# Patient Record
Sex: FEMALE | Race: BLACK OR AFRICAN AMERICAN | NOT HISPANIC OR LATINO | Employment: UNEMPLOYED | URBAN - METROPOLITAN AREA
[De-identification: names, ages, dates, MRNs, and addresses within clinical notes are randomized per-mention and may not be internally consistent; named-entity substitution may affect disease eponyms.]

---

## 2017-06-28 ENCOUNTER — HOSPITAL ENCOUNTER (EMERGENCY)
Facility: HOSPITAL | Age: 27
Discharge: HOME | End: 2017-06-28
Attending: EMERGENCY MEDICINE
Payer: MEDICAID

## 2017-06-28 VITALS
DIASTOLIC BLOOD PRESSURE: 74 MMHG | TEMPERATURE: 98 F | HEART RATE: 71 BPM | BODY MASS INDEX: 39.85 KG/M2 | OXYGEN SATURATION: 99 % | HEIGHT: 60 IN | WEIGHT: 203 LBS | SYSTOLIC BLOOD PRESSURE: 128 MMHG | RESPIRATION RATE: 20 BRPM

## 2017-06-28 DIAGNOSIS — S05.01XA CORNEAL ABRASION, RIGHT, INITIAL ENCOUNTER: Primary | ICD-10-CM

## 2017-06-28 LAB
B-HCG UR QL: NEGATIVE
CTP QC/QA: YES

## 2017-06-28 PROCEDURE — 81025 URINE PREGNANCY TEST: CPT | Performed by: NURSE PRACTITIONER

## 2017-06-28 PROCEDURE — 25000003 PHARM REV CODE 250: Performed by: NURSE PRACTITIONER

## 2017-06-28 PROCEDURE — 99283 EMERGENCY DEPT VISIT LOW MDM: CPT

## 2017-06-28 RX ORDER — ERYTHROMYCIN 5 MG/G
OINTMENT OPHTHALMIC
Qty: 1 TUBE | Refills: 0 | Status: SHIPPED | OUTPATIENT
Start: 2017-06-28 | End: 2018-06-01 | Stop reason: CLARIF

## 2017-06-28 RX ORDER — TETRACAINE HYDROCHLORIDE 5 MG/ML
2 SOLUTION OPHTHALMIC
Status: COMPLETED | OUTPATIENT
Start: 2017-06-28 | End: 2017-06-28

## 2017-06-28 RX ADMIN — FLUORESCEIN SODIUM 1 STRIP: 1 STRIP OPHTHALMIC at 05:06

## 2017-06-28 RX ADMIN — TETRACAINE HYDROCHLORIDE 2 DROP: 5 SOLUTION OPHTHALMIC at 05:06
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
## 2017-06-28 NOTE — DISCHARGE INSTRUCTIONS
You're being discharged with a diagnosis of corneal abrasion.  This is caused by a scratch to the surface of the eye.  Try not to rub or touch the eye surface.  Apply the eye ointment as prescribed and see an optometrist as soon as possible for continuing care.  If the other eye becomes irritated or begins to have similar symptoms return to the emergency Department or see her primary care provider.
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
## 2017-06-30 ENCOUNTER — OFFICE VISIT (OUTPATIENT)
Dept: OPTOMETRY | Facility: CLINIC | Age: 27
End: 2017-06-30
Payer: MEDICAID

## 2017-06-30 DIAGNOSIS — H11.31 SUBCONJUNCTIVAL HEMORRHAGE, RIGHT: ICD-10-CM

## 2017-06-30 DIAGNOSIS — H20.9 TRAUMATIC IRITIS: Primary | ICD-10-CM

## 2017-06-30 PROCEDURE — 99212 OFFICE O/P EST SF 10 MIN: CPT | Mod: PBBFAC,PO | Performed by: OPTOMETRIST

## 2017-06-30 PROCEDURE — 92004 COMPRE OPH EXAM NEW PT 1/>: CPT | Mod: S$PBB,,, | Performed by: OPTOMETRIST

## 2017-06-30 PROCEDURE — 99999 PR PBB SHADOW E&M-EST. PATIENT-LVL II: CPT | Mod: PBBFAC,,, | Performed by: OPTOMETRIST

## 2017-06-30 RX ORDER — PREDNISOLONE ACETATE 10 MG/ML
1 SUSPENSION/ DROPS OPHTHALMIC 4 TIMES DAILY
Qty: 5 ML | Refills: 0 | Status: SHIPPED | OUTPATIENT
Start: 2017-06-30 | End: 2017-07-07
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
## 2018-06-01 ENCOUNTER — HOSPITAL ENCOUNTER (EMERGENCY)
Facility: HOSPITAL | Age: 28
Discharge: HOME | End: 2018-06-01
Attending: EMERGENCY MEDICINE
Payer: MEDICAID

## 2018-06-01 VITALS
HEART RATE: 74 BPM | DIASTOLIC BLOOD PRESSURE: 71 MMHG | WEIGHT: 179 LBS | TEMPERATURE: 99 F | OXYGEN SATURATION: 100 % | SYSTOLIC BLOOD PRESSURE: 112 MMHG | RESPIRATION RATE: 20 BRPM | BODY MASS INDEX: 34.96 KG/M2

## 2018-06-01 DIAGNOSIS — Z32.01 PREGNANCY EXAMINATION OR TEST, POSITIVE RESULT: ICD-10-CM

## 2018-06-01 DIAGNOSIS — D64.9 ANEMIA, UNSPECIFIED TYPE: Primary | ICD-10-CM

## 2018-06-01 DIAGNOSIS — M25.50 ARTHRALGIA, UNSPECIFIED JOINT: ICD-10-CM

## 2018-06-01 DIAGNOSIS — O21.0 MILD HYPEREMESIS GRAVIDARUM, ANTEPARTUM: ICD-10-CM

## 2018-06-01 DIAGNOSIS — H10.9 CONJUNCTIVITIS OF RIGHT EYE, UNSPECIFIED CONJUNCTIVITIS TYPE: ICD-10-CM

## 2018-06-01 LAB
ALBUMIN SERPL BCP-MCNC: 3.5 G/DL
ALP SERPL-CCNC: 71 U/L
ALT SERPL W/O P-5'-P-CCNC: 6 U/L
ANION GAP SERPL CALC-SCNC: 9 MMOL/L
ANISOCYTOSIS BLD QL SMEAR: ABNORMAL
AST SERPL-CCNC: 13 U/L
B-HCG UR QL: NEGATIVE
BACTERIA #/AREA URNS HPF: ABNORMAL /HPF
BASOPHILS # BLD AUTO: 0.02 K/UL
BASOPHILS NFR BLD: 0.3 %
BILIRUB SERPL-MCNC: 0.2 MG/DL
BILIRUB UR QL STRIP: NEGATIVE
BUN SERPL-MCNC: 8 MG/DL
CALCIUM SERPL-MCNC: 9.3 MG/DL
CHLORIDE SERPL-SCNC: 104 MMOL/L
CLARITY UR: ABNORMAL
CO2 SERPL-SCNC: 24 MMOL/L
COLOR UR: YELLOW
CREAT SERPL-MCNC: 0.7 MG/DL
CTP QC/QA: YES
DIFFERENTIAL METHOD: ABNORMAL
EOSINOPHIL # BLD AUTO: 0.2 K/UL
EOSINOPHIL NFR BLD: 2.9 %
ERYTHROCYTE [DISTWIDTH] IN BLOOD BY AUTOMATED COUNT: 22.1 %
EST. GFR  (AFRICAN AMERICAN): >60 ML/MIN/1.73 M^2
EST. GFR  (NON AFRICAN AMERICAN): >60 ML/MIN/1.73 M^2
GLUCOSE SERPL-MCNC: 88 MG/DL
GLUCOSE UR QL STRIP: NEGATIVE
HCT VFR BLD AUTO: 25.6 %
HGB BLD-MCNC: 8 G/DL
HGB UR QL STRIP: NEGATIVE
HYPOCHROMIA BLD QL SMEAR: ABNORMAL
KETONES UR QL STRIP: NEGATIVE
LEUKOCYTE ESTERASE UR QL STRIP: ABNORMAL
LIPASE SERPL-CCNC: 14 U/L
LYMPHOCYTES # BLD AUTO: 2.6 K/UL
LYMPHOCYTES NFR BLD: 32.8 %
MCH RBC QN AUTO: 15.4 PG
MCHC RBC AUTO-ENTMCNC: 31.3 G/DL
MCV RBC AUTO: 49 FL
MICROSCOPIC COMMENT: ABNORMAL
MONOCYTES # BLD AUTO: 0.6 K/UL
MONOCYTES NFR BLD: 8 %
NEUTROPHILS # BLD AUTO: 4.5 K/UL
NEUTROPHILS NFR BLD: 56 %
NITRITE UR QL STRIP: NEGATIVE
OVALOCYTES BLD QL SMEAR: ABNORMAL
PH UR STRIP: 6 [PH] (ref 5–8)
PLATELET # BLD AUTO: 434 K/UL
PMV BLD AUTO: ABNORMAL FL
POIKILOCYTOSIS BLD QL SMEAR: SLIGHT
POTASSIUM SERPL-SCNC: 3.8 MMOL/L
PROT SERPL-MCNC: 8.3 G/DL
PROT UR QL STRIP: NEGATIVE
RBC # BLD AUTO: 5.2 M/UL
RBC #/AREA URNS HPF: 1 /HPF (ref 0–4)
SCHISTOCYTES BLD QL SMEAR: ABNORMAL
SODIUM SERPL-SCNC: 137 MMOL/L
SP GR UR STRIP: 1.02 (ref 1–1.03)
SQUAMOUS #/AREA URNS HPF: 2 /HPF
URN SPEC COLLECT METH UR: ABNORMAL
UROBILINOGEN UR STRIP-ACNC: NEGATIVE EU/DL
WBC # BLD AUTO: 8 K/UL
WBC #/AREA URNS HPF: 5 /HPF (ref 0–5)

## 2018-06-01 PROCEDURE — 81000 URINALYSIS NONAUTO W/SCOPE: CPT

## 2018-06-01 PROCEDURE — 83690 ASSAY OF LIPASE: CPT

## 2018-06-01 PROCEDURE — 63600175 PHARM REV CODE 636 W HCPCS: Performed by: PHYSICIAN ASSISTANT

## 2018-06-01 PROCEDURE — 99284 EMERGENCY DEPT VISIT MOD MDM: CPT | Mod: 25

## 2018-06-01 PROCEDURE — 80053 COMPREHEN METABOLIC PANEL: CPT

## 2018-06-01 PROCEDURE — 96374 THER/PROPH/DIAG INJ IV PUSH: CPT

## 2018-06-01 PROCEDURE — 81025 URINE PREGNANCY TEST: CPT | Performed by: NURSE PRACTITIONER

## 2018-06-01 PROCEDURE — 85025 COMPLETE CBC W/AUTO DIFF WBC: CPT

## 2018-06-01 RX ORDER — MELOXICAM 7.5 MG/1
7.5 TABLET ORAL DAILY
Qty: 30 TABLET | Refills: 0 | Status: SHIPPED | OUTPATIENT
Start: 2018-06-01 | End: 2020-03-23

## 2018-06-01 RX ORDER — KETOROLAC TROMETHAMINE 30 MG/ML
15 INJECTION, SOLUTION INTRAMUSCULAR; INTRAVENOUS
Status: COMPLETED | OUTPATIENT
Start: 2018-06-01 | End: 2018-06-01

## 2018-06-01 RX ORDER — ERYTHROMYCIN 5 MG/G
OINTMENT OPHTHALMIC
Qty: 1 TUBE | Refills: 0 | Status: SHIPPED | OUTPATIENT
Start: 2018-06-01 | End: 2018-06-19

## 2018-06-01 RX ADMIN — KETOROLAC TROMETHAMINE 15 MG: 30 INJECTION, SOLUTION INTRAMUSCULAR at 10:06

## 2018-06-01 NOTE — ED PROVIDER NOTES
"Encounter Date: 2018  This is an initial triage evaluation of Nikkie Myles, a 28 y.o., female  that presents to the Emergency Department with c/o "everything hurting" x1 week.     Pertinent exam findings:  Dry mucous membranes, tachycardia    Orders Pending :  HCG,    Destination:  FT    I have evaluated and provided a medical screening exam with initial orders placed, if indicated, to expedite care. The patient is stable to return to the waiting area and will be placed in a treatment area when one is available. Care will be transferred to an alternate provider when patient is roomed from the lobby for full assessment including: history, physical exam, additional orders, and final disposition.      CALLI Leo-RAVI        History     Chief Complaint   Patient presents with    Generalized Body Aches     general pain all over, legs and arms x 1 wk; denies cough, denies fever     27 y/o female presents c/o joint pain all over x 1 wk. She denies fever or recent injury. She states the pain has been moving all over. Today b/l hands and knees hurt. She denies swelling, warmth or redness. PT also is c/o right eye redness and drainage x 2 days. She denies change in vision.           Review of patient's allergies indicates:  No Known Allergies  History reviewed. No pertinent past medical history.  Past Surgical History:   Procedure Laterality Date     SECTION, LOW TRANSVERSE  2013     Family History   Problem Relation Age of Onset    Hypertension Mother     Hypertension Father      Social History   Substance Use Topics    Smoking status: Former Smoker     Packs/day: 1.00     Years: 5.00    Smokeless tobacco: Never Used    Alcohol use Yes      Comment: occ     Review of Systems   Constitutional: Negative for fever.        Myalgia   Eyes: Positive for discharge (right ), redness and itching. Negative for photophobia, pain and visual disturbance.   Respiratory: Negative for chest tightness.  "   Cardiovascular: Negative for chest pain.   Gastrointestinal: Negative for abdominal distention.   Musculoskeletal: Positive for arthralgias. Negative for joint swelling.       Physical Exam     Initial Vitals [06/01/18 0936]   BP Pulse Resp Temp SpO2   131/81 110 20 99.2 °F (37.3 °C) 100 %      MAP       97.67         Physical Exam    Constitutional: She appears well-developed and well-nourished.   HENT:   Right Ear: External ear normal.   Left Ear: External ear normal.   Mouth/Throat: Oropharynx is clear and moist.   Eyes: EOM and lids are normal. Pupils are equal, round, and reactive to light. Lids are everted and swept, no foreign bodies found. Right eye exhibits discharge. Right conjunctiva is injected. Left conjunctiva is not injected.   Slit lamp exam:       The right eye shows no corneal abrasion, no corneal ulcer and no fluorescein uptake.   Neck: Normal range of motion. Neck supple.   Cardiovascular: Normal rate, regular rhythm, normal heart sounds and intact distal pulses.   Pulmonary/Chest: Breath sounds normal.   Abdominal: Soft.   Musculoskeletal: Normal range of motion. She exhibits no edema or tenderness.   Neurological: She is alert and oriented to person, place, and time. She has normal strength. She displays normal reflexes. No cranial nerve deficit or sensory deficit.   Skin: Skin is warm.         ED Course   Procedures  Labs Reviewed   URINALYSIS - Abnormal; Notable for the following:        Result Value    Appearance, UA Hazy (*)     Leukocytes, UA 1+ (*)     All other components within normal limits   CBC W/ AUTO DIFFERENTIAL - Abnormal; Notable for the following:     Hemoglobin 8.0 (*)     Hematocrit 25.6 (*)     MCV 49 (*)     MCH 15.4 (*)     MCHC 31.3 (*)     RDW 22.1 (*)     Platelets 434 (*)     All other components within normal limits   COMPREHENSIVE METABOLIC PANEL - Abnormal; Notable for the following:     ALT 6 (*)     All other components within normal limits   URINALYSIS  MICROSCOPIC - Abnormal; Notable for the following:     Bacteria, UA Few (*)     All other components within normal limits   LIPASE   POCT URINE PREGNANCY             Medical Decision Making:   Initial Assessment:   PT;s hgb is 8 which is her base line. All other labs unremarkable.Pt is afebrile and non-toxic appearing. Vitals stable.  Pt states she has not taken iron supp in the past 2 months. I recommended pt to start taking supp again. PT has no nerurological deficits on exam, full rom of all ext, and distal pulses 3 +. Etiology of pt symptoms unknown at this time. Pt instrcuted to f/up with pcp and rheumatology to r/out a possible rheumatologic cause. PT stable for d/c                      Clinical Impression:   arthralgia  anemia    No orders to display                              RIKI Thompson  06/06/18 0013
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
## 2018-06-19 ENCOUNTER — OFFICE VISIT (OUTPATIENT)
Dept: OPTOMETRY | Facility: CLINIC | Age: 28
End: 2018-06-19
Payer: MEDICAID

## 2018-06-19 DIAGNOSIS — H16.201 KERATOCONJUNCTIVITIS OF RIGHT EYE: Primary | ICD-10-CM

## 2018-06-19 PROCEDURE — 99999 PR PBB SHADOW E&M-EST. PATIENT-LVL II: CPT | Mod: PBBFAC,,, | Performed by: OPTOMETRIST

## 2018-06-19 PROCEDURE — 92014 COMPRE OPH EXAM EST PT 1/>: CPT | Mod: S$PBB,,, | Performed by: OPTOMETRIST

## 2018-06-19 PROCEDURE — 99212 OFFICE O/P EST SF 10 MIN: CPT | Mod: PBBFAC,PO | Performed by: OPTOMETRIST

## 2018-06-19 RX ORDER — PREDNISOLONE ACETATE 10 MG/ML
1 SUSPENSION/ DROPS OPHTHALMIC
Qty: 5 ML | Refills: 0 | Status: SHIPPED | OUTPATIENT
Start: 2018-06-19 | End: 2018-06-29

## 2018-06-19 NOTE — PROGRESS NOTES
Subjective:       Patient ID: Nikkie Myles is a 28 y.o. female      Chief Complaint   Patient presents with    Other     Urgent Care     History of Present Illness  Last Eye Exam: 6/30/2017 w/ Dr. Frausto.    Pt here for urgent care.  Pt states that she uses allergy drops b/c eyes are red/swollen.  Pt went to ER and was given erythromycin--no improvement.  Pt denies injury.    (+) Eye pain/discomfort: 10/10 od only  (+) Blurry Vision  (--) Double Vision  (+) Itchy Eyes od  (+) Watery Eyes od only  (--) Dry Eyes  (--) Floaters/Black Spots  (--) Headaches  (+) Photophobia: od only  ---------------------------------------------------------------------------    Eye Meds: none; last used erythromycin 3 days ago.  ---------------------------------------------------------------------------    Glasses: SVLs  Contacts: none    Pt reports pain 10/10. Some light sensitivity, blurry VA, started a few weeks ago, pt thought it was getting better but started getting worse again.         Assessment/Plan:     1. Keratoconjunctivitis of right eye  ? Toxic/chemical keratoconjunctivitis. Pt denies trauma to eye. 2-3+ diffuse SPK with mild corneal edema. No abrasion/no ulcer. Start PF q2h OD today, q3h OD tomorrow, then QID OD til see me again. RTC Friday for follow up, sooner PRN.   - prednisoLONE acetate (PRED FORTE) 1 % DrpS; Place 1 drop into the right eye every 2 (two) hours.  Dispense: 5 mL; Refill: 0    Follow-up in about 3 days (around 6/22/2018) for follow up.

## 2018-06-22 ENCOUNTER — OFFICE VISIT (OUTPATIENT)
Dept: OPTOMETRY | Facility: CLINIC | Age: 28
End: 2018-06-22
Payer: MEDICAID

## 2018-06-22 DIAGNOSIS — H16.201 KERATOCONJUNCTIVITIS OF RIGHT EYE: Primary | ICD-10-CM

## 2018-06-22 PROCEDURE — 99999 PR PBB SHADOW E&M-EST. PATIENT-LVL II: CPT | Mod: PBBFAC,,, | Performed by: OPTOMETRIST

## 2018-06-22 PROCEDURE — 92012 INTRM OPH EXAM EST PATIENT: CPT | Mod: S$PBB,,, | Performed by: OPTOMETRIST

## 2018-06-22 PROCEDURE — 99212 OFFICE O/P EST SF 10 MIN: CPT | Mod: PBBFAC,PO | Performed by: OPTOMETRIST

## 2018-06-22 NOTE — PROGRESS NOTES
Subjective:       Patient ID: Nikkie Myles is a 28 y.o. female      Chief Complaint   Patient presents with    Follow-up     History of Present Illness  Dls: 6/19/18 Dr. Frausto     27 y/o female presents today for f/u kertoconjunctivitis od.   Pt states od doing better no pain slight photophobia off/on od mucous od in am.     Eye meds:   PF od q 4 hrs         Assessment/Plan:     1. Keratoconjunctivitis of right eye  Symptoms improving per pt. PH OD 20/30. Continue PF QID OD. F/u 1 week, sooner PRN.    Follow-up in about 1 week (around 6/29/2018), or if symptoms worsen or fail to improve, for follow up.

## 2018-06-29 ENCOUNTER — OFFICE VISIT (OUTPATIENT)
Dept: OPTOMETRY | Facility: CLINIC | Age: 28
End: 2018-06-29
Payer: MEDICAID

## 2018-06-29 DIAGNOSIS — H16.201 KERATOCONJUNCTIVITIS OF RIGHT EYE: Primary | ICD-10-CM

## 2018-06-29 DIAGNOSIS — H18.509 CORNEAL DYSTROPHY: ICD-10-CM

## 2018-06-29 PROCEDURE — 99999 PR PBB SHADOW E&M-EST. PATIENT-LVL II: CPT | Mod: PBBFAC,,, | Performed by: OPTOMETRIST

## 2018-06-29 PROCEDURE — 99212 OFFICE O/P EST SF 10 MIN: CPT | Mod: PBBFAC,PO | Performed by: OPTOMETRIST

## 2018-06-29 PROCEDURE — 92012 INTRM OPH EXAM EST PATIENT: CPT | Mod: S$PBB,,, | Performed by: OPTOMETRIST

## 2018-06-29 NOTE — PROGRESS NOTES
Subjective:       Patient ID: Nikkie Myles is a 28 y.o. female      Chief Complaint   Patient presents with    Follow-up     History of Present Illness  Dls: 6/22/18 Dr. Frausto    29 y/o female presents today for f/u keratoconjunctivitis od.   Pt states od feels much better still having blurry vision od.     Eye meds:  PF od qid last dose this am         Assessment/Plan:     1. Keratoconjunctivitis of right eye  2. ? Corneal dystrophy  Pt used romycin noris and allergy drops with no relief started PF 06/19/18. Symptoms improved since initial treatment, no more pain/photophobia/discharge but still with blurry VA. Diffuse SPK on exam with ? Corneal dystrophy. Pt denies trauma/injury to eye. Continue PF QID OD.  - Ambulatory referral to Ophthalmology    Follow-up in about 1 week (around 7/6/2018) for Dr. Carter corneal consult.

## 2018-07-20 ENCOUNTER — OFFICE VISIT (OUTPATIENT)
Dept: OPHTHALMOLOGY | Facility: CLINIC | Age: 28
End: 2018-07-20
Payer: MEDICAID

## 2018-07-20 DIAGNOSIS — H18.891 LIMBAL STEM CELL DEFICIENCY OF RIGHT EYE: Primary | ICD-10-CM

## 2018-07-20 DIAGNOSIS — H10.13 CONJUNCTIVITIS, ALLERGIC, BILATERAL: ICD-10-CM

## 2018-07-20 DIAGNOSIS — H52.213 IRREGULAR ASTIGMATISM OF BOTH EYES: ICD-10-CM

## 2018-07-20 PROCEDURE — 92025 CPTRIZED CORNEAL TOPOGRAPHY: CPT | Mod: PBBFAC | Performed by: OPHTHALMOLOGY

## 2018-07-20 PROCEDURE — 99212 OFFICE O/P EST SF 10 MIN: CPT | Mod: PBBFAC | Performed by: OPHTHALMOLOGY

## 2018-07-20 PROCEDURE — 92014 COMPRE OPH EXAM EST PT 1/>: CPT | Mod: S$PBB,,, | Performed by: OPHTHALMOLOGY

## 2018-07-20 PROCEDURE — 99999 PR PBB SHADOW E&M-EST. PATIENT-LVL II: CPT | Mod: PBBFAC,,, | Performed by: OPHTHALMOLOGY

## 2018-07-20 NOTE — PROGRESS NOTES
HPI     Referred by  Dr. Jett fernández sicca OD    PF od qid last dose this am     Patient returns  for f/u keratoconjunctivitis od. Pt reports discharge   through out the day in corner of OD.  Pt states od feels much better still having blurry vision od.     Last edited by Jeimy Carter MD on 7/20/2018  9:21 AM. (History)            Assessment /Plan     For exam results, see Encounter Report.    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    - improving subjectively and clinically on course of steroids, drops as below  - alaway vs. Zaditor, cool compresses, no rubbing     Blurred VA OD  - 2/2 irreg astig from dry surface, confirmed with fred today      PF BID x 2 wks then qd until you see me again.  F/up 4 wks, va/IOP OU, then back to dr. mei

## 2018-08-08 ENCOUNTER — TELEPHONE (OUTPATIENT)
Dept: OPTOMETRY | Facility: CLINIC | Age: 28
End: 2018-08-08

## 2019-10-22 ENCOUNTER — HOSPITAL ENCOUNTER (EMERGENCY)
Facility: HOSPITAL | Age: 29
Discharge: HOME | End: 2019-10-22
Attending: EMERGENCY MEDICINE
Payer: MEDICAID

## 2019-10-22 VITALS
TEMPERATURE: 99 F | SYSTOLIC BLOOD PRESSURE: 103 MMHG | HEART RATE: 76 BPM | OXYGEN SATURATION: 100 % | RESPIRATION RATE: 18 BRPM | HEIGHT: 60 IN | BODY MASS INDEX: 34.36 KG/M2 | WEIGHT: 175 LBS | DIASTOLIC BLOOD PRESSURE: 60 MMHG

## 2019-10-22 DIAGNOSIS — H16.001 CORNEAL ULCERATION, RIGHT: Primary | ICD-10-CM

## 2019-10-22 LAB
B-HCG UR QL: NEGATIVE
CTP QC/QA: YES

## 2019-10-22 PROCEDURE — 25000003 PHARM REV CODE 250: Performed by: EMERGENCY MEDICINE

## 2019-10-22 PROCEDURE — 90715 TDAP VACCINE 7 YRS/> IM: CPT | Mod: SL,ER | Performed by: NURSE PRACTITIONER

## 2019-10-22 PROCEDURE — 25000003 PHARM REV CODE 250: Mod: ER | Performed by: NURSE PRACTITIONER

## 2019-10-22 PROCEDURE — 63600175 PHARM REV CODE 636 W HCPCS: Mod: SL,ER | Performed by: NURSE PRACTITIONER

## 2019-10-22 PROCEDURE — 87186 SC STD MICRODIL/AGAR DIL: CPT

## 2019-10-22 PROCEDURE — 99285 EMERGENCY DEPT VISIT HI MDM: CPT | Mod: 25

## 2019-10-22 PROCEDURE — 25000003 PHARM REV CODE 250: Mod: ER | Performed by: EMERGENCY MEDICINE

## 2019-10-22 PROCEDURE — 87070 CULTURE OTHR SPECIMN AEROBIC: CPT

## 2019-10-22 PROCEDURE — 87077 CULTURE AEROBIC IDENTIFY: CPT

## 2019-10-22 PROCEDURE — 81025 URINE PREGNANCY TEST: CPT | Mod: ER | Performed by: EMERGENCY MEDICINE

## 2019-10-22 PROCEDURE — 90471 IMMUNIZATION ADMIN: CPT | Mod: VFC,ER | Performed by: NURSE PRACTITIONER

## 2019-10-22 RX ORDER — MOXIFLOXACIN 5 MG/ML
1 SOLUTION/ DROPS OPHTHALMIC
Status: COMPLETED | OUTPATIENT
Start: 2019-10-22 | End: 2019-10-22

## 2019-10-22 RX ORDER — PROPARACAINE HYDROCHLORIDE 5 MG/ML
1 SOLUTION/ DROPS OPHTHALMIC
Status: COMPLETED | OUTPATIENT
Start: 2019-10-22 | End: 2019-10-22

## 2019-10-22 RX ORDER — MOXIFLOXACIN 5 MG/ML
1 SOLUTION/ DROPS OPHTHALMIC
Qty: 3 ML | Refills: 0 | Status: SHIPPED | OUTPATIENT
Start: 2019-10-22 | End: 2020-03-23

## 2019-10-22 RX ADMIN — MOXIFLOXACIN HYDROCHLORIDE 1 DROP: 5 SOLUTION/ DROPS OPHTHALMIC at 10:10

## 2019-10-22 RX ADMIN — PROPARACAINE HYDROCHLORIDE 1 DROP: 5 SOLUTION/ DROPS OPHTHALMIC at 04:10

## 2019-10-22 RX ADMIN — FLUORESCEIN SODIUM 1 EACH: 1 STRIP OPHTHALMIC at 04:10

## 2019-10-22 RX ADMIN — CLOSTRIDIUM TETANI TOXOID ANTIGEN (FORMALDEHYDE INACTIVATED), CORYNEBACTERIUM DIPHTHERIAE TOXOID ANTIGEN (FORMALDEHYDE INACTIVATED), BORDETELLA PERTUSSIS TOXOID ANTIGEN (GLUTARALDEHYDE INACTIVATED), BORDETELLA PERTUSSIS FILAMENTOUS HEMAGGLUTININ ANTIGEN (FORMALDEHYDE INACTIVATED), BORDETELLA PERTUSSIS PERTACTIN ANTIGEN, AND BORDETELLA PERTUSSIS FIMBRIAE 2/3 ANTIGEN 0.5 ML: 5; 2; 2.5; 5; 3; 5 INJECTION, SUSPENSION INTRAMUSCULAR at 04:10

## 2019-10-22 RX ADMIN — FLUORESCEIN SODIUM 1 EACH: 1 STRIP OPHTHALMIC at 07:10

## 2019-10-22 RX ADMIN — MOXIFLOXACIN HYDROCHLORIDE 1 DROP: 5 SOLUTION/ DROPS OPHTHALMIC at 05:10

## 2019-10-22 RX ADMIN — PROPARACAINE HYDROCHLORIDE 1 DROP: 5 SOLUTION/ DROPS OPHTHALMIC at 07:10

## 2019-10-22 NOTE — ED TRIAGE NOTES
Patient sent to ED from [facility] for Optho consult,  reported corneal ulcer. Pain to right eye, denies fevers.

## 2019-10-22 NOTE — ED TRIAGE NOTES
Right eye pain that started a couple of weeks ago. Pt. Reports history of wearing fake eyelashes. Swelling noted to the right outer eye. Pt is alert and oriented ambulatory respirations even unlabored. Will continue to monitor.

## 2019-10-22 NOTE — DISCHARGE INSTRUCTIONS
Follow-up with Ophthalmology tomorrow as scheduled.     You can take Tylenol and/or ibuprofen as needed for symptom control.
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
## 2019-10-22 NOTE — ED NOTES
Pt informed to go straight to [facility] with no detours. Pt informed report has been called and they are waiting on them. V/u. Transfer paper work placed in an envelope and sent with the pt.

## 2019-10-22 NOTE — ED NOTES
Patient identifiers verified and correct for Ms Myles  C/C: Right eye pain  APPEARANCE: awake and alert in NAD. Denies eye injury or redness, denies blurred vision  SKIN: warm, dry and intact. No breakdown or bruising.  MUSCULOSKELETAL: Patient moving all extremities spontaneously, no obvious swelling or deformities noted. Ambulates independently.  RESPIRATORY: Denies shortness of breath.Respirations unlabored.   CARDIAC: Denies CP, 2+ distal pulses; no peripheral edema  ABDOMEN: S/ND/NT, Denies nausea  : voids spontaneously, denies difficulty  Neurologic: AAO x 4; follows commands equal strength in all extremities; denies numbness/tingling. Denies dizziness denies weakness

## 2019-10-23 ENCOUNTER — OFFICE VISIT (OUTPATIENT)
Dept: OPHTHALMOLOGY | Facility: CLINIC | Age: 29
End: 2019-10-23
Payer: MEDICAID

## 2019-10-23 ENCOUNTER — TELEPHONE (OUTPATIENT)
Dept: OPHTHALMOLOGY | Facility: CLINIC | Age: 29
End: 2019-10-23

## 2019-10-23 DIAGNOSIS — H16.9 KERATITIS: Primary | ICD-10-CM

## 2019-10-23 PROCEDURE — 92012 INTRM OPH EXAM EST PATIENT: CPT | Mod: S$PBB,,, | Performed by: OPHTHALMOLOGY

## 2019-10-23 PROCEDURE — 92012 PR EYE EXAM, EST PATIENT,INTERMED: ICD-10-PCS | Mod: S$PBB,,, | Performed by: OPHTHALMOLOGY

## 2019-10-23 PROCEDURE — 99212 OFFICE O/P EST SF 10 MIN: CPT | Mod: PBBFAC | Performed by: OPHTHALMOLOGY

## 2019-10-23 PROCEDURE — 99999 PR PBB SHADOW E&M-EST. PATIENT-LVL II: ICD-10-PCS | Mod: PBBFAC,,, | Performed by: OPHTHALMOLOGY

## 2019-10-23 PROCEDURE — 99999 PR PBB SHADOW E&M-EST. PATIENT-LVL II: CPT | Mod: PBBFAC,,, | Performed by: OPHTHALMOLOGY

## 2019-10-23 RX ORDER — NEOMYCIN SULFATE, POLYMYXIN B SULFATE AND DEXAMETHASONE 3.5; 10000; 1 MG/ML; [USP'U]/ML; MG/ML
1 SUSPENSION/ DROPS OPHTHALMIC 4 TIMES DAILY
Qty: 5 ML | Refills: 1 | Status: SHIPPED | OUTPATIENT
Start: 2019-10-23 | End: 2019-11-22

## 2019-10-23 NOTE — PROGRESS NOTES
HPI     Follow-up      Additional comments: k ulcer OD ED follow-up               Comments     Patient reports that she went ED yesterday for eye pain, redness and   discomfort. She was told she had a K-ulcer OD. No improvement since   yesterday. Using the drops as instructed.     Vision blurry     Drops Moxifloxicin q2 hours OD           Last edited by Pj Prado on 10/23/2019  4:31 PM. (History)            Assessment /Plan     For exam results, see Encounter Report.    Keratitis      Staph Marginal appearing leesa limbal infiltrates OD with peripheral vascularization superiorly.  Poss secondary infection, but start with Maxitrol and monitor.
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
## 2019-10-23 NOTE — ED NOTES
Visual exam completed, states she is only able to see 20/200 right eye, positive light sensitivity.

## 2019-10-25 LAB — BACTERIA SPEC AEROBE CULT: ABNORMAL

## 2019-10-30 ENCOUNTER — OFFICE VISIT (OUTPATIENT)
Dept: OPHTHALMOLOGY | Facility: CLINIC | Age: 29
End: 2019-10-30
Payer: MEDICAID

## 2019-10-30 DIAGNOSIS — H16.001 ULCER OF RIGHT CORNEA: Primary | ICD-10-CM

## 2019-10-30 PROCEDURE — 99212 OFFICE O/P EST SF 10 MIN: CPT | Mod: PBBFAC | Performed by: OPHTHALMOLOGY

## 2019-10-30 PROCEDURE — 99999 PR PBB SHADOW E&M-EST. PATIENT-LVL II: CPT | Mod: PBBFAC,,, | Performed by: OPHTHALMOLOGY

## 2019-10-30 PROCEDURE — 92014 COMPRE OPH EXAM EST PT 1/>: CPT | Mod: S$PBB,,, | Performed by: OPHTHALMOLOGY

## 2019-10-30 PROCEDURE — 99999 PR PBB SHADOW E&M-EST. PATIENT-LVL II: ICD-10-PCS | Mod: PBBFAC,,, | Performed by: OPHTHALMOLOGY

## 2019-10-30 PROCEDURE — 92014 PR EYE EXAM, EST PATIENT,COMPREHESV: ICD-10-PCS | Mod: S$PBB,,, | Performed by: OPHTHALMOLOGY

## 2019-10-30 NOTE — PROGRESS NOTES
HPI      k ulcer OD     Pt states that OD feels a lot better but vision is still blurry in the OD.   Denies pain or discomfort OU today.    Moxifloxacin QID OD      Last edited by Margaret Garcia on 10/30/2019  3:46 PM. (History)            Assessment /Plan     For exam results, see Encounter Report.    Ulcer of right cornea      Some improvement with Maxitrol, but MRSA+ cx so add Vanco q3-4hrs  Limbal areas clearing, but still with dense central infiltrate 1.2mm.

## 2020-01-22 ENCOUNTER — OFFICE VISIT (OUTPATIENT)
Dept: OPHTHALMOLOGY | Facility: CLINIC | Age: 30
End: 2020-01-22
Payer: MEDICAID

## 2020-01-22 DIAGNOSIS — Z22.322 MRSA (METHICILLIN RESISTANT STAPH AUREUS) CULTURE POSITIVE: Primary | ICD-10-CM

## 2020-01-22 DIAGNOSIS — H16.001 ULCER OF RIGHT CORNEA: ICD-10-CM

## 2020-01-22 PROCEDURE — 99213 OFFICE O/P EST LOW 20 MIN: CPT | Mod: PBBFAC | Performed by: OPHTHALMOLOGY

## 2020-01-22 PROCEDURE — 92014 PR EYE EXAM, EST PATIENT,COMPREHESV: ICD-10-PCS | Mod: S$PBB,,, | Performed by: OPHTHALMOLOGY

## 2020-01-22 PROCEDURE — 99999 PR PBB SHADOW E&M-EST. PATIENT-LVL III: ICD-10-PCS | Mod: PBBFAC,,, | Performed by: OPHTHALMOLOGY

## 2020-01-22 PROCEDURE — 92014 COMPRE OPH EXAM EST PT 1/>: CPT | Mod: S$PBB,,, | Performed by: OPHTHALMOLOGY

## 2020-01-22 PROCEDURE — 99999 PR PBB SHADOW E&M-EST. PATIENT-LVL III: CPT | Mod: PBBFAC,,, | Performed by: OPHTHALMOLOGY

## 2020-01-22 NOTE — PROGRESS NOTES
HPI     30 YO female presents today for an ulcer F/U OD. She states that her   vision is very foggy and blurred. Notes occasional eye sharp pain and   tearing OD more often in the morning in bright lighting. No longer using   any eye drops ran out.     Last edited by Cheri Arboleda, PCT on 1/22/2020  4:07 PM. (History)            Assessment /Plan     For exam results, see Encounter Report.    MRSA (methicillin resistant staph aureus) culture positive    Ulcer of right cornea      Central infiltrate now w hx MRSA cx +  Unable to gert fortified Vanco before, but urged compliance.  2-3mm central white infiltrate.(photos taken)  Vanco q2-3hrs

## 2020-01-23 ENCOUNTER — TELEPHONE (OUTPATIENT)
Dept: OPHTHALMOLOGY | Facility: CLINIC | Age: 30
End: 2020-01-23

## 2020-01-23 NOTE — TELEPHONE ENCOUNTER
----- Message from Henri Martinez sent at 1/23/2020  1:54 PM CST -----  Type:  Needs Medical Advice    Who Called: Pt    Would the patient rather a call back or a response via [facility]? Call back    Best Call Back Number: [phone]    Additional Information: Need a letter stating her diagnosis for her job

## 2020-01-29 ENCOUNTER — OFFICE VISIT (OUTPATIENT)
Dept: OPHTHALMOLOGY | Facility: CLINIC | Age: 30
End: 2020-01-29
Payer: MEDICAID

## 2020-01-29 DIAGNOSIS — Z22.322 MRSA (METHICILLIN RESISTANT STAPH AUREUS) CULTURE POSITIVE: ICD-10-CM

## 2020-01-29 DIAGNOSIS — H16.001 ULCER OF RIGHT CORNEA: Primary | ICD-10-CM

## 2020-01-29 PROCEDURE — 92014 COMPRE OPH EXAM EST PT 1/>: CPT | Mod: S$PBB,,, | Performed by: OPHTHALMOLOGY

## 2020-01-29 PROCEDURE — 99999 PR PBB SHADOW E&M-EST. PATIENT-LVL II: ICD-10-PCS | Mod: PBBFAC,,, | Performed by: OPHTHALMOLOGY

## 2020-01-29 PROCEDURE — 99212 OFFICE O/P EST SF 10 MIN: CPT | Mod: PBBFAC | Performed by: OPHTHALMOLOGY

## 2020-01-29 PROCEDURE — 92014 PR EYE EXAM, EST PATIENT,COMPREHESV: ICD-10-PCS | Mod: S$PBB,,, | Performed by: OPHTHALMOLOGY

## 2020-01-29 PROCEDURE — 99999 PR PBB SHADOW E&M-EST. PATIENT-LVL II: CPT | Mod: PBBFAC,,, | Performed by: OPHTHALMOLOGY

## 2020-01-29 NOTE — PATIENT INSTRUCTIONS
Much improved with vanco, so Continue current treatment/medications     3-4 weeks of abx anticipated.  NOT contagious, so OK to work.

## 2020-01-29 NOTE — PROGRESS NOTES
HPI     28 YO female presents today for an ulcer F/U OD. She notes not much   change since her visit last week.     Vanco OD every 2-3 hours.     Last edited by CANDIDA Brunson on 1/29/2020 10:17 AM. (History)            Assessment /Plan     For exam results, see Encounter Report.    Ulcer of right cornea    MRSA (methicillin resistant staph aureus) culture positive      Much improved with vanco, so Continue current treatment/medications  Infiltrate starting to lighten and dry, but 3-4 weeks of abx anticipated.  NOT contagious, so OK to work.

## 2020-03-23 ENCOUNTER — OFFICE VISIT (OUTPATIENT)
Dept: OBSTETRICS AND GYNECOLOGY | Facility: CLINIC | Age: 30
End: 2020-03-23
Payer: MEDICAID

## 2020-03-23 VITALS
BODY MASS INDEX: 33.07 KG/M2 | SYSTOLIC BLOOD PRESSURE: 102 MMHG | DIASTOLIC BLOOD PRESSURE: 60 MMHG | WEIGHT: 168.44 LBS | HEIGHT: 60 IN

## 2020-03-23 DIAGNOSIS — Z32.01 POSITIVE PREGNANCY TEST: Primary | ICD-10-CM

## 2020-03-23 DIAGNOSIS — N92.6 MISSED PERIOD: ICD-10-CM

## 2020-03-23 LAB
B-HCG UR QL: POSITIVE
CTP QC/QA: YES

## 2020-03-23 PROCEDURE — 81025 URINE PREGNANCY TEST: CPT | Mod: PBBFAC | Performed by: OBSTETRICS & GYNECOLOGY

## 2020-03-23 PROCEDURE — 99999 PR PBB SHADOW E&M-EST. PATIENT-LVL III: CPT | Mod: PBBFAC,,, | Performed by: OBSTETRICS & GYNECOLOGY

## 2020-03-23 PROCEDURE — 87491 CHLMYD TRACH DNA AMP PROBE: CPT

## 2020-03-23 PROCEDURE — 99204 PR OFFICE/OUTPT VISIT, NEW, LEVL IV, 45-59 MIN: ICD-10-PCS | Mod: TH,S$PBB,, | Performed by: OBSTETRICS & GYNECOLOGY

## 2020-03-23 PROCEDURE — 99204 OFFICE O/P NEW MOD 45 MIN: CPT | Mod: TH,S$PBB,, | Performed by: OBSTETRICS & GYNECOLOGY

## 2020-03-23 PROCEDURE — 99213 OFFICE O/P EST LOW 20 MIN: CPT | Mod: PBBFAC,TH | Performed by: OBSTETRICS & GYNECOLOGY

## 2020-03-23 PROCEDURE — 99999 PR PBB SHADOW E&M-EST. PATIENT-LVL III: ICD-10-PCS | Mod: PBBFAC,,, | Performed by: OBSTETRICS & GYNECOLOGY
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
## 2020-03-24 LAB
C TRACH DNA SPEC QL NAA+PROBE: NOT DETECTED
N GONORRHOEA DNA SPEC QL NAA+PROBE: NOT DETECTED

## 2020-04-14 ENCOUNTER — INITIAL PRENATAL (OUTPATIENT)
Dept: OBSTETRICS AND GYNECOLOGY | Facility: CLINIC | Age: 30
End: 2020-04-14
Payer: MEDICAID

## 2020-04-14 ENCOUNTER — LAB VISIT (OUTPATIENT)
Dept: LAB | Facility: HOSPITAL | Age: 30
End: 2020-04-14
Attending: OBSTETRICS & GYNECOLOGY
Payer: MEDICAID

## 2020-04-14 VITALS
WEIGHT: 171.94 LBS | BODY MASS INDEX: 33.58 KG/M2 | DIASTOLIC BLOOD PRESSURE: 66 MMHG | SYSTOLIC BLOOD PRESSURE: 110 MMHG

## 2020-04-14 DIAGNOSIS — Z3A.20 20 WEEKS GESTATION OF PREGNANCY: Primary | ICD-10-CM

## 2020-04-14 DIAGNOSIS — Z34.92 SECOND TRIMESTER PREGNANCY: ICD-10-CM

## 2020-04-14 DIAGNOSIS — O34.219 PREVIOUS CESAREAN SECTION COMPLICATING PREGNANCY, ANTEPARTUM CONDITION OR COMPLICATION: ICD-10-CM

## 2020-04-14 DIAGNOSIS — Z3A.20 20 WEEKS GESTATION OF PREGNANCY: ICD-10-CM

## 2020-04-14 DIAGNOSIS — L02.31 CUTANEOUS ABSCESS OF BUTTOCK: ICD-10-CM

## 2020-04-14 LAB
ABO + RH BLD: NORMAL
BASOPHILS # BLD AUTO: 0.02 K/UL (ref 0–0.2)
BASOPHILS NFR BLD: 0.3 % (ref 0–1.9)
BLD GP AB SCN CELLS X3 SERPL QL: NORMAL
BLD GP AB SCN CELLS X3 SERPL QL: NORMAL
DIFFERENTIAL METHOD: ABNORMAL
EOSINOPHIL # BLD AUTO: 0.1 K/UL (ref 0–0.5)
EOSINOPHIL NFR BLD: 0.9 % (ref 0–8)
ERYTHROCYTE [DISTWIDTH] IN BLOOD BY AUTOMATED COUNT: 21.1 % (ref 11.5–14.5)
HCT VFR BLD AUTO: 25.7 % (ref 37–48.5)
HGB BLD-MCNC: 7.3 G/DL (ref 12–16)
IMM GRANULOCYTES # BLD AUTO: 0.01 K/UL (ref 0–0.04)
IMM GRANULOCYTES NFR BLD AUTO: 0.2 % (ref 0–0.5)
LYMPHOCYTES # BLD AUTO: 1.4 K/UL (ref 1–4.8)
LYMPHOCYTES NFR BLD: 22.5 % (ref 18–48)
MCH RBC QN AUTO: 14.6 PG (ref 27–31)
MCHC RBC AUTO-ENTMCNC: 28.4 G/DL (ref 32–36)
MCV RBC AUTO: 52 FL (ref 82–98)
MONOCYTES # BLD AUTO: 0.5 K/UL (ref 0.3–1)
MONOCYTES NFR BLD: 7.4 % (ref 4–15)
NEUTROPHILS # BLD AUTO: 4.4 K/UL (ref 1.8–7.7)
NEUTROPHILS NFR BLD: 68.7 % (ref 38–73)
NRBC BLD-RTO: 0 /100 WBC
PLATELET # BLD AUTO: 377 K/UL (ref 150–350)
PMV BLD AUTO: ABNORMAL FL (ref 9.2–12.9)
RBC # BLD AUTO: 4.99 M/UL (ref 4–5.4)
T4 FREE SERPL-MCNC: 0.85 NG/DL (ref 0.71–1.51)
WBC # BLD AUTO: 6.39 K/UL (ref 3.9–12.7)

## 2020-04-14 PROCEDURE — 86762 RUBELLA ANTIBODY: CPT

## 2020-04-14 PROCEDURE — 86850 RBC ANTIBODY SCREEN: CPT

## 2020-04-14 PROCEDURE — 83036 HEMOGLOBIN GLYCOSYLATED A1C: CPT

## 2020-04-14 PROCEDURE — 85025 COMPLETE CBC W/AUTO DIFF WBC: CPT

## 2020-04-14 PROCEDURE — 99999 PR PBB SHADOW E&M-EST. PATIENT-LVL II: ICD-10-PCS | Mod: PBBFAC,,, | Performed by: OBSTETRICS & GYNECOLOGY

## 2020-04-14 PROCEDURE — 86901 BLOOD TYPING SEROLOGIC RH(D): CPT

## 2020-04-14 PROCEDURE — 87075 CULTR BACTERIA EXCEPT BLOOD: CPT

## 2020-04-14 PROCEDURE — 86703 HIV-1/HIV-2 1 RESULT ANTBDY: CPT

## 2020-04-14 PROCEDURE — 99204 OFFICE O/P NEW MOD 45 MIN: CPT | Mod: TH,S$PBB,, | Performed by: OBSTETRICS & GYNECOLOGY

## 2020-04-14 PROCEDURE — 86592 SYPHILIS TEST NON-TREP QUAL: CPT

## 2020-04-14 PROCEDURE — 87070 CULTURE OTHR SPECIMN AEROBIC: CPT | Mod: 59

## 2020-04-14 PROCEDURE — 87340 HEPATITIS B SURFACE AG IA: CPT

## 2020-04-14 PROCEDURE — 87086 URINE CULTURE/COLONY COUNT: CPT

## 2020-04-14 PROCEDURE — 86803 HEPATITIS C AB TEST: CPT

## 2020-04-14 PROCEDURE — 84439 ASSAY OF FREE THYROXINE: CPT

## 2020-04-14 PROCEDURE — 83020 HEMOGLOBIN ELECTROPHORESIS: CPT

## 2020-04-14 PROCEDURE — 99212 OFFICE O/P EST SF 10 MIN: CPT | Mod: PBBFAC,TH,25 | Performed by: OBSTETRICS & GYNECOLOGY

## 2020-04-14 PROCEDURE — 87147 CULTURE TYPE IMMUNOLOGIC: CPT

## 2020-04-14 PROCEDURE — 99999 PR PBB SHADOW E&M-EST. PATIENT-LVL II: CPT | Mod: PBBFAC,,, | Performed by: OBSTETRICS & GYNECOLOGY

## 2020-04-14 PROCEDURE — 99204 PR OFFICE/OUTPT VISIT, NEW, LEVL IV, 45-59 MIN: ICD-10-PCS | Mod: TH,S$PBB,, | Performed by: OBSTETRICS & GYNECOLOGY

## 2020-04-14 NOTE — PROGRESS NOTES
Discussed [employer] [facility].    Program explained with risks and benefits.  Patient opted in.  Orders in.  Equipments will be sent to her along with specific instructions.

## 2020-04-14 NOTE — PROGRESS NOTES
NOB here for initial prenatal care. Pt feeling really tired due to being restless. Pt with boil on left buttocks for 1 week. frances
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
## 2020-04-15 LAB
ESTIMATED AVG GLUCOSE: 108 MG/DL (ref 68–131)
HBA1C MFR BLD HPLC: 5.4 % (ref 4–5.6)
HBV SURFACE AG SERPL QL IA: NEGATIVE
HCV AB SERPL QL IA: NEGATIVE
HIV 1+2 AB+HIV1 P24 AG SERPL QL IA: NEGATIVE
RPR SER QL: NORMAL
RUBV IGG SER-ACNC: 21.5 IU/ML
RUBV IGG SER-IMP: REACTIVE

## 2020-04-16 LAB
BACTERIA SPEC AEROBE CULT: ABNORMAL
BACTERIA UR CULT: NORMAL
HGB A MFR BLD ELPH: 97.7 % (ref 95.8–98)
HGB A2 MFR BLD: 2.3 % (ref 2–3.3)
HGB F MFR BLD: 0 % (ref 0–0.9)
HGB FRACT BLD ELPH-IMP: NORMAL
HGB OTHER MFR BLD ELPH: 0 %
THEVP VARIANT 2: NORMAL
THEVP VARIANT 3: NORMAL

## 2020-04-18 LAB — BACTERIA SPEC ANAEROBE CULT: NORMAL

## 2020-04-28 ENCOUNTER — PROCEDURE VISIT (OUTPATIENT)
Dept: MATERNAL FETAL MEDICINE | Facility: CLINIC | Age: 30
End: 2020-04-28
Payer: MEDICAID

## 2020-04-28 DIAGNOSIS — Z36.4 ANTENATAL SCREENING FOR FETAL GROWTH RETARDATION USING ULTRASONICS: ICD-10-CM

## 2020-04-28 DIAGNOSIS — Z36.3 ANTENATAL SCREENING FOR MALFORMATION USING ULTRASONICS: ICD-10-CM

## 2020-04-28 DIAGNOSIS — Z32.01 POSITIVE PREGNANCY TEST: ICD-10-CM

## 2020-04-28 DIAGNOSIS — Z3A.23 23 WEEKS GESTATION OF PREGNANCY: ICD-10-CM

## 2020-04-28 PROCEDURE — 76805 OB US >/= 14 WKS SNGL FETUS: CPT | Mod: 26,S$PBB,, | Performed by: OBSTETRICS & GYNECOLOGY

## 2020-04-28 PROCEDURE — 76805 OB US >/= 14 WKS SNGL FETUS: CPT | Mod: PBBFAC,PO | Performed by: OBSTETRICS & GYNECOLOGY

## 2020-04-28 PROCEDURE — 99499 UNLISTED E&M SERVICE: CPT | Mod: S$PBB,,, | Performed by: OBSTETRICS & GYNECOLOGY

## 2020-04-28 PROCEDURE — 99499 NO LOS: ICD-10-PCS | Mod: S$PBB,,, | Performed by: OBSTETRICS & GYNECOLOGY

## 2020-04-28 PROCEDURE — 76805 PR US, OB 14+WKS, TRANSABD, SINGLE GESTATION: ICD-10-PCS | Mod: 26,S$PBB,, | Performed by: OBSTETRICS & GYNECOLOGY

## 2020-06-16 ENCOUNTER — TELEPHONE (OUTPATIENT)
Dept: OBSTETRICS AND GYNECOLOGY | Facility: CLINIC | Age: 30
End: 2020-06-16

## 2020-06-16 NOTE — TELEPHONE ENCOUNTER
Attempted to reach pt left message with mother for call back       ----- Message from Irene Delatorre sent at 6/16/2020  3:24 PM CDT -----  Contact: Self [phone] or [phone]  Type:  Sooner Appointment Request    Patient is requesting a sooner appointment.  Patient declined first available appointment listed as well as another facility and provider .  Patient will not accept being placed on the waitlist and is requesting a message be sent to doctor.    Name of Caller: Self    When is the first available appointment? 7/8    Symptoms: OB appt/missed the last one    Would the patient rather a call back or a response via My [facility]? Call back    Best Call Back Number: [phone] or [phone]

## 2020-06-16 NOTE — TELEPHONE ENCOUNTER
Spoke with pt appt scheduled     ----- Message from Aubrie Piedra sent at 6/16/2020  3:47 PM CDT -----  Contact: Patient [phone]  Type:  Patient Returning Call    Who Called: Patient    Who Left Message for Patient: Darlene    Does the patient know what this is regarding?: Appointment    Would the patient rather a call back or a response via [facility]? Call back    Best Call Back Number: [phone]

## 2020-06-22 DIAGNOSIS — Z36.89 ENCOUNTER FOR ULTRASOUND TO CHECK FETAL GROWTH: Primary | ICD-10-CM

## 2020-06-24 ENCOUNTER — LAB VISIT (OUTPATIENT)
Dept: LAB | Facility: HOSPITAL | Age: 30
End: 2020-06-24
Attending: OBSTETRICS & GYNECOLOGY
Payer: MEDICAID

## 2020-06-24 ENCOUNTER — CLINICAL SUPPORT (OUTPATIENT)
Dept: OBSTETRICS AND GYNECOLOGY | Facility: CLINIC | Age: 30
End: 2020-06-24
Payer: MEDICAID

## 2020-06-24 ENCOUNTER — ROUTINE PRENATAL (OUTPATIENT)
Dept: OBSTETRICS AND GYNECOLOGY | Facility: CLINIC | Age: 30
End: 2020-06-24
Payer: MEDICAID

## 2020-06-24 ENCOUNTER — TELEPHONE (OUTPATIENT)
Dept: OBSTETRICS AND GYNECOLOGY | Facility: CLINIC | Age: 30
End: 2020-06-24

## 2020-06-24 VITALS
BODY MASS INDEX: 35 KG/M2 | BODY MASS INDEX: 35.43 KG/M2 | WEIGHT: 180.44 LBS | HEIGHT: 60 IN | DIASTOLIC BLOOD PRESSURE: 60 MMHG | SYSTOLIC BLOOD PRESSURE: 110 MMHG | WEIGHT: 179.25 LBS

## 2020-06-24 DIAGNOSIS — Z34.93 THIRD TRIMESTER PREGNANCY: ICD-10-CM

## 2020-06-24 DIAGNOSIS — Z23 NEED FOR TDAP VACCINATION: Primary | ICD-10-CM

## 2020-06-24 DIAGNOSIS — Z23 NEED FOR TDAP VACCINATION: ICD-10-CM

## 2020-06-24 DIAGNOSIS — O99.810 GLUCOSE INTOLERANCE OF PREGNANCY: Primary | ICD-10-CM

## 2020-06-24 DIAGNOSIS — L02.31 CUTANEOUS ABSCESS OF BUTTOCK: ICD-10-CM

## 2020-06-24 DIAGNOSIS — Z3A.31 31 WEEKS GESTATION OF PREGNANCY: ICD-10-CM

## 2020-06-24 DIAGNOSIS — Z3A.31 31 WEEKS GESTATION OF PREGNANCY: Primary | ICD-10-CM

## 2020-06-24 DIAGNOSIS — O34.219 PREVIOUS CESAREAN SECTION COMPLICATING PREGNANCY, ANTEPARTUM CONDITION OR COMPLICATION: ICD-10-CM

## 2020-06-24 LAB
ACANTHOCYTES BLD QL SMEAR: PRESENT
ANISOCYTOSIS BLD QL SMEAR: SLIGHT
BASOPHILS # BLD AUTO: 0.03 K/UL (ref 0–0.2)
BASOPHILS NFR BLD: 0.3 % (ref 0–1.9)
BURR CELLS BLD QL SMEAR: ABNORMAL
DACRYOCYTES BLD QL SMEAR: ABNORMAL
DIFFERENTIAL METHOD: ABNORMAL
EOSINOPHIL # BLD AUTO: 0.1 K/UL (ref 0–0.5)
EOSINOPHIL NFR BLD: 0.6 % (ref 0–8)
ERYTHROCYTE [DISTWIDTH] IN BLOOD BY AUTOMATED COUNT: 22.2 % (ref 11.5–14.5)
GLUCOSE SERPL-MCNC: 145 MG/DL (ref 70–140)
HCT VFR BLD AUTO: 25 % (ref 37–48.5)
HGB BLD-MCNC: 7.1 G/DL (ref 12–16)
HYPOCHROMIA BLD QL SMEAR: ABNORMAL
IMM GRANULOCYTES # BLD AUTO: 0.03 K/UL (ref 0–0.04)
IMM GRANULOCYTES NFR BLD AUTO: 0.3 % (ref 0–0.5)
LYMPHOCYTES # BLD AUTO: 2.1 K/UL (ref 1–4.8)
LYMPHOCYTES NFR BLD: 23 % (ref 18–48)
MCH RBC QN AUTO: 14.3 PG (ref 27–31)
MCHC RBC AUTO-ENTMCNC: 28.4 G/DL (ref 32–36)
MCV RBC AUTO: 50 FL (ref 82–98)
MONOCYTES # BLD AUTO: 0.5 K/UL (ref 0.3–1)
MONOCYTES NFR BLD: 5.9 % (ref 4–15)
NEUTROPHILS # BLD AUTO: 6.3 K/UL (ref 1.8–7.7)
NEUTROPHILS NFR BLD: 69.9 % (ref 38–73)
NRBC BLD-RTO: 0 /100 WBC
PLATELET # BLD AUTO: 400 K/UL (ref 150–350)
PMV BLD AUTO: ABNORMAL FL (ref 9.2–12.9)
POIKILOCYTOSIS BLD QL SMEAR: SLIGHT
RBC # BLD AUTO: 4.97 M/UL (ref 4–5.4)
WBC # BLD AUTO: 8.95 K/UL (ref 3.9–12.7)

## 2020-06-24 PROCEDURE — 82950 GLUCOSE TEST: CPT

## 2020-06-24 PROCEDURE — 90471 IMMUNIZATION ADMIN: CPT | Mod: PBBFAC

## 2020-06-24 PROCEDURE — 99213 PR OFFICE/OUTPT VISIT, EST, LEVL III, 20-29 MIN: ICD-10-PCS | Mod: TH,S$PBB,, | Performed by: OBSTETRICS & GYNECOLOGY

## 2020-06-24 PROCEDURE — 99999 PR PBB SHADOW E&M-EST. PATIENT-LVL III: ICD-10-PCS | Mod: PBBFAC,,, | Performed by: OBSTETRICS & GYNECOLOGY

## 2020-06-24 PROCEDURE — 99213 OFFICE O/P EST LOW 20 MIN: CPT | Mod: TH,S$PBB,, | Performed by: OBSTETRICS & GYNECOLOGY

## 2020-06-24 PROCEDURE — 99213 OFFICE O/P EST LOW 20 MIN: CPT | Mod: PBBFAC,TH,25 | Performed by: OBSTETRICS & GYNECOLOGY

## 2020-06-24 PROCEDURE — 85025 COMPLETE CBC W/AUTO DIFF WBC: CPT

## 2020-06-24 PROCEDURE — 99999 PR PBB SHADOW E&M-EST. PATIENT-LVL II: CPT | Mod: PBBFAC,,,

## 2020-06-24 PROCEDURE — 36415 COLL VENOUS BLD VENIPUNCTURE: CPT

## 2020-06-24 PROCEDURE — 99999 PR PBB SHADOW E&M-EST. PATIENT-LVL III: CPT | Mod: PBBFAC,,, | Performed by: OBSTETRICS & GYNECOLOGY

## 2020-06-24 PROCEDURE — 99999 PR PBB SHADOW E&M-EST. PATIENT-LVL II: ICD-10-PCS | Mod: PBBFAC,,,

## 2020-06-24 RX ORDER — AMPICILLIN 500 MG/1
500 CAPSULE ORAL 3 TIMES DAILY
Qty: 21 CAPSULE | Refills: 0 | Status: SHIPPED | OUTPATIENT
Start: 2020-06-24 | End: 2020-07-01

## 2020-06-24 NOTE — PROGRESS NOTES
Tdap injection given IM to the L/DELTOID without difficulty. Pt advised to remain in the clinic for 15 minutes for any adverse reactions.

## 2020-06-24 NOTE — PROGRESS NOTES
31w0d  NO new complaint.  Has not been back since she was 20 weeks    Discussed and given Tdap  Scheduled RCS with BTL for 8/19/2020.  Medicaid tubal consent today.  Glucose tolerance test.  Ampicillin per request.  Still with abscess.    Encourage compliance.  BAck in 2 weeks

## 2020-06-30 ENCOUNTER — PROCEDURE VISIT (OUTPATIENT)
Dept: MATERNAL FETAL MEDICINE | Facility: CLINIC | Age: 30
End: 2020-06-30
Payer: MEDICAID

## 2020-06-30 DIAGNOSIS — Z36.89 ENCOUNTER FOR ULTRASOUND TO CHECK FETAL GROWTH: ICD-10-CM

## 2020-06-30 PROCEDURE — 76816 PR  US,PREGNANT UTERUS,F/U,TRANSABD APP: ICD-10-PCS | Mod: 26,S$PBB,, | Performed by: PEDIATRICS

## 2020-06-30 PROCEDURE — 76816 OB US FOLLOW-UP PER FETUS: CPT | Mod: PBBFAC,PO | Performed by: PEDIATRICS

## 2020-06-30 PROCEDURE — 76819 PR US, OB, FETAL BIOPHYSICAL, W/O NST: ICD-10-PCS | Mod: 26,S$PBB,, | Performed by: PEDIATRICS

## 2020-06-30 PROCEDURE — 76819 FETAL BIOPHYS PROFIL W/O NST: CPT | Mod: PBBFAC,PO | Performed by: PEDIATRICS

## 2020-06-30 PROCEDURE — 76816 OB US FOLLOW-UP PER FETUS: CPT | Mod: 26,S$PBB,, | Performed by: PEDIATRICS

## 2020-06-30 PROCEDURE — 76819 FETAL BIOPHYS PROFIL W/O NST: CPT | Mod: 26,S$PBB,, | Performed by: PEDIATRICS

## 2020-07-06 DIAGNOSIS — O26.93 COMPLICATION OF PREGNANCY IN THIRD TRIMESTER: Primary | ICD-10-CM

## 2020-07-08 ENCOUNTER — INITIAL CONSULT (OUTPATIENT)
Dept: MATERNAL FETAL MEDICINE | Facility: CLINIC | Age: 30
End: 2020-07-08
Payer: MEDICAID

## 2020-07-08 VITALS — SYSTOLIC BLOOD PRESSURE: 104 MMHG | DIASTOLIC BLOOD PRESSURE: 76 MMHG | WEIGHT: 183 LBS | BODY MASS INDEX: 35.74 KG/M2

## 2020-07-08 DIAGNOSIS — O35.9XX0 FETAL ABNORMALITY AFFECTING MANAGEMENT OF MOTHER, SINGLE OR UNSPECIFIED FETUS: Primary | ICD-10-CM

## 2020-07-08 DIAGNOSIS — O26.93 COMPLICATION OF PREGNANCY IN THIRD TRIMESTER: ICD-10-CM

## 2020-07-08 PROCEDURE — 99999 PR PBB SHADOW E&M-EST. PATIENT-LVL III: CPT | Mod: PBBFAC,,, | Performed by: PEDIATRICS

## 2020-07-08 PROCEDURE — 99213 OFFICE O/P EST LOW 20 MIN: CPT | Mod: PBBFAC,TH,PO | Performed by: PEDIATRICS

## 2020-07-08 PROCEDURE — 99214 PR OFFICE/OUTPT VISIT, EST, LEVL IV, 30-39 MIN: ICD-10-PCS | Mod: S$PBB,TH,, | Performed by: PEDIATRICS

## 2020-07-08 PROCEDURE — 99214 OFFICE O/P EST MOD 30 MIN: CPT | Mod: S$PBB,TH,, | Performed by: PEDIATRICS

## 2020-07-08 PROCEDURE — 99999 PR PBB SHADOW E&M-EST. PATIENT-LVL III: ICD-10-PCS | Mod: PBBFAC,,, | Performed by: PEDIATRICS

## 2020-07-08 NOTE — LETTER
July 25, 2020      Jasper Hester MD  [address] [facility] [address]  [address]  [place] [address]           [facility]IC[facility]  [address], [address]  [facility] [address]  Phone: [phone]  Fax: [phone]          Patient: Nikkie Myles   MR Number: [id number]   YOB: 1990   Date of Visit: 7/8/2020       Dear Dr. Jasper Hester:    Thank you for referring Nikkie Myles to me for evaluation. Attached you will find relevant portions of my assessment and plan of care.    If you have questions, please do not hesitate to call me. I look forward to following Nikkie Myles along with you.    Sincerely,    Hoda Kaufman MD    Enclosure  CC:  No Recipients    If you would like to receive this communication electronically, please contact [email or web address] or [phone] to request more information on [employer] access.    For providers and/or their staff who would like to refer a patient to [facility], please contact us through our one-stop-shop provider referral line, [facility], at [phone].    If you feel you have received this communication in error or would no longer like to receive these types of communications, please e-mail [email or web address]

## 2020-07-20 ENCOUNTER — NURSE TRIAGE (OUTPATIENT)
Dept: ADMINISTRATIVE | Facility: CLINIC | Age: 30
End: 2020-07-20

## 2020-07-20 NOTE — TELEPHONE ENCOUNTER
Patient calling, states she is SOB, started two days ago, much worse at night but now she is very SOB, very anxious, audible SOB noted in phone conversation, Triage done, ED advice given, patient verb understanding, will have a family member bring her to the ED,     Reason for Disposition   MODERATE difficulty breathing (e.g., speaks in phrases, SOB even at rest, pulse 100-120) of new onset or worse than normal    Additional Information   Negative: Breathing stopped and hasn't returned   Negative: Choking on something   Negative: SEVERE difficulty breathing (e.g., struggling for each breath, speaks in single words, pulse > 120)   Negative: Bluish (or gray) lips or face   Negative: Difficult to awaken or acting confused (e.g., disoriented, slurred speech)   Negative: Passed out (i.e., fainted, collapsed and was not responding)   Negative: Wheezing started suddenly after medicine, an allergic food, or bee sting   Negative: Stridor   Negative: Slow, shallow and weak breathing   Negative: Sounds like a life-threatening emergency to the triager   Negative: Chest pain   Negative: Wheezing (high pitched whistling sound) and previous asthma attacks or use of asthma medicines   Negative: Difficulty breathing and only present when coughing   Negative: Difficulty breathing and only from stuffy or runny nose    Protocols used: BREATHING DIFFICULTY-A-OH

## 2020-07-21 ENCOUNTER — TELEPHONE (OUTPATIENT)
Dept: OBSTETRICS AND GYNECOLOGY | Facility: CLINIC | Age: 30
End: 2020-07-21

## 2020-07-21 NOTE — TELEPHONE ENCOUNTER
Attempted to return pt call no answer lm for call back   ----- Message from Zuleyka John sent at 7/20/2020  3:43 PM CDT -----  Contact: ISREAL ANDERSON [[id number]]  Type: Patient Call Back    Who called: ISREAL ANDERSON [[id number]]    What is the request in detail: Patient is requesting a call back. She states that she is having SOB. She states that this has been going on a couple of nights ago. She states that she is about 34 weeks pregnant.   Please advise.    Can the clinic reply by [facility]? No    Best call back number: [phone]    Additional Information: N/A

## 2020-07-23 ENCOUNTER — LAB VISIT (OUTPATIENT)
Dept: LAB | Facility: HOSPITAL | Age: 30
End: 2020-07-23
Attending: OBSTETRICS & GYNECOLOGY
Payer: MEDICAID

## 2020-07-23 ENCOUNTER — ROUTINE PRENATAL (OUTPATIENT)
Dept: OBSTETRICS AND GYNECOLOGY | Facility: CLINIC | Age: 30
End: 2020-07-23
Payer: MEDICAID

## 2020-07-23 VITALS
SYSTOLIC BLOOD PRESSURE: 138 MMHG | DIASTOLIC BLOOD PRESSURE: 80 MMHG | BODY MASS INDEX: 38.23 KG/M2 | WEIGHT: 195.75 LBS

## 2020-07-23 DIAGNOSIS — O34.219 PREVIOUS CESAREAN SECTION COMPLICATING PREGNANCY, ANTEPARTUM CONDITION OR COMPLICATION: ICD-10-CM

## 2020-07-23 DIAGNOSIS — Z3A.35 35 WEEKS GESTATION OF PREGNANCY: Primary | ICD-10-CM

## 2020-07-23 DIAGNOSIS — O99.810 GLUCOSE INTOLERANCE OF PREGNANCY: ICD-10-CM

## 2020-07-23 DIAGNOSIS — Z3A.35 35 WEEKS GESTATION OF PREGNANCY: ICD-10-CM

## 2020-07-23 LAB
ALBUMIN SERPL BCP-MCNC: 2.1 G/DL (ref 3.5–5.2)
ALP SERPL-CCNC: 130 U/L (ref 55–135)
ALT SERPL W/O P-5'-P-CCNC: <5 U/L (ref 10–44)
ANION GAP SERPL CALC-SCNC: 3 MMOL/L (ref 8–16)
ANISOCYTOSIS BLD QL SMEAR: ABNORMAL
AST SERPL-CCNC: 13 U/L (ref 10–40)
BASOPHILS # BLD AUTO: 0.03 K/UL (ref 0–0.2)
BASOPHILS NFR BLD: 0.4 % (ref 0–1.9)
BILIRUB SERPL-MCNC: 0.1 MG/DL (ref 0.1–1)
BUN SERPL-MCNC: 9 MG/DL (ref 6–20)
CALCIUM SERPL-MCNC: 7.9 MG/DL (ref 8.7–10.5)
CHLORIDE SERPL-SCNC: 110 MMOL/L (ref 95–110)
CO2 SERPL-SCNC: 23 MMOL/L (ref 23–29)
CREAT SERPL-MCNC: 0.6 MG/DL (ref 0.5–1.4)
DIFFERENTIAL METHOD: ABNORMAL
EOSINOPHIL # BLD AUTO: 0.1 K/UL (ref 0–0.5)
EOSINOPHIL NFR BLD: 0.6 % (ref 0–8)
ERYTHROCYTE [DISTWIDTH] IN BLOOD BY AUTOMATED COUNT: 22.8 % (ref 11.5–14.5)
EST. GFR  (AFRICAN AMERICAN): >60 ML/MIN/1.73 M^2
EST. GFR  (NON AFRICAN AMERICAN): >60 ML/MIN/1.73 M^2
ESTIMATED AVG GLUCOSE: 108 MG/DL (ref 68–131)
GLUCOSE SERPL-MCNC: 64 MG/DL (ref 70–110)
HBA1C MFR BLD HPLC: 5.4 % (ref 4–5.6)
HCT VFR BLD AUTO: 23.7 % (ref 37–48.5)
HGB BLD-MCNC: 6.6 G/DL (ref 12–16)
HYPOCHROMIA BLD QL SMEAR: ABNORMAL
IMM GRANULOCYTES # BLD AUTO: 0.09 K/UL (ref 0–0.04)
IMM GRANULOCYTES NFR BLD AUTO: 1.1 % (ref 0–0.5)
LYMPHOCYTES # BLD AUTO: 2 K/UL (ref 1–4.8)
LYMPHOCYTES NFR BLD: 24.9 % (ref 18–48)
MCH RBC QN AUTO: 13.9 PG (ref 27–31)
MCHC RBC AUTO-ENTMCNC: 27.8 G/DL (ref 32–36)
MCV RBC AUTO: 50 FL (ref 82–98)
MONOCYTES # BLD AUTO: 0.7 K/UL (ref 0.3–1)
MONOCYTES NFR BLD: 8.4 % (ref 4–15)
NEUTROPHILS # BLD AUTO: 5.3 K/UL (ref 1.8–7.7)
NEUTROPHILS NFR BLD: 64.6 % (ref 38–73)
NRBC BLD-RTO: 0 /100 WBC
OVALOCYTES BLD QL SMEAR: ABNORMAL
PLATELET # BLD AUTO: 355 K/UL (ref 150–350)
PMV BLD AUTO: ABNORMAL FL (ref 9.2–12.9)
POIKILOCYTOSIS BLD QL SMEAR: ABNORMAL
POLYCHROMASIA BLD QL SMEAR: ABNORMAL
POTASSIUM SERPL-SCNC: 3.9 MMOL/L (ref 3.5–5.1)
PROT SERPL-MCNC: 6.9 G/DL (ref 6–8.4)
RBC # BLD AUTO: 4.75 M/UL (ref 4–5.4)
SCHISTOCYTES BLD QL SMEAR: ABNORMAL
SODIUM SERPL-SCNC: 136 MMOL/L (ref 136–145)
TARGETS BLD QL SMEAR: ABNORMAL
URATE SERPL-MCNC: 5.2 MG/DL (ref 2.4–5.7)
WBC # BLD AUTO: 8.12 K/UL (ref 3.9–12.7)

## 2020-07-23 PROCEDURE — 99999 PR PBB SHADOW E&M-EST. PATIENT-LVL II: CPT | Mod: PBBFAC,,, | Performed by: OBSTETRICS & GYNECOLOGY

## 2020-07-23 PROCEDURE — 87147 CULTURE TYPE IMMUNOLOGIC: CPT

## 2020-07-23 PROCEDURE — 80053 COMPREHEN METABOLIC PANEL: CPT

## 2020-07-23 PROCEDURE — 36415 COLL VENOUS BLD VENIPUNCTURE: CPT

## 2020-07-23 PROCEDURE — 87081 CULTURE SCREEN ONLY: CPT

## 2020-07-23 PROCEDURE — 85025 COMPLETE CBC W/AUTO DIFF WBC: CPT

## 2020-07-23 PROCEDURE — 86703 HIV-1/HIV-2 1 RESULT ANTBDY: CPT

## 2020-07-23 PROCEDURE — 84550 ASSAY OF BLOOD/URIC ACID: CPT

## 2020-07-23 PROCEDURE — 99999 PR PBB SHADOW E&M-EST. PATIENT-LVL II: ICD-10-PCS | Mod: PBBFAC,,, | Performed by: OBSTETRICS & GYNECOLOGY

## 2020-07-23 PROCEDURE — 83036 HEMOGLOBIN GLYCOSYLATED A1C: CPT

## 2020-07-23 PROCEDURE — 99213 PR OFFICE/OUTPT VISIT, EST, LEVL III, 20-29 MIN: ICD-10-PCS | Mod: TH,S$PBB,, | Performed by: OBSTETRICS & GYNECOLOGY

## 2020-07-23 PROCEDURE — 99213 OFFICE O/P EST LOW 20 MIN: CPT | Mod: TH,S$PBB,, | Performed by: OBSTETRICS & GYNECOLOGY

## 2020-07-23 PROCEDURE — 99212 OFFICE O/P EST SF 10 MIN: CPT | Mod: PBBFAC,TH | Performed by: OBSTETRICS & GYNECOLOGY

## 2020-07-23 NOTE — PROGRESS NOTES
35w1d  No new complaint  Has not been back for 4 weeks  Problems with transportation    Has not done her 3hr OGTT    Occasional contractions  No vaginal bleeding or rupture of membranes  No discharge  Good fetal movements    Exam with cervix at closed    GBS, HIV and consents  Labor precautions  Encourage compliance  To the lab today for CBC, CMP, uric acid and hemoglobin A1c  Fetal kick count instructed and encouraged  Back next week with MFM    Still wants tubal ligation at her  section  Medicaid tubal consent signed 2020  RCS/BTL scheduled for 2020

## 2020-07-23 NOTE — PROGRESS NOTES
OB here for routine exam. Pt is aware of failed 1 hour glucose test today.  Her phone has been off and has been informed to contact her mother for the rest of the pregnancy. Pt will try to go have her 3 hour glucose test today. frances

## 2020-07-24 LAB
BACTERIA SPEC AEROBE CULT: ABNORMAL
HIV 1+2 AB+HIV1 P24 AG SERPL QL IA: NEGATIVE

## 2020-07-25 NOTE — PROGRESS NOTES
Patient was seen one week prior for US only.    US Findings:  Fetal size is AGA with the EFW at the 15%.   On repeat limited fetal anatomic survey, fetal extremities range from 1st percentile to 3rd percentile.  This is a marked drop off in extremity length.  There are no abnormalities of bone structure, density, shape.  No fractures noted.  Normal calcification. No other abnormalities are noted.      Although I suspect this is a normal variation secondary to mild shortening and late diagnorsis, and conveyed that to the patient, we did discuss skeletal dysplasias.    Skeletal dysplasias are a large, heterogeneous group of conditions involving the formation and growth of bone. Some skeletal dysplasias are associated with abnormalities in other organ systems. These disorders begin to manifest in the early stages of fetal development. Prenatal diagnosis is based primarily upon fetal ultrasound findings.    Patient is a smoker, but no other signs of IUGR noted.    A comprehensive assessment of the fetal skeleton is necessary to determine which bones are affected and the type and severity of the abnormalities. The presence of associated abnormalities in other systems also needs to be assessed. The low incidence, phenotypic variability, overlapping features, and lack of family history in the majority of cases make a specific diagnosis, and thus prognostic counselling, difficult.     Genetic defects have been identified for approximately 70% of the over 456 skeletal dysplasias and can be used to provide early prenatal diagnosis (before diagnostic findings are seen on ultrasound) or preimplantation diagnosis in at-risk families, or to confirm he presumptive diagnosis on imaging performed later in pregnancy. A fetal karyotype or microarray analysis is usually performed at the same time.  The patient did not have genetic screening, but there are no real signs of aneuploidy are present and patient has declined screening.  As  she is in late third trimester,  examination and follow up is recommended.      RECOMMENDATIONS:    1. Growth in 2 weeks with reevaluation of skeletal growth.  2. Follow up as indicated by that scan.          I spent 25 minutes in patient care management and consultation with >50% face to face.

## 2020-07-29 ENCOUNTER — ANESTHESIA (OUTPATIENT)
Dept: OBSTETRICS AND GYNECOLOGY | Facility: HOSPITAL | Age: 30
End: 2020-07-29
Payer: MEDICAID

## 2020-07-29 ENCOUNTER — ANESTHESIA EVENT (OUTPATIENT)
Dept: OBSTETRICS AND GYNECOLOGY | Facility: HOSPITAL | Age: 30
End: 2020-07-29
Payer: MEDICAID

## 2020-07-29 ENCOUNTER — INITIAL CONSULT (OUTPATIENT)
Dept: MATERNAL FETAL MEDICINE | Facility: CLINIC | Age: 30
End: 2020-07-29
Payer: MEDICAID

## 2020-07-29 ENCOUNTER — PROCEDURE VISIT (OUTPATIENT)
Dept: MATERNAL FETAL MEDICINE | Facility: CLINIC | Age: 30
End: 2020-07-29
Payer: MEDICAID

## 2020-07-29 ENCOUNTER — HOSPITAL ENCOUNTER (INPATIENT)
Facility: HOSPITAL | Age: 30
LOS: 2 days | Discharge: HOME | End: 2020-07-31
Attending: OBSTETRICS & GYNECOLOGY | Admitting: OBSTETRICS & GYNECOLOGY
Payer: MEDICAID

## 2020-07-29 VITALS
DIASTOLIC BLOOD PRESSURE: 102 MMHG | WEIGHT: 198.44 LBS | SYSTOLIC BLOOD PRESSURE: 155 MMHG | BODY MASS INDEX: 38.75 KG/M2

## 2020-07-29 DIAGNOSIS — O36.5930 INTRAUTERINE GROWTH RESTRICTION (IUGR) AFFECTING CARE OF MOTHER, THIRD TRIMESTER, SINGLE OR UNSPECIFIED FETUS: Primary | ICD-10-CM

## 2020-07-29 DIAGNOSIS — O35.9XX0 FETAL ABNORMALITY AFFECTING MANAGEMENT OF MOTHER, SINGLE OR UNSPECIFIED FETUS: ICD-10-CM

## 2020-07-29 DIAGNOSIS — O16.9 ELEVATED BLOOD PRESSURE AFFECTING PREGNANCY, ANTEPARTUM: ICD-10-CM

## 2020-07-29 DIAGNOSIS — Z98.891 STATUS POST CESAREAN SECTION: Primary | ICD-10-CM

## 2020-07-29 DIAGNOSIS — Z3A.36 36 WEEKS GESTATION OF PREGNANCY: ICD-10-CM

## 2020-07-29 LAB
ABO + RH BLD: NORMAL
ACANTHOCYTES BLD QL SMEAR: PRESENT
ALBUMIN SERPL BCP-MCNC: 2.1 G/DL (ref 3.5–5.2)
ALP SERPL-CCNC: 137 U/L (ref 55–135)
ALT SERPL W/O P-5'-P-CCNC: 7 U/L (ref 10–44)
AMORPH CRY URNS QL MICRO: ABNORMAL
AMPHET+METHAMPHET UR QL: NEGATIVE
ANION GAP SERPL CALC-SCNC: 5 MMOL/L (ref 8–16)
ANISOCYTOSIS BLD QL SMEAR: ABNORMAL
AST SERPL-CCNC: 14 U/L (ref 10–40)
BACTERIA #/AREA URNS HPF: ABNORMAL /HPF
BARBITURATES UR QL SCN>200 NG/ML: NEGATIVE
BASOPHILS # BLD AUTO: 0.02 K/UL (ref 0–0.2)
BASOPHILS NFR BLD: 0.2 % (ref 0–1.9)
BENZODIAZ UR QL SCN>200 NG/ML: NEGATIVE
BILIRUB SERPL-MCNC: 0.1 MG/DL (ref 0.1–1)
BILIRUB UR QL STRIP: NEGATIVE
BLD GP AB SCN CELLS X3 SERPL QL: NORMAL
BUN SERPL-MCNC: 10 MG/DL (ref 6–20)
BZE UR QL SCN: NEGATIVE
CALCIUM SERPL-MCNC: 8.2 MG/DL (ref 8.7–10.5)
CANNABINOIDS UR QL SCN: NEGATIVE
CHLORIDE SERPL-SCNC: 110 MMOL/L (ref 95–110)
CLARITY UR: CLEAR
CO2 SERPL-SCNC: 22 MMOL/L (ref 23–29)
COLOR UR: YELLOW
CREAT SERPL-MCNC: 0.7 MG/DL (ref 0.5–1.4)
CREAT UR-MCNC: 171.8 MG/DL (ref 15–325)
CREAT UR-MCNC: 188.6 MG/DL (ref 15–325)
DIFFERENTIAL METHOD: ABNORMAL
EOSINOPHIL # BLD AUTO: 0.1 K/UL (ref 0–0.5)
EOSINOPHIL NFR BLD: 0.7 % (ref 0–8)
ERYTHROCYTE [DISTWIDTH] IN BLOOD BY AUTOMATED COUNT: 23.3 % (ref 11.5–14.5)
EST. GFR  (AFRICAN AMERICAN): >60 ML/MIN/1.73 M^2
EST. GFR  (NON AFRICAN AMERICAN): >60 ML/MIN/1.73 M^2
GLUCOSE SERPL-MCNC: 89 MG/DL (ref 70–110)
GLUCOSE UR QL STRIP: NEGATIVE
HCT VFR BLD AUTO: 23.3 % (ref 37–48.5)
HGB BLD-MCNC: 6.6 G/DL (ref 12–16)
HGB UR QL STRIP: NEGATIVE
HYALINE CASTS #/AREA URNS LPF: 10 /LPF
HYPOCHROMIA BLD QL SMEAR: ABNORMAL
IMM GRANULOCYTES # BLD AUTO: 0.05 K/UL (ref 0–0.04)
IMM GRANULOCYTES NFR BLD AUTO: 0.6 % (ref 0–0.5)
KETONES UR QL STRIP: NEGATIVE
LDH SERPL L TO P-CCNC: 194 U/L (ref 110–260)
LEUKOCYTE ESTERASE UR QL STRIP: ABNORMAL
LYMPHOCYTES # BLD AUTO: 1.9 K/UL (ref 1–4.8)
LYMPHOCYTES NFR BLD: 23 % (ref 18–48)
MCH RBC QN AUTO: 14.1 PG (ref 27–31)
MCHC RBC AUTO-ENTMCNC: 28.3 G/DL (ref 32–36)
MCV RBC AUTO: 50 FL (ref 82–98)
METHADONE UR QL SCN>300 NG/ML: NEGATIVE
MICROSCOPIC COMMENT: ABNORMAL
MONOCYTES # BLD AUTO: 0.6 K/UL (ref 0.3–1)
MONOCYTES NFR BLD: 7.1 % (ref 4–15)
NEUTROPHILS # BLD AUTO: 5.7 K/UL (ref 1.8–7.7)
NEUTROPHILS NFR BLD: 68.4 % (ref 38–73)
NITRITE UR QL STRIP: NEGATIVE
NRBC BLD-RTO: 0 /100 WBC
OPIATES UR QL SCN: NEGATIVE
OVALOCYTES BLD QL SMEAR: ABNORMAL
PCP UR QL SCN>25 NG/ML: NEGATIVE
PH UR STRIP: 6 [PH] (ref 5–8)
PLATELET # BLD AUTO: 357 K/UL (ref 150–350)
PMV BLD AUTO: ABNORMAL FL (ref 9.2–12.9)
POIKILOCYTOSIS BLD QL SMEAR: ABNORMAL
POLYCHROMASIA BLD QL SMEAR: ABNORMAL
POTASSIUM SERPL-SCNC: 3.7 MMOL/L (ref 3.5–5.1)
PROT SERPL-MCNC: 6.9 G/DL (ref 6–8.4)
PROT UR QL STRIP: ABNORMAL
PROT UR-MCNC: 1243 MG/DL
PROT/CREAT UR: 6.59 MG/G{CREAT} (ref 0–0.2)
RBC # BLD AUTO: 4.68 M/UL (ref 4–5.4)
RBC #/AREA URNS HPF: 0 /HPF (ref 0–4)
SARS-COV-2 RDRP RESP QL NAA+PROBE: NEGATIVE
SCHISTOCYTES BLD QL SMEAR: ABNORMAL
SODIUM SERPL-SCNC: 137 MMOL/L (ref 136–145)
SP GR UR STRIP: 1.03 (ref 1–1.03)
SQUAMOUS #/AREA URNS HPF: 15 /HPF
TARGETS BLD QL SMEAR: ABNORMAL
TOXICOLOGY INFORMATION: NORMAL
URATE SERPL-MCNC: 5.7 MG/DL (ref 2.4–5.7)
URN SPEC COLLECT METH UR: ABNORMAL
UROBILINOGEN UR STRIP-ACNC: NEGATIVE EU/DL
WBC # BLD AUTO: 8.33 K/UL (ref 3.9–12.7)
WBC #/AREA URNS HPF: 10 /HPF (ref 0–5)

## 2020-07-29 PROCEDURE — 86850 RBC ANTIBODY SCREEN: CPT

## 2020-07-29 PROCEDURE — S0028 INJECTION, FAMOTIDINE, 20 MG: HCPCS | Performed by: OBSTETRICS & GYNECOLOGY

## 2020-07-29 PROCEDURE — 25000003 PHARM REV CODE 250: Performed by: OBSTETRICS & GYNECOLOGY

## 2020-07-29 PROCEDURE — 88302 TISSUE EXAM BY PATHOLOGIST: CPT | Mod: 26,,, | Performed by: PATHOLOGY

## 2020-07-29 PROCEDURE — 86920 COMPATIBILITY TEST SPIN: CPT

## 2020-07-29 PROCEDURE — 11000001 HC ACUTE MED/SURG PRIVATE ROOM

## 2020-07-29 PROCEDURE — 59514 PR CESAREAN DELIVERY ONLY: ICD-10-PCS | Mod: AT,,, | Performed by: OBSTETRICS & GYNECOLOGY

## 2020-07-29 PROCEDURE — 58611 PR LIGATION,FALLOPIAN TUBE W/C-SECTION: ICD-10-PCS | Mod: ,,, | Performed by: OBSTETRICS & GYNECOLOGY

## 2020-07-29 PROCEDURE — 76816 OB US FOLLOW-UP PER FETUS: CPT | Mod: PBBFAC,PO | Performed by: PEDIATRICS

## 2020-07-29 PROCEDURE — 25000003 PHARM REV CODE 250: Performed by: ANESTHESIOLOGY

## 2020-07-29 PROCEDURE — 86592 SYPHILIS TEST NON-TREP QUAL: CPT

## 2020-07-29 PROCEDURE — 84156 ASSAY OF PROTEIN URINE: CPT

## 2020-07-29 PROCEDURE — 37000009 HC ANESTHESIA EA ADD 15 MINS: Performed by: OBSTETRICS & GYNECOLOGY

## 2020-07-29 PROCEDURE — 88302 TISSUE EXAM BY PATHOLOGIST: CPT | Mod: SZN | Performed by: PATHOLOGY

## 2020-07-29 PROCEDURE — 99213 PR OFFICE/OUTPT VISIT, EST, LEVL III, 20-29 MIN: ICD-10-PCS | Mod: 25,S$PBB,TH, | Performed by: PEDIATRICS

## 2020-07-29 PROCEDURE — 59514 CESAREAN DELIVERY ONLY: CPT | Mod: CRNA,,, | Performed by: REGISTERED NURSE

## 2020-07-29 PROCEDURE — 59514 CESAREAN DELIVERY ONLY: CPT | Mod: 80,AT,, | Performed by: OBSTETRICS & GYNECOLOGY

## 2020-07-29 PROCEDURE — 80307 DRUG TEST PRSMV CHEM ANLYZR: CPT

## 2020-07-29 PROCEDURE — 84550 ASSAY OF BLOOD/URIC ACID: CPT

## 2020-07-29 PROCEDURE — 37000009 HC ANESTHESIA EA ADD 15 MINS: Mod: SZN

## 2020-07-29 PROCEDURE — 76819 FETAL BIOPHYS PROFIL W/O NST: CPT | Mod: 26,S$PBB,, | Performed by: PEDIATRICS

## 2020-07-29 PROCEDURE — 99213 OFFICE O/P EST LOW 20 MIN: CPT | Mod: 25,S$PBB,TH, | Performed by: PEDIATRICS

## 2020-07-29 PROCEDURE — S0020 INJECTION, BUPIVICAINE HYDRO: HCPCS | Performed by: ANESTHESIOLOGY

## 2020-07-29 PROCEDURE — 99211 OFF/OP EST MAY X REQ PHY/QHP: CPT | Mod: 25

## 2020-07-29 PROCEDURE — 59514 PRA REAN DELIVERY ONLY: ICD-10-PCS | Mod: ANES,,, | Performed by: ANESTHESIOLOGY

## 2020-07-29 PROCEDURE — 63600175 PHARM REV CODE 636 W HCPCS: Performed by: OBSTETRICS & GYNECOLOGY

## 2020-07-29 PROCEDURE — 37000008 HC ANESTHESIA 1ST 15 MINUTES: Performed by: OBSTETRICS & GYNECOLOGY

## 2020-07-29 PROCEDURE — 76820 PR US, OB DOPPLER, FETAL UMBILICAL ARTERY ECHO: ICD-10-PCS | Mod: 26,S$PBB,, | Performed by: PEDIATRICS

## 2020-07-29 PROCEDURE — 76816 PR  US,PREGNANT UTERUS,F/U,TRANSABD APP: ICD-10-PCS | Mod: 26,S$PBB,, | Performed by: PEDIATRICS

## 2020-07-29 PROCEDURE — 80053 COMPREHEN METABOLIC PANEL: CPT

## 2020-07-29 PROCEDURE — 27201127 HC VACUUM EXTRACTOR

## 2020-07-29 PROCEDURE — 58611 LIGATE OVIDUCT(S) ADD-ON: CPT | Mod: 80,,, | Performed by: OBSTETRICS & GYNECOLOGY

## 2020-07-29 PROCEDURE — 36415 COLL VENOUS BLD VENIPUNCTURE: CPT

## 2020-07-29 PROCEDURE — 85025 COMPLETE CBC W/AUTO DIFF WBC: CPT

## 2020-07-29 PROCEDURE — 76819 PR US, OB, FETAL BIOPHYSICAL, W/O NST: ICD-10-PCS | Mod: 26,S$PBB,, | Performed by: PEDIATRICS

## 2020-07-29 PROCEDURE — 25000003 PHARM REV CODE 250: Performed by: REGISTERED NURSE

## 2020-07-29 PROCEDURE — 36000685 HC CESAREAN SECTION LEVEL I

## 2020-07-29 PROCEDURE — 63600175 PHARM REV CODE 636 W HCPCS: Performed by: REGISTERED NURSE

## 2020-07-29 PROCEDURE — 27200688 HC TRAY, SPINAL-HYPER/ ISOBARIC: Performed by: ANESTHESIOLOGY

## 2020-07-29 PROCEDURE — 88302 PR  SURG PATH,LEVEL II: ICD-10-PCS | Mod: 26,,, | Performed by: PATHOLOGY

## 2020-07-29 PROCEDURE — 76820 UMBILICAL ARTERY ECHO: CPT | Mod: PBBFAC,PO | Performed by: PEDIATRICS

## 2020-07-29 PROCEDURE — 83615 LACTATE (LD) (LDH) ENZYME: CPT

## 2020-07-29 PROCEDURE — 58611 LIGATE OVIDUCT(S) ADD-ON: CPT | Mod: ,,, | Performed by: OBSTETRICS & GYNECOLOGY

## 2020-07-29 PROCEDURE — 76819 FETAL BIOPHYS PROFIL W/O NST: CPT | Mod: PBBFAC,PO | Performed by: PEDIATRICS

## 2020-07-29 PROCEDURE — U0002 COVID-19 LAB TEST NON-CDC: HCPCS

## 2020-07-29 PROCEDURE — 58611 PR LIGATION,FALLOPIAN TUBE W/C-SECTION: ICD-10-PCS | Mod: 80,,, | Performed by: OBSTETRICS & GYNECOLOGY

## 2020-07-29 PROCEDURE — 36000679 HC C/S TUBAL LIGATION, UNSCHEDULED

## 2020-07-29 PROCEDURE — 59514 CESAREAN DELIVERY ONLY: CPT | Mod: ANES,,, | Performed by: ANESTHESIOLOGY

## 2020-07-29 PROCEDURE — 81000 URINALYSIS NONAUTO W/SCOPE: CPT | Mod: 59

## 2020-07-29 PROCEDURE — 51702 INSERT TEMP BLADDER CATH: CPT

## 2020-07-29 PROCEDURE — 27100025 HC TUBING, SET FLUID WARMER: Performed by: ANESTHESIOLOGY

## 2020-07-29 PROCEDURE — 76816 OB US FOLLOW-UP PER FETUS: CPT | Mod: 26,S$PBB,, | Performed by: PEDIATRICS

## 2020-07-29 PROCEDURE — 59514 PR CESAREAN DELIVERY ONLY: ICD-10-PCS | Mod: 80,AT,, | Performed by: OBSTETRICS & GYNECOLOGY

## 2020-07-29 PROCEDURE — 59514 CESAREAN DELIVERY ONLY: CPT | Mod: AT,,, | Performed by: OBSTETRICS & GYNECOLOGY

## 2020-07-29 PROCEDURE — 76820 UMBILICAL ARTERY ECHO: CPT | Mod: 26,S$PBB,, | Performed by: PEDIATRICS

## 2020-07-29 PROCEDURE — 59514 PRA REAN DELIVERY ONLY: ICD-10-PCS | Mod: CRNA,,, | Performed by: REGISTERED NURSE

## 2020-07-29 RX ORDER — ACETAMINOPHEN 10 MG/ML
INJECTION, SOLUTION INTRAVENOUS
Status: DISCONTINUED | OUTPATIENT
Start: 2020-07-29 | End: 2020-07-29

## 2020-07-29 RX ORDER — OXYCODONE HYDROCHLORIDE 5 MG/1
10 TABLET ORAL EVERY 4 HOURS PRN
Status: DISCONTINUED | OUTPATIENT
Start: 2020-07-29 | End: 2020-07-29

## 2020-07-29 RX ORDER — SIMETHICONE 80 MG
1 TABLET,CHEWABLE ORAL EVERY 6 HOURS PRN
Status: DISCONTINUED | OUTPATIENT
Start: 2020-07-29 | End: 2020-07-31 | Stop reason: HOSPADM

## 2020-07-29 RX ORDER — HYDROCODONE BITARTRATE AND ACETAMINOPHEN 5; 325 MG/1; MG/1
1 TABLET ORAL EVERY 4 HOURS PRN
Status: DISCONTINUED | OUTPATIENT
Start: 2020-07-29 | End: 2020-07-31 | Stop reason: HOSPADM

## 2020-07-29 RX ORDER — MUPIROCIN 20 MG/G
OINTMENT TOPICAL
Status: CANCELLED | OUTPATIENT
Start: 2020-07-29

## 2020-07-29 RX ORDER — ACETAMINOPHEN 325 MG/1
650 TABLET ORAL EVERY 6 HOURS PRN
Status: DISCONTINUED | OUTPATIENT
Start: 2020-07-29 | End: 2020-07-31 | Stop reason: HOSPADM

## 2020-07-29 RX ORDER — MAGNESIUM SULFATE HEPTAHYDRATE 40 MG/ML
2 INJECTION, SOLUTION INTRAVENOUS CONTINUOUS
Status: DISCONTINUED | OUTPATIENT
Start: 2020-07-29 | End: 2020-07-31 | Stop reason: HOSPADM

## 2020-07-29 RX ORDER — CEFAZOLIN SODIUM 2 G/50ML
2 SOLUTION INTRAVENOUS
Status: COMPLETED | OUTPATIENT
Start: 2020-07-29 | End: 2020-07-29

## 2020-07-29 RX ORDER — ONDANSETRON 2 MG/ML
4 INJECTION INTRAMUSCULAR; INTRAVENOUS EVERY 6 HOURS PRN
Status: DISCONTINUED | OUTPATIENT
Start: 2020-07-29 | End: 2020-07-29

## 2020-07-29 RX ORDER — OXYCODONE HYDROCHLORIDE 5 MG/1
5 TABLET ORAL EVERY 4 HOURS PRN
Status: DISCONTINUED | OUTPATIENT
Start: 2020-07-29 | End: 2020-07-29

## 2020-07-29 RX ORDER — MISOPROSTOL 200 UG/1
200 TABLET ORAL ONCE AS NEEDED
Status: DISCONTINUED | OUTPATIENT
Start: 2020-07-29 | End: 2020-07-31 | Stop reason: HOSPADM

## 2020-07-29 RX ORDER — CALCIUM GLUCONATE 98 MG/ML
1 INJECTION, SOLUTION INTRAVENOUS
Status: DISCONTINUED | OUTPATIENT
Start: 2020-07-29 | End: 2020-07-29

## 2020-07-29 RX ORDER — FAMOTIDINE 10 MG/ML
20 INJECTION INTRAVENOUS
Status: DISCONTINUED | OUTPATIENT
Start: 2020-07-29 | End: 2020-07-29

## 2020-07-29 RX ORDER — ONDANSETRON 8 MG/1
8 TABLET, ORALLY DISINTEGRATING ORAL EVERY 8 HOURS PRN
Status: DISCONTINUED | OUTPATIENT
Start: 2020-07-29 | End: 2020-07-31 | Stop reason: HOSPADM

## 2020-07-29 RX ORDER — DIPHENHYDRAMINE HCL 25 MG
25 CAPSULE ORAL EVERY 4 HOURS PRN
Status: DISCONTINUED | OUTPATIENT
Start: 2020-07-29 | End: 2020-07-31 | Stop reason: HOSPADM

## 2020-07-29 RX ORDER — MISOPROSTOL 200 UG/1
800 TABLET ORAL
Status: DISCONTINUED | OUTPATIENT
Start: 2020-07-29 | End: 2020-07-29

## 2020-07-29 RX ORDER — OXYTOCIN 10 [USP'U]/ML
INJECTION, SOLUTION INTRAMUSCULAR; INTRAVENOUS
Status: DISCONTINUED | OUTPATIENT
Start: 2020-07-29 | End: 2020-07-29

## 2020-07-29 RX ORDER — MUPIROCIN 20 MG/G
OINTMENT TOPICAL 2 TIMES DAILY
Status: DISCONTINUED | OUTPATIENT
Start: 2020-07-29 | End: 2020-07-31 | Stop reason: HOSPADM

## 2020-07-29 RX ORDER — FAMOTIDINE 10 MG/ML
20 INJECTION INTRAVENOUS ONCE
Status: CANCELLED | OUTPATIENT
Start: 2020-07-29 | End: 2020-07-29

## 2020-07-29 RX ORDER — PROPOFOL 10 MG/ML
VIAL (ML) INTRAVENOUS
Status: DISCONTINUED | OUTPATIENT
Start: 2020-07-29 | End: 2020-07-29

## 2020-07-29 RX ORDER — HYDRALAZINE HYDROCHLORIDE 20 MG/ML
INJECTION INTRAMUSCULAR; INTRAVENOUS
Status: DISCONTINUED | OUTPATIENT
Start: 2020-07-29 | End: 2020-07-29

## 2020-07-29 RX ORDER — MAGNESIUM SULFATE HEPTAHYDRATE 40 MG/ML
4 INJECTION, SOLUTION INTRAVENOUS ONCE
Status: COMPLETED | OUTPATIENT
Start: 2020-07-29 | End: 2020-07-29

## 2020-07-29 RX ORDER — SODIUM CITRATE AND CITRIC ACID MONOHYDRATE 334; 500 MG/5ML; MG/5ML
30 SOLUTION ORAL ONCE
Status: CANCELLED | OUTPATIENT
Start: 2020-07-29 | End: 2020-07-29

## 2020-07-29 RX ORDER — KETOROLAC TROMETHAMINE 30 MG/ML
30 INJECTION, SOLUTION INTRAMUSCULAR; INTRAVENOUS EVERY 8 HOURS
Status: COMPLETED | OUTPATIENT
Start: 2020-07-29 | End: 2020-07-30

## 2020-07-29 RX ORDER — MIDAZOLAM HYDROCHLORIDE 1 MG/ML
INJECTION, SOLUTION INTRAMUSCULAR; INTRAVENOUS
Status: DISCONTINUED | OUTPATIENT
Start: 2020-07-29 | End: 2020-07-29

## 2020-07-29 RX ORDER — OXYTOCIN/RINGER'S LACTATE 30/500 ML
41.65 PLASTIC BAG, INJECTION (ML) INTRAVENOUS CONTINUOUS
Status: ACTIVE | OUTPATIENT
Start: 2020-07-29 | End: 2020-07-30

## 2020-07-29 RX ORDER — CARBOPROST TROMETHAMINE 250 UG/ML
250 INJECTION, SOLUTION INTRAMUSCULAR
Status: DISCONTINUED | OUTPATIENT
Start: 2020-07-29 | End: 2020-07-29

## 2020-07-29 RX ORDER — MORPHINE SULFATE 1 MG/ML
INJECTION, SOLUTION EPIDURAL; INTRATHECAL; INTRAVENOUS
Status: DISCONTINUED | OUTPATIENT
Start: 2020-07-29 | End: 2020-07-29

## 2020-07-29 RX ORDER — SODIUM CITRATE AND CITRIC ACID MONOHYDRATE 334; 500 MG/5ML; MG/5ML
30 SOLUTION ORAL
Status: DISCONTINUED | OUTPATIENT
Start: 2020-07-29 | End: 2020-07-29

## 2020-07-29 RX ORDER — SODIUM CHLORIDE, SODIUM LACTATE, POTASSIUM CHLORIDE, CALCIUM CHLORIDE 600; 310; 30; 20 MG/100ML; MG/100ML; MG/100ML; MG/100ML
INJECTION, SOLUTION INTRAVENOUS CONTINUOUS
Status: DISCONTINUED | OUTPATIENT
Start: 2020-07-29 | End: 2020-07-31 | Stop reason: HOSPADM

## 2020-07-29 RX ORDER — HYDROCODONE BITARTRATE AND ACETAMINOPHEN 500; 5 MG/1; MG/1
TABLET ORAL
Status: DISCONTINUED | OUTPATIENT
Start: 2020-07-29 | End: 2020-07-29

## 2020-07-29 RX ORDER — IBUPROFEN 400 MG/1
800 TABLET ORAL EVERY 8 HOURS
Status: DISCONTINUED | OUTPATIENT
Start: 2020-07-30 | End: 2020-07-31 | Stop reason: HOSPADM

## 2020-07-29 RX ORDER — SODIUM CHLORIDE, SODIUM LACTATE, POTASSIUM CHLORIDE, CALCIUM CHLORIDE 600; 310; 30; 20 MG/100ML; MG/100ML; MG/100ML; MG/100ML
INJECTION, SOLUTION INTRAVENOUS CONTINUOUS
Status: DISCONTINUED | OUTPATIENT
Start: 2020-07-29 | End: 2020-07-29

## 2020-07-29 RX ORDER — BUPIVACAINE HYDROCHLORIDE 5 MG/ML
INJECTION, SOLUTION EPIDURAL; INTRACAUDAL
Status: DISCONTINUED | OUTPATIENT
Start: 2020-07-29 | End: 2020-07-29

## 2020-07-29 RX ORDER — KETOROLAC TROMETHAMINE 30 MG/ML
30 INJECTION, SOLUTION INTRAMUSCULAR; INTRAVENOUS EVERY 6 HOURS
Status: DISCONTINUED | OUTPATIENT
Start: 2020-07-29 | End: 2020-07-29

## 2020-07-29 RX ORDER — FENTANYL CITRATE 50 UG/ML
INJECTION, SOLUTION INTRAMUSCULAR; INTRAVENOUS
Status: DISCONTINUED | OUTPATIENT
Start: 2020-07-29 | End: 2020-07-29

## 2020-07-29 RX ORDER — METOPROLOL TARTRATE 1 MG/ML
INJECTION, SOLUTION INTRAVENOUS
Status: DISCONTINUED | OUTPATIENT
Start: 2020-07-29 | End: 2020-07-29

## 2020-07-29 RX ORDER — OXYTOCIN/RINGER'S LACTATE 30/500 ML
334 PLASTIC BAG, INJECTION (ML) INTRAVENOUS ONCE
Status: DISCONTINUED | OUTPATIENT
Start: 2020-07-29 | End: 2020-07-29

## 2020-07-29 RX ORDER — METHYLERGONOVINE MALEATE 0.2 MG/ML
200 INJECTION INTRAVENOUS
Status: DISCONTINUED | OUTPATIENT
Start: 2020-07-29 | End: 2020-07-29

## 2020-07-29 RX ORDER — METOCLOPRAMIDE HYDROCHLORIDE 5 MG/ML
10 INJECTION INTRAMUSCULAR; INTRAVENOUS ONCE
Status: CANCELLED | OUTPATIENT
Start: 2020-07-29 | End: 2020-07-29

## 2020-07-29 RX ORDER — OXYTOCIN/RINGER'S LACTATE 30/500 ML
95 PLASTIC BAG, INJECTION (ML) INTRAVENOUS ONCE
Status: COMPLETED | OUTPATIENT
Start: 2020-07-29 | End: 2020-07-29

## 2020-07-29 RX ORDER — DOCUSATE SODIUM 100 MG/1
200 CAPSULE, LIQUID FILLED ORAL 2 TIMES DAILY PRN
Status: DISCONTINUED | OUTPATIENT
Start: 2020-07-29 | End: 2020-07-31 | Stop reason: HOSPADM

## 2020-07-29 RX ORDER — DIPHENHYDRAMINE HYDROCHLORIDE 50 MG/ML
25 INJECTION INTRAMUSCULAR; INTRAVENOUS EVERY 4 HOURS PRN
Status: DISCONTINUED | OUTPATIENT
Start: 2020-07-29 | End: 2020-07-31 | Stop reason: HOSPADM

## 2020-07-29 RX ORDER — ACETAMINOPHEN 325 MG/1
650 TABLET ORAL EVERY 6 HOURS
Status: DISCONTINUED | OUTPATIENT
Start: 2020-07-29 | End: 2020-07-29

## 2020-07-29 RX ORDER — MAGNESIUM SULFATE HEPTAHYDRATE 40 MG/ML
2 INJECTION, SOLUTION INTRAVENOUS CONTINUOUS
Status: DISCONTINUED | OUTPATIENT
Start: 2020-07-29 | End: 2020-07-29

## 2020-07-29 RX ORDER — OXYCODONE AND ACETAMINOPHEN 10; 325 MG/1; MG/1
1 TABLET ORAL EVERY 4 HOURS PRN
Status: DISCONTINUED | OUTPATIENT
Start: 2020-07-29 | End: 2020-07-31 | Stop reason: HOSPADM

## 2020-07-29 RX ORDER — ONDANSETRON HYDROCHLORIDE 2 MG/ML
INJECTION, SOLUTION INTRAMUSCULAR; INTRAVENOUS
Status: DISCONTINUED | OUTPATIENT
Start: 2020-07-29 | End: 2020-07-29

## 2020-07-29 RX ADMIN — FENTANYL CITRATE 10 MCG: 50 INJECTION, SOLUTION INTRAMUSCULAR; INTRAVENOUS at 02:07

## 2020-07-29 RX ADMIN — HYDRALAZINE HYDROCHLORIDE 5 MG: 20 INJECTION INTRAMUSCULAR; INTRAVENOUS at 03:07

## 2020-07-29 RX ADMIN — SODIUM CITRATE AND CITRIC ACID MONOHYDRATE 30 ML: 500; 334 SOLUTION ORAL at 01:07

## 2020-07-29 RX ADMIN — PROPOFOL 10 MG: 10 INJECTION, EMULSION INTRAVENOUS at 03:07

## 2020-07-29 RX ADMIN — Medication 75 MILLI-UNITS/MIN: at 04:07

## 2020-07-29 RX ADMIN — OXYTOCIN 30 UNITS: 10 INJECTION, SOLUTION INTRAMUSCULAR; INTRAVENOUS at 03:07

## 2020-07-29 RX ADMIN — SODIUM CHLORIDE, SODIUM LACTATE, POTASSIUM CHLORIDE, AND CALCIUM CHLORIDE 1000 ML: .6; .31; .03; .02 INJECTION, SOLUTION INTRAVENOUS at 01:07

## 2020-07-29 RX ADMIN — CEFAZOLIN SODIUM 2 G: 2 SOLUTION INTRAVENOUS at 01:07

## 2020-07-29 RX ADMIN — FAMOTIDINE 20 MG: 10 INJECTION INTRAVENOUS at 01:07

## 2020-07-29 RX ADMIN — MIDAZOLAM HYDROCHLORIDE 2 MG: 1 INJECTION, SOLUTION INTRAMUSCULAR; INTRAVENOUS at 03:07

## 2020-07-29 RX ADMIN — OXYCODONE HYDROCHLORIDE AND ACETAMINOPHEN 1 TABLET: 10; 325 TABLET ORAL at 05:07

## 2020-07-29 RX ADMIN — BUPIVACAINE HYDROCHLORIDE 1.4 ML: 5 INJECTION, SOLUTION EPIDURAL; INTRACAUDAL; PERINEURAL at 02:07

## 2020-07-29 RX ADMIN — ONDANSETRON 4 MG: 2 INJECTION, SOLUTION INTRAMUSCULAR; INTRAVENOUS at 02:07

## 2020-07-29 RX ADMIN — FENTANYL CITRATE 50 MCG: 50 INJECTION, SOLUTION INTRAMUSCULAR; INTRAVENOUS at 03:07

## 2020-07-29 RX ADMIN — SODIUM CHLORIDE, SODIUM LACTATE, POTASSIUM CHLORIDE, AND CALCIUM CHLORIDE: .6; .31; .03; .02 INJECTION, SOLUTION INTRAVENOUS at 02:07

## 2020-07-29 RX ADMIN — FENTANYL CITRATE 25 MCG: 50 INJECTION, SOLUTION INTRAMUSCULAR; INTRAVENOUS at 02:07

## 2020-07-29 RX ADMIN — HYDROCODONE BITARTRATE AND ACETAMINOPHEN 1 TABLET: 5; 325 TABLET ORAL at 08:07

## 2020-07-29 RX ADMIN — KETOROLAC TROMETHAMINE 30 MG: 30 INJECTION, SOLUTION INTRAMUSCULAR at 10:07

## 2020-07-29 RX ADMIN — MUPIROCIN: 20 OINTMENT TOPICAL at 10:07

## 2020-07-29 RX ADMIN — METOPROLOL TARTRATE 5 MG: 5 INJECTION, SOLUTION INTRAVENOUS at 03:07

## 2020-07-29 RX ADMIN — ACETAMINOPHEN 1000 MG: 10 INJECTION, SOLUTION INTRAVENOUS at 03:07

## 2020-07-29 RX ADMIN — MAGNESIUM SULFATE HEPTAHYDRATE 4 G: 40 INJECTION, SOLUTION INTRAVENOUS at 01:07

## 2020-07-29 RX ADMIN — DIPHENHYDRAMINE HYDROCHLORIDE 25 MG: 25 CAPSULE ORAL at 11:07

## 2020-07-29 RX ADMIN — FENTANYL CITRATE 65 MCG: 50 INJECTION, SOLUTION INTRAMUSCULAR; INTRAVENOUS at 02:07

## 2020-07-29 RX ADMIN — Medication 0.1 MG: at 02:07

## 2020-07-29 NOTE — PROGRESS NOTES
Indication  ========    Evaluation of fetal growth/BPP/Dopplers    Maternal Assessment  =================    Weight 90 kg  Weight (lb) 198 lb  BP syst 155 mmHg  BP diast 102 mmHg  Maternal assessment other: repeat BP - 166/98, 154/98    Fetal Growth Overview  =================    Exam date        GA              BPD (mm)         HC (mm)        AC (mm)        FL (mm)         HL (mm)        EFW (g)  04/28/2020        23w 0d        55.4                  215.0             183.5             38.3              36.5               540    35%  06/30/2020        32w 0d        79.6                  300.1             266.1             56.5              50.3               1,629    15%  07/29/2020        36w 1d        85.6                  317.4             302.1             63.7              54.5               2,328    8%      Method  ======    Transabdominal ultrasound examination, 2D Color Doppler, Voluson E10. View: Suboptimal view: limited by late gestational age    Pregnancy  =========    Beaver pregnancy. Number of fetuses: 1    Dating  ======    LMP on: 11/20/2019  GA by LMP 36 w + 0 d  ENDY by LMP: 8/26/2020  Ultrasound examination on: 7/29/2020  GA by U/S based upon: AC, BPD, Femur, HC  GA by U/S 34 w + 2 d  ENDY by U/S: 9/7/2020  Assigned: based on ultrasound (AC, BPD, Femur, HC), selected on 04/28/2020  Assigned GA 36 w + 1 d  Assigned ENDY: 8/25/2020  Pregnancy length 280 d    General Evaluation  ==============    Cardiac activity present.  bpm.  Fetal movements visualized.  Presentation cephalic.  Placenta posterior, fundal.  Umbilical cord 3 vessel cord.  Amniotic fluid MVP 4.8 cm. SANTOS 9.7 cm. Q1 4.8 cm, Q2 0.0 cm, Q3 3.3 cm, Q4 1.6 cm.    Biophysical Profile  ==============    2: Fetal breathing movements  2: Gross body movements  2: Fetal tone  2: Amniotic fluid volume  8/8 Biophysical profile score  Interpretation: normal    Fetal Biometry  ============    Standard  BPD 85.6 mm  34w 4d                 Hadlock    .1 mm  38w 0d                Bert    .4 mm  35w 5d                Hadlock    .1 mm  34w 1d                Hadlock    Femur 63.7 mm  32w 6d                Hadlock    Humerus 54.5 mm  31w 5d                Bert    HC / AC 1.05    EFW 2,328 g          8%        Michael    EFW (lb) 5 lb  EFW (oz) 2 oz  EFW by: Hadlock (BPD-HC-AC-FL)  Extended  Tibia 53.6 mm  31w 5d                Bert    Fibula 53.0 mm  32w 4d                Bert    Radius 52.9 mm    Ulna 51.1 mm  32w 2d                Bert    Head / Face / Neck  Cephalic index 0.76    Extremities / Bony Struc  FL / BPD 0.74    FL / AC 0.21    Other Structures   bpm    Fetal Anatomy  ============    Cranium: normal  4-chamber view: documented previously  Stomach: normal  Kidneys: normal  Bladder: normal  Arms: abnormal  Legs: abnormal  Arms: Short  Legs: Short  Wants to know gender: no  Impression: A full anatomy scan was previously performed.    Fetal Doppler  ===========    Arterial  Umbilical A PI 1.19          96%        Tramaine    Umbilical A RI 0.69          92%        Tramaine    Umbilical A PS -48.66 cm/s    Umbilical A ED -16.73 cm/s  Umbilical A TAmax -19.98 cm/s    Umbilical A MD -8.96 cm/s  Umbilical A S / D 3.16          87%        Tramaine    Umbilical A  bpm  Impression: normal umbilical artery blood flow (S/D ratio)          Consultation  ==========    See prior discussion    Long bones remain below 5th percentile growing at steady and stable rate.  EFWt is at 8th percentile. All testing is normal *see below.    BP is elevated x3: 155/102, repeat BP - 166/98, 154/98  Patient is asymptomatic without signs of Pre E. Explained Pre E and need for evaluation in L&D.    D/W Dr. Hester who is now aware of her admission.      I spent 15 minutes in patient care management and consultation with >50% face to face.      Impression  =========    Fetal size is iugr with the EFW at the 8%. AC is normal; short length  of long bones causative factor.  Normal repeat limited fetal anatomic survey.    AFV is normal.  BPP is 8 of 8.    Fetal UA Doppler S/D ratio is normal.      Recommendation  ==============    To L&D. If discharged, follow IUGR protocol (weekly BPP/Dopplers, intervening NST, delivery by 39 weeks')

## 2020-07-29 NOTE — ANESTHESIA PROCEDURE NOTES
Spinal    Diagnosis: IUP  Patient location during procedure: OR  Start time: 7/29/2020 2:23 PM  Timeout: 7/29/2020 2:23 PM  End time: 7/29/2020 2:30 PM    Staffing  Authorizing Provider: Dario Jesus MD  Performing Provider: Dario Jesus MD    Preanesthetic Checklist  Completed: patient identified, surgical consent, pre-op evaluation, timeout performed, IV checked, risks and benefits discussed and monitors and equipment checked  Spinal Block  Patient position: sitting  Prep: ChloraPrep  Patient monitoring: heart rate, cardiac monitor, continuous pulse ox and frequent blood pressure checks  Approach: midline  Location: L4-5  Injection technique: single shot  CSF Fluid: clear free-flowing CSF  Needle  Needle type: pencan.   Needle gauge: 25 G  Needle length: 3.5 in  Additional Documentation: negative aspiration for heme and no paresthesia on injection  Needle localization: anatomical landmarks  Assessment  Ease of block: easy  Patient's tolerance of the procedure: comfortable throughout block and no complaints
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
## 2020-07-29 NOTE — HOSPITAL COURSE
On Labor and Delivery, blood pressure 170-180s/100-110  Denies preeclamptic symptoms.  No headache.  No blurry.vision. No right upper quadrant pain  However, after 45 minutes, blood pressure still at 174/104    Will start magnesium IV  Proceed with repeat  section with tubal ligation.    MD Manoj Riley MD  Spinal by DANYEL Jesus MD  Viable female infant at 1501 2020  Weight: 2.01 kg (4 lb 6.9 oz)  Apgar: 9/9  Normal tubes and ovaries  Partial salpingectomy performed for sterilization.  To Path.  EBL: 500 cc  No complication    2020 Mag continued postpartum.  Blood pressure better with mostly under 150/100.  Diuresing well, over 1100 cc in 14 hours.  Last hours prior to my rounding with output 150 cc.  Denies symptoms  2020 Did well on labetalol 200 mg bid with -150/70-90.  Still no preeclamptic symptoms.  Breast-feeding better.  Wants to go home.    Postpartum course was otherwise benign.  She is breast-feeding well.  Exam was benign with patient afebrile, vitals stable, and minimal bleeding.  Normal activities.  Patient discharged home on postpartum day #2, 2020 per request   Discharge medications include Percocet, Motrin, prenatal vitamins, and iron supplement with labetalol.  Follow-up with me next week for blood pressure check and dressing removal.    Jasper Hester MD.

## 2020-07-29 NOTE — HPI
31 yo  at 36w0d  Previous  sections x 2  Came in to see M today, 2020 for fetal short legs  Found to have severe hypertension.  Sent to Labor and Delivery

## 2020-07-29 NOTE — NURSING
"Patient arrived to [facility] escorted by OMAR Duke RN from [facility] for elevated Bps. Pt denies contractions, gush/leaking of fluids, or vaginal bleeding. Patient denies headache and RUQ pain. Reports "seeing spots". 2+ edema noted to bilateral lower extremities. /98. Will notify Dr. Hester of patient arrival and assessment.  "

## 2020-07-29 NOTE — LETTER
July 29, 2020      Jasper Hester MD  [address] [facility] [address]  [address]  [place] [address]           MATERNAL AND FETAL MEDICINE  [address], [address]  [facility] [address]  Phone: [phone]  Fax: [phone]          Patient: Nikkie Myles   MR Number: [id number]   YOB: 1990   Date of Visit: 7/29/2020       Dear Dr. Jasper Hester:    Thank you for referring Nikkie Myles to me for evaluation. Attached you will find relevant portions of my assessment and plan of care.    If you have questions, please do not hesitate to call me. I look forward to following Nikkie Myles along with you.    Sincerely,    Hoda Kaufman MD    Enclosure  CC:  No Recipients    If you would like to receive this communication electronically, please contact [email or web address] or [phone] to request more information on prollie Link access.    For providers and/or their staff who would like to refer a patient to [facility], please contact us through our one-stop-shop provider referral line, [facility], at [phone].    If you feel you have received this communication in error or would no longer like to receive these types of communications, please e-mail [email or web address]
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
## 2020-07-29 NOTE — ANESTHESIA PREPROCEDURE EVALUATION
07/29/2020  Nikkie Myles is a 30 y.o., female.    Anesthesia Evaluation     I have reviewed the Nursing Notes.       Review of Systems  Anesthesia Hx:  No problems with previous Anesthesia   Social:  Former Smoker    Cardiovascular:   Denies Pacemaker. Hypertension (pt not on home meds for HTN)  Denies Valvular problems/Murmurs.  Denies MI.  Denies CAD.    Denies CABG/stent.  Denies Dysrhythmias.   Denies Angina.             denies PVD    Pulmonary:  Pulmonary Normal    Renal/:  Renal/ Normal     Hepatic/GI:   No Bowel Prep. Denies PUD. Denies Liver Disease. Denies Hepatitis.    OB/GYN/PEDS:  Pt to be sectioned for HTN   Neurological:  Neurology Normal    Endocrine:  Endocrine Normal        Physical Exam  General:  Well nourished    Airway/Jaw/Neck:  AIRWAY FINDINGS: Normal      Chest/Lungs:  Chest/Lungs Clear    Heart/Vascular:  Heart Findings: Normal       Mental Status:  Mental Status Findings: Normal        Anesthesia Plan  Type of Anesthesia, risks & benefits discussed:  Anesthesia Type:  spinal  Patient's Preference:   Intra-op Monitoring Plan: standard ASA monitors  Intra-op Monitoring Plan Comments:   Post Op Pain Control Plan:   Post Op Pain Control Plan Comments:   Induction:   IV  Beta Blocker:  Patient is not currently on a Beta-Blocker (No further documentation required).       Informed Consent: Patient understands risks and agrees with Anesthesia plan.  Questions answered. Anesthesia consent signed with patient.  ASA Score: 2     Day of Surgery Review of History & Physical:  There are no significant changes.  H&P update referred to the provider.         Ready For Surgery From Anesthesia Perspective.

## 2020-07-29 NOTE — TRANSFER OF CARE
Anesthesia Transfer of Care Note    Patient: Nikkie Myles    Procedure(s) Performed: Procedure(s) (LRB):   SECTION, WITH TUBAL LIGATION (Bilateral)    Patient location: Labor and Delivery    Transport from OR: Transported from OR on room air with adequate spontaneous ventilation    Post pain: adequate analgesia    Post assessment: no apparent anesthetic complications and tolerated procedure well    Post vital signs: stable    Level of consciousness: awake, alert and oriented    Nausea/Vomiting: no nausea/vomiting    Complications: none    Transfer of care protocol was followed      Last vitals:   Visit Vitals  BP (!) 156/95   Pulse 94   Temp 36.9 °C (98.4 °F) (Oral)   Resp 18   Ht 5' (1.524 m)   Wt 89.8 kg (198 lb)   LMP 2019   SpO2 100%   Breastfeeding No   BMI 38.67 kg/m²

## 2020-07-29 NOTE — NURSING
Dr. Hester notified of patient arrival, assessment, and BP 170s/90s x2 15 minutes apart, pre-e labs being collected now. No new orders received at this time. MD farrell will come assess.

## 2020-07-30 LAB
ANISOCYTOSIS BLD QL SMEAR: ABNORMAL
BASOPHILS # BLD AUTO: 0.02 K/UL (ref 0–0.2)
BASOPHILS NFR BLD: 0.2 % (ref 0–1.9)
DIFFERENTIAL METHOD: ABNORMAL
EOSINOPHIL # BLD AUTO: 0.1 K/UL (ref 0–0.5)
EOSINOPHIL NFR BLD: 1.2 % (ref 0–8)
ERYTHROCYTE [DISTWIDTH] IN BLOOD BY AUTOMATED COUNT: 22.6 % (ref 11.5–14.5)
HCT VFR BLD AUTO: 21 % (ref 37–48.5)
HGB BLD-MCNC: 6 G/DL (ref 12–16)
HYPOCHROMIA BLD QL SMEAR: ABNORMAL
IMM GRANULOCYTES # BLD AUTO: 0.04 K/UL (ref 0–0.04)
IMM GRANULOCYTES NFR BLD AUTO: 0.5 % (ref 0–0.5)
LYMPHOCYTES # BLD AUTO: 1.7 K/UL (ref 1–4.8)
LYMPHOCYTES NFR BLD: 20.3 % (ref 18–48)
MCH RBC QN AUTO: 14.2 PG (ref 27–31)
MCHC RBC AUTO-ENTMCNC: 28.6 G/DL (ref 32–36)
MCV RBC AUTO: 50 FL (ref 82–98)
MONOCYTES # BLD AUTO: 0.7 K/UL (ref 0.3–1)
MONOCYTES NFR BLD: 8.2 % (ref 4–15)
NEUTROPHILS # BLD AUTO: 5.8 K/UL (ref 1.8–7.7)
NEUTROPHILS NFR BLD: 69.6 % (ref 38–73)
NRBC BLD-RTO: 0 /100 WBC
OVALOCYTES BLD QL SMEAR: ABNORMAL
PLATELET # BLD AUTO: 297 K/UL (ref 150–350)
PMV BLD AUTO: ABNORMAL FL (ref 9.2–12.9)
POIKILOCYTOSIS BLD QL SMEAR: ABNORMAL
POLYCHROMASIA BLD QL SMEAR: ABNORMAL
RBC # BLD AUTO: 4.22 M/UL (ref 4–5.4)
RPR SER QL: NORMAL
TARGETS BLD QL SMEAR: ABNORMAL
WBC # BLD AUTO: 8.28 K/UL (ref 3.9–12.7)

## 2020-07-30 PROCEDURE — 25000003 PHARM REV CODE 250: Performed by: OBSTETRICS & GYNECOLOGY

## 2020-07-30 PROCEDURE — 36415 COLL VENOUS BLD VENIPUNCTURE: CPT

## 2020-07-30 PROCEDURE — 11000001 HC ACUTE MED/SURG PRIVATE ROOM

## 2020-07-30 PROCEDURE — 85025 COMPLETE CBC W/AUTO DIFF WBC: CPT

## 2020-07-30 PROCEDURE — 63600175 PHARM REV CODE 636 W HCPCS: Performed by: OBSTETRICS & GYNECOLOGY

## 2020-07-30 RX ORDER — LABETALOL 100 MG/1
200 TABLET, FILM COATED ORAL EVERY 12 HOURS
Status: DISCONTINUED | OUTPATIENT
Start: 2020-07-30 | End: 2020-07-31 | Stop reason: HOSPADM

## 2020-07-30 RX ADMIN — KETOROLAC TROMETHAMINE 30 MG: 30 INJECTION, SOLUTION INTRAMUSCULAR at 02:07

## 2020-07-30 RX ADMIN — HYDROCODONE BITARTRATE AND ACETAMINOPHEN 1 TABLET: 5; 325 TABLET ORAL at 05:07

## 2020-07-30 RX ADMIN — KETOROLAC TROMETHAMINE 30 MG: 30 INJECTION, SOLUTION INTRAMUSCULAR at 06:07

## 2020-07-30 RX ADMIN — IBUPROFEN 800 MG: 400 TABLET, FILM COATED ORAL at 09:07

## 2020-07-30 RX ADMIN — LABETALOL HYDROCHLORIDE 200 MG: 100 TABLET, FILM COATED ORAL at 09:07

## 2020-07-30 RX ADMIN — LABETALOL HYDROCHLORIDE 200 MG: 100 TABLET, FILM COATED ORAL at 10:07

## 2020-07-30 RX ADMIN — MUPIROCIN: 20 OINTMENT TOPICAL at 09:07

## 2020-07-30 RX ADMIN — OXYCODONE HYDROCHLORIDE AND ACETAMINOPHEN 1 TABLET: 10; 325 TABLET ORAL at 09:07

## 2020-07-30 RX ADMIN — SIMETHICONE CHEW TAB 80 MG 80 MG: 80 TABLET ORAL at 09:07

## 2020-07-30 RX ADMIN — MUPIROCIN: 20 OINTMENT TOPICAL at 07:07

## 2020-07-30 RX ADMIN — OXYCODONE HYDROCHLORIDE AND ACETAMINOPHEN 1 TABLET: 10; 325 TABLET ORAL at 07:07

## 2020-07-30 NOTE — L&D DELIVERY NOTE
[facility]   Section   Operative Note    SUMMARY     Date of Procedure: 2020        PREOPERATIVE DIAGNOSES:  1.  Intrauterine pregnancy at 36w0d.  2.  Previous  sections.  3.  Undesired fertility  4.  Severe preeclampsia     POSTOPERATIVE DIAGNOSES:  1.  Intrauterine pregnancy at 36w0d.  2.  Previous  sections.  3.  Undesired fertility  4.  Severe preeclampsia     PROCEDURE:  Repeat low transverse  section with bilateral tubal ligation.     SURGEON:  Jasper Hester M.D.     ASSISTANT:  Manoj Miller M.D.     ANESTHESIA:  Spinal by DANYEL Jesus MD     BLOOD LOSS:  About 500 mL.     URINE:  Clear.     FINDINGS:  Viable female infant delivered from vertex position at 1501 on 2020.  Weight is 2010 gm.  Apgar was 9 at 1 minute and 9 at 5 minutes.     OTHER FINDINGS:  Include normal tubes and ovaries bilaterally.     COMPLICATIONS:  None.     SPECIMEN:  None.     HISTORY:    29 yo  at 36w0d  Previous  sections x 2  Came in to see M today, 2020 for fetal short legs  Found to have severe hypertension.  Sent to Labor and Delivery.    On Labor and Delivery, blood pressure 170-180s/100-110  Denies preeclamptic symptoms.  No headache.  No blurry.vision. No right upper quadrant pain  However, after 45 minutes, blood pressure still at 174/104     Will start magnesium IV  Preeclamptic labs only significant for P/C ratio at 6.59  Proceed with repeat  section with tubal ligation.   Risks and benefits discussed.     PROCEDURE IN DETAIL:  After confirming appropriate consent was signed in chart, the patient was then transported to the OR.  Spinal anesthesia per Anesthesia.  The patient was then put in a supine position, prepped and draped.  Adequate anesthesia was verified with negative Allis test to the umbilicus.  A Pfannenstiel skin incision was then made with the scalpel, extended down to the fascia.  Fascia was then entered sharply and extended  bilaterally sharply.  Peritoneum was then identified and entered bluntly and sharply.  The lower uterine segment was then identified.  Hysterotomy was performed using the scalpel transversely and amniotomy performed bluntly without any difficulty.  Clear fluid noted.  The uterine incision was then extended bluntly using the surgeon's finger.  A viable female infant delivered from vertex position using the Kiwi vacuum without much difficulty, suctioned on the field and handed off to the nurse practitioner in attendance.  Birth time was 1501 on 2020.  Weight is 2010 gm  Apgar is 9 at 1 minute and 9 at 5 minutes.  Cord blood was then obtained.  Placenta was then manually extracted intact.  Uterus was then delivered into the abdominal incision.  Endometrial cavity was then wiped clean with wet laps.  Uterine incision was then repaired in 2 layers using #0 Monocryl in a running interlocking fashion with the second layer in an imbricating manner.  Good hemostasis was noted.  Again, normal tubes and ovaries bilaterally.      After confirming again that the patient still would like her tubal ligation, we proceeded with the procedure. The right tube was identified with its fimbriated end.  Sohail clamp placed at the junction of the proximal and middle third of the tube without any difficulty.  Tube elevated and twisted.  Number 0 plain gut was used to ligate the elevated segment of tube twice.  A segment of tube was then transected and removed with Metzenbaum scissors.  The same procedure was repeated on the left side, again without any difficulty.  Good hemostasis noted.  Both segments of tube were sent to Pathology.    Uterus was then replaced back to the abdomen.  Good hemostasis was noted.  Peritoneum was then closed using #3-0 Vicryl, fascia was then closed using #1 Vicryl in a running nonlocking fashion.  Subcutaneous tissue was then noted to be in good hemostasis.  It was then reapproximated using #3-0 Vicryl  and then the skin was then reapproximated InSorb absorbable staples and a pressure bandage applied with the Aquacel dressing.  The patient was then transferred to the Recovery Room in stable condition.         Specimens:   Specimen (12h ago, onward)    None          Condition: Good    Disposition: PACU - hemodynamically stable.    Attestation: Good         Delivery Information for Sabrina Myles    Birth information:  YOB: 2019   Time of birth: 3:01 PM   Sex: female   Head Delivery Date/Time: 2020  3:01 PM   Delivery type: , Low Transverse   Gestational Age: 36w0d    Delivery Providers    Delivering clinician: Jasper Hester MD   Provider Role    Manoj Miller MD Delivery Assist    Irina Lin, CLEMENCIA Registered Nurse    Rea Hoyt, [facility] Surgical Tech    [facility] Nurse Anesthetist    Es CLAY Do, CRNA Nurse Anesthetist    Gagan Pereyra, CRNA Nurse Anesthetist    Dario Jesus MD Anesthesiologist    Brian Garrido, RN Registered Nurse    Kamini Camejo, NP Nurse Practitioner            Measurements    Weight: 2010 g  Length:          Apgars    Living status: Living  Apgars:  1 min.:  5 min.:  10 min.:  15 min.:  20 min.:    Skin color:  1  1       Heart rate:  2  2       Reflex irritability:  2  2       Muscle tone:  2  2       Respiratory effort:  2  2       Total:  9  9       Apgars assigned by: BERTRAM SANTIAGO NNP         Operative Delivery    Forceps attempted?: No  Vacuum extractor attempted?: Yes  Vacuum type: Kiwi Omni Cup (mushroom)  First attempt time vacuum applied: 2020 15:01:00  First attempt time vacuum removed: 2020 15:01:00  Number of pop offs: 0  Number of pulls with vacuum: 1  Low end pressure range (mmHg): 0  High end pressure range (mmHg): 500  Vacuum applied by: DR. HESTRE  Failed vacuum delivery?: No         Shoulder Dystocia    Shoulder dystocia present?: No           Presentation    Presentation: Vertex  Position: Middle Occiput            Interventions/Resuscitation    Method: Bulb Suctioning, Tactile Stimulation, NICU Attended       Cord    Vessels: 3 vessels  Complications: None  Delayed Cord Clamping?: No  Cord Blood Disposition: Sent with Baby  Gases Sent?: No  Stem Cell Collection (by MD): No       Placenta    Placenta delivery date/time: 2020 1503  Placenta removal: Manual removal  Placenta appearance: Intact  Placenta disposition: discarded           Labor Events:       labor: No     Labor Onset Date/Time:         Dilation Complete Date/Time:         Start Pushing Date/Time:         Start Pushing Date/Time:       Rupture Date/Time: 20  1500         Rupture type:          Fluid Amount: Moderate      Fluid Color: Clear, Pinkish      Fluid Odor:       Membrane Status: ARM (Artificial Rupture)               steroids: None     Antibiotics given for GBS: No     Induction: none     Indications for induction:        Augmentation:       Indications for augmentation:       Labor complications: None     Additional complications:          Cervical ripening:                     Delivery:      Episiotomy: None     Indication for Episiotomy:       Perineal Lacerations: None Repaired:      Periurethral Laceration:   Repaired:     Labial Laceration:   Repaired:     Sulcus Laceration:   Repaired:     Vaginal Laceration:   Repaired:     Cervical Laceration:   Repaired:     Repair suture: None     Repair # of packets:       Last Value - EBL - Nursing (mL):       Sum - EBL - Nursing (mL): 330     Last Value - EBL - Anesthesia (mL): 330      Calculated QBL (mL): 330      Vaginal Sweep Performed: No     Surgicount Correct: Yes       Other providers:       Anesthesia    Method: Spinal          Details (if applicable):  Trial of Labor No    Categorization: Repeat    Priority: Emergency   Indications for : Other (Add Comments);Repeat Section   Incision Type: low transverse     Additional   information:  Forceps:    Vacuum:    Breech:    Observed anomalies    Other (Comments):

## 2020-07-30 NOTE — NURSING
Report received from YOLY Llamas RN; care assumed. AAOx3, assessment completed. C/o incisional pain, rates pain 7/10 using pain scale. Right wrist #18g IV patent, infusing LR @ 75 cc/h; Mag 2g @ 50 cc/h without difficulty, no redness or swelling present. SCD's to BLE's. Urimeter present, draining cyu to gravity. POC reviewed with pt, pt verb understanding. Call bell within reach, will monitor. NAD.     0748: Magnesium completed, disconnected. Urimeter removed, pt tolerated well.     0900: Pt up to shower.

## 2020-07-30 NOTE — LACTATION NOTE
"   07/30/20 1000   Pain/Comfort/Sleep   Preferred Pain Scale number (Numeric Rating Pain Scale)   Comfort/Acceptable Pain Level 5   Pain Rating (0-10): Rest 0   Pain Rating: Rest 0 - no pain   Breasts WDL   Breast WDL WDL   Maternal Feeding Assessment   Maternal Emotional State relaxed;independent   Latch Assistance no   Reproductive Interventions   Breastfeeding Support encouragement provided;lactation counseling provided   Safety   Safety WDL WDL     Reports breastfeeding well at least q 2 -3 hours.  Spoke with pt in room.  Baby asleep at this time, without signs of hunger cues. Reviewed breastfeeding basics.  Encouraged to call to call for latch check with next feeding and prn assist.  States "understand" and verbalized appropriate recall.  "

## 2020-07-30 NOTE — SUBJECTIVE & OBJECTIVE
Interval History:   Status post RCSBTL on 2020 for severe pre-eclampsia at 36 weeks, previous  sections with undesired fertility    She is doing well this morning. She is tolerating a regular diet without nausea or vomiting. She is not voiding spontaneously. She is ambulating. She has passed flatus, and has not a BM. Vaginal bleeding is mild. She denies fever or chills. Abdominal pain is mild and controlled with oral medications. She is breastfeeding. She desires circumcision for her male baby: not applicable.    Objective:     Vital Signs (Most Recent):  Temp: 97.9 °F (36.6 °C) (20 0000)  Pulse: 83 (20 0650)  Resp: 18 (20 0650)  BP: (!) 141/95 (20 0650)  SpO2: 96 % (20 0650) Vital Signs (24h Range):  Temp:  [97.9 °F (36.6 °C)-98.4 °F (36.9 °C)] 97.9 °F (36.6 °C)  Pulse:  [] 83  Resp:  [17-18] 18  SpO2:  [96 %-100 %] 96 %  BP: (120-185)/() 141/95     Weight: 89.8 kg (198 lb)  Body mass index is 38.67 kg/m².      Intake/Output Summary (Last 24 hours) at 2020 0713  Last data filed at 2020 0600  Gross per 24 hour   Intake 232.5 ml   Output 2765 ml   Net -2532.5 ml           Significant Labs:  Lab Results   Component Value Date    GROUPTRH O POS 2020    HEPBSAG Negative 2020    STREPBCULT (A) 2020     STREPTOCOCCUS AGALACTIAE (GROUP B)  In case of Penicillin allergy, call lab for further testing.  Beta-hemolytic streptococci are routinely susceptible to   penicillins,cephalosporins and carbapenems.       Recent Labs   Lab 20  0520   HGB 6.0*   HCT 21.0*       I have personallly reviewed all pertinent lab results from the last 24 hours.    Physical Exam:   Constitutional: She appears well-developed and well-nourished. No distress.    HENT:   Head: Normocephalic and atraumatic.    Eyes: EOM are normal.    Neck: Normal range of motion.    Cardiovascular: Normal rate.     Pulmonary/Chest: Effort normal. No respiratory distress.         Abdominal: Soft. She exhibits no distension. There is no abdominal tenderness. There is no rebound and no guarding.   Pfannenstiel incision in AquaCel dressing.  No evidence of active bleeding.                 Musculoskeletal: Normal range of motion.       Neurological: She is alert.    Skin: Skin is warm and dry.    Psychiatric: She has a normal mood and affect.

## 2020-07-30 NOTE — ANESTHESIA POSTPROCEDURE EVALUATION
Anesthesia Post Evaluation    Patient: Nikkie Myles    Procedure(s) Performed: Procedure(s) (LRB):   SECTION, WITH TUBAL LIGATION (Bilateral)    Final Anesthesia Type: spinal    Patient location during evaluation: labor & delivery  Patient participation: Yes- Able to Participate  Level of consciousness: awake and alert  Post-procedure vital signs: reviewed and stable  Pain management: adequate  Airway patency: patent  TRAE mitigation strategies: Multimodal analgesia and Use of major conduction anesthesia (spinal/epidural) or peripheral nerve block  PONV status at discharge: No PONV  Anesthetic complications: no      Cardiovascular status: hypertensive and blood pressure returned to baseline  Respiratory status: spontaneous ventilation  Hydration status: euvolemic  Follow-up not needed.          Vitals Value Taken Time   /104 20 1920   Temp 36.7 °C (98.1 °F) 20 1615   Pulse 85 20 1920   Resp 17 20 1700   SpO2 100 % 20 1918   Vitals shown include unvalidated device data.      No case tracking events are documented in the log.      Pain/Giovanni Score: Pain Rating Prior to Med Admin: 10 (2020  6:00 PM)  Pain Rating Post Med Admin: 7 (2020  6:00 PM)

## 2020-07-30 NOTE — LACTATION NOTE
07/29/20 1814   Maternal Assessment   Breast Density Bilateral:;soft   Areola Bilateral:;elastic   Nipples Bilateral:;everted   Maternal Infant Feeding   Maternal Emotional State assist needed   Infant Positioning cradle   Signs of Milk Transfer audible swallow;infant jaw motion present   Pain with Feeding no   Latch Assistance yes     Baby brought to mother's room from extended transition in NICU. Discussed donor milk for supplementation, mother agreed and signed consent form. Able to hand express drops of colostrum from left breast.  Assisted patient to latch baby to left breast in cradle position. Baby latched deeply, nursing well for 20 minutes, with audible swallows. Mother denies pain during feeding. Provided mother with 20 ml Donor EBM in bottle for post feed supplementation. Baby took full amount. Reviewed basic breastfeeding instructions and answered all questions. Instructed mother to breastfeed first and then supplement with 15-20 ml Donor milk on demand, at least 8 times in 24 hours. Mother verbalizes understanding of all instructions with good recall.

## 2020-07-30 NOTE — PROGRESS NOTES
[facility]  Obstetrics  Postpartum Progress Note    Patient Name: Nikkie Myles  MRN: [id number]  Admission Date: 2020  Hospital Length of Stay: 1 days  Attending Physician: Jasper Hester MD  Primary Care Provider: Brian Collado MD    Subjective:     Principal Problem:36 weeks gestation of pregnancy    Hospital Course:  On Labor and Delivery, blood pressure 170-180s/100-110  Denies preeclamptic symptoms.  No headache.  No blurry.vision. No right upper quadrant pain  However, after 45 minutes, blood pressure still at 174/104    Will start magnesium IV  Proceed with repeat  section with tubal ligation.    MD Manoj Riley MD  Spinal by DANYEL Jesus MD  Viable female infant at 1501 2020  Weight: 2.01 kg (4 lb 6.9 oz)  Apgar: 9/9  Normal tubes and ovaries  Partial salpingectomy performed for sterilization.  To Path.  EBL: 500 cc  No complication    Jasper Hester Md  2020 Mag continued postpartum.  Blood pressure better with mostly under 150/100.  Diuresing well, over 1100 cc in 14 hours.  Last hours prior to my rounding with output 150 cc.  Denies symptoms      Interval History:   Status post RCSBTL on 2020 for severe pre-eclampsia at 36 weeks, previous  sections with undesired fertility    She is doing well this morning. She is tolerating a regular diet without nausea or vomiting. She is not voiding spontaneously. She is ambulating. She has passed flatus, and has not a BM. Vaginal bleeding is mild. She denies fever or chills. Abdominal pain is mild and controlled with oral medications. She is breastfeeding. She desires circumcision for her male baby: not applicable.    Objective:     Vital Signs (Most Recent):  Temp: 97.9 °F (36.6 °C) (20 0000)  Pulse: 83 (20 0650)  Resp: 18 (20 0650)  BP: (!) 141/95 (20 0650)  SpO2: 96 % (20 0650) Vital Signs (24h Range):  Temp:  [97.9 °F (36.6 °C)-98.4 °F (36.9 °C)] 97.9 °F (36.6 °C)  Pulse:   [] 83  Resp:  [17-18] 18  SpO2:  [96 %-100 %] 96 %  BP: (120-185)/() 141/95     Weight: 89.8 kg (198 lb)  Body mass index is 38.67 kg/m².      Intake/Output Summary (Last 24 hours) at 2020 0713  Last data filed at 2020 0600  Gross per 24 hour   Intake 232.5 ml   Output 2765 ml   Net -2532.5 ml           Significant Labs:  Lab Results   Component Value Date    GROUPTRH O POS 2020    HEPBSAG Negative 2020    STREPBCULT (A) 2020     STREPTOCOCCUS AGALACTIAE (GROUP B)  In case of Penicillin allergy, call lab for further testing.  Beta-hemolytic streptococci are routinely susceptible to   penicillins,cephalosporins and carbapenems.       Recent Labs   Lab 20  0520   HGB 6.0*   HCT 21.0*       I have personallly reviewed all pertinent lab results from the last 24 hours.    Physical Exam:   Constitutional: She appears well-developed and well-nourished. No distress.    HENT:   Head: Normocephalic and atraumatic.    Eyes: EOM are normal.    Neck: Normal range of motion.    Cardiovascular: Normal rate.     Pulmonary/Chest: Effort normal. No respiratory distress.        Abdominal: Soft. She exhibits no distension. There is no abdominal tenderness. There is no rebound and no guarding.   Pfannenstiel incision in AquaCel dressing.  No evidence of active bleeding.                 Musculoskeletal: Normal range of motion.       Neurological: She is alert.    Skin: Skin is warm and dry.    Psychiatric: She has a normal mood and affect.       Assessment/Plan:     30 y.o. female  for:    Anemia of mother during pregnancy, delivered  Iron supplement postpartum    Elevated blood pressure affecting pregnancy, antepartum  Stop Mag now  Watch urine output and bleeding  Labetalol 200 mg bid.  Pain control    Status post  section  Stop Mag now  Watch urine output and bleeding  Labetalol 200 mg bid.  Pain control          Disposition: As patient meets milestones, will plan to  discharge home.    Jasper Hester MD  Obstetrics  [facility]

## 2020-07-31 VITALS
HEIGHT: 60 IN | TEMPERATURE: 98 F | OXYGEN SATURATION: 99 % | WEIGHT: 198 LBS | RESPIRATION RATE: 20 BRPM | SYSTOLIC BLOOD PRESSURE: 149 MMHG | BODY MASS INDEX: 38.87 KG/M2 | DIASTOLIC BLOOD PRESSURE: 75 MMHG | HEART RATE: 85 BPM

## 2020-07-31 LAB
BLD PROD TYP BPU: NORMAL
BLD PROD TYP BPU: NORMAL
BLOOD UNIT EXPIRATION DATE: NORMAL
BLOOD UNIT EXPIRATION DATE: NORMAL
BLOOD UNIT TYPE CODE: 5100
BLOOD UNIT TYPE CODE: 5100
BLOOD UNIT TYPE: NORMAL
BLOOD UNIT TYPE: NORMAL
CODING SYSTEM: NORMAL
CODING SYSTEM: NORMAL
DISPENSE STATUS: NORMAL
DISPENSE STATUS: NORMAL
FINAL PATHOLOGIC DIAGNOSIS: NORMAL
GROSS: NORMAL
TRANS ERYTHROCYTES VOL PATIENT: NORMAL ML
TRANS ERYTHROCYTES VOL PATIENT: NORMAL ML

## 2020-07-31 PROCEDURE — 25000003 PHARM REV CODE 250: Performed by: OBSTETRICS & GYNECOLOGY

## 2020-07-31 RX ORDER — LABETALOL 200 MG/1
200 TABLET, FILM COATED ORAL EVERY 12 HOURS
Qty: 60 TABLET | Refills: 1 | Status: SHIPPED | OUTPATIENT
Start: 2020-07-31 | End: 2021-03-10

## 2020-07-31 RX ORDER — IBUPROFEN 800 MG/1
800 TABLET ORAL EVERY 8 HOURS
Qty: 30 TABLET | Refills: 1 | Status: SHIPPED | OUTPATIENT
Start: 2020-07-31 | End: 2020-09-14

## 2020-07-31 RX ORDER — HYDROCODONE BITARTRATE AND ACETAMINOPHEN 5; 325 MG/1; MG/1
1 TABLET ORAL EVERY 4 HOURS PRN
Qty: 12 TABLET | Refills: 0 | Status: SHIPPED | OUTPATIENT
Start: 2020-07-31 | End: 2020-09-14

## 2020-07-31 RX ADMIN — IBUPROFEN 800 MG: 400 TABLET, FILM COATED ORAL at 05:07

## 2020-07-31 RX ADMIN — LABETALOL HYDROCHLORIDE 200 MG: 100 TABLET, FILM COATED ORAL at 08:07

## 2020-07-31 RX ADMIN — MUPIROCIN: 20 OINTMENT TOPICAL at 08:07

## 2020-07-31 NOTE — DISCHARGE INSTRUCTIONS
 Breastfeeding Discharge Instructions      AAP recommendation of exclusive breastfeeding for the first 6 months of life and continued breastfeeding with the introduction of supplemental foods beyond the first year of life and recommends to delay all bottle and pacifier use until after 4 weeks of age and breastfeeding is well established.  Discussed the benefits of exclusive breastfeeding for both mother and baby.  Discussed the risks of supplementation/pacifier use on the exclusivity of breastfeeding in the first 6 months. Feed the baby at the earliest sign of hunger or comfort  o Hands to mouth, sucking motions  o Rooting or searching for something to suck on  o Dont wait for crying - it is a not a late sign of hunger; it is a sign of distress     The feedings may be 8-12 times per 24hrs and will not follow a schedule   Alternate the breast you start the feeding with, or start with the breast that feels the fullest   Switch breasts when the baby takes himself off the breast or falls asleep   Keep offering breasts until the baby looks full, no longer gives hunger signs, and stays asleep when placed on his back in the crib   If the baby is sleepy and wont wake for a feeding, put the baby skin-to-skin dressed in a diaper against the mothers bare chest   Sleep near your baby   The baby should be positioned and latched on to the breast correctly  o Chest-to-chest, chin in the breast  o Babys lips are flipped outward  o Babys mouth is stretched open wide like a shout  o Babys sucking should feel like tugging to the mother  - The baby should be drinking at the breast:  o You should hear swallowing or gulping throughout the feeding  o You should see milk on the babys lips when he comes off the breast  o Your breasts should be softer when the baby is finished feeding  o The baby should look relaxed at the end of feedings  o After the 4th day and your milk is in:  o The babys poop should turn bright  yellow and be loose, watery, and seedy  o The baby should have at least 3-4 poops the size of the palm of your hand per day  o The baby should have at least 6-8 wet diapers per day  o The urine should be light yellow in color  You should drink when you are thirsty and eat a healthy diet when you are    hungry.     Take naps to get the rest you need.   Take medications and/or drink alcohol only with permission of your obstetrician    or the babys pediatrician.  You can also call the [facility],   ([phone]), Monday-Friday, 8am-5pm Central time, to get the most   up-to-date evidence-based information on the use of medications during   pregnancy and breastfeeding.      The baby should be examined by a pediatrician at 3-5 days of age; unless ordered sooner by the pediatrician.  Once your milk comes in, the baby should be back to birth weight no later than 10-14 days of age.    Primary Engorgement    If the milk is flowing, use wet or dry heat applied to the breasts for approximately 10min prior to each feeding as a comfort measure to facilitate the milk ejection reflex    Follow heat treatment with breast massage to soften hard/lumpy areas of the breast    Use unrestricted, frequent, effective feedings.      Wake baby to feed if necessary    Avoid pacifier and bottle feedings    Hand express or pump breasts to the point of comfort prn    Use cold treatments in the form of ice packs/gel packs/ frozen vegetables wrapped in a soft thin cloth and applied to the breasts for approximately 20min after each feeding until engorgement is resolved    Wear comfortable, supportive bra    Take pain medicine prn    Use anti-inflammatory medications if prescribed by physician    Other:    [place] Pumping Instructions :    Preparation and Hygiene:    1. Shower daily.  2. Wear a clean bra each day and wash daily in warm soapy water.  3. Change wet or moist breast pads frequently.  Moist pads can promote growth of  germs.  4. Actively wash your hands, paying close attention to the area around and under your fingernails, thoroughly with soap and water for 15 seconds before pumping or handling your milk.  Re-wash your hands if you touch anything (scratching your nose, answering the phone, etc) while pumping or handling your milk.   5. Before pumping your breasts, assemble the pump collection kit and have ready the sterile container and labels.  Place these items on a clean surface next to the breastpump.  6. Each time after you have finished pumping, take apart all of the parts of the breastpump collection kit and place them in a separate cleaning container (do not place them in the sink).  Be sure to remove the yellow valve from the breastshield and separate the white membrane from the yellow valve.  Rinse all of these parts with cool water.  Then use a new sponge and/or bottle brush and dishwashing detergent to clean the parts.  Rinse off the soapy water with cool water and air dry on a clean towel covered with a clean cloth.  All parts may also be washed after each use in the top rack of a .  7. Once each day, sterilize all of the parts of the breastpump collection kit.  This can be done by boiling the kit parts for 10 minutes or by using a Quick Clean Micro-Steam Bag made by [employer]  8. If condensation appears in the tubing, continue to run the pump with the tubing attached for 1-2 minutes or until the tubing is dry.   9. Notify your babys nurse or doctor if you become ill or need to take any medication, even over-the-counter medicines.        Collection and Storage of Expressed Breastmilk:         1. Pump your breasts at least 8-10 times every 24 hours.  Double pump (both breasts at  the same time) for at least 15-20 minutes using the most suction that is comfortable.    2. Write the date and time of pumping and the name of any medications you are takingon the babys pre-printed hospital identification  label.   3.    Do not touch the inside of the storage containers or lids.      4.        Tightly screw the lid onto the container and place immediately into the                                refrigerator for daily use.  Bottle may remain at room temperature if the next                    feeding is within 4 hours.  5.    Expressed breastmilk should be refrigerated or frozen within 4 hours of                pumping.  6.        Do not store expressed breastmilk on the door of your refrigerator or freeze             where the temperature is warmer.   7.        Refrigerated milk may be stored in for up to 7 days.  At this point it can be              moved to the freezer for 6 -12 months.  8.        Thaw frozen breast milk in overnight in the refrigerator.  Once milk is thawed it              must be used within 24 hours.  9.        Refrigerated breast milk needs to be warmed to room temperature.  Warm by leaving unrefrigerated until it reached room temperature, or place sealed bottle into a cup of warm (not boiling) water or use a bottle warmer.               Never warm breast milk in a microwave or boiling water.    For any questions or concerns call:  Lactation Department at [phone]

## 2020-07-31 NOTE — NURSING
D/C instructions with meds and follow up read to and copy given to pt and significant other, both verbalized and understanding and intent to comply, escorted off unit to personal vehicle with baby and personal belongings via wheelchair per one person escort, no compliants at this time.

## 2020-07-31 NOTE — LACTATION NOTE
"   07/31/20 1300   Pain/Comfort/Sleep   Pain Body Location - Side Bilateral   Pain Body Location breast   Pain Rating: Rest 0 - no pain   Breasts WDL   Breast WDL WDL   Maternal Feeding Assessment   Maternal Emotional State relaxed;independent   Latch Assistance no   Reproductive Interventions   Breastfeeding Support encouragement provided;lactation counseling provided     Breastfeeding discharge instructions given with review of Mother's Breastfeeding Guide and Resource List.  Encouraged to call hotline # prn.  States "understand" and verbalized appropriate recall.    "

## 2020-07-31 NOTE — DISCHARGE SUMMARY
[facility]  Obstetrics  Discharge Summary      Patient Name: Nikkie Myles  MRN: [id number]  Admission Date: 2020  Hospital Length of Stay: 2 days  Discharge Date and Time: 2020  Attending Physician: Jasper Hester MD   Discharging Provider: Jasper Hester MD   Primary Care Provider: Brian Collado MD    HPI: 29 yo  at 36w0d  Previous  sections x 2  Came in to see MFM today, 2020 for fetal short legs  Found to have severe hypertension.  Sent to Labor and Delivery         Procedure(s) (LRB):   SECTION, WITH TUBAL LIGATION (Bilateral)     Hospital Course:   On Labor and Delivery, blood pressure 170-180s/100-110  Denies preeclamptic symptoms.  No headache.  No blurry.vision. No right upper quadrant pain  However, after 45 minutes, blood pressure still at 174/104    Will start magnesium IV  Proceed with repeat  section with tubal ligation.    MD Manoj Riley MD  Spinal by DANYEL Jesus MD  Viable female infant at 1501 2020  Weight: 2.01 kg (4 lb 6.9 oz)  Apgar: 9/9  Normal tubes and ovaries  Partial salpingectomy performed for sterilization.  To Path.  EBL: 500 cc  No complication    2020 Mag continued postpartum.  Blood pressure better with mostly under 150/100.  Diuresing well, over 1100 cc in 14 hours.  Last hours prior to my rounding with output 150 cc.  Denies symptoms  2020 Did well on labetalol 200 mg bid with -150/70-90.  Still no preeclamptic symptoms.  Breast-feeding better.  Wants to go home.    Postpartum course was otherwise benign.  She is breast-feeding well.  Exam was benign with patient afebrile, vitals stable, and minimal bleeding.  Normal activities.  Patient discharged home on postpartum day #2, 2020 per request   Discharge medications include Percocet, Motrin, prenatal vitamins, and iron supplement with labetalol.  Follow-up with me next week for blood pressure check and dressing removal.    Jasper Hester  MD.             Final Active Diagnoses:    Diagnosis Date Noted POA    Anemia of mother during pregnancy, delivered [O99.02] 2020 Yes    Elevated blood pressure affecting pregnancy, antepartum [O16.9] 2020 Yes    Status post  section [Z98.891] 10/17/2015 Not Applicable      Problems Resolved During this Admission:    Diagnosis Date Noted Date Resolved POA    PRINCIPAL PROBLEM:  36 weeks gestation of pregnancy [Z3A.36] 2020 Not Applicable        Significant Diagnostic Studies: Labs: All labs within the past 24 hours have been reviewed      Feeding Method: breast    Immunizations     None          Delivery:    Episiotomy: None   Lacerations: None   Repair suture: None   Repair # of packets:     Blood loss (ml):       Birth information:  YOB: 2019   Time of birth: 3:01 PM   Sex: female   Delivery type: , Low Transverse   Gestational Age: 36w0d    Delivery Clinician:      Other providers:       Additional  information:  Forceps:    Vacuum:    Breech:    Observed anomalies      Living?:           APGARS  One minute Five minutes Ten minutes   Skin color:         Heart rate:         Grimace:         Muscle tone:         Breathing:         Totals: 9  9        Placenta: Delivered:       appearance    Pending Diagnostic Studies:     Procedure Component Value Units Date/Time    Specimen to Pathology, Surgery Gynecology and Obstetrics [[id number]] Collected: 20 1508    Order Status: Sent Lab Status: In process Updated: 20 1011          Discharged Condition: good    Disposition: Home or Self Care    Follow Up:  Follow-up Information     Jasper Hester MD In 1 week.    Specialties: Obstetrics and Gynecology, Obstetrics and Gynecology  Contact information:  [address]  [address] [place], [place] [id number] [phone]                 Patient Instructions:      BREAST PUMP FOR HOME USE     Order Specific Question Answer Comments   Type of pump: Electric     Weight: 89.8 kg (198 lb)    Length of need (1-99 months): 99      Call MD for:  temperature >100.4     Call MD for:  persistent nausea and vomiting or diarrhea     Call MD for:  severe uncontrolled pain     Call MD for:  redness, tenderness, or signs of infection (pain, swelling, redness, odor or green/yellow discharge around incision site)     Call MD for:  difficulty breathing or increased cough     Call MD for:  severe persistent headache     Call MD for:  worsening rash     Call MD for:  persistent dizziness, light-headedness, or visual disturbances     Call MD for:  increased confusion or weakness     No dressing needed     Medications:  Current Discharge Medication List      START taking these medications    Details   HYDROcodone-acetaminophen (NORCO) 5-325 mg per tablet Take 1 tablet by mouth every 4 (four) hours as needed.  Qty: 12 tablet, Refills: 0      ibuprofen (ADVIL,MOTRIN) 800 MG tablet Take 1 tablet (800 mg total) by mouth every 8 (eight) hours.  Qty: 30 tablet, Refills: 1      labetaloL (NORMODYNE) 200 MG tablet Take 1 tablet (200 mg total) by mouth every 12 (twelve) hours.  Qty: 60 tablet, Refills: 1    Comments: .         CONTINUE these medications which have NOT CHANGED    Details   prenatal 26-iron ps-folic-dha (VITAFOL-ONE) 29 mg iron- 1 mg-200 mg Cap Take 1 capsule by mouth once daily.  Qty: 30 capsule, Refills: 6    Associated Diagnoses: Positive pregnancy test      vancomycin oral solution 25mg/mL Place 1 drop into the right eye every hour.             Jasper Hester MD  Obstetrics  [facility]

## 2020-07-31 NOTE — PLAN OF CARE
Plan of care reviewed with mother. All questions and concerns addressed. Pt maintaining adequate pain control with PO pain meds. Mother and infant bonding well. breastfeeding.  Encouraged pt to increase ambulation.  Incision care reviewed with pt.  Exam WNL

## 2020-08-02 ENCOUNTER — TELEPHONE (OUTPATIENT)
Dept: OBSTETRICS AND GYNECOLOGY | Facility: HOSPITAL | Age: 30
End: 2020-08-02

## 2020-08-02 NOTE — TELEPHONE ENCOUNTER
Spoke to patient via telephone regarding breastfeeding since discharge from hospital. States that baby has been nursing well. States that her milk is in.  States baby nurses at least 10 times in 24 hours and softens breasts during feeding. Also pumping 4 ounces several times daily. States baby has had 2 wet and 2 stool diapers so far today. Advised mother that baby should have at least 4 wet and stool diapers today and at 5 days of age 5-6 wet and yellow stool diapers. Baby has pediatrician appointment with  scheduled later this week. Patient states no breastfeeding related concerns. Encouraged patient to call lactation department if any questions or concerns should arise. Patient verbalizes understanding of all instructions with good recall.

## 2020-09-14 ENCOUNTER — POSTPARTUM VISIT (OUTPATIENT)
Dept: OBSTETRICS AND GYNECOLOGY | Facility: CLINIC | Age: 30
End: 2020-09-14
Payer: MEDICAID

## 2020-09-14 VITALS
HEIGHT: 60 IN | SYSTOLIC BLOOD PRESSURE: 120 MMHG | WEIGHT: 160.94 LBS | BODY MASS INDEX: 31.6 KG/M2 | DIASTOLIC BLOOD PRESSURE: 70 MMHG

## 2020-09-14 DIAGNOSIS — Z87.59 HISTORY OF PRE-ECLAMPSIA: ICD-10-CM

## 2020-09-14 DIAGNOSIS — L02.215 PERINEAL ABSCESS: ICD-10-CM

## 2020-09-14 PROCEDURE — 99999 PR PBB SHADOW E&M-EST. PATIENT-LVL III: ICD-10-PCS | Mod: PBBFAC,,, | Performed by: OBSTETRICS & GYNECOLOGY

## 2020-09-14 PROCEDURE — 87147 CULTURE TYPE IMMUNOLOGIC: CPT

## 2020-09-14 PROCEDURE — 99213 OFFICE O/P EST LOW 20 MIN: CPT | Mod: PBBFAC | Performed by: OBSTETRICS & GYNECOLOGY

## 2020-09-14 PROCEDURE — 87075 CULTR BACTERIA EXCEPT BLOOD: CPT

## 2020-09-14 PROCEDURE — 99999 PR PBB SHADOW E&M-EST. PATIENT-LVL III: CPT | Mod: PBBFAC,,, | Performed by: OBSTETRICS & GYNECOLOGY

## 2020-09-14 PROCEDURE — 87070 CULTURE OTHR SPECIMN AEROBIC: CPT

## 2020-09-14 PROCEDURE — 87076 CULTURE ANAEROBE IDENT EACH: CPT

## 2020-09-14 PROCEDURE — 88175 CYTOPATH C/V AUTO FLUID REDO: CPT

## 2020-09-14 RX ORDER — SULFAMETHOXAZOLE AND TRIMETHOPRIM 800; 160 MG/1; MG/1
1 TABLET ORAL 2 TIMES DAILY
Qty: 14 TABLET | Refills: 0 | Status: SHIPPED | OUTPATIENT
Start: 2020-09-14 | End: 2021-03-10 | Stop reason: ALTCHOICE
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
## 2020-09-14 NOTE — LETTER
September 14, 2020               [facility] - OB/ GYN  [address]  [place] [address]  Phone: [phone] September 14, 2020     Patient: Nikkie Myles   YOB: 1990   Date of Visit: 9/14/2020       To Whom It May Concern:    It is my medical opinion that Nikkie Myles may return to full duty immediately with no restrictions starting Tuesday 09/15/2020.    If you have any questions or concerns, please don't hesitate to call.    Sincerely,        Jasper Hester MD

## 2020-09-17 ENCOUNTER — TELEPHONE (OUTPATIENT)
Dept: OBSTETRICS AND GYNECOLOGY | Facility: CLINIC | Age: 30
End: 2020-09-17

## 2020-09-17 DIAGNOSIS — L02.215 PERINEAL ABSCESS: Primary | ICD-10-CM

## 2020-09-17 LAB — BACTERIA SPEC AEROBE CULT: ABNORMAL

## 2020-09-17 RX ORDER — AMPICILLIN 500 MG/1
500 CAPSULE ORAL 3 TIMES DAILY
Qty: 21 CAPSULE | Refills: 0 | Status: SHIPPED | OUTPATIENT
Start: 2020-09-17 | End: 2020-09-24

## 2020-09-17 NOTE — TELEPHONE ENCOUNTER
Attempted to call pt and inform her antibiotic sent to pharmacy number not in service that's on file

## 2020-09-19 LAB — BACTERIA SPEC ANAEROBE CULT: ABNORMAL

## 2020-09-29 LAB
FINAL PATHOLOGIC DIAGNOSIS: NORMAL
Lab: NORMAL

## 2021-03-07 PROCEDURE — 99283 EMERGENCY DEPT VISIT LOW MDM: CPT

## 2021-03-08 ENCOUNTER — HOSPITAL ENCOUNTER (EMERGENCY)
Facility: HOSPITAL | Age: 31
Discharge: HOME | End: 2021-03-08
Attending: EMERGENCY MEDICINE
Payer: MEDICAID

## 2021-03-08 VITALS
TEMPERATURE: 99 F | HEIGHT: 60 IN | SYSTOLIC BLOOD PRESSURE: 134 MMHG | BODY MASS INDEX: 29.45 KG/M2 | WEIGHT: 150 LBS | OXYGEN SATURATION: 99 % | DIASTOLIC BLOOD PRESSURE: 64 MMHG | RESPIRATION RATE: 18 BRPM | HEART RATE: 84 BPM

## 2021-03-08 DIAGNOSIS — H10.9 CONJUNCTIVITIS OF RIGHT EYE, UNSPECIFIED CONJUNCTIVITIS TYPE: Primary | ICD-10-CM

## 2021-03-08 PROCEDURE — 25000003 PHARM REV CODE 250: Performed by: PHYSICIAN ASSISTANT

## 2021-03-08 RX ORDER — MOXIFLOXACIN 5 MG/ML
1 SOLUTION/ DROPS OPHTHALMIC 3 TIMES DAILY
Qty: 3 ML | Refills: 0 | Status: SHIPPED | OUTPATIENT
Start: 2021-03-08 | End: 2021-11-22

## 2021-03-08 RX ORDER — TETRACAINE HYDROCHLORIDE 5 MG/ML
2 SOLUTION OPHTHALMIC
Status: COMPLETED | OUTPATIENT
Start: 2021-03-08 | End: 2021-03-08

## 2021-03-08 RX ORDER — MOXIFLOXACIN 5 MG/ML
1 SOLUTION/ DROPS OPHTHALMIC 3 TIMES DAILY
Status: DISCONTINUED | OUTPATIENT
Start: 2021-03-08 | End: 2021-03-08

## 2021-03-08 RX ORDER — MOXIFLOXACIN 5 MG/ML
1 SOLUTION/ DROPS OPHTHALMIC
Status: COMPLETED | OUTPATIENT
Start: 2021-03-08 | End: 2021-03-08

## 2021-03-08 RX ADMIN — MOXIFLOXACIN 1 DROP: 5 SOLUTION/ DROPS OPHTHALMIC at 01:03

## 2021-03-08 RX ADMIN — FLUORESCEIN SODIUM 1 EACH: 1 STRIP OPHTHALMIC at 01:03

## 2021-03-08 RX ADMIN — TETRACAINE HYDROCHLORIDE 2 DROP: 5 SOLUTION OPHTHALMIC at 01:03

## 2021-03-10 ENCOUNTER — OFFICE VISIT (OUTPATIENT)
Dept: OPHTHALMOLOGY | Facility: CLINIC | Age: 31
End: 2021-03-10
Payer: MEDICAID

## 2021-03-10 ENCOUNTER — TELEPHONE (OUTPATIENT)
Dept: OPHTHALMOLOGY | Facility: CLINIC | Age: 31
End: 2021-03-10

## 2021-03-10 DIAGNOSIS — H16.9 KERATITIS: ICD-10-CM

## 2021-03-10 DIAGNOSIS — H17.9 CORNEAL OPACITY: Primary | ICD-10-CM

## 2021-03-10 PROCEDURE — 99999 PR PBB SHADOW E&M-EST. PATIENT-LVL II: ICD-10-PCS | Mod: PBBFAC,,, | Performed by: OPHTHALMOLOGY

## 2021-03-10 PROCEDURE — 92014 COMPRE OPH EXAM EST PT 1/>: CPT | Mod: S$PBB,,, | Performed by: OPHTHALMOLOGY

## 2021-03-10 PROCEDURE — 99212 OFFICE O/P EST SF 10 MIN: CPT | Mod: PBBFAC | Performed by: OPHTHALMOLOGY

## 2021-03-10 PROCEDURE — 99999 PR PBB SHADOW E&M-EST. PATIENT-LVL II: CPT | Mod: PBBFAC,,, | Performed by: OPHTHALMOLOGY

## 2021-03-10 PROCEDURE — 92014 PR EYE EXAM, EST PATIENT,COMPREHESV: ICD-10-PCS | Mod: S$PBB,,, | Performed by: OPHTHALMOLOGY

## 2021-03-10 RX ORDER — PREDNISOLONE ACETATE 10 MG/ML
1 SUSPENSION/ DROPS OPHTHALMIC 4 TIMES DAILY
Qty: 10 ML | Refills: 0 | Status: SHIPPED | OUTPATIENT
Start: 2021-03-10 | End: 2021-11-22

## 2021-03-17 ENCOUNTER — OFFICE VISIT (OUTPATIENT)
Dept: OPHTHALMOLOGY | Facility: CLINIC | Age: 31
End: 2021-03-17
Payer: MEDICAID

## 2021-03-17 ENCOUNTER — TELEPHONE (OUTPATIENT)
Dept: OPHTHALMOLOGY | Facility: CLINIC | Age: 31
End: 2021-03-17

## 2021-03-17 DIAGNOSIS — H16.209 KERATOCONJUNCTIVITIS, UNSPECIFIED LATERALITY: ICD-10-CM

## 2021-03-17 DIAGNOSIS — H16.9 KERATITIS: Primary | ICD-10-CM

## 2021-03-17 PROCEDURE — 92014 PR EYE EXAM, EST PATIENT,COMPREHESV: ICD-10-PCS | Mod: S$PBB,,, | Performed by: OPHTHALMOLOGY

## 2021-03-17 PROCEDURE — 99999 PR PBB SHADOW E&M-EST. PATIENT-LVL II: ICD-10-PCS | Mod: PBBFAC,,, | Performed by: OPHTHALMOLOGY

## 2021-03-17 PROCEDURE — 99999 PR PBB SHADOW E&M-EST. PATIENT-LVL II: CPT | Mod: PBBFAC,,, | Performed by: OPHTHALMOLOGY

## 2021-03-17 PROCEDURE — 99212 OFFICE O/P EST SF 10 MIN: CPT | Mod: PBBFAC | Performed by: OPHTHALMOLOGY

## 2021-03-17 PROCEDURE — 92014 COMPRE OPH EXAM EST PT 1/>: CPT | Mod: S$PBB,,, | Performed by: OPHTHALMOLOGY

## 2021-04-16 ENCOUNTER — PATIENT MESSAGE (OUTPATIENT)
Dept: RESEARCH | Facility: HOSPITAL | Age: 31
End: 2021-04-16

## 2021-08-27 ENCOUNTER — OFFICE VISIT (OUTPATIENT)
Dept: OPHTHALMOLOGY | Facility: CLINIC | Age: 31
End: 2021-08-27
Attending: OPHTHALMOLOGY
Payer: MEDICAID

## 2021-08-27 ENCOUNTER — TELEPHONE (OUTPATIENT)
Dept: OPHTHALMOLOGY | Facility: CLINIC | Age: 31
End: 2021-08-27

## 2021-08-27 DIAGNOSIS — Z22.322 MRSA (METHICILLIN RESISTANT STAPH AUREUS) CULTURE POSITIVE: Primary | ICD-10-CM

## 2021-08-27 DIAGNOSIS — H16.401 CORNEAL NEOVASCULARIZATION OF RIGHT EYE: ICD-10-CM

## 2021-08-27 DIAGNOSIS — H17.9 CORNEAL OPACITY: ICD-10-CM

## 2021-08-27 PROCEDURE — 99999 PR PBB SHADOW E&M-EST. PATIENT-LVL II: CPT | Mod: PBBFAC,,, | Performed by: OPHTHALMOLOGY

## 2021-08-27 PROCEDURE — 99999 PR PBB SHADOW E&M-EST. PATIENT-LVL II: ICD-10-PCS | Mod: PBBFAC,,, | Performed by: OPHTHALMOLOGY

## 2021-08-27 PROCEDURE — 92014 COMPRE OPH EXAM EST PT 1/>: CPT | Mod: S$PBB,,, | Performed by: OPHTHALMOLOGY

## 2021-08-27 PROCEDURE — 92014 PR EYE EXAM, EST PATIENT,COMPREHESV: ICD-10-PCS | Mod: S$PBB,,, | Performed by: OPHTHALMOLOGY

## 2021-08-27 PROCEDURE — 99212 OFFICE O/P EST SF 10 MIN: CPT | Mod: PBBFAC | Performed by: OPHTHALMOLOGY

## 2021-08-27 RX ORDER — PREDNISOLONE ACETATE 10 MG/ML
1 SUSPENSION/ DROPS OPHTHALMIC 3 TIMES DAILY
Qty: 10 ML | Refills: 3 | Status: SHIPPED | OUTPATIENT
Start: 2021-08-27 | End: 2021-11-22

## 2021-11-22 ENCOUNTER — HOSPITAL ENCOUNTER (EMERGENCY)
Facility: HOSPITAL | Age: 31
Discharge: HOME | End: 2021-11-22
Attending: EMERGENCY MEDICINE
Payer: MEDICAID

## 2021-11-22 VITALS
DIASTOLIC BLOOD PRESSURE: 69 MMHG | SYSTOLIC BLOOD PRESSURE: 115 MMHG | RESPIRATION RATE: 18 BRPM | HEIGHT: 60 IN | OXYGEN SATURATION: 99 % | HEART RATE: 91 BPM | BODY MASS INDEX: 29.45 KG/M2 | TEMPERATURE: 99 F | WEIGHT: 150 LBS

## 2021-11-22 DIAGNOSIS — S00.81XA ABRASION OF FOREHEAD, INITIAL ENCOUNTER: ICD-10-CM

## 2021-11-22 DIAGNOSIS — T14.8XXA MUSCLE STRAIN: ICD-10-CM
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
## 2021-11-22 LAB
B-HCG UR QL: NEGATIVE
CTP QC/QA: YES

## 2021-11-22 PROCEDURE — 25000003 PHARM REV CODE 250: Performed by: EMERGENCY MEDICINE

## 2021-11-22 PROCEDURE — 99285 EMERGENCY DEPT VISIT HI MDM: CPT | Mod: 25

## 2021-11-22 PROCEDURE — 96372 THER/PROPH/DIAG INJ SC/IM: CPT

## 2021-11-22 PROCEDURE — 81025 URINE PREGNANCY TEST: CPT | Performed by: EMERGENCY MEDICINE

## 2021-11-22 PROCEDURE — 63600175 PHARM REV CODE 636 W HCPCS: Performed by: EMERGENCY MEDICINE

## 2021-11-22 RX ORDER — OXYCODONE AND ACETAMINOPHEN 5; 325 MG/1; MG/1
1 TABLET ORAL
Status: COMPLETED | OUTPATIENT
Start: 2021-11-22 | End: 2021-11-22

## 2021-11-22 RX ORDER — METHOCARBAMOL 750 MG/1
1500 TABLET, FILM COATED ORAL 3 TIMES DAILY
Qty: 30 TABLET | Refills: 0 | Status: SHIPPED | OUTPATIENT
Start: 2021-11-22 | End: 2021-11-27

## 2021-11-22 RX ORDER — NAPROXEN 375 MG/1
375 TABLET ORAL 2 TIMES DAILY WITH MEALS
Qty: 30 TABLET | Refills: 0 | Status: SHIPPED | OUTPATIENT
Start: 2021-11-22 | End: 2021-11-29

## 2021-11-22 RX ORDER — KETOROLAC TROMETHAMINE 30 MG/ML
15 INJECTION, SOLUTION INTRAMUSCULAR; INTRAVENOUS
Status: COMPLETED | OUTPATIENT
Start: 2021-11-22 | End: 2021-11-22

## 2021-11-22 RX ADMIN — OXYCODONE AND ACETAMINOPHEN 1 TABLET: 5; 325 TABLET ORAL at 09:11

## 2021-11-22 RX ADMIN — KETOROLAC TROMETHAMINE 15 MG: 30 INJECTION, SOLUTION INTRAMUSCULAR; INTRAVENOUS at 11:11

## 2021-11-27 ENCOUNTER — HOSPITAL ENCOUNTER (EMERGENCY)
Facility: HOSPITAL | Age: 31
Discharge: HOME | End: 2021-11-27
Attending: EMERGENCY MEDICINE
Payer: MEDICAID

## 2021-11-27 VITALS
HEART RATE: 98 BPM | DIASTOLIC BLOOD PRESSURE: 64 MMHG | TEMPERATURE: 98 F | OXYGEN SATURATION: 100 % | BODY MASS INDEX: 29.45 KG/M2 | HEIGHT: 60 IN | SYSTOLIC BLOOD PRESSURE: 115 MMHG | RESPIRATION RATE: 18 BRPM | WEIGHT: 150 LBS
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
## 2021-11-27 DIAGNOSIS — H11.002 PTERYGIUM OF LEFT EYE: ICD-10-CM

## 2021-11-27 DIAGNOSIS — H57.12 LEFT EYE PAIN: Primary | ICD-10-CM

## 2021-11-27 DIAGNOSIS — S05.12XA PERIORBITAL CONTUSION, LEFT, INITIAL ENCOUNTER: ICD-10-CM

## 2021-11-27 PROCEDURE — 25000003 PHARM REV CODE 250: Performed by: PHYSICIAN ASSISTANT

## 2021-11-27 PROCEDURE — 99284 EMERGENCY DEPT VISIT MOD MDM: CPT

## 2021-11-27 RX ORDER — TETRACAINE HYDROCHLORIDE 5 MG/ML
2 SOLUTION OPHTHALMIC
Status: COMPLETED | OUTPATIENT
Start: 2021-11-27 | End: 2021-11-27

## 2021-11-27 RX ORDER — HYDROCODONE BITARTRATE AND ACETAMINOPHEN 5; 325 MG/1; MG/1
1 TABLET ORAL EVERY 6 HOURS PRN
Qty: 12 TABLET | Refills: 0 | Status: SHIPPED | OUTPATIENT
Start: 2021-11-27 | End: 2021-11-30

## 2021-11-27 RX ORDER — ERYTHROMYCIN 5 MG/G
OINTMENT OPHTHALMIC
Qty: 1 G | Refills: 0 | Status: ON HOLD | OUTPATIENT
Start: 2021-11-27 | End: 2022-09-03 | Stop reason: HOSPADM

## 2021-11-27 RX ORDER — PREDNISOLONE ACETATE 10 MG/ML
1 SUSPENSION/ DROPS OPHTHALMIC 3 TIMES DAILY
Qty: 10 ML | Refills: 0 | Status: SHIPPED | OUTPATIENT
Start: 2021-11-27 | End: 2021-12-07

## 2021-11-27 RX ADMIN — FLUORESCEIN SODIUM 2 EACH: 1 STRIP OPHTHALMIC at 08:11

## 2021-11-27 RX ADMIN — TETRACAINE HYDROCHLORIDE 2 DROP: 5 SOLUTION OPHTHALMIC at 08:11

## 2021-12-28 ENCOUNTER — HOSPITAL ENCOUNTER (EMERGENCY)
Facility: HOSPITAL | Age: 31
Discharge: HOME | End: 2021-12-28
Attending: EMERGENCY MEDICINE
Payer: MEDICAID

## 2021-12-28 VITALS
SYSTOLIC BLOOD PRESSURE: 105 MMHG | HEIGHT: 60 IN | WEIGHT: 150 LBS | HEART RATE: 103 BPM | OXYGEN SATURATION: 100 % | BODY MASS INDEX: 29.45 KG/M2 | DIASTOLIC BLOOD PRESSURE: 74 MMHG | RESPIRATION RATE: 18 BRPM | TEMPERATURE: 99 F

## 2021-12-28 DIAGNOSIS — U07.1 COVID-19 VIRUS INFECTION: Primary | ICD-10-CM

## 2021-12-28 LAB
CTP QC/QA: YES
CTP QC/QA: YES
POC MOLECULAR INFLUENZA A AGN: NEGATIVE
POC MOLECULAR INFLUENZA B AGN: NEGATIVE
SARS-COV-2 RDRP RESP QL NAA+PROBE: POSITIVE

## 2021-12-28 PROCEDURE — 87502 INFLUENZA DNA AMP PROBE: CPT

## 2021-12-28 PROCEDURE — U0002 COVID-19 LAB TEST NON-CDC: HCPCS | Performed by: NURSE PRACTITIONER

## 2021-12-28 PROCEDURE — 99284 EMERGENCY DEPT VISIT MOD MDM: CPT | Mod: 25

## 2021-12-28 RX ORDER — ONDANSETRON 4 MG/1
4 TABLET, ORALLY DISINTEGRATING ORAL EVERY 8 HOURS PRN
Qty: 20 TABLET | Refills: 0 | Status: ON HOLD | OUTPATIENT
Start: 2021-12-28 | End: 2022-12-30 | Stop reason: SDUPTHER

## 2021-12-28 RX ORDER — IBUPROFEN 400 MG/1
400 TABLET ORAL EVERY 6 HOURS PRN
Qty: 20 TABLET | Refills: 0 | Status: ON HOLD | OUTPATIENT
Start: 2021-12-28 | End: 2022-12-30 | Stop reason: HOSPADM

## 2021-12-28 RX ORDER — ACETAMINOPHEN 500 MG
1000 TABLET ORAL EVERY 6 HOURS PRN
Qty: 60 TABLET | Refills: 0 | Status: ON HOLD | OUTPATIENT
Start: 2021-12-28 | End: 2022-12-30 | Stop reason: SDUPTHER

## 2021-12-28 NOTE — FIRST PROVIDER EVALUATION
Medical screening exam completed.  I have conducted a focused provider triage encounter, findings are as follows:    Brief history of present illness:  Covid symptoms     There were no vitals filed for this visit.    Pertinent physical exam:  AAO, NAD    Brief workup plan:  POCT Covid and Fl. UPT and U/A reflex to Culture    Preliminary workup initiated; this workup will be continued and followed by the physician or advanced practice provider that is assigned to the patient when roomed.
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
## 2021-12-28 NOTE — Clinical Note
"Nikkie"Marissa Myles was seen and treated in our emergency department on 12/28/2021.     COVID-19 is present in our communities across the state. There is limited testing for COVID at this time, so not all patients can be tested. In this situation, your employee meets the following criteria:    Nikkie Myles has met the criteria for COVID-19 testing and has a POSITIVE result. She can return to work once they are asymptomatic for 72 hours without the use of fever reducing medications AND at least ten days from the first positive result.     If you have any questions or concerns, or if I can be of further assistance, please do not hesitate to contact me.    Sincerely,             Micah Prince MD"

## 2022-03-08 ENCOUNTER — OFFICE VISIT (OUTPATIENT)
Dept: OPTOMETRY | Facility: CLINIC | Age: 32
End: 2022-03-08
Payer: MEDICAID

## 2022-03-08 DIAGNOSIS — H16.401 CORNEAL NEOVASCULARIZATION, RIGHT: ICD-10-CM

## 2022-03-08 DIAGNOSIS — H16.9 KERATITIS, BILATERAL: Primary | ICD-10-CM

## 2022-03-08 PROCEDURE — 92014 PR EYE EXAM, EST PATIENT,COMPREHESV: ICD-10-PCS | Mod: S$PBB,,, | Performed by: OPTOMETRIST

## 2022-03-08 PROCEDURE — 1159F MED LIST DOCD IN RCRD: CPT | Mod: CPTII,,, | Performed by: OPTOMETRIST

## 2022-03-08 PROCEDURE — 99999 PR PBB SHADOW E&M-EST. PATIENT-LVL II: CPT | Mod: PBBFAC,,, | Performed by: OPTOMETRIST

## 2022-03-08 PROCEDURE — 99212 OFFICE O/P EST SF 10 MIN: CPT | Mod: PBBFAC,PO | Performed by: OPTOMETRIST

## 2022-03-08 PROCEDURE — 1160F RVW MEDS BY RX/DR IN RCRD: CPT | Mod: CPTII,,, | Performed by: OPTOMETRIST

## 2022-03-08 PROCEDURE — 92014 COMPRE OPH EXAM EST PT 1/>: CPT | Mod: S$PBB,,, | Performed by: OPTOMETRIST

## 2022-03-08 PROCEDURE — 1159F PR MEDICATION LIST DOCUMENTED IN MEDICAL RECORD: ICD-10-PCS | Mod: CPTII,,, | Performed by: OPTOMETRIST

## 2022-03-08 PROCEDURE — 99999 PR PBB SHADOW E&M-EST. PATIENT-LVL II: ICD-10-PCS | Mod: PBBFAC,,, | Performed by: OPTOMETRIST

## 2022-03-08 PROCEDURE — 1160F PR REVIEW ALL MEDS BY PRESCRIBER/CLIN PHARMACIST DOCUMENTED: ICD-10-PCS | Mod: CPTII,,, | Performed by: OPTOMETRIST

## 2022-03-08 RX ORDER — PREDNISOLONE ACETATE 10 MG/ML
SUSPENSION/ DROPS OPHTHALMIC
Qty: 5 ML | Refills: 0 | Status: SHIPPED | OUTPATIENT
Start: 2022-03-08 | End: 2022-03-29
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
## 2022-03-30 ENCOUNTER — OFFICE VISIT (OUTPATIENT)
Dept: OPHTHALMOLOGY | Facility: CLINIC | Age: 32
End: 2022-03-30
Payer: MEDICAID

## 2022-03-30 DIAGNOSIS — Z22.322 MRSA (METHICILLIN RESISTANT STAPH AUREUS) CULTURE POSITIVE: ICD-10-CM

## 2022-03-30 DIAGNOSIS — H16.9 KERATITIS: ICD-10-CM

## 2022-03-30 DIAGNOSIS — H16.401 CORNEAL NEOVASCULARIZATION OF RIGHT EYE: Primary | ICD-10-CM

## 2022-03-30 PROCEDURE — 99999 PR PBB SHADOW E&M-EST. PATIENT-LVL III: CPT | Mod: PBBFAC,,, | Performed by: OPHTHALMOLOGY

## 2022-03-30 PROCEDURE — 1160F RVW MEDS BY RX/DR IN RCRD: CPT | Mod: CPTII,,, | Performed by: OPHTHALMOLOGY

## 2022-03-30 PROCEDURE — 99999 PR PBB SHADOW E&M-EST. PATIENT-LVL III: ICD-10-PCS | Mod: PBBFAC,,, | Performed by: OPHTHALMOLOGY

## 2022-03-30 PROCEDURE — 92014 PR EYE EXAM, EST PATIENT,COMPREHESV: ICD-10-PCS | Mod: S$PBB,,, | Performed by: OPHTHALMOLOGY

## 2022-03-30 PROCEDURE — 1159F MED LIST DOCD IN RCRD: CPT | Mod: CPTII,,, | Performed by: OPHTHALMOLOGY

## 2022-03-30 PROCEDURE — 92014 COMPRE OPH EXAM EST PT 1/>: CPT | Mod: S$PBB,,, | Performed by: OPHTHALMOLOGY

## 2022-03-30 PROCEDURE — 1159F PR MEDICATION LIST DOCUMENTED IN MEDICAL RECORD: ICD-10-PCS | Mod: CPTII,,, | Performed by: OPHTHALMOLOGY

## 2022-03-30 PROCEDURE — 1160F PR REVIEW ALL MEDS BY PRESCRIBER/CLIN PHARMACIST DOCUMENTED: ICD-10-PCS | Mod: CPTII,,, | Performed by: OPHTHALMOLOGY

## 2022-03-30 PROCEDURE — 99213 OFFICE O/P EST LOW 20 MIN: CPT | Mod: PBBFAC | Performed by: OPHTHALMOLOGY

## 2022-03-30 NOTE — PROGRESS NOTES
HPI     30 y/o female presents to clinic for keratitis f/u     Hx MRSA K ulcer 2019,    Pt states had a flare up in march with Dr Frausto. Rx'd more pre. Recently   diagnosed with Crohns. Having colonoscopy soon     Pred qD OS    Last edited by Mariaelena Noe on 3/30/2022 11:55 AM. (History)            Assessment /Plan     For exam results, see Encounter Report.    Corneal neovascularization of right eye    MRSA (methicillin resistant staph aureus) culture positive    Keratitis      OD with old stable vascularized central scar from MRSA ulcer.    OS with new epithelial and limbal issues, on PF qd and Gel drops...  If doesn't heal well, will consider AMT/BCL...

## 2022-05-04 ENCOUNTER — OFFICE VISIT (OUTPATIENT)
Dept: OPHTHALMOLOGY | Facility: CLINIC | Age: 32
End: 2022-05-04
Payer: MEDICAID

## 2022-05-04 DIAGNOSIS — H17.9 CORNEAL OPACITY: Primary | ICD-10-CM

## 2022-05-04 DIAGNOSIS — H16.9 KERATITIS: ICD-10-CM

## 2022-05-04 DIAGNOSIS — H16.401 CORNEAL NEOVASCULARIZATION OF RIGHT EYE: ICD-10-CM

## 2022-05-04 PROCEDURE — 1160F RVW MEDS BY RX/DR IN RCRD: CPT | Mod: CPTII,,, | Performed by: OPHTHALMOLOGY

## 2022-05-04 PROCEDURE — 99999 PR PBB SHADOW E&M-EST. PATIENT-LVL III: ICD-10-PCS | Mod: PBBFAC,,, | Performed by: OPHTHALMOLOGY

## 2022-05-04 PROCEDURE — 1160F PR REVIEW ALL MEDS BY PRESCRIBER/CLIN PHARMACIST DOCUMENTED: ICD-10-PCS | Mod: CPTII,,, | Performed by: OPHTHALMOLOGY

## 2022-05-04 PROCEDURE — 92014 PR EYE EXAM, EST PATIENT,COMPREHESV: ICD-10-PCS | Mod: S$PBB,,, | Performed by: OPHTHALMOLOGY

## 2022-05-04 PROCEDURE — 99213 OFFICE O/P EST LOW 20 MIN: CPT | Mod: PBBFAC | Performed by: OPHTHALMOLOGY

## 2022-05-04 PROCEDURE — 92014 COMPRE OPH EXAM EST PT 1/>: CPT | Mod: S$PBB,,, | Performed by: OPHTHALMOLOGY

## 2022-05-04 PROCEDURE — 1159F MED LIST DOCD IN RCRD: CPT | Mod: CPTII,,, | Performed by: OPHTHALMOLOGY

## 2022-05-04 PROCEDURE — 1159F PR MEDICATION LIST DOCUMENTED IN MEDICAL RECORD: ICD-10-PCS | Mod: CPTII,,, | Performed by: OPHTHALMOLOGY

## 2022-05-04 PROCEDURE — 99999 PR PBB SHADOW E&M-EST. PATIENT-LVL III: CPT | Mod: PBBFAC,,, | Performed by: OPHTHALMOLOGY

## 2022-05-04 RX ORDER — NEOMYCIN SULFATE, POLYMYXIN B SULFATE AND DEXAMETHASONE 3.5; 10000; 1 MG/ML; [USP'U]/ML; MG/ML
1 SUSPENSION/ DROPS OPHTHALMIC 3 TIMES DAILY
Qty: 10 ML | Refills: 3 | Status: SHIPPED | OUTPATIENT
Start: 2022-05-04 | End: 2022-06-03

## 2022-05-04 RX ORDER — PREDNISOLONE ACETATE 10 MG/ML
1 SUSPENSION/ DROPS OPHTHALMIC 3 TIMES DAILY
Qty: 10 ML | Refills: 4 | Status: ON HOLD | OUTPATIENT
Start: 2022-05-04 | End: 2022-12-30 | Stop reason: HOSPADM

## 2022-05-04 NOTE — PROGRESS NOTES
HPI     33 y/o female presents to clinic for corneal neovascularization OD f/u    Hx MRSA K ulcer 2019    Pt states eyes are still light sensitive and run water a lot     Pred qD OD  AT gel PRN    Last edited by Mariaelena Noe on 5/4/2022  3:49 PM. (History)            Assessment /Plan     For exam results, see Encounter Report.    Corneal opacity    Keratitis    Corneal neovascularization of right eye    Other orders  -     prednisoLONE acetate (PRED FORTE) 1 % DrpS; Place 1 drop into the left eye 3 (three) times daily.  Dispense: 10 mL; Refill: 4      OD with dense central scar and NV from prior infection (MRSA)    OS with new and progressive limbal wedge defects (Phlectenular-like) superior/inferior with whorling epitheliopathy  Add PF tid to systemic MTX for Chron's

## 2022-06-08 ENCOUNTER — OFFICE VISIT (OUTPATIENT)
Dept: OPHTHALMOLOGY | Facility: CLINIC | Age: 32
End: 2022-06-08
Payer: MEDICAID

## 2022-06-08 DIAGNOSIS — H16.401 CORNEAL NEOVASCULARIZATION OF RIGHT EYE: ICD-10-CM

## 2022-06-08 DIAGNOSIS — H17.9 CORNEAL OPACITY: Primary | ICD-10-CM

## 2022-06-08 PROCEDURE — 1160F RVW MEDS BY RX/DR IN RCRD: CPT | Mod: CPTII,,, | Performed by: OPHTHALMOLOGY

## 2022-06-08 PROCEDURE — 1159F PR MEDICATION LIST DOCUMENTED IN MEDICAL RECORD: ICD-10-PCS | Mod: CPTII,,, | Performed by: OPHTHALMOLOGY

## 2022-06-08 PROCEDURE — 92014 COMPRE OPH EXAM EST PT 1/>: CPT | Mod: S$PBB,,, | Performed by: OPHTHALMOLOGY

## 2022-06-08 PROCEDURE — 92014 PR EYE EXAM, EST PATIENT,COMPREHESV: ICD-10-PCS | Mod: S$PBB,,, | Performed by: OPHTHALMOLOGY

## 2022-06-08 PROCEDURE — 1160F PR REVIEW ALL MEDS BY PRESCRIBER/CLIN PHARMACIST DOCUMENTED: ICD-10-PCS | Mod: CPTII,,, | Performed by: OPHTHALMOLOGY

## 2022-06-08 PROCEDURE — 99213 OFFICE O/P EST LOW 20 MIN: CPT | Mod: PBBFAC | Performed by: OPHTHALMOLOGY

## 2022-06-08 PROCEDURE — 99999 PR PBB SHADOW E&M-EST. PATIENT-LVL III: CPT | Mod: PBBFAC,,, | Performed by: OPHTHALMOLOGY

## 2022-06-08 PROCEDURE — 99999 PR PBB SHADOW E&M-EST. PATIENT-LVL III: ICD-10-PCS | Mod: PBBFAC,,, | Performed by: OPHTHALMOLOGY

## 2022-06-08 PROCEDURE — 1159F MED LIST DOCD IN RCRD: CPT | Mod: CPTII,,, | Performed by: OPHTHALMOLOGY

## 2022-06-08 RX ORDER — TETRACAINE HYDROCHLORIDE 5 MG/ML
1 SOLUTION OPHTHALMIC
Status: CANCELLED | OUTPATIENT
Start: 2022-06-08

## 2022-06-08 RX ORDER — SODIUM CHLORIDE 0.9 % (FLUSH) 0.9 %
10 SYRINGE (ML) INJECTION
Status: DISCONTINUED | OUTPATIENT
Start: 2022-06-08 | End: 2022-12-30 | Stop reason: HOSPADM

## 2022-06-08 RX ORDER — MOXIFLOXACIN 5 MG/ML
1 SOLUTION/ DROPS OPHTHALMIC
Status: CANCELLED | OUTPATIENT
Start: 2022-06-08

## 2022-06-08 NOTE — PROGRESS NOTES
HPI     33 y/o female presents to clinic for corneal neovascularization OD f/u     Hx MRSA K ulcer 2019    Pt states eyes are feeling much better. Used pred for about 2-3 weeks and   then stopped       Last edited by Mariaelena Noe on 6/8/2022  3:46 PM. (History)            Assessment /Plan     For exam results, see Encounter Report.    Corneal opacity    Corneal neovascularization of right eye        OD with dense central scar and NV from prior infection (MRSA)  Plan PKP OD  General Anesthesia      OS with new and progressive limbal wedge defects (Phlectenular-like) superior/inferior with whorling epitheliopathy  Add PF tid to systemic MTX for Chron's  Improved with 2-3 weeks of PF, now with stable peripheral scars (wedge shaped x2)

## 2022-08-11 ENCOUNTER — HOSPITAL ENCOUNTER (INPATIENT)
Facility: HOSPITAL | Age: 32
LOS: 23 days | Discharge: HOME HEALTH | DRG: 331 | End: 2022-09-03
Attending: EMERGENCY MEDICINE | Admitting: INTERNAL MEDICINE
Payer: MEDICAID

## 2022-08-11 DIAGNOSIS — R06.02 SHORTNESS OF BREATH: ICD-10-CM

## 2022-08-11 DIAGNOSIS — R07.9 CHEST PAIN: ICD-10-CM

## 2022-08-11 DIAGNOSIS — M07.60 ENTEROPATHIC ARTHROPATHY: ICD-10-CM

## 2022-08-11 DIAGNOSIS — H16.011 CENTRAL CORNEAL ULCER, RIGHT: Primary | ICD-10-CM

## 2022-08-11 DIAGNOSIS — B96.81 HELICOBACTER PYLORI GASTRITIS: ICD-10-CM

## 2022-08-11 DIAGNOSIS — K50.114 CROHN'S DISEASE OF LARGE INTESTINE WITH ABSCESS: ICD-10-CM

## 2022-08-11 DIAGNOSIS — K29.70 HELICOBACTER PYLORI GASTRITIS: ICD-10-CM

## 2022-08-11 DIAGNOSIS — Z87.2 HISTORY OF ERYTHEMA NODOSUM: ICD-10-CM

## 2022-08-11 DIAGNOSIS — K50.813 CROHN'S DISEASE OF BOTH SMALL AND LARGE INTESTINE WITH FISTULA: ICD-10-CM

## 2022-08-11 DIAGNOSIS — H16.009 CORNEAL ULCER, UNSPECIFIED LATERALITY: ICD-10-CM

## 2022-08-11 DIAGNOSIS — N76.5 VAGINAL ULCER: ICD-10-CM

## 2022-08-11 DIAGNOSIS — N89.8 VAGINAL DISCHARGE: ICD-10-CM

## 2022-08-11 DIAGNOSIS — R00.1 BRADYCARDIA: ICD-10-CM

## 2022-08-11 DIAGNOSIS — R10.9 INTRACTABLE ABDOMINAL PAIN: ICD-10-CM

## 2022-08-11 DIAGNOSIS — K60.3 PERIANAL FISTULA: ICD-10-CM

## 2022-08-11 DIAGNOSIS — K50.119 CROHN'S DISEASE OF COLON WITH COMPLICATION: ICD-10-CM

## 2022-08-11 PROCEDURE — 12000002 HC ACUTE/MED SURGE SEMI-PRIVATE ROOM

## 2022-08-11 RX ORDER — ONDANSETRON 2 MG/ML
4 INJECTION INTRAMUSCULAR; INTRAVENOUS
Status: COMPLETED | OUTPATIENT
Start: 2022-08-12 | End: 2022-08-12

## 2022-08-11 RX ORDER — ACETAMINOPHEN 500 MG
1000 TABLET ORAL
Status: DISPENSED | OUTPATIENT
Start: 2022-08-12 | End: 2022-08-12

## 2022-08-11 RX ORDER — MORPHINE SULFATE 4 MG/ML
4 INJECTION, SOLUTION INTRAMUSCULAR; INTRAVENOUS
Status: COMPLETED | OUTPATIENT
Start: 2022-08-12 | End: 2022-08-12

## 2022-08-11 NOTE — Clinical Note
Diagnosis: Intractable abdominal pain [[id number]]   Future Attending Provider: EVAN BREEN [[id number]]   Admitting Provider:: EVAN BREEN [[id number]]

## 2022-08-12 PROBLEM — H16.011: Status: ACTIVE | Noted: 2022-08-12

## 2022-08-12 PROBLEM — K50.914 CROHN'S DISEASE WITH ABSCESS: Status: ACTIVE | Noted: 2022-08-12

## 2022-08-12 LAB
ALBUMIN SERPL BCP-MCNC: 3 G/DL (ref 3.5–5.2)
ALP SERPL-CCNC: 67 U/L (ref 55–135)
ALT SERPL W/O P-5'-P-CCNC: 6 U/L (ref 10–44)
ANION GAP SERPL CALC-SCNC: 11 MMOL/L (ref 8–16)
ANISOCYTOSIS BLD QL SMEAR: SLIGHT
AST SERPL-CCNC: 13 U/L (ref 10–40)
B-HCG UR QL: NEGATIVE
BACTERIA #/AREA URNS HPF: ABNORMAL /HPF
BACTERIA GENITAL QL WET PREP: ABNORMAL
BASOPHILS # BLD AUTO: 0.04 K/UL (ref 0–0.2)
BASOPHILS NFR BLD: 0.5 % (ref 0–1.9)
BILIRUB SERPL-MCNC: 0.2 MG/DL (ref 0.1–1)
BILIRUB UR QL STRIP: NEGATIVE
BUN SERPL-MCNC: 8 MG/DL (ref 6–20)
C DIFF GDH STL QL: NEGATIVE
C DIFF TOX A+B STL QL IA: NEGATIVE
CALCIUM SERPL-MCNC: 9.1 MG/DL (ref 8.7–10.5)
CHLORIDE SERPL-SCNC: 103 MMOL/L (ref 95–110)
CLARITY UR: ABNORMAL
CLUE CELLS VAG QL WET PREP: ABNORMAL
CO2 SERPL-SCNC: 23 MMOL/L (ref 23–29)
COLOR UR: YELLOW
CREAT SERPL-MCNC: 0.7 MG/DL (ref 0.5–1.4)
CRP SERPL-MCNC: 91.4 MG/L (ref 0–8.2)
CTP QC/QA: YES
DACRYOCYTES BLD QL SMEAR: ABNORMAL
DIFFERENTIAL METHOD: ABNORMAL
EOSINOPHIL # BLD AUTO: 0.1 K/UL (ref 0–0.5)
EOSINOPHIL NFR BLD: 1.3 % (ref 0–8)
ERYTHROCYTE [DISTWIDTH] IN BLOOD BY AUTOMATED COUNT: 24.4 % (ref 11.5–14.5)
ERYTHROCYTE [SEDIMENTATION RATE] IN BLOOD BY WESTERGREN METHOD: 75 MM/HR (ref 0–20)
EST. GFR  (NO RACE VARIABLE): >60 ML/MIN/1.73 M^2
FILAMENT FUNGI VAG WET PREP-#/AREA: ABNORMAL
GLUCOSE SERPL-MCNC: 99 MG/DL (ref 70–110)
GLUCOSE UR QL STRIP: NEGATIVE
HCT VFR BLD AUTO: 29.4 % (ref 37–48.5)
HGB BLD-MCNC: 9.1 G/DL (ref 12–16)
HGB UR QL STRIP: ABNORMAL
HYALINE CASTS #/AREA URNS LPF: 50 /LPF
HYPOCHROMIA BLD QL SMEAR: ABNORMAL
IMM GRANULOCYTES # BLD AUTO: 0.04 K/UL (ref 0–0.04)
IMM GRANULOCYTES NFR BLD AUTO: 0.5 % (ref 0–0.5)
KETONES UR QL STRIP: ABNORMAL
LACTATE SERPL-SCNC: 0.7 MMOL/L (ref 0.5–2.2)
LACTATE SERPL-SCNC: 1.8 MMOL/L (ref 0.5–2.2)
LEUKOCYTE ESTERASE UR QL STRIP: ABNORMAL
LYMPHOCYTES # BLD AUTO: 2.3 K/UL (ref 1–4.8)
LYMPHOCYTES NFR BLD: 29.4 % (ref 18–48)
MCH RBC QN AUTO: 17.4 PG (ref 27–31)
MCHC RBC AUTO-ENTMCNC: 31 G/DL (ref 32–36)
MCV RBC AUTO: 56 FL (ref 82–98)
MICROSCOPIC COMMENT: ABNORMAL
MONOCYTES # BLD AUTO: 0.8 K/UL (ref 0.3–1)
MONOCYTES NFR BLD: 10.4 % (ref 4–15)
NEUTROPHILS # BLD AUTO: 4.6 K/UL (ref 1.8–7.7)
NEUTROPHILS NFR BLD: 57.9 % (ref 38–73)
NITRITE UR QL STRIP: NEGATIVE
NRBC BLD-RTO: 0 /100 WBC
OVALOCYTES BLD QL SMEAR: ABNORMAL
PH UR STRIP: 6 [PH] (ref 5–8)
PLATELET # BLD AUTO: 382 K/UL (ref 150–450)
PLATELET BLD QL SMEAR: ABNORMAL
PMV BLD AUTO: ABNORMAL FL (ref 9.2–12.9)
POC MOLECULAR INFLUENZA A AGN: NEGATIVE
POC MOLECULAR INFLUENZA B AGN: NEGATIVE
POTASSIUM SERPL-SCNC: 3.6 MMOL/L (ref 3.5–5.1)
PROT SERPL-MCNC: 9.5 G/DL (ref 6–8.4)
PROT UR QL STRIP: ABNORMAL
RBC # BLD AUTO: 5.24 M/UL (ref 4–5.4)
RBC #/AREA URNS HPF: 12 /HPF (ref 0–4)
SARS-COV-2 RDRP RESP QL NAA+PROBE: NEGATIVE
SODIUM SERPL-SCNC: 137 MMOL/L (ref 136–145)
SP GR UR STRIP: 1.02 (ref 1–1.03)
SPECIMEN SOURCE: ABNORMAL
SQUAMOUS #/AREA URNS HPF: 3 /HPF
T VAGINALIS GENITAL QL WET PREP: ABNORMAL
TARGETS BLD QL SMEAR: ABNORMAL
URN SPEC COLLECT METH UR: ABNORMAL
UROBILINOGEN UR STRIP-ACNC: NEGATIVE EU/DL
WBC # BLD AUTO: 7.92 K/UL (ref 3.9–12.7)
WBC #/AREA STL HPF: ABNORMAL /[HPF]
WBC #/AREA URNS HPF: >100 /HPF (ref 0–5)
WBC #/AREA VAG WET PREP: ABNORMAL
YEAST GENITAL QL WET PREP: ABNORMAL

## 2022-08-12 PROCEDURE — 99285 EMERGENCY DEPT VISIT HI MDM: CPT | Mod: 25

## 2022-08-12 PROCEDURE — 87070 CULTURE OTHR SPECIMN AEROBIC: CPT

## 2022-08-12 PROCEDURE — 85025 COMPLETE CBC W/AUTO DIFF WBC: CPT | Performed by: EMERGENCY MEDICINE

## 2022-08-12 PROCEDURE — 87491 CHLMYD TRACH DNA AMP PROBE: CPT | Performed by: NURSE PRACTITIONER

## 2022-08-12 PROCEDURE — 25500020 PHARM REV CODE 255: Performed by: EMERGENCY MEDICINE

## 2022-08-12 PROCEDURE — 63600175 PHARM REV CODE 636 W HCPCS: Performed by: EMERGENCY MEDICINE

## 2022-08-12 PROCEDURE — 87449 NOS EACH ORGANISM AG IA: CPT | Performed by: NURSE PRACTITIONER

## 2022-08-12 PROCEDURE — 86140 C-REACTIVE PROTEIN: CPT | Performed by: EMERGENCY MEDICINE

## 2022-08-12 PROCEDURE — 99223 PR INITIAL HOSPITAL CARE,LEVL III: ICD-10-PCS | Mod: ,,, | Performed by: FAMILY MEDICINE

## 2022-08-12 PROCEDURE — 96361 HYDRATE IV INFUSION ADD-ON: CPT

## 2022-08-12 PROCEDURE — 82397 CHEMILUMINESCENT ASSAY: CPT | Performed by: INTERNAL MEDICINE

## 2022-08-12 PROCEDURE — 89055 LEUKOCYTE ASSESSMENT FECAL: CPT | Performed by: NURSE PRACTITIONER

## 2022-08-12 PROCEDURE — 63600175 PHARM REV CODE 636 W HCPCS: Performed by: NURSE PRACTITIONER

## 2022-08-12 PROCEDURE — 36415 COLL VENOUS BLD VENIPUNCTURE: CPT | Performed by: INTERNAL MEDICINE

## 2022-08-12 PROCEDURE — 99223 1ST HOSP IP/OBS HIGH 75: CPT | Mod: ,,, | Performed by: SURGERY

## 2022-08-12 PROCEDURE — 63600175 PHARM REV CODE 636 W HCPCS: Performed by: INTERNAL MEDICINE

## 2022-08-12 PROCEDURE — 25000003 PHARM REV CODE 250: Performed by: NURSE PRACTITIONER

## 2022-08-12 PROCEDURE — 87045 FECES CULTURE AEROBIC BACT: CPT | Performed by: NURSE PRACTITIONER

## 2022-08-12 PROCEDURE — 99204 OFFICE O/P NEW MOD 45 MIN: CPT | Mod: ,,, | Performed by: INTERNAL MEDICINE

## 2022-08-12 PROCEDURE — 85652 RBC SED RATE AUTOMATED: CPT | Performed by: EMERGENCY MEDICINE

## 2022-08-12 PROCEDURE — 25000003 PHARM REV CODE 250: Performed by: EMERGENCY MEDICINE

## 2022-08-12 PROCEDURE — 83993 ASSAY FOR CALPROTECTIN FECAL: CPT | Performed by: NURSE PRACTITIONER

## 2022-08-12 PROCEDURE — 83605 ASSAY OF LACTIC ACID: CPT | Performed by: EMERGENCY MEDICINE

## 2022-08-12 PROCEDURE — 93010 ELECTROCARDIOGRAM REPORT: CPT | Mod: ,,, | Performed by: INTERNAL MEDICINE

## 2022-08-12 PROCEDURE — 27000207 HC ISOLATION

## 2022-08-12 PROCEDURE — 81025 URINE PREGNANCY TEST: CPT | Performed by: EMERGENCY MEDICINE

## 2022-08-12 PROCEDURE — 80053 COMPREHEN METABOLIC PANEL: CPT | Performed by: EMERGENCY MEDICINE

## 2022-08-12 PROCEDURE — 81000 URINALYSIS NONAUTO W/SCOPE: CPT | Performed by: EMERGENCY MEDICINE

## 2022-08-12 PROCEDURE — U0002 COVID-19 LAB TEST NON-CDC: HCPCS | Performed by: STUDENT IN AN ORGANIZED HEALTH CARE EDUCATION/TRAINING PROGRAM

## 2022-08-12 PROCEDURE — 99223 1ST HOSP IP/OBS HIGH 75: CPT | Mod: ,,, | Performed by: FAMILY MEDICINE

## 2022-08-12 PROCEDURE — 83605 ASSAY OF LACTIC ACID: CPT | Mod: 91 | Performed by: EMERGENCY MEDICINE

## 2022-08-12 PROCEDURE — 87209 SMEAR COMPLEX STAIN: CPT | Performed by: NURSE PRACTITIONER

## 2022-08-12 PROCEDURE — 87102 FUNGUS ISOLATION CULTURE: CPT

## 2022-08-12 PROCEDURE — 87040 BLOOD CULTURE FOR BACTERIA: CPT | Mod: 59 | Performed by: EMERGENCY MEDICINE

## 2022-08-12 PROCEDURE — 63600175 PHARM REV CODE 636 W HCPCS: Performed by: PHYSICIAN ASSISTANT

## 2022-08-12 PROCEDURE — 25000003 PHARM REV CODE 250: Performed by: FAMILY MEDICINE

## 2022-08-12 PROCEDURE — 25000003 PHARM REV CODE 250: Performed by: INTERNAL MEDICINE

## 2022-08-12 PROCEDURE — 96375 TX/PRO/DX INJ NEW DRUG ADDON: CPT

## 2022-08-12 PROCEDURE — 87591 N.GONORRHOEAE DNA AMP PROB: CPT | Performed by: NURSE PRACTITIONER

## 2022-08-12 PROCEDURE — 87177 OVA AND PARASITES SMEARS: CPT | Performed by: NURSE PRACTITIONER

## 2022-08-12 PROCEDURE — 87205 SMEAR GRAM STAIN: CPT

## 2022-08-12 PROCEDURE — 87046 STOOL CULTR AEROBIC BACT EA: CPT | Performed by: NURSE PRACTITIONER

## 2022-08-12 PROCEDURE — 11000001 HC ACUTE MED/SURG PRIVATE ROOM

## 2022-08-12 PROCEDURE — 99204 PR OFFICE/OUTPT VISIT, NEW, LEVL IV, 45-59 MIN: ICD-10-PCS | Mod: ,,, | Performed by: INTERNAL MEDICINE

## 2022-08-12 PROCEDURE — 25000003 PHARM REV CODE 250

## 2022-08-12 PROCEDURE — 87075 CULTR BACTERIA EXCEPT BLOOD: CPT

## 2022-08-12 PROCEDURE — 93005 ELECTROCARDIOGRAM TRACING: CPT

## 2022-08-12 PROCEDURE — 93010 EKG 12-LEAD: ICD-10-PCS | Mod: ,,, | Performed by: INTERNAL MEDICINE

## 2022-08-12 PROCEDURE — 96374 THER/PROPH/DIAG INJ IV PUSH: CPT

## 2022-08-12 PROCEDURE — 87427 SHIGA-LIKE TOXIN AG IA: CPT | Mod: 59 | Performed by: NURSE PRACTITIONER

## 2022-08-12 PROCEDURE — 87076 CULTURE ANAEROBE IDENT EACH: CPT

## 2022-08-12 PROCEDURE — 99223 PR INITIAL HOSPITAL CARE,LEVL III: ICD-10-PCS | Mod: ,,, | Performed by: SURGERY

## 2022-08-12 PROCEDURE — 87086 URINE CULTURE/COLONY COUNT: CPT | Performed by: EMERGENCY MEDICINE

## 2022-08-12 PROCEDURE — 87210 SMEAR WET MOUNT SALINE/INK: CPT | Performed by: EMERGENCY MEDICINE

## 2022-08-12 PROCEDURE — 96376 TX/PRO/DX INJ SAME DRUG ADON: CPT

## 2022-08-12 RX ORDER — MORPHINE SULFATE 4 MG/ML
3 INJECTION, SOLUTION INTRAMUSCULAR; INTRAVENOUS EVERY 6 HOURS PRN
Status: DISCONTINUED | OUTPATIENT
Start: 2022-08-12 | End: 2022-08-12

## 2022-08-12 RX ORDER — MORPHINE SULFATE 4 MG/ML
4 INJECTION, SOLUTION INTRAMUSCULAR; INTRAVENOUS
Status: DISPENSED | OUTPATIENT
Start: 2022-08-12 | End: 2022-08-12

## 2022-08-12 RX ORDER — SODIUM CHLORIDE 9 MG/ML
INJECTION, SOLUTION INTRAVENOUS CONTINUOUS
Status: DISCONTINUED | OUTPATIENT
Start: 2022-08-12 | End: 2022-08-16

## 2022-08-12 RX ORDER — MAG HYDROX/ALUMINUM HYD/SIMETH 200-200-20
30 SUSPENSION, ORAL (FINAL DOSE FORM) ORAL 4 TIMES DAILY PRN
Status: DISCONTINUED | OUTPATIENT
Start: 2022-08-12 | End: 2022-09-03 | Stop reason: HOSPADM

## 2022-08-12 RX ORDER — OXYCODONE HYDROCHLORIDE 5 MG/1
5 TABLET ORAL EVERY 6 HOURS PRN
Status: DISCONTINUED | OUTPATIENT
Start: 2022-08-12 | End: 2022-08-17

## 2022-08-12 RX ORDER — GLUCAGON 1 MG
1 KIT INJECTION
Status: DISCONTINUED | OUTPATIENT
Start: 2022-08-12 | End: 2022-08-14

## 2022-08-12 RX ORDER — IBUPROFEN 200 MG
24 TABLET ORAL
Status: DISCONTINUED | OUTPATIENT
Start: 2022-08-12 | End: 2022-08-14

## 2022-08-12 RX ORDER — MORPHINE SULFATE 4 MG/ML
2 INJECTION, SOLUTION INTRAMUSCULAR; INTRAVENOUS EVERY 6 HOURS PRN
Status: DISCONTINUED | OUTPATIENT
Start: 2022-08-12 | End: 2022-08-17

## 2022-08-12 RX ORDER — MORPHINE SULFATE 4 MG/ML
4 INJECTION, SOLUTION INTRAMUSCULAR; INTRAVENOUS
Status: COMPLETED | OUTPATIENT
Start: 2022-08-12 | End: 2022-08-12

## 2022-08-12 RX ORDER — IPRATROPIUM BROMIDE AND ALBUTEROL SULFATE 2.5; .5 MG/3ML; MG/3ML
3 SOLUTION RESPIRATORY (INHALATION) EVERY 6 HOURS PRN
Status: DISCONTINUED | OUTPATIENT
Start: 2022-08-12 | End: 2022-09-03 | Stop reason: HOSPADM

## 2022-08-12 RX ORDER — ONDANSETRON 2 MG/ML
4 INJECTION INTRAMUSCULAR; INTRAVENOUS EVERY 8 HOURS PRN
Status: DISCONTINUED | OUTPATIENT
Start: 2022-08-12 | End: 2022-09-03 | Stop reason: HOSPADM

## 2022-08-12 RX ORDER — ACETAMINOPHEN 325 MG/1
650 TABLET ORAL EVERY 4 HOURS PRN
Status: DISCONTINUED | OUTPATIENT
Start: 2022-08-12 | End: 2022-08-23

## 2022-08-12 RX ORDER — SODIUM CHLORIDE 0.9 % (FLUSH) 0.9 %
10 SYRINGE (ML) INJECTION EVERY 12 HOURS PRN
Status: DISCONTINUED | OUTPATIENT
Start: 2022-08-12 | End: 2022-09-03 | Stop reason: HOSPADM

## 2022-08-12 RX ORDER — ERYTHROMYCIN 5 MG/G
OINTMENT OPHTHALMIC NIGHTLY
Status: DISCONTINUED | OUTPATIENT
Start: 2022-08-12 | End: 2022-08-13

## 2022-08-12 RX ORDER — ATROPINE SULFATE 10 MG/ML
1 SOLUTION/ DROPS OPHTHALMIC 3 TIMES DAILY
Status: DISCONTINUED | OUTPATIENT
Start: 2022-08-12 | End: 2022-08-17

## 2022-08-12 RX ORDER — TALC
6 POWDER (GRAM) TOPICAL NIGHTLY PRN
Status: DISCONTINUED | OUTPATIENT
Start: 2022-08-12 | End: 2022-09-03 | Stop reason: HOSPADM

## 2022-08-12 RX ORDER — FOLIC ACID 1 MG/1
1 TABLET ORAL DAILY
Status: DISCONTINUED | OUTPATIENT
Start: 2022-08-12 | End: 2022-08-12

## 2022-08-12 RX ORDER — FOLIC ACID 1 MG/1
1 TABLET ORAL DAILY
Status: DISCONTINUED | OUTPATIENT
Start: 2022-08-13 | End: 2022-09-03 | Stop reason: HOSPADM

## 2022-08-12 RX ORDER — IBUPROFEN 200 MG
16 TABLET ORAL
Status: DISCONTINUED | OUTPATIENT
Start: 2022-08-12 | End: 2022-08-14

## 2022-08-12 RX ORDER — NALOXONE HCL 0.4 MG/ML
0.02 VIAL (ML) INJECTION
Status: DISCONTINUED | OUTPATIENT
Start: 2022-08-12 | End: 2022-09-03 | Stop reason: HOSPADM

## 2022-08-12 RX ADMIN — CEFTRIAXONE 1 G: 1 INJECTION, SOLUTION INTRAVENOUS at 05:08

## 2022-08-12 RX ADMIN — MORPHINE SULFATE 4 MG: 4 INJECTION INTRAVENOUS at 04:08

## 2022-08-12 RX ADMIN — ONDANSETRON 4 MG: 2 INJECTION INTRAMUSCULAR; INTRAVENOUS at 01:08

## 2022-08-12 RX ADMIN — IOHEXOL 70 ML: 350 INJECTION, SOLUTION INTRAVENOUS at 03:08

## 2022-08-12 RX ADMIN — MORPHINE SULFATE 4 MG: 4 INJECTION INTRAVENOUS at 05:08

## 2022-08-12 RX ADMIN — HYPROMELLOSE 2910 1 DROP: 5 SOLUTION OPHTHALMIC at 11:08

## 2022-08-12 RX ADMIN — OXYCODONE 5 MG: 5 TABLET ORAL at 11:08

## 2022-08-12 RX ADMIN — SODIUM CHLORIDE, SODIUM LACTATE, POTASSIUM CHLORIDE, AND CALCIUM CHLORIDE 2313 ML: .6; .31; .03; .02 INJECTION, SOLUTION INTRAVENOUS at 01:08

## 2022-08-12 RX ADMIN — MORPHINE SULFATE 3 MG: 4 INJECTION INTRAVENOUS at 11:08

## 2022-08-12 RX ADMIN — MORPHINE SULFATE 4 MG: 4 INJECTION INTRAVENOUS at 01:08

## 2022-08-12 RX ADMIN — SODIUM CHLORIDE: 0.9 INJECTION, SOLUTION INTRAVENOUS at 08:08

## 2022-08-12 RX ADMIN — VANCOMYCIN HYDROCHLORIDE 1250 MG: 1.25 INJECTION, POWDER, LYOPHILIZED, FOR SOLUTION INTRAVENOUS at 11:08

## 2022-08-12 RX ADMIN — VANCOMYCIN HYDROCHLORIDE 2000 MG: 500 INJECTION, POWDER, LYOPHILIZED, FOR SOLUTION INTRAVENOUS at 06:08

## 2022-08-12 RX ADMIN — METHYLPREDNISOLONE SODIUM SUCCINATE 40 MG: 40 INJECTION, POWDER, FOR SOLUTION INTRAMUSCULAR; INTRAVENOUS at 11:08

## 2022-08-12 NOTE — ASSESSMENT & PLAN NOTE
Complicated crohn's disease. History of right corneal ulcer that sounds like resolved after start of infliximab now recurrent symptoms which started on Tuesday (4 days ago). Baseline blurry but can see shape and color. Currently significant discomfort, redness and no vision. IV steroid x 1 given.   Requested transfer to [facility] [place] for Ophthalmology evaluation

## 2022-08-12 NOTE — PROVIDER TRANSFER
Outside Transfer Acceptance Note / [facility]erral [facility]    Referring facility: [facility]  [facility] [place]   Referring provider: THO MIXON THUY N.  Accepting facility: [facility]  Accepting provider: Darcy Joya MD  Admitting provider: Darcy Joya MD  Reason for transfer: Corneal ulcer in immunocompromised patient  Transfer diagnosis: Corneal ulcer, loss of vision  Transfer specialty requested: Ophthalmology  Ophthalmology  Transfer specialty notified: yes  Transfer level: NUMBER 1-5: 1  Bed type requested: [facility]  Isolation status: Special Contact   Admission class or status: Emergency  IP- Inpatient      Narrative     Transfer Diagnosis:  Corneal Ulcer in immunocompromised patient  Reason for Transfer:  Optho eval  Consultants:  Optho, GI  Transferring Facility:  [facility]  Transferring Destination: [facility]    Ms. Nikkie Myles is a 32 y.o. female with  duodenal, ileocolonic and perianal Crohn's disease on Remicade and MTX, as well as a a chronic right corneal ulcer, who presented to the  ER on 8/11 for evaluation of several weeks bloody diarrhea and abdominal cramping, as well as vaginal discharge.  CT abdomen pelvis showed mild rectosigmoid colitis, and prominent appearance of the region of the lower uterine segment/cervix.  MRI was ordered for further evaluation.  GI and GYN were consulted.  Stool studies and C. Diff were collected.  She was given a dose of Vanc and Ceftriaxone.  She also endorses complete loss of vision in her right eye and significant discomfort, when she normally can see shapes and colors.  Her case was discussed with Dr. Strickland of Optho, who suggested she be transferred to [facility] for Optho evaluation.  She was given Solumedrol 125mg IV x 1.    · Level 1 transfer to [facility] for emergent Optho evaluation for corneal ulcer given loss of vision in the right eye, as there are no beds at [facility] to do a direct transfer to the  floor  · Consult Optho  · Consult GI  · Consult Gyn  · F/u stool studies      Objective     Vitals: Temp: 97.6 °F (36.4 °C) (08/12/22 1059)  Pulse: 92 (08/12/22 1059)  Resp: 16 (08/12/22 1132)  BP: 120/73 (08/12/22 1059)  SpO2: 100 % (08/12/22 1059)  Recent Labs: All pertinent labs within the past 24 hours have been reviewed.  Recent imaging: Epic   Airway:     Vent settings:     IV access:        Peripheral IV - Single Lumen 08/12/22 0054 20 G Right Antecubital (Active)   Site Assessment Clean;Dry;Intact 08/12/22 0900   Line Status Flushed 08/12/22 0900     Allergies: Review of patient's allergies indicates:  No Known Allergies   NPO: Yes      Anticoagulation:   Anticoagulants     None           Instructions      Ej Parada-  Admit to Hospital Medicine
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
## 2022-08-12 NOTE — HPI
Nikkie Myles is a 32 y.o. lady with newly diagnosed Crohn's disease on 3/22 complicated by uveitis and erythema nodosum, started on infliximab and methotrexate. She presents with a 2 week history of abdominal pain, bloody diarrhea, and changes in vision. She reports uncontrolled drainage from her anus that she is unable to keep dry despite changing pads frequently. She was found to be afebrile and HDS without a white count in the ED. CT was performed which revealed proctitis, but no drainable perianal fluid collections. General surgery was consulted for perirectal induration and evaluation for possible drainage.    Upon evaluation in her room, she is AF and HDS. Complaints of pain and continued drainage perirectally. Upon examination, she has pain and induration but no fluctuance palpated. Healing fistulous track seen, but exam limited due to pain.

## 2022-08-12 NOTE — HPI
Ms. Nikkie Myles is a 32 y.o. female with  duodenal, ileocolonic and perianal Crohn's disease on Remicade and MTX, as well as a a chronic right corneal ulcer, who presented to the  ER on 8/11 for evaluation of several weeks bloody diarrhea and abdominal cramping, as well as vaginal discharge.  CT abdomen pelvis showed mild rectosigmoid colitis, and prominent appearance of the region of the lower uterine segment/cervix.  MRI was ordered for further evaluation.  GI and GYN were consulted.  Stool studies and C. Diff were collected.  She was given a dose of Vanc and Ceftriaxone.  She also endorses complete loss of vision in her right eye and significant discomfort, when she normally can see shapes and colors.  Her case was discussed with Dr. Strickland of Optho, who suggested she be transferred to [facility] for Optho evaluation.  She was given Solumedrol 125mg IV x 1. Last infliximab 7/14 and next infusion for 8/19. Compliant with MTX 15 mg once a week up until last Sat due to insurance issue.      Patient transferred ED to ED due to bed availability. In ED patient afebrile and hemodynamically stable. Continues to report ongoing abdominal/rectal pain and blood mixed stools. Ophthalmology consulted and evaluated patient in the ED. Patient admitted to the care of medicine for further evaluation and management.

## 2022-08-12 NOTE — PLAN OF CARE
Problem: Adult Inpatient Plan of Care  Goal: Plan of Care Review  Outcome: Ongoing, Progressing  Goal: Patient-Specific Goal (Individualized)  Outcome: Ongoing, Progressing  Goal: Absence of Hospital-Acquired Illness or Injury  Outcome: Ongoing, Progressing  Goal: Optimal Comfort and Wellbeing  Outcome: Ongoing, Progressing  Goal: Readiness for Transition of Care  Outcome: Ongoing, Progressing     Problem: Infection  Goal: Absence of Infection Signs and Symptoms  Outcome: Ongoing, Progressing     Problem: Diarrhea  Goal: Fluid and Electrolyte Balance  Outcome: Ongoing, Progressing
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
## 2022-08-12 NOTE — ASSESSMENT & PLAN NOTE
Recent diagnosis March 2022 of duodenal, ileocolonic and perianal Crohn disease on Remicade and MTX doing well up into 2 weeks ago.  Admit to hospital for frequent diarrhea with blood 10 times a day and diffuse abdominal pain. Perianal tender and tiny abscesses?? General surgery following  CT showed mild rectosigmoid colitis, and prominent appearance of the region of the lower uterine segment/cervix  States vaginal discharge similar to however female monthly discharge  STD work up in progress  GI following  C diff, OCP, fecal calprotectin, stool culture  Infliximab level and antibody, next dose plan for 8/19  Restart MTX when okay GI  MRI pelvis  IV steroid x 1  GYN consult given CT findings
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
## 2022-08-12 NOTE — ASSESSMENT & PLAN NOTE
Nikkie Myles is a 32 y.o. lady with Crohn's who presents with a possible acute exacerbation.     - No clinical evidence of drainable fluid collection, and none seen on CT  - however, agree with MRI and will f/u results.   - agree with antibiotics
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
## 2022-08-12 NOTE — ED NOTES
Pt care assumed. Report received by CLEMENCIA Ureña. Pt lying in stretcher in low and locked position and side rails raised x2. Call light, pt's belongings, and bedside table within pt's reach. Pt on continuous cardiac monitoring, pulse oximetry, and BP cycling every 30 minutes. Pt in NAD and verbalized no needs at this time.
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
## 2022-08-13 LAB
ALBUMIN SERPL BCP-MCNC: 2.5 G/DL (ref 3.5–5.2)
ALP SERPL-CCNC: 60 U/L (ref 55–135)
ALT SERPL W/O P-5'-P-CCNC: 8 U/L (ref 10–44)
ANION GAP SERPL CALC-SCNC: 5 MMOL/L (ref 8–16)
AST SERPL-CCNC: 14 U/L (ref 10–40)
BASOPHILS # BLD AUTO: 0.01 K/UL (ref 0–0.2)
BASOPHILS NFR BLD: 0.1 % (ref 0–1.9)
BILIRUB SERPL-MCNC: 0.1 MG/DL (ref 0.1–1)
BUN SERPL-MCNC: 5 MG/DL (ref 6–20)
CALCIUM SERPL-MCNC: 8.9 MG/DL (ref 8.7–10.5)
CHLORIDE SERPL-SCNC: 104 MMOL/L (ref 95–110)
CO2 SERPL-SCNC: 27 MMOL/L (ref 23–29)
CREAT SERPL-MCNC: 0.6 MG/DL (ref 0.5–1.4)
DIFFERENTIAL METHOD: ABNORMAL
E COLI SXT1 STL QL IA: NEGATIVE
E COLI SXT2 STL QL IA: NEGATIVE
EOSINOPHIL # BLD AUTO: 0 K/UL (ref 0–0.5)
EOSINOPHIL NFR BLD: 0 % (ref 0–8)
ERYTHROCYTE [DISTWIDTH] IN BLOOD BY AUTOMATED COUNT: 24.4 % (ref 11.5–14.5)
EST. GFR  (NO RACE VARIABLE): >60 ML/MIN/1.73 M^2
GLUCOSE SERPL-MCNC: 87 MG/DL (ref 70–110)
GRAM STN SPEC: NORMAL
GRAM STN SPEC: NORMAL
HCT VFR BLD AUTO: 27 % (ref 37–48.5)
HGB BLD-MCNC: 8.1 G/DL (ref 12–16)
IMM GRANULOCYTES # BLD AUTO: 0.02 K/UL (ref 0–0.04)
IMM GRANULOCYTES NFR BLD AUTO: 0.3 % (ref 0–0.5)
LYMPHOCYTES # BLD AUTO: 2.1 K/UL (ref 1–4.8)
LYMPHOCYTES NFR BLD: 28 % (ref 18–48)
MAGNESIUM SERPL-MCNC: 1.9 MG/DL (ref 1.6–2.6)
MCH RBC QN AUTO: 16.9 PG (ref 27–31)
MCHC RBC AUTO-ENTMCNC: 30 G/DL (ref 32–36)
MCV RBC AUTO: 56 FL (ref 82–98)
MONOCYTES # BLD AUTO: 0.6 K/UL (ref 0.3–1)
MONOCYTES NFR BLD: 8.7 % (ref 4–15)
NEUTROPHILS # BLD AUTO: 4.6 K/UL (ref 1.8–7.7)
NEUTROPHILS NFR BLD: 62.9 % (ref 38–73)
NRBC BLD-RTO: 0 /100 WBC
PHOSPHATE SERPL-MCNC: 3.6 MG/DL (ref 2.7–4.5)
PLATELET # BLD AUTO: 335 K/UL (ref 150–450)
PMV BLD AUTO: ABNORMAL FL (ref 9.2–12.9)
POTASSIUM SERPL-SCNC: 4 MMOL/L (ref 3.5–5.1)
PROT SERPL-MCNC: 8.2 G/DL (ref 6–8.4)
RBC # BLD AUTO: 4.8 M/UL (ref 4–5.4)
SODIUM SERPL-SCNC: 136 MMOL/L (ref 136–145)
WBC # BLD AUTO: 7.38 K/UL (ref 3.9–12.7)

## 2022-08-13 PROCEDURE — A9585 GADOBUTROL INJECTION: HCPCS | Performed by: INTERNAL MEDICINE

## 2022-08-13 PROCEDURE — 25000003 PHARM REV CODE 250

## 2022-08-13 PROCEDURE — 99223 1ST HOSP IP/OBS HIGH 75: CPT | Mod: ,,, | Performed by: INTERNAL MEDICINE

## 2022-08-13 PROCEDURE — 99233 SBSQ HOSP IP/OBS HIGH 50: CPT | Mod: ,,, | Performed by: INTERNAL MEDICINE

## 2022-08-13 PROCEDURE — 85025 COMPLETE CBC W/AUTO DIFF WBC: CPT | Performed by: FAMILY MEDICINE

## 2022-08-13 PROCEDURE — 11000001 HC ACUTE MED/SURG PRIVATE ROOM

## 2022-08-13 PROCEDURE — 27000207 HC ISOLATION

## 2022-08-13 PROCEDURE — 83735 ASSAY OF MAGNESIUM: CPT | Performed by: FAMILY MEDICINE

## 2022-08-13 PROCEDURE — 25000003 PHARM REV CODE 250: Performed by: FAMILY MEDICINE

## 2022-08-13 PROCEDURE — 36415 COLL VENOUS BLD VENIPUNCTURE: CPT | Performed by: FAMILY MEDICINE

## 2022-08-13 PROCEDURE — 63600175 PHARM REV CODE 636 W HCPCS: Performed by: FAMILY MEDICINE

## 2022-08-13 PROCEDURE — 99233 PR SUBSEQUENT HOSPITAL CARE,LEVL III: ICD-10-PCS | Mod: ,,, | Performed by: INTERNAL MEDICINE

## 2022-08-13 PROCEDURE — 25500020 PHARM REV CODE 255: Performed by: INTERNAL MEDICINE

## 2022-08-13 PROCEDURE — 80053 COMPREHEN METABOLIC PANEL: CPT | Performed by: FAMILY MEDICINE

## 2022-08-13 PROCEDURE — 25000003 PHARM REV CODE 250: Performed by: NURSE PRACTITIONER

## 2022-08-13 PROCEDURE — 99223 PR INITIAL HOSPITAL CARE,LEVL III: ICD-10-PCS | Mod: ,,, | Performed by: INTERNAL MEDICINE

## 2022-08-13 PROCEDURE — 25000003 PHARM REV CODE 250: Performed by: INTERNAL MEDICINE

## 2022-08-13 PROCEDURE — 63600175 PHARM REV CODE 636 W HCPCS: Performed by: INTERNAL MEDICINE

## 2022-08-13 PROCEDURE — 63600175 PHARM REV CODE 636 W HCPCS: Performed by: STUDENT IN AN ORGANIZED HEALTH CARE EDUCATION/TRAINING PROGRAM

## 2022-08-13 PROCEDURE — 84100 ASSAY OF PHOSPHORUS: CPT | Performed by: FAMILY MEDICINE

## 2022-08-13 RX ORDER — ERYTHROMYCIN 5 MG/G
OINTMENT OPHTHALMIC 3 TIMES DAILY
Status: DISCONTINUED | OUTPATIENT
Start: 2022-08-13 | End: 2022-08-31

## 2022-08-13 RX ORDER — GADOBUTROL 604.72 MG/ML
8 INJECTION INTRAVENOUS
Status: COMPLETED | OUTPATIENT
Start: 2022-08-13 | End: 2022-08-13

## 2022-08-13 RX ADMIN — METHYLPREDNISOLONE SODIUM SUCCINATE 60 MG: 40 INJECTION, POWDER, FOR SOLUTION INTRAMUSCULAR; INTRAVENOUS at 04:08

## 2022-08-13 RX ADMIN — OXYCODONE 5 MG: 5 TABLET ORAL at 05:08

## 2022-08-13 RX ADMIN — CEFTRIAXONE 1 G: 1 INJECTION, SOLUTION INTRAVENOUS at 05:08

## 2022-08-13 RX ADMIN — VANCOMYCIN HYDROCHLORIDE 1250 MG: 1.25 INJECTION, POWDER, LYOPHILIZED, FOR SOLUTION INTRAVENOUS at 11:08

## 2022-08-13 RX ADMIN — HYPROMELLOSE 2910 1 DROP: 5 SOLUTION OPHTHALMIC at 10:08

## 2022-08-13 RX ADMIN — HYPROMELLOSE 2910 1 DROP: 5 SOLUTION OPHTHALMIC at 02:08

## 2022-08-13 RX ADMIN — GADOBUTROL 8 ML: 604.72 INJECTION INTRAVENOUS at 03:08

## 2022-08-13 RX ADMIN — HYPROMELLOSE 2910 1 DROP: 5 SOLUTION OPHTHALMIC at 01:08

## 2022-08-13 RX ADMIN — HYPROMELLOSE 2910 1 DROP: 5 SOLUTION OPHTHALMIC at 04:08

## 2022-08-13 RX ADMIN — ATROPINE SULFATE 1 DROP: 10 SOLUTION OPHTHALMIC at 08:08

## 2022-08-13 RX ADMIN — FOLIC ACID 1 MG: 1 TABLET ORAL at 08:08

## 2022-08-13 RX ADMIN — ERYTHROMYCIN: 5 OINTMENT OPHTHALMIC at 08:08

## 2022-08-13 RX ADMIN — HYPROMELLOSE 2910 1 DROP: 5 SOLUTION OPHTHALMIC at 08:08

## 2022-08-13 RX ADMIN — HYPROMELLOSE 2910 1 DROP: 5 SOLUTION OPHTHALMIC at 07:08

## 2022-08-13 RX ADMIN — OXYCODONE 5 MG: 5 TABLET ORAL at 11:08

## 2022-08-13 RX ADMIN — ATROPINE SULFATE 1 DROP: 10 SOLUTION OPHTHALMIC at 02:08

## 2022-08-13 RX ADMIN — HYPROMELLOSE 2910 1 DROP: 5 SOLUTION OPHTHALMIC at 11:08

## 2022-08-13 NOTE — ASSESSMENT & PLAN NOTE
Recent diagnosis March 2022 of duodenal, ileocolonic and perianal Crohn disease on Remicade and MTX doing well up into 2 weeks ago.    - CT showed mild rectosigmoid colitis, and prominent appearance of the region of the lower uterine segment/cervix  - GI consulted  ;  Appreciate recs  - C diff negative  - continue ceftriaxone and vancomycin  - follow up additional stool studies: OCP, fecal calprotectin, stool culture  - General surgery evaluated at outside hospital ; per note no drainable fluid collection. Further eval with MRI and consider re-consult pending review  - Infliximab level and antibody pending, next dose plan for 8/19  - Restart MTX when okay GI  - MRI pelvis pending  - consider Gyn consult in am given CT results  - STD work up in progress
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
## 2022-08-13 NOTE — ED NOTES
Telemetry Verification   Patient placed on Telemetry Box  Verified with War Room  Box # [id number]   Monitor Tech Alexandra   Rate 66   Rhythm Normal sinus

## 2022-08-13 NOTE — PLAN OF CARE
"  Problem: Adult Inpatient Plan of Care  Goal: Plan of Care Review  Outcome: Ongoing, Progressing  Goal: Patient-Specific Goal (Individualized)  Outcome: Ongoing, Progressing  Goal: Optimal Comfort and Wellbeing  Outcome: Ongoing, Progressing  Goal: Readiness for Transition of Care  Outcome: Ongoing, Progressing     Problem: Infection  Goal: Absence of Infection Signs and Symptoms  Outcome: Ongoing, Progressing     Pt has refused eye drops most of the shift. She tells me "I don't like eye drops"the patient remains free from falls. Pain management prn oxycodone 5mg for pain. Pt R eye is edematous and red.  Bed in lowest position with call light in reach.   "

## 2022-08-13 NOTE — NURSING
Nurse attempted to go start IV but she was currently getting a thorough eye exam by Ophthalmology. Pt requested for nurse to come back after eye exam.
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
## 2022-08-13 NOTE — PROGRESS NOTES
"Progress Note  Ophthalmology Service    Date: 08/13/2022    Chief complaint: "corneal ulcer transfer"     Interval History:   Patient still reports eye pain, photophobia, redness, irritation, and discharge. Feels that sx are the same as yesterday without any changes or new/worsening sx.       Current eye gtts: OD only: Fortified Vanc/tobra q2h, Atropine TID, erythromycin ointment qhs     Medications this encounter:    atropine 1%  1 drop Right Eye TID    cefTRIAXone (ROCEPHIN) IVPB  1 g Intravenous Q24H    erythromycin   Right Eye TID    folic acid  1 mg Oral Daily    Fortified Tobramycin 15 mg/ml Opht  1 drop Right Eye Q2H    Fortified Vancomycin 25 mg/ml Ophth  1 drop Right Eye Q2H    vancomycin (VANCOCIN) IVPB  15 mg/kg Intravenous Q12H       Allergies: has No Known Allergies.     ROS: As per HPI    Ocular examination/Dilated fundus examination:  Base Eye Exam     Visual Acuity (Snellen - Linear)       Right Left    Dist sc HM 20/30          Tonometry (Tonopen, 3:00 PM)       Right Left    Pressure 7           Pupils       Dark Light Shape React APD    Right         Left 4 3 Round Brisk None          Neuro/Psych     Oriented x3: Yes    Mood/Affect: Normal            Slit Lamp and Fundus Exam     External Exam       Right Left    External mild edema Normal          Slit Lamp Exam       Right Left    Lids/Lashes Ptosis Normal    Conjunctiva/Sclera 2+ Injection IT and inf, limbal thickening White and quiet    Cornea old central scar and stromal haze with 3+ NV with overlying central epi defect and thinning, IT epi defect with underlying stromal haze, lindsay negative 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 Corneal Ulcer without Hypopyon, RIGHT eye   2. 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   - Patient w/ 2wk hx of Crohn's flare and OD pain, photophobia, redness, irritation, and discharge  - Limited VA OD from previous ulcer with baseline at CF @3ft   - OD Exam 8/12/22: VA HM, iop wnl, 2+ injection IT, Epi defect centrally over stromal scar with thinning centrally and 3+ KNV, epi defect IT with underlying stromal haze   - 8/13/22 Update: Pt reports sx are unchanged, OD exam overall stable with  IOP 7, VA HM, mild increase in conj injection, but epi defects and area of central thinning remain stable in size, no hypopyon, lindsay negative  - Gram stain without organisms  - Cultures pending  - Absolutely no contact lens wear in either eye    Recommendations:   - Oral pain medication as needed  - Continue fortified Vancomycin and Tobramycin q2hr (5min apart)  - Continue Atropine TID  - Increase Erythromycin ointment toTID   - Plan discussed w/  Friend     Will plan to follow daily while inpatient. Please contact on call resident for any significant changes.     Carlos Manuel Rodriguez MD  [facility], PGY-2  08/13/2022  2:03 PM

## 2022-08-13 NOTE — CONSULTS
"Consultation Report  Ophthalmology Service    Date: 2022    Chief complaint/Reason for Consult: "corneal ulcer transfer"     History of Present Illness: Nikkie Myles is a 32 y.o. female with PMHx Crohn's disease dx on 3/22 complicated by uveitis and erythema nodosum, started on infliximab and methotrexate presents with a 2 week history of abdominal pain, bloody diarrhea, and right eye pain. 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 Patient reports right eye pain comes and goes w/ Crohns flares. States that since her flare up began 2 weeks ago she has experienced OD pain, photophobia, redness, irritation, and discharge. Denies any recent trauma to the eye, contact lens use, flashes, floaters, or curtains/veils over vision.    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 family history includes Glaucoma in her maternal grandmother; Hypertension in her father and mother; No Known Problems in her brother, maternal aunt, maternal grandfather, maternal uncle, paternal aunt, paternal grandfather, paternal grandmother, paternal uncle, and sister.     PMHx:  has a past medical history of Anal fistula, Chronic diarrhea, Crohn's disease (2022), and Eye injury.     PSurgHx:  has a past surgical history that includes  section, low transverse (2013);  section with tubal ligation (Bilateral, 2020); and  section ().     Home Medications:   Prior to Admission medications    Medication Sig Start Date End Date Taking? Authorizing Provider   acetaminophen (TYLENOL) 500 MG tablet Take 2 tablets (1,000 mg total) " by mouth every 6 (six) hours as needed. 12/28/21   Micah Prince MD   erythromycin (ROMYCIN) ophthalmic ointment Place a 1/2 inch ribbon of ointment into the lower eyelid 2-3 times daily.  Patient not taking: Reported on 3/8/2022 11/27/21   Willie Pickering PA-C   ibuprofen (ADVIL,MOTRIN) 400 MG tablet Take 1 tablet (400 mg total) by mouth every 6 (six) hours as needed. 12/28/21   Micah Prince MD   ondansetron (ZOFRAN-ODT) 4 MG TbDL Take 1 tablet (4 mg total) by mouth every 8 (eight) hours as needed. 12/28/21   Micah Prince MD   prednisoLONE acetate (PRED FORTE) 1 % DrpS Place 1 drop into the left eye 3 (three) times daily. 5/4/22   Doug Warner MD        Medications this encounter:    atropine 1%  1 drop Right Eye TID    [START ON 8/13/2022] cefTRIAXone (ROCEPHIN) IVPB  1 g Intravenous Q24H    erythromycin   Right Eye QHS    [START ON 8/13/2022] folic acid  1 mg Oral Daily    Fortified Tobramycin 15 mg/ml Opht  1 drop Right Eye Q2H    Fortified Vancomycin 25 mg/ml Ophth  1 drop Right Eye Q2H    vancomycin (VANCOCIN) IVPB  15 mg/kg Intravenous Q12H       Allergies: has No Known Allergies.     Social:  reports that she has quit smoking. She has a 5.00 pack-year smoking history. She has never used smokeless tobacco. She reports current alcohol use. She reports that she does not use drugs.     ROS: As per HPI    Ocular examination/Dilated fundus examination:  Base Eye Exam     Visual Acuity (Snellen - Linear)       Right Left    Dist sc HM 20/30    Dist ph sc  20/20 -1          Tonometry (Tonopen, 9:22 PM)       Right Left    Pressure 9 11          Pupils       Dark Light Shape React APD    Right         Left 4 3 Round Brisk None   Lesli OD            Visual Fields       Right Left      Full          Extraocular Movement       Right Left     Full, Ortho Full, Ortho          Neuro/Psych     Oriented x3: Yes    Mood/Affect: Normal          Dilation     Both eyes: 1% Tropicamide, 2.5%  Phenylephrine, 2% Cyclopentolate @ 7:22 PM            Slit Lamp and Fundus Exam     External Exam       Right Left    External Normal Normal          Slit Lamp Exam       Right Left    Lids/Lashes Normal Normal    Conjunctiva/Sclera 2+ Injection IT, limbal thickening White and quiet    Cornea old central scar and stromal haze with 3+ NV with overlying central epi defect and thinning, IT epi defect with underlying stromal haze Whorling epitheliopathy w/ inferior and superior limbal wedge defects     Anterior Chamber Deep and appears formed with no hypopyon appreciated, jennyfer for cell/flare 2/2 corneal haze Deep and formed    Iris no view Round and reactive    Lens no view Clear    Anterior Vitreous no view Normal          Fundus Exam       Right Left    Disc no view Pink & sharp    C/D Ratio  0.2    Macula no view Flat    Vessels no view Normal    Periphery no view Flat w/o holes/tears/detachment               8/12/22         Assessment/Plan:     1. Corneal Ulcer without Hypopyon, RIGHT eye   2. 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   - Patient w/ 2wk hx of Crohn's flare and OD pain, photophobia, redness, irritation, and discharge  - Limited VA OD from previous ulcer with baseline at CF @3ft   - OD exam significant for 2+ injection, Epi defect centrally over stromal scar with thinning centrally and 3+ KNV, epi defect IT with underlying stromal haze   - OD VA HM, iop wnl, OS with few corneal scars but otherwise OS exam wnl   - Patient unable to position for SLE 2/2 pain   - Gram stain and anaerobic, aerobic, and fungal cultures ordered   - Absolutely no contact lens wear in either eye  - Pt informed about the seriousness of this condition and the possibility of poor visual outcome despite maximal antibiotic treatment  - Oral pain medication as needed  - START fortified tobramycin drops 15mg/mL q2hr and fortified vancomycin drops 25mg/mL q2hr to RIGHT eye  - START Atropine TID  - START Erythromycin  ointment qhs   - Plan discussed w/ Naina Lisa and Derek   Will plan to follow daily while inpatient. Please contact on call resident for any significant changes.     Carlos Manuel Rodriguez MD  [facility], PGY-2  08/12/2022  7:11 PM
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
## 2022-08-14 LAB
ABO + RH BLD: NORMAL
ALBUMIN SERPL BCP-MCNC: 2.7 G/DL (ref 3.5–5.2)
ALP SERPL-CCNC: 79 U/L (ref 55–135)
ALT SERPL W/O P-5'-P-CCNC: 9 U/L (ref 10–44)
ANION GAP SERPL CALC-SCNC: 10 MMOL/L (ref 8–16)
AST SERPL-CCNC: 11 U/L (ref 10–40)
B-HCG UR QL: NEGATIVE
BACTERIA UR CULT: NORMAL
BASOPHILS # BLD AUTO: 0.01 K/UL (ref 0–0.2)
BASOPHILS NFR BLD: 0.1 % (ref 0–1.9)
BILIRUB SERPL-MCNC: 0.3 MG/DL (ref 0.1–1)
BLD GP AB SCN CELLS X3 SERPL QL: NORMAL
BUN SERPL-MCNC: 8 MG/DL (ref 6–20)
CALCIUM SERPL-MCNC: 8.8 MG/DL (ref 8.7–10.5)
CHLORIDE SERPL-SCNC: 105 MMOL/L (ref 95–110)
CO2 SERPL-SCNC: 23 MMOL/L (ref 23–29)
CREAT SERPL-MCNC: 0.7 MG/DL (ref 0.5–1.4)
CRP SERPL-MCNC: 61.8 MG/L (ref 0–8.2)
DIFFERENTIAL METHOD: ABNORMAL
EOSINOPHIL # BLD AUTO: 0 K/UL (ref 0–0.5)
EOSINOPHIL NFR BLD: 0 % (ref 0–8)
ERYTHROCYTE [DISTWIDTH] IN BLOOD BY AUTOMATED COUNT: 24.9 % (ref 11.5–14.5)
EST. GFR  (NO RACE VARIABLE): >60 ML/MIN/1.73 M^2
GLUCOSE SERPL-MCNC: 145 MG/DL (ref 70–110)
HCT VFR BLD AUTO: 30.9 % (ref 37–48.5)
HGB BLD-MCNC: 9.1 G/DL (ref 12–16)
IMM GRANULOCYTES # BLD AUTO: 0.05 K/UL (ref 0–0.04)
IMM GRANULOCYTES NFR BLD AUTO: 0.7 % (ref 0–0.5)
LYMPHOCYTES # BLD AUTO: 1.1 K/UL (ref 1–4.8)
LYMPHOCYTES NFR BLD: 14.8 % (ref 18–48)
MAGNESIUM SERPL-MCNC: 1.9 MG/DL (ref 1.6–2.6)
MCH RBC QN AUTO: 17.3 PG (ref 27–31)
MCHC RBC AUTO-ENTMCNC: 29.4 G/DL (ref 32–36)
MCV RBC AUTO: 59 FL (ref 82–98)
MONOCYTES # BLD AUTO: 0.1 K/UL (ref 0.3–1)
MONOCYTES NFR BLD: 0.8 % (ref 4–15)
NEUTROPHILS # BLD AUTO: 6 K/UL (ref 1.8–7.7)
NEUTROPHILS NFR BLD: 83.6 % (ref 38–73)
NRBC BLD-RTO: 0 /100 WBC
PHOSPHATE SERPL-MCNC: 3.7 MG/DL (ref 2.7–4.5)
PLATELET # BLD AUTO: 365 K/UL (ref 150–450)
PMV BLD AUTO: ABNORMAL FL (ref 9.2–12.9)
POTASSIUM SERPL-SCNC: 4.1 MMOL/L (ref 3.5–5.1)
PROT SERPL-MCNC: 8.8 G/DL (ref 6–8.4)
RBC # BLD AUTO: 5.27 M/UL (ref 4–5.4)
SARS-COV-2 RNA RESP QL NAA+PROBE: NOT DETECTED
SODIUM SERPL-SCNC: 138 MMOL/L (ref 136–145)
VANCOMYCIN TROUGH SERPL-MCNC: 10 UG/ML (ref 10–22)
WBC # BLD AUTO: 7.16 K/UL (ref 3.9–12.7)

## 2022-08-14 PROCEDURE — U0003 INFECTIOUS AGENT DETECTION BY NUCLEIC ACID (DNA OR RNA); SEVERE ACUTE RESPIRATORY SYNDROME CORONAVIRUS 2 (SARS-COV-2) (CORONAVIRUS DISEASE [COVID-19]), AMPLIFIED PROBE TECHNIQUE, MAKING USE OF HIGH THROUGHPUT TECHNOLOGIES AS DESCRIBED BY CMS-2020-01-R: HCPCS | Performed by: STUDENT IN AN ORGANIZED HEALTH CARE EDUCATION/TRAINING PROGRAM

## 2022-08-14 PROCEDURE — 80053 COMPREHEN METABOLIC PANEL: CPT | Performed by: FAMILY MEDICINE

## 2022-08-14 PROCEDURE — 25000003 PHARM REV CODE 250

## 2022-08-14 PROCEDURE — 80202 ASSAY OF VANCOMYCIN: CPT | Performed by: STUDENT IN AN ORGANIZED HEALTH CARE EDUCATION/TRAINING PROGRAM

## 2022-08-14 PROCEDURE — 86140 C-REACTIVE PROTEIN: CPT | Performed by: STUDENT IN AN ORGANIZED HEALTH CARE EDUCATION/TRAINING PROGRAM

## 2022-08-14 PROCEDURE — 85025 COMPLETE CBC W/AUTO DIFF WBC: CPT | Performed by: FAMILY MEDICINE

## 2022-08-14 PROCEDURE — 86850 RBC ANTIBODY SCREEN: CPT | Performed by: STUDENT IN AN ORGANIZED HEALTH CARE EDUCATION/TRAINING PROGRAM

## 2022-08-14 PROCEDURE — 63600175 PHARM REV CODE 636 W HCPCS: Performed by: NURSE PRACTITIONER

## 2022-08-14 PROCEDURE — 84100 ASSAY OF PHOSPHORUS: CPT | Performed by: FAMILY MEDICINE

## 2022-08-14 PROCEDURE — 11000001 HC ACUTE MED/SURG PRIVATE ROOM

## 2022-08-14 PROCEDURE — 99223 PR INITIAL HOSPITAL CARE,LEVL III: ICD-10-PCS | Mod: ,,, | Performed by: INTERNAL MEDICINE

## 2022-08-14 PROCEDURE — 27000207 HC ISOLATION

## 2022-08-14 PROCEDURE — 81025 URINE PREGNANCY TEST: CPT | Performed by: STUDENT IN AN ORGANIZED HEALTH CARE EDUCATION/TRAINING PROGRAM

## 2022-08-14 PROCEDURE — 99233 SBSQ HOSP IP/OBS HIGH 50: CPT | Mod: ,,, | Performed by: INTERNAL MEDICINE

## 2022-08-14 PROCEDURE — 63600175 PHARM REV CODE 636 W HCPCS: Performed by: FAMILY MEDICINE

## 2022-08-14 PROCEDURE — 25000003 PHARM REV CODE 250: Performed by: INTERNAL MEDICINE

## 2022-08-14 PROCEDURE — 36415 COLL VENOUS BLD VENIPUNCTURE: CPT | Performed by: FAMILY MEDICINE

## 2022-08-14 PROCEDURE — 99233 PR SUBSEQUENT HOSPITAL CARE,LEVL III: ICD-10-PCS | Mod: ,,, | Performed by: INTERNAL MEDICINE

## 2022-08-14 PROCEDURE — 99223 1ST HOSP IP/OBS HIGH 75: CPT | Mod: ,,, | Performed by: INTERNAL MEDICINE

## 2022-08-14 PROCEDURE — 25000003 PHARM REV CODE 250: Performed by: NURSE PRACTITIONER

## 2022-08-14 PROCEDURE — 36415 COLL VENOUS BLD VENIPUNCTURE: CPT | Performed by: STUDENT IN AN ORGANIZED HEALTH CARE EDUCATION/TRAINING PROGRAM

## 2022-08-14 PROCEDURE — 63600175 PHARM REV CODE 636 W HCPCS: Performed by: STUDENT IN AN ORGANIZED HEALTH CARE EDUCATION/TRAINING PROGRAM

## 2022-08-14 PROCEDURE — 63600175 PHARM REV CODE 636 W HCPCS: Performed by: INTERNAL MEDICINE

## 2022-08-14 PROCEDURE — U0005 INFEC AGEN DETEC AMPLI PROBE: HCPCS | Performed by: STUDENT IN AN ORGANIZED HEALTH CARE EDUCATION/TRAINING PROGRAM

## 2022-08-14 PROCEDURE — 83735 ASSAY OF MAGNESIUM: CPT | Performed by: FAMILY MEDICINE

## 2022-08-14 RX ORDER — ENOXAPARIN SODIUM 100 MG/ML
40 INJECTION SUBCUTANEOUS EVERY 24 HOURS
Status: DISCONTINUED | OUTPATIENT
Start: 2022-08-14 | End: 2022-09-03 | Stop reason: HOSPADM

## 2022-08-14 RX ORDER — CARBOXYMETHYLCELLULOSE SODIUM 10 MG/ML
1 GEL OPHTHALMIC 4 TIMES DAILY
Status: DISCONTINUED | OUTPATIENT
Start: 2022-08-14 | End: 2022-09-03 | Stop reason: HOSPADM

## 2022-08-14 RX ADMIN — HYPROMELLOSE 2910 1 DROP: 5 SOLUTION OPHTHALMIC at 06:08

## 2022-08-14 RX ADMIN — ATROPINE SULFATE 1 DROP: 10 SOLUTION OPHTHALMIC at 09:08

## 2022-08-14 RX ADMIN — CARBOXYMETHYLCELLULOSE SODIUM 1 DROP: 10 GEL OPHTHALMIC at 09:08

## 2022-08-14 RX ADMIN — ENOXAPARIN SODIUM 40 MG: 100 INJECTION SUBCUTANEOUS at 05:08

## 2022-08-14 RX ADMIN — MORPHINE SULFATE 2 MG: 4 INJECTION INTRAVENOUS at 01:08

## 2022-08-14 RX ADMIN — METHYLPREDNISOLONE SODIUM SUCCINATE 20 MG: 40 INJECTION, POWDER, FOR SOLUTION INTRAMUSCULAR; INTRAVENOUS at 04:08

## 2022-08-14 RX ADMIN — MORPHINE SULFATE 2 MG: 4 INJECTION INTRAVENOUS at 09:08

## 2022-08-14 RX ADMIN — CEFTRIAXONE 1 G: 1 INJECTION, SOLUTION INTRAVENOUS at 06:08

## 2022-08-14 RX ADMIN — METHYLPREDNISOLONE SODIUM SUCCINATE 20 MG: 40 INJECTION, POWDER, FOR SOLUTION INTRAMUSCULAR; INTRAVENOUS at 12:08

## 2022-08-14 RX ADMIN — ERYTHROMYCIN: 5 OINTMENT OPHTHALMIC at 09:08

## 2022-08-14 RX ADMIN — METHYLPREDNISOLONE SODIUM SUCCINATE 20 MG: 40 INJECTION, POWDER, FOR SOLUTION INTRAMUSCULAR; INTRAVENOUS at 08:08

## 2022-08-14 RX ADMIN — MORPHINE SULFATE 2 MG: 4 INJECTION INTRAVENOUS at 12:08

## 2022-08-14 RX ADMIN — CARBOXYMETHYLCELLULOSE SODIUM 1 DROP: 10 GEL OPHTHALMIC at 01:08

## 2022-08-14 RX ADMIN — HYPROMELLOSE 2910 1 DROP: 5 SOLUTION OPHTHALMIC at 12:08

## 2022-08-14 RX ADMIN — VANCOMYCIN HYDROCHLORIDE 1250 MG: 1.25 INJECTION, POWDER, LYOPHILIZED, FOR SOLUTION INTRAVENOUS at 01:08

## 2022-08-14 RX ADMIN — FOLIC ACID 1 MG: 1 TABLET ORAL at 08:08

## 2022-08-14 RX ADMIN — HYPROMELLOSE 2910 1 DROP: 5 SOLUTION OPHTHALMIC at 02:08

## 2022-08-14 NOTE — PROGRESS NOTES
[facility] Medicine  Progress note    Team: [facility] Jen Webster MD  Admit Date: 8/11/2022    Principal Problem:  Crohn's disease with abscess    Interval hx:  Patient is complaining of pain. Well-controlled on medications    ROS   Respiratory: neg for cough neg for shortness of breath  Cardiovascular: neg for chest pain neg for palpitations  Gastrointestinal: neg for nausea neg for vomiting, neg for abdominal pain neg for diarrhea neg for constipation   Behavioral/Psych: neg for depression neg for anxiety    PEx  Temp:  [96.1 °F (35.6 °C)-97.9 °F (36.6 °C)]   Pulse:  [66-84]   Resp:  [17-20]   BP: (100-124)/(58-75)   SpO2:  [97 %-100 %]     Intake/Output Summary (Last 24 hours) at 8/14/2022 0119  Last data filed at 8/13/2022 2200  Gross per 24 hour   Intake 840 ml   Output 1 ml   Net 839 ml       General Appearance: no acute distress, WDWN  Heart: regular rate and rhythm, no heave  Respiratory: Normal respiratory effort, symmetric excursion, bilateral vesicular breath sounds   Abdomen: Soft, non-tender; bowel sounds active  Skin: intact, no rash, no ulcers  Neurologic:  No focal numbness or weakness  Mental status: Alert, oriented x 4, affect appropriate     Recent Labs   Lab 08/12/22 0055 08/13/22  0505   WBC 7.92 7.38   HGB 9.1* 8.1*   HCT 29.4* 27.0*    335     Recent Labs   Lab 08/12/22 0055 08/13/22  0505    136   K 3.6 4.0    104   CO2 23 27   BUN 8 5*   CREATININE 0.7 0.6   GLU 99 87   CALCIUM 9.1 8.9   MG  --  1.9   PHOS  --  3.6     Recent Labs   Lab 08/12/22 0055 08/13/22  0505   ALKPHOS 67 60   ALT 6* 8*   AST 13 14   ALBUMIN 3.0* 2.5*   PROT 9.5* 8.2   BILITOT 0.2 0.1        No results for input(s): POCTGLUCOSE in the last 168 hours.    Scheduled Meds:   atropine 1%  1 drop Right Eye TID    cefTRIAXone (ROCEPHIN) IVPB  1 g Intravenous Q24H    erythromycin   Right Eye TID    folic acid  1 mg Oral Daily    Fortified Tobramycin 15 mg/ml Opht  1 drop Right Eye Q2H     Fortified Vancomycin 25 mg/ml Ophth  1 drop Right Eye Q2H    methylPREDNISolone sodium succinate injection  20 mg Intravenous Q8H    vancomycin (VANCOCIN) IVPB  15 mg/kg Intravenous Q12H     Continuous Infusions:   sodium chloride 0.9% Stopped (08/12/22 2129)     As Needed:  acetaminophen, albuterol-ipratropium, aluminum-magnesium hydroxide-simethicone, dextrose 10%, dextrose 10%, glucagon (human recombinant), glucose, glucose, melatonin, morphine, naloxone, ondansetron, oxyCODONE, sodium chloride 0.9%, Pharmacy to dose Vancomycin consult **AND** vancomycin - pharmacy to dose    Assessment and Plan  / Problems managed today    * Crohn's disease with abscess  Perirectal fistula    Recent diagnosis March 2022 of duodenal, ileocolonic and perianal Crohn disease on Remicade and MTX doing well up into 2 weeks ago.    - CT showed mild rectosigmoid colitis, and prominent appearance of the region of the lower uterine segment/cervix  - GI consulted  ;  Appreciate recs  - C diff negative  - continue ceftriaxone and vancomycin  - follow up additional stool studies: OCP, fecal calprotectin, stool culture  - General surgery evaluated at outside hospital ; per note no drainable fluid collection.   - Infliximab level and antibody pending, next dose plan for 8/19  - Restart MTX when okay GI  - MRI pelvis showed 2 complex perianal fistulas  - STD work up in progress  -ceftriaxone and vancomycin   - ID consult    Central corneal ulcer, right  - Solumedrol 125mg IV x 1  - Ophthalmology consulted and evaluated patient  ;  Appreciate recs      Discharge Planning   ENDY:      Code Status: Full Code   Is the patient medically ready for discharge?:     Reason for patient still in hospital (select all that apply): Patient trending condition           Diet:  regular diet  GI PPx: not needed  DVT PPx:  enoxaparin  Airways: room air  Wounds: none    Goals of Care:  Return to prior functional status      Time (minutes) spent in care of the  patient (Greater than 1/2 spent in direct face-to-face contact and care coordination on unit)  35 min    Jen Webster MD
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
## 2022-08-14 NOTE — PLAN OF CARE
Problem: Adult Inpatient Plan of Care  Goal: Plan of Care Review  Outcome: Ongoing, Progressing  Goal: Patient-Specific Goal (Individualized)  Outcome: Ongoing, Progressing  Goal: Absence of Hospital-Acquired Illness or Injury  Outcome: Ongoing, Progressing  Goal: Optimal Comfort and Wellbeing  Outcome: Ongoing, Progressing  Goal: Readiness for Transition of Care  Outcome: Ongoing, Progressing     Problem: Infection  Goal: Absence of Infection Signs and Symptoms  Outcome: Ongoing, Progressing     Problem: Diarrhea  Goal: Fluid and Electrolyte Balance  Outcome: Ongoing, Progressing    Plan of care reviewed with pt VSS. Pt rt eye remains swollen and red. Pt intermittently refuses eye gtts at times. Pt given prn pain meds via iv for rectal and eye pain. Pt is free of falls and injuries. Bed in lowest position and call light within reach. I will continue to monitor.
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
## 2022-08-14 NOTE — PROGRESS NOTES
"Progress Note  Ophthalmology Service    Date: 08/14/2022    Chief complaint: "corneal ulcer transfer"     Interval History:   Patient reports mild improvement in OD sx today. States that she is feeling slightly better overall with corresponding decrease in OD pain/irriatation and photophobia.     Current eye gtts: OD only: Fortified Vanc/tobra q2h, Atropine TID, erythromycin ointment TID, gel ATs QID      Medications this encounter:    atropine 1%  1 drop Right Eye TID    carboxymethylcellulose sodium  1 drop Ophthalmic QID    cefTRIAXone (ROCEPHIN) IVPB  1 g Intravenous Q24H    enoxaparin  40 mg Subcutaneous Daily    erythromycin   Right Eye TID    folic acid  1 mg Oral Daily    Fortified Tobramycin 15 mg/ml Opht  1 drop Right Eye Q2H    Fortified Vancomycin 25 mg/ml Ophth  1 drop Right Eye Q2H    methylPREDNISolone sodium succinate injection  20 mg Intravenous Q8H    vancomycin (VANCOCIN) IVPB  1,250 mg Intravenous Q12H       Allergies: has No Known Allergies.     ROS: As per HPI    Ocular examination/Dilated fundus examination:  Base Eye Exam     Visual Acuity (Snellen - Linear)       Right Left    Dist sc HM 20/30          Tonometry    Deferred            Pupils       Dark Light Shape React APD    Right         Left 4 3 Round Brisk None          Visual Fields       Right Left      Full          Extraocular Movement       Right Left     Full, Ortho Full, Ortho          Neuro/Psych     Oriented x3: Yes    Mood/Affect: Normal            Slit Lamp and Fundus Exam     External Exam       Right Left    External trace edema Normal          Pen Light Exam       Right Left    Lids/Lashes mild Ptosis Normal    Conjunctiva/Sclera 1+ Injection IT, limbal thickening White and quiet    Cornea Central scar/stromal haze with 3+ NV, central epi defect with stromal haze and thinning of central/ST portion, IT epi defect with underlying stromal haze/edema, lindsay negative Whorling epitheliopathy w/ inferior and superior " limbal wedge defects     Anterior Chamber Deep and formed with no hypopyon appreciated, jennyfer for cell/flare 2/2 corneal haze Deep and formed    Iris no view Round and reactive    Lens no view Clear    Anterior Vitreous no view Normal                     8/12/22 8/13/22 8/14/22    Assessment/Plan:     1. Corneal Ulcer without Hypopyon, RIGHT eye   2. 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   - Patient w/ 2wk hx of Crohn's flare and OD pain, photophobia, redness, irritation, and discharge  - Limited VA OD from previous ulcer with baseline at CF @3ft   - OD Exam 8/12/22: VA HM, iop wnl, 2+ injection IT, Epi defect centrally over stromal scar with thinning centrally and 3+ KNV, epi defect IT with underlying stromal haze   - 8/13/22 Update: Pt reports sx are unchanged, OD exam overall stable with  IOP 7, VA HM, mild increase in conj injection, but epi defects and area of central thinning remain stable in size, no hypopyon, lindsay negative  - 8/14/22 Update: Pt reports mild improvement in sx and feels her overall condition is improving as well. OD exam stable with slight decrease in conj injection and no evidence of vitritis on B scan. Otherwise unchanged from previous.    - Gram stain without organisms  - Cultures pending  - Absolutely no contact lens wear in either eye    Recommendations:   - Oral pain medication as needed  - Continue Atropine TID  - Continue Erythromycin ointment toTID   - Continue gel ATs QID  - Decrease fortified Vancomycin and Tobramycin to QID (5min apart)   - Continue mgmt of Crohn's flare per GI/primary team - currently on IV SoluMedrol 20mg Q8hrs, Vancomycin, and Ceftriaxone   - Plan discussed w/ Dr Lisa     Will plan to follow daily while inpatient and will have patient seen by Cornea 8/15/22. Please contact on call resident for any significant changes.     Carlos Manuel Rodriguez MD  [facility], PGY-2  08/14/2022  12:18 PM

## 2022-08-14 NOTE — HPI
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  CT a/p with mild rectosigmoid colitis and prominence of lower uterine segment/cervix.  She also c/o lpss of vision in R eye which is typical for her crohns flare being treated for Crohns flare and R corneal ulcer (tobramycin, vancomycin, erythromycin)

## 2022-08-14 NOTE — PROGRESS NOTES
Hospital Medicine  Progress note    Team: [facility] MED Z Jen Webster MD  Admit Date: 8/11/2022    Principal Problem:  Crohn's disease with abscess    Interval hx: Pain improved    ROS   Respiratory: neg for cough neg for shortness of breath  Cardiovascular: neg for chest pain neg for palpitations  Gastrointestinal: neg for nausea neg for vomiting, neg for abdominal pain neg for diarrhea neg for constipation   Behavioral/Psych: neg for depression neg for anxiety    PEx  Temp:  [96.1 °F (35.6 °C)-98.3 °F (36.8 °C)]   Pulse:  [57-84]   Resp:  [16-20]   BP: ()/(55-76)   SpO2:  [97 %-100 %]     Intake/Output Summary (Last 24 hours) at 8/14/2022 1542  Last data filed at 8/14/2022 0400  Gross per 24 hour   Intake 780 ml   Output 3 ml   Net 777 ml       General Appearance: no acute distress, WDWN  Heart: regular rate and rhythm, no heave  Respiratory: Normal respiratory effort, symmetric excursion, bilateral vesicular breath sounds   Abdomen: Soft, non-tender; bowel sounds active  Skin: intact, no rash, no ulcers  Neurologic:  No focal numbness or weakness  Mental status: Alert, oriented x 4, affect appropriate     Recent Labs   Lab 08/12/22 0055 08/13/22  0505 08/14/22  0248   WBC 7.92 7.38 7.16   HGB 9.1* 8.1* 9.1*   HCT 29.4* 27.0* 30.9*    335 365     Recent Labs   Lab 08/12/22 0055 08/13/22  0505 08/14/22  0247    136 138   K 3.6 4.0 4.1    104 105   CO2 23 27 23   BUN 8 5* 8   CREATININE 0.7 0.6 0.7   GLU 99 87 145*   CALCIUM 9.1 8.9 8.8   MG  --  1.9 1.9   PHOS  --  3.6 3.7     Recent Labs   Lab 08/12/22 0055 08/13/22  0505 08/14/22  0247   ALKPHOS 67 60 79   ALT 6* 8* 9*   AST 13 14 11   ALBUMIN 3.0* 2.5* 2.7*   PROT 9.5* 8.2 8.8*   BILITOT 0.2 0.1 0.3      Scheduled Meds:   atropine 1%  1 drop Right Eye TID    carboxymethylcellulose sodium  1 drop Ophthalmic QID    cefTRIAXone (ROCEPHIN) IVPB  1 g Intravenous Q24H    enoxaparin  40 mg Subcutaneous Daily    erythromycin   Right Eye  TID    folic acid  1 mg Oral Daily    Fortified Tobramycin 15 mg/ml Opht  1 drop Right Eye QID    Fortified Vancomycin 25 mg/ml Ophth  1 drop Right Eye QID    methylPREDNISolone sodium succinate injection  20 mg Intravenous Q8H    vancomycin (VANCOCIN) IVPB  1,250 mg Intravenous Q12H     Continuous Infusions:   sodium chloride 0.9% Stopped (08/12/22 2129)     As Needed:  acetaminophen, albuterol-ipratropium, aluminum-magnesium hydroxide-simethicone, dextrose 10%, dextrose 10%, glucagon (human recombinant), glucose, glucose, melatonin, morphine, naloxone, ondansetron, oxyCODONE, sodium chloride 0.9%, Pharmacy to dose Vancomycin consult **AND** vancomycin - pharmacy to dose    Assessment and Plan  / Problems managed today    * Crohn's disease with abscess  Perirectal fistula    Recent diagnosis March 2022 of duodenal, ileocolonic and perianal Crohn disease on Remicade and MTX doing well up into 2 weeks ago.    - CT showed mild rectosigmoid colitis, and prominent appearance of the region of the lower uterine segment/cervix  - GI consulted  ;  Appreciate recs  - C diff negative, stool leukocytes - positive  - started ceftriaxone and vancomycin  - follow up additional stool studies: fecal calprotectin and stool culture positive  - General surgery evaluated at outside hospital ; per note no drainable fluid collection.   - Infliximab level and antibody pending, next dose plan for 8/19  - Restart MTX when okay GI  - MRI pelvis showed 2 complex perianal fistulas   -CRS evaluated patient - will perform EUA tomorrow  - STD work up pending  - intermittent methylprednisolone IV per GI - now 20 mg IV q8h  - ID consulted -recommended ceftriaxone and metronidazole for 5 more days    Central corneal ulcer, right  - Solumedrol 125mg IV x 1, now solumedrol 20 mg IV q8h  - Ophthalmology consulted and evaluated patient  ;  Appreciate recs. Managing eye drops and ointments.      Discharge Planning   ENDY:      Code Status: Full Code    Is the patient medically ready for discharge?:     Reason for patient still in hospital (select all that apply): Patient trending condition           Diet:  regular diet  GI PPx: not needed  DVT PPx:  enoxaparin  Airways: room air  Wounds: none    Goals of Care:  Return to prior functional status      Time (minutes) spent in care of the patient (Greater than 1/2 spent in direct face-to-face contact and care coordination on unit)  35 min    Jen Webster MD

## 2022-08-14 NOTE — PROGRESS NOTES
Pharmacokinetic Assessment Follow Up: IV Vancomycin    Vancomycin serum concentration assessment(s):    -Vancomycin level was drawn appropriately and can be used to guide therapy.  -Level of 10 mcg/mL is below goal for IAI (15-20 mcg/mL).  -Renal function wnl.    Vancomycin Regimen Plan:    -Change vancomycin to 1250 mg IV Q12H.  -Trough on 8/16 @ 11:00.  -Monitor for changes in renal function closely.    Drug levels (last 3 results):  Recent Labs   Lab Result Units 08/14/22  1206   Vancomycin-Trough ug/mL 10.0       Pharmacy will continue to follow and monitor vancomycin.    Please contact pharmacy at extension [phone] for questions regarding this assessment.    Thank you for the consult,   Sage Trevino       Patient brief summary:  Nikkie Myles is a 32 y.o. female initiated on antimicrobial therapy with IV Vancomycin for treatment of intra-abdominal infection    Drug Allergies:   Review of patient's allergies indicates:  No Known Allergies    Actual Body Weight:   77.2 kg    Renal Function:   Estimated Creatinine Clearance: 107 mL/min (based on SCr of 0.7 mg/dL).,     Dialysis Method (if applicable):  N/A    CBC (last 72 hours):  Recent Labs   Lab Result Units 08/12/22  0055 08/13/22  0505 08/14/22  0248   WBC K/uL 7.92 7.38 7.16   Hemoglobin g/dL 9.1* 8.1* 9.1*   Hematocrit % 29.4* 27.0* 30.9*   Platelets K/uL 382 335 365   Gran % % 57.9 62.9 83.6*   Lymph % % 29.4 28.0 14.8*   Mono % % 10.4 8.7 0.8*   Eosinophil % % 1.3 0.0 0.0   Basophil % % 0.5 0.1 0.1   Differential Method  Automated Automated Automated       Metabolic Panel (last 72 hours):  Recent Labs   Lab Result Units 08/12/22  0055 08/12/22  0333 08/13/22  0505 08/14/22  0247   Sodium mmol/L 137  --  136 138   Potassium mmol/L 3.6  --  4.0 4.1   Chloride mmol/L 103  --  104 105   CO2 mmol/L 23  --  27 23   Glucose mg/dL 99  --  87 145*   Glucose, UA   --  Negative  --   --    BUN mg/dL 8  --  5* 8   Creatinine mg/dL 0.7  --  0.6 0.7   Albumin g/dL  3.0*  --  2.5* 2.7*   Total Bilirubin mg/dL 0.2  --  0.1 0.3   Alkaline Phosphatase U/L 67  --  60 79   AST U/L 13  --  14 11   ALT U/L 6*  --  8* 9*   Magnesium mg/dL  --   --  1.9 1.9   Phosphorus mg/dL  --   --  3.6 3.7       Vancomycin Administrations:  vancomycin given in the last 96 hours                   vancomycin 1.25 g in dextrose 5% 250 mL IVPB (ready to mix) (mg) 1,250 mg New Bag 08/14/22 1304     1,250 mg New Bag 08/13/22 2345     1,250 mg New Bag  1151     1,250 mg New Bag 08/12/22 2354    Fortified Vancomycin 25 mg/ml Ophth Drop 1 drop (drop) 1 drop Given 08/14/22 0638     1 drop Given  0200     1 drop Given  0000     1 drop Given 08/13/22 2030     1 drop Given  1630     1 drop Given  1414     1 drop Given  1149     1 drop Given  1031     1 drop Given  0811     1 drop Given  0701     1 drop Given  0138     1 drop Given 08/12/22 2342    vancomycin (VANCOCIN) 2,000 mg in dextrose 5 % 500 mL IVPB (mg) 2,000 mg New Bag 08/12/22 0621                Microbiologic Results:  Microbiology Results (last 7 days)     Procedure Component Value Units Date/Time    Culture, Anaerobe [[id number]] Collected: 08/12/22 2058    Order Status: Completed Specimen: Conjunctiva from Cornea, Right Updated: 08/14/22 1119     Anaerobic Culture Culture in progress    Urine culture [[id number]] Collected: 08/12/22 0333    Order Status: Completed Specimen: Urine Updated: 08/14/22 0936     Urine Culture, Routine No significant growth    Narrative:      Specimen Source->Urine    Stool culture [[id number]] Collected: 08/12/22 1250    Order Status: Completed Specimen: Stool Updated: 08/14/22 0843     Stool Culture Nothing significant to date    Aerobic culture [[id number]] Collected: 08/12/22 2058    Order Status: Completed Specimen: Conjunctiva from Cornea, Right Updated: 08/14/22 0720     Aerobic Bacterial Culture No growth    Blood culture x two cultures. Draw prior to antibiotics. [[id number]] Collected: 08/12/22 0133    Order Status:  Completed Specimen: Blood from Peripheral, Hand, Left Updated: 08/14/22 0303     Blood Culture, Routine No Growth to date      No Growth to date      No Growth to date    Narrative:      Aerobic and anaerobic    Blood culture x two cultures. Draw prior to antibiotics. [[id number]] Collected: 08/12/22 0055    Order Status: Completed Specimen: Blood from Peripheral, Antecubital, Right Updated: 08/14/22 0303     Blood Culture, Routine No Growth to date      No Growth to date      No Growth to date    Narrative:      Aerobic and anaerobic    E. coli 0157 antigen [[id number]] Collected: 08/12/22 1250    Order Status: Completed Specimen: Stool Updated: 08/13/22 1227     Shiga Toxin 1 E.coli Negative     Shiga Toxin 2 E.coli Negative    Gram stain [[id number]] Collected: 08/12/22 2058    Order Status: Completed Specimen: Conjunctiva from Cornea, Right Updated: 08/13/22 0159     Gram Stain Result No WBC's      No organisms seen    Fungus culture [[id number]] Collected: 08/12/22 2058    Order Status: Sent Specimen: Conjunctiva from Cornea, Right Updated: 08/12/22 2124    C. trachomatis/N. gonorrhoeae by AMP DNA [facility]; Urine [[id number]] Collected: 08/12/22 1249    Order Status: Sent Specimen: Genital Updated: 08/12/22 1731    Clostridium difficile EIA [[id number]] Collected: 08/12/22 1250    Order Status: Completed Specimen: Stool Updated: 08/12/22 1342     C. diff Antigen Negative     C difficile Toxins A+B, EIA Negative     Comment: Testing not recommended for children <24 months old.       Vaginal Screen [[id number]]  (Abnormal) Collected: 08/12/22 0556    Order Status: Completed Specimen: Vaginal swab Updated: 08/12/22 0615     Trichomonas None     Clue Cells None     Budding Yeast None     Fungal Hyphae None     WBC - Vaginal Screen Many     Bacteria - Vaginal Screen Occasional     Wet Prep Source Vagina    Narrative:      Release to patient->Immediate    C. trachomatis/N. gonorrhoeae by AMP DNA [facility]; Cervix [[id number]]  Collected: 08/12/22 0556    Order Status: Canceled Specimen: Genital

## 2022-08-14 NOTE — ASSESSMENT & PLAN NOTE
- Solumedrol 125mg IV x 1, now solumedrol 20 mg IV q8h  - Ophthalmology consulted and evaluated patient  ;  Appreciate recs. Managing eye drops and ointments.

## 2022-08-14 NOTE — ASSESSMENT & PLAN NOTE
"Hs of Perirectal fistula    Recent diagnosis March 2022 of duodenal, ileocolonic and perianal Crohn disease on Remicade and MTX doing well up into 2 weeks ago.    - CT showed mild rectosigmoid colitis, and prominent appearance of the region of the lower uterine segment/cervix  - GI consulted  ;  Appreciate recs  - C diff negative, stool leukocytes - positive  - started ceftriaxone and vancomycin  - follow up additional stool studies: fecal calprotectin and stool culture positive  - General surgery evaluated at outside hospital ; per note no drainable fluid collection.   - Infliximab level and antibody pending, next dose plan for 8/19  - Restart MTX when okay GI  - MRI pelvis showed 2 complex perianal fistulas  - MRI enterography showed "mucosal enhancement and wall thickening of the distal descending/sigmoid colon" and one perianal fistula.   -CRS evaluated patient via EUA - extensive disease but no defined fistulas   -candidate for fecal diversion  - STD work up pending  - intermittent methylprednisolone IV per GI - now 20 mg IV q8h  - ID consulted -recommended ceftriaxone and metronidazole for 5 more days  - continue ciprofloxacin and metronidazole for now  - colonoscopy per GI to assess for ideal placement of ostomy. Clear liquid diet now    8/18- Per CRS: on the schedule on Tuesday for diverting loop ileostomy, pending final results of endoscopies.  Will have her seen in marked for ileostomy preoperatively.  "

## 2022-08-14 NOTE — TREATMENT PLAN
[facility]  Gastroenterology  TREATMENT PLAN    Patient Name: Nikkie Myles  MRN: [id number]  Admission Date: 2022  Hospital Length of Stay: 2 days    Subjective:     HPI: Nikkie Myles is a 32 y.o. female with history of Crohn's disease (with duodenal, perianal, sigmoidal and rectal involvement), anemia & corneal ulceration who was transferred from [facility] to [facility] for ophthalmology consult for her corneal ulcer. GI has been consulted for management of her Crohn's disease.       Interval History:  - IV steroids started on . CRP today 91.4>61.8.  - Patient had 3-4 BMs yesterday that were bloody but more formed. No fever, chills. Continues to have some rectal pain.   - Explained the results of the MRI findings to her.     ============================================  IBD history:  - The patient had recurrent ocular infections & erythema nodosum, and was diagnosed with Crohn's disease in 3/2022.   - Started on infliximab and methotrexate by [facility] Dr. Lujan/Yo and CRS Dr. Henson.  - MRI pelvis 2022 showed complex bilateral multiple transsphinteric perianal fistula with internal sphincter components and multiple tiny abscesses along the tract.     Past surgical history:  has a past surgical history that includes  section, low transverse (2013);  section with tubal ligation (Bilateral, 2020); and  section ().      Endoscopic history:  - EGD (22): Exam concerning for duodenal Crohn's. Biopsy consistent with duodenal enteritis.   - Colonoscopy (22)    No current facility-administered medications on file prior to encounter.     Current Outpatient Medications on File Prior to Encounter   Medication Sig Dispense Refill    acetaminophen (TYLENOL) 500 MG tablet Take 2 tablets (1,000 mg total) by mouth every 6 (six) hours as needed. 60 tablet 0    erythromycin (ROMYCIN) ophthalmic ointment Place a 1/2 inch ribbon of  ointment into the lower eyelid 2-3 times daily. (Patient not taking: Reported on 3/8/2022) 1 g 0    ibuprofen (ADVIL,MOTRIN) 400 MG tablet Take 1 tablet (400 mg total) by mouth every 6 (six) hours as needed. 20 tablet 0    ondansetron (ZOFRAN-ODT) 4 MG TbDL Take 1 tablet (4 mg total) by mouth every 8 (eight) hours as needed. 20 tablet 0    prednisoLONE acetate (PRED FORTE) 1 % DrpS Place 1 drop into the left eye 3 (three) times daily. 10 mL 4       Review of patient's allergies indicates:  No Known Allergies      Objective:     Vitals:    08/14/22 0829   BP: 114/76   Pulse: 64   Resp: 16   Temp: 98.2 °F (36.8 °C)         Constitutional:       General: She is not in acute distress.     Appearance: Normal appearance.   HENT:      Head: Normocephalic and atraumatic.   Cardiovascular:      Rate and Rhythm: Normal rate and regular rhythm.   Pulmonary:      Effort: Pulmonary effort is normal. No respiratory distress.      Breath sounds: Normal breath sounds.   Abdominal:      General: Abdomen is flat. There is no distension.      Palpations: Abdomen is soft.      Tenderness: There is no abdominal tenderness.   Genitourinary:     Comments: Perianal drainage  Induration perirectal area, no fluctuance palpated  Healing wounds  Tender to palpation  Neurological:      General: No focal deficit present.      Mental Status: She is alert and oriented to person, place, and time.   Psychiatric:         Behavior: Behavior normal.         Thought Content: Thought content normal.        Significant Labs:  Recent Labs   Lab 08/12/22  0055 08/13/22  0505 08/14/22  0248   HGB 9.1* 8.1* 9.1*       Lab Results   Component Value Date    WBC 7.16 08/14/2022    HGB 9.1 (L) 08/14/2022    HCT 30.9 (L) 08/14/2022    MCV 59 (L) 08/14/2022     08/14/2022       Lab Results   Component Value Date     08/14/2022    K 4.1 08/14/2022     08/14/2022    CO2 23 08/14/2022    BUN 8 08/14/2022    CREATININE 0.7 08/14/2022    CALCIUM  8.8 08/14/2022    ANIONGAP 10 08/14/2022    ESTGFRAFRICA >60 07/29/2020    EGFRNONAA >60 07/29/2020       Lab Results   Component Value Date    ALT 9 (L) 08/14/2022    AST 11 08/14/2022    ALKPHOS 79 08/14/2022    BILITOT 0.3 08/14/2022       Lab Results   Component Value Date    INR 0.9 01/12/2012       Significant Imaging:  Reviewed pertinent radiology findings.       Assessment/Plan:     Nikkie Myles is a 32 y.o. female with history of  Crohn's disease (with duodenal, perianal, sigmoidal and rectal involvement), anemia & corneal ulceration who was transferred from [facility] to [facility] for ophthalmology consult for her corneal ulcer.     Problem List:  1. Crohn's disease with duodenal, perianal, sigmoidal and rectal involvement)  2. S/p anal fistula repair (7/5/22)  3. Corneal ulceration     - CRP (8/12/22): 91.4>61.8  -  ESR (8/12/22): 75    - C diff negative on 8/8    - CT scan abdomen and pelvis: No evidence for small bowel obstructive process. Subtle suggestion of mild wall and fold thickening along the rectosigmoid colon without significant pericolonic stranding however may relate to mild rectosigmoid colitis.  There is no evidence for colonic obstructive change.  There is no evidence for free intraperitoneal air.     - MRI pelvis (8/13/22): Two complex perianal fistulas with branching tracks. Along the bilateral medial gluteal cleft just deep to the cutaneous surface there are multiple serpiginous peripherally enhancing tracts which may have multiple cutaneous exits. Tracts are difficult to follow.      Recommendations:    - ContinueIV SoluMedrol 20 mg Q8hrs.    - have reached out to colorectal surgery now that we have MRI results.   - Continue abx per primary team.   - Follow up on fecal calprotectin & stool culture.    Inpatient IBD Recommendations:  -Avoid NSAIDS, for minor pain control Acetaminophen is preferred  -Patient should be placed on DVT ppx appropriately dosed for weight during  inpatient stay  -Minimize use of opiate/sedating medications; if necessary, IV morphine is preferred due to short acting nature  -Trend Daily CBC, CMP  -Trend CRP q48 hours       Thank you for involving us in the care of Nikkie Myles. Please call with any additional questions, concerns or changes in the patient's clinical status. We will continue to follow.     Henry Sheridan MD  Gastroenterology Fellow PGY IV   [facility]

## 2022-08-14 NOTE — ASSESSMENT & PLAN NOTE
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  CT a/p with mild rectosigmoid colitis and prominence of lower uterine segment/cervix.  She also c/o lpss of vision in R eye which is typical for her crohns flare being treated for Crohns flare and R corneal ulcer (tobramycin, vancomycin, erythromycin)  - can continue ceftriaxone add metronidazole x 5 days  - can stop vancomycin  - will sign off, flare management per GI, call back if questions

## 2022-08-15 ENCOUNTER — ANESTHESIA (OUTPATIENT)
Dept: SURGERY | Facility: HOSPITAL | Age: 32
DRG: 331 | End: 2022-08-15
Payer: MEDICAID

## 2022-08-15 ENCOUNTER — ANESTHESIA EVENT (OUTPATIENT)
Dept: SURGERY | Facility: HOSPITAL | Age: 32
DRG: 331 | End: 2022-08-15
Payer: MEDICAID

## 2022-08-15 ENCOUNTER — TELEPHONE (OUTPATIENT)
Dept: OBSTETRICS AND GYNECOLOGY | Facility: CLINIC | Age: 32
End: 2022-08-15
Payer: MEDICAID

## 2022-08-15 LAB — BACTERIA STL CULT: NORMAL

## 2022-08-15 PROCEDURE — 36000704 HC OR TIME LEV I 1ST 15 MIN: Performed by: COLON & RECTAL SURGERY

## 2022-08-15 PROCEDURE — 36000705 HC OR TIME LEV I EA ADD 15 MIN: Performed by: COLON & RECTAL SURGERY

## 2022-08-15 PROCEDURE — 45990 PR SURG DIAGNOSTIC EXAM, ANORECTAL: ICD-10-PCS | Mod: ,,, | Performed by: COLON & RECTAL SURGERY

## 2022-08-15 PROCEDURE — 63600175 PHARM REV CODE 636 W HCPCS: Performed by: NURSE PRACTITIONER

## 2022-08-15 PROCEDURE — 63600175 PHARM REV CODE 636 W HCPCS: Performed by: FAMILY MEDICINE

## 2022-08-15 PROCEDURE — 63600175 PHARM REV CODE 636 W HCPCS: Performed by: INTERNAL MEDICINE

## 2022-08-15 PROCEDURE — 11000001 HC ACUTE MED/SURG PRIVATE ROOM

## 2022-08-15 PROCEDURE — 25000003 PHARM REV CODE 250: Performed by: COLON & RECTAL SURGERY

## 2022-08-15 PROCEDURE — 45990 SURG DX EXAM ANORECTAL: CPT | Mod: ,,, | Performed by: COLON & RECTAL SURGERY

## 2022-08-15 PROCEDURE — 25000003 PHARM REV CODE 250: Performed by: NURSE ANESTHETIST, CERTIFIED REGISTERED

## 2022-08-15 PROCEDURE — 63600175 PHARM REV CODE 636 W HCPCS: Performed by: STUDENT IN AN ORGANIZED HEALTH CARE EDUCATION/TRAINING PROGRAM

## 2022-08-15 PROCEDURE — D9220A PRA ANESTHESIA: Mod: CRNA,,, | Performed by: NURSE ANESTHETIST, CERTIFIED REGISTERED

## 2022-08-15 PROCEDURE — 25000003 PHARM REV CODE 250: Performed by: INTERNAL MEDICINE

## 2022-08-15 PROCEDURE — 99233 PR SUBSEQUENT HOSPITAL CARE,LEVL III: ICD-10-PCS | Mod: ,,, | Performed by: INTERNAL MEDICINE

## 2022-08-15 PROCEDURE — 71000033 HC RECOVERY, INTIAL HOUR: Performed by: COLON & RECTAL SURGERY

## 2022-08-15 PROCEDURE — 37000008 HC ANESTHESIA 1ST 15 MINUTES: Performed by: COLON & RECTAL SURGERY

## 2022-08-15 PROCEDURE — 71000015 HC POSTOP RECOV 1ST HR: Performed by: COLON & RECTAL SURGERY

## 2022-08-15 PROCEDURE — 71000016 HC POSTOP RECOV ADDL HR: Performed by: COLON & RECTAL SURGERY

## 2022-08-15 PROCEDURE — 63600175 PHARM REV CODE 636 W HCPCS: Performed by: NURSE ANESTHETIST, CERTIFIED REGISTERED

## 2022-08-15 PROCEDURE — D9220A PRA ANESTHESIA: Mod: ANES,,, | Performed by: ANESTHESIOLOGY

## 2022-08-15 PROCEDURE — D9220A PRA ANESTHESIA: ICD-10-PCS | Mod: ANES,,, | Performed by: ANESTHESIOLOGY

## 2022-08-15 PROCEDURE — 99233 SBSQ HOSP IP/OBS HIGH 50: CPT | Mod: ,,, | Performed by: INTERNAL MEDICINE

## 2022-08-15 PROCEDURE — 37000009 HC ANESTHESIA EA ADD 15 MINS: Performed by: COLON & RECTAL SURGERY

## 2022-08-15 PROCEDURE — 25000003 PHARM REV CODE 250

## 2022-08-15 PROCEDURE — 25000003 PHARM REV CODE 250: Performed by: STUDENT IN AN ORGANIZED HEALTH CARE EDUCATION/TRAINING PROGRAM

## 2022-08-15 PROCEDURE — D9220A PRA ANESTHESIA: ICD-10-PCS | Mod: CRNA,,, | Performed by: NURSE ANESTHETIST, CERTIFIED REGISTERED

## 2022-08-15 PROCEDURE — 25000003 PHARM REV CODE 250: Performed by: FAMILY MEDICINE

## 2022-08-15 PROCEDURE — 25000003 PHARM REV CODE 250: Performed by: NURSE PRACTITIONER

## 2022-08-15 RX ORDER — HALOPERIDOL 5 MG/ML
0.5 INJECTION INTRAMUSCULAR EVERY 10 MIN PRN
Status: DISCONTINUED | OUTPATIENT
Start: 2022-08-15 | End: 2022-08-15 | Stop reason: HOSPADM

## 2022-08-15 RX ORDER — SODIUM CHLORIDE 9 MG/ML
INJECTION, SOLUTION INTRAVENOUS CONTINUOUS PRN
Status: DISCONTINUED | OUTPATIENT
Start: 2022-08-15 | End: 2022-08-15

## 2022-08-15 RX ORDER — FENTANYL CITRATE 50 UG/ML
25 INJECTION, SOLUTION INTRAMUSCULAR; INTRAVENOUS EVERY 5 MIN PRN
Status: DISCONTINUED | OUTPATIENT
Start: 2022-08-15 | End: 2022-08-15 | Stop reason: HOSPADM

## 2022-08-15 RX ORDER — BUPIVACAINE HYDROCHLORIDE AND EPINEPHRINE 2.5; 5 MG/ML; UG/ML
INJECTION, SOLUTION EPIDURAL; INFILTRATION; INTRACAUDAL; PERINEURAL
Status: DISCONTINUED | OUTPATIENT
Start: 2022-08-15 | End: 2022-08-15 | Stop reason: HOSPADM

## 2022-08-15 RX ORDER — PROPOFOL 10 MG/ML
VIAL (ML) INTRAVENOUS CONTINUOUS PRN
Status: DISCONTINUED | OUTPATIENT
Start: 2022-08-15 | End: 2022-08-15

## 2022-08-15 RX ORDER — DEXMEDETOMIDINE HYDROCHLORIDE 100 UG/ML
INJECTION, SOLUTION INTRAVENOUS
Status: DISCONTINUED | OUTPATIENT
Start: 2022-08-15 | End: 2022-08-15

## 2022-08-15 RX ORDER — METRONIDAZOLE 500 MG/1
500 TABLET ORAL EVERY 8 HOURS
Status: DISCONTINUED | OUTPATIENT
Start: 2022-08-15 | End: 2022-08-16

## 2022-08-15 RX ORDER — FENTANYL CITRATE 50 UG/ML
INJECTION, SOLUTION INTRAMUSCULAR; INTRAVENOUS
Status: DISCONTINUED | OUTPATIENT
Start: 2022-08-15 | End: 2022-08-15

## 2022-08-15 RX ORDER — HYDROMORPHONE HYDROCHLORIDE 2 MG/ML
INJECTION, SOLUTION INTRAMUSCULAR; INTRAVENOUS; SUBCUTANEOUS
Status: DISCONTINUED | OUTPATIENT
Start: 2022-08-15 | End: 2022-08-15

## 2022-08-15 RX ORDER — PROPOFOL 10 MG/ML
VIAL (ML) INTRAVENOUS
Status: DISCONTINUED | OUTPATIENT
Start: 2022-08-15 | End: 2022-08-15

## 2022-08-15 RX ORDER — CIPROFLOXACIN 500 MG/1
500 TABLET ORAL EVERY 12 HOURS
Status: DISCONTINUED | OUTPATIENT
Start: 2022-08-15 | End: 2022-08-19

## 2022-08-15 RX ORDER — KETAMINE HCL IN 0.9 % NACL 50 MG/5 ML
SYRINGE (ML) INTRAVENOUS
Status: DISCONTINUED | OUTPATIENT
Start: 2022-08-15 | End: 2022-08-15

## 2022-08-15 RX ORDER — MIDAZOLAM HYDROCHLORIDE 1 MG/ML
INJECTION INTRAMUSCULAR; INTRAVENOUS
Status: DISCONTINUED | OUTPATIENT
Start: 2022-08-15 | End: 2022-08-15

## 2022-08-15 RX ORDER — LIDOCAINE HYDROCHLORIDE 10 MG/ML
INJECTION, SOLUTION EPIDURAL; INFILTRATION; INTRACAUDAL; PERINEURAL
Status: DISCONTINUED | OUTPATIENT
Start: 2022-08-15 | End: 2022-08-15 | Stop reason: HOSPADM

## 2022-08-15 RX ORDER — SODIUM CHLORIDE 0.9 % (FLUSH) 0.9 %
10 SYRINGE (ML) INJECTION
Status: DISCONTINUED | OUTPATIENT
Start: 2022-08-15 | End: 2022-08-15 | Stop reason: HOSPADM

## 2022-08-15 RX ORDER — ONDANSETRON 2 MG/ML
INJECTION INTRAMUSCULAR; INTRAVENOUS
Status: DISCONTINUED | OUTPATIENT
Start: 2022-08-15 | End: 2022-08-15

## 2022-08-15 RX ADMIN — MIDAZOLAM HYDROCHLORIDE 2 MG: 1 INJECTION, SOLUTION INTRAMUSCULAR; INTRAVENOUS at 02:08

## 2022-08-15 RX ADMIN — GLYCOPYRROLATE 0.2 MG: 0.2 INJECTION INTRAMUSCULAR; INTRAVENOUS at 02:08

## 2022-08-15 RX ADMIN — METHYLPREDNISOLONE SODIUM SUCCINATE 20 MG: 40 INJECTION, POWDER, FOR SOLUTION INTRAMUSCULAR; INTRAVENOUS at 11:08

## 2022-08-15 RX ADMIN — SODIUM CHLORIDE: 0.9 INJECTION, SOLUTION INTRAVENOUS at 05:08

## 2022-08-15 RX ADMIN — ONDANSETRON 4 MG: 2 INJECTION INTRAMUSCULAR; INTRAVENOUS at 02:08

## 2022-08-15 RX ADMIN — CARBOXYMETHYLCELLULOSE SODIUM 1 DROP: 10 GEL OPHTHALMIC at 11:08

## 2022-08-15 RX ADMIN — FENTANYL CITRATE 25 MCG: 50 INJECTION, SOLUTION INTRAMUSCULAR; INTRAVENOUS at 03:08

## 2022-08-15 RX ADMIN — Medication 25 MG: at 03:08

## 2022-08-15 RX ADMIN — ENOXAPARIN SODIUM 40 MG: 100 INJECTION SUBCUTANEOUS at 05:08

## 2022-08-15 RX ADMIN — METHYLPREDNISOLONE SODIUM SUCCINATE 20 MG: 40 INJECTION, POWDER, FOR SOLUTION INTRAMUSCULAR; INTRAVENOUS at 05:08

## 2022-08-15 RX ADMIN — OXYCODONE 5 MG: 5 TABLET ORAL at 10:08

## 2022-08-15 RX ADMIN — HYDROMORPHONE HYDROCHLORIDE 0.4 MG: 2 INJECTION, SOLUTION INTRAMUSCULAR; INTRAVENOUS; SUBCUTANEOUS at 03:08

## 2022-08-15 RX ADMIN — CARBOXYMETHYLCELLULOSE SODIUM 1 DROP: 10 GEL OPHTHALMIC at 08:08

## 2022-08-15 RX ADMIN — VANCOMYCIN HYDROCHLORIDE 1250 MG: 1.25 INJECTION, POWDER, LYOPHILIZED, FOR SOLUTION INTRAVENOUS at 11:08

## 2022-08-15 RX ADMIN — CEFTRIAXONE 1 G: 1 INJECTION, SOLUTION INTRAVENOUS at 02:08

## 2022-08-15 RX ADMIN — METHYLPREDNISOLONE SODIUM SUCCINATE 20 MG: 40 INJECTION, POWDER, FOR SOLUTION INTRAMUSCULAR; INTRAVENOUS at 12:08

## 2022-08-15 RX ADMIN — METRONIDAZOLE 500 MG: 500 TABLET ORAL at 10:08

## 2022-08-15 RX ADMIN — OXYCODONE 5 MG: 5 TABLET ORAL at 01:08

## 2022-08-15 RX ADMIN — PROPOFOL 20 MG: 10 INJECTION, EMULSION INTRAVENOUS at 02:08

## 2022-08-15 RX ADMIN — FOLIC ACID 1 MG: 1 TABLET ORAL at 11:08

## 2022-08-15 RX ADMIN — ATROPINE SULFATE 1 DROP: 10 SOLUTION OPHTHALMIC at 08:08

## 2022-08-15 RX ADMIN — CIPROFLOXACIN 500 MG: 500 TABLET, FILM COATED ORAL at 08:08

## 2022-08-15 RX ADMIN — FENTANYL CITRATE 50 MCG: 50 INJECTION, SOLUTION INTRAMUSCULAR; INTRAVENOUS at 02:08

## 2022-08-15 RX ADMIN — MORPHINE SULFATE 2 MG: 4 INJECTION INTRAVENOUS at 11:08

## 2022-08-15 RX ADMIN — VANCOMYCIN HYDROCHLORIDE 1250 MG: 1.25 INJECTION, POWDER, LYOPHILIZED, FOR SOLUTION INTRAVENOUS at 01:08

## 2022-08-15 RX ADMIN — CARBOXYMETHYLCELLULOSE SODIUM 1 DROP: 10 GEL OPHTHALMIC at 05:08

## 2022-08-15 RX ADMIN — PROPOFOL 150 MCG/KG/MIN: 10 INJECTION, EMULSION INTRAVENOUS at 02:08

## 2022-08-15 RX ADMIN — SODIUM CHLORIDE: 9 INJECTION, SOLUTION INTRAVENOUS at 02:08

## 2022-08-15 RX ADMIN — MORPHINE SULFATE 2 MG: 4 INJECTION INTRAVENOUS at 05:08

## 2022-08-15 RX ADMIN — DEXMEDETOMIDINE HYDROCHLORIDE 8 MCG: 100 INJECTION, SOLUTION INTRAVENOUS at 02:08

## 2022-08-15 RX ADMIN — FENTANYL CITRATE 25 MCG: 50 INJECTION, SOLUTION INTRAMUSCULAR; INTRAVENOUS at 02:08

## 2022-08-15 NOTE — PROGRESS NOTES
Hospital Medicine  Progress note    Team: [facility] MED Z Jen Webster MD  Admit Date: 8/11/2022    Principal Problem:  Crohn's disease with abscess    Interval hx: Pain improved. Eyesight unchanged, eye irritation improved.     ROS   Respiratory: neg for cough neg for shortness of breath  Cardiovascular: neg for chest pain neg for palpitations  Gastrointestinal: neg for nausea neg for vomiting, neg for abdominal pain neg for diarrhea neg for constipation   Behavioral/Psych: neg for depression neg for anxiety    PEx  Temp:  [97.9 °F (36.6 °C)-98.3 °F (36.8 °C)]   Pulse:  [61-79]   Resp:  [14-19]   BP: ()/(55-79)   SpO2:  [98 %-100 %]     Intake/Output Summary (Last 24 hours) at 8/15/2022 1201  Last data filed at 8/14/2022 2000  Gross per 24 hour   Intake 120 ml   Output --   Net 120 ml       General Appearance: no acute distress, WDWN  Heart: regular rate and rhythm, no heave  Respiratory: Normal respiratory effort, symmetric excursion, bilateral vesicular breath sounds   Abdomen: Soft, non-tender; bowel sounds active  Skin: intact, no rash, no ulcers  Neurologic:  No focal numbness or weakness  Mental status: Alert, oriented x 4, affect appropriate     Recent Labs   Lab 08/12/22 0055 08/13/22  0505 08/14/22  0248   WBC 7.92 7.38 7.16   HGB 9.1* 8.1* 9.1*   HCT 29.4* 27.0* 30.9*    335 365     Recent Labs   Lab 08/12/22 0055 08/13/22  0505 08/14/22  0247    136 138   K 3.6 4.0 4.1    104 105   CO2 23 27 23   BUN 8 5* 8   CREATININE 0.7 0.6 0.7   GLU 99 87 145*   CALCIUM 9.1 8.9 8.8   MG  --  1.9 1.9   PHOS  --  3.6 3.7     Recent Labs   Lab 08/12/22 0055 08/13/22  0505 08/14/22  0247   ALKPHOS 67 60 79   ALT 6* 8* 9*   AST 13 14 11   ALBUMIN 3.0* 2.5* 2.7*   PROT 9.5* 8.2 8.8*   BILITOT 0.2 0.1 0.3      Scheduled Meds:   atropine 1%  1 drop Right Eye TID    carboxymethylcellulose sodium  1 drop Ophthalmic QID    cefTRIAXone (ROCEPHIN) IVPB  1 g Intravenous Q24H    enoxaparin  40 mg  Subcutaneous Daily    erythromycin   Right Eye TID    folic acid  1 mg Oral Daily    Fortified Tobramycin 15 mg/ml Opht  1 drop Right Eye QID    Fortified Vancomycin 25 mg/ml Ophth  1 drop Right Eye QID    methylPREDNISolone sodium succinate injection  20 mg Intravenous Q8H    vancomycin (VANCOCIN) IVPB  1,250 mg Intravenous Q12H     Continuous Infusions:   sodium chloride 0.9% Stopped (08/12/22 2129)     As Needed:  acetaminophen, albuterol-ipratropium, aluminum-magnesium hydroxide-simethicone, melatonin, morphine, naloxone, ondansetron, oxyCODONE, sodium chloride 0.9%, Pharmacy to dose Vancomycin consult **AND** vancomycin - pharmacy to dose    Assessment and Plan  / Problems managed today    * Crohn's disease with abscess  Perirectal fistula    Recent diagnosis March 2022 of duodenal, ileocolonic and perianal Crohn disease on Remicade and MTX doing well up into 2 weeks ago.    - CT showed mild rectosigmoid colitis, and prominent appearance of the region of the lower uterine segment/cervix  - GI consulted  ;  Appreciate recs  - C diff negative, stool leukocytes - positive  - started ceftriaxone and vancomycin  - follow up additional stool studies: fecal calprotectin and stool culture positive  - General surgery evaluated at outside hospital ; per note no drainable fluid collection.   - Infliximab level and antibody pending, next dose plan for 8/19  - Restart MTX when okay GI  - MRI pelvis showed 2 complex perianal fistulas   -CRS evaluated patient - will perform EUA tomorrow  - STD work up pending  - intermittent methylprednisolone IV per GI - now 20 mg IV q8h  - ID consulted -recommended ceftriaxone and metronidazole for 5 more days    Central corneal ulcer, right  - Solumedrol 125mg IV x 1, now solumedrol 20 mg IV q8h  - Ophthalmology consulted and evaluated patient  ;  Appreciate recs. Managing eye drops and ointments.      Discharge Planning   ENDY:      Code Status: Full Code   Is the patient medically  ready for discharge?: No    Reason for patient still in hospital (select all that apply): Patient trending condition           Diet:  regular diet  GI PPx: not needed  DVT PPx:  enoxaparin  Airways: room air  Wounds: none    Goals of Care:  Return to prior functional status      Time (minutes) spent in care of the patient (Greater than 1/2 spent in direct face-to-face contact and care coordination on unit)  35 min    Jen Webster MD

## 2022-08-15 NOTE — PROGRESS NOTES
[facility] Surg  Colorectal Surgery  Progress Note    Patient Name: Nikkie Myles  MRN: [id number]  Admission Date: 8/11/2022  Hospital Length of Stay: 3 days  Attending Physician: Jen Webster MD    Subjective:     Interval History: No acute events overnight. Pt continues to have perirectal pain. She is NPO.     Post-Op Info:  Procedure(s) (LRB):  Exam under anesthesia (N/A)  SIGMOIDOSCOPY, FLEXIBLE (N/A)          Medications:  Continuous Infusions:   sodium chloride 0.9% Stopped (08/12/22 2129)     Scheduled Meds:   atropine 1%  1 drop Right Eye TID    carboxymethylcellulose sodium  1 drop Ophthalmic QID    cefTRIAXone (ROCEPHIN) IVPB  1 g Intravenous Q24H    enoxaparin  40 mg Subcutaneous Daily    erythromycin   Right Eye TID    folic acid  1 mg Oral Daily    Fortified Tobramycin 15 mg/ml Opht  1 drop Right Eye QID    Fortified Vancomycin 25 mg/ml Ophth  1 drop Right Eye QID    methylPREDNISolone sodium succinate injection  20 mg Intravenous Q8H    vancomycin (VANCOCIN) IVPB  1,250 mg Intravenous Q12H     PRN Meds:   acetaminophen    albuterol-ipratropium    aluminum-magnesium hydroxide-simethicone    melatonin    morphine    naloxone    ondansetron    oxyCODONE    sodium chloride 0.9%    vancomycin - pharmacy to dose        Objective:     Vital Signs (Most Recent):  Temp: 97.9 °F (36.6 °C) (08/15/22 0810)  Pulse: 61 (08/15/22 0810)  Resp: 14 (08/15/22 0810)  BP: 115/79 (08/15/22 0810)  SpO2: 100 % (08/15/22 0810) Vital Signs (24h Range):  Temp:  [97.8 °F (36.6 °C)-98.3 °F (36.8 °C)] 97.9 °F (36.6 °C)  Pulse:  [61-79] 61  Resp:  [14-20] 14  SpO2:  [98 %-100 %] 100 %  BP: ()/(55-79) 115/79     Intake/Output - Last 3 Shifts         08/13 0700  08/14 0659 08/14 0700  08/15 0659 08/15 0700  08/16 0659    P.O. 1140 120     I.V. (mL/kg)       Total Intake(mL/kg) 1140 (14.8) 120 (1.6)     Urine (mL/kg/hr) 2 (0)      Stool 1      Total Output 3      Net +1137 +120            Urine  Occurrence 4 x 2 x     Stool Occurrence 2 x              Physical Exam  Constitutional:       General: She is not in acute distress.     Appearance: She is well-developed.   HENT:      Head: Normocephalic and atraumatic.   Eyes:      General:         Right eye: No discharge.         Left eye: No discharge.      Conjunctiva/sclera: Conjunctivae normal.   Cardiovascular:      Rate and Rhythm: Normal rate and regular rhythm.   Pulmonary:      Effort: Pulmonary effort is normal. No respiratory distress.   Abdominal:      General: There is no distension.      Palpations: Abdomen is soft.      Tenderness: There is no abdominal tenderness.   Musculoskeletal:         General: No deformity. Normal range of motion.      Cervical back: Normal range of motion.   Skin:     General: Skin is warm and dry.   Neurological:      Mental Status: She is alert and oriented to person, place, and time.   Psychiatric:         Behavior: Behavior normal.       Anorectal Exam:  Exquisitely tender to palpation throughout  Multiple areas of induration  No visible drainage or bleeding seen       Significant Labs:  BMP (Last 3 Results):   Recent Labs   Lab 08/12/22  0055 08/13/22  0505 08/14/22  0247   GLU 99 87 145*    136 138   K 3.6 4.0 4.1    104 105   CO2 23 27 23   BUN 8 5* 8   CREATININE 0.7 0.6 0.7   CALCIUM 9.1 8.9 8.8   MG  --  1.9 1.9     CBC (Last 3 Results):   Recent Labs   Lab 08/12/22 0055 08/13/22  0505 08/14/22  0248   WBC 7.92 7.38 7.16   RBC 5.24 4.80 5.27   HGB 9.1* 8.1* 9.1*   HCT 29.4* 27.0* 30.9*    335 365   MCV 56* 56* 59*   MCH 17.4* 16.9* 17.3*   MCHC 31.0* 30.0* 29.4*       Significant Diagnostics:  I have reviewed all pertinent imaging results/findings within the past 24 hours.    Assessment/Plan:     * Crohn's disease with abscess  31 yo female with hx of Crohn's admitted with perirectal abscess    - Plan for OR today for EUA with I&D, possible seton; consent obtained  - NPO  - Continue IV abx,  methylprednisolone  - mIVF  - Pain meds prn  - Please call with questions          Clark Bridges MD  Colorectal Surgery  [facility]

## 2022-08-15 NOTE — TRANSFER OF CARE
Anesthesia Transfer of Care Note    Patient: Nikkie Myles    Procedure(s) Performed: Procedure(s) (LRB):  Exam under anesthesia (N/A)  SIGMOIDOSCOPY, FLEXIBLE (N/A)    Patient location: PACU    Anesthesia Type: general    Transport from OR: Transported from OR on 6-10 L/min O2 by face mask with adequate spontaneous ventilation    Post pain: adequate analgesia    Post assessment: no apparent anesthetic complications and tolerated procedure well    Post vital signs: stable    Level of consciousness: awake    Nausea/Vomiting: no nausea/vomiting    Complications: none    Transfer of care protocol was followed      Last vitals:   Visit Vitals  /64 (BP Location: Right arm, Patient Position: Lying)   Pulse 102   Temp 36.6 °C (97.9 °F) (Temporal)   Resp 20   Ht 5' (1.524 m)   Wt 77.2 kg (170 lb 3.1 oz)   LMP 07/13/2022   SpO2 100%   Breastfeeding No   BMI 33.24 kg/m²

## 2022-08-15 NOTE — ANESTHESIA PREPROCEDURE EVALUATION
08/15/2022  Pre-operative evaluation for Procedure(s) (LRB):  Exam under anesthesia (N/A)  SIGMOIDOSCOPY, FLEXIBLE (N/A)    Nikkie Myles is a 32 y.o. female     Patient Active Problem List   Diagnosis    Status post  section    Elevated blood pressure affecting pregnancy, antepartum    Anemia of mother during pregnancy, delivered    Central corneal ulcer, right    Crohn's disease with abscess       Review of patient's allergies indicates:  No Known Allergies    No current facility-administered medications on file prior to encounter.     Current Outpatient Medications on File Prior to Encounter   Medication Sig Dispense Refill    acetaminophen (TYLENOL) 500 MG tablet Take 2 tablets (1,000 mg total) by mouth every 6 (six) hours as needed. 60 tablet 0    erythromycin (ROMYCIN) ophthalmic ointment Place a 1/2 inch ribbon of ointment into the lower eyelid 2-3 times daily. (Patient not taking: Reported on 3/8/2022) 1 g 0    ibuprofen (ADVIL,MOTRIN) 400 MG tablet Take 1 tablet (400 mg total) by mouth every 6 (six) hours as needed. 20 tablet 0    ondansetron (ZOFRAN-ODT) 4 MG TbDL Take 1 tablet (4 mg total) by mouth every 8 (eight) hours as needed. 20 tablet 0    prednisoLONE acetate (PRED FORTE) 1 % DrpS Place 1 drop into the left eye 3 (three) times daily. 10 mL 4       Past Surgical History:   Procedure Laterality Date     SECTION       SECTION WITH TUBAL LIGATION Bilateral 2020    Procedure:  SECTION, WITH TUBAL LIGATION;  Surgeon: Jasper Hester MD;  Location: [facility] L&D OR;  Service: OB/GYN;  Laterality: Bilateral;     SECTION, LOW TRANSVERSE  2013       Social History     Socioeconomic History    Marital status: Single   Tobacco Use    Smoking status: Former Smoker     Packs/day: 1.00     Years: 5.00     Pack years: 5.00    Smokeless tobacco:  Never Used   Substance and Sexual Activity    Alcohol use: Yes     Comment: RARELY    Drug use: No    Sexual activity: Yes     Partners: Male     Birth control/protection: None   Social History Narrative    Together since last year,     He is a garvin    She is a CNA at [employer].         CBC:   Recent Labs     22  0505 22  0248   WBC 7.38 7.16   RBC 4.80 5.27   HGB 8.1* 9.1*   HCT 27.0* 30.9*    365   MCV 56* 59*   MCH 16.9* 17.3*   MCHC 30.0* 29.4*       CMP:   Recent Labs     22  0505 22  0247    138   K 4.0 4.1    105   CO2 27 23   BUN 5* 8   CREATININE 0.6 0.7   GLU 87 145*   MG 1.9 1.9   PHOS 3.6 3.7   CALCIUM 8.9 8.8   ALBUMIN 2.5* 2.7*   PROT 8.2 8.8*   ALKPHOS 60 79   ALT 8* 9*   AST 14 11   BILITOT 0.1 0.3       INR  No results for input(s): PT, INR, PROTIME, APTT in the last 72 hours.        Diagnostic Studies:      EKD Echo:  No results found for this or any previous visit.        Pre-op Assessment    I have reviewed the Patient Summary Reports.     I have reviewed the Nursing Notes. I have reviewed the NPO Status.      Review of Systems  EENT/Dental:   OD corneal ulcer   Cardiovascular:  Cardiovascular Normal     Pulmonary:  Pulmonary Normal    Renal/:  Renal/ Normal     Hepatic/GI:   Crohn's with perianal abscess   Neurological:  Neurology Normal    Endocrine:  Obesity / BMI > 30  Psych:  Psychiatric Normal           Physical Exam  General: Well nourished, Cooperative and Alert    Airway:  Mallampati: II   Mouth Opening: Normal  Neck ROM: Normal ROM    Dental:  Intact        Anesthesia Plan  Type of Anesthesia, risks & benefits discussed:    Anesthesia Type: Gen Natural Airway, Gen ETT  Intra-op Monitoring Plan: Standard ASA Monitors  Induction:  IV  Informed Consent: Informed consent signed with the Patient and all parties understand the risks and agree with anesthesia plan.  All questions answered.   ASA Score: 2  Day of  Surgery Review of History & Physical: H&P Update referred to the surgeon/provider.    Ready For Surgery From Anesthesia Perspective.     .

## 2022-08-15 NOTE — PLAN OF CARE
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  Per Ms. Carlson, patient lives with her boyfriend and 3 children in a 2-story home with no step(s) to enter. Patient is unable to live on the first floor of the home per Ms. Carlson.   Per Ms. Carlson, patient was independent with ADLs and used no dme for ambulation prior to admit.  Patient will have assistance from family upon discharge.  Patient does not attend a coumadin clinic and is not on dialysis.     Discharge Planning Booklet was left in patient's room.  All questions addressed.     SW/CM to follow and assist with d/c needs as needed.    Discharge Barriers Identified: Underinsured    Payor: MEDICAID / Plan: HEALTHY BLUE [place] / Product Type: Managed Medicaid /     Extended Emergency Contact Information  Primary Emergency Contact: MiguelFranky  Mobile Phone: [phone]  Relation: Mother  Secondary Emergency Contact: Karsten Carlson  Mobile Phone: [phone]  Relation: Sister    Discharge Plan A: Home with family  Discharge Plan B: Home      [facility] - [place]rasco - [address]  [address]  [place] [address]  Phone: [phone] Fax: [phone]    [id number] - [place] DONALD - [address] AT [facility] [address]  [address]  [place] [address]  Phone: [phone] Fax: [phone]    [facility]  - [place] - [place] - [address]  [address]  [place] [address]  Phone:  [phone] Fax: [phone]    [id number] - [place], [place] - [address] [place] [address]  Phone: [phone] Fax: [phone]      Initial Assessment (most recent)     Adult Discharge Assessment - 08/15/22 1315        Discharge Assessment    Assessment Type Discharge Planning Assessment     Confirmed/corrected address, phone number and insurance Yes     Confirmed Demographics Correct on Facesheet     Source of Information family     If unable to respond/provide information was family/caregiver contacted? Yes     Contact Name/Number sister Karsten Carlson [phone]     Communicated ENDY with patient/caregiver Date not available/Unable to determine     Reason For Admission Crohn's disease with abscess     Lives With child(ayad), dependent;significant other     Facility Arrived From: home     Do you expect to return to your current living situation? Yes     Do you have help at home or someone to help you manage your care at home? No     Prior to hospitilization cognitive status: Unable to Assess     Current cognitive status: Unable to Assess     Walking or Climbing Stairs Difficulty none     Dressing/Bathing Difficulty none     Home Accessibility not wheelchair accessible;stairs within home     Home Layout Bathroom on 2nd floor;Bedroom on 2nd floor     Equipment Currently Used at Home none     Do you currently have service(s) that help you manage your care at home? No     Do you have prescription coverage? Yes     Who is going to help you get home at discharge? family     How do you get to doctors appointments? family or friend will provide     Are you on dialysis? No     Do you take coumadin? No     Discharge Plan A Home with family     Discharge Plan B Home     Discharge Plan discussed with: Sibling     Discharge Barriers Identified Underinsured        Relationship/Environment    Name(s) of Who Lives With Patient  boyfriend and 3 minor children                      Nevaeh Knight, [facility]  [id number]

## 2022-08-15 NOTE — PLAN OF CARE
Problem: Adult Inpatient Plan of Care  Goal: Plan of Care Review  Outcome: Ongoing, Progressing  Goal: Patient-Specific Goal (Individualized)  Outcome: Ongoing, Progressing  Goal: Absence of Hospital-Acquired Illness or Injury  Outcome: Ongoing, Progressing  Goal: Optimal Comfort and Wellbeing  Outcome: Ongoing, Progressing  Goal: Readiness for Transition of Care  Outcome: Ongoing, Progressing     Problem: Infection  Goal: Absence of Infection Signs and Symptoms  Outcome: Ongoing, Progressing     Problem: Diarrhea  Goal: Fluid and Electrolyte Balance  Outcome: Ongoing, Progressing    Plan of care reviewed with pt. VSS. Pt lost iv access last night. (See previous note) Midline consult placed. Pt has been npo since mn . Pt given prn pain meds . Pt is free of falls and injuries .Bed in lowest position and call light within reach. I will continue to monitor.

## 2022-08-15 NOTE — SUBJECTIVE & OBJECTIVE
Subjective:     Interval History: No acute events overnight. Pt continues to have perirectal pain. She is NPO.     Post-Op Info:  Procedure(s) (LRB):  Exam under anesthesia (N/A)  SIGMOIDOSCOPY, FLEXIBLE (N/A)          Medications:  Continuous Infusions:   sodium chloride 0.9% Stopped (08/12/22 2129)     Scheduled Meds:   atropine 1%  1 drop Right Eye TID    carboxymethylcellulose sodium  1 drop Ophthalmic QID    cefTRIAXone (ROCEPHIN) IVPB  1 g Intravenous Q24H    enoxaparin  40 mg Subcutaneous Daily    erythromycin   Right Eye TID    folic acid  1 mg Oral Daily    Fortified Tobramycin 15 mg/ml Opht  1 drop Right Eye QID    Fortified Vancomycin 25 mg/ml Ophth  1 drop Right Eye QID    methylPREDNISolone sodium succinate injection  20 mg Intravenous Q8H    vancomycin (VANCOCIN) IVPB  1,250 mg Intravenous Q12H     PRN Meds:   acetaminophen    albuterol-ipratropium    aluminum-magnesium hydroxide-simethicone    melatonin    morphine    naloxone    ondansetron    oxyCODONE    sodium chloride 0.9%    vancomycin - pharmacy to dose        Objective:     Vital Signs (Most Recent):  Temp: 97.9 °F (36.6 °C) (08/15/22 0810)  Pulse: 61 (08/15/22 0810)  Resp: 14 (08/15/22 0810)  BP: 115/79 (08/15/22 0810)  SpO2: 100 % (08/15/22 0810) Vital Signs (24h Range):  Temp:  [97.8 °F (36.6 °C)-98.3 °F (36.8 °C)] 97.9 °F (36.6 °C)  Pulse:  [61-79] 61  Resp:  [14-20] 14  SpO2:  [98 %-100 %] 100 %  BP: ()/(55-79) 115/79     Intake/Output - Last 3 Shifts         08/13 0700  08/14 0659 08/14 0700  08/15 0659 08/15 0700  08/16 0659    P.O. 1140 120     I.V. (mL/kg)       Total Intake(mL/kg) 1140 (14.8) 120 (1.6)     Urine (mL/kg/hr) 2 (0)      Stool 1      Total Output 3      Net +1137 +120            Urine Occurrence 4 x 2 x     Stool Occurrence 2 x              Physical Exam  Constitutional:       General: She is not in acute distress.     Appearance: She is well-developed.   HENT:      Head: Normocephalic and atraumatic.   Eyes:       General:         Right eye: No discharge.         Left eye: No discharge.      Conjunctiva/sclera: Conjunctivae normal.   Cardiovascular:      Rate and Rhythm: Normal rate and regular rhythm.   Pulmonary:      Effort: Pulmonary effort is normal. No respiratory distress.   Abdominal:      General: There is no distension.      Palpations: Abdomen is soft.      Tenderness: There is no abdominal tenderness.   Musculoskeletal:         General: No deformity. Normal range of motion.      Cervical back: Normal range of motion.   Skin:     General: Skin is warm and dry.   Neurological:      Mental Status: She is alert and oriented to person, place, and time.   Psychiatric:         Behavior: Behavior normal.       Anorectal Exam:  Exquisitely tender to palpation throughout  Multiple areas of induration  No visible drainage or bleeding seen       Significant Labs:  BMP (Last 3 Results):   Recent Labs   Lab 08/12/22 0055 08/13/22  0505 08/14/22  0247   GLU 99 87 145*    136 138   K 3.6 4.0 4.1    104 105   CO2 23 27 23   BUN 8 5* 8   CREATININE 0.7 0.6 0.7   CALCIUM 9.1 8.9 8.8   MG  --  1.9 1.9     CBC (Last 3 Results):   Recent Labs   Lab 08/12/22 0055 08/13/22  0505 08/14/22  0248   WBC 7.92 7.38 7.16   RBC 5.24 4.80 5.27   HGB 9.1* 8.1* 9.1*   HCT 29.4* 27.0* 30.9*    335 365   MCV 56* 56* 59*   MCH 17.4* 16.9* 17.3*   MCHC 31.0* 30.0* 29.4*       Significant Diagnostics:  I have reviewed all pertinent imaging results/findings within the past 24 hours.

## 2022-08-15 NOTE — OP NOTE
[facility]  Operative Note    Date: 08/15/2022    Name: Nikkie Myles    MRN: [id number]    Pre-Op Diagnosis: Crohn's disease of large intestine with abscess [K50.114]    Post-Op Diagnosis: Same    Procedure(s) Performed:   1. Rectal exam under anesthesia  2. Flexible sigmoidoscopy    Specimen(s): None    Staff Surgeon: Zuleyka Yip    Assistant Surgeon: Micheal Akhtar (resident)    Anesthesia: Monitor Anesthesia Care    Indications: Nikkie Myles is a 32 y.o. female with a history of Crohn's disease, with reported duodenal/sigmoid/pperianal involvement.  She has received her care at [facility], and recently underwent fistulotomy in July 2022.  She presented here with worsening abdominal and perianal pain.  MRI pelvis demonstrated multiple complex fistulas.  After a discussion of risks and benefits she agreed to proceed with EUA.    Details of procedure: The patient was taken to the operating room and transferred to the OR table.  SCDs boots were applied and she was placed under sedation.  Her anus was prepped and draped with betadine.  A time-out was performed.  External exam revealed severe perianal disease (see photo below).  There was a large anterior perianal skin tag with a deep ulcer.  There was also a left posterior deep ulcer that I suspect was her prior fistulotomy site.  There was a deep posterior midline ulcer with a skin bridge.  There was a shallow ulcer in the distal vaginal introitus, but this did not probe anywhere.  There was also some small openings in the skin overlying the ischiorectal fat bilaterally that probed to each other but not the anal canal (similar to hydradenitis).     We next performed a perianal block using a mixture of 0.25% marcaine with epi and 1% lidocaine.  Digital exam was performed which revealed a deep ulcer in the posterior midline, but no abscess cavity.  A Hill-Gonzalez anoscope was introduced and the anal canal was examined.  There was severe, circumferential  inflammation of the anal canal and distal rectal mucosa. In the posterior midline there was an internal fistula opening below the internal sphincter.  This was probed and it tracked to the rectovaginal space, where there was a cavity.  However, there was no clear external opening.  Peroxide was injected to confirm that this did not connect with the vagina.    Because there was no true fistula to place a seton in, we elected to stop the EUA.  We inserted a colonoscope into the anal canal and advanced it to the mid-sigmoid, where we encountered formed stool.  The sigmoid and upper rectal mucosa appeared normal, however, the distal rectal mucosa was severely inflamed.  The procedure was then terminated. All sponge and instrument counts were correct.  I was present and scrubbed for the entire procedure.  The patient was taken to recovery in good condition.    Estimated Blood Loss: 10mL    Drains/Implants: None    Wound Class: IV    Zuleyka GAYTAN Paruch              Proximal sigmoid    Proximal rectum      Distal rectal/anal ulcer      Distal rectum      Anal canal
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
## 2022-08-15 NOTE — ASSESSMENT & PLAN NOTE
31 yo female with hx of Crohn's admitted with perirectal abscess    - Plan for OR today for EUA with I&D, possible seton; consent obtained  - NPO  - Continue IV abx, methylprednisolone  - mIVF  - Pain meds prn  - Please call with questions

## 2022-08-15 NOTE — BRIEF OP NOTE
[facility]  Brief Operative Note     Patient: Nikkie Myles  Date: 8/15/2022  MRN: [id number]    Date of Procedure: 8/15/2022      Surgeon(s) and Role:   Zuleyka Yip MD - Primary     Micheal Ventura MD - Resident - assisting        Pre-op Diagnosis: Chron's related severe perianal disease     Post-op Diagnosis: Same as pre-op     Procedure: Procedure(s) (LRB):  Exam under anesthesia (N/A)  SIGMOIDOSCOPY, FLEXIBLE (N/A)        Anesthesia: General      Description of the findings of the procedure(s): See Op note       Estimated Blood Loss: 10cc         Specimens:   Specimen (24h ago, onward)            None        Micheal Ventura MD  General Surgery, PGY-1

## 2022-08-15 NOTE — NURSING
Pt piv leaking. Nurse attempted to replace as well as charge nurse. ANethesia notifed to possibly replace piv. He explained someone would be able to come up around 6 am . I will continue to monitor

## 2022-08-15 NOTE — PROGRESS NOTES
Patient arrived to [facility] at 1243.  VSS and afebrile. Patient has redness, inflammation, and drainage to her right eye due to Chrohn's flare up. Patient is unable to see out of her right eye. PreOp complete. Anesthesia consent needed.

## 2022-08-15 NOTE — NURSING TRANSFER
Nursing Transfer Note      8/15/2022     Reason patient is being transferred: Out of PACU    Transfer To: 626    Transfer via stretcher    Transported by PCT    Medicines sent: N/A    Any special needs or follow-up needed: No    Chart send with patient: Yes    Notified: Mother    Patient reassessed at: 1630

## 2022-08-15 NOTE — TELEPHONE ENCOUNTER
----- Message from Gina Soria MD sent at 8/12/2022  5:22 PM CDT -----  Regarding: ED follow up  Hello!    This patient was seen in the ED for a chron's flare and would like to follow up in our clinic for non-emergent GYN issues. Would you be able to schedule an appointment for her in our ED follow up clinic as soon as possible? Thank you!    Gina Soria MD PGY-3  Obstetrics and Gynecology

## 2022-08-15 NOTE — PROGRESS NOTES
"Progress Note  Ophthalmology Service    Date: 08/15/2022    Chief complaint: "corneal ulcer transfer"     Interval History:   Patient reports mild improvement in OD sx today. States that she is feeling slightly better overall with corresponding decrease in OD pain/irriatation and photophobia.     Current eye gtts: OD only: Fortified Vanc/tobra q2h, Atropine TID, erythromycin ointment TID, gel ATs QID      Medications this encounter:    atropine 1%  1 drop Right Eye TID    carboxymethylcellulose sodium  1 drop Ophthalmic QID    cefTRIAXone (ROCEPHIN) IVPB  1 g Intravenous Q24H    enoxaparin  40 mg Subcutaneous Daily    erythromycin   Right Eye TID    folic acid  1 mg Oral Daily    Fortified Tobramycin 15 mg/ml Opht  1 drop Right Eye QID    Fortified Vancomycin 25 mg/ml Ophth  1 drop Right Eye QID    methylPREDNISolone sodium succinate injection  20 mg Intravenous Q8H    vancomycin (VANCOCIN) IVPB  1,250 mg Intravenous Q12H       Allergies: has No Known Allergies.     ROS: As per HPI    Ocular examination/Dilated fundus examination:  Base Eye Exam     Visual Acuity (Snellen - Linear)       Right Left    Dist sc HM           Extraocular Movement       Right Left     Full, Ortho Full, Ortho          Neuro/Psych     Oriented x3: Yes    Mood/Affect: Normal            Slit Lamp and Fundus Exam     External Exam       Right Left    External trace edema Normal          Slit Lamp Exam       Right Left    Lids/Lashes mild Ptosis Normal    Conjunctiva/Sclera 1+ Injection IT, limbal thickening White and quiet    Cornea Central scar/stromal haze with 3+ NV appears overall improved, central epi defect with stromal haze and thinning of central/ST portion, IT epi defect with underlying stromal haze/edema, lindsay negative Whorling epitheliopathy w/ inferior and superior limbal wedge defects    Anterior Chamber Deep and formed with no hypopyon appreciated, jennyfer for cell/flare 2/2 corneal haze Deep and formed    Iris no view " Round and reactive    Lens no view Clear    Anterior Vitreous no view Normal                     8/12/22      8/13/22        8/14/22      8/15    Assessment/Plan:     1. Corneal Ulcer without Hypopyon, RIGHT eye   2. 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   - Patient w/ 2wk hx of Crohn's flare and OD pain, photophobia, redness, irritation, and discharge  - Limited VA OD from previous ulcer with baseline at CF @3ft   - OD Exam 8/12/22: VA HM, iop wnl, 2+ injection IT, Epi defect centrally over stromal scar with thinning centrally and 3+ KNV, epi defect IT with underlying stromal haze   - 8/13/22 Update: Pt reports sx are unchanged, OD exam overall stable with  IOP 7, VA HM, mild increase in conj injection, but epi defects and area of central thinning remain stable in size, no hypopyon, lindsay negative  - 8/14/22 Update: Pt reports mild improvement in sx and feels her overall condition is improving as well. OD exam stable with slight decrease in conj injection and no evidence of vitritis on B scan. Otherwise unchanged from previous.   - 8/15/22 update - Pt was in OR for majority of the day so briefly saw patient at the end of the day, still tired from anesthesia. Could not bring patient to cornea clinic, will plan for Wednesday if she is still admitted as she is a patient of [facility]. Patient reports improvement in eye. States she doesn't like the abx drops. Recommended to continue the abx until the cultures finalize.   - Gram stain without organisms  - Cultures pending  - Absolutely no contact lens wear in either eye    Recommendations:   - Oral pain medication as needed  - Continue Atropine TID  - Continue Erythromycin ointment TID   - Continue gel ATs QID  - Continue fortified Vancomycin and Tobramycin to QID   - Continue mgmt of Crohn's flare per GI/primary team - currently on IV SoluMedrol 20mg Q8hrs, Vancomycin, and Ceftriaxone   - Cornea specialist agrees that steroids (IV or PO) will improve the  epithelial defect as the ulcer is most likely related to her chrohn's exacerbation  - If cultures are no growth, will stop abx and transition to PF drops      Terri Jaffe MD  [employer] [facility], PGY-2  08/15/2022

## 2022-08-15 NOTE — TELEPHONE ENCOUNTER
Message received to schedule pt a follow up in the resident gyn follow up clinic. Pt has been scheduled on 8/31/22 for 1:15 at [facility] [address]. Call was disconnected. Tried to contact pt back no ans. Pt currently still admitted.

## 2022-08-16 LAB
BACTERIA BLD CULT: NORMAL
BACTERIA BLD CULT: NORMAL
BACTERIA SPEC AEROBE CULT: NO GROWTH
C TRACH DNA SPEC QL NAA+PROBE: NOT DETECTED
N GONORRHOEA DNA SPEC QL NAA+PROBE: NOT DETECTED

## 2022-08-16 PROCEDURE — 93005 ELECTROCARDIOGRAM TRACING: CPT

## 2022-08-16 PROCEDURE — 11000001 HC ACUTE MED/SURG PRIVATE ROOM

## 2022-08-16 PROCEDURE — A9585 GADOBUTROL INJECTION: HCPCS | Performed by: INTERNAL MEDICINE

## 2022-08-16 PROCEDURE — 25000003 PHARM REV CODE 250: Performed by: NURSE PRACTITIONER

## 2022-08-16 PROCEDURE — 25000003 PHARM REV CODE 250

## 2022-08-16 PROCEDURE — 63600175 PHARM REV CODE 636 W HCPCS: Performed by: INTERNAL MEDICINE

## 2022-08-16 PROCEDURE — 25000003 PHARM REV CODE 250: Performed by: INTERNAL MEDICINE

## 2022-08-16 PROCEDURE — 25500020 PHARM REV CODE 255: Performed by: INTERNAL MEDICINE

## 2022-08-16 PROCEDURE — 63600175 PHARM REV CODE 636 W HCPCS: Performed by: NURSE PRACTITIONER

## 2022-08-16 PROCEDURE — 99233 PR SUBSEQUENT HOSPITAL CARE,LEVL III: ICD-10-PCS | Mod: ,,, | Performed by: INTERNAL MEDICINE

## 2022-08-16 PROCEDURE — 99223 1ST HOSP IP/OBS HIGH 75: CPT | Mod: ,,, | Performed by: COLON & RECTAL SURGERY

## 2022-08-16 PROCEDURE — 93010 ELECTROCARDIOGRAM REPORT: CPT | Mod: ,,, | Performed by: INTERNAL MEDICINE

## 2022-08-16 PROCEDURE — 99233 SBSQ HOSP IP/OBS HIGH 50: CPT | Mod: ,,, | Performed by: INTERNAL MEDICINE

## 2022-08-16 PROCEDURE — 25000003 PHARM REV CODE 250: Performed by: FAMILY MEDICINE

## 2022-08-16 PROCEDURE — 63600175 PHARM REV CODE 636 W HCPCS: Performed by: STUDENT IN AN ORGANIZED HEALTH CARE EDUCATION/TRAINING PROGRAM

## 2022-08-16 PROCEDURE — 99223 PR INITIAL HOSPITAL CARE,LEVL III: ICD-10-PCS | Mod: ,,, | Performed by: COLON & RECTAL SURGERY

## 2022-08-16 PROCEDURE — 93010 EKG 12-LEAD: ICD-10-PCS | Mod: ,,, | Performed by: INTERNAL MEDICINE

## 2022-08-16 RX ORDER — MORPHINE SULFATE 2 MG/ML
10 INJECTION, SOLUTION INTRAMUSCULAR; INTRAVENOUS ONCE
Status: DISCONTINUED | OUTPATIENT
Start: 2022-08-16 | End: 2022-08-16

## 2022-08-16 RX ORDER — METRONIDAZOLE 500 MG/1
500 TABLET ORAL EVERY 8 HOURS
Status: DISCONTINUED | OUTPATIENT
Start: 2022-08-16 | End: 2022-08-19

## 2022-08-16 RX ORDER — MORPHINE SULFATE 2 MG/ML
5 INJECTION, SOLUTION INTRAMUSCULAR; INTRAVENOUS ONCE
Status: COMPLETED | OUTPATIENT
Start: 2022-08-16 | End: 2022-08-16

## 2022-08-16 RX ORDER — GADOBUTROL 604.72 MG/ML
10 INJECTION INTRAVENOUS
Status: COMPLETED | OUTPATIENT
Start: 2022-08-16 | End: 2022-08-16

## 2022-08-16 RX ADMIN — ATROPINE SULFATE 1 DROP: 10 SOLUTION OPHTHALMIC at 08:08

## 2022-08-16 RX ADMIN — OXYCODONE 5 MG: 5 TABLET ORAL at 01:08

## 2022-08-16 RX ADMIN — SODIUM CHLORIDE: 0.9 INJECTION, SOLUTION INTRAVENOUS at 08:08

## 2022-08-16 RX ADMIN — CARBOXYMETHYLCELLULOSE SODIUM 1 DROP: 10 GEL OPHTHALMIC at 02:08

## 2022-08-16 RX ADMIN — CARBOXYMETHYLCELLULOSE SODIUM 1 DROP: 10 GEL OPHTHALMIC at 05:08

## 2022-08-16 RX ADMIN — CEFTRIAXONE 1 G: 1 INJECTION, SOLUTION INTRAVENOUS at 02:08

## 2022-08-16 RX ADMIN — OXYCODONE 5 MG: 5 TABLET ORAL at 08:08

## 2022-08-16 RX ADMIN — ATROPINE SULFATE 1 DROP: 10 SOLUTION OPHTHALMIC at 04:08

## 2022-08-16 RX ADMIN — METRONIDAZOLE 500 MG: 500 TABLET ORAL at 10:08

## 2022-08-16 RX ADMIN — METHYLPREDNISOLONE SODIUM SUCCINATE 20 MG: 40 INJECTION, POWDER, FOR SOLUTION INTRAMUSCULAR; INTRAVENOUS at 08:08

## 2022-08-16 RX ADMIN — METRONIDAZOLE 500 MG: 500 TABLET ORAL at 06:08

## 2022-08-16 RX ADMIN — CARBOXYMETHYLCELLULOSE SODIUM 1 DROP: 10 GEL OPHTHALMIC at 08:08

## 2022-08-16 RX ADMIN — GADOBUTROL 10 ML: 604.72 INJECTION INTRAVENOUS at 10:08

## 2022-08-16 RX ADMIN — METRONIDAZOLE 500 MG: 500 TABLET ORAL at 01:08

## 2022-08-16 RX ADMIN — ENOXAPARIN SODIUM 40 MG: 100 INJECTION SUBCUTANEOUS at 04:08

## 2022-08-16 RX ADMIN — MORPHINE SULFATE 5 MG: 2 INJECTION, SOLUTION INTRAMUSCULAR; INTRAVENOUS at 04:08

## 2022-08-16 RX ADMIN — OXYCODONE 5 MG: 5 TABLET ORAL at 06:08

## 2022-08-16 RX ADMIN — FOLIC ACID 1 MG: 1 TABLET ORAL at 08:08

## 2022-08-16 RX ADMIN — SODIUM CHLORIDE: 0.9 INJECTION, SOLUTION INTRAVENOUS at 12:08

## 2022-08-16 RX ADMIN — MORPHINE SULFATE 2 MG: 4 INJECTION INTRAVENOUS at 10:08

## 2022-08-16 RX ADMIN — MELATONIN TAB 3 MG 6 MG: 3 TAB at 10:08

## 2022-08-16 RX ADMIN — CIPROFLOXACIN 500 MG: 500 TABLET, FILM COATED ORAL at 08:08

## 2022-08-16 RX ADMIN — METHYLPREDNISOLONE SODIUM SUCCINATE 20 MG: 40 INJECTION, POWDER, FOR SOLUTION INTRAMUSCULAR; INTRAVENOUS at 04:08

## 2022-08-16 NOTE — PROGRESS NOTES
Therapy with vancomycin complete and/or consult discontinued by provider.  Pharmacy will sign off, please re-consult as needed.      Nkechi Villatoro, PharmD, [facility]  Clinical Pharmacist  Extension: [phone]

## 2022-08-16 NOTE — NURSING
Patient's heart rate fluctuates between 54 and 70 within seconds, this morning her heart rate was 47, Dr. Webster notified via secure chat, EKG ordered, Morphine 5mg IV ordered for pain, will continue to monitor for changes.

## 2022-08-16 NOTE — NURSING
Patient complains of pain, rates 10 on 0-10 scale, pt already received Morphine 2mg IV and oxycodone 5mg.  Dr. Webster notified via secure chat.

## 2022-08-16 NOTE — SUBJECTIVE & OBJECTIVE
Subjective:     Interval History: She underwent EUA yesterday, demonstrated numerous deep fissures but no surgical intervention indicated. Pain somewhat improved overnight.     Post-Op Info:  Procedure(s) (LRB):  Exam under anesthesia (N/A)  SIGMOIDOSCOPY, FLEXIBLE (N/A)   1 Day Post-Op      Medications:  Continuous Infusions:   sodium chloride 0.9% 125 mL/hr at 08/16/22 0050     Scheduled Meds:   atropine 1%  1 drop Right Eye TID    carboxymethylcellulose sodium  1 drop Ophthalmic QID    cefTRIAXone (ROCEPHIN) IVPB  1 g Intravenous Q24H    ciprofloxacin HCl  500 mg Oral Q12H    enoxaparin  40 mg Subcutaneous Daily    erythromycin   Right Eye TID    folic acid  1 mg Oral Daily    Fortified Tobramycin 15 mg/ml Opht  1 drop Right Eye QID    Fortified Vancomycin 25 mg/ml Ophth  1 drop Right Eye QID    methylPREDNISolone sodium succinate injection  20 mg Intravenous Q8H    metroNIDAZOLE  500 mg Oral Q8H     PRN Meds:   acetaminophen    albuterol-ipratropium    aluminum-magnesium hydroxide-simethicone    melatonin    morphine    naloxone    ondansetron    oxyCODONE    sodium chloride 0.9%        Objective:     Vital Signs (Most Recent):  Temp: 98.8 °F (37.1 °C) (08/16/22 0500)  Pulse: (!) 47 (08/16/22 0500)  Resp: 16 (08/16/22 0610)  BP: (!) 103/58 (08/16/22 0500)  SpO2: 97 % (08/16/22 0500) Vital Signs (24h Range):  Temp:  [97.9 °F (36.6 °C)-98.8 °F (37.1 °C)] 98.8 °F (37.1 °C)  Pulse:  [] 47  Resp:  [14-20] 16  SpO2:  [95 %-100 %] 97 %  BP: ()/(53-88) 103/58     Intake/Output - Last 3 Shifts         08/14 0700  08/15 0659 08/15 0700 08/16 0659 08/16 0700 08/17 0659    P.O. 120      I.V. (mL/kg)  2175 (28.2)     Total Intake(mL/kg) 120 (1.6) 2175 (28.2)     Urine (mL/kg/hr)       Stool       Total Output       Net +120 +2175            Urine Occurrence 2 x 2 x             Physical Exam  Constitutional:       General: She is not in acute distress.     Appearance: She is well-developed.   HENT:      Head:  Normocephalic and atraumatic.   Eyes:      General:         Right eye: No discharge.         Left eye: No discharge.      Conjunctiva/sclera: Conjunctivae normal.   Cardiovascular:      Rate and Rhythm: Normal rate and regular rhythm.   Pulmonary:      Effort: Pulmonary effort is normal. No respiratory distress.   Abdominal:      General: There is no distension.      Palpations: Abdomen is soft.      Tenderness: There is no abdominal tenderness.   Musculoskeletal:         General: No deformity. Normal range of motion.      Cervical back: Normal range of motion.   Skin:     General: Skin is warm and dry.   Neurological:      Mental Status: She is alert and oriented to person, place, and time.   Psychiatric:         Behavior: Behavior normal.       Anorectal Exam:  Moderately tender to palpation throughout  Multiple areas of induration  No visible drainage or bleeding seen       Significant Labs:  BMP (Last 3 Results):   Recent Labs   Lab 08/12/22 0055 08/13/22  0505 08/14/22  0247   GLU 99 87 145*    136 138   K 3.6 4.0 4.1    104 105   CO2 23 27 23   BUN 8 5* 8   CREATININE 0.7 0.6 0.7   CALCIUM 9.1 8.9 8.8   MG  --  1.9 1.9       CBC (Last 3 Results):   Recent Labs   Lab 08/12/22 0055 08/13/22  0505 08/14/22  0248   WBC 7.92 7.38 7.16   RBC 5.24 4.80 5.27   HGB 9.1* 8.1* 9.1*   HCT 29.4* 27.0* 30.9*    335 365   MCV 56* 56* 59*   MCH 17.4* 16.9* 17.3*   MCHC 31.0* 30.0* 29.4*         Significant Diagnostics:  I have reviewed all pertinent imaging results/findings within the past 24 hours.

## 2022-08-16 NOTE — PROGRESS NOTES
"Progress Note  Ophthalmology Service    Date: 08/16/2022    Chief complaint: "corneal ulcer transfer"     Interval History:   Patient continues to improve     Current eye gtts: OD only: Fortified Vanc QID, Atropine TID, erythromycin ointment TID, gel ATs QID      Medications this encounter:    atropine 1%  1 drop Right Eye TID    carboxymethylcellulose sodium  1 drop Ophthalmic QID    ciprofloxacin HCl  500 mg Oral Q12H    enoxaparin  40 mg Subcutaneous Daily    erythromycin   Right Eye TID    folic acid  1 mg Oral Daily    Fortified Vancomycin 25 mg/ml Ophth  1 drop Right Eye QID    methylPREDNISolone sodium succinate injection  20 mg Intravenous Q8H    metroNIDAZOLE  500 mg Oral Q8H       Allergies: has No Known Allergies.     ROS: As per HPI    Ocular examination/Dilated fundus examination:  Base Eye Exam     Visual Acuity (Snellen - Linear)       Right Left    Dist sc HM 20/30          Extraocular Movement       Right Left     Full, Ortho Full, Ortho          Neuro/Psych     Oriented x3: Yes    Mood/Affect: Normal            Slit Lamp and Fundus Exam     External Exam       Right Left    External trace edema Normal          Slit Lamp Exam       Right Left    Lids/Lashes mild Ptosis Normal    Conjunctiva/Sclera 1+ Injection IT, limbal thickening White and quiet    Cornea Central scar/stromal haze with 3+ NV appears overall improved, central epi defect with stromal haze and thinning of central/ST portion, IT epi defect with underlying stromal haze/edema, lindsay negative Whorling epitheliopathy w/ inferior and superior limbal wedge defects    Anterior Chamber Deep and formed with no hypopyon appreciated, jennyfer for cell/flare 2/2 corneal haze Deep and formed    Iris no view Round and reactive    Lens no view Clear    Anterior Vitreous no view Normal                     8/12/22 8/13/22 8/14/22 8/16    Assessment/Plan:     1. Corneal Ulcer without Hypopyon, RIGHT eye   2. 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   - Patient w/ 2wk hx of Crohn's flare and OD pain, photophobia, redness, irritation, and discharge  - Limited VA OD from previous ulcer with baseline at CF @3ft   - OD Exam 8/12/22: VA HM, iop wnl, 2+ injection IT, Epi defect centrally over stromal scar with thinning centrally and 3+ KNV, epi defect IT with underlying stromal haze   - 8/13/22 Update: Pt reports sx are unchanged, OD exam overall stable with  IOP 7, VA HM, mild increase in conj injection, but epi defects and area of central thinning remain stable in size, no hypopyon, lindsay negative  - 8/14/22 Update: Pt reports mild improvement in sx and feels her overall condition is improving as well. OD exam stable with slight decrease in conj injection and no evidence of vitritis on B scan. Otherwise unchanged from previous.   - 8/15/22 update - Pt was in OR for majority of the day so briefly saw patient at the end of the day, still tired from anesthesia. Could not bring patient to cornea clinic, will plan for Wednesday if she is still admitted as she is a patient of [facility]. Patient reports improvement in eye. States she doesn't like the abx drops. Recommended to continue the abx until the cultures finalize.   - 8/16/22 update - Patient continues to improve. Reports vision is at baseline. Will discuss patient with Dr. Warner tomorrow as he is in clinic. Likely will stop antibiotics tomorrow once all cultures result.   - Gram stain without organisms, aerobic cx NGTD, anaerobic pending   - Absolutely no contact lens wear in either eye    Recommendations:   - Oral pain medication as needed  - Continue Atropine TID OD  - Continue Erythromycin ointment TID OD  - Continue gel ATs QID OD  - Continue fortified Vancomycin QID OD  - STOP fortified tobramycin   - Continue mgmt of Crohn's flare per GI/primary team - currently on IV SoluMedrol 20mg Q8hrs, Vancomycin, and Ceftriaxone   - Cornea specialist agrees that steroids (IV or PO) will  improve the epithelial defect as the ulcer is most likely related to her chrohn's exacerbation  - Will start to decrease abx as cultures are beginning to result and likely sterile infiltrate. Once cultures finalize, will stop all abx and transition to PF.       Terri Jaffe MD  [facility], PGY-2  08/16/2022

## 2022-08-16 NOTE — PROGRESS NOTES
[facility] Surg  Colorectal Surgery  Progress Note    Patient Name: Nikkie Myles  MRN: [id number]  Admission Date: 8/11/2022  Hospital Length of Stay: 4 days  Attending Physician: Jen Webster MD    Subjective:     Interval History: She underwent EUA yesterday, demonstrated numerous deep fissures but no surgical intervention indicated. Pain somewhat improved overnight.     Post-Op Info:  Procedure(s) (LRB):  Exam under anesthesia (N/A)  SIGMOIDOSCOPY, FLEXIBLE (N/A)   1 Day Post-Op      Medications:  Continuous Infusions:   sodium chloride 0.9% 125 mL/hr at 08/16/22 0050     Scheduled Meds:   atropine 1%  1 drop Right Eye TID    carboxymethylcellulose sodium  1 drop Ophthalmic QID    cefTRIAXone (ROCEPHIN) IVPB  1 g Intravenous Q24H    ciprofloxacin HCl  500 mg Oral Q12H    enoxaparin  40 mg Subcutaneous Daily    erythromycin   Right Eye TID    folic acid  1 mg Oral Daily    Fortified Tobramycin 15 mg/ml Opht  1 drop Right Eye QID    Fortified Vancomycin 25 mg/ml Ophth  1 drop Right Eye QID    methylPREDNISolone sodium succinate injection  20 mg Intravenous Q8H    metroNIDAZOLE  500 mg Oral Q8H     PRN Meds:   acetaminophen    albuterol-ipratropium    aluminum-magnesium hydroxide-simethicone    melatonin    morphine    naloxone    ondansetron    oxyCODONE    sodium chloride 0.9%        Objective:     Vital Signs (Most Recent):  Temp: 98.8 °F (37.1 °C) (08/16/22 0500)  Pulse: (!) 47 (08/16/22 0500)  Resp: 16 (08/16/22 0610)  BP: (!) 103/58 (08/16/22 0500)  SpO2: 97 % (08/16/22 0500) Vital Signs (24h Range):  Temp:  [97.9 °F (36.6 °C)-98.8 °F (37.1 °C)] 98.8 °F (37.1 °C)  Pulse:  [] 47  Resp:  [14-20] 16  SpO2:  [95 %-100 %] 97 %  BP: ()/(53-88) 103/58     Intake/Output - Last 3 Shifts         08/14 0700  08/15 0659 08/15 0700  08/16 0659 08/16 0700 08/17 0659    P.O. 120      I.V. (mL/kg)  2175 (28.2)     Total Intake(mL/kg) 120 (1.6) 2175 (28.2)     Urine (mL/kg/hr)        Stool       Total Output       Net +120 +2175            Urine Occurrence 2 x 2 x             Physical Exam  Constitutional:       General: She is not in acute distress.     Appearance: She is well-developed.   HENT:      Head: Normocephalic and atraumatic.   Eyes:      General:         Right eye: No discharge.         Left eye: No discharge.      Conjunctiva/sclera: Conjunctivae normal.   Cardiovascular:      Rate and Rhythm: Normal rate and regular rhythm.   Pulmonary:      Effort: Pulmonary effort is normal. No respiratory distress.   Abdominal:      General: There is no distension.      Palpations: Abdomen is soft.      Tenderness: There is no abdominal tenderness.   Musculoskeletal:         General: No deformity. Normal range of motion.      Cervical back: Normal range of motion.   Skin:     General: Skin is warm and dry.   Neurological:      Mental Status: She is alert and oriented to person, place, and time.   Psychiatric:         Behavior: Behavior normal.       Anorectal Exam:  Moderately tender to palpation throughout  Multiple areas of induration  No visible drainage or bleeding seen       Significant Labs:  BMP (Last 3 Results):   Recent Labs   Lab 08/12/22  0055 08/13/22  0505 08/14/22  0247   GLU 99 87 145*    136 138   K 3.6 4.0 4.1    104 105   CO2 23 27 23   BUN 8 5* 8   CREATININE 0.7 0.6 0.7   CALCIUM 9.1 8.9 8.8   MG  --  1.9 1.9       CBC (Last 3 Results):   Recent Labs   Lab 08/12/22 0055 08/13/22  0505 08/14/22  0248   WBC 7.92 7.38 7.16   RBC 5.24 4.80 5.27   HGB 9.1* 8.1* 9.1*   HCT 29.4* 27.0* 30.9*    335 365   MCV 56* 56* 59*   MCH 17.4* 16.9* 17.3*   MCHC 31.0* 30.0* 29.4*         Significant Diagnostics:  I have reviewed all pertinent imaging results/findings within the past 24 hours.    Assessment/Plan:     * Crohn's disease with abscess  31 yo female with hx of Crohn's admitted with perirectal abscess. S/p EUA on 8/15 demonstrating deep anal fissures but no  surgical intervention indicated    - Okay to advance to reg diet  - Continue IV Cipro/Flagyl, methylprednisolone; work to optimize medical management  - mIVF  - F/u GI recs; may be candidate for fecal diversion however needs further Crohn's staging first  - Pain meds prn  - Please call with questions          Clark Bridges MD  Colorectal Surgery  [facility]       Pickle ball 3 times per week

## 2022-08-16 NOTE — PROGRESS NOTES
[facility]  Gastroenterology  Progress Note    Patient Name: Nikkie Myles  MRN: [id number]  Admission Date: 2022  Hospital Length of Stay: 3 days    Subjective:     HPI: Nikkie Myles is a 32 y.o. female with history of Crohn's disease (with duodenal, perianal, sigmoidal and rectal involvement), anemia & corneal ulceration who was transferred from [facility] to [facility] for ophthalmology consult for her corneal ulcer. GI has been consulted for management of her Crohn's disease.       Interval History:  - Underwent EUA today with CRS, showed severe perianal disease with deep ulcerations, shallow vaginal ulcer without fistula formation, and small openings in ischiorectal fat concerning for hidradenitis  - No setons placed as no true fistula  - Flex sig done, advanced to mid-sigmoid then encountered formed stool. Distal rectal mucosa was reportedly severely inflamed    Patient had 3 solid, formed stools over past 24h but did note blood in each stool. Denies fevers, chills, join pain. Still having significant rectal pain prior to EUA.    ============================================  IBD history:  - The patient had recurrent ocular infections & erythema nodosum, and was diagnosed with Crohn's disease in 3/2022.   - Started on infliximab and methotrexate by [facility] Dr. Lujan/Yo and CRS Dr. Henson.  - MRI pelvis 2022 showed complex bilateral multiple transsphinteric perianal fistula with internal sphincter components and multiple tiny abscesses along the tract.  - Infliximab infusion history:   - 1st infusion 22   - 2nd infusion 22   - 3rd infusion 22 (completed induction)   - Next infusion due 22 (q8 weeks)     Past surgical history:  has a past surgical history that includes  section, low transverse (2013);  section with tubal ligation (Bilateral, 2020); and  section ().      Endoscopic history:  - EGD (22): Exam  concerning for duodenal Crohn's. Biopsy consistent with duodenal enteritis.   - Colonoscopy (4/21/22)    No current facility-administered medications on file prior to encounter.     Current Outpatient Medications on File Prior to Encounter   Medication Sig Dispense Refill    acetaminophen (TYLENOL) 500 MG tablet Take 2 tablets (1,000 mg total) by mouth every 6 (six) hours as needed. 60 tablet 0    erythromycin (ROMYCIN) ophthalmic ointment Place a 1/2 inch ribbon of ointment into the lower eyelid 2-3 times daily. (Patient not taking: Reported on 3/8/2022) 1 g 0    ibuprofen (ADVIL,MOTRIN) 400 MG tablet Take 1 tablet (400 mg total) by mouth every 6 (six) hours as needed. 20 tablet 0    ondansetron (ZOFRAN-ODT) 4 MG TbDL Take 1 tablet (4 mg total) by mouth every 8 (eight) hours as needed. 20 tablet 0    prednisoLONE acetate (PRED FORTE) 1 % DrpS Place 1 drop into the left eye 3 (three) times daily. 10 mL 4       Review of patient's allergies indicates:  No Known Allergies      Objective:     Vitals:    08/15/22 1721   BP:    Pulse:    Resp: 16   Temp:          Constitutional:       General: She is not in acute distress.     Appearance: Normal appearance.   HENT:      Head: Normocephalic and atraumatic.   Cardiovascular:      Rate and Rhythm: Normal rate and regular rhythm.   Pulmonary:      Effort: Pulmonary effort is normal. No respiratory distress.      Breath sounds: Normal breath sounds.   Abdominal:      General: Abdomen is flat. There is no distension.      Palpations: Abdomen is soft.      Tenderness: There is no abdominal tenderness.   Genitourinary:     Comments: deferred  Neurological:      General: No focal deficit present.      Mental Status: She is alert and oriented to person, place, and time.   Psychiatric:         Behavior: Behavior normal.         Thought Content: Thought content normal.        Significant Labs:  Recent Labs   Lab 08/12/22  0055 08/13/22  0505 08/14/22  0248   HGB 9.1* 8.1* 9.1*        Lab Results   Component Value Date    WBC 7.16 08/14/2022    HGB 9.1 (L) 08/14/2022    HCT 30.9 (L) 08/14/2022    MCV 59 (L) 08/14/2022     08/14/2022       Lab Results   Component Value Date     08/14/2022    K 4.1 08/14/2022     08/14/2022    CO2 23 08/14/2022    BUN 8 08/14/2022    CREATININE 0.7 08/14/2022    CALCIUM 8.8 08/14/2022    ANIONGAP 10 08/14/2022    ESTGFRAFRICA >60 07/29/2020    EGFRNONAA >60 07/29/2020       Lab Results   Component Value Date    ALT 9 (L) 08/14/2022    AST 11 08/14/2022    ALKPHOS 79 08/14/2022    BILITOT 0.3 08/14/2022       Lab Results   Component Value Date    INR 0.9 01/12/2012       Significant Imaging:  Reviewed pertinent radiology findings.       Assessment/Plan:     Nikkie Myles is a 32 y.o. female with Crohn's disease (possible gastroduodenitis, ileal, sigmoid/rectum, perianal/perineal with abscess/fistula, S/P fistulotomy)  transferred from [facility] to [facility] for ophthalmology consult for her corneal ulcer. CRS following and are concerned for severe perianal disease noted on EUA, although no opportunity for seton placement. At this time we want to assess her disease witkh MRE. Last EGD/colonoscopy 4/2022 prior to starting IFX but reports not available though bx c/w H pylori pos gastritis, duodenitis, ileal/sigmoid/rectal and perineal/perianal disease.  Will need serum trough levels prior to upcoming infliximab dose to help evaluate effectiveness. Per notes looks like there were plans to optimize remicade dosing in near future by her [facility] GI/IBD MD    Problem List:     Crohn's disease with duodenal ?, ileum, sigmoid/rectum, perianal fistula with vaginal ulcer and suspect impending RV fistula  Possible hydradenitis supp- perineal    S/p anal fistula repair (7/5/22)  Corneal ulceration with h/o uveitis- opthamology consulted  History of erythema nodosum  Enteropathic arthropathy suspected  H pylori gastritis- not clear if treated     -  CRP (8/12/22): 91.4>61.8  - C diff negative on 8/8  - CT scan abdomen and pelvis: No evidence for small bowel obstructive process. Subtle suggestion of mild wall and fold thickening along the rectosigmoid colon without significant pericolonic stranding however may relate to mild rectosigmoid colitis.  There is no evidence for colonic obstructive change.  There is no evidence for free intraperitoneal air.   - MRI pelvis (8/13/22): Two complex perianal fistulas with branching tracks. Along the bilateral medial gluteal cleft just deep to the cutaneous surface there are multiple serpiginous peripherally enhancing tracts which may have multiple cutaneous exits. Tracts are difficult to follow.  - EUA 8/15 shows severe perianal disease with deep ulcerations, shallow vaginal ulceration without fistula, and some changes in ischiorectal fat c/f hidradenitis    Recommendations:  - Continue solumedrol 20 mg IV q 8 hours   - Obtain MR enterography on 8/16 to help characterize small bowel disease, may help with surgical planning if diverting ostomy becomes necessary  - Will need to obtain 14-week infliximab trough level on 8/18 prior to next infusion due on 8/19- may need to postpone infusion if pt still inpatient  - Will likely plan for EGD/colonoscopy on 8/17 to evaluate disease activity on infliximab though may defer depending on MRE results   - Continue abx per primary team.   - Follow up on stool studies  - Anemia- S/P venofer     Inpatient IBD Recommendations:  -Avoid NSAIDS, for minor pain control Acetaminophen is preferred  -Patient should be placed on DVT ppx appropriately dosed for weight during inpatient stay  -Minimize use of opiate/sedating medications; if necessary, IV morphine is preferred due to short acting nature    Thank you for involving us in the care of Nikkie Myles. Please call with any additional questions, concerns or changes in the patient's clinical status. We will continue to follow.     Brian  MD Sola  Gastroenterology Fellow PGY IV   [facility]

## 2022-08-16 NOTE — PROGRESS NOTES
33yo F w/ who presented to the emergency room here with diarrhea and rectal pain.  She has a history of Crohn's disease.  This was initially diagnosed at [facility] in spring of this year.  At that time she presented with painful anal skin tags and diarrhea.  Prior to this she did note that she had had perianal abscesses treated with antibiotics or aspiration.    She underwent an EUA with Dr. Henson on March 29th, where she was found to have severe perianal and anal canal ulceration as well as superficial fistulas and a large skin tag consistent with Crohn's disease.  She reportedly underwent an EGD and colonoscopy on April 21st, I do not have the procedure reports, but pathology showed moderate gastritis with non necrotizing granulomas, H pylori positive, mild enteritis of the duodenal bulb, chronic inactive colitis of the sigmoid colon, severe proctitis of the rectum.  She was then reportedly started on Remicade and methotrexate.  She says that her perianal symptoms initially did get better with this treatment, however, she was recently tapered to every 8 week dosing.  With this her anal pain became worse again.    MRI pelvis on admission here demonstrated evidence of complex perianal fistulas with branching tracts.  She was taken by me for an exam under anesthesia yesterday which demonstrated severe perianal ulceration, a developing rectovaginal fistula, and large skin tag.  She is currently on her menstrual cycle, is not entirely sure whether she has fecal drainage from the vagina.    /65 (BP Location: Right arm, Patient Position: Lying)   Pulse 67   Temp 97.7 °F (36.5 °C) (Oral)   Resp 16   Ht 5' (1.524 m)   Wt 77.2 kg (170 lb 3.1 oz)   LMP 07/13/2022   SpO2 98%   Breastfeeding No   BMI 33.24 kg/m²   Awake, alert, comfortable  Right eye with evidence of resolving corneal abrasion  Abdomen is soft, nontender, and nondistended    MR enterography yesterday demonstrates mucosal enhancement and wall  thickening of the distal descending and sigmoid colon as well as perianal fistulas.  Terminal ileum and small bowel appear normal.    33yo F w/ Crohn's disease, with severe perianal involvement.  Case discussed with Gastroenterology team, who are in the process of evaluating her.  Discussed with Gastroenterology team and patient that we may need to consider fecal diversion, especially if she has already been optimized on medical treatment.  She does seem open to this idea, but would like to meet with our enterostomal therapist.  We will place the referral for that.  I did tell her that we could tentatively plan for this procedure as early as next week, but that she does not need to remain in the hospital for this.  In the interim would continue ciprofloxacin and Flagyl.  Gastroenterology team tentatively planning for repeat endoscopies this admission.    Zuleyka Yip

## 2022-08-16 NOTE — PLAN OF CARE
Patient in bed awake and alert, currently denies discomfort at present, tolerate scheduled meds well, ambulate to restroom independently, will continue to monitor.    Problem: Adult Inpatient Plan of Care  Goal: Optimal Comfort and Wellbeing  Outcome: Ongoing, Progressing  Intervention: Monitor Pain and Promote Comfort  Flowsheets (Taken 8/16/2022 1823)  Pain Management Interventions:   pillow support provided   position adjusted   medication offered     Problem: Infection  Goal: Absence of Infection Signs and Symptoms  Outcome: Ongoing, Progressing     Problem: Fall Injury Risk  Goal: Absence of Fall and Fall-Related Injury  Outcome: Ongoing, Progressing  Intervention: Identify and Manage Contributors  Flowsheets (Taken 8/16/2022 1823)  Medication Review/Management: medications reviewed

## 2022-08-16 NOTE — ASSESSMENT & PLAN NOTE
31 yo female with hx of Crohn's admitted with perirectal abscess. S/p EUA on 8/15 demonstrating deep anal fissures but no surgical intervention indicated    - Okay to advance to reg diet  - Continue IV Cipro/Flagyl, methylprednisolone; work to optimize medical management  - mIVF  - F/u GI recs; may be candidate for fecal diversion however needs further Crohn's staging first  - Pain meds prn  - Please call with questions

## 2022-08-16 NOTE — PROGRESS NOTES
[facility] Medicine  Progress note    Team: [facility] HOSP MED Z Jen Webster MD  Admit Date: 8/11/2022    Principal Problem:  Crohn's disease with abscess    Interval hx: Patient with significant pain after MRI enterography. It started after she drank the contrast.Eyesight unchanged, eye irritation improved.     ROS   Respiratory: neg for cough neg for shortness of breath  Cardiovascular: neg for chest pain neg for palpitations  Gastrointestinal: neg for nausea neg for vomiting, neg for abdominal pain neg for diarrhea neg for constipation   Behavioral/Psych: neg for depression neg for anxiety    PEx  Temp:  [97.7 °F (36.5 °C)-98.8 °F (37.1 °C)]   Pulse:  [47-71]   Resp:  [16]   BP: ()/(53-76)   SpO2:  [97 %-100 %]     Intake/Output Summary (Last 24 hours) at 8/16/2022 1753  Last data filed at 8/16/2022 0600  Gross per 24 hour   Intake 1375 ml   Output --   Net 1375 ml       General Appearance: no acute distress, WDWN  Heart: regular rate and rhythm, no heave  Respiratory: Normal respiratory effort, symmetric excursion, bilateral vesicular breath sounds   Abdomen: Soft, non-tender; bowel sounds active  Skin: intact, no rash, no ulcers  Neurologic:  No focal numbness or weakness  Mental status: Alert, oriented x 4, affect appropriate     Recent Labs   Lab 08/12/22 0055 08/13/22  0505 08/14/22  0248   WBC 7.92 7.38 7.16   HGB 9.1* 8.1* 9.1*   HCT 29.4* 27.0* 30.9*    335 365     Recent Labs   Lab 08/12/22 0055 08/13/22  0505 08/14/22  0247    136 138   K 3.6 4.0 4.1    104 105   CO2 23 27 23   BUN 8 5* 8   CREATININE 0.7 0.6 0.7   GLU 99 87 145*   CALCIUM 9.1 8.9 8.8   MG  --  1.9 1.9   PHOS  --  3.6 3.7     Recent Labs   Lab 08/12/22 0055 08/13/22  0505 08/14/22  0247   ALKPHOS 67 60 79   ALT 6* 8* 9*   AST 13 14 11   ALBUMIN 3.0* 2.5* 2.7*   PROT 9.5* 8.2 8.8*   BILITOT 0.2 0.1 0.3      Scheduled Meds:   atropine 1%  1 drop Right Eye TID    carboxymethylcellulose sodium  1 drop Ophthalmic QID  "   ciprofloxacin HCl  500 mg Oral Q12H    enoxaparin  40 mg Subcutaneous Daily    erythromycin   Right Eye TID    folic acid  1 mg Oral Daily    Fortified Vancomycin 25 mg/ml Ophth  1 drop Right Eye QID    methylPREDNISolone sodium succinate injection  20 mg Intravenous Q8H    metroNIDAZOLE  500 mg Oral Q8H     Continuous Infusions:    As Needed:  acetaminophen, albuterol-ipratropium, aluminum-magnesium hydroxide-simethicone, melatonin, morphine, naloxone, ondansetron, oxyCODONE, sodium chloride 0.9%    Assessment and Plan  / Problems managed today    * Crohn's disease with abscess  Perirectal fistula    Recent diagnosis March 2022 of duodenal, ileocolonic and perianal Crohn disease on Remicade and MTX doing well up into 2 weeks ago.    - CT showed mild rectosigmoid colitis, and prominent appearance of the region of the lower uterine segment/cervix  - GI consulted  ;  Appreciate recs  - C diff negative, stool leukocytes - positive  - started ceftriaxone and vancomycin  - follow up additional stool studies: fecal calprotectin and stool culture positive  - General surgery evaluated at outside hospital ; per note no drainable fluid collection.   - Infliximab level and antibody pending, next dose plan for 8/19  - Restart MTX when okay GI  - MRI pelvis showed 2 complex perianal fistulas  - MRI enterography showed "mucosal enhancement and wall thickening of the distal descending/sigmoid colon" and one perianal fistula.   -CRS evaluated patient via EUA - extensive disease but no defined fistulas   -candidate for fecal diversion  - STD work up pending  - intermittent methylprednisolone IV per GI - now 20 mg IV q8h  - ID consulted -recommended ceftriaxone and metronidazole for 5 more days  - continue ciprofloxacin and metronidazole for now  - endoscopies per GI    Central corneal ulcer, right  - Solumedrol 125mg IV x 1, now solumedrol 20 mg IV q8h  - Ophthalmology consulted and evaluated patient  ;  Appreciate recs. " Managing eye drops and ointments.      Discharge Planning   ENDY:      Code Status: Full Code   Is the patient medically ready for discharge?: No    Reason for patient still in hospital (select all that apply): Patient trending condition  Discharge Plan A: Home with family        Diet:  regular diet  GI PPx: not needed  DVT PPx:  enoxaparin  Airways: room air  Wounds: none    Goals of Care:  Return to prior functional status      Time (minutes) spent in care of the patient (Greater than 1/2 spent in direct face-to-face contact and care coordination on unit)  35 min    Jen Webster MD

## 2022-08-16 NOTE — ANESTHESIA POSTPROCEDURE EVALUATION
Anesthesia Post Evaluation    Patient: Nikkie Myles    Procedure(s) Performed: Procedure(s) (LRB):  Exam under anesthesia (N/A)  SIGMOIDOSCOPY, FLEXIBLE (N/A)    Final Anesthesia Type: general      Patient location during evaluation: PACU  Patient participation: Yes- Able to Participate  Level of consciousness: awake and alert  Post-procedure vital signs: reviewed and stable  Pain management: adequate  Airway patency: patent    PONV status at discharge: No PONV  Anesthetic complications: no      Cardiovascular status: blood pressure returned to baseline  Respiratory status: unassisted  Hydration status: euvolemic  Follow-up not needed.          Vitals Value Taken Time   /58 08/16/22 0500   Temp 37.1 °C (98.8 °F) 08/16/22 0500   Pulse 47 08/16/22 0500   Resp 16 08/16/22 0610   SpO2 97 % 08/16/22 0500         Event Time   Out of Recovery 08/15/2022 15:45:00         Pain/Giovanni Score: Pain Rating Prior to Med Admin: 6 (8/16/2022  6:10 AM)  Pain Rating Post Med Admin: 3 (8/15/2022  6:00 PM)  Giovanni Score: 10 (8/15/2022  3:45 PM)

## 2022-08-17 PROBLEM — R52 ACUTE PAIN: Status: ACTIVE | Noted: 2022-08-17

## 2022-08-17 LAB — CRP SERPL-MCNC: 11.4 MG/L (ref 0–8.2)

## 2022-08-17 PROCEDURE — 25000003 PHARM REV CODE 250: Performed by: INTERNAL MEDICINE

## 2022-08-17 PROCEDURE — 25000003 PHARM REV CODE 250

## 2022-08-17 PROCEDURE — 25000003 PHARM REV CODE 250: Performed by: NURSE PRACTITIONER

## 2022-08-17 PROCEDURE — 25000003 PHARM REV CODE 250: Performed by: STUDENT IN AN ORGANIZED HEALTH CARE EDUCATION/TRAINING PROGRAM

## 2022-08-17 PROCEDURE — 99233 SBSQ HOSP IP/OBS HIGH 50: CPT | Mod: ,,, | Performed by: INTERNAL MEDICINE

## 2022-08-17 PROCEDURE — 99233 PR SUBSEQUENT HOSPITAL CARE,LEVL III: ICD-10-PCS | Mod: ,,, | Performed by: INTERNAL MEDICINE

## 2022-08-17 PROCEDURE — 63600175 PHARM REV CODE 636 W HCPCS: Performed by: INTERNAL MEDICINE

## 2022-08-17 PROCEDURE — 86140 C-REACTIVE PROTEIN: CPT | Performed by: STUDENT IN AN ORGANIZED HEALTH CARE EDUCATION/TRAINING PROGRAM

## 2022-08-17 PROCEDURE — 63600175 PHARM REV CODE 636 W HCPCS: Performed by: STUDENT IN AN ORGANIZED HEALTH CARE EDUCATION/TRAINING PROGRAM

## 2022-08-17 PROCEDURE — 11000001 HC ACUTE MED/SURG PRIVATE ROOM

## 2022-08-17 PROCEDURE — 94761 N-INVAS EAR/PLS OXIMETRY MLT: CPT

## 2022-08-17 PROCEDURE — 25000003 PHARM REV CODE 250: Performed by: FAMILY MEDICINE

## 2022-08-17 PROCEDURE — 36415 COLL VENOUS BLD VENIPUNCTURE: CPT | Performed by: STUDENT IN AN ORGANIZED HEALTH CARE EDUCATION/TRAINING PROGRAM

## 2022-08-17 PROCEDURE — 63600175 PHARM REV CODE 636 W HCPCS: Performed by: NURSE PRACTITIONER

## 2022-08-17 RX ORDER — TOBRAMYCIN 3 MG/ML
1 SOLUTION/ DROPS OPHTHALMIC 4 TIMES DAILY
Status: DISCONTINUED | OUTPATIENT
Start: 2022-08-17 | End: 2022-08-31

## 2022-08-17 RX ORDER — OXYCODONE HYDROCHLORIDE 10 MG/1
10 TABLET ORAL EVERY 6 HOURS PRN
Status: DISCONTINUED | OUTPATIENT
Start: 2022-08-17 | End: 2022-08-19

## 2022-08-17 RX ORDER — MORPHINE SULFATE 2 MG/ML
5 INJECTION, SOLUTION INTRAMUSCULAR; INTRAVENOUS EVERY 4 HOURS PRN
Status: DISCONTINUED | OUTPATIENT
Start: 2022-08-17 | End: 2022-08-19

## 2022-08-17 RX ORDER — PREDNISOLONE ACETATE 10 MG/ML
1 SUSPENSION/ DROPS OPHTHALMIC 3 TIMES DAILY
Status: DISCONTINUED | OUTPATIENT
Start: 2022-08-17 | End: 2022-08-31

## 2022-08-17 RX ORDER — GABAPENTIN 100 MG/1
100 CAPSULE ORAL 3 TIMES DAILY
Status: DISCONTINUED | OUTPATIENT
Start: 2022-08-17 | End: 2022-08-23

## 2022-08-17 RX ORDER — ACETAMINOPHEN 500 MG
1000 TABLET ORAL 3 TIMES DAILY
Status: DISCONTINUED | OUTPATIENT
Start: 2022-08-17 | End: 2022-09-03 | Stop reason: HOSPADM

## 2022-08-17 RX ORDER — POLYETHYLENE GLYCOL 3350, SODIUM SULFATE ANHYDROUS, SODIUM BICARBONATE, SODIUM CHLORIDE, POTASSIUM CHLORIDE 236; 22.74; 6.74; 5.86; 2.97 G/4L; G/4L; G/4L; G/4L; G/4L
4000 POWDER, FOR SOLUTION ORAL ONCE
Status: COMPLETED | OUTPATIENT
Start: 2022-08-17 | End: 2022-08-17

## 2022-08-17 RX ADMIN — GABAPENTIN 100 MG: 100 CAPSULE ORAL at 09:08

## 2022-08-17 RX ADMIN — CARBOXYMETHYLCELLULOSE SODIUM 1 DROP: 10 GEL OPHTHALMIC at 12:08

## 2022-08-17 RX ADMIN — MORPHINE SULFATE 5 MG: 2 INJECTION, SOLUTION INTRAMUSCULAR; INTRAVENOUS at 02:08

## 2022-08-17 RX ADMIN — CARBOXYMETHYLCELLULOSE SODIUM 1 DROP: 10 GEL OPHTHALMIC at 09:08

## 2022-08-17 RX ADMIN — MORPHINE SULFATE 2 MG: 4 INJECTION INTRAVENOUS at 04:08

## 2022-08-17 RX ADMIN — OXYCODONE 5 MG: 5 TABLET ORAL at 06:08

## 2022-08-17 RX ADMIN — PREDNISOLONE ACETATE 1 DROP: 10 SUSPENSION/ DROPS OPHTHALMIC at 04:08

## 2022-08-17 RX ADMIN — CIPROFLOXACIN 500 MG: 500 TABLET, FILM COATED ORAL at 09:08

## 2022-08-17 RX ADMIN — TOBRAMYCIN 1 DROP: 3 SOLUTION/ DROPS OPHTHALMIC at 04:08

## 2022-08-17 RX ADMIN — METRONIDAZOLE 500 MG: 500 TABLET ORAL at 02:08

## 2022-08-17 RX ADMIN — POLYETHYLENE GLYCOL 3350, SODIUM SULFATE ANHYDROUS, SODIUM BICARBONATE, SODIUM CHLORIDE, POTASSIUM CHLORIDE 4000 ML: 236; 22.74; 6.74; 5.86; 2.97 POWDER, FOR SOLUTION ORAL at 05:08

## 2022-08-17 RX ADMIN — TOBRAMYCIN 1 DROP: 3 SOLUTION/ DROPS OPHTHALMIC at 09:08

## 2022-08-17 RX ADMIN — ACETAMINOPHEN 1000 MG: 500 TABLET ORAL at 10:08

## 2022-08-17 RX ADMIN — METRONIDAZOLE 500 MG: 500 TABLET ORAL at 09:08

## 2022-08-17 RX ADMIN — ACETAMINOPHEN 1000 MG: 500 TABLET ORAL at 09:08

## 2022-08-17 RX ADMIN — METHYLPREDNISOLONE SODIUM SUCCINATE 20 MG: 40 INJECTION, POWDER, FOR SOLUTION INTRAMUSCULAR; INTRAVENOUS at 04:08

## 2022-08-17 RX ADMIN — ENOXAPARIN SODIUM 40 MG: 100 INJECTION SUBCUTANEOUS at 04:08

## 2022-08-17 RX ADMIN — MORPHINE SULFATE 5 MG: 2 INJECTION, SOLUTION INTRAMUSCULAR; INTRAVENOUS at 09:08

## 2022-08-17 RX ADMIN — METHYLPREDNISOLONE SODIUM SUCCINATE 20 MG: 40 INJECTION, POWDER, FOR SOLUTION INTRAMUSCULAR; INTRAVENOUS at 09:08

## 2022-08-17 RX ADMIN — ATROPINE SULFATE 1 DROP: 10 SOLUTION OPHTHALMIC at 09:08

## 2022-08-17 RX ADMIN — GABAPENTIN 100 MG: 100 CAPSULE ORAL at 02:08

## 2022-08-17 RX ADMIN — PREDNISOLONE ACETATE 1 DROP: 10 SUSPENSION/ DROPS OPHTHALMIC at 09:08

## 2022-08-17 RX ADMIN — CARBOXYMETHYLCELLULOSE SODIUM 1 DROP: 10 GEL OPHTHALMIC at 04:08

## 2022-08-17 RX ADMIN — METRONIDAZOLE 500 MG: 500 TABLET ORAL at 06:08

## 2022-08-17 RX ADMIN — ACETAMINOPHEN 1000 MG: 500 TABLET ORAL at 02:08

## 2022-08-17 RX ADMIN — FOLIC ACID 1 MG: 1 TABLET ORAL at 09:08

## 2022-08-17 RX ADMIN — METHYLPREDNISOLONE SODIUM SUCCINATE 20 MG: 40 INJECTION, POWDER, FOR SOLUTION INTRAMUSCULAR; INTRAVENOUS at 12:08

## 2022-08-17 NOTE — PROGRESS NOTES
"Progress Note  Ophthalmology Service    Date: 08/17/2022    Chief complaint: "corneal ulcer transfer"     Interval History:   Patient continues to improve     Current eye gtts: OD only: Fortified Vanc QID, Atropine TID, erythromycin ointment TID, gel ATs QID      Medications this encounter:    acetaminophen  1,000 mg Oral TID    carboxymethylcellulose sodium  1 drop Ophthalmic QID    ciprofloxacin HCl  500 mg Oral Q12H    enoxaparin  40 mg Subcutaneous Daily    erythromycin   Right Eye TID    folic acid  1 mg Oral Daily    gabapentin  100 mg Oral TID    methylPREDNISolone sodium succinate injection  20 mg Intravenous Q8H    metroNIDAZOLE  500 mg Oral Q8H    prednisoLONE acetate  1 drop Left Eye TID    tobramycin sulfate 0.3%  1 drop Right Eye QID       Allergies: has No Known Allergies.     ROS: As per HPI    Ocular examination/Dilated fundus examination:  Base Eye Exam     Visual Acuity (Snellen - Linear)       Right Left    Dist sc HM 20/30          Extraocular Movement       Right Left     Full, Ortho Full, Ortho          Neuro/Psych     Oriented x3: Yes    Mood/Affect: Normal            Slit Lamp and Fundus Exam     External Exam       Right Left    External trace edema Normal          Slit Lamp Exam       Right Left    Lids/Lashes mild Ptosis Normal    Conjunctiva/Sclera 1+ Injection IT, limbal thickening White and quiet    Cornea Central scar/stromal haze with 3+ NV appears overall improved, central epi defect with stromal haze and thinning of central/ST portion, IT epi defect with underlying stromal haze/edema, lindsay negative Whorling epitheliopathy w/ inferior and superior limbal wedge defects    Anterior Chamber Deep and formed with no hypopyon appreciated, jennyfer for cell/flare 2/2 corneal haze Deep and formed    Iris no view Round and reactive    Lens no view Clear    Anterior Vitreous no view Normal                     8/12/22 8/13/22 8/14/22 8/16       " 8/17    Assessment/Plan:     1. Corneal Ulcer without Hypopyon, RIGHT eye   2. 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   - Patient w/ 2wk hx of Crohn's flare and OD pain, photophobia, redness, irritation, and discharge  - Limited VA OD from previous ulcer with baseline at CF @3ft   - OD Exam 8/12/22: VA HM, iop wnl, 2+ injection IT, Epi defect centrally over stromal scar with thinning centrally and 3+ KNV, epi defect IT with underlying stromal haze   - 8/13/22 Update: Pt reports sx are unchanged, OD exam overall stable with  IOP 7, VA HM, mild increase in conj injection, but epi defects and area of central thinning remain stable in size, no hypopyon, lindsay negative  - 8/14/22 Update: Pt reports mild improvement in sx and feels her overall condition is improving as well. OD exam stable with slight decrease in conj injection and no evidence of vitritis on B scan. Otherwise unchanged from previous.   - 8/15/22 update - Pt was in OR for majority of the day so briefly saw patient at the end of the day, still tired from anesthesia. Could not bring patient to cornea clinic, will plan for Wednesday if she is still admitted as she is a patient of Timmy. Patient reports improvement in eye. States she doesn't like the abx drops. Recommended to continue the abx until the cultures finalize.   - 8/16/22 update - Patient continues to improve. Reports vision is at baseline. Will discuss patient with Dr. Warner tomorrow as he is in clinic. Likely will stop antibiotics tomorrow once all cultures result.   - 8/17/22 update - Continues to improve. Timmy okay with stopping fortified abx and transitioning to tobrex QID OD. For changes in left eye, will restart PF TID OS.   - Gram stain without organisms, aerobic cx NGTD, anaerobic pending   - Absolutely no contact lens wear in either eye    Recommendations:   - Oral pain medication as needed  - Start Tobrex QID OD  - Start PF TID OS  - Continue Erythromycin ointment TID OD  -  Continue gel ATs QID OD  - Stop fortified Vancomycin QID OD  - Stop Atropine TID OD  - Continue mgmt of Crohn's flare per GI/primary team. Cornea specialist agrees that steroids will improve the epithelial defect as the ulcer is most likely related to her chrohn's exacerbation       Terri Jaffe MD  [facility], PGY-2  08/17/2022

## 2022-08-17 NOTE — PROGRESS NOTES
[facility]  Gastroenterology  Progress Note    Patient Name: Nikkie Myles  MRN: [id number]  Admission Date: 2022  Hospital Length of Stay: 4 days    Subjective:     HPI: Nikkie Myles is a 32 y.o. female with history of Crohn's disease (with duodenal, perianal, sigmoidal and rectal involvement), anemia & corneal ulceration who was transferred from [facility] to [facility] for ophthalmology consult for her corneal ulcer. GI has been consulted for management of her Crohn's disease.     Interval History:  - Still having severe perianal pain provoked by BMs  - Had one large volume liquid BM this morning, first since EUA yesterday, 3-4 in past 24h  - Reports she has noticed some pneumaturia, otherwise no evidence of rectovaginal fistula formation    ============================================  IBD history:  - The patient had recurrent ocular infections & erythema nodosum, and was diagnosed with Crohn's disease in 3/2022.   - Started on infliximab and methotrexate by [facility] Dr. Lujan/Yo and CRS Dr. Henson.  - MRI pelvis 2022 showed complex bilateral multiple transsphinteric perianal fistula with internal sphincter components and multiple tiny abscesses along the tract.  - Infliximab infusion history:   - 1st infusion 22   - 2nd infusion 22   - 3rd infusion 22 (completed induction)   - Next infusion due 22 (q8 weeks)     Past surgical history:  has a past surgical history that includes  section, low transverse (2013);  section with tubal ligation (Bilateral, 2020); and  section ().      Endoscopic history:  - EGD (22): Exam concerning for duodenal Crohn's. Biopsy consistent with duodenal enteritis.   - Colonoscopy (22)    No current facility-administered medications on file prior to encounter.     Current Outpatient Medications on File Prior to Encounter   Medication Sig Dispense Refill    acetaminophen  (TYLENOL) 500 MG tablet Take 2 tablets (1,000 mg total) by mouth every 6 (six) hours as needed. 60 tablet 0    erythromycin (ROMYCIN) ophthalmic ointment Place a 1/2 inch ribbon of ointment into the lower eyelid 2-3 times daily. (Patient not taking: Reported on 3/8/2022) 1 g 0    ibuprofen (ADVIL,MOTRIN) 400 MG tablet Take 1 tablet (400 mg total) by mouth every 6 (six) hours as needed. 20 tablet 0    ondansetron (ZOFRAN-ODT) 4 MG TbDL Take 1 tablet (4 mg total) by mouth every 8 (eight) hours as needed. 20 tablet 0    prednisoLONE acetate (PRED FORTE) 1 % DrpS Place 1 drop into the left eye 3 (three) times daily. 10 mL 4       Review of patient's allergies indicates:  No Known Allergies      Objective:     Vitals:    08/16/22 1603   BP:    Pulse:    Resp: 16   Temp:      Constitutional:       General: She is not in acute distress.     Appearance: Normal appearance.   HENT:      Head: Normocephalic and atraumatic.   Cardiovascular:      Rate and Rhythm: Normal rate and regular rhythm.   Pulmonary:      Effort: Pulmonary effort is normal. No respiratory distress.      Breath sounds: Normal breath sounds.   Abdominal:      General: Abdomen is flat. There is no distension.      Palpations: Abdomen is soft.      Tenderness: There is no abdominal tenderness.   Genitourinary:     Comments: deferred  Neurological:      General: No focal deficit present.      Mental Status: She is alert and oriented to person, place, and time.   Psychiatric:         Behavior: Behavior normal.         Thought Content: Thought content normal.     Significant Labs:  Recent Labs   Lab 08/12/22  0055 08/13/22  0505 08/14/22  0248   HGB 9.1* 8.1* 9.1*       Lab Results   Component Value Date    WBC 7.16 08/14/2022    HGB 9.1 (L) 08/14/2022    HCT 30.9 (L) 08/14/2022    MCV 59 (L) 08/14/2022     08/14/2022       Lab Results   Component Value Date     08/14/2022    K 4.1 08/14/2022     08/14/2022    CO2 23 08/14/2022    BUN 8  08/14/2022    CREATININE 0.7 08/14/2022    CALCIUM 8.8 08/14/2022    ANIONGAP 10 08/14/2022    ESTGFRAFRICA >60 07/29/2020    EGFRNONAA >60 07/29/2020       Lab Results   Component Value Date    ALT 9 (L) 08/14/2022    AST 11 08/14/2022    ALKPHOS 79 08/14/2022    BILITOT 0.3 08/14/2022       Lab Results   Component Value Date    INR 0.9 01/12/2012     Significant Imaging:  Reviewed pertinent radiology findings.     Assessment/Plan:     Nikkie Myles is a 32 y.o. female with Crohn's disease (possible gastroduodenitis, ileal, sigmoid/rectum, perianal/perineal with abscess/fistula, S/P fistulotomy)  transferred from [facility] to [facility] for ophthalmology consult for her corneal ulcer. CRS following and are concerned for severe perianal/perineal disease noted on EUA, although no opportunity for seton placement. MRE shows disease activity in distal colon. Last EGD/colonoscopy 4/2022 prior to starting IFX but reports not available though bx c/w H pylori pos gastritis, duodenitis, ileal/sigmoid/rectal and perineal/perianal disease.  Per notes looks like there were plans to optimize remicade dosing in near future by her [facility] GI/IBD MD. Has 13 week IFX in process and will get 14 week levels if needed.  Given ongoing severe symptomatic perianal disease, will plan on EGD/colon to evaluate disease activity and to help with surgical planning for possible DLI.      Problem List:   Crohn's disease with duodenal ?, ileum, sigmoid/rectum, perianal fistula with vaginal ulcer and suspect impending RV fistula  Possible hydradenitis supp- perineal   S/P anal fistula repair (7/5/22)  Eye issues- corneal ulceration with h/o MRSA corneal ulcer and h/o uveitis?  History of erythema nodosum  Enteropathic arthropathy suspected  H pylori gastritis- not clear if treated     - CRP (8/12/22): 91.4>61.8  - C diff negative on 8/8  - CT scan abdomen and pelvis: No evidence for small bowel obstructive process. Subtle suggestion of mild  wall and fold thickening along the rectosigmoid colon without significant pericolonic stranding however may relate to mild rectosigmoid colitis.  There is no evidence for colonic obstructive change.  There is no evidence for free intraperitoneal air.   - MRI pelvis (8/13/22): Two complex perianal fistulas with branching tracks. Along the bilateral medial gluteal cleft just deep to the cutaneous surface there are multiple serpiginous peripherally enhancing tracts which may have multiple cutaneous exits. Tracts are difficult to follow.  - EUA 8/15 shows severe perianal disease with deep ulcerations, shallow vaginal ulceration without fistula, and some changes in ischiorectal fat c/f hidradenitis  -MRE shows mucosal enhancement and wall thickening of the distal descending/sigmoid colon in this patient with Crohn's disease, as above, likely representing chronic inflammatory process.  No definitive abscess collection. Persistent perianal fistula as seen on MRI pelvis 8/13/22. Pt does have some passage of air in vagina suspicious for early RV fistula    Recommendations:  - Continue solumedrol 20 mg IV q 8 hours   - Check CRP in morning (ordered), may consider transition to prednisone 30 mg po BID if improving  - Will need to obtain 14-week infliximab trough level on 8/18 prior to next infusion due on 8/19- may need to postpone infusion if pt still inpatient  - Has infliximab level pending from 8/12, expect result on 8/19  - Will plan for EGD/colonoscopy on Thursday 8/18 to evaluate disease activity on infliximab. Start clear liquid diet tomorrow 8/17.  - Continue abx per primary team: cipro/flagyl  - optho following for corneal ulcer  - Follow up on stool studies  - Anemia- S/P venofer in past (8/14- Hgb 9.1)  - Will plan for repeat H. Pylori biopsy during EGD (positive in April, unclear if treated)  - On DVT ppx: lovenox    Thank you for involving us in the care of Nikkie Myles. Please call with any additional  questions, concerns or changes in the patient's clinical status. We will continue to follow.     Brian Selby MD  Gastroenterology Fellow PGY IV   [facility]

## 2022-08-17 NOTE — PROGRESS NOTES
[facility] Medicine  Progress note    Team: [facility]OS[facility] MED Z Jen Webster MD  Admit Date: 8/11/2022    Principal Problem:  Crohn's disease with abscess    Interval hx:Patient still with signficant perineal pain. Okay with   ROS   Respiratory: neg for cough neg for shortness of breath  Cardiovascular: neg for chest pain neg for palpitations  Gastrointestinal: neg for nausea neg for vomiting, neg for abdominal pain neg for diarrhea neg for constipation   Behavioral/Psych: neg for depression neg for anxiety    PEx  Temp:  [97.3 °F (36.3 °C)-98 °F (36.7 °C)]   Pulse:  [47-67]   Resp:  [1-20]   BP: (111-124)/(64-75)   SpO2:  [97 %-99 %]   No intake or output data in the 24 hours ending 08/17/22 0859    General Appearance: no acute distress, WDWN  Heart: regular rate and rhythm, no heave  Respiratory: Normal respiratory effort, symmetric excursion, bilateral vesicular breath sounds   Abdomen: Soft, non-tender; bowel sounds active  Skin: intact, no rash, no ulcers  Neurologic:  No focal numbness or weakness  Mental status: Alert, oriented x 4, affect appropriate     Recent Labs   Lab 08/12/22 0055 08/13/22  0505 08/14/22  0248   WBC 7.92 7.38 7.16   HGB 9.1* 8.1* 9.1*   HCT 29.4* 27.0* 30.9*    335 365     Recent Labs   Lab 08/12/22 0055 08/13/22  0505 08/14/22  0247    136 138   K 3.6 4.0 4.1    104 105   CO2 23 27 23   BUN 8 5* 8   CREATININE 0.7 0.6 0.7   GLU 99 87 145*   CALCIUM 9.1 8.9 8.8   MG  --  1.9 1.9   PHOS  --  3.6 3.7     Recent Labs   Lab 08/12/22 0055 08/13/22  0505 08/14/22  0247   ALKPHOS 67 60 79   ALT 6* 8* 9*   AST 13 14 11   ALBUMIN 3.0* 2.5* 2.7*   PROT 9.5* 8.2 8.8*   BILITOT 0.2 0.1 0.3      Scheduled Meds:   acetaminophen  1,000 mg Oral TID    atropine 1%  1 drop Right Eye TID    carboxymethylcellulose sodium  1 drop Ophthalmic QID    ciprofloxacin HCl  500 mg Oral Q12H    enoxaparin  40 mg Subcutaneous Daily    erythromycin   Right Eye TID    folic acid  1 mg Oral Daily  "   Fortified Vancomycin 25 mg/ml Ophth  1 drop Right Eye QID    gabapentin  100 mg Oral TID    methylPREDNISolone sodium succinate injection  20 mg Intravenous Q8H    metroNIDAZOLE  500 mg Oral Q8H     Continuous Infusions:    As Needed:  acetaminophen, albuterol-ipratropium, aluminum-magnesium hydroxide-simethicone, melatonin, morphine, naloxone, ondansetron, oxyCODONE, sodium chloride 0.9%    Assessment and Plan  / Problems managed today    * Crohn's disease with abscess  Perirectal fistula    Recent diagnosis March 2022 of duodenal, ileocolonic and perianal Crohn disease on Remicade and MTX doing well up into 2 weeks ago.    - CT showed mild rectosigmoid colitis, and prominent appearance of the region of the lower uterine segment/cervix  - GI consulted  ;  Appreciate recs  - C diff negative, stool leukocytes - positive  - started ceftriaxone and vancomycin  - follow up additional stool studies: fecal calprotectin and stool culture positive  - General surgery evaluated at outside hospital ; per note no drainable fluid collection.   - Infliximab level and antibody pending, next dose plan for 8/19  - Restart MTX when okay GI  - MRI pelvis showed 2 complex perianal fistulas  - MRI enterography showed "mucosal enhancement and wall thickening of the distal descending/sigmoid colon" and one perianal fistula.   -CRS evaluated patient via EUA - extensive disease but no defined fistulas   -candidate for fecal diversion  - STD work up pending  - intermittent methylprednisolone IV per GI - now 20 mg IV q8h  - ID consulted -recommended ceftriaxone and metronidazole for 5 more days  - continue ciprofloxacin and metronidazole for now  - colonoscopy per GI to assess for ideal placement of ostomy. Clear liquid diet now    Acute pain  Perianal pain in passage of stool  Multimodal approach  Acetaminophen 1 g TID and gabapentin 100 mg TID  Morphine 5 mg IV q4h prn  oxycodone 10 mg q4h prn  Need judicious use of opioids in IBD, " but NSAID is also contraindicated!    Central corneal ulcer, right  - Solumedrol 125mg IV x 1, now solumedrol 20 mg IV q8h  - Ophthalmology consulted and evaluated patient  ;  Appreciate recs. Managing eye drops and ointments.      Discharge Planning   ENDY:      Code Status: Full Code   Is the patient medically ready for discharge?: No    Reason for patient still in hospital (select all that apply): Patient trending condition  Discharge Plan A: Home with family        Diet:  regular diet  GI PPx: not needed  DVT PPx:  enoxaparin  Airways: room air  Wounds: none    Goals of Care:  Return to prior functional status      Time (minutes) spent in care of the patient (Greater than 1/2 spent in direct face-to-face contact and care coordination on unit)  35 min    Jen Webster MD

## 2022-08-17 NOTE — PROGRESS NOTES
[facility]  Gastroenterology  Progress Note    Patient Name: Nikkie Myles  MRN: [id number]  Admission Date: 2022  Hospital Length of Stay: 5 days    Subjective:     HPI: Nikkie Myles is a 32 y.o. female with history of Crohn's disease (with duodenal, perianal, sigmoidal and rectal involvement), anemia & corneal ulceration who was transferred from [facility] to [facility] for ophthalmology consult for her corneal ulcer. GI has been consulted for management of her Crohn's disease.     Interval History:  - Still having severe perianal pain provoked by BMs  - R eye looks and feels better  - 3 BMs past 24h, 2 liquid, one soft, no blood  - CRP downtrending    ============================================  IBD history:  - The patient had recurrent ocular infections & erythema nodosum, and was diagnosed with Crohn's disease in 3/2022.   - Started on infliximab and methotrexate by [facility] Dr. Lujan/Yo and CRS Dr. Henson.  - MRI pelvis 2022 showed complex bilateral multiple transsphinteric perianal fistula with internal sphincter components and multiple tiny abscesses along the tract.  - Infliximab infusion history:   - 1st infusion 22   - 2nd infusion 22   - 3rd infusion 22 (completed induction)   - Next infusion due 22 (q8 weeks)     Past surgical history:  has a past surgical history that includes  section, low transverse (2013);  section with tubal ligation (Bilateral, 2020); and  section ().      Endoscopic history:  - EGD (22): Exam concerning for duodenal Crohn's. Biopsy consistent with duodenal enteritis.   - Colonoscopy (22)    No current facility-administered medications on file prior to encounter.     Current Outpatient Medications on File Prior to Encounter   Medication Sig Dispense Refill    acetaminophen (TYLENOL) 500 MG tablet Take 2 tablets (1,000 mg total) by mouth every 6 (six) hours as needed.  60 tablet 0    erythromycin (ROMYCIN) ophthalmic ointment Place a 1/2 inch ribbon of ointment into the lower eyelid 2-3 times daily. (Patient not taking: Reported on 3/8/2022) 1 g 0    ibuprofen (ADVIL,MOTRIN) 400 MG tablet Take 1 tablet (400 mg total) by mouth every 6 (six) hours as needed. 20 tablet 0    ondansetron (ZOFRAN-ODT) 4 MG TbDL Take 1 tablet (4 mg total) by mouth every 8 (eight) hours as needed. 20 tablet 0    prednisoLONE acetate (PRED FORTE) 1 % DrpS Place 1 drop into the left eye 3 (three) times daily. 10 mL 4       Review of patient's allergies indicates:  No Known Allergies      Objective:     Vitals:    08/17/22 1609   BP: 127/78   Pulse: (!) 49   Resp: 16   Temp: 97.5 °F (36.4 °C)     Constitutional:       General: She is not in acute distress.     Appearance: Normal appearance.   HENT:      Head: Normocephalic and atraumatic.   Cardiovascular:      Rate and Rhythm: Normal rate and regular rhythm.   Pulmonary:      Effort: Pulmonary effort is normal. No respiratory distress.      Breath sounds: Normal breath sounds.   Abdominal:      General: Abdomen is flat. There is no distension.      Palpations: Abdomen is soft.      Tenderness: There is no abdominal tenderness.   Genitourinary:     Comments: deferred  Neurological:      General: No focal deficit present.      Mental Status: She is alert and oriented to person, place, and time.   Psychiatric:         Behavior: Behavior normal.         Thought Content: Thought content normal.     Significant Labs:  Recent Labs   Lab 08/12/22  0055 08/13/22  0505 08/14/22  0248   HGB 9.1* 8.1* 9.1*       Lab Results   Component Value Date    WBC 7.16 08/14/2022    HGB 9.1 (L) 08/14/2022    HCT 30.9 (L) 08/14/2022    MCV 59 (L) 08/14/2022     08/14/2022       Lab Results   Component Value Date     08/14/2022    K 4.1 08/14/2022     08/14/2022    CO2 23 08/14/2022    BUN 8 08/14/2022    CREATININE 0.7 08/14/2022    CALCIUM 8.8 08/14/2022     ANIONGAP 10 08/14/2022    ESTGFRAFRICA >60 07/29/2020    EGFRNONAA >60 07/29/2020       Lab Results   Component Value Date    ALT 9 (L) 08/14/2022    AST 11 08/14/2022    ALKPHOS 79 08/14/2022    BILITOT 0.3 08/14/2022       Lab Results   Component Value Date    INR 0.9 01/12/2012     Significant Imaging:  Reviewed pertinent radiology findings.     Assessment/Plan:     Nikkie Myles is a 32 y.o. female with Crohn's disease (possible gastroduodenitis, ileal, sigmoid/rectum, perianal/perineal with abscess/fistula, S/P fistulotomy)  transferred from [facility] to [facility] for ophthalmology consult for her corneal ulcer. CRS following and are concerned for severe perianal/perineal disease noted on EUA, although no opportunity for seton placement. MRE shows disease activity in distal colon. Last EGD/colonoscopy 4/2022 prior to starting IFX but reports not available though bx c/w H pylori pos gastritis, duodenitis, ileal/sigmoid/rectal and perineal/perianal disease.  Per notes looks like there were plans to optimize remicade dosing in near future by her [facility] GI/IBD MD. Has 13 week IFX in process and will get 14 week levels if needed. Will plan on EGD/colon to evaluate disease activity. Given severe perianal disease, planning DLI on 8/23.    Problem List:  1.  Crohn's disease with duodenal ?, ileum, sigmoid/rectum, perianal fistula with vaginal ulcer and suspect impending RV fistula  2. Possible hydradenitis supp- perineal   3. S/P anal fistula repair (7/5/22)  4. 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  5. History of erythema nodosum  6. 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 H pylori gastritis- not clear if treated     - CRP (8/12/22): 91.4>61.8>11.4  - C diff negative on 8/8  - CT scan abdomen and pelvis: No evidence for small bowel obstructive process. Subtle suggestion of mild wall and fold thickening along the rectosigmoid colon without significant pericolonic stranding  however may relate to mild rectosigmoid colitis.  There is no evidence for colonic obstructive change.  There is no evidence for free intraperitoneal air.   - MRI pelvis (8/13/22): Two complex perianal fistulas with branching tracks. Along the bilateral medial gluteal cleft just deep to the cutaneous surface there are multiple serpiginous peripherally enhancing tracts which may have multiple cutaneous exits. Tracts are difficult to follow.  - EUA 8/15 shows severe perianal disease with deep ulcerations, shallow vaginal ulceration without fistula, and some changes in ischiorectal fat c/f hidradenitis  -MRE shows mucosal enhancement and wall thickening of the distal descending/sigmoid colon in this patient with Crohn's disease, as above, likely representing chronic inflammatory process.  No definitive abscess collection. Persistent perianal fistula as seen on MRI pelvis 8/13/22. Pt does have some passage of air in vagina suspicious for early RV fistula    Recommendations:  - Decreased solumedrol to 25 mg IV q12h. CRP downtrending.  - Ordered 14-week infliximab trough level to be drawn 8/18   - With upcoming surgery, postpone next infliximab infusion (originally planned 8/19)  - Has infliximab level pending from 8/12, expect result on 8/19  - Will plan for EGD/colonoscopy on Thursday 8/18 to evaluate disease activity on infliximab. Prep ordered, NPO at midnight.  - Continue abx per primary team: cipro/flagyl  - optho following for corneal ulcer  - Follow up on stool studies  - Anemia- S/P venofer in past (8/14- Hgb 9.1)  - Will plan for repeat H. Pylori biopsy during EGD (positive in April, unclear if treated)  - On DVT ppx: lovenox    Thank you for involving us in the care of Nikkie Myles. Please call with any additional questions, concerns or changes in the patient's clinical status. We will continue to follow.     Brian Selby MD  Gastroenterology Fellow PGY IV   [facility]

## 2022-08-17 NOTE — PROGRESS NOTES
08/17/22 1530   WOCN Assessment   WOCN Total Time (mins) 10   Visit Date 08/17/22   Visit Time 1530   Consult Type New   WOCN Speciality Wound   WOCN List ileostomy   Intervention assessed;chart review;coordination of care;orders;applied;changed   Marked yes   Patient wound care consult for ileostomy site. The patient was assessed lying, sitting, and standing. The patient rectus muscles were palpated. With agreement with the patient she was marked the right upper and right lower quadrant. The sites were covered by Tegaderm dressing to maintain michelle.

## 2022-08-17 NOTE — ASSESSMENT & PLAN NOTE
Perianal pain in passage of stool  Multimodal approach  Acetaminophen 1 g TID and gabapentin 100 mg TID  Morphine 5 mg IV q4h prn  oxycodone 10 mg q4h prn  Need judicious use of opioids in IBD, but NSAID is also contraindicated!

## 2022-08-18 ENCOUNTER — ANESTHESIA EVENT (OUTPATIENT)
Dept: ENDOSCOPY | Facility: HOSPITAL | Age: 32
DRG: 331 | End: 2022-08-18
Payer: MEDICAID

## 2022-08-18 ENCOUNTER — ANESTHESIA (OUTPATIENT)
Dept: ENDOSCOPY | Facility: HOSPITAL | Age: 32
DRG: 331 | End: 2022-08-18
Payer: MEDICAID

## 2022-08-18 PROCEDURE — 99233 PR SUBSEQUENT HOSPITAL CARE,LEVL III: ICD-10-PCS | Mod: 25,,, | Performed by: INTERNAL MEDICINE

## 2022-08-18 PROCEDURE — 63600175 PHARM REV CODE 636 W HCPCS: Performed by: REGISTERED NURSE

## 2022-08-18 PROCEDURE — 88305 TISSUE EXAM BY PATHOLOGIST: CPT | Performed by: PATHOLOGY

## 2022-08-18 PROCEDURE — 25000003 PHARM REV CODE 250: Performed by: REGISTERED NURSE

## 2022-08-18 PROCEDURE — 82397 CHEMILUMINESCENT ASSAY: CPT | Performed by: STUDENT IN AN ORGANIZED HEALTH CARE EDUCATION/TRAINING PROGRAM

## 2022-08-18 PROCEDURE — 99233 SBSQ HOSP IP/OBS HIGH 50: CPT | Mod: 25,,, | Performed by: INTERNAL MEDICINE

## 2022-08-18 PROCEDURE — D9220A PRA ANESTHESIA: ICD-10-PCS | Mod: ANES,,, | Performed by: ANESTHESIOLOGY

## 2022-08-18 PROCEDURE — 63600175 PHARM REV CODE 636 W HCPCS: Performed by: INTERNAL MEDICINE

## 2022-08-18 PROCEDURE — 11000001 HC ACUTE MED/SURG PRIVATE ROOM

## 2022-08-18 PROCEDURE — D9220A PRA ANESTHESIA: Mod: ANES,,, | Performed by: ANESTHESIOLOGY

## 2022-08-18 PROCEDURE — 25000003 PHARM REV CODE 250

## 2022-08-18 PROCEDURE — 45380 COLONOSCOPY AND BIOPSY: CPT | Mod: ,,, | Performed by: INTERNAL MEDICINE

## 2022-08-18 PROCEDURE — D9220A PRA ANESTHESIA: ICD-10-PCS | Mod: CRNA,,, | Performed by: REGISTERED NURSE

## 2022-08-18 PROCEDURE — 36415 COLL VENOUS BLD VENIPUNCTURE: CPT | Performed by: STUDENT IN AN ORGANIZED HEALTH CARE EDUCATION/TRAINING PROGRAM

## 2022-08-18 PROCEDURE — 99232 SBSQ HOSP IP/OBS MODERATE 35: CPT | Mod: ,,, | Performed by: STUDENT IN AN ORGANIZED HEALTH CARE EDUCATION/TRAINING PROGRAM

## 2022-08-18 PROCEDURE — D9220A PRA ANESTHESIA: Mod: CRNA,,, | Performed by: REGISTERED NURSE

## 2022-08-18 PROCEDURE — 37000008 HC ANESTHESIA 1ST 15 MINUTES: Performed by: INTERNAL MEDICINE

## 2022-08-18 PROCEDURE — 99232 PR SUBSEQUENT HOSPITAL CARE,LEVL II: ICD-10-PCS | Mod: ,,, | Performed by: STUDENT IN AN ORGANIZED HEALTH CARE EDUCATION/TRAINING PROGRAM

## 2022-08-18 PROCEDURE — 45380 PR COLONOSCOPY,BIOPSY: ICD-10-PCS | Mod: ,,, | Performed by: INTERNAL MEDICINE

## 2022-08-18 PROCEDURE — 43239 EGD BIOPSY SINGLE/MULTIPLE: CPT | Performed by: INTERNAL MEDICINE

## 2022-08-18 PROCEDURE — 88342 IMHCHEM/IMCYTCHM 1ST ANTB: CPT | Mod: 26,,, | Performed by: PATHOLOGY

## 2022-08-18 PROCEDURE — 88342 CHG IMMUNOCYTOCHEMISTRY: ICD-10-PCS | Mod: 26,,, | Performed by: PATHOLOGY

## 2022-08-18 PROCEDURE — 63600175 PHARM REV CODE 636 W HCPCS: Performed by: STUDENT IN AN ORGANIZED HEALTH CARE EDUCATION/TRAINING PROGRAM

## 2022-08-18 PROCEDURE — 88305 TISSUE EXAM BY PATHOLOGIST: ICD-10-PCS | Mod: 26,,, | Performed by: PATHOLOGY

## 2022-08-18 PROCEDURE — 88305 TISSUE EXAM BY PATHOLOGIST: CPT | Mod: 26,,, | Performed by: PATHOLOGY

## 2022-08-18 PROCEDURE — 27201012 HC FORCEPS, HOT/COLD, DISP: Performed by: INTERNAL MEDICINE

## 2022-08-18 PROCEDURE — 25000003 PHARM REV CODE 250: Performed by: INTERNAL MEDICINE

## 2022-08-18 PROCEDURE — 88342 IMHCHEM/IMCYTCHM 1ST ANTB: CPT | Performed by: PATHOLOGY

## 2022-08-18 PROCEDURE — 37000009 HC ANESTHESIA EA ADD 15 MINS: Performed by: INTERNAL MEDICINE

## 2022-08-18 PROCEDURE — 94761 N-INVAS EAR/PLS OXIMETRY MLT: CPT

## 2022-08-18 PROCEDURE — 43239 EGD BIOPSY SINGLE/MULTIPLE: CPT | Mod: 51,,, | Performed by: INTERNAL MEDICINE

## 2022-08-18 PROCEDURE — 43239 PR EGD, FLEX, W/BIOPSY, SGL/MULTI: ICD-10-PCS | Mod: 51,,, | Performed by: INTERNAL MEDICINE

## 2022-08-18 PROCEDURE — 25000003 PHARM REV CODE 250: Performed by: NURSE PRACTITIONER

## 2022-08-18 PROCEDURE — 45380 COLONOSCOPY AND BIOPSY: CPT | Performed by: INTERNAL MEDICINE

## 2022-08-18 RX ORDER — SODIUM CHLORIDE 0.9 % (FLUSH) 0.9 %
10 SYRINGE (ML) INJECTION
Status: DISCONTINUED | OUTPATIENT
Start: 2022-08-18 | End: 2022-08-18 | Stop reason: HOSPADM

## 2022-08-18 RX ORDER — PROPOFOL 10 MG/ML
VIAL (ML) INTRAVENOUS CONTINUOUS PRN
Status: DISCONTINUED | OUTPATIENT
Start: 2022-08-18 | End: 2022-08-18

## 2022-08-18 RX ORDER — PROPOFOL 10 MG/ML
VIAL (ML) INTRAVENOUS
Status: DISCONTINUED | OUTPATIENT
Start: 2022-08-18 | End: 2022-08-18

## 2022-08-18 RX ORDER — LIDOCAINE HYDROCHLORIDE 10 MG/ML
INJECTION, SOLUTION INTRAVENOUS
Status: DISCONTINUED | OUTPATIENT
Start: 2022-08-18 | End: 2022-08-18

## 2022-08-18 RX ORDER — DEXMEDETOMIDINE HYDROCHLORIDE 100 UG/ML
INJECTION, SOLUTION INTRAVENOUS
Status: DISCONTINUED | OUTPATIENT
Start: 2022-08-18 | End: 2022-08-18

## 2022-08-18 RX ADMIN — ACETAMINOPHEN 1000 MG: 500 TABLET ORAL at 09:08

## 2022-08-18 RX ADMIN — ENOXAPARIN SODIUM 40 MG: 100 INJECTION SUBCUTANEOUS at 04:08

## 2022-08-18 RX ADMIN — GABAPENTIN 100 MG: 100 CAPSULE ORAL at 09:08

## 2022-08-18 RX ADMIN — METHYLPREDNISOLONE SODIUM SUCCINATE 25 MG: 40 INJECTION, POWDER, FOR SOLUTION INTRAMUSCULAR; INTRAVENOUS at 04:08

## 2022-08-18 RX ADMIN — CARBOXYMETHYLCELLULOSE SODIUM 1 DROP: 10 GEL OPHTHALMIC at 04:08

## 2022-08-18 RX ADMIN — CARBOXYMETHYLCELLULOSE SODIUM 1 DROP: 10 GEL OPHTHALMIC at 09:08

## 2022-08-18 RX ADMIN — Medication 90 MG: at 12:08

## 2022-08-18 RX ADMIN — Medication 50 MG: at 12:08

## 2022-08-18 RX ADMIN — PROPOFOL 150 MCG/KG/MIN: 10 INJECTION, EMULSION INTRAVENOUS at 12:08

## 2022-08-18 RX ADMIN — GLYCOPYRROLATE 0.2 MG: 0.2 INJECTION, SOLUTION INTRAMUSCULAR; INTRAVITREAL at 12:08

## 2022-08-18 RX ADMIN — DEXMEDETOMIDINE HYDROCHLORIDE 4 MCG: 100 INJECTION, SOLUTION, CONCENTRATE INTRAVENOUS at 12:08

## 2022-08-18 RX ADMIN — TOBRAMYCIN 1 DROP: 3 SOLUTION/ DROPS OPHTHALMIC at 09:08

## 2022-08-18 RX ADMIN — METRONIDAZOLE 500 MG: 500 TABLET ORAL at 06:08

## 2022-08-18 RX ADMIN — CIPROFLOXACIN 500 MG: 500 TABLET, FILM COATED ORAL at 09:08

## 2022-08-18 RX ADMIN — LIDOCAINE HYDROCHLORIDE 30 MG: 10 INJECTION, SOLUTION INTRAVENOUS at 12:08

## 2022-08-18 RX ADMIN — METRONIDAZOLE 500 MG: 500 TABLET ORAL at 09:08

## 2022-08-18 RX ADMIN — PREDNISOLONE ACETATE 1 DROP: 10 SUSPENSION/ DROPS OPHTHALMIC at 09:08

## 2022-08-18 RX ADMIN — OXYCODONE HYDROCHLORIDE 10 MG: 10 TABLET ORAL at 02:08

## 2022-08-18 RX ADMIN — MORPHINE SULFATE 5 MG: 2 INJECTION, SOLUTION INTRAMUSCULAR; INTRAVENOUS at 09:08

## 2022-08-18 RX ADMIN — PREDNISOLONE ACETATE 1 DROP: 10 SUSPENSION/ DROPS OPHTHALMIC at 02:08

## 2022-08-18 RX ADMIN — METRONIDAZOLE 500 MG: 500 TABLET ORAL at 02:08

## 2022-08-18 RX ADMIN — TOBRAMYCIN 1 DROP: 3 SOLUTION/ DROPS OPHTHALMIC at 04:08

## 2022-08-18 RX ADMIN — MORPHINE SULFATE 5 MG: 2 INJECTION, SOLUTION INTRAMUSCULAR; INTRAVENOUS at 02:08

## 2022-08-18 RX ADMIN — CARBOXYMETHYLCELLULOSE SODIUM 1 DROP: 10 GEL OPHTHALMIC at 02:08

## 2022-08-18 RX ADMIN — SODIUM CHLORIDE: 9 INJECTION, SOLUTION INTRAVENOUS at 12:08

## 2022-08-18 RX ADMIN — GABAPENTIN 100 MG: 100 CAPSULE ORAL at 02:08

## 2022-08-18 RX ADMIN — FOLIC ACID 1 MG: 1 TABLET ORAL at 09:08

## 2022-08-18 RX ADMIN — TOBRAMYCIN 1 DROP: 3 SOLUTION/ DROPS OPHTHALMIC at 02:08

## 2022-08-18 RX ADMIN — ACETAMINOPHEN 1000 MG: 500 TABLET ORAL at 02:08

## 2022-08-18 RX ADMIN — OXYCODONE HYDROCHLORIDE 10 MG: 10 TABLET ORAL at 09:08

## 2022-08-18 NOTE — ANESTHESIA PREPROCEDURE EVALUATION
08/18/2022  Pre-operative evaluation for Procedure(s) (LRB):  Exam under anesthesia (N/A)  SIGMOIDOSCOPY, FLEXIBLE (N/A)    Nikkie Myles is a 32 y.o. female     Nikkie Myles is a 32 y.o. female with Crohn's disease (possible gastroduodenitis, ileal, sigmoid/rectum, perianal/perineal with abscess/fistula, S/P fistulotomy)  transferred from [facility] for ophthalmology consult for her corneal ulcer. CRS following and are concerned for severe perianal/perineal disease noted on EUA, although no opportunity for seton placement. MRE shows disease activity in distal colon. Last EGD/colonoscopy 4/2022 prior to starting IFX but reports not available though bx c/w H pylori pos gastritis, duodenitis, ileal/sigmoid/rectal and perineal/perianal disease.  Per notes looks like there were plans to optimize remicade dosing in near future by her [facility] GI/IBD MD. Has 13 week IFX in process and will get 14 week levels if needed. Will plan on EGD/colon to evaluate disease activity. Given severe perianal disease, planning DLI on 8/23.     Problem List:  1.  Crohn's disease with duodenal ?, ileum, sigmoid/rectum, perianal fistula with vaginal ulcer and suspect impending RV fistula  2. Possible hydradenitis supp- perineal   3. S/P anal fistula repair (7/5/22)  4. 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  5. History of erythema nodosum  6. 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 H pylori gastritis- not clear if treated      - CRP (8/12/22): 91.4>61.8>11.4  - C diff negative on 8/8  - CT scan abdomen and pelvis: No evidence for small bowel obstructive process. Subtle suggestion of mild wall and fold thickening along the rectosigmoid colon without significant pericolonic stranding however may relate to mild rectosigmoid colitis.  There is no evidence for colonic obstructive  change.  There is no evidence for free intraperitoneal air.   - MRI pelvis (22): Two complex perianal fistulas with branching tracks. Along the bilateral medial gluteal cleft just deep to the cutaneous surface there are multiple serpiginous peripherally enhancing tracts which may have multiple cutaneous exits. Tracts are difficult to follow.  - EUA 8/15 shows severe perianal disease with deep ulcerations, shallow vaginal ulceration without fistula, and some changes in ischiorectal fat c/f hidradenitis  -MRE shows mucosal enhancement and wall thickening of the distal descending/sigmoid colon in this patient with Crohn's disease, as above, likely representing chronic inflammatory process.  No definitive abscess collection. Persistent perianal fistula as seen on MRI pelvis 22. Pt does have some passage of air in vagina suspicious for early RV fistula    Patient Active Problem List   Diagnosis    Status post  section    Elevated blood pressure affecting pregnancy, antepartum    Anemia of mother during pregnancy, delivered    Central corneal ulcer, right    Crohn's disease with abscess    Vaginal ulcer    Perianal fistula    Crohn's disease of both small and large intestine with fistula    Helicobacter pylori gastritis    History of erythema nodosum    Enteropathic arthropathy    Corneal ulcer    Acute pain       Review of patient's allergies indicates:  No Known Allergies    Current Facility-Administered Medications on File Prior to Visit   Medication Dose Route Frequency Provider Last Rate Last Admin    acetaminophen tablet 1,000 mg  1,000 mg Oral TID Jen Webster MD   1,000 mg at 22    acetaminophen tablet 650 mg  650 mg Oral Q4H PRN Bernabe Rowe MD        albuterol-ipratropium 2.5 mg-0.5 mg/3 mL nebulizer solution 3 mL  3 mL Nebulization Q6H PRN Bernabe Rowe MD        aluminum-magnesium hydroxide-simethicone 200-200-20 mg/5 mL suspension 30 mL  30 mL Oral  QID PRN Bernabe Rowe MD        carboxymethylcellulose sodium 1 % DpGe 1 drop  1 drop Ophthalmic QID Carlos Manuel Rodriguez MD   1 drop at 08/17/22 2145    ciprofloxacin HCl tablet 500 mg  500 mg Oral Q12H Jen Webster MD   500 mg at 08/17/22 2103    enoxaparin injection 40 mg  40 mg Subcutaneous Daily Jen Webster MD   40 mg at 08/17/22 1611    erythromycin 5 mg/gram (0.5 %) ophthalmic ointment   Right Eye TID Carlos Manuel Rodriguez MD   Given at 08/14/22 2124    folic acid tablet 1 mg  1 mg Oral Daily Jacklyn Trevino, NP   1 mg at 08/17/22 0913    gabapentin capsule 100 mg  100 mg Oral TID Jen Webster MD   100 mg at 08/17/22 2103    melatonin tablet 6 mg  6 mg Oral Nightly PRN Bernabe Rowe MD   6 mg at 08/16/22 2209    methylPREDNISolone sodium succinate injection 25 mg  25 mg Intravenous Q12H Brian Selby MD   25 mg at 08/18/22 0415    metroNIDAZOLE tablet 500 mg  500 mg Oral Q8H Jen Webster MD   500 mg at 08/17/22 2103    morphine injection 5 mg  5 mg Intravenous Q4H PRN Jen Webster MD   5 mg at 08/17/22 2120    naloxone 0.4 mg/mL injection 0.02 mg  0.02 mg Intravenous PRN Bernabe Rowe MD        ondansetron injection 4 mg  4 mg Intravenous Q8H PRN Bernabe Rowe MD        oxyCODONE immediate release tablet Tab 10 mg  10 mg Oral Q6H PRN Jen Webster MD   10 mg at 08/18/22 0225    prednisoLONE acetate 1 % ophthalmic suspension 1 drop  1 drop Left Eye TID Terri Jaffe MD   1 drop at 08/17/22 2102    sodium chloride 0.9% flush 10 mL  10 mL Intravenous Q12H PRN Bernabe Rowe MD        tobramycin sulfate 0.3% ophthalmic solution 1 drop  1 drop Right Eye QID Terri Jaffe MD   1 drop at 08/17/22 2103     Current Outpatient Medications on File Prior to Visit   Medication Sig Dispense Refill    acetaminophen (TYLENOL) 500 MG tablet Take 2 tablets (1,000 mg total) by mouth every 6 (six) hours as needed. 60 tablet 0    erythromycin (ROMYCIN) ophthalmic ointment Place  a 1/2 inch ribbon of ointment into the lower eyelid 2-3 times daily. (Patient not taking: Reported on 3/8/2022) 1 g 0    ibuprofen (ADVIL,MOTRIN) 400 MG tablet Take 1 tablet (400 mg total) by mouth every 6 (six) hours as needed. 20 tablet 0    ondansetron (ZOFRAN-ODT) 4 MG TbDL Take 1 tablet (4 mg total) by mouth every 8 (eight) hours as needed. 20 tablet 0    prednisoLONE acetate (PRED FORTE) 1 % DrpS Place 1 drop into the left eye 3 (three) times daily. 10 mL 4       Past Surgical History:   Procedure Laterality Date     SECTION       SECTION WITH TUBAL LIGATION Bilateral 2020    Procedure:  SECTION, WITH TUBAL LIGATION;  Surgeon: Jasper Hester MD;  Location: [facility] L&D OR;  Service: OB/GYN;  Laterality: Bilateral;     SECTION, LOW TRANSVERSE  2013    EXAMINATION UNDER ANESTHESIA N/A 8/15/2022    Procedure: Exam under anesthesia;  Surgeon: Zuleyka Yip MD;  Location: [facility] OR [address] [place] [address];  Service: Endoscopy;  Laterality: N/A;  Possible seton    FLEXIBLE SIGMOIDOSCOPY N/A 8/15/2022    Procedure: SIGMOIDOSCOPY, FLEXIBLE;  Surgeon: Zuleyka Yip MD;  Location: [address];  Service: Endoscopy;  Laterality: N/A;       Social History     Socioeconomic History    Marital status: Single   Tobacco Use    Smoking status: Former Smoker     Packs/day: 1.00     Years: 5.00     Pack years: 5.00    Smokeless tobacco: Never Used   Substance and Sexual Activity    Alcohol use: Yes     Comment: RARELY    Drug use: No    Sexual activity: Yes     Partners: Male     Birth control/protection: None   Social History Narrative    Together since last year,     He is a garvin    She is a CNA at [employer].         CBC:   No results for input(s): WBC, RBC, HGB, HCT, PLT, MCV, MCH, MCHC in the last 72 hours.    CMP:   No results for input(s): NA, K, CL, CO2, BUN, CREATININE, GLU, MG, PHOS, CALCIUM, ALBUMIN, PROT, ALKPHOS, ALT, AST, BILITOT in the last 72  hours.    INR  No results for input(s): PT, INR, PROTIME, APTT in the last 72 hours.        Diagnostic Studies:      EKD Echo:  No results found for this or any previous visit.        Pre-op Assessment    I have reviewed the Patient Summary Reports.     I have reviewed the Nursing Notes. I have reviewed the NPO Status.      Review of Systems  EENT/Dental:   OD corneal ulcer   Cardiovascular:  Cardiovascular Normal     Pulmonary:  Pulmonary Normal    Renal/:  Renal/ Normal     Hepatic/GI:   Crohn's with perianal abscess   Neurological:  Neurology Normal    Endocrine:  Obesity / BMI > 30  Psych:  Psychiatric Normal         Physical Exam  General: Well nourished, Cooperative and Alert    Airway:  Mallampati: II   Mouth Opening: Normal  Neck ROM: Normal ROM    Dental:  Intact        Anesthesia Plan  Type of Anesthesia, risks & benefits discussed:    Anesthesia Type: Gen Natural Airway, Gen ETT  Intra-op Monitoring Plan: Standard ASA Monitors  Induction:  IV  Informed Consent: Informed consent signed with the Patient and all parties understand the risks and agree with anesthesia plan.  All questions answered.   ASA Score: 2  Day of Surgery Review of History & Physical: H&P Update referred to the surgeon/provider.    Ready For Surgery From Anesthesia Perspective.     .

## 2022-08-18 NOTE — HOSPITAL COURSE
She was started on IV solumedrol and ophthalmology managing eye medication. Colorectal surgery did EUA and did not find fistula to where they can place seton. MRI enterography confirmed severe distal colon disease and no abscess. They recommend fecal diversion. GI to perform colonoscopy to determine extent of disease for ostomy placement. Patient started on vancomycin and ceftriaxone due to concern of MRSA of eye and intraabdominal abscess. ID consulted and recommended a limited course of ceftriaxone and metronidazole. Will continue on ciprofloxacin and metronidazole as per CRS. GI is managing corticosteroids.    8/18- per GI:  32 y.o. female with history of Crohn's disease (with duodenal, perianal, sigmoidal and rectal involvement), anemia & corneal ulceration who was transferred from [facility] to [facility] for ophthalmology consult for her corneal ulcer.   - Still having severe perianal pain provoked by BMs  - R eye looks and feels better  - 3 BMs past 24h, 2 liquid, one soft, no blood  - CRP downtrending  - on solumedrol  /70  Pulse 52  AF, CRS planning for fecal diversion, ileostomy next Tuesday 8/19- advance diet. /67 and VSS. Pain under control.; lab done. We had a long talk about ostomies.  8/20- /68 VSS. Appeciate GI f.u. surgery planned Tuesday 8/21- solumedrol weaned. VSS, surgery planned this upcomming Tuesday 8/22- pt is ready for surgery tomorrow. BP 84/54   Pulse 90    8/23- VSS, ate 50% yesterday, NPO, +urinations and BMs. To OR today. Pt is ready.

## 2022-08-18 NOTE — PROGRESS NOTES
[facility] Medicine  Progress Note    Patient Name: Nikkie Myles  MRN: [id number]  Patient Class: IP- Inpatient   Admission Date: 8/11/2022  Length of Stay: 6 days  Attending Physician: Shayy Dietz MD  Primary Care Provider: Brian Collado MD        Subjective:     Principal Problem:Crohn's disease with abscess        HPI:  Ms. Nikkie Myles is a 32 y.o. female with  duodenal, ileocolonic and perianal Crohn's disease on Remicade and MTX, as well as a a chronic right corneal ulcer, who presented to the  ER on 8/11 for evaluation of several weeks bloody diarrhea and abdominal cramping, as well as vaginal discharge.  CT abdomen pelvis showed mild rectosigmoid colitis, and prominent appearance of the region of the lower uterine segment/cervix.  MRI was ordered for further evaluation.  GI and GYN were consulted.  Stool studies and C. Diff were collected.  She was given a dose of Vanc and Ceftriaxone.  She also endorses complete loss of vision in her right eye and significant discomfort, when she normally can see shapes and colors.  Her case was discussed with Dr. Strickland of Optho, who suggested she be transferred to [facility] for Optho evaluation.  She was given Solumedrol 125mg IV x 1. Last infliximab 7/14 and next infusion for 8/19. Compliant with MTX 15 mg once a week up until last Sat due to insurance issue.      Patient transferred ED to ED due to bed availability. In ED patient afebrile and hemodynamically stable. Continues to report ongoing abdominal/rectal pain and blood mixed stools. Ophthalmology consulted and evaluated patient in the ED. Patient admitted to the care of medicine for further evaluation and management.      Overview/Hospital Course:  She was started on IV solumedrol and ophthalmology managing eye medication. Colorectal surgery did EUA and did not find fistula to where they can place seton. MRI enterography confirmed severe distal colon disease and no abscess. They recommend  fecal diversion. GI to perform colonoscopy to determine extent of disease for ostomy placement. Patient started on vancomycin and ceftriaxone due to concern of MRSA of eye and intraabdominal abscess. ID consulted and recommended a limited course of ceftriaxone and metronidazole. Will continue on ciprofloxacin and metronidazole as per CRS. GI is managing corticosteroids.    8/18- per GI:  32 y.o. female with history of Crohn's disease (with duodenal, perianal, sigmoidal and rectal involvement), anemia & corneal ulceration who was transferred from [facility] to [facility] for ophthalmology consult for her corneal ulcer.   - Still having severe perianal pain provoked by BMs  - R eye looks and feels better  - 3 BMs past 24h, 2 liquid, one soft, no blood  - CRP downtrending  - on solumedrol  /70  Pulse 52  AF, CRS planning for fecal diversion, ileostomy next Tuesday      Interval History: see above    Review of Systems   Constitutional:  Negative for activity change, chills, fatigue and fever.   HENT:  Negative for congestion, nosebleeds and trouble swallowing.    Respiratory:  Negative for apnea, cough, choking, chest tightness and shortness of breath.    Cardiovascular:  Negative for chest pain and leg swelling.   Gastrointestinal:  Negative for abdominal distention, abdominal pain, constipation, diarrhea, nausea and vomiting.   Genitourinary:  Negative for decreased urine volume, difficulty urinating, dysuria and frequency.   Musculoskeletal:  Negative for arthralgias, back pain, joint swelling, neck pain and neck stiffness.   Skin:  Negative for rash and wound.   Neurological:  Negative for dizziness, seizures, syncope, weakness, light-headedness, numbness and headaches.   Psychiatric/Behavioral:  Negative for agitation, behavioral problems, confusion, hallucinations, self-injury and sleep disturbance. The patient is not nervous/anxious.    Objective:     Vital Signs (Most Recent):  Temp: 98.3 °F (36.8 °C)  (08/18/22 1041)  Pulse: (!) 52 (08/18/22 1049)  Resp: 17 (08/18/22 1049)  BP: 126/70 (08/18/22 1041)  SpO2: 97 % (08/18/22 1049)   Vital Signs (24h Range):  Temp:  [97.1 °F (36.2 °C)-98.7 °F (37.1 °C)] 98.3 °F (36.8 °C)  Pulse:  [48-54] 52  Resp:  [14-18] 17  SpO2:  [95 %-100 %] 97 %  BP: (100-131)/(55-78) 126/70     Weight: 77.2 kg (170 lb 3.1 oz)  Body mass index is 33.24 kg/m².  No intake or output data in the 24 hours ending 08/18/22 1131   Physical Exam  Constitutional:       General: She is not in acute distress.     Appearance: Normal appearance.   HENT:      Head: Normocephalic and atraumatic.      Nose: Nose normal.      Mouth/Throat:      Mouth: Mucous membranes are moist.   Eyes:      General: No scleral icterus.     Extraocular Movements: Extraocular movements intact.      Pupils: Pupils are equal, round, and reactive to light.   Cardiovascular:      Rate and Rhythm: Normal rate and regular rhythm.      Pulses: Normal pulses.      Heart sounds: Normal heart sounds.   Pulmonary:      Effort: Pulmonary effort is normal.      Breath sounds: Normal breath sounds. No wheezing or rhonchi.   Chest:      Chest wall: No tenderness.   Abdominal:      General: Abdomen is flat. Bowel sounds are normal. There is no distension.      Palpations: Abdomen is soft.      Tenderness: There is no abdominal tenderness. There is no right CVA tenderness, left CVA tenderness, guarding or rebound.   Musculoskeletal:         General: No swelling, tenderness, deformity or signs of injury. Normal range of motion.      Cervical back: Normal range of motion and neck supple. No rigidity or tenderness.   Skin:     General: Skin is warm and dry.      Coloration: Skin is not jaundiced or pale.      Findings: No erythema or rash.   Neurological:      General: No focal deficit present.      Mental Status: She is alert and oriented to person, place, and time. Mental status is at baseline.      Cranial Nerves: No cranial nerve deficit.       "Motor: No weakness.   Psychiatric:         Mood and Affect: Mood normal.         Behavior: Behavior normal.         Thought Content: Thought content normal.         Judgment: Judgment normal.       Significant Labs: All pertinent labs within the past 24 hours have been reviewed.  CBC: No results for input(s): WBC, HGB, HCT, PLT in the last 48 hours.  CMP: No results for input(s): NA, K, CL, CO2, GLU, BUN, CREATININE, CALCIUM, PROT, ALBUMIN, BILITOT, ALKPHOS, AST, ALT, ANIONGAP, EGFRNONAA in the last 48 hours.    Invalid input(s): ESTGFAFR[place]    Significant Imaging: I have reviewed all pertinent imaging results/findings within the past 24 hours.      Assessment/Plan:      * Crohn's disease with abscess  Hs of Perirectal fistula    Recent diagnosis March 2022 of duodenal, ileocolonic and perianal Crohn disease on Remicade and MTX doing well up into 2 weeks ago.    - CT showed mild rectosigmoid colitis, and prominent appearance of the region of the lower uterine segment/cervix  - GI consulted  ;  Appreciate recs  - C diff negative, stool leukocytes - positive  - started ceftriaxone and vancomycin  - follow up additional stool studies: fecal calprotectin and stool culture positive  - General surgery evaluated at outside hospital ; per note no drainable fluid collection.   - Infliximab level and antibody pending, next dose plan for 8/19  - Restart MTX when okay GI  - MRI pelvis showed 2 complex perianal fistulas  - MRI enterography showed "mucosal enhancement and wall thickening of the distal descending/sigmoid colon" and one perianal fistula.   -CRS evaluated patient via EUA - extensive disease but no defined fistulas   -candidate for fecal diversion  - STD work up pending  - intermittent methylprednisolone IV per GI - now 20 mg IV q8h  - ID consulted -recommended ceftriaxone and metronidazole for 5 more days  - continue ciprofloxacin and metronidazole for now  - colonoscopy per GI to assess for ideal placement of " ostomy. Clear liquid diet now    8/18- Per CRS: on the schedule on Tuesday for diverting loop ileostomy, pending final results of endoscopies.  Will have her seen in marked for ileostomy preoperatively.    Acute pain  Perianal pain in passage of stool  Multimodal approach  Acetaminophen 1 g TID and gabapentin 100 mg TID  Morphine 5 mg IV q4h prn  oxycodone 10 mg q4h prn  Need judicious use of opioids in IBD, but NSAID is also contraindicated!    Central corneal ulcer, right  - Solumedrol 125mg IV x 1, now solumedrol 20 mg IV q8h  - Ophthalmology consulted and evaluated patient  ;  Appreciate recs. Managing eye drops and ointments.    Corneal ulcer  See ophthalmology consuts      Crohn's disease of both small and large intestine with fistula  Per history.   Has severe leesa-anal disease  See above        VTE Risk Mitigation (From admission, onward)         Ordered     enoxaparin injection 40 mg  Daily         08/14/22 0219     IP VTE HIGH RISK PATIENT  Once         08/12/22 2058                Discharge Planning   ENDY: 8/17/2022     Code Status: Full Code   Is the patient medically ready for discharge?: No    Reason for patient still in hospital (select all that apply): Patient trending condition  Discharge Plan A: Home with family          Shayy Dietz MD  Department of Hospital Medicine   [facility]

## 2022-08-18 NOTE — SUBJECTIVE & OBJECTIVE
Interval History: see above    Review of Systems   Constitutional:  Negative for activity change, chills, fatigue and fever.   HENT:  Negative for congestion, nosebleeds and trouble swallowing.    Respiratory:  Negative for apnea, cough, choking, chest tightness and shortness of breath.    Cardiovascular:  Negative for chest pain and leg swelling.   Gastrointestinal:  Negative for abdominal distention, abdominal pain, constipation, diarrhea, nausea and vomiting.   Genitourinary:  Negative for decreased urine volume, difficulty urinating, dysuria and frequency.   Musculoskeletal:  Negative for arthralgias, back pain, joint swelling, neck pain and neck stiffness.   Skin:  Negative for rash and wound.   Neurological:  Negative for dizziness, seizures, syncope, weakness, light-headedness, numbness and headaches.   Psychiatric/Behavioral:  Negative for agitation, behavioral problems, confusion, hallucinations, self-injury and sleep disturbance. The patient is not nervous/anxious.    Objective:     Vital Signs (Most Recent):  Temp: 98.3 °F (36.8 °C) (08/18/22 1041)  Pulse: (!) 52 (08/18/22 1049)  Resp: 17 (08/18/22 1049)  BP: 126/70 (08/18/22 1041)  SpO2: 97 % (08/18/22 1049)   Vital Signs (24h Range):  Temp:  [97.1 °F (36.2 °C)-98.7 °F (37.1 °C)] 98.3 °F (36.8 °C)  Pulse:  [48-54] 52  Resp:  [14-18] 17  SpO2:  [95 %-100 %] 97 %  BP: (100-131)/(55-78) 126/70     Weight: 77.2 kg (170 lb 3.1 oz)  Body mass index is 33.24 kg/m².  No intake or output data in the 24 hours ending 08/18/22 1131   Physical Exam  Constitutional:       General: She is not in acute distress.     Appearance: Normal appearance.   HENT:      Head: Normocephalic and atraumatic.      Nose: Nose normal.      Mouth/Throat:      Mouth: Mucous membranes are moist.   Eyes:      General: No scleral icterus.     Extraocular Movements: Extraocular movements intact.      Pupils: Pupils are equal, round, and reactive to light.   Cardiovascular:      Rate and  Rhythm: Normal rate and regular rhythm.      Pulses: Normal pulses.      Heart sounds: Normal heart sounds.   Pulmonary:      Effort: Pulmonary effort is normal.      Breath sounds: Normal breath sounds. No wheezing or rhonchi.   Chest:      Chest wall: No tenderness.   Abdominal:      General: Abdomen is flat. Bowel sounds are normal. There is no distension.      Palpations: Abdomen is soft.      Tenderness: There is no abdominal tenderness. There is no right CVA tenderness, left CVA tenderness, guarding or rebound.   Musculoskeletal:         General: No swelling, tenderness, deformity or signs of injury. Normal range of motion.      Cervical back: Normal range of motion and neck supple. No rigidity or tenderness.   Skin:     General: Skin is warm and dry.      Coloration: Skin is not jaundiced or pale.      Findings: No erythema or rash.   Neurological:      General: No focal deficit present.      Mental Status: She is alert and oriented to person, place, and time. Mental status is at baseline.      Cranial Nerves: No cranial nerve deficit.      Motor: No weakness.   Psychiatric:         Mood and Affect: Mood normal.         Behavior: Behavior normal.         Thought Content: Thought content normal.         Judgment: Judgment normal.       Significant Labs: All pertinent labs within the past 24 hours have been reviewed.  CBC: No results for input(s): WBC, HGB, HCT, PLT in the last 48 hours.  CMP: No results for input(s): NA, K, CL, CO2, GLU, BUN, CREATININE, CALCIUM, PROT, ALBUMIN, BILITOT, ALKPHOS, AST, ALT, ANIONGAP, EGFRNONAA in the last 48 hours.    Invalid input(s): ESTGFA[place]    Significant Imaging: I have reviewed all pertinent imaging results/findings within the past 24 hours.

## 2022-08-18 NOTE — NURSING TRANSFER
Nursing Transfer Note      8/18/2022     Reason patient is being transferred: post op    Transfer To: 626, pt returning, report called to bedside RN at the time of this note    Transfer via stretcher    Transported by PCT    Medicines sent: na    Any special needs or follow-up needed: routine    Chart send with patient: Yes    Notified: family    Patient reassessed at: 1345 on 8/18

## 2022-08-18 NOTE — TRANSFER OF CARE
Anesthesia Transfer of Care Note    Patient: Nikkie Myles    Procedure(s) Performed: Procedure(s) (LRB):  EGD (ESOPHAGOGASTRODUODENOSCOPY) (N/A)  COLONOSCOPY (N/A)    Patient location: PACU    Anesthesia Type: general    Transport from OR: Transported from OR on room air with adequate spontaneous ventilation    Post pain: adequate analgesia    Post assessment: no apparent anesthetic complications and tolerated procedure well    Post vital signs: stable    Level of consciousness: awake, alert and oriented    Nausea/Vomiting: no nausea/vomiting    Complications: none    Transfer of care protocol was followedComments: Nurse at bedside, VSS, spont reg resp noted      Last vitals:   Visit Vitals  /79 (BP Location: Right arm, Patient Position: Lying)   Pulse (!) 50   Temp 36.7 °C (98.1 °F) (Temporal)   Resp 16   Ht 5' (1.524 m)   Wt 77.2 kg (170 lb 3.1 oz)   LMP 07/13/2022   SpO2 100%   Breastfeeding No   BMI 33.24 kg/m²

## 2022-08-18 NOTE — PROVATION PATIENT INSTRUCTIONS
Discharge Summary/Instructions after an Endoscopic Procedure  Patient Name: Nikkie Myles  Patient MRN: [id number]  Patient YOB: 1990 Thursday, August 18, 2022  Luigi Jasmine MD  Dear patient,  As a result of recent federal legislation (The Federal Cures Act), you may   receive lab or pathology results from your procedure in your [facility]   account before your physician is able to contact you. Your physician or   their representative will relay the results to you with their   recommendations at their soonest availability.  Thank you,  RESTRICTIONS:  During your procedure today, you received medications for sedation.  These   medications may affect your judgment, balance and coordination.  Therefore,   for 24 hours, you have the following restrictions:   - DO NOT drive a car, operate machinery, make legal/financial decisions,   sign important papers or drink alcohol.    ACTIVITY:  Today: no heavy lifting, straining or running due to procedural   sedation/anesthesia.  The following day: return to full activity including work.  DIET:  Eat and drink normally unless instructed otherwise.     TREATMENT FOR COMMON SIDE EFFECTS:  - Mild abdominal pain, nausea, belching, bloating or excessive gas:  rest,   eat lightly and use a heating pad.  - Sore Throat: treat with throat lozenges and/or gargle with warm salt   water.  - Because air was used during the procedure, expelling large amounts of air   from your rectum or belching is normal.  - If a bowel prep was taken, you may not have a bowel movement for 1-3 days.    This is normal.  SYMPTOMS TO WATCH FOR AND REPORT TO YOUR PHYSICIAN:  1. Abdominal pain or bloating, other than gas cramps.  2. Chest pain.  3. Back pain.  4. Signs of infection such as: chills or fever occurring within 24 hours   after the procedure.  5. Rectal bleeding, which would show as bright red, maroon, or black stools.   (A tablespoon of blood from the rectum is not serious, especially  if   hemorrhoids are present.)  6. Vomiting.  7. Weakness or dizziness.  GO DIRECTLY TO THE NEAREST EMERGENCY ROOM IF YOU HAVE ANY OF THE FOLLOWING:      Difficulty breathing              Chills and/or fever over 101 F   Persistent vomiting and/or vomiting blood   Severe abdominal pain   Severe chest pain   Black, tarry stools   Bleeding- more than one tablespoon   Any other symptom or condition that you feel may need urgent attention  Your doctor recommends these additional instructions:  If any biopsies were taken, your doctors clinic will contact you in 1 to 2   weeks with any results.  - Await pathology results.   - Perform a colonoscopy now.   - See colonoscopy report for further recommendations.   For questions, problems or results please call your physician - Luigi Jasmine MD at Work:  [phone].  [facility], EMERGENCY ROOM PHONE NUMBER: [phone]  IF A COMPLICATION OR EMERGENCY SITUATION ARISES AND YOU ARE UNABLE TO REACH   YOUR PHYSICIAN - GO DIRECTLY TO THE EMERGENCY ROOM.  Luigi Jasmine MD  8/18/2022 1:39:25 PM  This report has been verified and signed electronically.  Dear patient,  As a result of recent federal legislation (The Federal Cures Act), you may   receive lab or pathology results from your procedure in your [facility]   account before your physician is able to contact you. Your physician or   their representative will relay the results to you with their   recommendations at their soonest availability.  Thank you,  PROVATION

## 2022-08-18 NOTE — PROGRESS NOTES
"Progress Note  Ophthalmology Service    Date: 08/18/2022    Chief complaint: "corneal ulcer transfer"     Interval History:   Patient continues to improve daily. States she is feeling better overall and feels that "things are looking up".      Current eye gtts:   OD: Tobrex QID OD, Erythromycin ointment TID OD, gel ATs QID OD  OS: Pred Forte TID OS    Medications this encounter:    acetaminophen  1,000 mg Oral TID    carboxymethylcellulose sodium  1 drop Ophthalmic QID    ciprofloxacin HCl  500 mg Oral Q12H    enoxaparin  40 mg Subcutaneous Daily    erythromycin   Right Eye TID    folic acid  1 mg Oral Daily    gabapentin  100 mg Oral TID    methylPREDNISolone sodium succinate injection  25 mg Intravenous Q12H    metroNIDAZOLE  500 mg Oral Q8H    prednisoLONE acetate  1 drop Left Eye TID    tobramycin sulfate 0.3%  1 drop Right Eye QID       Allergies: has No Known Allergies.     ROS: As per HPI    Ocular examination/Dilated fundus examination:  Base Eye Exam     Visual Acuity (Snellen - Linear)       Right Left    Dist sc CF @ 5ft 20/30          Visual Fields       Right Left      Full          Extraocular Movement       Right Left     Full, Ortho Full, Ortho          Neuro/Psych     Oriented x3: Yes    Mood/Affect: Normal            Slit Lamp and Fundus Exam     External Exam       Right Left    External trace edema Normal          Slit Lamp Exam       Right Left    Lids/Lashes mild Ptosis Normal    Conjunctiva/Sclera 1+ Injection IT, limbal thickening White and quiet    Cornea Central scar/stromal haze with 3+ NV, central epi defect with stromal haze and thinning of central/ST portion and IT epi defect with underlying stromal haze/edema much improved 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 Corneal Ulcer without Hypopyon, RIGHT eye   2. 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   - Patient w/ 2wk hx of Crohn's flare and OD pain, photophobia, redness, irritation, and discharge  - Limited VA OD from previous ulcer with baseline at CF @3ft   - OD Exam 8/12/22: VA HM, iop wnl, 2+ injection IT, Epi defect centrally over stromal scar with thinning centrally and 3+ KNV, epi defect IT with underlying stromal haze   - 8/13/22 Update: Pt reports sx are unchanged, OD exam overall stable with  IOP 7, VA HM, mild increase in conj injection, but epi defects and area of central thinning remain stable in size, no hypopyon, lindsay negative  - 8/14/22 Update: Pt reports mild improvement in sx and feels her overall condition is improving as well. OD exam stable with slight decrease in conj injection and no evidence of vitritis on B scan. Otherwise unchanged from previous.   - 8/15/22 update - Pt was in OR for majority of the day so briefly saw patient at the end of the day, still tired from anesthesia. Could not bring patient to cornea clinic, will plan for Wednesday if she is still admitted as she is a patient of [facility]. Patient reports improvement in eye. States she doesn't like the abx drops. Recommended to continue the abx until the cultures finalize.   - 8/16/22 update - Patient continues to improve. Reports vision is at baseline. Will discuss patient with Dr. Warner tomorrow as he is in clinic. Likely will stop antibiotics tomorrow once all cultures result.   - 8/17/22 update - Continues to improve. [facility] okay with stopping fortified abx and transitioning to tobrex QID OD. For changes in left eye, will restart PF TID OS.   - 8/18/22 update - Doing well w/ continued improvement off fortified gtts. Will continue current management for now.  - Gram stain without organisms,  aerobic cx NGTD, anaerobic pending   - Absolutely no contact lens wear in either eye    Recommendations:   - Continue Tobrex QID OD  - Continue Erythromycin ointment TID OD  - Continue gel ATs QID OD    - Continue PF TID OS    - Continue mgmt of Crohn's flare per GI/primary team. Cornea specialist agrees that steroids will improve the epithelial defect as the ulcer is most likely related to her chrohn's exacerbation     Carlos Manuel Rodriguez MD  [employer] [facility], PGY-2  08/18/2022  2:49 PM

## 2022-08-18 NOTE — PLAN OF CARE
Problem: Adult Inpatient Plan of Care  Goal: Plan of Care Review  Outcome: Ongoing, Progressing  Goal: Patient-Specific Goal (Individualized)  Outcome: Ongoing, Progressing  Goal: Absence of Hospital-Acquired Illness or Injury  Outcome: Ongoing, Progressing  Goal: Optimal Comfort and Wellbeing  Outcome: Ongoing, Progressing  Goal: Readiness for Transition of Care  Outcome: Ongoing, Progressing     Problem: Infection  Goal: Absence of Infection Signs and Symptoms  Outcome: Ongoing, Progressing     Problem: Diarrhea  Goal: Fluid and Electrolyte Balance  Outcome: Ongoing, Progressing     Problem: Fall Injury Risk  Goal: Absence of Fall and Fall-Related Injury  Outcome: Ongoing, Progressing

## 2022-08-18 NOTE — PROVATION PATIENT INSTRUCTIONS
Discharge Summary/Instructions after an Endoscopic Procedure  Patient Name: Nikkie Myles  Patient MRN: [id number]  Patient YOB: 1990 Thursday, August 18, 2022  Luigi Jasmine MD  Dear patient,  As a result of recent federal legislation (The Federal Cures Act), you may   receive lab or pathology results from your procedure in your [facility]   account before your physician is able to contact you. Your physician or   their representative will relay the results to you with their   recommendations at their soonest availability.  Thank you,  RESTRICTIONS:  During your procedure today, you received medications for sedation.  These   medications may affect your judgment, balance and coordination.  Therefore,   for 24 hours, you have the following restrictions:   - DO NOT drive a car, operate machinery, make legal/financial decisions,   sign important papers or drink alcohol.    ACTIVITY:  Today: no heavy lifting, straining or running due to procedural   sedation/anesthesia.  The following day: return to full activity including work.  DIET:  Eat and drink normally unless instructed otherwise.     TREATMENT FOR COMMON SIDE EFFECTS:  - Mild abdominal pain, nausea, belching, bloating or excessive gas:  rest,   eat lightly and use a heating pad.  - Sore Throat: treat with throat lozenges and/or gargle with warm salt   water.  - Because air was used during the procedure, expelling large amounts of air   from your rectum or belching is normal.  - If a bowel prep was taken, you may not have a bowel movement for 1-3 days.    This is normal.  SYMPTOMS TO WATCH FOR AND REPORT TO YOUR PHYSICIAN:  1. Abdominal pain or bloating, other than gas cramps.  2. Chest pain.  3. Back pain.  4. Signs of infection such as: chills or fever occurring within 24 hours   after the procedure.  5. Rectal bleeding, which would show as bright red, maroon, or black stools.   (A tablespoon of blood from the rectum is not serious, especially  if   hemorrhoids are present.)  6. Vomiting.  7. Weakness or dizziness.  GO DIRECTLY TO THE NEAREST EMERGENCY ROOM IF YOU HAVE ANY OF THE FOLLOWING:      Difficulty breathing              Chills and/or fever over 101 F   Persistent vomiting and/or vomiting blood   Severe abdominal pain   Severe chest pain   Black, tarry stools   Bleeding- more than one tablespoon   Any other symptom or condition that you feel may need urgent attention  Your doctor recommends these additional instructions:  If any biopsies were taken, your doctors clinic will contact you in 1 to 2   weeks with any results.  - Return patient to [facility] for ongoing care.   - Advance diet as tolerated.   - Continue present medications.   - Await pathology results.  I requested rush processing.   - No recommendation at this time regarding repeat colonoscopy.   For questions, problems or results please call your physician - Luigi Jasmine MD at Work:  [phone].  [facility], EMERGENCY ROOM PHONE NUMBER: [phone]  IF A COMPLICATION OR EMERGENCY SITUATION ARISES AND YOU ARE UNABLE TO REACH   YOUR PHYSICIAN - GO DIRECTLY TO THE EMERGENCY ROOM.  Luigi Jasmine MD  8/18/2022 1:56:05 PM  This report has been verified and signed electronically.  Dear patient,  As a result of recent federal legislation (The Federal Cures Act), you may   receive lab or pathology results from your procedure in your [facility]   account before your physician is able to contact you. Your physician or   their representative will relay the results to you with their   recommendations at their soonest availability.  Thank you,  PROVATION

## 2022-08-18 NOTE — PLAN OF CARE
Pt was NPO for majority of the day and had EGD/Colonoscopy procedure done. No major events or reports as per PACU nurse. Pt stated she primarily experiences pain during bowel movements. Otherwise no major events during day shift. Pain control continues to be part of plan of care.   Problem: Adult Inpatient Plan of Care  Goal: Plan of Care Review  Outcome: Ongoing, Progressing  Goal: Patient-Specific Goal (Individualized)  Outcome: Ongoing, Progressing  Goal: Absence of Hospital-Acquired Illness or Injury  Outcome: Ongoing, Progressing  Goal: Optimal Comfort and Wellbeing  Outcome: Ongoing, Progressing  Goal: Readiness for Transition of Care  Outcome: Ongoing, Progressing     Problem: Infection  Goal: Absence of Infection Signs and Symptoms  Outcome: Ongoing, Progressing     Problem: Diarrhea  Goal: Fluid and Electrolyte Balance  Outcome: Ongoing, Progressing     Problem: Fall Injury Risk  Goal: Absence of Fall and Fall-Related Injury  Outcome: Ongoing, Progressing

## 2022-08-18 NOTE — H&P
Short Stay Endoscopy History and Physical    PCP - Brian Collado MD     Procedure - EGD & Colonoscopy  ASA - per anesthesia  Mallampati - per anesthesia  History of Anesthesia problems - no  Family history Anesthesia problems -  no   Plan of anesthesia - General    HPI:  This is a 32 y.o. female here for evaluation of :     Disease activity evaluation in a patient presenting wiht Crohn's flare while on infliximab. Her disease is complicated by perianal fistulas.     - EGD (22): Exam concerning for duodenal Crohn's. Biopsy consistent with duodenal enteritis.     - Colonoscopy (22): records not available.       ROS:  Constitutional: No fevers, chills, No weight loss  CV: No chest pain  Pulm: No cough, No shortness of breath  Ophtho: No vision changes  GI: see HPI  Derm: No rash    Medical History:  has a past medical history of Anal fistula, Chronic diarrhea, Crohn's disease (2022), Enteropathic arthropathy, and Eye injury.    Surgical History:  has a past surgical history that includes  section, low transverse (2013);  section with tubal ligation (Bilateral, 2020);  section (); Examination under anesthesia (N/A, 8/15/2022); and Flexible sigmoidoscopy (N/A, 8/15/2022).    Family History: family history includes Glaucoma in her maternal grandmother; Hypertension in her father and mother; No Known Problems in her brother, maternal aunt, maternal grandfather, maternal uncle, paternal aunt, paternal grandfather, paternal grandmother, paternal uncle, and sister.. Otherwise no colon cancer, inflammatory bowel disease, or GI malignancies.    Social History:  reports that she has quit smoking. She has a 5.00 pack-year smoking history. She has never used smokeless tobacco. She reports current alcohol use. She reports that she does not use drugs.    Review of patient's allergies indicates:  No Known Allergies    Medications:   Facility-Administered Medications Prior to  Admission   Medication Dose Route Frequency Provider Last Rate Last Admin    sodium chloride 0.9% flush 10 mL  10 mL Intravenous PRN Doug Warner MD         Medications Prior to Admission   Medication Sig Dispense Refill Last Dose    acetaminophen (TYLENOL) 500 MG tablet Take 2 tablets (1,000 mg total) by mouth every 6 (six) hours as needed. 60 tablet 0     erythromycin (ROMYCIN) ophthalmic ointment Place a 1/2 inch ribbon of ointment into the lower eyelid 2-3 times daily. (Patient not taking: Reported on 3/8/2022) 1 g 0     ibuprofen (ADVIL,MOTRIN) 400 MG tablet Take 1 tablet (400 mg total) by mouth every 6 (six) hours as needed. 20 tablet 0     ondansetron (ZOFRAN-ODT) 4 MG TbDL Take 1 tablet (4 mg total) by mouth every 8 (eight) hours as needed. 20 tablet 0     prednisoLONE acetate (PRED FORTE) 1 % DrpS Place 1 drop into the left eye 3 (three) times daily. 10 mL 4        Physical Exam:    Vital Signs:   Vitals:    08/18/22 0927   BP:    Pulse:    Resp: 16   Temp:        General Appearance: Well appearing in no acute distress  Eyes:    No scleral icterus  ENT: Neck supple, Lips, mucosa, and tongue normal; teeth and gums normal  Abdomen: Soft, non tender, non distended with normal bowel sounds. No hepatosplenomegaly, ascites, or mass.  Extremities: No edema  Skin: No rash    Labs:  Lab Results   Component Value Date    WBC 7.16 08/14/2022    HGB 9.1 (L) 08/14/2022    HCT 30.9 (L) 08/14/2022     08/14/2022    CHOL 118 04/20/2022    TRIG 85 04/20/2022    HDL 35 (L) 04/20/2022    ALT 9 (L) 08/14/2022    AST 11 08/14/2022     08/14/2022    K 4.1 08/14/2022     08/14/2022    CREATININE 0.7 08/14/2022    BUN 8 08/14/2022    CO2 23 08/14/2022    INR 0.9 01/12/2012    HGBA1C 5.7 (H) 04/21/2022     #Crohn's disease, fistulizing  - On infliximab. Next dose was due on 8/19.     PLAN   - EGD and colonoscopy today with Dr. Jasmine         I have explained the risks and benefits of endoscopy procedures to  the patient including but not limited to bleeding, perforation, infection, and death.  The patient was asked if they understand and allowed to ask any further questions to their satisfaction.      Henry Sheridan MD  PGY-4, Gastroenterology

## 2022-08-19 LAB
ALBUMIN SERPL BCP-MCNC: 2.8 G/DL (ref 3.5–5.2)
ALP SERPL-CCNC: 62 U/L (ref 55–135)
ALT SERPL W/O P-5'-P-CCNC: 15 U/L (ref 10–44)
ANION GAP SERPL CALC-SCNC: 8 MMOL/L (ref 8–16)
ANISOCYTOSIS BLD QL SMEAR: SLIGHT
AST SERPL-CCNC: 12 U/L (ref 10–40)
BASOPHILS # BLD AUTO: 0.01 K/UL (ref 0–0.2)
BASOPHILS NFR BLD: 0.1 % (ref 0–1.9)
BILIRUB SERPL-MCNC: 0.2 MG/DL (ref 0.1–1)
BUN SERPL-MCNC: 14 MG/DL (ref 6–20)
CALCIUM SERPL-MCNC: 9 MG/DL (ref 8.7–10.5)
CALPROTECTIN STL-MCNT: ABNORMAL MCG/G
CHLORIDE SERPL-SCNC: 103 MMOL/L (ref 95–110)
CO2 SERPL-SCNC: 24 MMOL/L (ref 23–29)
CREAT SERPL-MCNC: 0.6 MG/DL (ref 0.5–1.4)
CRP SERPL-MCNC: 4.3 MG/L (ref 0–8.2)
DIFFERENTIAL METHOD: ABNORMAL
EOSINOPHIL # BLD AUTO: 0 K/UL (ref 0–0.5)
EOSINOPHIL NFR BLD: 0 % (ref 0–8)
ERYTHROCYTE [DISTWIDTH] IN BLOOD BY AUTOMATED COUNT: 26.4 % (ref 11.5–14.5)
EST. GFR  (NO RACE VARIABLE): >60 ML/MIN/1.73 M^2
GLUCOSE SERPL-MCNC: 113 MG/DL (ref 70–110)
HCT VFR BLD AUTO: 31.1 % (ref 37–48.5)
HGB BLD-MCNC: 9.6 G/DL (ref 12–16)
HYPOCHROMIA BLD QL SMEAR: ABNORMAL
IMM GRANULOCYTES # BLD AUTO: 0.14 K/UL (ref 0–0.04)
IMM GRANULOCYTES NFR BLD AUTO: 1.3 % (ref 0–0.5)
LYMPHOCYTES # BLD AUTO: 1.8 K/UL (ref 1–4.8)
LYMPHOCYTES NFR BLD: 15.9 % (ref 18–48)
MCH RBC QN AUTO: 18 PG (ref 27–31)
MCHC RBC AUTO-ENTMCNC: 30.9 G/DL (ref 32–36)
MCV RBC AUTO: 58 FL (ref 82–98)
MONOCYTES # BLD AUTO: 0.5 K/UL (ref 0.3–1)
MONOCYTES NFR BLD: 4.4 % (ref 4–15)
NEUTROPHILS # BLD AUTO: 8.8 K/UL (ref 1.8–7.7)
NEUTROPHILS NFR BLD: 78.3 % (ref 38–73)
NRBC BLD-RTO: 0 /100 WBC
O+P STL MICRO: NORMAL
OVALOCYTES BLD QL SMEAR: ABNORMAL
PLATELET # BLD AUTO: 375 K/UL (ref 150–450)
PLATELET BLD QL SMEAR: ABNORMAL
PMV BLD AUTO: ABNORMAL FL (ref 9.2–12.9)
POIKILOCYTOSIS BLD QL SMEAR: SLIGHT
POTASSIUM SERPL-SCNC: 4.4 MMOL/L (ref 3.5–5.1)
PROT SERPL-MCNC: 8 G/DL (ref 6–8.4)
RBC # BLD AUTO: 5.34 M/UL (ref 4–5.4)
SODIUM SERPL-SCNC: 135 MMOL/L (ref 136–145)
TARGETS BLD QL SMEAR: ABNORMAL
WBC # BLD AUTO: 11.2 K/UL (ref 3.9–12.7)

## 2022-08-19 PROCEDURE — 63600175 PHARM REV CODE 636 W HCPCS: Performed by: INTERNAL MEDICINE

## 2022-08-19 PROCEDURE — 36415 COLL VENOUS BLD VENIPUNCTURE: CPT | Performed by: HOSPITALIST

## 2022-08-19 PROCEDURE — 99233 SBSQ HOSP IP/OBS HIGH 50: CPT | Mod: ,,, | Performed by: HOSPITALIST

## 2022-08-19 PROCEDURE — 86140 C-REACTIVE PROTEIN: CPT | Performed by: STUDENT IN AN ORGANIZED HEALTH CARE EDUCATION/TRAINING PROGRAM

## 2022-08-19 PROCEDURE — 85025 COMPLETE CBC W/AUTO DIFF WBC: CPT | Performed by: HOSPITALIST

## 2022-08-19 PROCEDURE — 11000001 HC ACUTE MED/SURG PRIVATE ROOM

## 2022-08-19 PROCEDURE — 25000003 PHARM REV CODE 250: Performed by: HOSPITALIST

## 2022-08-19 PROCEDURE — 25000003 PHARM REV CODE 250

## 2022-08-19 PROCEDURE — 25000003 PHARM REV CODE 250: Performed by: INTERNAL MEDICINE

## 2022-08-19 PROCEDURE — 99233 PR SUBSEQUENT HOSPITAL CARE,LEVL III: ICD-10-PCS | Mod: ,,, | Performed by: HOSPITALIST

## 2022-08-19 PROCEDURE — 36415 COLL VENOUS BLD VENIPUNCTURE: CPT | Performed by: STUDENT IN AN ORGANIZED HEALTH CARE EDUCATION/TRAINING PROGRAM

## 2022-08-19 PROCEDURE — 63600175 PHARM REV CODE 636 W HCPCS: Performed by: STUDENT IN AN ORGANIZED HEALTH CARE EDUCATION/TRAINING PROGRAM

## 2022-08-19 PROCEDURE — 25000003 PHARM REV CODE 250: Performed by: FAMILY MEDICINE

## 2022-08-19 PROCEDURE — 80053 COMPREHEN METABOLIC PANEL: CPT | Performed by: HOSPITALIST

## 2022-08-19 PROCEDURE — 25000003 PHARM REV CODE 250: Performed by: NURSE PRACTITIONER

## 2022-08-19 RX ORDER — OXYCODONE HYDROCHLORIDE 5 MG/1
5 TABLET ORAL EVERY 6 HOURS PRN
Status: DISCONTINUED | OUTPATIENT
Start: 2022-08-19 | End: 2022-08-23

## 2022-08-19 RX ADMIN — ACETAMINOPHEN 1000 MG: 500 TABLET ORAL at 09:08

## 2022-08-19 RX ADMIN — PREDNISOLONE ACETATE 1 DROP: 10 SUSPENSION/ DROPS OPHTHALMIC at 03:08

## 2022-08-19 RX ADMIN — MORPHINE SULFATE 5 MG: 2 INJECTION, SOLUTION INTRAMUSCULAR; INTRAVENOUS at 08:08

## 2022-08-19 RX ADMIN — PREDNISOLONE ACETATE 1 DROP: 10 SUSPENSION/ DROPS OPHTHALMIC at 10:08

## 2022-08-19 RX ADMIN — METHYLPREDNISOLONE SODIUM SUCCINATE 20 MG: 40 INJECTION, POWDER, FOR SOLUTION INTRAMUSCULAR; INTRAVENOUS at 03:08

## 2022-08-19 RX ADMIN — ACETAMINOPHEN 1000 MG: 500 TABLET ORAL at 03:08

## 2022-08-19 RX ADMIN — ERYTHROMYCIN: 5 OINTMENT OPHTHALMIC at 10:08

## 2022-08-19 RX ADMIN — TOBRAMYCIN 1 DROP: 3 SOLUTION/ DROPS OPHTHALMIC at 10:08

## 2022-08-19 RX ADMIN — CARBOXYMETHYLCELLULOSE SODIUM 1 DROP: 10 GEL OPHTHALMIC at 04:08

## 2022-08-19 RX ADMIN — METHYLPREDNISOLONE SODIUM SUCCINATE 25 MG: 40 INJECTION, POWDER, FOR SOLUTION INTRAMUSCULAR; INTRAVENOUS at 05:08

## 2022-08-19 RX ADMIN — TOBRAMYCIN 1 DROP: 3 SOLUTION/ DROPS OPHTHALMIC at 04:08

## 2022-08-19 RX ADMIN — METRONIDAZOLE 500 MG: 500 TABLET ORAL at 06:08

## 2022-08-19 RX ADMIN — MELATONIN TAB 3 MG 6 MG: 3 TAB at 09:08

## 2022-08-19 RX ADMIN — OXYCODONE 5 MG: 5 TABLET ORAL at 09:08

## 2022-08-19 RX ADMIN — GABAPENTIN 100 MG: 100 CAPSULE ORAL at 09:08

## 2022-08-19 RX ADMIN — GABAPENTIN 100 MG: 100 CAPSULE ORAL at 03:08

## 2022-08-19 RX ADMIN — CARBOXYMETHYLCELLULOSE SODIUM 1 DROP: 10 GEL OPHTHALMIC at 08:08

## 2022-08-19 RX ADMIN — CARBOXYMETHYLCELLULOSE SODIUM 1 DROP: 10 GEL OPHTHALMIC at 10:08

## 2022-08-19 RX ADMIN — ENOXAPARIN SODIUM 40 MG: 100 INJECTION SUBCUTANEOUS at 04:08

## 2022-08-19 RX ADMIN — TOBRAMYCIN 1 DROP: 3 SOLUTION/ DROPS OPHTHALMIC at 09:08

## 2022-08-19 RX ADMIN — TOBRAMYCIN 1 DROP: 3 SOLUTION/ DROPS OPHTHALMIC at 01:08

## 2022-08-19 RX ADMIN — CIPROFLOXACIN 500 MG: 500 TABLET, FILM COATED ORAL at 08:08

## 2022-08-19 RX ADMIN — FOLIC ACID 1 MG: 1 TABLET ORAL at 09:08

## 2022-08-19 RX ADMIN — METRONIDAZOLE 500 MG: 500 TABLET ORAL at 01:08

## 2022-08-19 RX ADMIN — ACETAMINOPHEN 1000 MG: 500 TABLET ORAL at 08:08

## 2022-08-19 RX ADMIN — OXYCODONE 5 MG: 5 TABLET ORAL at 03:08

## 2022-08-19 RX ADMIN — PREDNISOLONE ACETATE 1 DROP: 10 SUSPENSION/ DROPS OPHTHALMIC at 09:08

## 2022-08-19 RX ADMIN — CARBOXYMETHYLCELLULOSE SODIUM 1 DROP: 10 GEL OPHTHALMIC at 01:08

## 2022-08-19 NOTE — ASSESSMENT & PLAN NOTE
Perianal pain in passage of stool  Multimodal approach  Acetaminophen 1 g TID and gabapentin 100 mg TID  Morphine 5 mg IV q4h prn  oxycodone 10 mg q4h prn  Need judicious use of opioids in IBD, but NSAID is also contraindicated!      Inpatient Morphine Milligram Equivalents Per Day 8/17 - 8/20    Values displayed are in units of MME/Day     Order Start / End Date 8/17 Yesterday Today Tomorrow     morphine injection 2 mg 8/12 - 8/17 6 of 6 -- -- --     oxyCODONE immediate release tablet 5 mg 8/12 - 8/17 7.5 of 7.5 -- -- --     oxyCODONE immediate release tablet Tab 10 mg 8/17 - No end date 0 of 45 30 of 60 0 of 60 0 of 60     morphine injection 5 mg 8/17 - No end date 30 of 60 30 of 90 15 of 90 0 of 90     Daily Totals  43.5 of 118.5 60 of 150 15 of 150 0 of 150        8/19- weaning opioid

## 2022-08-19 NOTE — ANESTHESIA POSTPROCEDURE EVALUATION
Anesthesia Post Evaluation    Patient: Nikkie Myles    Procedure(s) Performed: Procedure(s) (LRB):  EGD (ESOPHAGOGASTRODUODENOSCOPY) (N/A)  COLONOSCOPY (N/A)    Final Anesthesia Type: general      Patient location during evaluation: PACU  Patient participation: Yes- Able to Participate  Level of consciousness: awake and alert and oriented  Pain management: adequate  Airway patency: patent    PONV status at discharge: No PONV  Anesthetic complications: no      Cardiovascular status: blood pressure returned to baseline and hemodynamically stable  Respiratory status: unassisted  Hydration status: euvolemic  Follow-up not needed.          Vitals Value Taken Time   /65 08/18/22 1401   Temp 36.4 °C (97.5 °F) 08/18/22 1340   Pulse 44 08/18/22 1414   Resp 13 08/18/22 1414   SpO2 100 % 08/18/22 1414   Vitals shown include unvalidated device data.      Event Time   Out of Recovery 13:57:00         Pain/Giovanni Score: Pain Rating Prior to Med Admin: 10 (8/19/2022  8:53 AM)  Pain Rating Post Med Admin: 5 (8/18/2022 10:27 AM)  Giovanni Score: 9 (8/18/2022  1:40 PM)

## 2022-08-19 NOTE — PROGRESS NOTES
[facility]  Gastroenterology  Progress Note    Patient Name: Nikkie Myles  MRN: [id number]  Admission Date: 2022  Hospital Length of Stay: 6 days    Subjective:     HPI: Nikkie Myles is a 32 y.o. female with history of Crohn's disease (with duodenal, perianal, sigmoidal and rectal involvement), anemia & corneal ulceration who was transferred from [facility] to [facility] for ophthalmology consult for her corneal ulcer. GI has been consulted for management of her Crohn's disease.     Interval History:  - Able to tolerate most of prep overnight with significant perianal pain  - EGD/colon today, results per procedure notes. Terminal ileum without endoscopic disease    ============================================  IBD history:  - The patient had recurrent ocular infections & erythema nodosum, and was diagnosed with Crohn's disease in 3/2022.   - Started on infliximab and methotrexate by [facility] Dr. Lujan/Yo and CRS Dr. Henson.  - MRI pelvis 2022 showed complex bilateral multiple transsphinteric perianal fistula with internal sphincter components and multiple tiny abscesses along the tract.  - Infliximab infusion history:   - 1st infusion 22   - 2nd infusion 22   - 3rd infusion 22 (completed induction)   - Next infusion due 22 (q8 weeks)     Past surgical history:  has a past surgical history that includes  section, low transverse (2013);  section with tubal ligation (Bilateral, 2020); and  section ().      Endoscopic history:  - EGD (22): Exam concerning for duodenal Crohn's. Biopsy consistent with duodenal enteritis.   - Colonoscopy (22)    No current facility-administered medications on file prior to encounter.     Current Outpatient Medications on File Prior to Encounter   Medication Sig Dispense Refill    acetaminophen (TYLENOL) 500 MG tablet Take 2 tablets (1,000 mg total) by mouth every 6 (six) hours  as needed. 60 tablet 0    erythromycin (ROMYCIN) ophthalmic ointment Place a 1/2 inch ribbon of ointment into the lower eyelid 2-3 times daily. (Patient not taking: Reported on 3/8/2022) 1 g 0    ibuprofen (ADVIL,MOTRIN) 400 MG tablet Take 1 tablet (400 mg total) by mouth every 6 (six) hours as needed. 20 tablet 0    ondansetron (ZOFRAN-ODT) 4 MG TbDL Take 1 tablet (4 mg total) by mouth every 8 (eight) hours as needed. 20 tablet 0    prednisoLONE acetate (PRED FORTE) 1 % DrpS Place 1 drop into the left eye 3 (three) times daily. 10 mL 4       Review of patient's allergies indicates:  No Known Allergies      Objective:     Vitals:    08/18/22 1639   BP: (!) 157/81   Pulse: (!) 49   Resp: 17   Temp: 97.1 °F (36.2 °C)     Constitutional:       General: She is not in acute distress.     Appearance: Normal appearance.   HENT:      Head: Normocephalic and atraumatic.   Cardiovascular:      Rate and Rhythm: Normal rate and regular rhythm.   Pulmonary:      Effort: Pulmonary effort is normal. No respiratory distress.      Breath sounds: Normal breath sounds.   Abdominal:      General: Abdomen is flat. There is no distension.      Palpations: Abdomen is soft.      Tenderness: There is no abdominal tenderness.   Genitourinary:     Comments: deferred  Neurological:      General: No focal deficit present.      Mental Status: She is alert and oriented to person, place, and time.   Psychiatric:         Behavior: Behavior normal.         Thought Content: Thought content normal.     Significant Labs:  Recent Labs   Lab 08/12/22  0055 08/13/22  0505 08/14/22  0248   HGB 9.1* 8.1* 9.1*       Lab Results   Component Value Date    WBC 7.16 08/14/2022    HGB 9.1 (L) 08/14/2022    HCT 30.9 (L) 08/14/2022    MCV 59 (L) 08/14/2022     08/14/2022       Lab Results   Component Value Date     08/14/2022    K 4.1 08/14/2022     08/14/2022    CO2 23 08/14/2022    BUN 8 08/14/2022    CREATININE 0.7 08/14/2022    CALCIUM  8.8 08/14/2022    ANIONGAP 10 08/14/2022    ESTGFRAFRICA >60 07/29/2020    EGFRNONAA >60 07/29/2020       Lab Results   Component Value Date    ALT 9 (L) 08/14/2022    AST 11 08/14/2022    ALKPHOS 79 08/14/2022    BILITOT 0.3 08/14/2022       Lab Results   Component Value Date    INR 0.9 01/12/2012     Significant Imaging:  Reviewed pertinent radiology findings.     Assessment/Plan:     Nikkie Myles is a 32 y.o. female with Crohn's disease (possible gastroduodenitis, ileal, sigmoid/rectum, perianal/perineal with abscess/fistula, S/P fistulotomy)  transferred from [facility] to [facility] for ophthalmology consult for her corneal ulcer. CRS following and are concerned for severe perianal/perineal disease noted on EUA, although no opportunity for seton placement. MRE shows disease activity in distal colon. Last EGD/colonoscopy 4/2022 prior to starting IFX but reports not available though bx c/w H pylori pos gastritis, duodenitis, ileal/sigmoid/rectal and perineal/perianal disease.  Per notes looks like there were plans to optimize remicade dosing in near future by her [facility]/IBD MD. Has 13 week IFX in process and will get 14 week levels if needed. Colonoscopy showed mild disease activity in the sigmoid and rectum, biopsies pending. Terminal ileum was normal. Given severe perianal disease, planning DLI on 8/23.    Problem List:  1.  Crohn's disease with duodenal ?, ileum, sigmoid/rectum, perianal fistula with vaginal ulcer and suspect impending RV fistula  2. Possible hydradenitis supp- perineal   3. S/P anal fistula repair (7/5/22)  4. 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  5. History of erythema nodosum  6. 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 H pylori gastritis- not clear if treated     - CRP (8/12/22): 91.4>61.8>11.4  - C diff negative on 8/8  - CT scan abdomen and pelvis: No evidence for small bowel obstructive process. Subtle suggestion of mild wall and fold  thickening along the rectosigmoid colon without significant pericolonic stranding however may relate to mild rectosigmoid colitis.  There is no evidence for colonic obstructive change.  There is no evidence for free intraperitoneal air.   - MRI pelvis (8/13/22): Two complex perianal fistulas with branching tracks. Along the bilateral medial gluteal cleft just deep to the cutaneous surface there are multiple serpiginous peripherally enhancing tracts which may have multiple cutaneous exits. Tracts are difficult to follow.  - EUA 8/15 shows severe perianal disease with deep ulcerations, shallow vaginal ulceration without fistula, and some changes in ischiorectal fat c/f hidradenitis  -MRE shows mucosal enhancement and wall thickening of the distal descending/sigmoid colon in this patient with Crohn's disease, as above, likely representing chronic inflammatory process.  No definitive abscess collection. Persistent perianal fistula as seen on MRI pelvis 8/13/22. Pt does have some passage of air in vagina suspicious for early RV fistula    - EGD 8/18:   Impression:            - Normal esophagus.                          - Esophagogastric landmarks identified.                          - A single gastric polyp. This appears like a                          benign inflammatory polyp.                          - Congested and erythematous mucosa in the gastric                          body and antrum. Biopsied.                          - Duodenal aphthous ulcers, which are a                          non-specific finding. Biopsied.                          - Normal second portion of the duodenum and third                          portion of the duodenum.    Colonoscopy 8/18:  Impression:            - Anal fissure and perianal skin tags found on                          perianal exam.                          - Erythematous mucosa in the rectum. Biopsied.                          - Minimal patchy erythematous mucosa in the                           sigmoid colon. Biopsied.                          - The descending colon, transverse colon,                          ascending colon and cecum are normal.                          - The examined portion of the ileum was normal.                          Biopsied.                          - Biopsies were obtained in the descending colon,                          in the transverse colon, in the ascending colon                          and in the cecum.       Recommendations:  - Solumedrol 25 mg IV q12h. Trending CRP  - With upcoming surgery, postpone next infliximab infusion (originally planned 8/19)   - Has infliximab level pending from 8/12, expect result on 8/19   - Repeat infliximab level collected this AM as true 14 week trough level  - EGD/colonoscopy as above. Terminal ileum without disease activity.  - Continue abx per primary team: cipro/flagyl  - optho following for corneal ulcer, will discuss steroid plan  - Follow up on stool studies  - Anemia- S/P venofer in past (8/14- Hgb 9.1)  - H. Pylori biopsies taken during EGD (positive in April, unclear if treated)  - On DVT ppx: lovenox    Thank you for involving us in the care of Nikkie Myles. Please call with any additional questions, concerns or changes in the patient's clinical status. We will continue to follow.     Brian Selby MD  Gastroenterology Fellow PGY IV   [facility]

## 2022-08-19 NOTE — PROGRESS NOTES
[facility]  Gastroenterology  Progress Note    Patient Name: Nikkie Myles  MRN: [id number]  Admission Date: 2022  Hospital Length of Stay: 7 days    Subjective:     HPI: Nikkie Myles is a 32 y.o. female with history of Crohn's disease (with duodenal, perianal, sigmoidal and rectal involvement), anemia & corneal ulceration who was transferred from [facility] to [facility] for ophthalmology consult for her corneal ulcer. GI has been consulted for management of her Crohn's disease.     Interval History:  - One BM since EGD/colon, loosely formed, improved in consistency, not bloody  - Reported receiving PO oxycodone and Iv morphine for pain, still having pain with BMs    ============================================  IBD history:  - The patient had recurrent ocular infections & erythema nodosum, and was diagnosed with Crohn's disease in 3/2022.   - Started on infliximab and methotrexate by [facility] Dr. Lujan/Yo and CRS Dr. Henson.  - MRI pelvis 2022 showed complex bilateral multiple transsphinteric perianal fistula with internal sphincter components and multiple tiny abscesses along the tract.  - Infliximab infusion history:   - 1st infusion 22   - 2nd infusion 22   - 3rd infusion 22 (completed induction)   - Next infusion due 22 (q8 weeks)     Past surgical history:  has a past surgical history that includes  section, low transverse (2013);  section with tubal ligation (Bilateral, 2020); and  section ().      Endoscopic history:  - EGD (22): Exam concerning for duodenal Crohn's. Biopsy consistent with duodenal enteritis.   - Colonoscopy (22)    No current facility-administered medications on file prior to encounter.     Current Outpatient Medications on File Prior to Encounter   Medication Sig Dispense Refill    acetaminophen (TYLENOL) 500 MG tablet Take 2 tablets (1,000 mg total) by mouth every 6 (six)  hours as needed. 60 tablet 0    erythromycin (ROMYCIN) ophthalmic ointment Place a 1/2 inch ribbon of ointment into the lower eyelid 2-3 times daily. (Patient not taking: Reported on 3/8/2022) 1 g 0    ibuprofen (ADVIL,MOTRIN) 400 MG tablet Take 1 tablet (400 mg total) by mouth every 6 (six) hours as needed. 20 tablet 0    ondansetron (ZOFRAN-ODT) 4 MG TbDL Take 1 tablet (4 mg total) by mouth every 8 (eight) hours as needed. 20 tablet 0    prednisoLONE acetate (PRED FORTE) 1 % DrpS Place 1 drop into the left eye 3 (three) times daily. 10 mL 4       Review of patient's allergies indicates:  No Known Allergies      Objective:     Vitals:    08/19/22 1604   BP: (!) 92/50   Pulse: 63   Resp: 17   Temp: 97.9 °F (36.6 °C)     Constitutional:       General: She is not in acute distress.     Appearance: Normal appearance.   HENT:      Head: Normocephalic and atraumatic.   Cardiovascular:      Rate and Rhythm: Normal rate and regular rhythm.   Pulmonary:      Effort: Pulmonary effort is normal. No respiratory distress.      Breath sounds: Normal breath sounds.   Abdominal:      General: Abdomen is flat. There is no distension.      Palpations: Abdomen is soft.      Tenderness: There is no abdominal tenderness.   Genitourinary:     Comments: deferred  Neurological:      General: No focal deficit present.      Mental Status: She is alert and oriented to person, place, and time.   Psychiatric:         Behavior: Behavior normal.         Thought Content: Thought content normal.     Significant Labs:  Recent Labs   Lab 08/13/22  0505 08/14/22  0248 08/19/22  1102   HGB 8.1* 9.1* 9.6*       Lab Results   Component Value Date    WBC 11.20 08/19/2022    HGB 9.6 (L) 08/19/2022    HCT 31.1 (L) 08/19/2022    MCV 58 (L) 08/19/2022     08/19/2022       Lab Results   Component Value Date     (L) 08/19/2022    K 4.4 08/19/2022     08/19/2022    CO2 24 08/19/2022    BUN 14 08/19/2022    CREATININE 0.6 08/19/2022     CALCIUM 9.0 08/19/2022    ANIONGAP 8 08/19/2022    ESTGFRAFRICA >60 07/29/2020    EGFRNONAA >60 07/29/2020       Lab Results   Component Value Date    ALT 15 08/19/2022    AST 12 08/19/2022    ALKPHOS 62 08/19/2022    BILITOT 0.2 08/19/2022       Lab Results   Component Value Date    INR 0.9 01/12/2012     Significant Imaging:  Reviewed pertinent radiology findings.     Assessment/Plan:     Nikkie Myles is a 32 y.o. female with Crohn's disease (possible gastroduodenitis, ileal, sigmoid/rectum, perianal/perineal with abscess/fistula, S/P fistulotomy)  transferred from [facility] to [facility] for ophthalmology consult for her corneal ulcer. CRS following and are concerned for severe perianal/perineal disease noted on EUA, although no opportunity for seton placement. MRE shows disease activity in distal colon. Last EGD/colonoscopy 4/2022 prior to starting IFX but reports not available though bx c/w H pylori pos gastritis, duodenitis, ileal/sigmoid/rectal and perineal/perianal disease.  Per notes looks like there were plans to optimize remicade dosing in near future by her [facility] GI/IBD MD. Has 13 week IFX in process and will get 14 week levels if needed. Colonoscopy showed mild disease activity in the sigmoid and rectum, biopsies pending. Terminal ileum was normal. Given severe perianal disease, planning DLI on 8/23.    Problem List:  1.  Crohn's disease with duodenal ?, ileum, sigmoid/rectum, perianal fistula with vaginal ulcer and suspect impending RV fistula  2. Possible hydradenitis supp- perineal   3. S/P anal fistula repair (7/5/22)  4. 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  5. History of erythema nodosum  6. 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 H pylori gastritis- not clear if treated     - CRP (8/12/22): 91.4>61.8>11.4  - C diff negative on 8/8  - CT scan abdomen and pelvis: No evidence for small bowel obstructive process. Subtle suggestion of mild wall and fold  thickening along the rectosigmoid colon without significant pericolonic stranding however may relate to mild rectosigmoid colitis.  There is no evidence for colonic obstructive change.  There is no evidence for free intraperitoneal air.   - MRI pelvis (8/13/22): Two complex perianal fistulas with branching tracks. Along the bilateral medial gluteal cleft just deep to the cutaneous surface there are multiple serpiginous peripherally enhancing tracts which may have multiple cutaneous exits. Tracts are difficult to follow.  - EUA 8/15 shows severe perianal disease with deep ulcerations, shallow vaginal ulceration without fistula, and some changes in ischiorectal fat c/f hidradenitis  -MRE shows mucosal enhancement and wall thickening of the distal descending/sigmoid colon in this patient with Crohn's disease, as above, likely representing chronic inflammatory process.  No definitive abscess collection. Persistent perianal fistula as seen on MRI pelvis 8/13/22. Pt does have some passage of air in vagina suspicious for early RV fistula    - EGD 8/18:   Impression:            - Normal esophagus.                          - Esophagogastric landmarks identified.                          - A single gastric polyp. This appears like a                          benign inflammatory polyp.                          - Congested and erythematous mucosa in the gastric                          body and antrum. Biopsied.                          - Duodenal aphthous ulcers, which are a                          non-specific finding. Biopsied.                          - Normal second portion of the duodenum and third                          portion of the duodenum.    Colonoscopy 8/18:  Impression:            - Anal fissure and perianal skin tags found on                          perianal exam.                          - Erythematous mucosa in the rectum. Biopsied.                          - Minimal patchy erythematous mucosa in the                           sigmoid colon. Biopsied.                          - The descending colon, transverse colon,                          ascending colon and cecum are normal.                          - The examined portion of the ileum was normal.                          Biopsied.                          - Biopsies were obtained in the descending colon,                          in the transverse colon, in the ascending colon                          and in the cecum.       Recommendations:  - Reduced solumedrol to 20 mg IV q12h. Plan to continue taper if not uptrending  - With upcoming surgery, postpone next infliximab infusion (originally planned 8/19)   - Has infliximab level pending from 8/12, expect result by 8/22  - Repeat infliximab level collected 8/18 as true 14-week trough level  - EGD/colonoscopy as above. Terminal ileum without disease activity  - Would stop antibiotics at this point  - optho following for corneal ulcer, will discuss steroid plan  - Follow up on stool studies  - Anemia- S/P venofer in past (8/14- Hgb 9.1)  - H. Pylori biopsies taken during EGD (positive in April, unclear if treated)  - On DVT ppx: lovenox    Thank you for involving us in the care of Nikkie Myles. Please call with any additional questions, concerns or changes in the patient's clinical status. We will continue to follow.     Brian Selby MD  Gastroenterology Fellow PGY IV   [facility]

## 2022-08-19 NOTE — SUBJECTIVE & OBJECTIVE
Interval History: see above    Review of Systems   Constitutional:  Negative for activity change, chills, fatigue and fever.   HENT:  Negative for congestion, nosebleeds and trouble swallowing.    Respiratory:  Negative for apnea, cough, choking, chest tightness and shortness of breath.    Cardiovascular:  Negative for chest pain and leg swelling.   Gastrointestinal:  Negative for abdominal distention, abdominal pain, constipation, diarrhea, nausea and vomiting.   Genitourinary:  Negative for decreased urine volume, difficulty urinating, dysuria and frequency.   Musculoskeletal:  Negative for arthralgias, back pain, joint swelling, neck pain and neck stiffness.   Skin:  Negative for rash and wound.   Neurological:  Negative for dizziness, seizures, syncope, weakness, light-headedness, numbness and headaches.   Psychiatric/Behavioral:  Negative for agitation, behavioral problems, confusion, hallucinations, self-injury and sleep disturbance. The patient is not nervous/anxious.    Objective:     Vital Signs (Most Recent):  Temp: 97.9 °F (36.6 °C) (08/19/22 1604)  Pulse: 63 (08/19/22 1604)  Resp: 17 (08/19/22 1604)  BP: (!) 92/50 (08/19/22 1604)  SpO2: 100 % (08/19/22 1604)   Vital Signs (24h Range):  Temp:  [97.1 °F (36.2 °C)-98.3 °F (36.8 °C)] 97.9 °F (36.6 °C)  Pulse:  [49-76] 63  Resp:  [16-18] 17  SpO2:  [95 %-100 %] 100 %  BP: ()/(50-81) 92/50     Weight: 77.2 kg (170 lb 3.1 oz)  Body mass index is 33.24 kg/m².    Intake/Output Summary (Last 24 hours) at 8/19/2022 1615  Last data filed at 8/19/2022 0853  Gross per 24 hour   Intake 200 ml   Output --   Net 200 ml      Physical Exam  Constitutional:       General: She is not in acute distress.     Appearance: Normal appearance.   HENT:      Head: Normocephalic and atraumatic.      Nose: Nose normal.      Mouth/Throat:      Mouth: Mucous membranes are moist.   Eyes:      General: No scleral icterus.     Extraocular Movements: Extraocular movements intact.       Pupils: Pupils are equal, round, and reactive to light.   Cardiovascular:      Rate and Rhythm: Normal rate and regular rhythm.      Pulses: Normal pulses.      Heart sounds: Normal heart sounds.   Pulmonary:      Effort: Pulmonary effort is normal.      Breath sounds: Normal breath sounds. No wheezing or rhonchi.   Chest:      Chest wall: No tenderness.   Abdominal:      General: Abdomen is flat. Bowel sounds are normal. There is no distension.      Palpations: Abdomen is soft.      Tenderness: There is no abdominal tenderness. There is no right CVA tenderness, left CVA tenderness, guarding or rebound.   Musculoskeletal:         General: No swelling, tenderness, deformity or signs of injury. Normal range of motion.      Cervical back: Normal range of motion and neck supple. No rigidity or tenderness.   Skin:     General: Skin is warm and dry.      Coloration: Skin is not jaundiced or pale.      Findings: No erythema or rash.   Neurological:      General: No focal deficit present.      Mental Status: She is alert and oriented to person, place, and time. Mental status is at baseline.      Cranial Nerves: No cranial nerve deficit.      Motor: No weakness.   Psychiatric:         Mood and Affect: Mood normal.         Behavior: Behavior normal.         Thought Content: Thought content normal.         Judgment: Judgment normal.       Significant Labs: All pertinent labs within the past 24 hours have been reviewed.  CBC:   Recent Labs   Lab 08/19/22  1102   WBC 11.20   HGB 9.6*   HCT 31.1*        CMP:   Recent Labs   Lab 08/19/22  1102   *   K 4.4      CO2 24   *   BUN 14   CREATININE 0.6   CALCIUM 9.0   PROT 8.0   ALBUMIN 2.8*   BILITOT 0.2   ALKPHOS 62   AST 12   ALT 15   ANIONGAP 8       Significant Imaging: I have reviewed all pertinent imaging results/findings within the past 24 hours.

## 2022-08-19 NOTE — PLAN OF CARE
[facility]  Discharge Reassessment    Primary Care Provider: Brian Collado MD    Expected Discharge Date: 8/24/2022    Reassessment (most recent)     Discharge Reassessment - 08/19/22 0903        Discharge Reassessment    Assessment Type Discharge Planning Reassessment     Did the patient's condition or plan change since previous assessment? Yes     Discharge Plan discussed with: Patient     Communicated ENDY with patient/caregiver Yes     Discharge Plan A Home     Discharge Plan B Home with family     DME Needed Upon Discharge  none     Discharge Barriers Identified None     Why the patient remains in the hospital Requires continued medical care        Post-Acute Status    Discharge Delays None known at this time

## 2022-08-19 NOTE — ASSESSMENT & PLAN NOTE
"Hs of Perirectal fistula    Recent diagnosis March 2022 of duodenal, ileocolonic and perianal Crohn disease on Remicade and MTX doing well up into 2 weeks ago.    - CT showed mild rectosigmoid colitis, and prominent appearance of the region of the lower uterine segment/cervix  - GI consulted  ;  Appreciate recs  - C diff negative, stool leukocytes - positive  - started ceftriaxone and vancomycin  - follow up additional stool studies: fecal calprotectin and stool culture positive  - General surgery evaluated at outside hospital ; per note no drainable fluid collection.   - Infliximab level and antibody pending, next dose plan for 8/19  - Restart MTX when okay GI  - MRI pelvis showed 2 complex perianal fistulas  - MRI enterography showed "mucosal enhancement and wall thickening of the distal descending/sigmoid colon" and one perianal fistula.   -CRS evaluated patient via EUA - extensive disease but no defined fistulas   -candidate for fecal diversion  - STD work up pending  - intermittent methylprednisolone IV per GI - now 20 mg IV q8h  - ID consulted -recommended ceftriaxone and metronidazole for 5 more days  - continue ciprofloxacin and metronidazole for now  - colonoscopy per GI to assess for ideal placement of ostomy. Clear liquid diet now    8/19- Per CRS: on the schedule on Tuesday for diverting loop ileostomy, pending final results of endoscopies.  Will have her seen in marked for ileostomy preoperatively. Remcade Infusion 8/19 being held due to plans for diverting ileostomy. On soft diet, on steroids, flagyl, cipro.  "

## 2022-08-19 NOTE — PROGRESS NOTES
[facility] Medicine  Progress Note    Patient Name: Nikkie Myles  MRN: [id number]  Patient Class: IP- Inpatient   Admission Date: 8/11/2022  Length of Stay: 7 days  Attending Physician: Shayy Dietz MD  Primary Care Provider: Brian Collado MD        Subjective:     Principal Problem:Crohn's disease with abscess        HPI:  Ms. Nikkie Myles is a 32 y.o. female with  duodenal, ileocolonic and perianal Crohn's disease on Remicade and MTX, as well as a a chronic right corneal ulcer, who presented to the  ER on 8/11 for evaluation of several weeks bloody diarrhea and abdominal cramping, as well as vaginal discharge.  CT abdomen pelvis showed mild rectosigmoid colitis, and prominent appearance of the region of the lower uterine segment/cervix.  MRI was ordered for further evaluation.  GI and GYN were consulted.  Stool studies and C. Diff were collected.  She was given a dose of Vanc and Ceftriaxone.  She also endorses complete loss of vision in her right eye and significant discomfort, when she normally can see shapes and colors.  Her case was discussed with Dr. Strickland of Optho, who suggested she be transferred to [facility] for Optho evaluation.  She was given Solumedrol 125mg IV x 1. Last infliximab 7/14 and next infusion for 8/19. Compliant with MTX 15 mg once a week up until last Sat due to insurance issue.      Patient transferred ED to ED due to bed availability. In ED patient afebrile and hemodynamically stable. Continues to report ongoing abdominal/rectal pain and blood mixed stools. Ophthalmology consulted and evaluated patient in the ED. Patient admitted to the care of medicine for further evaluation and management.      Overview/Hospital Course:  She was started on IV solumedrol and ophthalmology managing eye medication. Colorectal surgery did EUA and did not find fistula to where they can place seton. MRI enterography confirmed severe distal colon disease and no abscess. They recommend  fecal diversion. GI to perform colonoscopy to determine extent of disease for ostomy placement. Patient started on vancomycin and ceftriaxone due to concern of MRSA of eye and intraabdominal abscess. ID consulted and recommended a limited course of ceftriaxone and metronidazole. Will continue on ciprofloxacin and metronidazole as per CRS. GI is managing corticosteroids.    8/18- per GI:  32 y.o. female with history of Crohn's disease (with duodenal, perianal, sigmoidal and rectal involvement), anemia & corneal ulceration who was transferred from [facility] to [facility] for ophthalmology consult for her corneal ulcer.   - Still having severe perianal pain provoked by BMs  - R eye looks and feels better  - 3 BMs past 24h, 2 liquid, one soft, no blood  - CRP downtrending  - on solumedrol  /70  Pulse 52  AF, CRS planning for fecal diversion, ileostomy next Tuesday 8/19- advance diet. /67 and VSS. Pain under control.; lab done. We had a long talk about ostomies.      Interval History: see above    Review of Systems   Constitutional:  Negative for activity change, chills, fatigue and fever.   HENT:  Negative for congestion, nosebleeds and trouble swallowing.    Respiratory:  Negative for apnea, cough, choking, chest tightness and shortness of breath.    Cardiovascular:  Negative for chest pain and leg swelling.   Gastrointestinal:  Negative for abdominal distention, abdominal pain, constipation, diarrhea, nausea and vomiting.   Genitourinary:  Negative for decreased urine volume, difficulty urinating, dysuria and frequency.   Musculoskeletal:  Negative for arthralgias, back pain, joint swelling, neck pain and neck stiffness.   Skin:  Negative for rash and wound.   Neurological:  Negative for dizziness, seizures, syncope, weakness, light-headedness, numbness and headaches.   Psychiatric/Behavioral:  Negative for agitation, behavioral problems, confusion, hallucinations, self-injury and sleep  disturbance. The patient is not nervous/anxious.    Objective:     Vital Signs (Most Recent):  Temp: 97.9 °F (36.6 °C) (08/19/22 1604)  Pulse: 63 (08/19/22 1604)  Resp: 17 (08/19/22 1604)  BP: (!) 92/50 (08/19/22 1604)  SpO2: 100 % (08/19/22 1604)   Vital Signs (24h Range):  Temp:  [97.1 °F (36.2 °C)-98.3 °F (36.8 °C)] 97.9 °F (36.6 °C)  Pulse:  [49-76] 63  Resp:  [16-18] 17  SpO2:  [95 %-100 %] 100 %  BP: ()/(50-81) 92/50     Weight: 77.2 kg (170 lb 3.1 oz)  Body mass index is 33.24 kg/m².    Intake/Output Summary (Last 24 hours) at 8/19/2022 1615  Last data filed at 8/19/2022 0853  Gross per 24 hour   Intake 200 ml   Output --   Net 200 ml      Physical Exam  Constitutional:       General: She is not in acute distress.     Appearance: Normal appearance.   HENT:      Head: Normocephalic and atraumatic.      Nose: Nose normal.      Mouth/Throat:      Mouth: Mucous membranes are moist.   Eyes:      General: No scleral icterus.     Extraocular Movements: Extraocular movements intact.      Pupils: Pupils are equal, round, and reactive to light.   Cardiovascular:      Rate and Rhythm: Normal rate and regular rhythm.      Pulses: Normal pulses.      Heart sounds: Normal heart sounds.   Pulmonary:      Effort: Pulmonary effort is normal.      Breath sounds: Normal breath sounds. No wheezing or rhonchi.   Chest:      Chest wall: No tenderness.   Abdominal:      General: Abdomen is flat. Bowel sounds are normal. There is no distension.      Palpations: Abdomen is soft.      Tenderness: There is no abdominal tenderness. There is no right CVA tenderness, left CVA tenderness, guarding or rebound.   Musculoskeletal:         General: No swelling, tenderness, deformity or signs of injury. Normal range of motion.      Cervical back: Normal range of motion and neck supple. No rigidity or tenderness.   Skin:     General: Skin is warm and dry.      Coloration: Skin is not jaundiced or pale.      Findings: No erythema or rash.  "  Neurological:      General: No focal deficit present.      Mental Status: She is alert and oriented to person, place, and time. Mental status is at baseline.      Cranial Nerves: No cranial nerve deficit.      Motor: No weakness.   Psychiatric:         Mood and Affect: Mood normal.         Behavior: Behavior normal.         Thought Content: Thought content normal.         Judgment: Judgment normal.       Significant Labs: All pertinent labs within the past 24 hours have been reviewed.  CBC:   Recent Labs   Lab 08/19/22  1102   WBC 11.20   HGB 9.6*   HCT 31.1*        CMP:   Recent Labs   Lab 08/19/22  1102   *   K 4.4      CO2 24   *   BUN 14   CREATININE 0.6   CALCIUM 9.0   PROT 8.0   ALBUMIN 2.8*   BILITOT 0.2   ALKPHOS 62   AST 12   ALT 15   ANIONGAP 8       Significant Imaging: I have reviewed all pertinent imaging results/findings within the past 24 hours.      Assessment/Plan:      * Crohn's disease with abscess  Hs of Perirectal fistula    Recent diagnosis March 2022 of duodenal, ileocolonic and perianal Crohn disease on Remicade and MTX doing well up into 2 weeks ago.    - CT showed mild rectosigmoid colitis, and prominent appearance of the region of the lower uterine segment/cervix  - GI consulted  ;  Appreciate recs  - C diff negative, stool leukocytes - positive  - started ceftriaxone and vancomycin  - follow up additional stool studies: fecal calprotectin and stool culture positive  - General surgery evaluated at outside hospital ; per note no drainable fluid collection.   - Infliximab level and antibody pending, next dose plan for 8/19  - Restart MTX when okay GI  - MRI pelvis showed 2 complex perianal fistulas  - MRI enterography showed "mucosal enhancement and wall thickening of the distal descending/sigmoid colon" and one perianal fistula.   -CRS evaluated patient via EUA - extensive disease but no defined fistulas   -candidate for fecal diversion  - STD work up pending  - " intermittent methylprednisolone IV per GI - now 20 mg IV q8h  - ID consulted -recommended ceftriaxone and metronidazole for 5 more days  - continue ciprofloxacin and metronidazole for now  - colonoscopy per GI to assess for ideal placement of ostomy. Clear liquid diet now    8/19- Per CRS: on the schedule on Tuesday for diverting loop ileostomy, pending final results of endoscopies.  Will have her seen in marked for ileostomy preoperatively. Remcade Infusion 8/19 being held due to plans for diverting ileostomy. On soft diet, on steroids, flagyl, cipro.    Acute pain  Perianal pain in passage of stool  Multimodal approach  Acetaminophen 1 g TID and gabapentin 100 mg TID  Morphine 5 mg IV q4h prn  oxycodone 10 mg q4h prn  Need judicious use of opioids in IBD, but NSAID is also contraindicated!      Inpatient Morphine Milligram Equivalents Per Day 8/17 - 8/20    Values displayed are in units of MME/Day     Order Start / End Date 8/17 Yesterday Today Tomorrow     morphine injection 2 mg 8/12 - 8/17 6 of 6 -- -- --     oxyCODONE immediate release tablet 5 mg 8/12 - 8/17 7.5 of 7.5 -- -- --     oxyCODONE immediate release tablet Tab 10 mg 8/17 - No end date 0 of 45 30 of 60 0 of 60 0 of 60     morphine injection 5 mg 8/17 - No end date 30 of 60 30 of 90 15 of 90 0 of 90     Daily Totals  43.5 of 118.5 60 of 150 15 of 150 0 of 150        8/19- weaning opioid    Central corneal ulcer, right  - Solumedrol 125mg IV x 1, now solumedrol 20 mg IV q8h  - Ophthalmology consulted and evaluated patient  ;  Appreciate recs. Managing eye drops and ointments.    Corneal ulcer  See ophthalmology consuts      Crohn's disease of both small and large intestine with fistula  Per history.   Has severe leesa-anal disease  See above      VTE Risk Mitigation (From admission, onward)         Ordered     enoxaparin injection 40 mg  Daily         08/14/22 0219     IP VTE HIGH RISK PATIENT  Once         08/12/22 2058                Discharge  Planning   ENDY: 8/24/2022     Code Status: Full Code   Is the patient medically ready for discharge?: No    Reason for patient still in hospital (select all that apply): Patient trending condition  Discharge Plan A: Home   Discharge Delays: None known at this time      Shayy Dietz MD  Department of Hospital Medicine   [facility]

## 2022-08-20 PROCEDURE — 25000003 PHARM REV CODE 250: Performed by: FAMILY MEDICINE

## 2022-08-20 PROCEDURE — 25000003 PHARM REV CODE 250: Performed by: HOSPITALIST

## 2022-08-20 PROCEDURE — 63600175 PHARM REV CODE 636 W HCPCS: Performed by: INTERNAL MEDICINE

## 2022-08-20 PROCEDURE — 99232 PR SUBSEQUENT HOSPITAL CARE,LEVL II: ICD-10-PCS | Mod: ,,, | Performed by: HOSPITALIST

## 2022-08-20 PROCEDURE — 25000003 PHARM REV CODE 250: Performed by: NURSE PRACTITIONER

## 2022-08-20 PROCEDURE — 11000001 HC ACUTE MED/SURG PRIVATE ROOM

## 2022-08-20 PROCEDURE — 25000003 PHARM REV CODE 250

## 2022-08-20 PROCEDURE — 63600175 PHARM REV CODE 636 W HCPCS: Performed by: STUDENT IN AN ORGANIZED HEALTH CARE EDUCATION/TRAINING PROGRAM

## 2022-08-20 PROCEDURE — 25000003 PHARM REV CODE 250: Performed by: INTERNAL MEDICINE

## 2022-08-20 PROCEDURE — 99232 SBSQ HOSP IP/OBS MODERATE 35: CPT | Mod: ,,, | Performed by: HOSPITALIST

## 2022-08-20 PROCEDURE — 94761 N-INVAS EAR/PLS OXIMETRY MLT: CPT

## 2022-08-20 RX ADMIN — TOBRAMYCIN 1 DROP: 3 SOLUTION/ DROPS OPHTHALMIC at 04:08

## 2022-08-20 RX ADMIN — GABAPENTIN 100 MG: 100 CAPSULE ORAL at 09:08

## 2022-08-20 RX ADMIN — TOBRAMYCIN 1 DROP: 3 SOLUTION/ DROPS OPHTHALMIC at 08:08

## 2022-08-20 RX ADMIN — ACETAMINOPHEN 1000 MG: 500 TABLET ORAL at 09:08

## 2022-08-20 RX ADMIN — OXYCODONE 5 MG: 5 TABLET ORAL at 09:08

## 2022-08-20 RX ADMIN — PREDNISOLONE ACETATE 1 DROP: 10 SUSPENSION/ DROPS OPHTHALMIC at 09:08

## 2022-08-20 RX ADMIN — TOBRAMYCIN 1 DROP: 3 SOLUTION/ DROPS OPHTHALMIC at 02:08

## 2022-08-20 RX ADMIN — CARBOXYMETHYLCELLULOSE SODIUM 1 DROP: 10 GEL OPHTHALMIC at 04:08

## 2022-08-20 RX ADMIN — GABAPENTIN 100 MG: 100 CAPSULE ORAL at 08:08

## 2022-08-20 RX ADMIN — ACETAMINOPHEN 1000 MG: 500 TABLET ORAL at 08:08

## 2022-08-20 RX ADMIN — ACETAMINOPHEN 1000 MG: 500 TABLET ORAL at 02:08

## 2022-08-20 RX ADMIN — CARBOXYMETHYLCELLULOSE SODIUM 1 DROP: 10 GEL OPHTHALMIC at 10:08

## 2022-08-20 RX ADMIN — CARBOXYMETHYLCELLULOSE SODIUM 1 DROP: 10 GEL OPHTHALMIC at 08:08

## 2022-08-20 RX ADMIN — METHYLPREDNISOLONE SODIUM SUCCINATE 20 MG: 40 INJECTION, POWDER, FOR SOLUTION INTRAMUSCULAR; INTRAVENOUS at 06:08

## 2022-08-20 RX ADMIN — PREDNISOLONE ACETATE 1 DROP: 10 SUSPENSION/ DROPS OPHTHALMIC at 08:08

## 2022-08-20 RX ADMIN — PREDNISOLONE ACETATE 1 DROP: 10 SUSPENSION/ DROPS OPHTHALMIC at 02:08

## 2022-08-20 RX ADMIN — METHYLPREDNISOLONE SODIUM SUCCINATE 10 MG: 40 INJECTION, POWDER, FOR SOLUTION INTRAMUSCULAR; INTRAVENOUS at 04:08

## 2022-08-20 RX ADMIN — OXYCODONE 5 MG: 5 TABLET ORAL at 08:08

## 2022-08-20 RX ADMIN — ENOXAPARIN SODIUM 40 MG: 100 INJECTION SUBCUTANEOUS at 04:08

## 2022-08-20 RX ADMIN — TOBRAMYCIN 1 DROP: 3 SOLUTION/ DROPS OPHTHALMIC at 09:08

## 2022-08-20 RX ADMIN — MELATONIN TAB 3 MG 6 MG: 3 TAB at 09:08

## 2022-08-20 RX ADMIN — GABAPENTIN 100 MG: 100 CAPSULE ORAL at 02:08

## 2022-08-20 RX ADMIN — CARBOXYMETHYLCELLULOSE SODIUM 1 DROP: 10 GEL OPHTHALMIC at 02:08

## 2022-08-20 RX ADMIN — FOLIC ACID 1 MG: 1 TABLET ORAL at 08:08

## 2022-08-20 NOTE — PLAN OF CARE
Pt pain regimen was adjusted today. She now only has Oxy 5 mg PO but stated that her pain has progressively gotten better. Pt is expected to have ileostomy procedure on Tuesday, abdomen area marked by wound care. Otherwise no major events.   Problem: Adult Inpatient Plan of Care  Goal: Plan of Care Review  Outcome: Ongoing, Progressing  Goal: Patient-Specific Goal (Individualized)  Outcome: Ongoing, Progressing  Goal: Absence of Hospital-Acquired Illness or Injury  Outcome: Ongoing, Progressing  Goal: Optimal Comfort and Wellbeing  Outcome: Ongoing, Progressing  Goal: Readiness for Transition of Care  Outcome: Ongoing, Progressing     Problem: Infection  Goal: Absence of Infection Signs and Symptoms  Outcome: Ongoing, Progressing     Problem: Diarrhea  Goal: Fluid and Electrolyte Balance  Outcome: Ongoing, Progressing

## 2022-08-20 NOTE — ASSESSMENT & PLAN NOTE
Perianal pain in passage of stool  Multimodal approach  Acetaminophen 1 g TID and gabapentin 100 mg TID  Morphine 5 mg IV q4h prn  oxycodone 10 mg q4h prn  Need judicious use of opioids in IBD, but NSAID is also contraindicated!    8/20- weaning opioid: MEDD 60-> 30  Inpatient Morphine Milligram Equivalents Per Day 8/18 - 8/21    Values displayed are in units of MME/Day     Order Start / End Date 8/18 Yesterday Today Tomorrow     oxyCODONE immediate release tablet Tab 10 mg 8/17 - 8/19 30 of 75 0 of 15 -- --     morphine injection 5 mg 8/17 - 8/19 30 of 105 15 of 30 -- --     oxyCODONE immediate release tablet 5 mg 8/19 - No end date -- 15 of 22.5 0 of 30 0 of 30     Daily Totals  60 of 180 30 of 67.5 0 of 30 0 of 30

## 2022-08-20 NOTE — SUBJECTIVE & OBJECTIVE
Interval History: see above    Review of Systems   Constitutional:  Negative for activity change, chills, fatigue and fever.   HENT:  Negative for congestion, nosebleeds and trouble swallowing.    Respiratory:  Negative for apnea, cough, choking, chest tightness and shortness of breath.    Cardiovascular:  Negative for chest pain and leg swelling.   Gastrointestinal:  Negative for abdominal distention, abdominal pain, constipation, diarrhea, nausea and vomiting.   Genitourinary:  Negative for decreased urine volume, difficulty urinating, dysuria and frequency.   Musculoskeletal:  Negative for arthralgias, back pain, joint swelling, neck pain and neck stiffness.   Skin:  Negative for rash and wound.   Neurological:  Negative for dizziness, seizures, syncope, weakness, light-headedness, numbness and headaches.   Psychiatric/Behavioral:  Negative for agitation, behavioral problems, confusion, hallucinations, self-injury and sleep disturbance. The patient is not nervous/anxious.    Objective:     Vital Signs (Most Recent):  Temp: 96.9 °F (36.1 °C) (08/19/22 2026)  Pulse: (!) 58 (08/19/22 2026)  Resp: 16 (08/19/22 2154)  BP: 106/68 (08/19/22 2026)  SpO2: 97 % (08/19/22 2026)   Vital Signs (24h Range):  Temp:  [96.9 °F (36.1 °C)-98.3 °F (36.8 °C)] 96.9 °F (36.1 °C)  Pulse:  [52-70] 58  Resp:  [16-17] 16  SpO2:  [95 %-100 %] 97 %  BP: ()/(50-68) 106/68     Weight: 77.2 kg (170 lb 3.1 oz)  Body mass index is 33.24 kg/m².    Intake/Output Summary (Last 24 hours) at 8/20/2022 0708  Last data filed at 8/19/2022 1800  Gross per 24 hour   Intake 900 ml   Output --   Net 900 ml        Physical Exam  Constitutional:       General: She is not in acute distress.     Appearance: Normal appearance.   HENT:      Head: Normocephalic and atraumatic.      Nose: Nose normal.      Mouth/Throat:      Mouth: Mucous membranes are moist.   Eyes:      General: No scleral icterus.     Extraocular Movements: Extraocular movements intact.       Pupils: Pupils are equal, round, and reactive to light.   Cardiovascular:      Rate and Rhythm: Normal rate and regular rhythm.      Pulses: Normal pulses.      Heart sounds: Normal heart sounds.   Pulmonary:      Effort: Pulmonary effort is normal.      Breath sounds: Normal breath sounds. No wheezing or rhonchi.   Chest:      Chest wall: No tenderness.   Abdominal:      General: Abdomen is flat. Bowel sounds are normal. There is no distension.      Palpations: Abdomen is soft.      Tenderness: There is no abdominal tenderness. There is no right CVA tenderness, left CVA tenderness, guarding or rebound.   Musculoskeletal:         General: No swelling, tenderness, deformity or signs of injury. Normal range of motion.      Cervical back: Normal range of motion and neck supple. No rigidity or tenderness.   Skin:     General: Skin is warm and dry.      Coloration: Skin is not jaundiced or pale.      Findings: No erythema or rash.   Neurological:      General: No focal deficit present.      Mental Status: She is alert and oriented to person, place, and time. Mental status is at baseline.      Cranial Nerves: No cranial nerve deficit.      Motor: No weakness.   Psychiatric:         Mood and Affect: Mood normal.         Behavior: Behavior normal.         Thought Content: Thought content normal.         Judgment: Judgment normal.       Significant Labs: All pertinent labs within the past 24 hours have been reviewed.  CBC:   Recent Labs   Lab 08/19/22  1102   WBC 11.20   HGB 9.6*   HCT 31.1*          CMP:   Recent Labs   Lab 08/19/22  1102   *   K 4.4      CO2 24   *   BUN 14   CREATININE 0.6   CALCIUM 9.0   PROT 8.0   ALBUMIN 2.8*   BILITOT 0.2   ALKPHOS 62   AST 12   ALT 15   ANIONGAP 8         Significant Imaging: I have reviewed all pertinent imaging results/findings within the past 24 hours.

## 2022-08-20 NOTE — PROGRESS NOTES
[facility] Medicine  Progress Note    Patient Name: Nikkie Myles  MRN: [id number]  Patient Class: IP- Inpatient   Admission Date: 8/11/2022  Length of Stay: 8 days  Attending Physician: Shayy Dietz MD  Primary Care Provider: Brian Collado MD        Subjective:     Principal Problem:Crohn's disease with abscess        HPI:  Ms. Nikkie Myles is a 32 y.o. female with  duodenal, ileocolonic and perianal Crohn's disease on Remicade and MTX, as well as a a chronic right corneal ulcer, who presented to the  ER on 8/11 for evaluation of several weeks bloody diarrhea and abdominal cramping, as well as vaginal discharge.  CT abdomen pelvis showed mild rectosigmoid colitis, and prominent appearance of the region of the lower uterine segment/cervix.  MRI was ordered for further evaluation.  GI and GYN were consulted.  Stool studies and C. Diff were collected.  She was given a dose of Vanc and Ceftriaxone.  She also endorses complete loss of vision in her right eye and significant discomfort, when she normally can see shapes and colors.  Her case was discussed with Dr. Strickland of Optho, who suggested she be transferred to [facility] for Optho evaluation.  She was given Solumedrol 125mg IV x 1. Last infliximab 7/14 and next infusion for 8/19. Compliant with MTX 15 mg once a week up until last Sat due to insurance issue.      Patient transferred ED to ED due to bed availability. In ED patient afebrile and hemodynamically stable. Continues to report ongoing abdominal/rectal pain and blood mixed stools. Ophthalmology consulted and evaluated patient in the ED. Patient admitted to the care of medicine for further evaluation and management.      Overview/Hospital Course:  She was started on IV solumedrol and ophthalmology managing eye medication. Colorectal surgery did EUA and did not find fistula to where they can place seton. MRI enterography confirmed severe distal colon disease and no abscess. They recommend  fecal diversion. GI to perform colonoscopy to determine extent of disease for ostomy placement. Patient started on vancomycin and ceftriaxone due to concern of MRSA of eye and intraabdominal abscess. ID consulted and recommended a limited course of ceftriaxone and metronidazole. Will continue on ciprofloxacin and metronidazole as per CRS. GI is managing corticosteroids.    8/18- per GI:  32 y.o. female with history of Crohn's disease (with duodenal, perianal, sigmoidal and rectal involvement), anemia & corneal ulceration who was transferred from [facility] to [facility] for ophthalmology consult for her corneal ulcer.   - Still having severe perianal pain provoked by BMs  - R eye looks and feels better  - 3 BMs past 24h, 2 liquid, one soft, no blood  - CRP downtrending  - on solumedrol  /70  Pulse 52  AF, CRS planning for fecal diversion, ileostomy next Tuesday 8/19- advance diet. /67 and VSS. Pain under control.; lab done. We had a long talk about ostomies.  8/20- /68 VSS. Appeciate GI f.u. surgery planned Tuesday      Interval History: see above    Review of Systems   Constitutional:  Negative for activity change, chills, fatigue and fever.   HENT:  Negative for congestion, nosebleeds and trouble swallowing.    Respiratory:  Negative for apnea, cough, choking, chest tightness and shortness of breath.    Cardiovascular:  Negative for chest pain and leg swelling.   Gastrointestinal:  Negative for abdominal distention, abdominal pain, constipation, diarrhea, nausea and vomiting.   Genitourinary:  Negative for decreased urine volume, difficulty urinating, dysuria and frequency.   Musculoskeletal:  Negative for arthralgias, back pain, joint swelling, neck pain and neck stiffness.   Skin:  Negative for rash and wound.   Neurological:  Negative for dizziness, seizures, syncope, weakness, light-headedness, numbness and headaches.   Psychiatric/Behavioral:  Negative for agitation, behavioral  problems, confusion, hallucinations, self-injury and sleep disturbance. The patient is not nervous/anxious.    Objective:     Vital Signs (Most Recent):  Temp: 96.9 °F (36.1 °C) (08/19/22 2026)  Pulse: (!) 58 (08/19/22 2026)  Resp: 16 (08/19/22 2154)  BP: 106/68 (08/19/22 2026)  SpO2: 97 % (08/19/22 2026)   Vital Signs (24h Range):  Temp:  [96.9 °F (36.1 °C)-98.3 °F (36.8 °C)] 96.9 °F (36.1 °C)  Pulse:  [52-70] 58  Resp:  [16-17] 16  SpO2:  [95 %-100 %] 97 %  BP: ()/(50-68) 106/68     Weight: 77.2 kg (170 lb 3.1 oz)  Body mass index is 33.24 kg/m².    Intake/Output Summary (Last 24 hours) at 8/20/2022 0708  Last data filed at 8/19/2022 1800  Gross per 24 hour   Intake 900 ml   Output --   Net 900 ml        Physical Exam  Constitutional:       General: She is not in acute distress.     Appearance: Normal appearance.   HENT:      Head: Normocephalic and atraumatic.      Nose: Nose normal.      Mouth/Throat:      Mouth: Mucous membranes are moist.   Eyes:      General: No scleral icterus.     Extraocular Movements: Extraocular movements intact.      Pupils: Pupils are equal, round, and reactive to light.   Cardiovascular:      Rate and Rhythm: Normal rate and regular rhythm.      Pulses: Normal pulses.      Heart sounds: Normal heart sounds.   Pulmonary:      Effort: Pulmonary effort is normal.      Breath sounds: Normal breath sounds. No wheezing or rhonchi.   Chest:      Chest wall: No tenderness.   Abdominal:      General: Abdomen is flat. Bowel sounds are normal. There is no distension.      Palpations: Abdomen is soft.      Tenderness: There is no abdominal tenderness. There is no right CVA tenderness, left CVA tenderness, guarding or rebound.   Musculoskeletal:         General: No swelling, tenderness, deformity or signs of injury. Normal range of motion.      Cervical back: Normal range of motion and neck supple. No rigidity or tenderness.   Skin:     General: Skin is warm and dry.      Coloration: Skin is  "not jaundiced or pale.      Findings: No erythema or rash.   Neurological:      General: No focal deficit present.      Mental Status: She is alert and oriented to person, place, and time. Mental status is at baseline.      Cranial Nerves: No cranial nerve deficit.      Motor: No weakness.   Psychiatric:         Mood and Affect: Mood normal.         Behavior: Behavior normal.         Thought Content: Thought content normal.         Judgment: Judgment normal.       Significant Labs: All pertinent labs within the past 24 hours have been reviewed.  CBC:   Recent Labs   Lab 08/19/22  1102   WBC 11.20   HGB 9.6*   HCT 31.1*          CMP:   Recent Labs   Lab 08/19/22  1102   *   K 4.4      CO2 24   *   BUN 14   CREATININE 0.6   CALCIUM 9.0   PROT 8.0   ALBUMIN 2.8*   BILITOT 0.2   ALKPHOS 62   AST 12   ALT 15   ANIONGAP 8         Significant Imaging: I have reviewed all pertinent imaging results/findings within the past 24 hours.      Assessment/Plan:      * Crohn's disease with abscess  Hs of Perirectal fistula    Recent diagnosis March 2022 of duodenal, ileocolonic and perianal Crohn disease on Remicade and MTX doing well up into 2 weeks ago.    - CT showed mild rectosigmoid colitis, and prominent appearance of the region of the lower uterine segment/cervix  - GI consulted  ;  Appreciate recs  - C diff negative, stool leukocytes - positive  - started ceftriaxone and vancomycin  - follow up additional stool studies: fecal calprotectin and stool culture positive  - General surgery evaluated at outside hospital ; per note no drainable fluid collection.   - Infliximab level and antibody pending, next dose plan for 8/19  - Restart MTX when okay GI  - MRI pelvis showed 2 complex perianal fistulas  - MRI enterography showed "mucosal enhancement and wall thickening of the distal descending/sigmoid colon" and one perianal fistula.   -CRS evaluated patient via EUA - extensive disease but no defined " fistulas   -candidate for fecal diversion  - STD work up pending  - intermittent methylprednisolone IV per GI - now 20 mg IV q8h  - ID consulted -recommended ceftriaxone and metronidazole for 5 more days  - continue ciprofloxacin and metronidazole for now  - colonoscopy per GI to assess for ideal placement of ostomy. Clear liquid diet now    8/19- Per CRS: on the schedule on Tuesday for diverting loop ileostomy, pending final results of endoscopies.  Will have her seen in marked for ileostomy preoperatively. Remcade Infusion 8/19 being held due to plans for diverting ileostomy. On soft diet, on steroids, flagyl, cipro.    8/20 - wants reg diet  Per GI:  Reduced solumedrol to 20 mg IV q12h. Plan to continue taper if not uptrending  - With upcoming surgery, postpone next infliximab infusion (originally planned 8/19)   - Has infliximab level pending from 8/12, expect result by 8/22  - Repeat infliximab level collected 8/18 as true 14-week trough level  - EGD/colonoscopy as above. Terminal ileum without disease activity  -- Follow up on stool studies  - Anemia- S/P venofer in past (8/14- Hgb 9.1)  - H. Pylori biopsies taken during EGD (positive in April, unclear if treated)    Acute pain  Perianal pain in passage of stool  Multimodal approach  Acetaminophen 1 g TID and gabapentin 100 mg TID  Morphine 5 mg IV q4h prn  oxycodone 10 mg q4h prn  Need judicious use of opioids in IBD, but NSAID is also contraindicated!    8/20- weaning opioid: MEDD 60-> 30  Inpatient Morphine Milligram Equivalents Per Day 8/18 - 8/21    Values displayed are in units of MME/Day     Order Start / End Date 8/18 Yesterday Today Tomorrow     oxyCODONE immediate release tablet Tab 10 mg 8/17 - 8/19 30 of 75 0 of 15 -- --     morphine injection 5 mg 8/17 - 8/19 30 of 105 15 of 30 -- --     oxyCODONE immediate release tablet 5 mg 8/19 - No end date -- 15 of 22.5 0 of 30 0 of 30     Daily Totals  60 of 180 30 of 67.5 0 of 30 0 of 30            Central  corneal ulcer, right  - Solumedrol 125mg IV x 1, now solumedrol 20 mg IV q8h  - Ophthalmology consulted and evaluated patient  ;  Appreciate recs. Managing eye drops and ointments.  8/20 - solumedrol 20 IV q 12    Corneal ulcer  See ophthalmology consuts      Crohn's disease of both small and large intestine with fistula  Per history.   Has severe leesa-anal disease  See above        VTE Risk Mitigation (From admission, onward)         Ordered     enoxaparin injection 40 mg  Daily         08/14/22 0219     IP VTE HIGH RISK PATIENT  Once         08/12/22 2058                Discharge Planning   ENDY: 8/24/2022     Code Status: Full Code   Is the patient medically ready for discharge?: No    Reason for patient still in hospital (select all that apply): Patient trending condition  Discharge Plan A: Home   Discharge Delays: None known at this time              Shayy Dietz MD  Department of Hospital Medicine   [facility]

## 2022-08-20 NOTE — TREATMENT PLAN
[facility]  Gastroenterology  Progress Note    Patient Name: Nikkie Myles  MRN: [id number]  Admission Date: 2022  Hospital Length of Stay: 8 days    Subjective:     HPI: Nikkie Myles is a 32 y.o. female with history of Crohn's disease (with duodenal, perianal, sigmoidal and rectal involvement), anemia & corneal ulceration who was transferred from [facility] to [facility] for ophthalmology consult for her corneal ulcer. GI has been consulted for management of her Crohn's disease.     Interval History:  HDS, CRP normalized yesterday    ============================================  IBD history:  - The patient had recurrent ocular infections & erythema nodosum, and was diagnosed with Crohn's disease in 3/2022.   - Started on infliximab and methotrexate by [facility] GI Dr. Lujan/Yo and CRS Dr. Henson.  - MRI pelvis 2022 showed complex bilateral multiple transsphinteric perianal fistula with internal sphincter components and multiple tiny abscesses along the tract.  - Infliximab infusion history:   - 1st infusion 22   - 2nd infusion 22   - 3rd infusion 22 (completed induction)   - Next infusion due 22 (q8 weeks)     Past surgical history:  has a past surgical history that includes  section, low transverse (2013);  section with tubal ligation (Bilateral, 2020); and  section ().      Endoscopic history:  - EGD (22): Exam concerning for duodenal Crohn's. Biopsy consistent with duodenal enteritis.   - Colonoscopy (22)    No current facility-administered medications on file prior to encounter.     Current Outpatient Medications on File Prior to Encounter   Medication Sig Dispense Refill    acetaminophen (TYLENOL) 500 MG tablet Take 2 tablets (1,000 mg total) by mouth every 6 (six) hours as needed. 60 tablet 0    erythromycin (ROMYCIN) ophthalmic ointment Place a 1/2 inch ribbon of ointment into the lower eyelid 2-3  times daily. (Patient not taking: Reported on 3/8/2022) 1 g 0    ibuprofen (ADVIL,MOTRIN) 400 MG tablet Take 1 tablet (400 mg total) by mouth every 6 (six) hours as needed. 20 tablet 0    ondansetron (ZOFRAN-ODT) 4 MG TbDL Take 1 tablet (4 mg total) by mouth every 8 (eight) hours as needed. 20 tablet 0    prednisoLONE acetate (PRED FORTE) 1 % DrpS Place 1 drop into the left eye 3 (three) times daily. 10 mL 4       Review of patient's allergies indicates:  No Known Allergies      Objective:     Vitals:    08/20/22 1643   BP: 98/60   Pulse: 75   Resp: 18   Temp: 97.4 °F (36.3 °C)     Constitutional:       General: She is not in acute distress.     Appearance: Normal appearance.   HENT:      Head: Normocephalic and atraumatic.   Cardiovascular:      Rate and Rhythm: Normal rate and regular rhythm.   Pulmonary:      Effort: Pulmonary effort is normal. No respiratory distress.      Breath sounds: Normal breath sounds.   Abdominal:      General: Abdomen is flat. There is no distension.      Palpations: Abdomen is soft.      Tenderness: There is no abdominal tenderness.   Genitourinary:     Comments: deferred  Neurological:      General: No focal deficit present.      Mental Status: She is alert and oriented to person, place, and time.   Psychiatric:         Behavior: Behavior normal.         Thought Content: Thought content normal.     Significant Labs:  Recent Labs   Lab 08/14/22  0248 08/19/22  1102   HGB 9.1* 9.6*       Lab Results   Component Value Date    WBC 11.20 08/19/2022    HGB 9.6 (L) 08/19/2022    HCT 31.1 (L) 08/19/2022    MCV 58 (L) 08/19/2022     08/19/2022       Lab Results   Component Value Date     (L) 08/19/2022    K 4.4 08/19/2022     08/19/2022    CO2 24 08/19/2022    BUN 14 08/19/2022    CREATININE 0.6 08/19/2022    CALCIUM 9.0 08/19/2022    ANIONGAP 8 08/19/2022    ESTGFRAFRICA >60 07/29/2020    EGFRNONAA >60 07/29/2020       Lab Results   Component Value Date    ALT 15  08/19/2022    AST 12 08/19/2022    ALKPHOS 62 08/19/2022    BILITOT 0.2 08/19/2022       Lab Results   Component Value Date    INR 0.9 01/12/2012     Significant Imaging:  Reviewed pertinent radiology findings.     Assessment/Plan:     Nikkie Myles is a 32 y.o. female with Crohn's disease (possible gastroduodenitis, ileal, sigmoid/rectum, perianal/perineal with abscess/fistula, S/P fistulotomy)  transferred from [facility] to [facility] for ophthalmology consult for her corneal ulcer. CRS following and are concerned for severe perianal/perineal disease noted on EUA, although no opportunity for seton placement. MRE shows disease activity in distal colon. Last EGD/colonoscopy 4/2022 prior to starting IFX but reports not available though bx c/w H pylori pos gastritis, duodenitis, ileal/sigmoid/rectal and perineal/perianal disease.  Per notes looks like there were plans to optimize remicade dosing in near future by her [facility] GI/IBD MD. Has 13 week IFX in process and will get 14 week levels if needed. Colonoscopy showed mild disease activity in the sigmoid and rectum, biopsies pending. Terminal ileum was normal. Given severe perianal disease, planning DLI on 8/23.    Problem List:  1.  Crohn's disease with duodenal ?, ileum, sigmoid/rectum, perianal fistula with vaginal ulcer and suspect impending RV fistula  2. Possible hydradenitis supp- perineal   3. S/P anal fistula repair (7/5/22)  4. 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  5. History of erythema nodosum  6. 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 H pylori gastritis- not clear if treated     - CRP (8/12/22): 91.4>61.8>11.4  - C diff negative on 8/8  - CT scan abdomen and pelvis: No evidence for small bowel obstructive process. Subtle suggestion of mild wall and fold thickening along the rectosigmoid colon without significant pericolonic stranding however may relate to mild rectosigmoid colitis.  There is no evidence for  colonic obstructive change.  There is no evidence for free intraperitoneal air.   - MRI pelvis (8/13/22): Two complex perianal fistulas with branching tracks. Along the bilateral medial gluteal cleft just deep to the cutaneous surface there are multiple serpiginous peripherally enhancing tracts which may have multiple cutaneous exits. Tracts are difficult to follow.  - EUA 8/15 shows severe perianal disease with deep ulcerations, shallow vaginal ulceration without fistula, and some changes in ischiorectal fat c/f hidradenitis  -MRE shows mucosal enhancement and wall thickening of the distal descending/sigmoid colon in this patient with Crohn's disease, as above, likely representing chronic inflammatory process.  No definitive abscess collection. Persistent perianal fistula as seen on MRI pelvis 8/13/22. Pt does have some passage of air in vagina suspicious for early RV fistula    - EGD 8/18:   Impression:            - Normal esophagus.                          - Esophagogastric landmarks identified.                          - A single gastric polyp. This appears like a                          benign inflammatory polyp.                          - Congested and erythematous mucosa in the gastric                          body and antrum. Biopsied.                          - Duodenal aphthous ulcers, which are a                          non-specific finding. Biopsied.                          - Normal second portion of the duodenum and third                          portion of the duodenum.    Colonoscopy 8/18:  Impression:            - Anal fissure and perianal skin tags found on                          perianal exam.                          - Erythematous mucosa in the rectum. Biopsied.                          - Minimal patchy erythematous mucosa in the                          sigmoid colon. Biopsied.                          - The descending colon, transverse colon,                          ascending colon and  cecum are normal.                          - The examined portion of the ileum was normal.                          Biopsied.                          - Biopsies were obtained in the descending colon,                          in the transverse colon, in the ascending colon                          and in the cecum.       Recommendations:  - Reduced solumedrol to 10 mg IV q12h. Checking CRP 8/21, continue taper if not uptrending  - With upcoming surgery, postpone next infliximab infusion (originally planned 8/19)   - Has infliximab level pending from 8/12, expect result by 8/22  - Repeat infliximab level collected 8/18 as true 14-week trough level  - EGD/colonoscopy as above. Terminal ileum without disease activity  - Would stop antibiotics at this point  - optho following for corneal ulcer, will discuss steroid plan  - Follow up on stool studies  - Anemia- S/P venofer in past (8/14- Hgb 9.1)  - H. Pylori biopsies taken during EGD (positive in April, unclear if treated)  - On DVT ppx: lovenox    Thank you for involving us in the care of Nikkie Myles. Please call with any additional questions, concerns or changes in the patient's clinical status. We will continue to follow.     Brian Selby MD  Gastroenterology Fellow PGY IV   [facility]

## 2022-08-20 NOTE — ASSESSMENT & PLAN NOTE
"Hs of Perirectal fistula    Recent diagnosis March 2022 of duodenal, ileocolonic and perianal Crohn disease on Remicade and MTX doing well up into 2 weeks ago.    - CT showed mild rectosigmoid colitis, and prominent appearance of the region of the lower uterine segment/cervix  - GI consulted  ;  Appreciate recs  - C diff negative, stool leukocytes - positive  - started ceftriaxone and vancomycin  - follow up additional stool studies: fecal calprotectin and stool culture positive  - General surgery evaluated at outside hospital ; per note no drainable fluid collection.   - Infliximab level and antibody pending, next dose plan for 8/19  - Restart MTX when okay GI  - MRI pelvis showed 2 complex perianal fistulas  - MRI enterography showed "mucosal enhancement and wall thickening of the distal descending/sigmoid colon" and one perianal fistula.   -CRS evaluated patient via EUA - extensive disease but no defined fistulas   -candidate for fecal diversion  - STD work up pending  - intermittent methylprednisolone IV per GI - now 20 mg IV q8h  - ID consulted -recommended ceftriaxone and metronidazole for 5 more days  - continue ciprofloxacin and metronidazole for now  - colonoscopy per GI to assess for ideal placement of ostomy. Clear liquid diet now    8/19- Per CRS: on the schedule on Tuesday for diverting loop ileostomy, pending final results of endoscopies.  Will have her seen in marked for ileostomy preoperatively. Remcade Infusion 8/19 being held due to plans for diverting ileostomy. On soft diet, on steroids, flagyl, cipro.    8/20 - wants reg diet  Per GI:  Reduced solumedrol to 20 mg IV q12h. Plan to continue taper if not uptrending  - With upcoming surgery, postpone next infliximab infusion (originally planned 8/19)   - Has infliximab level pending from 8/12, expect result by 8/22  - Repeat infliximab level collected 8/18 as true 14-week trough level  - EGD/colonoscopy as above. Terminal ileum without disease " activity  -- Follow up on stool studies  - Anemia- S/P venofer in past (8/14- Hgb 9.1)  - H. Pylori biopsies taken during EGD (positive in April, unclear if treated)

## 2022-08-20 NOTE — CARE UPDATE
Colorectal Surgery    Plan for ileostomy marking this weekend and will discuss consents with patient. Will coordinate pre-op orders as well.   Please call with questions.

## 2022-08-20 NOTE — ASSESSMENT & PLAN NOTE
- Solumedrol 125mg IV x 1, now solumedrol 20 mg IV q8h  - Ophthalmology consulted and evaluated patient  ;  Appreciate recs. Managing eye drops and ointments.  8/20 - solumedrol 20 IV q 12

## 2022-08-21 LAB
ALBUMIN SERPL BCP-MCNC: 2.8 G/DL (ref 3.5–5.2)
ALP SERPL-CCNC: 63 U/L (ref 55–135)
ALT SERPL W/O P-5'-P-CCNC: 12 U/L (ref 10–44)
ANION GAP SERPL CALC-SCNC: 6 MMOL/L (ref 8–16)
AST SERPL-CCNC: 11 U/L (ref 10–40)
BASOPHILS # BLD AUTO: 0.03 K/UL (ref 0–0.2)
BASOPHILS NFR BLD: 0.2 % (ref 0–1.9)
BILIRUB SERPL-MCNC: 0.1 MG/DL (ref 0.1–1)
BUN SERPL-MCNC: 12 MG/DL (ref 6–20)
CALCIUM SERPL-MCNC: 9.2 MG/DL (ref 8.7–10.5)
CHLORIDE SERPL-SCNC: 101 MMOL/L (ref 95–110)
CO2 SERPL-SCNC: 28 MMOL/L (ref 23–29)
CREAT SERPL-MCNC: 0.7 MG/DL (ref 0.5–1.4)
CRP SERPL-MCNC: 2.5 MG/L (ref 0–8.2)
DIFFERENTIAL METHOD: ABNORMAL
EOSINOPHIL # BLD AUTO: 0 K/UL (ref 0–0.5)
EOSINOPHIL NFR BLD: 0.1 % (ref 0–8)
ERYTHROCYTE [DISTWIDTH] IN BLOOD BY AUTOMATED COUNT: 26.8 % (ref 11.5–14.5)
EST. GFR  (NO RACE VARIABLE): >60 ML/MIN/1.73 M^2
GLUCOSE SERPL-MCNC: 180 MG/DL (ref 70–110)
HCT VFR BLD AUTO: 33 % (ref 37–48.5)
HGB BLD-MCNC: 9.8 G/DL (ref 12–16)
IMM GRANULOCYTES # BLD AUTO: 0.1 K/UL (ref 0–0.04)
IMM GRANULOCYTES NFR BLD AUTO: 0.8 % (ref 0–0.5)
LYMPHOCYTES # BLD AUTO: 2.1 K/UL (ref 1–4.8)
LYMPHOCYTES NFR BLD: 17.2 % (ref 18–48)
MCH RBC QN AUTO: 17.2 PG (ref 27–31)
MCHC RBC AUTO-ENTMCNC: 29.7 G/DL (ref 32–36)
MCV RBC AUTO: 58 FL (ref 82–98)
MONOCYTES # BLD AUTO: 0.7 K/UL (ref 0.3–1)
MONOCYTES NFR BLD: 5.5 % (ref 4–15)
NEUTROPHILS # BLD AUTO: 9.5 K/UL (ref 1.8–7.7)
NEUTROPHILS NFR BLD: 76.2 % (ref 38–73)
NRBC BLD-RTO: 0 /100 WBC
PLATELET # BLD AUTO: 390 K/UL (ref 150–450)
PMV BLD AUTO: ABNORMAL FL (ref 9.2–12.9)
POTASSIUM SERPL-SCNC: 4.9 MMOL/L (ref 3.5–5.1)
PROT SERPL-MCNC: 7.7 G/DL (ref 6–8.4)
RBC # BLD AUTO: 5.7 M/UL (ref 4–5.4)
SODIUM SERPL-SCNC: 135 MMOL/L (ref 136–145)
WBC # BLD AUTO: 12.4 K/UL (ref 3.9–12.7)

## 2022-08-21 PROCEDURE — 11000001 HC ACUTE MED/SURG PRIVATE ROOM

## 2022-08-21 PROCEDURE — 25000003 PHARM REV CODE 250

## 2022-08-21 PROCEDURE — 63600175 PHARM REV CODE 636 W HCPCS: Performed by: STUDENT IN AN ORGANIZED HEALTH CARE EDUCATION/TRAINING PROGRAM

## 2022-08-21 PROCEDURE — 85025 COMPLETE CBC W/AUTO DIFF WBC: CPT | Performed by: HOSPITALIST

## 2022-08-21 PROCEDURE — 25000003 PHARM REV CODE 250: Performed by: FAMILY MEDICINE

## 2022-08-21 PROCEDURE — 99233 PR SUBSEQUENT HOSPITAL CARE,LEVL III: ICD-10-PCS | Mod: ,,, | Performed by: HOSPITALIST

## 2022-08-21 PROCEDURE — 99233 SBSQ HOSP IP/OBS HIGH 50: CPT | Mod: ,,, | Performed by: HOSPITALIST

## 2022-08-21 PROCEDURE — 80053 COMPREHEN METABOLIC PANEL: CPT | Performed by: HOSPITALIST

## 2022-08-21 PROCEDURE — 25000003 PHARM REV CODE 250: Performed by: NURSE PRACTITIONER

## 2022-08-21 PROCEDURE — 25000003 PHARM REV CODE 250: Performed by: INTERNAL MEDICINE

## 2022-08-21 PROCEDURE — 86140 C-REACTIVE PROTEIN: CPT | Performed by: STUDENT IN AN ORGANIZED HEALTH CARE EDUCATION/TRAINING PROGRAM

## 2022-08-21 PROCEDURE — 25000003 PHARM REV CODE 250: Performed by: HOSPITALIST

## 2022-08-21 PROCEDURE — 63600175 PHARM REV CODE 636 W HCPCS: Performed by: INTERNAL MEDICINE

## 2022-08-21 PROCEDURE — 36415 COLL VENOUS BLD VENIPUNCTURE: CPT | Performed by: HOSPITALIST

## 2022-08-21 PROCEDURE — 36415 COLL VENOUS BLD VENIPUNCTURE: CPT | Performed by: STUDENT IN AN ORGANIZED HEALTH CARE EDUCATION/TRAINING PROGRAM

## 2022-08-21 RX ADMIN — OXYCODONE 5 MG: 5 TABLET ORAL at 08:08

## 2022-08-21 RX ADMIN — ACETAMINOPHEN 1000 MG: 500 TABLET ORAL at 03:08

## 2022-08-21 RX ADMIN — FOLIC ACID 1 MG: 1 TABLET ORAL at 08:08

## 2022-08-21 RX ADMIN — METHYLPREDNISOLONE SODIUM SUCCINATE 10 MG: 40 INJECTION, POWDER, FOR SOLUTION INTRAMUSCULAR; INTRAVENOUS at 04:08

## 2022-08-21 RX ADMIN — TOBRAMYCIN 1 DROP: 3 SOLUTION/ DROPS OPHTHALMIC at 01:08

## 2022-08-21 RX ADMIN — ENOXAPARIN SODIUM 40 MG: 100 INJECTION SUBCUTANEOUS at 05:08

## 2022-08-21 RX ADMIN — CARBOXYMETHYLCELLULOSE SODIUM 1 DROP: 10 GEL OPHTHALMIC at 01:08

## 2022-08-21 RX ADMIN — CARBOXYMETHYLCELLULOSE SODIUM 1 DROP: 10 GEL OPHTHALMIC at 08:08

## 2022-08-21 RX ADMIN — CARBOXYMETHYLCELLULOSE SODIUM 1 DROP: 10 GEL OPHTHALMIC at 05:08

## 2022-08-21 RX ADMIN — GABAPENTIN 100 MG: 100 CAPSULE ORAL at 08:08

## 2022-08-21 RX ADMIN — PREDNISOLONE ACETATE 1 DROP: 10 SUSPENSION/ DROPS OPHTHALMIC at 08:08

## 2022-08-21 RX ADMIN — ACETAMINOPHEN 1000 MG: 500 TABLET ORAL at 08:08

## 2022-08-21 RX ADMIN — TOBRAMYCIN 1 DROP: 3 SOLUTION/ DROPS OPHTHALMIC at 05:08

## 2022-08-21 RX ADMIN — PREDNISOLONE ACETATE 1 DROP: 10 SUSPENSION/ DROPS OPHTHALMIC at 03:08

## 2022-08-21 RX ADMIN — GABAPENTIN 100 MG: 100 CAPSULE ORAL at 03:08

## 2022-08-21 RX ADMIN — MELATONIN TAB 3 MG 6 MG: 3 TAB at 08:08

## 2022-08-21 RX ADMIN — TOBRAMYCIN 1 DROP: 3 SOLUTION/ DROPS OPHTHALMIC at 08:08

## 2022-08-21 NOTE — PROGRESS NOTES
[facility] Surg  Colorectal Surgery  Progress Note    Patient Name: Nikkie Myles  MRN: [id number]  Admission Date: 8/11/2022  Hospital Length of Stay: 9 days  Attending Physician: Shayy Dietz MD    Subjective:     Interval History: No complaints this morning. Discussed diverting ileostomy with patient and she is amenable. NAEON. VSS.     Post-Op Info:  Procedure(s) (LRB):  EGD (ESOPHAGOGASTRODUODENOSCOPY) (N/A)  COLONOSCOPY (N/A)   3 Days Post-Op      Medications:  Continuous Infusions:      Scheduled Meds:   acetaminophen  1,000 mg Oral TID    carboxymethylcellulose sodium  1 drop Ophthalmic QID    enoxaparin  40 mg Subcutaneous Daily    erythromycin   Right Eye TID    folic acid  1 mg Oral Daily    gabapentin  100 mg Oral TID    methylPREDNISolone sodium succinate injection  10 mg Intravenous Q12H    prednisoLONE acetate  1 drop Left Eye TID    tobramycin sulfate 0.3%  1 drop Right Eye QID     PRN Meds:   acetaminophen    albuterol-ipratropium    aluminum-magnesium hydroxide-simethicone    melatonin    naloxone    ondansetron    oxyCODONE    sodium chloride 0.9%        Objective:     Vital Signs (Most Recent):  Temp: 98.1 °F (36.7 °C) (08/21/22 0824)  Pulse: 73 (08/21/22 0824)  Resp: 16 (08/21/22 0824)  BP: (!) 96/54 (08/21/22 0824)  SpO2: 98 % (08/21/22 0824) Vital Signs (24h Range):  Temp:  [97.4 °F (36.3 °C)-98.3 °F (36.8 °C)] 98.1 °F (36.7 °C)  Pulse:  [71-88] 73  Resp:  [16-20] 16  SpO2:  [95 %-100 %] 98 %  BP: ()/(54-64) 96/54     Intake/Output - Last 3 Shifts         08/19 0700 08/20 0659 08/20 0700 08/21 0659 08/21 0700 08/22 0659    P.O. 900 450     IV Piggyback       Total Intake(mL/kg) 900 (11.7) 450 (5.8)     Net +900 +450            Urine Occurrence 5 x 2 x     Stool Occurrence 2 x 1 x             Physical Exam  Constitutional:       General: She is not in acute distress.     Appearance: She is well-developed.   HENT:      Head: Normocephalic and atraumatic.   Eyes:       General:         Right eye: No discharge.         Left eye: No discharge.      Conjunctiva/sclera: Conjunctivae normal.   Cardiovascular:      Rate and Rhythm: Normal rate and regular rhythm.   Pulmonary:      Effort: Pulmonary effort is normal. No respiratory distress.   Abdominal:      General: There is no distension.      Palpations: Abdomen is soft.      Tenderness: There is no abdominal tenderness.   Musculoskeletal:         General: No deformity. Normal range of motion.      Cervical back: Normal range of motion.   Skin:     General: Skin is warm and dry.   Neurological:      Mental Status: She is alert and oriented to person, place, and time.   Psychiatric:         Behavior: Behavior normal.       Anorectal Exam:  Moderately tender to palpation throughout  Multiple areas of induration  No visible drainage or bleeding seen       Significant Labs:  BMP (Last 3 Results):   Recent Labs   Lab 08/19/22  1102   *   *   K 4.4      CO2 24   BUN 14   CREATININE 0.6   CALCIUM 9.0       CBC (Last 3 Results):   Recent Labs   Lab 08/19/22  1102   WBC 11.20   RBC 5.34   HGB 9.6*   HCT 31.1*      MCV 58*   MCH 18.0*   MCHC 30.9*         Significant Diagnostics:  I have reviewed all pertinent imaging results/findings within the past 24 hours.    Assessment/Plan:     * Crohn's disease with abscess  33 yo female with hx of Crohn's admitted with perirectal abscess. S/p EUA on 8/15 demonstrating deep anal fissures. Colonoscopy 8/18 demonstraetd erythema in distal colon and proximal rectum. Plan for diverting ileostomy 8/23    - Continue regular diet  - Continue IV Cipro/Flagyl, methylprednisolone; work to optimize medical management  - Continue multimodal pain regimen  - Plan for diverting ileostomy 8/23   -site marked  - Please call with questions          Terry Baez MD  Colorectal Surgery  [facility] S[facility]

## 2022-08-21 NOTE — SUBJECTIVE & OBJECTIVE
Interval History: see above    Review of Systems   Constitutional:  Negative for activity change, chills, fatigue and fever.        Functioning well, talking to famiy on phome, and using technology   HENT:  Negative for congestion, nosebleeds and trouble swallowing.    Respiratory:  Negative for apnea, cough, choking, chest tightness and shortness of breath.    Cardiovascular:  Negative for chest pain and leg swelling.   Gastrointestinal:  Negative for abdominal distention, abdominal pain, constipation, diarrhea, nausea and vomiting.   Genitourinary:  Negative for decreased urine volume, difficulty urinating, dysuria and frequency.   Musculoskeletal:  Negative for arthralgias, back pain, joint swelling, neck pain and neck stiffness.   Skin:  Negative for rash and wound.   Neurological:  Negative for dizziness, seizures, syncope, weakness, light-headedness, numbness and headaches.   Psychiatric/Behavioral:  Negative for agitation, behavioral problems, confusion, hallucinations, self-injury and sleep disturbance. The patient is not nervous/anxious.    Objective:     Vital Signs (Most Recent):  Temp: 97.8 °F (36.6 °C) (08/21/22 0107)  Pulse: 87 (08/21/22 0107)  Resp: 20 (08/21/22 0107)  BP: (!) 94/58 (08/21/22 0107)  SpO2: 99 % (08/21/22 0107)   Vital Signs (24h Range):  Temp:  [97.4 °F (36.3 °C)-98.2 °F (36.8 °C)] 97.8 °F (36.6 °C)  Pulse:  [71-87] 87  Resp:  [18-20] 20  SpO2:  [95 %-100 %] 99 %  BP: ()/(58-64) 94/58     Weight: 77.2 kg (170 lb 3.1 oz)  Body mass index is 33.24 kg/m².  No intake or output data in the 24 hours ending 08/21/22 0649     Physical Exam  Constitutional:       General: She is not in acute distress.     Appearance: Normal appearance.   HENT:      Head: Normocephalic and atraumatic.      Nose: Nose normal.      Mouth/Throat:      Mouth: Mucous membranes are moist.   Eyes:      General: No scleral icterus.     Extraocular Movements: Extraocular movements intact.      Pupils: Pupils are  equal, round, and reactive to light.   Cardiovascular:      Rate and Rhythm: Normal rate and regular rhythm.      Pulses: Normal pulses.      Heart sounds: Normal heart sounds.   Pulmonary:      Effort: Pulmonary effort is normal.      Breath sounds: Normal breath sounds. No wheezing or rhonchi.   Chest:      Chest wall: No tenderness.   Abdominal:      General: Abdomen is flat. Bowel sounds are normal. There is no distension.      Palpations: Abdomen is soft.      Tenderness: There is no abdominal tenderness. There is no right CVA tenderness, left CVA tenderness, guarding or rebound.   Musculoskeletal:         General: No swelling, tenderness, deformity or signs of injury. Normal range of motion.      Cervical back: Normal range of motion and neck supple. No rigidity or tenderness.   Skin:     General: Skin is warm and dry.      Coloration: Skin is not jaundiced or pale.      Findings: No erythema or rash.   Neurological:      General: No focal deficit present.      Mental Status: She is alert and oriented to person, place, and time. Mental status is at baseline.      Cranial Nerves: No cranial nerve deficit.      Motor: No weakness.   Psychiatric:         Mood and Affect: Mood normal.         Behavior: Behavior normal.         Thought Content: Thought content normal.         Judgment: Judgment normal.       Significant Labs: All pertinent labs within the past 24 hours have been reviewed.  CBC:   Recent Labs   Lab 08/19/22  1102   WBC 11.20   HGB 9.6*   HCT 31.1*          CMP:   Recent Labs   Lab 08/19/22  1102   *   K 4.4      CO2 24   *   BUN 14   CREATININE 0.6   CALCIUM 9.0   PROT 8.0   ALBUMIN 2.8*   BILITOT 0.2   ALKPHOS 62   AST 12   ALT 15   ANIONGAP 8         Significant Imaging: I have reviewed all pertinent imaging results/findings within the past 24 hours.

## 2022-08-21 NOTE — PLAN OF CARE
Pt is AAOx4. Pt remain free from fall and injuries. VS remain stable. Questions and concerns voiced and answered. Medication compliance. Pain is managed with PRN meds. Call light in reach. Bed in low locked position. Side rails x2. Belongings at bedside.

## 2022-08-21 NOTE — SUBJECTIVE & OBJECTIVE
Subjective:     Interval History: No complaints this morning. Discussed diverting ileostomy with patient and she is amenable. MADDY. PAULS.     Post-Op Info:  Procedure(s) (LRB):  EGD (ESOPHAGOGASTRODUODENOSCOPY) (N/A)  COLONOSCOPY (N/A)   3 Days Post-Op      Medications:  Continuous Infusions:      Scheduled Meds:   acetaminophen  1,000 mg Oral TID    carboxymethylcellulose sodium  1 drop Ophthalmic QID    enoxaparin  40 mg Subcutaneous Daily    erythromycin   Right Eye TID    folic acid  1 mg Oral Daily    gabapentin  100 mg Oral TID    methylPREDNISolone sodium succinate injection  10 mg Intravenous Q12H    prednisoLONE acetate  1 drop Left Eye TID    tobramycin sulfate 0.3%  1 drop Right Eye QID     PRN Meds:   acetaminophen    albuterol-ipratropium    aluminum-magnesium hydroxide-simethicone    melatonin    naloxone    ondansetron    oxyCODONE    sodium chloride 0.9%        Objective:     Vital Signs (Most Recent):  Temp: 98.1 °F (36.7 °C) (08/21/22 0824)  Pulse: 73 (08/21/22 0824)  Resp: 16 (08/21/22 0824)  BP: (!) 96/54 (08/21/22 0824)  SpO2: 98 % (08/21/22 0824) Vital Signs (24h Range):  Temp:  [97.4 °F (36.3 °C)-98.3 °F (36.8 °C)] 98.1 °F (36.7 °C)  Pulse:  [71-88] 73  Resp:  [16-20] 16  SpO2:  [95 %-100 %] 98 %  BP: ()/(54-64) 96/54     Intake/Output - Last 3 Shifts         08/19 0700  08/20 0659 08/20 0700 08/21 0659 08/21 0700  08/22 0659    P.O. 900 450     IV Piggyback       Total Intake(mL/kg) 900 (11.7) 450 (5.8)     Net +900 +450            Urine Occurrence 5 x 2 x     Stool Occurrence 2 x 1 x             Physical Exam  Constitutional:       General: She is not in acute distress.     Appearance: She is well-developed.   HENT:      Head: Normocephalic and atraumatic.   Eyes:      General:         Right eye: No discharge.         Left eye: No discharge.      Conjunctiva/sclera: Conjunctivae normal.   Cardiovascular:      Rate and Rhythm: Normal rate and regular rhythm.   Pulmonary:      Effort:  Pulmonary effort is normal. No respiratory distress.   Abdominal:      General: There is no distension.      Palpations: Abdomen is soft.      Tenderness: There is no abdominal tenderness.   Musculoskeletal:         General: No deformity. Normal range of motion.      Cervical back: Normal range of motion.   Skin:     General: Skin is warm and dry.   Neurological:      Mental Status: She is alert and oriented to person, place, and time.   Psychiatric:         Behavior: Behavior normal.       Anorectal Exam:  Moderately tender to palpation throughout  Multiple areas of induration  No visible drainage or bleeding seen       Significant Labs:  BMP (Last 3 Results):   Recent Labs   Lab 08/19/22  1102   *   *   K 4.4      CO2 24   BUN 14   CREATININE 0.6   CALCIUM 9.0       CBC (Last 3 Results):   Recent Labs   Lab 08/19/22  1102   WBC 11.20   RBC 5.34   HGB 9.6*   HCT 31.1*      MCV 58*   MCH 18.0*   MCHC 30.9*         Significant Diagnostics:  I have reviewed all pertinent imaging results/findings within the past 24 hours.

## 2022-08-21 NOTE — PROGRESS NOTES
[facility] Medicine  Progress Note    Patient Name: Nikkie Myles  MRN: [id number]  Patient Class: IP- Inpatient   Admission Date: 8/11/2022  Length of Stay: 9 days  Attending Physician: Shayy Dietz MD  Primary Care Provider: Brian Collado MD        Subjective:     Principal Problem:Crohn's disease with abscess        HPI:  Ms. Nikkie Myles is a 32 y.o. female with  duodenal, ileocolonic and perianal Crohn's disease on Remicade and MTX, as well as a a chronic right corneal ulcer, who presented to the  ER on 8/11 for evaluation of several weeks bloody diarrhea and abdominal cramping, as well as vaginal discharge.  CT abdomen pelvis showed mild rectosigmoid colitis, and prominent appearance of the region of the lower uterine segment/cervix.  MRI was ordered for further evaluation.  GI and GYN were consulted.  Stool studies and C. Diff were collected.  She was given a dose of Vanc and Ceftriaxone.  She also endorses complete loss of vision in her right eye and significant discomfort, when she normally can see shapes and colors.  Her case was discussed with Dr. Strickland of Optho, who suggested she be transferred to [place] for Optho evaluation.  She was given Solumedrol 125mg IV x 1. Last infliximab 7/14 and next infusion for 8/19. Compliant with MTX 15 mg once a week up until last Sat due to insurance issue.      Patient transferred ED to ED due to bed availability. In ED patient afebrile and hemodynamically stable. Continues to report ongoing abdominal/rectal pain and blood mixed stools. Ophthalmology consulted and evaluated patient in the ED. Patient admitted to the care of medicine for further evaluation and management.      Overview/Hospital Course:  She was started on IV solumedrol and ophthalmology managing eye medication. Colorectal surgery did EUA and did not find fistula to where they can place seton. MRI enterography confirmed severe distal colon disease and no abscess. They recommend  fecal diversion. GI to perform colonoscopy to determine extent of disease for ostomy placement. Patient started on vancomycin and ceftriaxone due to concern of MRSA of eye and intraabdominal abscess. ID consulted and recommended a limited course of ceftriaxone and metronidazole. Will continue on ciprofloxacin and metronidazole as per CRS. GI is managing corticosteroids.    8/18- per GI:  32 y.o. female with history of Crohn's disease (with duodenal, perianal, sigmoidal and rectal involvement), anemia & corneal ulceration who was transferred from [facility] to [facility] for ophthalmology consult for her corneal ulcer.   - Still having severe perianal pain provoked by BMs  - R eye looks and feels better  - 3 BMs past 24h, 2 liquid, one soft, no blood  - CRP downtrending  - on solumedrol  /70  Pulse 52  AF, CRS planning for fecal diversion, ileostomy next Tuesday 8/19- advance diet. /67 and VSS. Pain under control.; lab done. We had a long talk about ostomies.  8/20- /68 VSS. Appeciate GI f.u. surgery planned Tuesday 8/21- solumedrol weaned. VSS, surgery planned this upcomming Tuesday      Interval History: see above    Review of Systems   Constitutional:  Negative for activity change, chills, fatigue and fever.        Functioning well, talking to famiy on phome, and using technology   HENT:  Negative for congestion, nosebleeds and trouble swallowing.    Respiratory:  Negative for apnea, cough, choking, chest tightness and shortness of breath.    Cardiovascular:  Negative for chest pain and leg swelling.   Gastrointestinal:  Negative for abdominal distention, abdominal pain, constipation, diarrhea, nausea and vomiting.   Genitourinary:  Negative for decreased urine volume, difficulty urinating, dysuria and frequency.   Musculoskeletal:  Negative for arthralgias, back pain, joint swelling, neck pain and neck stiffness.   Skin:  Negative for rash and wound.   Neurological:  Negative for  dizziness, seizures, syncope, weakness, light-headedness, numbness and headaches.   Psychiatric/Behavioral:  Negative for agitation, behavioral problems, confusion, hallucinations, self-injury and sleep disturbance. The patient is not nervous/anxious.    Objective:     Vital Signs (Most Recent):  Temp: 97.8 °F (36.6 °C) (08/21/22 0107)  Pulse: 87 (08/21/22 0107)  Resp: 20 (08/21/22 0107)  BP: (!) 94/58 (08/21/22 0107)  SpO2: 99 % (08/21/22 0107)   Vital Signs (24h Range):  Temp:  [97.4 °F (36.3 °C)-98.2 °F (36.8 °C)] 97.8 °F (36.6 °C)  Pulse:  [71-87] 87  Resp:  [18-20] 20  SpO2:  [95 %-100 %] 99 %  BP: ()/(58-64) 94/58     Weight: 77.2 kg (170 lb 3.1 oz)  Body mass index is 33.24 kg/m².  No intake or output data in the 24 hours ending 08/21/22 0649     Physical Exam  Constitutional:       General: She is not in acute distress.     Appearance: Normal appearance.   HENT:      Head: Normocephalic and atraumatic.      Nose: Nose normal.      Mouth/Throat:      Mouth: Mucous membranes are moist.   Eyes:      General: No scleral icterus.     Extraocular Movements: Extraocular movements intact.      Pupils: Pupils are equal, round, and reactive to light.   Cardiovascular:      Rate and Rhythm: Normal rate and regular rhythm.      Pulses: Normal pulses.      Heart sounds: Normal heart sounds.   Pulmonary:      Effort: Pulmonary effort is normal.      Breath sounds: Normal breath sounds. No wheezing or rhonchi.   Chest:      Chest wall: No tenderness.   Abdominal:      General: Abdomen is flat. Bowel sounds are normal. There is no distension.      Palpations: Abdomen is soft.      Tenderness: There is no abdominal tenderness. There is no right CVA tenderness, left CVA tenderness, guarding or rebound.   Musculoskeletal:         General: No swelling, tenderness, deformity or signs of injury. Normal range of motion.      Cervical back: Normal range of motion and neck supple. No rigidity or tenderness.   Skin:      "General: Skin is warm and dry.      Coloration: Skin is not jaundiced or pale.      Findings: No erythema or rash.   Neurological:      General: No focal deficit present.      Mental Status: She is alert and oriented to person, place, and time. Mental status is at baseline.      Cranial Nerves: No cranial nerve deficit.      Motor: No weakness.   Psychiatric:         Mood and Affect: Mood normal.         Behavior: Behavior normal.         Thought Content: Thought content normal.         Judgment: Judgment normal.       Significant Labs: All pertinent labs within the past 24 hours have been reviewed.  CBC:   Recent Labs   Lab 08/19/22  1102   WBC 11.20   HGB 9.6*   HCT 31.1*          CMP:   Recent Labs   Lab 08/19/22  1102   *   K 4.4      CO2 24   *   BUN 14   CREATININE 0.6   CALCIUM 9.0   PROT 8.0   ALBUMIN 2.8*   BILITOT 0.2   ALKPHOS 62   AST 12   ALT 15   ANIONGAP 8         Significant Imaging: I have reviewed all pertinent imaging results/findings within the past 24 hours.      Assessment/Plan:      * Crohn's disease with abscess  Hs of Perirectal fistula    Recent diagnosis March 2022 of duodenal, ileocolonic and perianal Crohn disease on Remicade and MTX doing well up into 2 weeks ago.    - CT showed mild rectosigmoid colitis, and prominent appearance of the region of the lower uterine segment/cervix  - GI consulted  ;  Appreciate recs  - C diff negative, stool leukocytes - positive  - started ceftriaxone and vancomycin  - follow up additional stool studies: fecal calprotectin and stool culture positive  - General surgery evaluated at outside hospital ; per note no drainable fluid collection.   - Infliximab level and antibody pending, next dose plan for 8/19  - Restart MTX when okay GI  - MRI pelvis showed 2 complex perianal fistulas  - MRI enterography showed "mucosal enhancement and wall thickening of the distal descending/sigmoid colon" and one perianal fistula.   -CRS evaluated " patient via EUA - extensive disease but no defined fistulas   -candidate for fecal diversion  - STD work up pending  - intermittent methylprednisolone IV per GI - now 20 mg IV q8h  - ID consulted -recommended ceftriaxone and metronidazole for 5 more days  - continue ciprofloxacin and metronidazole for now  - colonoscopy per GI to assess for ideal placement of ostomy. Clear liquid diet now    8/19- Per CRS: on the schedule on Tuesday for diverting loop ileostomy, pending final results of endoscopies.  Will have her seen in marked for ileostomy preoperatively. Remcade Infusion 8/19 being held due to plans for diverting ileostomy. On soft diet, on steroids, flagyl, cipro.    8/20 - wants reg diet  Per GI:  Reduced solumedrol to 20 mg IV q12h. Plan to continue taper if not uptrending  - With upcoming surgery, postpone next infliximab infusion (originally planned 8/19)   - Has infliximab level pending from 8/12, expect result by 8/22  - Repeat infliximab level collected 8/18 as true 14-week trough level  - EGD/colonoscopy as above. Terminal ileum without disease activity  -- Follow up on stool studies  - Anemia- S/P venofer in past (8/14- Hgb 9.1)  - H. Pylori biopsies taken during EGD (positive in April, unclear if treated)  8/21- solumedrol 10 bid, surgery planned for Tuesday    Acute pain  Perianal pain in passage of stool  Multimodal approach  Acetaminophen 1 g TID and gabapentin 100 mg TID  Morphine 5 mg IV q4h prn  oxycodone 10 mg q4h prn  Need judicious use of opioids in IBD, but NSAID is also contraindicated!    8/21- weaning opioid: MEDD 60-> 30 -> 15    Inpatient Morphine Milligram Equivalents Per Day 8/19 - 8/22    Values displayed are in units of MME/Day     Order Start / End Date 8/19 Yesterday Today Tomorrow     oxyCODONE immediate release tablet Tab 10 mg 8/17 - 8/19 0 of 15 -- -- --     morphine injection 5 mg 8/17 - 8/19 15 of 30 -- -- --     oxyCODONE immediate release tablet 5 mg 8/19 - No end date 15  of 22.5 15 of 30 0 of 30 0 of 30     Daily Totals  30 of 67.5 15 of 30 0 of 30 0 of 30              Central corneal ulcer, right  - Solumedrol 125mg IV x 1, now solumedrol 20 mg IV q8h  - Ophthalmology consulted and evaluated patient  ;  Appreciate recs. Managing eye drops and ointments.  8/10 - solumedrol 10 IV q 12    Corneal ulcer  See ophthalmology consuts      Crohn's disease of both small and large intestine with fistula  Per history.   Has severe leesa-anal disease  See above        VTE Risk Mitigation (From admission, onward)         Ordered     enoxaparin injection 40 mg  Daily         08/14/22 0219     IP VTE HIGH RISK PATIENT  Once         08/12/22 2058                Discharge Planning   ENDY: 8/24/2022     Code Status: Full Code   Is the patient medically ready for discharge?: No    Reason for patient still in hospital (select all that apply): Patient trending condition  Discharge Plan A: Home   Discharge Delays: None known at this time            Shayy Dietz MD  Department of Hospital Medicine   [facility]

## 2022-08-21 NOTE — ASSESSMENT & PLAN NOTE
- Solumedrol 125mg IV x 1, now solumedrol 20 mg IV q8h  - Ophthalmology consulted and evaluated patient  ;  Appreciate recs. Managing eye drops and ointments.  8/10 - solumedrol 10 IV q 12

## 2022-08-21 NOTE — PLAN OF CARE
Problem: Adult Inpatient Plan of Care  Goal: Plan of Care Review  Outcome: Ongoing, Progressing     Problem: Adult Inpatient Plan of Care  Goal: Absence of Hospital-Acquired Illness or Injury  Outcome: Ongoing, Progressing     Problem: Infection  Goal: Absence of Infection Signs and Symptoms  Outcome: Ongoing, Progressing     Problem: Fall Injury Risk  Goal: Absence of Fall and Fall-Related Injury  Outcome: Ongoing, Progressing   Pt is A,A,O x 4 . Stable V/S. Pain medications given as needed . Pt ambulated to the bathroom independently . Pt remain free of falls and injuries . Medications given as ordered . Pt is schedule for diverting ileostomy on 8/23. No significant changes during the day .

## 2022-08-21 NOTE — TREATMENT PLAN
[facility]  Gastroenterology  Treatment Plan    Patient Name: Nikkie Myles  MRN: [id number]  Admission Date: 2022  Hospital Length of Stay: 9 days    Subjective:     HPI: Nikkie Myles is a 32 y.o. female with history of Crohn's disease (with duodenal, perianal, sigmoidal and rectal involvement), anemia & corneal ulceration who [place] [facility] [place] [facility] for ophthalmology consult for her corneal ulcer. GI has been consulted for management of her Crohn's disease.     Interval History:  HDS, CRP remains wnl    ============================================  IBD history:  - The patient had recurrent ocular infections & erythema nodosum, and was diagnosed with Crohn's disease in 3/2022.   - Started on infliximab and methotrexate by [facility] GI Dr. Lujan/Yo and CRS Dr. Henson.  - MRI pelvis 2022 showed complex bilateral multiple transsphinteric perianal fistula with internal sphincter components and multiple tiny abscesses along the tract.  - Infliximab infusion history:   - 1st infusion 22   - 2nd infusion 22   - 3rd infusion 22 (completed induction)   - Next infusion due 22 (q8 weeks)     Past surgical history:  has a past surgical history that includes  section, low transverse (2013);  section with tubal ligation (Bilateral, 2020); and  section ().      Endoscopic history:  - EGD (22): Exam concerning for duodenal Crohn's. Biopsy consistent with duodenal enteritis.   - Colonoscopy (22)    No current facility-administered medications on file prior to encounter.     Current Outpatient Medications on File Prior to Encounter   Medication Sig Dispense Refill    acetaminophen (TYLENOL) 500 MG tablet Take 2 tablets (1,000 mg total) by mouth every 6 (six) hours as needed. 60 tablet 0    erythromycin (ROMYCIN) ophthalmic ointment Place a 1/2 inch ribbon of ointment into the lower eyelid 2-3 times  daily. (Patient not taking: Reported on 3/8/2022) 1 g 0    ibuprofen (ADVIL,MOTRIN) 400 MG tablet Take 1 tablet (400 mg total) by mouth every 6 (six) hours as needed. 20 tablet 0    ondansetron (ZOFRAN-ODT) 4 MG TbDL Take 1 tablet (4 mg total) by mouth every 8 (eight) hours as needed. 20 tablet 0    prednisoLONE acetate (PRED FORTE) 1 % DrpS Place 1 drop into the left eye 3 (three) times daily. 10 mL 4       Review of patient's allergies indicates:  No Known Allergies      Objective:     Vitals:    08/21/22 1533   BP: 100/60   Pulse: 95   Resp: 14   Temp: 97.7 °F (36.5 °C)     Constitutional:       General: She is not in acute distress.     Appearance: Normal appearance.   HENT:      Head: Normocephalic and atraumatic.   Cardiovascular:      Rate and Rhythm: Normal rate and regular rhythm.   Pulmonary:      Effort: Pulmonary effort is normal. No respiratory distress.      Breath sounds: Normal breath sounds.   Abdominal:      General: Abdomen is flat. There is no distension.      Palpations: Abdomen is soft.      Tenderness: There is no abdominal tenderness.   Genitourinary:     Comments: deferred  Neurological:      General: No focal deficit present.      Mental Status: She is alert and oriented to person, place, and time.   Psychiatric:         Behavior: Behavior normal.         Thought Content: Thought content normal.     Significant Labs:  Recent Labs   Lab 08/19/22  1102 08/21/22  1035   HGB 9.6* 9.8*       Lab Results   Component Value Date    WBC 12.40 08/21/2022    HGB 9.8 (L) 08/21/2022    HCT 33.0 (L) 08/21/2022    MCV 58 (L) 08/21/2022     08/21/2022       Lab Results   Component Value Date     (L) 08/21/2022    K 4.9 08/21/2022     08/21/2022    CO2 28 08/21/2022    BUN 12 08/21/2022    CREATININE 0.7 08/21/2022    CALCIUM 9.2 08/21/2022    ANIONGAP 6 (L) 08/21/2022    ESTGFRAFRICA >60 07/29/2020    EGFRNONAA >60 07/29/2020       Lab Results   Component Value Date    ALT 12  08/21/2022    AST 11 08/21/2022    ALKPHOS 63 08/21/2022    BILITOT 0.1 08/21/2022       Lab Results   Component Value Date    INR 0.9 01/12/2012     Significant Imaging:  Reviewed pertinent radiology findings.     Assessment/Plan:     Nikkie Myles is a 32 y.o. female with Crohn's disease (possible gastroduodenitis, ileal, sigmoid/rectum, perianal/perineal with abscess/fistula, S/P fistulotomy)  transferred from [facility] to [facility] for ophthalmology consult for her corneal ulcer. CRS following and are concerned for severe perianal/perineal disease noted on EUA, although no opportunity for seton placement. MRE shows disease activity in distal colon. Last EGD/colonoscopy 4/2022 prior to starting IFX but reports not available though bx c/w H pylori pos gastritis, duodenitis, ileal/sigmoid/rectal and perineal/perianal disease.  Per notes looks like there were plans to optimize remicade dosing in near future by her [facility] GI/IBD MD. Has 13 week IFX in process and will get 14 week levels if needed. Colonoscopy showed mild disease activity in the sigmoid and rectum, biopsies pending. Terminal ileum was normal. Given severe perianal disease, planning DLI on 8/23.    Problem List:  1.  Crohn's disease with duodenal ?, ileum, sigmoid/rectum, perianal fistula with vaginal ulcer and suspect impending RV fistula  2. Possible hydradenitis supp- perineal   3. S/P anal fistula repair (7/5/22)  4. 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  5. History of erythema nodosum  6. 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 H pylori gastritis- not clear if treated     - CRP (8/12/22): 91.4>61.8>11.4  - C diff negative on 8/8  - CT scan abdomen and pelvis: No evidence for small bowel obstructive process. Subtle suggestion of mild wall and fold thickening along the rectosigmoid colon without significant pericolonic stranding however may relate to mild rectosigmoid colitis.  There is no evidence for  colonic obstructive change.  There is no evidence for free intraperitoneal air.   - MRI pelvis (8/13/22): Two complex perianal fistulas with branching tracks. Along the bilateral medial gluteal cleft just deep to the cutaneous surface there are multiple serpiginous peripherally enhancing tracts which may have multiple cutaneous exits. Tracts are difficult to follow.  - EUA 8/15 shows severe perianal disease with deep ulcerations, shallow vaginal ulceration without fistula, and some changes in ischiorectal fat c/f hidradenitis  -MRE shows mucosal enhancement and wall thickening of the distal descending/sigmoid colon in this patient with Crohn's disease, as above, likely representing chronic inflammatory process.  No definitive abscess collection. Persistent perianal fistula as seen on MRI pelvis 8/13/22. Pt does have some passage of air in vagina suspicious for early RV fistula    - EGD 8/18:   Impression:            - Normal esophagus.                          - Esophagogastric landmarks identified.                          - A single gastric polyp. This appears like a                          benign inflammatory polyp.                          - Congested and erythematous mucosa in the gastric                          body and antrum. Biopsied.                          - Duodenal aphthous ulcers, which are a                          non-specific finding. Biopsied.                          - Normal second portion of the duodenum and third                          portion of the duodenum.    Colonoscopy 8/18:  Impression:            - Anal fissure and perianal skin tags found on                          perianal exam.                          - Erythematous mucosa in the rectum. Biopsied.                          - Minimal patchy erythematous mucosa in the                          sigmoid colon. Biopsied.                          - The descending colon, transverse colon,                          ascending colon and  cecum are normal.                          - The examined portion of the ileum was normal.                          Biopsied.                          - Biopsies were obtained in the descending colon,                          in the transverse colon, in the ascending colon                          and in the cecum.       Recommendations:   - IV solumedrol 10 mg once today, then discontinue. CRP remains wnl  - With upcoming surgery, postpone next infliximab infusion (originally planned 8/19)   - Has infliximab level pending from 8/12, expect result by 8/22  - Repeat infliximab level collected 8/18 as true 14-week trough level  - EGD/colonoscopy as above. Terminal ileum without disease activity  - optho following for corneal ulcer, will discuss steroid plan  - Anemia- S/P venofer in past (8/14- Hgb 9.1)  - H. Pylori biopsies taken during EGD (positive in April, unclear if treated)  - On DVT ppx: lovenox    Thank you for involving us in the care of Nikkie Myles. Please call with any additional questions, concerns or changes in the patient's clinical status. We will continue to follow.     Brian Selby MD  Gastroenterology Fellow PGY IV   [facility]

## 2022-08-21 NOTE — ASSESSMENT & PLAN NOTE
"Hs of Perirectal fistula    Recent diagnosis March 2022 of duodenal, ileocolonic and perianal Crohn disease on Remicade and MTX doing well up into 2 weeks ago.    - CT showed mild rectosigmoid colitis, and prominent appearance of the region of the lower uterine segment/cervix  - GI consulted  ;  Appreciate recs  - C diff negative, stool leukocytes - positive  - started ceftriaxone and vancomycin  - follow up additional stool studies: fecal calprotectin and stool culture positive  - General surgery evaluated at outside hospital ; per note no drainable fluid collection.   - Infliximab level and antibody pending, next dose plan for 8/19  - Restart MTX when okay GI  - MRI pelvis showed 2 complex perianal fistulas  - MRI enterography showed "mucosal enhancement and wall thickening of the distal descending/sigmoid colon" and one perianal fistula.   -CRS evaluated patient via EUA - extensive disease but no defined fistulas   -candidate for fecal diversion  - STD work up pending  - intermittent methylprednisolone IV per GI - now 20 mg IV q8h  - ID consulted -recommended ceftriaxone and metronidazole for 5 more days  - continue ciprofloxacin and metronidazole for now  - colonoscopy per GI to assess for ideal placement of ostomy. Clear liquid diet now    8/19- Per CRS: on the schedule on Tuesday for diverting loop ileostomy, pending final results of endoscopies.  Will have her seen in marked for ileostomy preoperatively. Remcade Infusion 8/19 being held due to plans for diverting ileostomy. On soft diet, on steroids, flagyl, cipro.    8/20 - wants reg diet  Per GI:  Reduced solumedrol to 20 mg IV q12h. Plan to continue taper if not uptrending  - With upcoming surgery, postpone next infliximab infusion (originally planned 8/19)   - Has infliximab level pending from 8/12, expect result by 8/22  - Repeat infliximab level collected 8/18 as true 14-week trough level  - EGD/colonoscopy as above. Terminal ileum without disease " activity  -- Follow up on stool studies  - Anemia- S/P venofer in past (8/14- Hgb 9.1)  - H. Pylori biopsies taken during EGD (positive in April, unclear if treated)  8/21- solumedrol 10 bid, surgery planned for Tuesday

## 2022-08-21 NOTE — ASSESSMENT & PLAN NOTE
33 yo female with hx of Crohn's admitted with perirectal abscess. S/p EUA on 8/15 demonstrating deep anal fissures. Colonoscopy 8/18 demonstraetd erythema in distal colon and proximal rectum. Plan for diverting ileostomy 8/23    - Continue regular diet  - Continue IV Cipro/Flagyl, methylprednisolone; work to optimize medical management  - Continue multimodal pain regimen  - Plan for diverting ileostomy 8/23   -site marked  - Please call with questions

## 2022-08-21 NOTE — ASSESSMENT & PLAN NOTE
Perianal pain in passage of stool  Multimodal approach  Acetaminophen 1 g TID and gabapentin 100 mg TID  Morphine 5 mg IV q4h prn  oxycodone 10 mg q4h prn  Need judicious use of opioids in IBD, but NSAID is also contraindicated!    8/21- weaning opioid: MEDD 60-> 30 -> 15    Inpatient Morphine Milligram Equivalents Per Day 8/19 - 8/22    Values displayed are in units of MME/Day     Order Start / End Date 8/19 Yesterday Today Tomorrow     oxyCODONE immediate release tablet Tab 10 mg 8/17 - 8/19 0 of 15 -- -- --     morphine injection 5 mg 8/17 - 8/19 15 of 30 -- -- --     oxyCODONE immediate release tablet 5 mg 8/19 - No end date 15 of 22.5 15 of 30 0 of 30 0 of 30     Daily Totals  30 of 67.5 15 of 30 0 of 30 0 of 30

## 2022-08-22 ENCOUNTER — ANESTHESIA EVENT (OUTPATIENT)
Dept: SURGERY | Facility: HOSPITAL | Age: 32
DRG: 331 | End: 2022-08-22
Payer: MEDICAID

## 2022-08-22 LAB
ABO + RH BLD: NORMAL
BACTERIA SPEC ANAEROBE CULT: ABNORMAL
BLD GP AB SCN CELLS X3 SERPL QL: NORMAL
SARS-COV-2 RNA RESP QL NAA+PROBE: NOT DETECTED

## 2022-08-22 PROCEDURE — 36415 COLL VENOUS BLD VENIPUNCTURE: CPT | Performed by: STUDENT IN AN ORGANIZED HEALTH CARE EDUCATION/TRAINING PROGRAM

## 2022-08-22 PROCEDURE — 86901 BLOOD TYPING SEROLOGIC RH(D): CPT | Performed by: STUDENT IN AN ORGANIZED HEALTH CARE EDUCATION/TRAINING PROGRAM

## 2022-08-22 PROCEDURE — 25000003 PHARM REV CODE 250

## 2022-08-22 PROCEDURE — 25000003 PHARM REV CODE 250: Performed by: INTERNAL MEDICINE

## 2022-08-22 PROCEDURE — 99232 SBSQ HOSP IP/OBS MODERATE 35: CPT | Mod: ,,, | Performed by: HOSPITALIST

## 2022-08-22 PROCEDURE — 25000003 PHARM REV CODE 250: Performed by: FAMILY MEDICINE

## 2022-08-22 PROCEDURE — 99232 PR SUBSEQUENT HOSPITAL CARE,LEVL II: ICD-10-PCS | Mod: ,,, | Performed by: HOSPITALIST

## 2022-08-22 PROCEDURE — U0003 INFECTIOUS AGENT DETECTION BY NUCLEIC ACID (DNA OR RNA); SEVERE ACUTE RESPIRATORY SYNDROME CORONAVIRUS 2 (SARS-COV-2) (CORONAVIRUS DISEASE [COVID-19]), AMPLIFIED PROBE TECHNIQUE, MAKING USE OF HIGH THROUGHPUT TECHNOLOGIES AS DESCRIBED BY CMS-2020-01-R: HCPCS | Performed by: STUDENT IN AN ORGANIZED HEALTH CARE EDUCATION/TRAINING PROGRAM

## 2022-08-22 PROCEDURE — 11000001 HC ACUTE MED/SURG PRIVATE ROOM

## 2022-08-22 PROCEDURE — U0005 INFEC AGEN DETEC AMPLI PROBE: HCPCS | Performed by: STUDENT IN AN ORGANIZED HEALTH CARE EDUCATION/TRAINING PROGRAM

## 2022-08-22 PROCEDURE — 25000003 PHARM REV CODE 250: Performed by: HOSPITALIST

## 2022-08-22 PROCEDURE — 25000003 PHARM REV CODE 250: Performed by: NURSE PRACTITIONER

## 2022-08-22 RX ADMIN — OXYCODONE 5 MG: 5 TABLET ORAL at 08:08

## 2022-08-22 RX ADMIN — PREDNISOLONE ACETATE 1 DROP: 10 SUSPENSION/ DROPS OPHTHALMIC at 08:08

## 2022-08-22 RX ADMIN — CARBOXYMETHYLCELLULOSE SODIUM 1 DROP: 10 GEL OPHTHALMIC at 01:08

## 2022-08-22 RX ADMIN — PREDNISOLONE ACETATE 1 DROP: 10 SUSPENSION/ DROPS OPHTHALMIC at 02:08

## 2022-08-22 RX ADMIN — CARBOXYMETHYLCELLULOSE SODIUM 1 DROP: 10 GEL OPHTHALMIC at 09:08

## 2022-08-22 RX ADMIN — TOBRAMYCIN 1 DROP: 3 SOLUTION/ DROPS OPHTHALMIC at 09:08

## 2022-08-22 RX ADMIN — FOLIC ACID 1 MG: 1 TABLET ORAL at 09:08

## 2022-08-22 RX ADMIN — GABAPENTIN 100 MG: 100 CAPSULE ORAL at 02:08

## 2022-08-22 RX ADMIN — CARBOXYMETHYLCELLULOSE SODIUM 1 DROP: 10 GEL OPHTHALMIC at 08:08

## 2022-08-22 RX ADMIN — ACETAMINOPHEN 1000 MG: 500 TABLET ORAL at 08:08

## 2022-08-22 RX ADMIN — TOBRAMYCIN 1 DROP: 3 SOLUTION/ DROPS OPHTHALMIC at 02:08

## 2022-08-22 RX ADMIN — TOBRAMYCIN 1 DROP: 3 SOLUTION/ DROPS OPHTHALMIC at 08:08

## 2022-08-22 RX ADMIN — GABAPENTIN 100 MG: 100 CAPSULE ORAL at 09:08

## 2022-08-22 RX ADMIN — ACETAMINOPHEN 1000 MG: 500 TABLET ORAL at 09:08

## 2022-08-22 RX ADMIN — GABAPENTIN 100 MG: 100 CAPSULE ORAL at 08:08

## 2022-08-22 RX ADMIN — PREDNISOLONE ACETATE 1 DROP: 10 SUSPENSION/ DROPS OPHTHALMIC at 09:08

## 2022-08-22 RX ADMIN — MELATONIN TAB 3 MG 6 MG: 3 TAB at 08:08

## 2022-08-22 RX ADMIN — ACETAMINOPHEN 1000 MG: 500 TABLET ORAL at 02:08

## 2022-08-22 NOTE — ASSESSMENT & PLAN NOTE
31 yo female with hx of Crohn's admitted with perirectal abscess. S/p EUA on 8/15 demonstrating deep anal fissures. Colonoscopy 8/18 demonstraetd erythema in distal colon and proximal rectum. Plan for diverting ileostomy 8/23    - Continue regular diet, NPO after midnight   - Continue IV Cipro/Flagyl, methylprednisolone; work to optimize medical management  - Continue multimodal pain regimen  - Plan for diverting ileostomy 8/23   -site marked   - consent and covid test  - Please call with questions

## 2022-08-22 NOTE — ANESTHESIA PREPROCEDURE EVALUATION
[facility]  Anesthesia Pre-Operative Evaluation         Patient Name: Nikkie Myles  YOB: 1990  MRN: [id number]    SUBJECTIVE:     Pre-operative evaluation for Procedure(s) (LRB):  CREATION, ILEOSTOMY, LAPAROSCOPIC (N/A)     2022    Nikkie Myles is a 32 y.o. female w/ a significant PMHx of Crohns disease who presented with weeks of bloody diarrhea and abdominal cramping, as well as vaginal discharge. She was found to have a perirectal abscess and now presents for the above procedure(s).      LDA:        Peripheral IV - Single Lumen 08/15/22 1029 20 G;1 3/4 in Left Upper Arm (Active)   Site Assessment Clean;Dry;Intact;No redness;No swelling 22   Extremity Assessment Distal to IV No abnormal discoloration;No redness;No swelling;No warmth 22   Line Status Saline locked 22   Dressing Status Clean;Dry;Intact 22   Dressing Intervention Integrity maintained 22   Dressing Change Due 22 08   Site Change Due 22 0800   Reason Not Rotated Poor venous access 22 08   Number of days: 6            Peripheral IV - Single Lumen 22 1245 20 G Right Forearm (Active)   Site Assessment Clean;Dry;Intact;No redness;No swelling 22   Line Status Saline locked 22   Dressing Status Clean;Dry;Intact 22   Dressing Intervention Integrity maintained 22   Dressing Change Due 22 08   Site Change Due 22 08   Reason Not Rotated Not due 22 0800   Number of days: 3       Prev airway: 3/29/22   03/29/22; 0842; Direct laryngoscopy; Oral Standard; 7.5 mm; Auscultation; Pink tape; [place]; 3; 1         Drips: None documented.    Patient Active Problem List   Diagnosis    Status post  section    Elevated blood pressure affecting pregnancy, antepartum    Anemia of mother during pregnancy, delivered    Central corneal ulcer,  right    Crohn's disease with abscess    Vaginal ulcer    Perianal fistula    Crohn's disease of both small and large intestine with fistula    Helicobacter pylori gastritis    History of erythema nodosum    Enteropathic arthropathy    Corneal ulcer    Acute pain       Review of patient's allergies indicates:  No Known Allergies    Current Outpatient Medications:    Current Facility-Administered Medications:     acetaminophen tablet 1,000 mg, 1,000 mg, Oral, TID, Jen Webster MD, 1,000 mg at 08/21/22 2021    acetaminophen tablet 650 mg, 650 mg, Oral, Q4H PRN, Bernabe Rowe MD    albuterol-ipratropium 2.5 mg-0.5 mg/3 mL nebulizer solution 3 mL, 3 mL, Nebulization, Q6H PRN, Bernabe Rowe MD    aluminum-magnesium hydroxide-simethicone 200-200-20 mg/5 mL suspension 30 mL, 30 mL, Oral, QID PRN, Bernabe Rowe MD    carboxymethylcellulose sodium 1 % DpGe 1 drop, 1 drop, Ophthalmic, QID, Carlos Manuel Rodriguez MD, 1 drop at 08/21/22 2022    enoxaparin injection 40 mg, 40 mg, Subcutaneous, Daily, Jen Webster MD, 40 mg at 08/21/22 1713    erythromycin 5 mg/gram (0.5 %) ophthalmic ointment, , Right Eye, TID, Carlos Manuel Rodriguez MD, Given at 08/19/22 2201    folic acid tablet 1 mg, 1 mg, Oral, Daily, Jacklyn Trevino NP, 1 mg at 08/21/22 0826    gabapentin capsule 100 mg, 100 mg, Oral, TID, Jen Webster MD, 100 mg at 08/21/22 2021    melatonin tablet 6 mg, 6 mg, Oral, Nightly PRN, Bernabe Rowe MD, 6 mg at 08/21/22 2021    naloxone 0.4 mg/mL injection 0.02 mg, 0.02 mg, Intravenous, PRN, Bernabe Rowe MD    ondansetron injection 4 mg, 4 mg, Intravenous, Q8H PRN, Bernabe Rowe MD    oxyCODONE immediate release tablet 5 mg, 5 mg, Oral, Q6H PRN, Shayy Dietz MD, 5 mg at 08/21/22 2021    prednisoLONE acetate 1 % ophthalmic suspension 1 drop, 1 drop, Left Eye, TID, Terri Jaffe MD, 1 drop at 08/21/22 2022    sodium chloride 0.9% flush 10 mL, 10 mL, Intravenous, Q12H PRN,  Bernabe Rowe MD    tobramycin sulfate 0.3% ophthalmic solution 1 drop, 1 drop, Right Eye, QID, Terri Jaffe MD, 1 drop at 22    Past Surgical History:   Procedure Laterality Date     SECTION       SECTION WITH TUBAL LIGATION Bilateral 2020    Procedure:  SECTION, WITH TUBAL LIGATION;  Surgeon: Jasper Hester MD;  Location: [facility] L&D OR;  Service: OB/GYN;  Laterality: Bilateral;     SECTION, LOW TRANSVERSE  2013    COLONOSCOPY N/A 2022    Procedure: COLONOSCOPY;  Surgeon: Luigi Jasmine MD;  Location: [facility] ENDO (2ND FLR);  Service: Endoscopy;  Laterality: N/A;    ESOPHAGOGASTRODUODENOSCOPY N/A 2022    Procedure: EGD (ESOPHAGOGASTRODUODENOSCOPY);  Surgeon: Luigi Jasmine MD;  Location: [facility] ENDO (2ND FLR);  Service: Endoscopy;  Laterality: N/A;    EXAMINATION UNDER ANESTHESIA N/A 8/15/2022    Procedure: Exam under anesthesia;  Surgeon: Zuleyka Yip MD;  [facility];  Service: Endoscopy;  Laterality: N/A;  Possible seton    FLEXIBLE SIGMOIDOSCOPY N/A 8/15/2022    Procedure: SIGMOIDOSCOPY, FLEXIBLE;  Surgeon: Zuleyka Yip MD;  [facility];  Service: Endoscopy;  Laterality: N/A;       Social History     Socioeconomic History    Marital status: Single   Tobacco Use    Smoking status: Former Smoker     Packs/day: 1.00     Years: 5.00     Pack years: 5.00    Smokeless tobacco: Never Used   Substance and Sexual Activity    Alcohol use: Yes     Comment: RARELY    Drug use: No    Sexual activity: Yes     Partners: Male     Birth control/protection: None   Social History Narrative    Together since last year,     He is a garvin    She is a CNA at [employer].       OBJECTIVE:     Vital Signs Range (Last 24H):  Temp:  [35.8 °C (96.4 °F)-37 °C (98.6 °F)]   Pulse:  []   Resp:  [14-20]   BP: ()/(54-60)   SpO2:  [97 %-100 %]       Significant Labs:  Lab Results   Component  Value Date    WBC 12.40 08/21/2022    HGB 9.8 (L) 08/21/2022    HCT 33.0 (L) 08/21/2022     08/21/2022    CHOL 118 04/20/2022    TRIG 85 04/20/2022    HDL 35 (L) 04/20/2022    ALT 12 08/21/2022    AST 11 08/21/2022     (L) 08/21/2022    K 4.9 08/21/2022     08/21/2022    CREATININE 0.7 08/21/2022    BUN 12 08/21/2022    CO2 28 08/21/2022    INR 0.9 01/12/2012    HGBA1C 5.7 (H) 04/21/2022       Diagnostic Studies: No relevant studies.    EKG:   Results for orders placed or performed during the hospital encounter of 08/11/22   EKG 12-lead    Collection Time: 08/16/22  5:31 PM    Narrative    Test Reason : R00.1,    Vent. Rate : 056 BPM     Atrial Rate : 056 BPM     P-R Int : 128 ms          QRS Dur : 078 ms      QT Int : 434 ms       P-R-T Axes : 047 010 012 degrees     QTc Int : 418 ms    Sinus bradycardia with sinus arrhythmia  Otherwise normal ECG  When compared with ECG of 12-AUG-2022 00:32,  Vent. rate has decreased BY  40 BPM  Confirmed by Mateus HENDERSON MD ([id number]) on 8/17/2022 7:56:20 AM    Referred By: [email or web address]   SELF           Confirmed By:Mateus HENDERSON MD       2D ECHO:  TTE:  No results found for this or any previous visit.    KARAN:  No results found for this or any previous visit.    ASSESSMENT/PLAN:                                                                                                                 08/22/2022  Nikkie Myles is a 32 y.o., female.      Pre-op Assessment    I have reviewed the Patient Summary Reports.     I have reviewed the Nursing Notes. I have reviewed the NPO Status.   I have reviewed the Medications.     Review of Systems  Anesthesia Hx:  No problems with previous Anesthesia  History of prior surgery of interest to airway management or planning: Denies Family Hx of Anesthesia complications.   Denies Personal Hx of Anesthesia complications.   Hematology/Oncology:         -- Anemia:   Cardiovascular:   Denies Hypertension.  Denies MI.  Denies CAD.    Denies  CABG/stent.   Denies CHF.    Pulmonary:   Denies COPD.    Hepatic/GI:   Denies GERD.  Bowel Conditions:  Inflammatory Bowel Disease, Crohns    Endocrine:  Obesity / BMI > 30      Physical Exam  General: Well nourished, Cooperative, Alert and Oriented    Airway:  Mallampati: I / I  Mouth Opening: Normal  TM Distance: Normal  Tongue: Normal  Neck ROM: Normal ROM    Dental:  Intact, Periodontal disease    Chest/Lungs:  Clear to auscultation, Normal Respiratory Rate    Heart:  Rate: Normal  Rhythm: Regular Rhythm        Anesthesia Plan  Type of Anesthesia, risks & benefits discussed:    Anesthesia Type: Gen ETT  Intra-op Monitoring Plan: Standard ASA Monitors  Post Op Pain Control Plan: multimodal analgesia and IV/PO Opioids PRN  Induction:  IV  Airway Plan: Direct, Post-Induction  Informed Consent: Informed consent signed with the Patient and all parties understand the risks and agree with anesthesia plan.  All questions answered. Patient consented to blood products? Yes  ASA Score: 3  Day of Surgery Review of History & Physical: H&P Update referred to the surgeon/provider.  Anesthesia Plan Notes: Discussed plan for General endotracheal anesthesia    Ready For Surgery From Anesthesia Perspective.     .

## 2022-08-22 NOTE — PROGRESS NOTES
[facility] Medicine  Progress Note    Patient Name: Nikkie Myles  MRN: [id number]  Patient Class: IP- Inpatient   Admission Date: 8/11/2022  Length of Stay: 10 days  Attending Physician: Shayy Dietz MD  Primary Care Provider: Brian Collado MD        Subjective:     Principal Problem:Crohn's disease with abscess        HPI:  Ms. Nikkie Myles is a 32 y.o. female with  duodenal, ileocolonic and perianal Crohn's disease on Remicade and MTX, as well as a a chronic right corneal ulcer, who presented to the  ER on 8/11 for evaluation of several weeks bloody diarrhea and abdominal cramping, as well as vaginal discharge.  CT abdomen pelvis showed mild rectosigmoid colitis, and prominent appearance of the region of the lower uterine segment/cervix.  MRI was ordered for further evaluation.  GI and GYN were consulted.  Stool studies and C. Diff were collected.  She was given a dose of Vanc and Ceftriaxone.  She also endorses complete loss of vision in her right eye and significant discomfort, when she normally can see shapes and colors.  Her case was discussed with Dr. Strickland of Optho, who suggested she be transferred to [facility] for Optho evaluation.  She was given Solumedrol 125mg IV x 1. Last infliximab 7/14 and next infusion for 8/19. Compliant with MTX 15 mg once a week up until last Sat due to insurance issue.      Patient transferred ED to ED due to bed availability. In ED patient afebrile and hemodynamically stable. Continues to report ongoing abdominal/rectal pain and blood mixed stools. Ophthalmology consulted and evaluated patient in the ED. Patient admitted to the care of medicine for further evaluation and management.      Overview/Hospital Course:  She was started on IV solumedrol and ophthalmology managing eye medication. Colorectal surgery did EUA and did not find fistula to where they can place seton. MRI enterography confirmed severe distal colon disease and no abscess. They recommend  fecal diversion. GI to perform colonoscopy to determine extent of disease for ostomy placement. Patient started on vancomycin and ceftriaxone due to concern of MRSA of eye and intraabdominal abscess. ID consulted and recommended a limited course of ceftriaxone and metronidazole. Will continue on ciprofloxacin and metronidazole as per CRS. GI is managing corticosteroids.    8/18- per GI:  32 y.o. female with history of Crohn's disease (with duodenal, perianal, sigmoidal and rectal involvement), anemia & corneal ulceration who was transferred from [facility] to [facility] for ophthalmology consult for her corneal ulcer.   - Still having severe perianal pain provoked by BMs  - R eye looks and feels better  - 3 BMs past 24h, 2 liquid, one soft, no blood  - CRP downtrending  - on solumedrol  /70  Pulse 52  AF, CRS planning for fecal diversion, ileostomy next Tuesday 8/19- advance diet. /67 and VSS. Pain under control.; lab done. We had a long talk about ostomies.  8/20- /68 VSS. Appeciate GI f.u. surgery planned Tuesday 8/21- solumedrol weaned. VSS, surgery planned this upcomming Tuesday 8/22- pt is ready for surgery tomorrow. BP 84/54   Pulse 90        Interval History: see above    Review of Systems   Constitutional:  Negative for activity change, chills, fatigue and fever.        Functioning well, talking to famiy on phome, and using technology   HENT:  Negative for congestion, nosebleeds and trouble swallowing.    Respiratory:  Negative for apnea, cough, choking, chest tightness and shortness of breath.    Cardiovascular:  Negative for chest pain and leg swelling.   Gastrointestinal:  Negative for abdominal distention, abdominal pain, constipation, diarrhea, nausea and vomiting.   Genitourinary:  Negative for decreased urine volume, difficulty urinating, dysuria and frequency.   Musculoskeletal:  Negative for arthralgias, back pain, joint swelling, neck pain and neck stiffness.   Skin:   Negative for rash and wound.   Neurological:  Negative for dizziness, seizures, syncope, weakness, light-headedness, numbness and headaches.   Psychiatric/Behavioral:  Negative for agitation, behavioral problems, confusion, hallucinations, self-injury and sleep disturbance. The patient is not nervous/anxious.    Objective:     Vital Signs (Most Recent):  Temp: 97.4 °F (36.3 °C) (08/22/22 0450)  Pulse: 90 (08/22/22 0450)  Resp: 20 (08/22/22 0450)  BP: (!) 84/54 (08/22/22 0450)  SpO2: 99 % (08/22/22 0450)   Vital Signs (24h Range):  Temp:  [97 °F (36.1 °C)-98.6 °F (37 °C)] 97.4 °F (36.3 °C)  Pulse:  [] 90  Resp:  [14-20] 20  SpO2:  [98 %-100 %] 99 %  BP: ()/(54-60) 84/54     Weight: 77.2 kg (170 lb 3.1 oz)  Body mass index is 33.24 kg/m².    Intake/Output Summary (Last 24 hours) at 8/22/2022 0717  Last data filed at 8/21/2022 1300  Gross per 24 hour   Intake 500 ml   Output --   Net 500 ml        Physical Exam  Constitutional:       General: She is not in acute distress.     Appearance: Normal appearance.   HENT:      Head: Normocephalic and atraumatic.      Nose: Nose normal.      Mouth/Throat:      Mouth: Mucous membranes are moist.   Eyes:      General: No scleral icterus.     Extraocular Movements: Extraocular movements intact.      Pupils: Pupils are equal, round, and reactive to light.   Cardiovascular:      Rate and Rhythm: Normal rate and regular rhythm.      Pulses: Normal pulses.      Heart sounds: Normal heart sounds.   Pulmonary:      Effort: Pulmonary effort is normal.      Breath sounds: Normal breath sounds. No wheezing or rhonchi.   Chest:      Chest wall: No tenderness.   Abdominal:      General: Abdomen is flat. Bowel sounds are normal. There is no distension.      Palpations: Abdomen is soft.      Tenderness: There is no abdominal tenderness. There is no right CVA tenderness, left CVA tenderness, guarding or rebound.   Musculoskeletal:         General: No swelling, tenderness, deformity  or signs of injury. Normal range of motion.      Cervical back: Normal range of motion and neck supple. No rigidity or tenderness.   Skin:     General: Skin is warm and dry.      Coloration: Skin is not jaundiced or pale.      Findings: No erythema or rash.   Neurological:      General: No focal deficit present.      Mental Status: She is alert and oriented to person, place, and time. Mental status is at baseline.      Cranial Nerves: No cranial nerve deficit.      Motor: No weakness.   Psychiatric:         Mood and Affect: Mood normal.         Behavior: Behavior normal.         Thought Content: Thought content normal.         Judgment: Judgment normal.       Significant Labs: All pertinent labs within the past 24 hours have been reviewed.  CBC:   Recent Labs   Lab 08/21/22  1035   WBC 12.40   HGB 9.8*   HCT 33.0*          CMP:   Recent Labs   Lab 08/21/22  1035   *   K 4.9      CO2 28   *   BUN 12   CREATININE 0.7   CALCIUM 9.2   PROT 7.7   ALBUMIN 2.8*   BILITOT 0.1   ALKPHOS 63   AST 11   ALT 12   ANIONGAP 6*         Significant Imaging: I have reviewed all pertinent imaging results/findings within the past 24 hours.      Assessment/Plan:      * Crohn's disease with abscess  Hs of Perirectal fistula    Recent diagnosis March 2022 of duodenal, ileocolonic and perianal Crohn disease on Remicade and MTX doing well up into 2 weeks ago.    - CT showed mild rectosigmoid colitis, and prominent appearance of the region of the lower uterine segment/cervix  - GI consulted  ;  Appreciate recs  - C diff negative, stool leukocytes - positive  - started ceftriaxone and vancomycin  - follow up additional stool studies: fecal calprotectin and stool culture positive  - General surgery evaluated at outside hospital ; per note no drainable fluid collection.   - Infliximab level and antibody pending, next dose plan for 8/19  - Restart MTX when okay GI  - MRI pelvis showed 2 complex perianal fistulas  - MRI  "enterography showed "mucosal enhancement and wall thickening of the distal descending/sigmoid colon" and one perianal fistula.   -CRS evaluated patient via EUA - extensive disease but no defined fistulas   -candidate for fecal diversion  - STD work up pending  - intermittent methylprednisolone IV per GI - now 20 mg IV q8h  - ID consulted -recommended ceftriaxone and metronidazole for 5 more days  - continue ciprofloxacin and metronidazole for now  - colonoscopy per GI to assess for ideal placement of ostomy. Clear liquid diet now    8/19- Per CRS: on the schedule on Tuesday for diverting loop ileostomy, pending final results of endoscopies.  Will have her seen in marked for ileostomy preoperatively. Remcade Infusion 8/19 being held due to plans for diverting ileostomy. On soft diet, on steroids, flagyl, cipro.    8/20 - wants reg diet  Per GI:  Reduced solumedrol to 20 mg IV q12h. Plan to continue taper if not uptrending  - With upcoming surgery, postpone next infliximab infusion (originally planned 8/19)   - Has infliximab level pending from 8/12, expect result by 8/22  - Repeat infliximab level collected 8/18 as true 14-week trough level  - EGD/colonoscopy as above. Terminal ileum without disease activity  -- Follow up on stool studies  - Anemia- S/P venofer in past (8/14- Hgb 9.1)  - H. Pylori biopsies taken during EGD (positive in April, unclear if treated)  8/21- solumedrol 10 bid, surgery planned for Tuesday    Acute pain  Perianal pain in passage of stool  Multimodal approach  Acetaminophen 1 g TID and gabapentin 100 mg TID  Morphine 5 mg IV q4h prn  oxycodone 10 mg q4h prn  Need judicious use of opioids in IBD, but NSAID is also contraindicated!    8/21- weaning opioid: MEDD 60-> 30 -> 15    Inpatient Morphine Milligram Equivalents Per Day 8/19 - 8/22    Values displayed are in units of MME/Day     Order Start / End Date 8/19 Yesterday Today Tomorrow     oxyCODONE immediate release tablet Tab 10 mg 8/17 - " 8/19 0 of 15 -- -- --     morphine injection 5 mg 8/17 - 8/19 15 of 30 -- -- --     oxyCODONE immediate release tablet 5 mg 8/19 - No end date 15 of 22.5 15 of 30 0 of 30 0 of 30     Daily Totals  30 of 67.5 15 of 30 0 of 30 0 of 30              Central corneal ulcer, right  - Solumedrol 125mg IV x 1, now solumedrol 20 mg IV q8h  - Ophthalmology consulted and evaluated patient  ;  Appreciate recs. Managing eye drops and ointments.  8/21 - solumedrol 10 IV q 12, GI weaning steroid    Corneal ulcer  See ophthalmology consuts      Crohn's disease of both small and large intestine with fistula  Per history.   Has severe leesa-anal disease  See above        VTE Risk Mitigation (From admission, onward)         Ordered     enoxaparin injection 40 mg  Daily         08/14/22 0219     IP VTE HIGH RISK PATIENT  Once         08/12/22 2058                Discharge Planning   ENDY: 8/24/2022     Code Status: Full Code   Is the patient medically ready for discharge?: No    Reason for patient still in hospital (select all that apply): Patient trending condition  Discharge Plan A: Home   Discharge Delays: None known at this time      Shayy Dietz MD  Department of Hospital Medicine   [facility]

## 2022-08-22 NOTE — PLAN OF CARE
Problem: Adult Inpatient Plan of Care  Goal: Plan of Care Review  Outcome: Ongoing, Progressing  Goal: Patient-Specific Goal (Individualized)  Outcome: Ongoing, Progressing  Goal: Absence of Hospital-Acquired Illness or Injury  Outcome: Ongoing, Progressing  Goal: Optimal Comfort and Wellbeing  Outcome: Ongoing, Progressing  Goal: Readiness for Transition of Care  Outcome: Ongoing, Progressing   VS within ranges, uneventful night.

## 2022-08-22 NOTE — NURSING
0720 Pt up in bathroom, communication board updated    0748 Pt back in bed A&O x4 resp even and unlabored, vitals done, bed in low locked position, call light in reach    0911 Pt in bed A&O x4, scheduled meds given, pt finished eating, eye gtts set up pt gave herself, refused ointment, denies any pain at this time states just sleepy as was up late, will be NPO tonight for surgery tomorrow, diet changed to clear liquid

## 2022-08-22 NOTE — CARE UPDATE
RAPID RESPONSE NURSE ROUND       Rounding completed with charge RN, Paty for hypotension, 98/56 reports she will recheck and continue to monitor. Patient is going for surgery tomorrow. No additional concerns verbalized at this time. Instructed to call [phone] for further concerns or assistance.

## 2022-08-22 NOTE — SUBJECTIVE & OBJECTIVE
Interval History: see above    Review of Systems   Constitutional:  Negative for activity change, chills, fatigue and fever.        Functioning well, talking to famiy on phome, and using technology   HENT:  Negative for congestion, nosebleeds and trouble swallowing.    Respiratory:  Negative for apnea, cough, choking, chest tightness and shortness of breath.    Cardiovascular:  Negative for chest pain and leg swelling.   Gastrointestinal:  Negative for abdominal distention, abdominal pain, constipation, diarrhea, nausea and vomiting.   Genitourinary:  Negative for decreased urine volume, difficulty urinating, dysuria and frequency.   Musculoskeletal:  Negative for arthralgias, back pain, joint swelling, neck pain and neck stiffness.   Skin:  Negative for rash and wound.   Neurological:  Negative for dizziness, seizures, syncope, weakness, light-headedness, numbness and headaches.   Psychiatric/Behavioral:  Negative for agitation, behavioral problems, confusion, hallucinations, self-injury and sleep disturbance. The patient is not nervous/anxious.    Objective:     Vital Signs (Most Recent):  Temp: 97.4 °F (36.3 °C) (08/22/22 0450)  Pulse: 90 (08/22/22 0450)  Resp: 20 (08/22/22 0450)  BP: (!) 84/54 (08/22/22 0450)  SpO2: 99 % (08/22/22 0450)   Vital Signs (24h Range):  Temp:  [97 °F (36.1 °C)-98.6 °F (37 °C)] 97.4 °F (36.3 °C)  Pulse:  [] 90  Resp:  [14-20] 20  SpO2:  [98 %-100 %] 99 %  BP: ()/(54-60) 84/54     Weight: 77.2 kg (170 lb 3.1 oz)  Body mass index is 33.24 kg/m².    Intake/Output Summary (Last 24 hours) at 8/22/2022 0717  Last data filed at 8/21/2022 1300  Gross per 24 hour   Intake 500 ml   Output --   Net 500 ml        Physical Exam  Constitutional:       General: She is not in acute distress.     Appearance: Normal appearance.   HENT:      Head: Normocephalic and atraumatic.      Nose: Nose normal.      Mouth/Throat:      Mouth: Mucous membranes are moist.   Eyes:      General: No scleral  icterus.     Extraocular Movements: Extraocular movements intact.      Pupils: Pupils are equal, round, and reactive to light.   Cardiovascular:      Rate and Rhythm: Normal rate and regular rhythm.      Pulses: Normal pulses.      Heart sounds: Normal heart sounds.   Pulmonary:      Effort: Pulmonary effort is normal.      Breath sounds: Normal breath sounds. No wheezing or rhonchi.   Chest:      Chest wall: No tenderness.   Abdominal:      General: Abdomen is flat. Bowel sounds are normal. There is no distension.      Palpations: Abdomen is soft.      Tenderness: There is no abdominal tenderness. There is no right CVA tenderness, left CVA tenderness, guarding or rebound.   Musculoskeletal:         General: No swelling, tenderness, deformity or signs of injury. Normal range of motion.      Cervical back: Normal range of motion and neck supple. No rigidity or tenderness.   Skin:     General: Skin is warm and dry.      Coloration: Skin is not jaundiced or pale.      Findings: No erythema or rash.   Neurological:      General: No focal deficit present.      Mental Status: She is alert and oriented to person, place, and time. Mental status is at baseline.      Cranial Nerves: No cranial nerve deficit.      Motor: No weakness.   Psychiatric:         Mood and Affect: Mood normal.         Behavior: Behavior normal.         Thought Content: Thought content normal.         Judgment: Judgment normal.       Significant Labs: All pertinent labs within the past 24 hours have been reviewed.  CBC:   Recent Labs   Lab 08/21/22  1035   WBC 12.40   HGB 9.8*   HCT 33.0*          CMP:   Recent Labs   Lab 08/21/22  1035   *   K 4.9      CO2 28   *   BUN 12   CREATININE 0.7   CALCIUM 9.2   PROT 7.7   ALBUMIN 2.8*   BILITOT 0.1   ALKPHOS 63   AST 11   ALT 12   ANIONGAP 6*         Significant Imaging: I have reviewed all pertinent imaging results/findings within the past 24 hours.

## 2022-08-22 NOTE — PLAN OF CARE
Pt on clear liquid diet, consent signed for anaesthesia for surgery tomorrow   Problem: Adult Inpatient Plan of Care  Goal: Plan of Care Review  Outcome: Ongoing, Progressing  Goal: Patient-Specific Goal (Individualized)  Outcome: Ongoing, Progressing  Goal: Absence of Hospital-Acquired Illness or Injury  Outcome: Ongoing, Progressing  Goal: Optimal Comfort and Wellbeing  Outcome: Ongoing, Progressing  Goal: Readiness for Transition of Care  Outcome: Ongoing, Progressing     Problem: Infection  Goal: Absence of Infection Signs and Symptoms  Outcome: Ongoing, Progressing     Problem: Diarrhea  Goal: Fluid and Electrolyte Balance  Outcome: Ongoing, Progressing     Problem: Fall Injury Risk  Goal: Absence of Fall and Fall-Related Injury  Outcome: Ongoing, Progressing

## 2022-08-22 NOTE — ASSESSMENT & PLAN NOTE
- Solumedrol 125mg IV x 1, now solumedrol 20 mg IV q8h  - Ophthalmology consulted and evaluated patient  ;  Appreciate recs. Managing eye drops and ointments.  8/21 - solumedrol 10 IV q 12, GI weaning steroid

## 2022-08-22 NOTE — NURSING
"Pt on 84/54. Pt refused for nurse to recheck BP with manual, says she "feels ok and it's gonna be low"  "

## 2022-08-22 NOTE — PROGRESS NOTES
[facility] Surg  Colorectal Surgery  Progress Note    Patient Name: Nikkie Myles  MRN: [id number]  Admission Date: 8/11/2022  Hospital Length of Stay: 10 days  Attending Physician: Shayy Dietz MD    Subjective:     Interval History: No acute overnight events, AF, VSS. No complaints today. Discussed diverting ileostomy with patient and still is ok to proceed with surgery.    Post-Op Info:  Procedure(s) (LRB):  EGD (ESOPHAGOGASTRODUODENOSCOPY) (N/A)  COLONOSCOPY (N/A)   4 Days Post-Op      Medications:  Continuous Infusions:      Scheduled Meds:   acetaminophen  1,000 mg Oral TID    carboxymethylcellulose sodium  1 drop Ophthalmic QID    enoxaparin  40 mg Subcutaneous Daily    erythromycin   Right Eye TID    folic acid  1 mg Oral Daily    gabapentin  100 mg Oral TID    prednisoLONE acetate  1 drop Left Eye TID    tobramycin sulfate 0.3%  1 drop Right Eye QID     PRN Meds:   acetaminophen    albuterol-ipratropium    aluminum-magnesium hydroxide-simethicone    melatonin    naloxone    ondansetron    oxyCODONE    sodium chloride 0.9%        Objective:     Vital Signs (Most Recent):  Temp: 96.4 °F (35.8 °C) (08/22/22 0748)  Pulse: 92 (08/22/22 0748)  Resp: 18 (08/22/22 0748)  BP: (!) 98/56 (08/22/22 0748)  SpO2: 97 % (08/22/22 0748) Vital Signs (24h Range):  Temp:  [96.4 °F (35.8 °C)-98.6 °F (37 °C)] 96.4 °F (35.8 °C)  Pulse:  [] 92  Resp:  [14-20] 18  SpO2:  [97 %-100 %] 97 %  BP: ()/(54-60) 98/56     Intake/Output - Last 3 Shifts         08/20 0700 08/21 0659 08/21 0700 08/22 0659 08/22 0700 08/23 0659    P.O. 450 500     Total Intake(mL/kg) 450 (5.8) 500 (6.5)     Net +450 +500            Urine Occurrence 2 x 2 x     Stool Occurrence 1 x 1 x             Physical Exam  Constitutional:       General: She is not in acute distress.     Appearance: She is well-developed.   HENT:      Head: Normocephalic and atraumatic.   Eyes:      General:         Right eye: No discharge.          Left eye: No discharge.      Conjunctiva/sclera: Conjunctivae normal.   Cardiovascular:      Rate and Rhythm: Normal rate and regular rhythm.   Pulmonary:      Effort: Pulmonary effort is normal. No respiratory distress.   Abdominal:      General: There is no distension.      Palpations: Abdomen is soft.      Tenderness: There is no abdominal tenderness.   Musculoskeletal:         General: No deformity. Normal range of motion.      Cervical back: Normal range of motion.   Skin:     General: Skin is warm and dry.   Neurological:      Mental Status: She is alert and oriented to person, place, and time.   Psychiatric:         Behavior: Behavior normal.       Anorectal Exam:  Moderately tender to palpation throughout  Multiple areas of induration  No visible drainage or bleeding seen       Significant Labs:  BMP (Last 3 Results):   Recent Labs   Lab 08/19/22  1102 08/21/22  1035   * 180*   * 135*   K 4.4 4.9    101   CO2 24 28   BUN 14 12   CREATININE 0.6 0.7   CALCIUM 9.0 9.2       CBC (Last 3 Results):   Recent Labs   Lab 08/19/22  1102 08/21/22  1035   WBC 11.20 12.40   RBC 5.34 5.70*   HGB 9.6* 9.8*   HCT 31.1* 33.0*    390   MCV 58* 58*   MCH 18.0* 17.2*   MCHC 30.9* 29.7*         Significant Diagnostics:  I have reviewed all pertinent imaging results/findings within the past 24 hours.    Assessment/Plan:     * Crohn's disease with abscess  31 yo female with hx of Crohn's admitted with perirectal abscess. S/p EUA on 8/15 demonstrating deep anal fissures. Colonoscopy 8/18 demonstraetd erythema in distal colon and proximal rectum. Plan for diverting ileostomy 8/23    - CLD today, NPO after midnight   - Continue multimodal pain regimen  - Plan for diverting ileostomy 8/23   -site marked   - consent and covid test  - Please call with questions          Micheal Mejia MD  Colorectal Surgery  [facility]

## 2022-08-22 NOTE — SUBJECTIVE & OBJECTIVE
Subjective:     Interval History: No acute overnight events, AF, VSS. No complaints today. Discussed diverting ileostomy with patient and still is ok to proceed with surgery.    Post-Op Info:  Procedure(s) (LRB):  EGD (ESOPHAGOGASTRODUODENOSCOPY) (N/A)  COLONOSCOPY (N/A)   4 Days Post-Op      Medications:  Continuous Infusions:      Scheduled Meds:   acetaminophen  1,000 mg Oral TID    carboxymethylcellulose sodium  1 drop Ophthalmic QID    enoxaparin  40 mg Subcutaneous Daily    erythromycin   Right Eye TID    folic acid  1 mg Oral Daily    gabapentin  100 mg Oral TID    prednisoLONE acetate  1 drop Left Eye TID    tobramycin sulfate 0.3%  1 drop Right Eye QID     PRN Meds:   acetaminophen    albuterol-ipratropium    aluminum-magnesium hydroxide-simethicone    melatonin    naloxone    ondansetron    oxyCODONE    sodium chloride 0.9%        Objective:     Vital Signs (Most Recent):  Temp: 96.4 °F (35.8 °C) (08/22/22 0748)  Pulse: 92 (08/22/22 0748)  Resp: 18 (08/22/22 0748)  BP: (!) 98/56 (08/22/22 0748)  SpO2: 97 % (08/22/22 0748) Vital Signs (24h Range):  Temp:  [96.4 °F (35.8 °C)-98.6 °F (37 °C)] 96.4 °F (35.8 °C)  Pulse:  [] 92  Resp:  [14-20] 18  SpO2:  [97 %-100 %] 97 %  BP: ()/(54-60) 98/56     Intake/Output - Last 3 Shifts         08/20 0700  08/21 0659 08/21 0700 08/22 0659 08/22 0700  08/23 0659    P.O. 450 500     Total Intake(mL/kg) 450 (5.8) 500 (6.5)     Net +450 +500            Urine Occurrence 2 x 2 x     Stool Occurrence 1 x 1 x             Physical Exam  Constitutional:       General: She is not in acute distress.     Appearance: She is well-developed.   HENT:      Head: Normocephalic and atraumatic.   Eyes:      General:         Right eye: No discharge.         Left eye: No discharge.      Conjunctiva/sclera: Conjunctivae normal.   Cardiovascular:      Rate and Rhythm: Normal rate and regular rhythm.   Pulmonary:      Effort: Pulmonary effort is normal. No respiratory distress.    Abdominal:      General: There is no distension.      Palpations: Abdomen is soft.      Tenderness: There is no abdominal tenderness.   Musculoskeletal:         General: No deformity. Normal range of motion.      Cervical back: Normal range of motion.   Skin:     General: Skin is warm and dry.   Neurological:      Mental Status: She is alert and oriented to person, place, and time.   Psychiatric:         Behavior: Behavior normal.       Anorectal Exam:  Moderately tender to palpation throughout  Multiple areas of induration  No visible drainage or bleeding seen       Significant Labs:  BMP (Last 3 Results):   Recent Labs   Lab 08/19/22  1102 08/21/22  1035   * 180*   * 135*   K 4.4 4.9    101   CO2 24 28   BUN 14 12   CREATININE 0.6 0.7   CALCIUM 9.0 9.2       CBC (Last 3 Results):   Recent Labs   Lab 08/19/22  1102 08/21/22  1035   WBC 11.20 12.40   RBC 5.34 5.70*   HGB 9.6* 9.8*   HCT 31.1* 33.0*    390   MCV 58* 58*   MCH 18.0* 17.2*   MCHC 30.9* 29.7*         Significant Diagnostics:  I have reviewed all pertinent imaging results/findings within the past 24 hours.

## 2022-08-23 ENCOUNTER — ANESTHESIA (OUTPATIENT)
Dept: SURGERY | Facility: HOSPITAL | Age: 32
DRG: 331 | End: 2022-08-23
Payer: MEDICAID

## 2022-08-23 LAB
ALBUMIN SERPL BCP-MCNC: 2.7 G/DL (ref 3.5–5.2)
ALP SERPL-CCNC: 52 U/L (ref 55–135)
ALT SERPL W/O P-5'-P-CCNC: 12 U/L (ref 10–44)
ANION GAP SERPL CALC-SCNC: 4 MMOL/L (ref 8–16)
AST SERPL-CCNC: 13 U/L (ref 10–40)
BASOPHILS # BLD AUTO: 0.01 K/UL (ref 0–0.2)
BASOPHILS NFR BLD: 0.1 % (ref 0–1.9)
BILIRUB SERPL-MCNC: 0.3 MG/DL (ref 0.1–1)
BUN SERPL-MCNC: 11 MG/DL (ref 6–20)
CALCIUM SERPL-MCNC: 8.7 MG/DL (ref 8.7–10.5)
CHLORIDE SERPL-SCNC: 101 MMOL/L (ref 95–110)
CO2 SERPL-SCNC: 28 MMOL/L (ref 23–29)
CREAT SERPL-MCNC: 0.7 MG/DL (ref 0.5–1.4)
CRP SERPL-MCNC: 33.2 MG/L (ref 0–8.2)
DIFFERENTIAL METHOD: ABNORMAL
EOSINOPHIL # BLD AUTO: 0.1 K/UL (ref 0–0.5)
EOSINOPHIL NFR BLD: 1 % (ref 0–8)
ERYTHROCYTE [DISTWIDTH] IN BLOOD BY AUTOMATED COUNT: 27.1 % (ref 11.5–14.5)
EST. GFR  (NO RACE VARIABLE): >60 ML/MIN/1.73 M^2
GLUCOSE SERPL-MCNC: 75 MG/DL (ref 70–110)
HCT VFR BLD AUTO: 29.8 % (ref 37–48.5)
HGB BLD-MCNC: 9 G/DL (ref 12–16)
IMM GRANULOCYTES # BLD AUTO: 0.04 K/UL (ref 0–0.04)
IMM GRANULOCYTES NFR BLD AUTO: 0.5 % (ref 0–0.5)
INFLIXIMAB AB SERPL-MCNC: <22 NG/ML
INFLIXIMAB SERPL-MCNC: 0.6 UG/ML
LYMPHOCYTES # BLD AUTO: 2.6 K/UL (ref 1–4.8)
LYMPHOCYTES NFR BLD: 31.1 % (ref 18–48)
MCH RBC QN AUTO: 17.8 PG (ref 27–31)
MCHC RBC AUTO-ENTMCNC: 30.2 G/DL (ref 32–36)
MCV RBC AUTO: 59 FL (ref 82–98)
MONOCYTES # BLD AUTO: 0.8 K/UL (ref 0.3–1)
MONOCYTES NFR BLD: 9.4 % (ref 4–15)
NEUTROPHILS # BLD AUTO: 4.8 K/UL (ref 1.8–7.7)
NEUTROPHILS NFR BLD: 57.9 % (ref 38–73)
NRBC BLD-RTO: 0 /100 WBC
PLATELET # BLD AUTO: 300 K/UL (ref 150–450)
PMV BLD AUTO: ABNORMAL FL (ref 9.2–12.9)
POTASSIUM SERPL-SCNC: 4.4 MMOL/L (ref 3.5–5.1)
PROT SERPL-MCNC: 7.2 G/DL (ref 6–8.4)
RBC # BLD AUTO: 5.06 M/UL (ref 4–5.4)
SODIUM SERPL-SCNC: 133 MMOL/L (ref 136–145)
WBC # BLD AUTO: 8.34 K/UL (ref 3.9–12.7)

## 2022-08-23 PROCEDURE — 25000003 PHARM REV CODE 250: Performed by: STUDENT IN AN ORGANIZED HEALTH CARE EDUCATION/TRAINING PROGRAM

## 2022-08-23 PROCEDURE — 88365 INSITU HYBRIDIZATION (FISH): CPT | Mod: 26,,, | Performed by: PATHOLOGY

## 2022-08-23 PROCEDURE — 85025 COMPLETE CBC W/AUTO DIFF WBC: CPT | Performed by: HOSPITALIST

## 2022-08-23 PROCEDURE — 94761 N-INVAS EAR/PLS OXIMETRY MLT: CPT

## 2022-08-23 PROCEDURE — 11104 PR PUNCH BIOPSY, SKIN, SINGLE LESION: ICD-10-PCS | Mod: 51,,, | Performed by: COLON & RECTAL SURGERY

## 2022-08-23 PROCEDURE — 88364 INSITU HYBRIDIZATION (FISH): CPT | Mod: 26,,, | Performed by: PATHOLOGY

## 2022-08-23 PROCEDURE — 63600175 PHARM REV CODE 636 W HCPCS: Performed by: NURSE ANESTHETIST, CERTIFIED REGISTERED

## 2022-08-23 PROCEDURE — 44187 PR LAP, SURG ILEO/JEJUNO-STOMY: ICD-10-PCS | Mod: ,,, | Performed by: COLON & RECTAL SURGERY

## 2022-08-23 PROCEDURE — D9220A PRA ANESTHESIA: Mod: CRNA,,, | Performed by: NURSE ANESTHETIST, CERTIFIED REGISTERED

## 2022-08-23 PROCEDURE — D9220A PRA ANESTHESIA: ICD-10-PCS | Mod: CRNA,,, | Performed by: NURSE ANESTHETIST, CERTIFIED REGISTERED

## 2022-08-23 PROCEDURE — 88364 CHG INSITU HYBRIDIZATION (FISH: ICD-10-PCS | Mod: 26,,, | Performed by: PATHOLOGY

## 2022-08-23 PROCEDURE — D9220A PRA ANESTHESIA: Mod: ANES,,, | Performed by: ANESTHESIOLOGY

## 2022-08-23 PROCEDURE — 25000003 PHARM REV CODE 250: Performed by: NURSE PRACTITIONER

## 2022-08-23 PROCEDURE — 25000003 PHARM REV CODE 250: Performed by: NURSE ANESTHETIST, CERTIFIED REGISTERED

## 2022-08-23 PROCEDURE — 71000033 HC RECOVERY, INTIAL HOUR: Performed by: COLON & RECTAL SURGERY

## 2022-08-23 PROCEDURE — 20600001 HC STEP DOWN PRIVATE ROOM

## 2022-08-23 PROCEDURE — 36415 COLL VENOUS BLD VENIPUNCTURE: CPT | Performed by: HOSPITALIST

## 2022-08-23 PROCEDURE — S0030 INJECTION, METRONIDAZOLE: HCPCS | Performed by: NURSE ANESTHETIST, CERTIFIED REGISTERED

## 2022-08-23 PROCEDURE — 44187 LAP ILEO/JEJUNO-STOMY: CPT | Mod: ,,, | Performed by: COLON & RECTAL SURGERY

## 2022-08-23 PROCEDURE — 37000008 HC ANESTHESIA 1ST 15 MINUTES: Performed by: COLON & RECTAL SURGERY

## 2022-08-23 PROCEDURE — 36000711: Performed by: COLON & RECTAL SURGERY

## 2022-08-23 PROCEDURE — 88304 TISSUE EXAM BY PATHOLOGIST: CPT | Mod: 26,,, | Performed by: PATHOLOGY

## 2022-08-23 PROCEDURE — 88312 PR  SPECIAL STAINS,GROUP I: ICD-10-PCS | Mod: 26,,, | Performed by: PATHOLOGY

## 2022-08-23 PROCEDURE — 88365 INSITU HYBRIDIZATION (FISH): CPT | Performed by: PATHOLOGY

## 2022-08-23 PROCEDURE — 88304 PR  SURG PATH,LEVEL III: ICD-10-PCS | Mod: 26,,, | Performed by: PATHOLOGY

## 2022-08-23 PROCEDURE — 99232 SBSQ HOSP IP/OBS MODERATE 35: CPT | Mod: ,,, | Performed by: HOSPITALIST

## 2022-08-23 PROCEDURE — 88341 IMHCHEM/IMCYTCHM EA ADD ANTB: CPT | Performed by: PATHOLOGY

## 2022-08-23 PROCEDURE — 63600175 PHARM REV CODE 636 W HCPCS: Performed by: STUDENT IN AN ORGANIZED HEALTH CARE EDUCATION/TRAINING PROGRAM

## 2022-08-23 PROCEDURE — 36000710: Performed by: COLON & RECTAL SURGERY

## 2022-08-23 PROCEDURE — 63600175 PHARM REV CODE 636 W HCPCS

## 2022-08-23 PROCEDURE — 11104 PUNCH BX SKIN SINGLE LESION: CPT | Mod: 51,,, | Performed by: COLON & RECTAL SURGERY

## 2022-08-23 PROCEDURE — C1729 CATH, DRAINAGE: HCPCS | Performed by: COLON & RECTAL SURGERY

## 2022-08-23 PROCEDURE — 80053 COMPREHEN METABOLIC PANEL: CPT | Performed by: HOSPITALIST

## 2022-08-23 PROCEDURE — 71000016 HC POSTOP RECOV ADDL HR: Performed by: COLON & RECTAL SURGERY

## 2022-08-23 PROCEDURE — 88341 IMHCHEM/IMCYTCHM EA ADD ANTB: CPT | Mod: 26,,, | Performed by: PATHOLOGY

## 2022-08-23 PROCEDURE — 25000003 PHARM REV CODE 250: Performed by: INTERNAL MEDICINE

## 2022-08-23 PROCEDURE — 71000015 HC POSTOP RECOV 1ST HR: Performed by: COLON & RECTAL SURGERY

## 2022-08-23 PROCEDURE — 88342 CHG IMMUNOCYTOCHEMISTRY: ICD-10-PCS | Mod: 26,,, | Performed by: PATHOLOGY

## 2022-08-23 PROCEDURE — 88364 INSITU HYBRIDIZATION (FISH): CPT | Mod: 59 | Performed by: PATHOLOGY

## 2022-08-23 PROCEDURE — 27201423 OPTIME MED/SURG SUP & DEVICES STERILE SUPPLY: Performed by: COLON & RECTAL SURGERY

## 2022-08-23 PROCEDURE — 36415 COLL VENOUS BLD VENIPUNCTURE: CPT | Performed by: STUDENT IN AN ORGANIZED HEALTH CARE EDUCATION/TRAINING PROGRAM

## 2022-08-23 PROCEDURE — 88342 IMHCHEM/IMCYTCHM 1ST ANTB: CPT | Performed by: PATHOLOGY

## 2022-08-23 PROCEDURE — 88341 PR IHC OR ICC EACH ADD'L SINGLE ANTIBODY  STAINPR: ICD-10-PCS | Mod: 26,,, | Performed by: PATHOLOGY

## 2022-08-23 PROCEDURE — 25000003 PHARM REV CODE 250: Performed by: HOSPITALIST

## 2022-08-23 PROCEDURE — 88365 PR  TISSUE HYBRIDIZATION: ICD-10-PCS | Mod: 26,,, | Performed by: PATHOLOGY

## 2022-08-23 PROCEDURE — D9220A PRA ANESTHESIA: ICD-10-PCS | Mod: ANES,,, | Performed by: ANESTHESIOLOGY

## 2022-08-23 PROCEDURE — 63600175 PHARM REV CODE 636 W HCPCS: Performed by: HOSPITALIST

## 2022-08-23 PROCEDURE — 86140 C-REACTIVE PROTEIN: CPT | Performed by: STUDENT IN AN ORGANIZED HEALTH CARE EDUCATION/TRAINING PROGRAM

## 2022-08-23 PROCEDURE — 99232 PR SUBSEQUENT HOSPITAL CARE,LEVL II: ICD-10-PCS | Mod: ,,, | Performed by: HOSPITALIST

## 2022-08-23 PROCEDURE — 88342 IMHCHEM/IMCYTCHM 1ST ANTB: CPT | Mod: 91 | Performed by: PATHOLOGY

## 2022-08-23 PROCEDURE — 37000009 HC ANESTHESIA EA ADD 15 MINS: Performed by: COLON & RECTAL SURGERY

## 2022-08-23 PROCEDURE — 88304 TISSUE EXAM BY PATHOLOGIST: CPT | Performed by: PATHOLOGY

## 2022-08-23 PROCEDURE — 88312 SPECIAL STAINS GROUP 1: CPT | Performed by: PATHOLOGY

## 2022-08-23 PROCEDURE — 88312 SPECIAL STAINS GROUP 1: CPT | Mod: 26,,, | Performed by: PATHOLOGY

## 2022-08-23 PROCEDURE — 88342 IMHCHEM/IMCYTCHM 1ST ANTB: CPT | Mod: 26,,, | Performed by: PATHOLOGY

## 2022-08-23 PROCEDURE — 63600175 PHARM REV CODE 636 W HCPCS: Performed by: ANESTHESIOLOGY

## 2022-08-23 RX ORDER — PROPOFOL 10 MG/ML
VIAL (ML) INTRAVENOUS
Status: DISCONTINUED | OUTPATIENT
Start: 2022-08-23 | End: 2022-08-23

## 2022-08-23 RX ORDER — METRONIDAZOLE 500 MG/100ML
INJECTION, SOLUTION INTRAVENOUS
Status: DISCONTINUED | OUTPATIENT
Start: 2022-08-23 | End: 2022-08-23

## 2022-08-23 RX ORDER — GABAPENTIN 300 MG/1
300 CAPSULE ORAL 3 TIMES DAILY
Status: DISCONTINUED | OUTPATIENT
Start: 2022-08-23 | End: 2022-08-28

## 2022-08-23 RX ORDER — FENTANYL CITRATE 50 UG/ML
INJECTION, SOLUTION INTRAMUSCULAR; INTRAVENOUS
Status: DISCONTINUED | OUTPATIENT
Start: 2022-08-23 | End: 2022-08-23

## 2022-08-23 RX ORDER — OXYCODONE HYDROCHLORIDE 5 MG/1
5 TABLET ORAL EVERY 6 HOURS PRN
Status: DISCONTINUED | OUTPATIENT
Start: 2022-08-23 | End: 2022-08-28

## 2022-08-23 RX ORDER — ONDANSETRON 2 MG/ML
4 INJECTION INTRAMUSCULAR; INTRAVENOUS DAILY PRN
Status: DISCONTINUED | OUTPATIENT
Start: 2022-08-23 | End: 2022-08-23

## 2022-08-23 RX ORDER — HYDROMORPHONE HYDROCHLORIDE 1 MG/ML
INJECTION, SOLUTION INTRAMUSCULAR; INTRAVENOUS; SUBCUTANEOUS
Status: COMPLETED
Start: 2022-08-23 | End: 2022-08-23

## 2022-08-23 RX ORDER — LIDOCAINE HYDROCHLORIDE 10 MG/ML
INJECTION, SOLUTION EPIDURAL; INFILTRATION; INTRACAUDAL; PERINEURAL
Status: DISCONTINUED | OUTPATIENT
Start: 2022-08-23 | End: 2022-08-23

## 2022-08-23 RX ORDER — IBUPROFEN 200 MG
16 TABLET ORAL
Status: DISCONTINUED | OUTPATIENT
Start: 2022-08-23 | End: 2022-09-03 | Stop reason: HOSPADM

## 2022-08-23 RX ORDER — ESMOLOL HYDROCHLORIDE 10 MG/ML
INJECTION INTRAVENOUS
Status: DISCONTINUED | OUTPATIENT
Start: 2022-08-23 | End: 2022-08-23

## 2022-08-23 RX ORDER — OXYCODONE HYDROCHLORIDE 10 MG/1
10 TABLET ORAL EVERY 4 HOURS PRN
Status: DISCONTINUED | OUTPATIENT
Start: 2022-08-23 | End: 2022-08-28

## 2022-08-23 RX ORDER — FENTANYL CITRATE 50 UG/ML
25 INJECTION, SOLUTION INTRAMUSCULAR; INTRAVENOUS EVERY 5 MIN PRN
Status: COMPLETED | OUTPATIENT
Start: 2022-08-23 | End: 2022-08-23

## 2022-08-23 RX ORDER — HYDROMORPHONE HYDROCHLORIDE 1 MG/ML
0.5 INJECTION, SOLUTION INTRAMUSCULAR; INTRAVENOUS; SUBCUTANEOUS ONCE
Status: COMPLETED | OUTPATIENT
Start: 2022-08-23 | End: 2022-08-23

## 2022-08-23 RX ORDER — MIDAZOLAM HYDROCHLORIDE 1 MG/ML
INJECTION, SOLUTION INTRAMUSCULAR; INTRAVENOUS
Status: DISCONTINUED | OUTPATIENT
Start: 2022-08-23 | End: 2022-08-23

## 2022-08-23 RX ORDER — ACETAMINOPHEN 10 MG/ML
INJECTION, SOLUTION INTRAVENOUS
Status: DISCONTINUED | OUTPATIENT
Start: 2022-08-23 | End: 2022-08-23

## 2022-08-23 RX ORDER — IBUPROFEN 200 MG
24 TABLET ORAL
Status: DISCONTINUED | OUTPATIENT
Start: 2022-08-23 | End: 2022-09-03 | Stop reason: HOSPADM

## 2022-08-23 RX ORDER — GLUCAGON 1 MG
1 KIT INJECTION
Status: DISCONTINUED | OUTPATIENT
Start: 2022-08-23 | End: 2022-09-03 | Stop reason: HOSPADM

## 2022-08-23 RX ORDER — ROCURONIUM BROMIDE 10 MG/ML
INJECTION, SOLUTION INTRAVENOUS
Status: DISCONTINUED | OUTPATIENT
Start: 2022-08-23 | End: 2022-08-23

## 2022-08-23 RX ORDER — PHENYLEPHRINE HYDROCHLORIDE 10 MG/ML
INJECTION INTRAVENOUS
Status: DISCONTINUED | OUTPATIENT
Start: 2022-08-23 | End: 2022-08-23

## 2022-08-23 RX ORDER — DEXAMETHASONE SODIUM PHOSPHATE 4 MG/ML
INJECTION, SOLUTION INTRA-ARTICULAR; INTRALESIONAL; INTRAMUSCULAR; INTRAVENOUS; SOFT TISSUE
Status: DISCONTINUED | OUTPATIENT
Start: 2022-08-23 | End: 2022-08-23

## 2022-08-23 RX ORDER — TRAMADOL HYDROCHLORIDE 50 MG/1
50 TABLET ORAL EVERY 4 HOURS PRN
Status: DISCONTINUED | OUTPATIENT
Start: 2022-08-23 | End: 2022-09-03 | Stop reason: HOSPADM

## 2022-08-23 RX ADMIN — LIDOCAINE HYDROCHLORIDE 100 MG: 10 INJECTION, SOLUTION EPIDURAL; INFILTRATION; INTRACAUDAL at 02:08

## 2022-08-23 RX ADMIN — ACETAMINOPHEN 1000 MG: 10 INJECTION INTRAVENOUS at 03:08

## 2022-08-23 RX ADMIN — MIDAZOLAM 2 MG: 1 INJECTION INTRAMUSCULAR; INTRAVENOUS at 02:08

## 2022-08-23 RX ADMIN — FENTANYL CITRATE 100 MCG: 0.05 INJECTION, SOLUTION INTRAMUSCULAR; INTRAVENOUS at 02:08

## 2022-08-23 RX ADMIN — OXYCODONE HYDROCHLORIDE 10 MG: 10 TABLET ORAL at 09:08

## 2022-08-23 RX ADMIN — GABAPENTIN 300 MG: 300 CAPSULE ORAL at 09:08

## 2022-08-23 RX ADMIN — ROCURONIUM BROMIDE 30 MG: 50 INJECTION INTRAVENOUS at 02:08

## 2022-08-23 RX ADMIN — OXYCODONE HYDROCHLORIDE 10 MG: 10 TABLET ORAL at 05:08

## 2022-08-23 RX ADMIN — HYDROMORPHONE HYDROCHLORIDE 0.5 MG: 1 INJECTION, SOLUTION INTRAMUSCULAR; INTRAVENOUS; SUBCUTANEOUS at 05:08

## 2022-08-23 RX ADMIN — ACETAMINOPHEN 1000 MG: 500 TABLET ORAL at 09:08

## 2022-08-23 RX ADMIN — CEFTRIAXONE 1 G: 1 INJECTION, POWDER, FOR SOLUTION INTRAMUSCULAR; INTRAVENOUS at 03:08

## 2022-08-23 RX ADMIN — ESMOLOL HYDROCHLORIDE 20 MG: 100 INJECTION, SOLUTION INTRAVENOUS at 03:08

## 2022-08-23 RX ADMIN — SODIUM CHLORIDE, SODIUM LACTATE, POTASSIUM CHLORIDE, AND CALCIUM CHLORIDE 500 ML: .6; .31; .03; .02 INJECTION, SOLUTION INTRAVENOUS at 12:08

## 2022-08-23 RX ADMIN — CARBOXYMETHYLCELLULOSE SODIUM 1 DROP: 10 GEL OPHTHALMIC at 09:08

## 2022-08-23 RX ADMIN — FENTANYL CITRATE 25 MCG: 50 INJECTION INTRAMUSCULAR; INTRAVENOUS at 04:08

## 2022-08-23 RX ADMIN — ONDANSETRON 4 MG: 2 INJECTION INTRAMUSCULAR; INTRAVENOUS at 05:08

## 2022-08-23 RX ADMIN — SODIUM CHLORIDE: 0.9 INJECTION, SOLUTION INTRAVENOUS at 02:08

## 2022-08-23 RX ADMIN — PHENYLEPHRINE HYDROCHLORIDE 300 MCG: 10 INJECTION INTRAVENOUS at 03:08

## 2022-08-23 RX ADMIN — PROPOFOL 150 MG: 10 INJECTION, EMULSION INTRAVENOUS at 02:08

## 2022-08-23 RX ADMIN — ROCURONIUM BROMIDE 50 MG: 50 INJECTION INTRAVENOUS at 02:08

## 2022-08-23 RX ADMIN — GABAPENTIN 100 MG: 100 CAPSULE ORAL at 08:08

## 2022-08-23 RX ADMIN — DEXAMETHASONE SODIUM PHOSPHATE 4 MG: 4 INJECTION INTRA-ARTICULAR; INTRALESIONAL; INTRAMUSCULAR; INTRAVENOUS; SOFT TISSUE at 03:08

## 2022-08-23 RX ADMIN — ENOXAPARIN SODIUM 40 MG: 100 INJECTION SUBCUTANEOUS at 05:08

## 2022-08-23 RX ADMIN — TOBRAMYCIN 1 DROP: 3 SOLUTION/ DROPS OPHTHALMIC at 09:08

## 2022-08-23 RX ADMIN — PREDNISOLONE ACETATE 1 DROP: 10 SUSPENSION/ DROPS OPHTHALMIC at 09:08

## 2022-08-23 RX ADMIN — ERYTHROMYCIN: 5 OINTMENT OPHTHALMIC at 09:08

## 2022-08-23 RX ADMIN — OXYCODONE 5 MG: 5 TABLET ORAL at 06:08

## 2022-08-23 RX ADMIN — FOLIC ACID 1 MG: 1 TABLET ORAL at 08:08

## 2022-08-23 RX ADMIN — METRONIDAZOLE 500 MG: 500 INJECTION, SOLUTION INTRAVENOUS at 03:08

## 2022-08-23 RX ADMIN — ACETAMINOPHEN 1000 MG: 500 TABLET ORAL at 08:08

## 2022-08-23 RX ADMIN — PHENYLEPHRINE HYDROCHLORIDE 100 MCG: 10 INJECTION INTRAVENOUS at 03:08

## 2022-08-23 RX ADMIN — PROPOFOL 50 MG: 10 INJECTION, EMULSION INTRAVENOUS at 02:08

## 2022-08-23 RX ADMIN — SUGAMMADEX 200 MG: 100 INJECTION, SOLUTION INTRAVENOUS at 04:08

## 2022-08-23 NOTE — NURSING TRANSFER
Nursing Transfer Note      8/23/2022     Reason patient is being transferred: routine    Transfer To: 1046    Transfer via stretcher    Transported by pct    Medicines sent: none    Any special needs or follow-up needed:routine    Chart send with patient: Yes    Notified: mother    Patient reassessed at: 2735

## 2022-08-23 NOTE — NURSING
Pt arrived to unit accompanied by transport with NADN noted. Pt AAOx4, PERRLA, RR even, and unlabored, with PIV clean dry and intact. Pt has 3 surgical laps site to abdomen with dermabond clean dry, and intact. Ileostomy to the RLQ, mild swelling stoma red, and moist, dry and intact. Pt is aware of MD orders. Bed in lowest position, SRx3, call light within reach.

## 2022-08-23 NOTE — SUBJECTIVE & OBJECTIVE
Interval History: see above    Review of Systems   Constitutional:  Negative for activity change, chills, fatigue and fever.        Functioning well, talking to famiy on phome, and using technology   HENT:  Negative for congestion, nosebleeds and trouble swallowing.    Respiratory:  Negative for apnea, cough, choking, chest tightness and shortness of breath.    Cardiovascular:  Negative for chest pain and leg swelling.   Gastrointestinal:  Negative for abdominal distention, abdominal pain, constipation, diarrhea, nausea and vomiting.   Genitourinary:  Negative for decreased urine volume, difficulty urinating, dysuria and frequency.   Musculoskeletal:  Negative for arthralgias, back pain, joint swelling, neck pain and neck stiffness.   Skin:  Negative for rash and wound.   Neurological:  Negative for dizziness, seizures, syncope, weakness, light-headedness, numbness and headaches.   Psychiatric/Behavioral:  Negative for agitation, behavioral problems, confusion, hallucinations, self-injury and sleep disturbance. The patient is not nervous/anxious.    Objective:     Vital Signs (Most Recent):  Temp: 98.9 °F (37.2 °C) (08/23/22 0329)  Pulse: 87 (08/23/22 0329)  Resp: 16 (08/23/22 0648)  BP: (!) 101/56 (08/23/22 0329)  SpO2: 99 % (08/23/22 0329)   Vital Signs (24h Range):  Temp:  [96.4 °F (35.8 °C)-99.3 °F (37.4 °C)] 98.9 °F (37.2 °C)  Pulse:  [76-94] 87  Resp:  [16-20] 16  SpO2:  [97 %-100 %] 99 %  BP: ()/(51-59) 101/56     Weight: 77.2 kg (170 lb 3.1 oz)  Body mass index is 33.24 kg/m².  No intake or output data in the 24 hours ending 08/23/22 0648     Physical Exam  Constitutional:       General: She is not in acute distress.     Appearance: Normal appearance.   HENT:      Head: Normocephalic and atraumatic.      Nose: Nose normal.      Mouth/Throat:      Mouth: Mucous membranes are moist.   Eyes:      General: No scleral icterus.     Extraocular Movements: Extraocular movements intact.      Pupils: Pupils are  equal, round, and reactive to light.   Cardiovascular:      Rate and Rhythm: Normal rate and regular rhythm.      Pulses: Normal pulses.      Heart sounds: Normal heart sounds.   Pulmonary:      Effort: Pulmonary effort is normal.      Breath sounds: Normal breath sounds. No wheezing or rhonchi.   Chest:      Chest wall: No tenderness.   Abdominal:      General: Abdomen is flat. Bowel sounds are normal. There is no distension.      Palpations: Abdomen is soft.      Tenderness: There is no abdominal tenderness. There is no right CVA tenderness, left CVA tenderness, guarding or rebound.   Musculoskeletal:         General: No swelling, tenderness, deformity or signs of injury. Normal range of motion.      Cervical back: Normal range of motion and neck supple. No rigidity or tenderness.   Skin:     General: Skin is warm and dry.      Coloration: Skin is not jaundiced or pale.      Findings: No erythema or rash.   Neurological:      General: No focal deficit present.      Mental Status: She is alert and oriented to person, place, and time. Mental status is at baseline.      Cranial Nerves: No cranial nerve deficit.      Motor: No weakness.   Psychiatric:         Mood and Affect: Mood normal.         Behavior: Behavior normal.         Thought Content: Thought content normal.         Judgment: Judgment normal.       Significant Labs: All pertinent labs within the past 24 hours have been reviewed.  CBC:   Recent Labs   Lab 08/21/22  1035   WBC 12.40   HGB 9.8*   HCT 33.0*          CMP:   Recent Labs   Lab 08/21/22  1035   *   K 4.9      CO2 28   *   BUN 12   CREATININE 0.7   CALCIUM 9.2   PROT 7.7   ALBUMIN 2.8*   BILITOT 0.1   ALKPHOS 63   AST 11   ALT 12   ANIONGAP 6*         Significant Imaging: I have reviewed all pertinent imaging results/findings within the past 24 hours.

## 2022-08-23 NOTE — ASSESSMENT & PLAN NOTE
"Hs of Perirectal fistula    Recent diagnosis March 2022 of duodenal, ileocolonic and perianal Crohn disease on Remicade and MTX doing well up into 2 weeks ago.    - CT showed mild rectosigmoid colitis, and prominent appearance of the region of the lower uterine segment/cervix  - GI consulted  ;  Appreciate recs  - C diff negative, stool leukocytes - positive  - started ceftriaxone and vancomycin  - follow up additional stool studies: fecal calprotectin and stool culture positive  - General surgery evaluated at outside hospital ; per note no drainable fluid collection.   - Infliximab level and antibody pending, next dose plan for 8/19  - Restart MTX when okay GI  - MRI pelvis showed 2 complex perianal fistulas  - MRI enterography showed "mucosal enhancement and wall thickening of the distal descending/sigmoid colon" and one perianal fistula.   -CRS evaluated patient via EUA - extensive disease but no defined fistulas   -candidate for fecal diversion  - STD work up pending  - intermittent methylprednisolone IV per GI - now 20 mg IV q8h  - ID consulted -recommended ceftriaxone and metronidazole for 5 more days  - continue ciprofloxacin and metronidazole for now  - colonoscopy per GI to assess for ideal placement of ostomy. Clear liquid diet now    8/19- Per CRS: on the schedule on Tuesday for diverting loop ileostomy, pending final results of endoscopies.  Will have her seen in marked for ileostomy preoperatively. Remcade Infusion 8/19 being held due to plans for diverting ileostomy. On soft diet, on steroids, flagyl, cipro.    8/20 - wants reg diet  Per GI:  Reduced solumedrol to 20 mg IV q12h. Plan to continue taper if not uptrending  - With upcoming surgery, postpone next infliximab infusion (originally planned 8/19)   - Has infliximab level pending from 8/12, expect result by 8/22  - Repeat infliximab level collected 8/18 as true 14-week trough level  - EGD/colonoscopy as above. Terminal ileum without disease " activity  -- Follow up on stool studies  - Anemia- S/P venofer in past (8/14- Hgb 9.1)  - H. Pylori biopsies taken during EGD (positive in April, unclear if treated)  8/21- solumedrol 10 bid, surgery planned for Tuesday 8/23- Surgery today

## 2022-08-23 NOTE — PROGRESS NOTES
[facility] Medicine  Progress Note    Patient Name: Nikkie Myles  MRN: [id number]  Patient Class: IP- Inpatient   Admission Date: 8/11/2022  Length of Stay: 11 days  Attending Physician: Shayy Dietz MD  Primary Care Provider: Brian Collado MD        Subjective:     Principal Problem:Crohn's disease with abscess        HPI:  Ms. Nikkie Myles is a 32 y.o. female with  duodenal, ileocolonic and perianal Crohn's disease on Remicade and MTX, as well as a a chronic right corneal ulcer, who presented to the  ER on 8/11 for evaluation of several weeks bloody diarrhea and abdominal cramping, as well as vaginal discharge.  CT abdomen pelvis showed mild rectosigmoid colitis, and prominent appearance of the region of the lower uterine segment/cervix.  MRI was ordered for further evaluation.  GI and GYN were consulted.  Stool studies and C. Diff were collected.  She was given a dose of Vanc and Ceftriaxone.  She also endorses complete loss of vision in her right eye and significant discomfort, when she normally can see shapes and colors.  Her case was discussed with Dr. Strickland of Optho, who suggested she be transferred to [facility] for Optho evaluation.  She was given Solumedrol 125mg IV x 1. Last infliximab 7/14 and next infusion for 8/19. Compliant with MTX 15 mg once a week up until last Sat due to insurance issue.      Patient transferred ED to ED due to bed availability. In ED patient afebrile and hemodynamically stable. Continues to report ongoing abdominal/rectal pain and blood mixed stools. Ophthalmology consulted and evaluated patient in the ED. Patient admitted to the care of medicine for further evaluation and management.      Overview/Hospital Course:  She was started on IV solumedrol and ophthalmology managing eye medication. Colorectal surgery did EUA and did not find fistula to where they can place seton. MRI enterography confirmed severe distal colon disease and no abscess. They recommend  fecal diversion. GI to perform colonoscopy to determine extent of disease for ostomy placement. Patient started on vancomycin and ceftriaxone due to concern of MRSA of eye and intraabdominal abscess. ID consulted and recommended a limited course of ceftriaxone and metronidazole. Will continue on ciprofloxacin and metronidazole as per CRS. GI is managing corticosteroids.    8/18- per GI:  32 y.o. female with history of Crohn's disease (with duodenal, perianal, sigmoidal and rectal involvement), anemia & corneal ulceration who was transferred from [facility] to [facility] for ophthalmology consult for her corneal ulcer.   - Still having severe perianal pain provoked by BMs  - R eye looks and feels better  - 3 BMs past 24h, 2 liquid, one soft, no blood  - CRP downtrending  - on solumedrol  /70  Pulse 52  AF, CRS planning for fecal diversion, ileostomy next Tuesday 8/19- advance diet. /67 and VSS. Pain under control.; lab done. We had a long talk about ostomies.  8/20- /68 VSS. Appeciate GI f.u. surgery planned Tuesday 8/21- solumedrol weaned. VSS, surgery planned this upcomming Tuesday 8/22- pt is ready for surgery tomorrow. BP 84/54   Pulse 90    8/23- VSS, ate 50% yesterday, NPO, +urinations and BMs. To OR today. Pt is ready.       Interval History: see above    Review of Systems   Constitutional:  Negative for activity change, chills, fatigue and fever.        Functioning well, talking to famiy on phome, and using technology   HENT:  Negative for congestion, nosebleeds and trouble swallowing.    Respiratory:  Negative for apnea, cough, choking, chest tightness and shortness of breath.    Cardiovascular:  Negative for chest pain and leg swelling.   Gastrointestinal:  Negative for abdominal distention, abdominal pain, constipation, diarrhea, nausea and vomiting.   Genitourinary:  Negative for decreased urine volume, difficulty urinating, dysuria and frequency.   Musculoskeletal:  Negative  for arthralgias, back pain, joint swelling, neck pain and neck stiffness.   Skin:  Negative for rash and wound.   Neurological:  Negative for dizziness, seizures, syncope, weakness, light-headedness, numbness and headaches.   Psychiatric/Behavioral:  Negative for agitation, behavioral problems, confusion, hallucinations, self-injury and sleep disturbance. The patient is not nervous/anxious.    Objective:     Vital Signs (Most Recent):  Temp: 98.9 °F (37.2 °C) (08/23/22 0329)  Pulse: 87 (08/23/22 0329)  Resp: 16 (08/23/22 0648)  BP: (!) 101/56 (08/23/22 0329)  SpO2: 99 % (08/23/22 0329)   Vital Signs (24h Range):  Temp:  [96.4 °F (35.8 °C)-99.3 °F (37.4 °C)] 98.9 °F (37.2 °C)  Pulse:  [76-94] 87  Resp:  [16-20] 16  SpO2:  [97 %-100 %] 99 %  BP: ()/(51-59) 101/56     Weight: 77.2 kg (170 lb 3.1 oz)  Body mass index is 33.24 kg/m².  No intake or output data in the 24 hours ending 08/23/22 0648     Physical Exam  Constitutional:       General: She is not in acute distress.     Appearance: Normal appearance.   HENT:      Head: Normocephalic and atraumatic.      Nose: Nose normal.      Mouth/Throat:      Mouth: Mucous membranes are moist.   Eyes:      General: No scleral icterus.     Extraocular Movements: Extraocular movements intact.      Pupils: Pupils are equal, round, and reactive to light.   Cardiovascular:      Rate and Rhythm: Normal rate and regular rhythm.      Pulses: Normal pulses.      Heart sounds: Normal heart sounds.   Pulmonary:      Effort: Pulmonary effort is normal.      Breath sounds: Normal breath sounds. No wheezing or rhonchi.   Chest:      Chest wall: No tenderness.   Abdominal:      General: Abdomen is flat. Bowel sounds are normal. There is no distension.      Palpations: Abdomen is soft.      Tenderness: There is no abdominal tenderness. There is no right CVA tenderness, left CVA tenderness, guarding or rebound.   Musculoskeletal:         General: No swelling, tenderness, deformity or  signs of injury. Normal range of motion.      Cervical back: Normal range of motion and neck supple. No rigidity or tenderness.   Skin:     General: Skin is warm and dry.      Coloration: Skin is not jaundiced or pale.      Findings: No erythema or rash.   Neurological:      General: No focal deficit present.      Mental Status: She is alert and oriented to person, place, and time. Mental status is at baseline.      Cranial Nerves: No cranial nerve deficit.      Motor: No weakness.   Psychiatric:         Mood and Affect: Mood normal.         Behavior: Behavior normal.         Thought Content: Thought content normal.         Judgment: Judgment normal.       Significant Labs: All pertinent labs within the past 24 hours have been reviewed.  CBC:   Recent Labs   Lab 08/21/22  1035   WBC 12.40   HGB 9.8*   HCT 33.0*          CMP:   Recent Labs   Lab 08/21/22  1035   *   K 4.9      CO2 28   *   BUN 12   CREATININE 0.7   CALCIUM 9.2   PROT 7.7   ALBUMIN 2.8*   BILITOT 0.1   ALKPHOS 63   AST 11   ALT 12   ANIONGAP 6*         Significant Imaging: I have reviewed all pertinent imaging results/findings within the past 24 hours.      Assessment/Plan:      * Crohn's disease with abscess  Hs of Perirectal fistula    Recent diagnosis March 2022 of duodenal, ileocolonic and perianal Crohn disease on Remicade and MTX doing well up into 2 weeks ago.    - CT showed mild rectosigmoid colitis, and prominent appearance of the region of the lower uterine segment/cervix  - GI consulted  ;  Appreciate recs  - C diff negative, stool leukocytes - positive  - started ceftriaxone and vancomycin  - follow up additional stool studies: fecal calprotectin and stool culture positive  - General surgery evaluated at outside hospital ; per note no drainable fluid collection.   - Infliximab level and antibody pending, next dose plan for 8/19  - Restart MTX when okay GI  - MRI pelvis showed 2 complex perianal fistulas  - MRI  "enterography showed "mucosal enhancement and wall thickening of the distal descending/sigmoid colon" and one perianal fistula.   -CRS evaluated patient via EUA - extensive disease but no defined fistulas   -candidate for fecal diversion  - STD work up pending  - intermittent methylprednisolone IV per GI - now 20 mg IV q8h  - ID consulted -recommended ceftriaxone and metronidazole for 5 more days  - continue ciprofloxacin and metronidazole for now  - colonoscopy per GI to assess for ideal placement of ostomy. Clear liquid diet now    8/19- Per CRS: on the schedule on Tuesday for diverting loop ileostomy, pending final results of endoscopies.  Will have her seen in marked for ileostomy preoperatively. Remcade Infusion 8/19 being held due to plans for diverting ileostomy. On soft diet, on steroids, flagyl, cipro.    8/20 - wants reg diet  Per GI:  Reduced solumedrol to 20 mg IV q12h. Plan to continue taper if not uptrending  - With upcoming surgery, postpone next infliximab infusion (originally planned 8/19)   - Has infliximab level pending from 8/12, expect result by 8/22  - Repeat infliximab level collected 8/18 as true 14-week trough level  - EGD/colonoscopy as above. Terminal ileum without disease activity  -- Follow up on stool studies  - Anemia- S/P venofer in past (8/14- Hgb 9.1)  - H. Pylori biopsies taken during EGD (positive in April, unclear if treated)  8/21- solumedrol 10 bid, surgery planned for Tuesday 8/23- Surgery today    Acute pain  Perianal pain in passage of stool  Multimodal approach  Acetaminophen 1 g TID and gabapentin 100 mg TID  Morphine 5 mg IV q4h prn  oxycodone 10 mg q4h prn  Need judicious use of opioids in IBD, but NSAID is also contraindicated!    8/23- weaning opioid: MEDD 60-> 30 -> 15 -> 7.5 yesterday    Inpatient Morphine Milligram Equivalents Per Day 8/21 - 8/24    Values displayed are in units of MME/Day     Order Start / End Date 8/21 Yesterday Today Tomorrow     oxyCODONE " immediate release tablet 5 mg 8/19 - No end date 15 of 30 7.5 of 30 7.5 of 30 0 of 30     Daily Totals  15 of 30 7.5 of 30 7.5 of 30 0 of 30            Central corneal ulcer, right  - Solumedrol 125mg IV x 1, now solumedrol 20 mg IV q8h  - Ophthalmology consulted and evaluated patient  ;  Appreciate recs. Managing eye drops and ointments.  8/21 - solumedrol 10 IV q 12, GI weaning steroid  8/23 - weaned off steroid    Corneal ulcer  See ophthalmology consuts      Crohn's disease of both small and large intestine with fistula  Per history.   Has severe leesa-anal disease  See above        VTE Risk Mitigation (From admission, onward)         Ordered     enoxaparin injection 40 mg  Daily         08/14/22 0219     IP VTE HIGH RISK PATIENT  Once         08/12/22 2058                Discharge Planning   ENDY: 8/24/2022     Code Status: Full Code   Is the patient medically ready for discharge?: No    Reason for patient still in hospital (select all that apply): Patient trending condition  Discharge Plan A: Home   Discharge Delays: None known at this time              Shayy Dietz MD  Department of Hospital Medicine   [facility] - Med Surg

## 2022-08-23 NOTE — SUBJECTIVE & OBJECTIVE
Subjective:     Interval History: No acute overnight events, AF, VSS. No complaints today. Patient on board to proceed with surgery. Questions were addressed. Ostomy markings present.     Post-Op Info:  Procedure(s) (LRB):  EGD (ESOPHAGOGASTRODUODENOSCOPY) (N/A)  COLONOSCOPY (N/A)   5 Days Post-Op      Medications:  Continuous Infusions:      Scheduled Meds:   acetaminophen  1,000 mg Oral TID    carboxymethylcellulose sodium  1 drop Ophthalmic QID    enoxaparin  40 mg Subcutaneous Daily    erythromycin   Right Eye TID    folic acid  1 mg Oral Daily    gabapentin  100 mg Oral TID    prednisoLONE acetate  1 drop Left Eye TID    tobramycin sulfate 0.3%  1 drop Right Eye QID     PRN Meds:   acetaminophen    albuterol-ipratropium    aluminum-magnesium hydroxide-simethicone    melatonin    naloxone    ondansetron    oxyCODONE    sodium chloride 0.9%        Objective:     Vital Signs (Most Recent):  Temp: 98.9 °F (37.2 °C) (08/23/22 0329)  Pulse: 87 (08/23/22 0329)  Resp: 16 (08/23/22 0648)  BP: (!) 101/56 (08/23/22 0329)  SpO2: 99 % (08/23/22 0329) Vital Signs (24h Range):  Temp:  [96.4 °F (35.8 °C)-99.3 °F (37.4 °C)] 98.9 °F (37.2 °C)  Pulse:  [76-94] 87  Resp:  [16-20] 16  SpO2:  [97 %-100 %] 99 %  BP: ()/(51-59) 101/56     Intake/Output - Last 3 Shifts         08/21 0700  08/22 0659 08/22 0700  08/23 0659    P.O. 500     Total Intake(mL/kg) 500 (6.5)     Net +500           Urine Occurrence 2 x     Stool Occurrence 1 x             Physical Exam  Constitutional:       General: She is not in acute distress.     Appearance: She is well-developed.   HENT:      Head: Normocephalic and atraumatic.   Eyes:      General:         Right eye: No discharge.         Left eye: No discharge.      Conjunctiva/sclera: Conjunctivae normal.   Cardiovascular:      Rate and Rhythm: Normal rate and regular rhythm.   Pulmonary:      Effort: Pulmonary effort is normal. No respiratory distress.   Abdominal:      General: There is no  distension.      Palpations: Abdomen is soft.      Tenderness: There is no abdominal tenderness.   Musculoskeletal:         General: No deformity. Normal range of motion.      Cervical back: Normal range of motion.   Skin:     General: Skin is warm and dry.   Neurological:      Mental Status: She is alert and oriented to person, place, and time.   Psychiatric:         Behavior: Behavior normal.       Anorectal Exam:  Moderately tender to palpation throughout  Multiple areas of induration  No visible drainage or bleeding seen       Significant Labs:  BMP (Last 3 Results):   Recent Labs   Lab 08/19/22  1102 08/21/22  1035   * 180*   * 135*   K 4.4 4.9    101   CO2 24 28   BUN 14 12   CREATININE 0.6 0.7   CALCIUM 9.0 9.2       CBC (Last 3 Results):   Recent Labs   Lab 08/19/22  1102 08/21/22  1035   WBC 11.20 12.40   RBC 5.34 5.70*   HGB 9.6* 9.8*   HCT 31.1* 33.0*    390   MCV 58* 58*   MCH 18.0* 17.2*   MCHC 30.9* 29.7*         Significant Diagnostics:  I have reviewed all pertinent imaging results/findings within the past 24 hours.

## 2022-08-23 NOTE — ASSESSMENT & PLAN NOTE
- Solumedrol 125mg IV x 1, now solumedrol 20 mg IV q8h  - Ophthalmology consulted and evaluated patient  ;  Appreciate recs. Managing eye drops and ointments.  8/21 - solumedrol 10 IV q 12, GI weaning steroid  8/23 - weaned off steroid

## 2022-08-23 NOTE — PLAN OF CARE
[facility]  Discharge Reassessment    Primary Care Provider: Brian Collado MD    Expected Discharge Date: 8/25/2022    Reassessment (most recent)     Discharge Reassessment - 08/23/22 1314        Discharge Reassessment    Assessment Type Discharge Planning Reassessment     Did the patient's condition or plan change since previous assessment? No     Discharge Plan discussed with: Patient     Communicated ENDY with patient/caregiver Yes     Discharge Plan A Home with family     Discharge Plan B Home     DME Needed Upon Discharge  none     Discharge Barriers Identified None     Why the patient remains in the hospital Requires continued medical care        Post-Acute Status    Hospital Resources/Appts/Education Provided Appointments scheduled and added to AVS     Discharge Delays None known at this time

## 2022-08-23 NOTE — PLAN OF CARE
Problem: Adult Inpatient Plan of Care  Goal: Plan of Care Review  Outcome: Ongoing, Progressing  Goal: Patient-Specific Goal (Individualized)  Outcome: Ongoing, Progressing  Goal: Absence of Hospital-Acquired Illness or Injury  Outcome: Ongoing, Progressing  Goal: Optimal Comfort and Wellbeing  Outcome: Ongoing, Progressing  Goal: Readiness for Transition of Care  Outcome: Ongoing, Progressing     Problem: Infection  Goal: Absence of Infection Signs and Symptoms  Outcome: Ongoing, Progressing   VSS now  2340 BP 84/51, MD notified, 500 ml LR bolus ordered.   Most recent BP is 101/56. NPO since midnight.  Family at bedside, call light and personal items within reach

## 2022-08-23 NOTE — ASSESSMENT & PLAN NOTE
31 yo female with hx of Crohn's admitted with perirectal abscess. S/p EUA on 8/15 demonstrating deep anal fissures. Colonoscopy 8/18 demonstraetd erythema in distal colon and proximal rectum. Plan for diverting ileostomy 8/23    - OR today for laparoscopic diverting ileostomy.  - CLD after procedure  - Record ostomy output  - Continue IV Cipro/Flagyl, methylprednisolone; work to optimize medical management  - Continue multimodal pain regimen, optimize ad appropriate postsurgery  - Please call with questions

## 2022-08-23 NOTE — PROGRESS NOTES
[facility] Surg  Colorectal Surgery  Progress Note    Patient Name: Nikkie Myles  MRN: [id number]  Admission Date: 8/11/2022  Hospital Length of Stay: 11 days  Attending Physician: Shayy Dietz MD    Subjective:     Interval History: No acute overnight events, AF, VSS. No complaints today. Patient on board to proceed with surgery. Questions were addressed. Ostomy markings present.     Post-Op Info:  Procedure(s) (LRB):  EGD (ESOPHAGOGASTRODUODENOSCOPY) (N/A)  COLONOSCOPY (N/A)   5 Days Post-Op      Medications:  Continuous Infusions:      Scheduled Meds:   acetaminophen  1,000 mg Oral TID    carboxymethylcellulose sodium  1 drop Ophthalmic QID    enoxaparin  40 mg Subcutaneous Daily    erythromycin   Right Eye TID    folic acid  1 mg Oral Daily    gabapentin  100 mg Oral TID    prednisoLONE acetate  1 drop Left Eye TID    tobramycin sulfate 0.3%  1 drop Right Eye QID     PRN Meds:   acetaminophen    albuterol-ipratropium    aluminum-magnesium hydroxide-simethicone    melatonin    naloxone    ondansetron    oxyCODONE    sodium chloride 0.9%        Objective:     Vital Signs (Most Recent):  Temp: 98.9 °F (37.2 °C) (08/23/22 0329)  Pulse: 87 (08/23/22 0329)  Resp: 16 (08/23/22 0648)  BP: (!) 101/56 (08/23/22 0329)  SpO2: 99 % (08/23/22 0329) Vital Signs (24h Range):  Temp:  [96.4 °F (35.8 °C)-99.3 °F (37.4 °C)] 98.9 °F (37.2 °C)  Pulse:  [76-94] 87  Resp:  [16-20] 16  SpO2:  [97 %-100 %] 99 %  BP: ()/(51-59) 101/56     Intake/Output - Last 3 Shifts         08/21 0700  08/22 0659 08/22 0700 08/23 0659    P.O. 500     Total Intake(mL/kg) 500 (6.5)     Net +500           Urine Occurrence 2 x     Stool Occurrence 1 x             Physical Exam  Constitutional:       General: She is not in acute distress.     Appearance: She is well-developed.   HENT:      Head: Normocephalic and atraumatic.   Eyes:      General:         Right eye: No discharge.         Left eye: No discharge.       Conjunctiva/sclera: Conjunctivae normal.   Cardiovascular:      Rate and Rhythm: Normal rate and regular rhythm.   Pulmonary:      Effort: Pulmonary effort is normal. No respiratory distress.   Abdominal:      General: There is no distension.      Palpations: Abdomen is soft.      Tenderness: There is no abdominal tenderness.   Musculoskeletal:         General: No deformity. Normal range of motion.      Cervical back: Normal range of motion.   Skin:     General: Skin is warm and dry.   Neurological:      Mental Status: She is alert and oriented to person, place, and time.   Psychiatric:         Behavior: Behavior normal.       Anorectal Exam:  Moderately tender to palpation throughout  Multiple areas of induration  No visible drainage or bleeding seen       Significant Labs:  BMP (Last 3 Results):   Recent Labs   Lab 08/19/22  1102 08/21/22  1035   * 180*   * 135*   K 4.4 4.9    101   CO2 24 28   BUN 14 12   CREATININE 0.6 0.7   CALCIUM 9.0 9.2       CBC (Last 3 Results):   Recent Labs   Lab 08/19/22  1102 08/21/22  1035   WBC 11.20 12.40   RBC 5.34 5.70*   HGB 9.6* 9.8*   HCT 31.1* 33.0*    390   MCV 58* 58*   MCH 18.0* 17.2*   MCHC 30.9* 29.7*         Significant Diagnostics:  I have reviewed all pertinent imaging results/findings within the past 24 hours.    Assessment/Plan:     * Crohn's disease with abscess  33 yo female with hx of Crohn's admitted with perirectal abscess. S/p EUA on 8/15 demonstrating deep anal fissures. Colonoscopy 8/18 demonstraetd erythema in distal colon and proximal rectum. Plan for diverting ileostomy 8/23    - OR today for laparoscopic diverting ileostomy.  - CLD after procedure  - Record ostomy output  - Continue IV Cipro/Flagyl, methylprednisolone; work to optimize medical management  - Continue multimodal pain regimen, optimize ad appropriate postsurgery  - Please call with questions          Micheal Mejia MD  Colorectal Surgery  [facility]  Surg

## 2022-08-23 NOTE — ANESTHESIA PROCEDURE NOTES
Intubation    Date/Time: 8/23/2022 2:47 PM  Performed by: Bernabe Henderson CRNA  Authorized by: Abdias Dugan MD     Intubation:     Induction:  Intravenous    Intubated:  Postinduction    Mask Ventilation:  Easy mask    Attempts:  1    Attempted By:  CRNA    Method of Intubation:  Direct    Blade:  [place] 3    Laryngeal View Grade: Grade I - full view of cords      Difficult Airway Encountered?: Yes      Complications:  None    Airway Device:  Oral endotracheal tube    Airway Device Size:  7.0    Style/Cuff Inflation:  Cuffed    Inflation Amount (mL):  7    Tube secured:  23    Secured at:  The lips    Placement Verified By:  Capnometry    Complicating Factors:  None    Findings Post-Intubation:  BS equal bilateral and atraumatic/condition of teeth unchanged

## 2022-08-23 NOTE — OP NOTE
[facility]  Operative Note    Date: 08/23/2022    Name: Nikkie Myles    MRN: [id number]    Pre-Op Diagnosis: Crohn's disease of both small and large intestine with fistula [K50.813]    Post-Op Diagnosis: Same    Procedure(s) Performed:   1. Laparoscopic ileostomy creating  2. Biopsy of perianal fistula    Specimen(s): Perianal fistula biopsy    Staff Surgeon: Zuleyka Yip    Assistant Surgeon: Clark Bridges (resident)    Anesthesia: General    Indications: Nikkie Myles is a 32 y.o. female with a history of gastric, colonic, and perianal Crohn's disease.  She was admitted to our hospital 1 week ago with worsening of her perianal fistula symptoms.  She underwent an exam under anesthesia on August 15th which showed severe ulcerating disease with multiple fistula in transit.  There was nothing that was amenable to seton placement.  She then underwent an upper and lower endoscopy with the Gastroenterology teams.  Given the severity of her disease, despite some medical management, we recommended fecal diversion to give her the best chance at healing.  She was seen and marked preoperatively by our enterostomal therapist.  After discussion of risks and benefits she agreed to proceed with surgery.    Details of procedure:  The patient was taken to the operating room and transferred to the OR table.  SCD was were applied and IV antibiotics were administered.  She was then placed under general endotracheal anesthesia.  A Pittman catheter was placed.  Her abdomen was prepped and draped in the standard sterile fashion.  A time-out was performed.  We created a 5 mm supraumbilical incision, elevated the fascia, and entered the peritoneal cavity using a Veress needle.  We then placed a 5 mm port.  We performed a diagnostic laparoscopy and confirmed that there was no injury from our entry.  We then placed 2 additional 5 mm ports in the suprapubic and left lower quadrant locations.  Once this was done we tilted the  table and identified the terminal ileum, using the entry into the cecum and the ileal fat pad.  We then measured a distance, approximately 20 cm proximal to this as the site for our ileostomy.  We attempted to create a small mesenteric window in this location, however, we did create a small tear on the back wall of the small intestine.  Given this we elected to create our ileostomy site and exteriorize it.  We placed a locking grasper at the site.      We next the abdomen inflated excised a small [place] of skin at the previously marked ileostomy site.  We dissected down through the subcutaneous fat and identified the anterior fascia.  This was opened vertically with a small cruciate.  Muscle was then spread the posterior fascia was opened vertically, large enough to accommodate 2 fingers.  We then placed a balloon GelPort through the opening and reinflated the abdomen.  The camera was reintroduced, and we used a locking grasper to grasp the bowel at the site of our dissection.  This was then pulled out of the body along with the balloon port.  The abdomen was again deflated.  We elected to create an end ileostomy.  I felt that this would be safer than using the area with the small tear, and would also allow us to better Marly the ileostomy.  We created a small mesenteric window and divided the small intestine with a blue load of the MARILEE 75 stapler.  We divided the mesentery for a short distance using LigaSure.  The distal limb was then tucked gently into the subcutaneous space, just above the level of the fascia.  We then reinflated the abdomen and confirmed that there was no twisting of the mesentery that the proximal limb was correct.  We then deflated the abdomen and removed the 5 mm ports.  The skin incisions were closed with Monocryl followed by skin glue, which was allowed to fully dry.    Once the glue was dry we matured the ileostomy using Vicryl suture with a circumferential Marly.  We then placed  ileostomy appliance.    Following this I went below, prepped a small area near one of her right perianal fistulas with betadine, and then used a 4mm punch biopsy to obtain a full-thickness specimen for pathology.    The procedure was terminated.  All sponge instrument counts were correct.  I was present scrubbed for the entire procedure.  The patient was awakened transferred to recovery in good condition.    Estimated Blood Loss: 25mL    Drains/Implants: None    Wound Class: II    Zuleyka Yip             PT consult/

## 2022-08-23 NOTE — ASSESSMENT & PLAN NOTE
Perianal pain in passage of stool  Multimodal approach  Acetaminophen 1 g TID and gabapentin 100 mg TID  Morphine 5 mg IV q4h prn  oxycodone 10 mg q4h prn  Need judicious use of opioids in IBD, but NSAID is also contraindicated!    8/23- weaning opioid: MEDD 60-> 30 -> 15 -> 7.5 yesterday    Inpatient Morphine Milligram Equivalents Per Day 8/21 - 8/24    Values displayed are in units of MME/Day     Order Start / End Date 8/21 Yesterday Today Tomorrow     oxyCODONE immediate release tablet 5 mg 8/19 - No end date 15 of 30 7.5 of 30 7.5 of 30 0 of 30     Daily Totals  15 of 30 7.5 of 30 7.5 of 30 0 of 30

## 2022-08-23 NOTE — TRANSFER OF CARE
Anesthesia Transfer of Care Note    Patient: Nikkie Myles    Procedure(s) Performed: Procedure(s) (LRB):  CREATION, ILEOSTOMY, LAPAROSCOPIC, BIOPSY PERIANAL FISTULA (N/A)    Patient location: PACU    Anesthesia Type: general    Transport from OR: Transported from OR on 6-10 L/min O2 by face mask with adequate spontaneous ventilation    Post pain: adequate analgesia    Post assessment: no apparent anesthetic complications    Post vital signs: stable    Level of consciousness: awake and alert    Nausea/Vomiting: no nausea/vomiting    Complications: none    Transfer of care protocol was followed      Last vitals:   Visit Vitals  /62   Pulse 97   Temp 37.1 °C (98.8 °F) (Oral)   Resp 16   Ht 5' (1.524 m)   Wt 77.2 kg (170 lb 3.1 oz)   LMP 07/13/2022   SpO2 100%   Breastfeeding No   BMI 33.24 kg/m²

## 2022-08-23 NOTE — BRIEF OP NOTE
[facility]  Brief Operative Note    SUMMARY     Surgery Date: 8/23/2022     Surgeon(s) and Role:     * Zuleyka Yip MD - Primary     * Clark Bridges MD - Resident - Chief    Assisting Surgeon: None    Pre-op Diagnosis:  Crohn's disease of both small and large intestine with fistula [K50.813]    Post-op Diagnosis:  Post-Op Diagnosis Codes:     * Crohn's disease of both small and large intestine with fistula [K50.813]    Procedure(s) (LRB):  CREATION, ILEOSTOMY, LAPAROSCOPIC, BIOPSY PERIANAL FISTULA (N/A)    Anesthesia: General    Operative Findings: Lap divided end ileostomy creation. Perianal biopsy.     Estimated Blood Loss: 20 cc         Specimens:   Specimen (24h ago, onward)             Start     Ordered    08/23/22 1634  Specimen to Pathology, Surgery Other (CRS)  Once        Comments: Pre-op Diagnosis: Crohn's disease of both small and large intestine with fistula [K50.813]Procedure(s):CREATION, ILEOSTOMY, LAPAROSCOPIC, BIOPSY PERIANAL FISTULA Number of specimens: 1Name of specimens: 1. Perianal Fistula- Permanent     References:    Click here for ordering Quick Tip   Question Answer Comment   Procedure Type: Other CRS   Which provider would you like to cc? ZULEYKA YIP    Release to patient Immediate        08/23/22 1633                [id number]

## 2022-08-24 PROCEDURE — 99231 PR SUBSEQUENT HOSPITAL CARE,LEVL I: ICD-10-PCS | Mod: ,,, | Performed by: INTERNAL MEDICINE

## 2022-08-24 PROCEDURE — 25000003 PHARM REV CODE 250: Performed by: STUDENT IN AN ORGANIZED HEALTH CARE EDUCATION/TRAINING PROGRAM

## 2022-08-24 PROCEDURE — 63600175 PHARM REV CODE 636 W HCPCS: Performed by: STUDENT IN AN ORGANIZED HEALTH CARE EDUCATION/TRAINING PROGRAM

## 2022-08-24 PROCEDURE — 20600001 HC STEP DOWN PRIVATE ROOM

## 2022-08-24 PROCEDURE — 99231 SBSQ HOSP IP/OBS SF/LOW 25: CPT | Mod: ,,, | Performed by: INTERNAL MEDICINE

## 2022-08-24 RX ADMIN — PREDNISOLONE ACETATE 1 DROP: 10 SUSPENSION/ DROPS OPHTHALMIC at 02:08

## 2022-08-24 RX ADMIN — GABAPENTIN 300 MG: 300 CAPSULE ORAL at 02:08

## 2022-08-24 RX ADMIN — OXYCODONE HYDROCHLORIDE 10 MG: 10 TABLET ORAL at 12:08

## 2022-08-24 RX ADMIN — OXYCODONE HYDROCHLORIDE 10 MG: 10 TABLET ORAL at 08:08

## 2022-08-24 RX ADMIN — TOBRAMYCIN 1 DROP: 3 SOLUTION/ DROPS OPHTHALMIC at 08:08

## 2022-08-24 RX ADMIN — ACETAMINOPHEN 1000 MG: 500 TABLET ORAL at 02:08

## 2022-08-24 RX ADMIN — GABAPENTIN 300 MG: 300 CAPSULE ORAL at 08:08

## 2022-08-24 RX ADMIN — TOBRAMYCIN 1 DROP: 3 SOLUTION/ DROPS OPHTHALMIC at 04:08

## 2022-08-24 RX ADMIN — CARBOXYMETHYLCELLULOSE SODIUM 1 DROP: 10 GEL OPHTHALMIC at 04:08

## 2022-08-24 RX ADMIN — CARBOXYMETHYLCELLULOSE SODIUM 1 DROP: 10 GEL OPHTHALMIC at 12:08

## 2022-08-24 RX ADMIN — CARBOXYMETHYLCELLULOSE SODIUM 1 DROP: 10 GEL OPHTHALMIC at 08:08

## 2022-08-24 RX ADMIN — TOBRAMYCIN 1 DROP: 3 SOLUTION/ DROPS OPHTHALMIC at 12:08

## 2022-08-24 RX ADMIN — ACETAMINOPHEN 1000 MG: 500 TABLET ORAL at 08:08

## 2022-08-24 RX ADMIN — FOLIC ACID 1 MG: 1 TABLET ORAL at 08:08

## 2022-08-24 RX ADMIN — ENOXAPARIN SODIUM 40 MG: 100 INJECTION SUBCUTANEOUS at 04:08

## 2022-08-24 RX ADMIN — PREDNISOLONE ACETATE 1 DROP: 10 SUSPENSION/ DROPS OPHTHALMIC at 08:08

## 2022-08-24 RX ADMIN — OXYCODONE HYDROCHLORIDE 10 MG: 10 TABLET ORAL at 05:08

## 2022-08-24 RX ADMIN — OXYCODONE HYDROCHLORIDE 10 MG: 10 TABLET ORAL at 04:08

## 2022-08-24 NOTE — SUBJECTIVE & OBJECTIVE
Subjective:     Interval History: Pt had a diverting loop ileostomy creation yesterday, no intraoperative complications. No acute overnight events, AF, VSS. C/o some pain that is amenable to PO pain medication. Currently on CLD tolerating well. Denies N/V.    Post-Op Info:  Procedure(s) (LRB):  CREATION, ILEOSTOMY, LAPAROSCOPIC, BIOPSY PERIANAL FISTULA (N/A)   1 Day Post-Op      Medications:  Continuous Infusions:      Scheduled Meds:   acetaminophen  1,000 mg Oral TID    carboxymethylcellulose sodium  1 drop Ophthalmic QID    enoxaparin  40 mg Subcutaneous Daily    erythromycin   Right Eye TID    folic acid  1 mg Oral Daily    gabapentin  300 mg Oral TID    prednisoLONE acetate  1 drop Left Eye TID    tobramycin sulfate 0.3%  1 drop Right Eye QID     PRN Meds:   albuterol-ipratropium    aluminum-magnesium hydroxide-simethicone    dextrose 10%    dextrose 10%    glucagon (human recombinant)    glucose    glucose    melatonin    naloxone    ondansetron    oxyCODONE    oxyCODONE    sodium chloride 0.9%    traMADoL        Objective:     Vital Signs (Most Recent):  Temp: 98.7 °F (37.1 °C) (08/24/22 0712)  Pulse: 62 (08/24/22 0712)  Resp: 16 (08/24/22 0802)  BP: 103/62 (08/24/22 0712)  SpO2: 98 % (08/24/22 0712) Vital Signs (24h Range):  Temp:  [96.4 °F (35.8 °C)-99.3 °F (37.4 °C)] 98.7 °F (37.1 °C)  Pulse:  [] 62  Resp:  [12-20] 16  SpO2:  [94 %-100 %] 98 %  BP: ()/(46-79) 103/62     Intake/Output - Last 3 Shifts         08/22 0700 08/23 0659 08/23 0700 08/24 0659 08/24 0700 08/25 0659    P.O.  240     Total Intake(mL/kg)  240 (3.1)     Urine (mL/kg/hr)  1400 (0.8)     Total Output  1400     Net  -1160                    Physical Exam  Constitutional:       General: She is not in acute distress.     Appearance: She is well-developed.   HENT:      Head: Normocephalic and atraumatic.   Eyes:      General:         Right eye: No discharge.         Left eye: No discharge.      Conjunctiva/sclera:  Conjunctivae normal.   Cardiovascular:      Rate and Rhythm: Normal rate and regular rhythm.   Pulmonary:      Effort: Pulmonary effort is normal. No respiratory distress.   Abdominal:      General: There is no distension.      Palpations: Abdomen is soft.      Tenderness: There is abdominal tenderness (incisional pain, appropriate post op).      Comments: Ostomy bag w/ some fecal output   Musculoskeletal:         General: No deformity. Normal range of motion.      Cervical back: Normal range of motion.   Skin:     General: Skin is warm and dry.   Neurological:      Mental Status: She is alert and oriented to person, place, and time.   Psychiatric:         Behavior: Behavior normal.       Anorectal Exam:  Moderately tender to palpation throughout  Multiple areas of induration  No visible drainage or bleeding seen       Significant Labs:  BMP (Last 3 Results):   Recent Labs   Lab 08/19/22  1102 08/21/22  1035 08/23/22  1101   * 180* 75   * 135* 133*   K 4.4 4.9 4.4    101 101   CO2 24 28 28   BUN 14 12 11   CREATININE 0.6 0.7 0.7   CALCIUM 9.0 9.2 8.7       CBC (Last 3 Results):   Recent Labs   Lab 08/19/22  1102 08/21/22  1035 08/23/22  1100   WBC 11.20 12.40 8.34   RBC 5.34 5.70* 5.06   HGB 9.6* 9.8* 9.0*   HCT 31.1* 33.0* 29.8*    390 300   MCV 58* 58* 59*   MCH 18.0* 17.2* 17.8*   MCHC 30.9* 29.7* 30.2*         Significant Diagnostics:  I have reviewed all pertinent imaging results/findings within the past 24 hours.

## 2022-08-24 NOTE — PHYSICIAN QUERY
PT Name: Nikkie Myles  MR #: [id number]     DOCUMENTATION CLARIFICATION      CDS: Brandy Capley, RN  Email: [email or web address]    This form is a permanent document in the medical record.    Query Date: August 24, 2022    By submitting this query, we are merely seeking further clarification of documentation to reflect the severity of illness of your patient. Please utilize your independent clinical judgment when addressing the question(s) below.     The Medical Record contains the following:   Indicators   Supporting Clinical Findings Location in Medical Record    Gastrointestinal Ulcer Documented     X EGD/Colonscopy Findings Impression:                                     - Duodenal aphthous ulcers, which are a                          non-specific finding. Biopsied.                          - Normal second portion of the duodenum and third                          portion of the duodenum.     Recommendation:        - Await pathology results.                     UGI 8/18   X Pathology Findings DUODENUM, ULCER BIOPSY:   Benign small bowel mucosa with ulceration and reactive/regenerative changes   Negative for neoplasia or malignancy   Surgical pathology 8/18    Radiology Findings     X Treatment/Medication Recommendations:  - Solumedrol 25 mg IV q12h. Trending CRP  - With upcoming surgery, postpone next infliximab infusion (originally planned 8/19)   - Has infliximab level pending from 8/12, expect result on 8/19   - Repeat infliximab level collected this AM as true 14 week trough level    - Continue abx per primary team: cipro/flagyl  - optho following for corneal ulcer, will discuss steroid plan    - H. Pylori biopsies taken during EGD (positive in April, unclear if treated)   GI progress notes 8/18, filed 8/19   X Other This is a 32 y.o. female here for evaluation of :      Disease activity evaluation in a patient presenting wiht Crohn's flare while on infliximab. Her disease is complicated by perianal fistulas.       - EGD (4/21/22): Exam concerning for duodenal Crohn's. Biopsy consistent with duodenal enteritis.     #Crohn's disease, fistulizing  - On infliximab. Next dose was due on 8/19.      PLAN   - EGD and colonoscopy today with Dr. Jasmine     The patient is a 32-year-old who complains of acute symptoms related to her Crohn's disease for two weeks. She is having fever, fatigue, decreased appetite, diffuse abdominal cramping, nausea, redness and burning discomfort to her eyes, diarrhea, grossly bloody stools, passage of mucous from rectum, and drainage from previous perianal fistulas.     GI H&P 8/18                            ED provider notes 8/11, filed 8/12      Please further specify the acuity of the  __duodenal__ ulcers:    [   ] Acute   [ x  ] Chronic   [   ] Other (please specify): ___________   [   ] Clinically Undetermined       Please document in your progress notes daily for the duration of treatment until resolved, and include in your discharge summary.  Form No. [id number]

## 2022-08-24 NOTE — ANESTHESIA POSTPROCEDURE EVALUATION
Anesthesia Post Evaluation    Patient: Nikkie Myles    Procedure(s) Performed: Procedure(s) (LRB):  CREATION, ILEOSTOMY, LAPAROSCOPIC, BIOPSY PERIANAL FISTULA (N/A)    Final Anesthesia Type: general      Patient location during evaluation: PACU  Patient participation: Yes- Able to Participate  Level of consciousness: awake and alert  Post-procedure vital signs: reviewed and stable  Pain management: adequate  Airway patency: patent    PONV status at discharge: No PONV  Anesthetic complications: no      Cardiovascular status: blood pressure returned to baseline  Respiratory status: unassisted  Hydration status: euvolemic  Follow-up not needed.          Vitals Value Taken Time   BP 92/50 08/24/22 1512   Temp 36.7 °C (98.1 °F) 08/24/22 1512   Pulse 72 08/24/22 1512   Resp 16 08/24/22 1618   SpO2 99 % 08/24/22 1512         Event Time   Out of Recovery 08/23/2022 17:00:00         Pain/Giovanni Score: Pain Rating Prior to Med Admin: 8 (8/24/2022  4:18 PM)  Pain Rating Post Med Admin: 3 (8/24/2022  5:18 PM)  Giovanni Score: 10 (8/23/2022  4:30 PM)

## 2022-08-24 NOTE — PROGRESS NOTES
[facility]  Colorectal Surgery  Progress Note    Patient Name: Nikkie Myles  MRN: [id number]  Admission Date: 8/11/2022  Hospital Length of Stay: 12 days  Attending Physician: Zuleyka Yip MD    Subjective:     Interval History: Pt had a diverting loop ileostomy creation yesterday, no intraoperative complications. No acute overnight events, AF, VSS. C/o some pain that is amenable to PO pain medication. Currently on CLD tolerating well. Denies N/V.    Post-Op Info:  Procedure(s) (LRB):  CREATION, ILEOSTOMY, LAPAROSCOPIC, BIOPSY PERIANAL FISTULA (N/A)   1 Day Post-Op      Medications:  Continuous Infusions:      Scheduled Meds:   acetaminophen  1,000 mg Oral TID    carboxymethylcellulose sodium  1 drop Ophthalmic QID    enoxaparin  40 mg Subcutaneous Daily    erythromycin   Right Eye TID    folic acid  1 mg Oral Daily    gabapentin  300 mg Oral TID    prednisoLONE acetate  1 drop Left Eye TID    tobramycin sulfate 0.3%  1 drop Right Eye QID     PRN Meds:   albuterol-ipratropium    aluminum-magnesium hydroxide-simethicone    dextrose 10%    dextrose 10%    glucagon (human recombinant)    glucose    glucose    melatonin    naloxone    ondansetron    oxyCODONE    oxyCODONE    sodium chloride 0.9%    traMADoL        Objective:     Vital Signs (Most Recent):  Temp: 98.7 °F (37.1 °C) (08/24/22 0712)  Pulse: 62 (08/24/22 0712)  Resp: 16 (08/24/22 0802)  BP: 103/62 (08/24/22 0712)  SpO2: 98 % (08/24/22 0712) Vital Signs (24h Range):  Temp:  [96.4 °F (35.8 °C)-99.3 °F (37.4 °C)] 98.7 °F (37.1 °C)  Pulse:  [] 62  Resp:  [12-20] 16  SpO2:  [94 %-100 %] 98 %  BP: ()/(46-79) 103/62     Intake/Output - Last 3 Shifts         08/22 0700  08/23 0659 08/23 0700 08/24 0659 08/24 0700 08/25 0659    P.O.  240     Total Intake(mL/kg)  240 (3.1)     Urine (mL/kg/hr)  1400 (0.8)     Total Output  1400     Net  -1160                    Physical Exam  Constitutional:       General: She is not in  acute distress.     Appearance: She is well-developed.   HENT:      Head: Normocephalic and atraumatic.   Eyes:      General:         Right eye: No discharge.         Left eye: No discharge.      Conjunctiva/sclera: Conjunctivae normal.   Cardiovascular:      Rate and Rhythm: Normal rate and regular rhythm.   Pulmonary:      Effort: Pulmonary effort is normal. No respiratory distress.   Abdominal:      General: There is no distension.      Palpations: Abdomen is soft.      Tenderness: There is abdominal tenderness (incisional pain, appropriate post op).      Comments: Ostomy bag w/ some fecal output   Musculoskeletal:         General: No deformity. Normal range of motion.      Cervical back: Normal range of motion.   Skin:     General: Skin is warm and dry.   Neurological:      Mental Status: She is alert and oriented to person, place, and time.   Psychiatric:         Behavior: Behavior normal.       Anorectal Exam:  Moderately tender to palpation throughout  Multiple areas of induration  No visible drainage or bleeding seen       Significant Labs:  BMP (Last 3 Results):   Recent Labs   Lab 08/19/22  1102 08/21/22  1035 08/23/22  1101   * 180* 75   * 135* 133*   K 4.4 4.9 4.4    101 101   CO2 24 28 28   BUN 14 12 11   CREATININE 0.6 0.7 0.7   CALCIUM 9.0 9.2 8.7       CBC (Last 3 Results):   Recent Labs   Lab 08/19/22  1102 08/21/22  1035 08/23/22  1100   WBC 11.20 12.40 8.34   RBC 5.34 5.70* 5.06   HGB 9.6* 9.8* 9.0*   HCT 31.1* 33.0* 29.8*    390 300   MCV 58* 58* 59*   MCH 18.0* 17.2* 17.8*   MCHC 30.9* 29.7* 30.2*         Significant Diagnostics:  I have reviewed all pertinent imaging results/findings within the past 24 hours.    Assessment/Plan:     * Crohn's disease with abscess  33 yo female with hx of Crohn's admitted with perirectal abscess. S/p EUA on 8/15 demonstrating deep anal fissures. Colonoscopy 8/18 demonstraetd erythema in distal colon and proximal rectum.Now s/p  laparoscopic diverting ileostomy 8/23/22    - Diet: LRD  - Monitor bowel function and ostomy output  - Continue IV Cipro/Flagyl, methylprednisolone; work to optimize medical management  - Multimodal pain management  - OOBTC/ ambulation encouraged    Dispo: Continue inpatient care. ERAS pathway          Micheal Mejia MD  Colorectal Surgery  [facility]

## 2022-08-24 NOTE — ASSESSMENT & PLAN NOTE
31 yo female with hx of Crohn's admitted with perirectal abscess. S/p EUA on 8/15 demonstrating deep anal fissures. Colonoscopy 8/18 demonstraetd erythema in distal colon and proximal rectum.Now s/p laparoscopic diverting ileostomy 8/23/22    - Diet: LRD  - Monitor bowel function and ostomy output  - Continue IV Cipro/Flagyl, methylprednisolone; work to optimize medical management  - Multimodal pain management  - OOBTC/ ambulation encouraged    Dispo: Continue inpatient care. ERAS pathway

## 2022-08-24 NOTE — PHYSICIAN QUERY
PT Name: Nikkie Myles  MR #: [id number]    DOCUMENTATION CLARIFICATION     CDS: Brandy Capley, RN  Email: [email or web address]    This form is a permanent document in the medical record.     Query Date: August 24, 2022    Dear Provider,  By submitting this query, we are merely seeking further clarification of documentation. Please utilize your independent clinical judgment when addressing the question(s) below.    The medical record contains the following:  Supporting Clinical Findings Location in Medical Record   Date: 08/23/2022    Procedure(s) Performed:   1. Laparoscopic ileostomy creating  2. Biopsy of perianal fistula    We attempted to create a small mesenteric window in this location, however, we did create a small tear on the back wall of the small intestine.  Op note 8/23       Please clarify if ___small tear on the back wall of the small intestine____________ (as it relates to __8/23 Op procedure___) is:      [  ] Complication of the procedure   [x  ] Present, but not a complication of the procedure   [  ] Other (please specify): __________________   [  ] Clinically Undetermined       Please document in your progress notes daily for the duration of treatment until resolved and include in your discharge summary.

## 2022-08-25 ENCOUNTER — TELEPHONE (OUTPATIENT)
Dept: OPHTHALMOLOGY | Facility: CLINIC | Age: 32
End: 2022-08-25
Payer: MEDICAID

## 2022-08-25 LAB
ALBUMIN SERPL BCP-MCNC: 2.6 G/DL (ref 3.5–5.2)
ALP SERPL-CCNC: 51 U/L (ref 55–135)
ALT SERPL W/O P-5'-P-CCNC: 9 U/L (ref 10–44)
ANION GAP SERPL CALC-SCNC: 5 MMOL/L (ref 8–16)
ANISOCYTOSIS BLD QL SMEAR: SLIGHT
AST SERPL-CCNC: 10 U/L (ref 10–40)
BASO STIPL BLD QL SMEAR: ABNORMAL
BASOPHILS # BLD AUTO: 0.01 K/UL (ref 0–0.2)
BASOPHILS NFR BLD: 0.1 % (ref 0–1.9)
BILIRUB SERPL-MCNC: 0.1 MG/DL (ref 0.1–1)
BUN SERPL-MCNC: 9 MG/DL (ref 6–20)
CALCIUM SERPL-MCNC: 8.6 MG/DL (ref 8.7–10.5)
CHLORIDE SERPL-SCNC: 104 MMOL/L (ref 95–110)
CO2 SERPL-SCNC: 25 MMOL/L (ref 23–29)
CREAT SERPL-MCNC: 0.6 MG/DL (ref 0.5–1.4)
CRP SERPL-MCNC: 48.4 MG/L (ref 0–8.2)
DACRYOCYTES BLD QL SMEAR: ABNORMAL
DIFFERENTIAL METHOD: ABNORMAL
EOSINOPHIL # BLD AUTO: 0.1 K/UL (ref 0–0.5)
EOSINOPHIL NFR BLD: 0.8 % (ref 0–8)
ERYTHROCYTE [DISTWIDTH] IN BLOOD BY AUTOMATED COUNT: 25.9 % (ref 11.5–14.5)
EST. GFR  (NO RACE VARIABLE): >60 ML/MIN/1.73 M^2
GLUCOSE SERPL-MCNC: 97 MG/DL (ref 70–110)
HCT VFR BLD AUTO: 28 % (ref 37–48.5)
HGB BLD-MCNC: 8.5 G/DL (ref 12–16)
HYPOCHROMIA BLD QL SMEAR: ABNORMAL
IMM GRANULOCYTES # BLD AUTO: 0.02 K/UL (ref 0–0.04)
IMM GRANULOCYTES NFR BLD AUTO: 0.3 % (ref 0–0.5)
LYMPHOCYTES # BLD AUTO: 1.8 K/UL (ref 1–4.8)
LYMPHOCYTES NFR BLD: 23.8 % (ref 18–48)
MCH RBC QN AUTO: 17.8 PG (ref 27–31)
MCHC RBC AUTO-ENTMCNC: 30.4 G/DL (ref 32–36)
MCV RBC AUTO: 59 FL (ref 82–98)
MONOCYTES # BLD AUTO: 0.7 K/UL (ref 0.3–1)
MONOCYTES NFR BLD: 9.6 % (ref 4–15)
NEUTROPHILS # BLD AUTO: 4.9 K/UL (ref 1.8–7.7)
NEUTROPHILS NFR BLD: 65.4 % (ref 38–73)
NRBC BLD-RTO: 0 /100 WBC
OVALOCYTES BLD QL SMEAR: ABNORMAL
PLATELET # BLD AUTO: 273 K/UL (ref 150–450)
PLATELET BLD QL SMEAR: ABNORMAL
PMV BLD AUTO: ABNORMAL FL (ref 9.2–12.9)
POIKILOCYTOSIS BLD QL SMEAR: SLIGHT
POLYCHROMASIA BLD QL SMEAR: ABNORMAL
POTASSIUM SERPL-SCNC: 3.9 MMOL/L (ref 3.5–5.1)
PROT SERPL-MCNC: 7.1 G/DL (ref 6–8.4)
RBC # BLD AUTO: 4.78 M/UL (ref 4–5.4)
SODIUM SERPL-SCNC: 134 MMOL/L (ref 136–145)
TARGETS BLD QL SMEAR: ABNORMAL
WBC # BLD AUTO: 7.51 K/UL (ref 3.9–12.7)

## 2022-08-25 PROCEDURE — 86140 C-REACTIVE PROTEIN: CPT | Performed by: STUDENT IN AN ORGANIZED HEALTH CARE EDUCATION/TRAINING PROGRAM

## 2022-08-25 PROCEDURE — 36415 COLL VENOUS BLD VENIPUNCTURE: CPT | Performed by: STUDENT IN AN ORGANIZED HEALTH CARE EDUCATION/TRAINING PROGRAM

## 2022-08-25 PROCEDURE — 80053 COMPREHEN METABOLIC PANEL: CPT | Performed by: STUDENT IN AN ORGANIZED HEALTH CARE EDUCATION/TRAINING PROGRAM

## 2022-08-25 PROCEDURE — 25000003 PHARM REV CODE 250: Performed by: STUDENT IN AN ORGANIZED HEALTH CARE EDUCATION/TRAINING PROGRAM

## 2022-08-25 PROCEDURE — 20600001 HC STEP DOWN PRIVATE ROOM

## 2022-08-25 PROCEDURE — 85025 COMPLETE CBC W/AUTO DIFF WBC: CPT | Performed by: STUDENT IN AN ORGANIZED HEALTH CARE EDUCATION/TRAINING PROGRAM

## 2022-08-25 PROCEDURE — 94761 N-INVAS EAR/PLS OXIMETRY MLT: CPT

## 2022-08-25 PROCEDURE — 63600175 PHARM REV CODE 636 W HCPCS: Performed by: STUDENT IN AN ORGANIZED HEALTH CARE EDUCATION/TRAINING PROGRAM

## 2022-08-25 RX ADMIN — ONDANSETRON 4 MG: 2 INJECTION INTRAMUSCULAR; INTRAVENOUS at 06:08

## 2022-08-25 RX ADMIN — FOLIC ACID 1 MG: 1 TABLET ORAL at 09:08

## 2022-08-25 RX ADMIN — CARBOXYMETHYLCELLULOSE SODIUM 1 DROP: 10 GEL OPHTHALMIC at 10:08

## 2022-08-25 RX ADMIN — GABAPENTIN 300 MG: 300 CAPSULE ORAL at 08:08

## 2022-08-25 RX ADMIN — OXYCODONE HYDROCHLORIDE 10 MG: 10 TABLET ORAL at 06:08

## 2022-08-25 RX ADMIN — OXYCODONE HYDROCHLORIDE 10 MG: 10 TABLET ORAL at 04:08

## 2022-08-25 RX ADMIN — PREDNISOLONE ACETATE 1 DROP: 10 SUSPENSION/ DROPS OPHTHALMIC at 08:08

## 2022-08-25 RX ADMIN — TOBRAMYCIN 1 DROP: 3 SOLUTION/ DROPS OPHTHALMIC at 05:08

## 2022-08-25 RX ADMIN — GABAPENTIN 300 MG: 300 CAPSULE ORAL at 09:08

## 2022-08-25 RX ADMIN — TOBRAMYCIN 1 DROP: 3 SOLUTION/ DROPS OPHTHALMIC at 02:08

## 2022-08-25 RX ADMIN — OXYCODONE HYDROCHLORIDE 10 MG: 10 TABLET ORAL at 09:08

## 2022-08-25 RX ADMIN — CARBOXYMETHYLCELLULOSE SODIUM 1 DROP: 10 GEL OPHTHALMIC at 05:08

## 2022-08-25 RX ADMIN — METHOCARBAMOL 500 MG: 100 INJECTION, SOLUTION INTRAMUSCULAR; INTRAVENOUS at 03:08

## 2022-08-25 RX ADMIN — OXYCODONE HYDROCHLORIDE 10 MG: 10 TABLET ORAL at 02:08

## 2022-08-25 RX ADMIN — PREDNISOLONE ACETATE 1 DROP: 10 SUSPENSION/ DROPS OPHTHALMIC at 02:08

## 2022-08-25 RX ADMIN — TOBRAMYCIN 1 DROP: 3 SOLUTION/ DROPS OPHTHALMIC at 09:08

## 2022-08-25 RX ADMIN — CARBOXYMETHYLCELLULOSE SODIUM 1 DROP: 10 GEL OPHTHALMIC at 08:08

## 2022-08-25 RX ADMIN — TOBRAMYCIN 1 DROP: 3 SOLUTION/ DROPS OPHTHALMIC at 08:08

## 2022-08-25 RX ADMIN — CARBOXYMETHYLCELLULOSE SODIUM 1 DROP: 10 GEL OPHTHALMIC at 02:08

## 2022-08-25 RX ADMIN — ENOXAPARIN SODIUM 40 MG: 100 INJECTION SUBCUTANEOUS at 05:08

## 2022-08-25 RX ADMIN — PREDNISOLONE ACETATE 1 DROP: 10 SUSPENSION/ DROPS OPHTHALMIC at 09:08

## 2022-08-25 RX ADMIN — TRAMADOL HYDROCHLORIDE 50 MG: 50 TABLET, COATED ORAL at 05:08

## 2022-08-25 RX ADMIN — ACETAMINOPHEN 1000 MG: 500 TABLET ORAL at 09:08

## 2022-08-25 RX ADMIN — ACETAMINOPHEN 1000 MG: 500 TABLET ORAL at 08:08

## 2022-08-25 RX ADMIN — GABAPENTIN 300 MG: 300 CAPSULE ORAL at 02:08

## 2022-08-25 RX ADMIN — ACETAMINOPHEN 1000 MG: 500 TABLET ORAL at 02:08

## 2022-08-25 NOTE — PLAN OF CARE
08/25/22 1501   Post-Acute Status   Post-Acute Authorization Home Health   Home Health Status Referrals Sent   Hospital Resources/Appts/Education Provided Provided patient/caregiver with written discharge plan information     ALICIA sent out a blast referral for home health services. ALICIA will continue to monitor.     Rachael Bonilla LCSW  Case Management/[facility]  [phone]       3:45 PM  [facility] willing to accept pt. Service to begin on 8/29/22.    Rachael Bonilla LCSW  Case Management/[facility]  [phone]

## 2022-08-25 NOTE — PROGRESS NOTES
[facility]  Gastroenterology  Progress Note    Patient Name: Nikkie Myles  MRN: [id number]  Admission Date: 2022  Hospital Length of Stay: 12 days    Subjective:     HPI: Nikkie Myles is a 32 y.o. female with history of Crohn's disease (with duodenal, perianal, sigmoidal and rectal involvement), anemia & corneal ulceration who was transferred [facility] for ophthalmology consult for her corneal ulcer. GI has been consulted for management of her Crohn's disease.     Interval History:  Underwent DLI yesterday, doing well  Pain well controlled  Asks about timeline on DLI reversal    ============================================  IBD history:  - The patient had recurrent ocular infections & erythema nodosum, and was diagnosed with Crohn's disease in 3/2022.   - Started on infliximab and methotrexate by [facility] Dr. Lujan/Yo and CRS Dr. Henson.  - MRI pelvis 2022 showed complex bilateral multiple transsphinteric perianal fistula with internal sphincter components and multiple tiny abscesses along the tract.  - Infliximab infusion history:   - 1st infusion 22   - 2nd infusion 22   - 3rd infusion 22 (completed induction)   - Next infusion due 22 (q8 weeks)     Past surgical history:  has a past surgical history that includes  section, low transverse (2013);  section with tubal ligation (Bilateral, 2020); and  section ().      Endoscopic history:  - EGD (22): Exam concerning for duodenal Crohn's. Biopsy consistent with duodenal enteritis.   - Colonoscopy (22)    No current facility-administered medications on file prior to encounter.     Current Outpatient Medications on File Prior to Encounter   Medication Sig Dispense Refill    acetaminophen (TYLENOL) 500 MG tablet Take 2 tablets (1,000 mg total) by mouth every 6 (six) hours as needed. 60 tablet 0    erythromycin (ROMYCIN) ophthalmic  ointment Place a 1/2 inch ribbon of ointment into the lower eyelid 2-3 times daily. (Patient not taking: Reported on 3/8/2022) 1 g 0    ibuprofen (ADVIL,MOTRIN) 400 MG tablet Take 1 tablet (400 mg total) by mouth every 6 (six) hours as needed. 20 tablet 0    ondansetron (ZOFRAN-ODT) 4 MG TbDL Take 1 tablet (4 mg total) by mouth every 8 (eight) hours as needed. 20 tablet 0    prednisoLONE acetate (PRED FORTE) 1 % DrpS Place 1 drop into the left eye 3 (three) times daily. 10 mL 4       Review of patient's allergies indicates:  No Known Allergies      Objective:     Vitals:    08/24/22 1912   BP: (!) 90/51   Pulse: 93   Resp: 19   Temp: 98.6 °F (37 °C)     Constitutional:       General: She is not in acute distress.     Appearance: Normal appearance.   HENT:      Head: Normocephalic and atraumatic.   Cardiovascular:      Rate and Rhythm: Normal rate and regular rhythm.   Pulmonary:      Effort: Pulmonary effort is normal. No respiratory distress.      Breath sounds: Normal breath sounds.   Abdominal:      General: Abdomen is flat. There is no distension.      Palpations: Abdomen is soft.      Tenderness: There is no abdominal tenderness.   Genitourinary:     Comments: deferred  Neurological:      General: No focal deficit present.      Mental Status: She is alert and oriented to person, place, and time.   Psychiatric:         Behavior: Behavior normal.         Thought Content: Thought content normal.     Significant Labs:  Recent Labs   Lab 08/19/22  1102 08/21/22  1035 08/23/22  1100   HGB 9.6* 9.8* 9.0*       Lab Results   Component Value Date    WBC 8.34 08/23/2022    HGB 9.0 (L) 08/23/2022    HCT 29.8 (L) 08/23/2022    MCV 59 (L) 08/23/2022     08/23/2022       Lab Results   Component Value Date     (L) 08/23/2022    K 4.4 08/23/2022     08/23/2022    CO2 28 08/23/2022    BUN 11 08/23/2022    CREATININE 0.7 08/23/2022    CALCIUM 8.7 08/23/2022    ANIONGAP 4 (L) 08/23/2022    ESTGFRAFRICA >60  07/29/2020    EGFRNONAA >60 07/29/2020       Lab Results   Component Value Date    ALT 12 08/23/2022    AST 13 08/23/2022    ALKPHOS 52 (L) 08/23/2022    BILITOT 0.3 08/23/2022       Lab Results   Component Value Date    INR 0.9 01/12/2012     Significant Imaging:  Reviewed pertinent radiology findings.     Assessment/Plan:     Nikkie Myles is a 32 y.o. female with Crohn's disease (possible gastroduodenitis, ileal, sigmoid/rectum, perianal/perineal with abscess/fistula, S/P fistulotomy)  transferred from [facility] to [facility] for ophthalmology consult for her corneal ulcer. CRS following and are concerned for severe perianal/perineal disease noted on EUA, although no opportunity for seton placement. MRE shows disease activity in distal colon. Last EGD/colonoscopy 4/2022 prior to starting IFX but reports not available though bx c/w H pylori pos gastritis, duodenitis, ileal/sigmoid/rectal and perineal/perianal disease.  Per notes looks like there were plans to optimize remicade dosing in near future by her [facility]/IBD MD. Has 13 week IFX in process and will get 14 week levels if needed. Colonoscopy showed mild disease activity in the sigmoid and rectum, biopsies pending. Terminal ileum was normal. Given severe perianal disease, planning DLI on 8/23.    Problem List:  1.  Crohn's disease with duodenal ?, ileum, sigmoid/rectum, perianal fistula with vaginal ulcer and suspect impending RV fistula  2. Possible hydradenitis supp- perineal   3. S/P anal fistula repair (7/5/22)  4. 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  5. History of erythema nodosum  6. 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 H pylori gastritis- not clear if treated     - CRP (8/12/22): 91.4>61.8>11.4  - C diff negative on 8/8  - CT scan abdomen and pelvis: No evidence for small bowel obstructive process. Subtle suggestion of mild wall and fold thickening along the rectosigmoid colon without significant  pericolonic stranding however may relate to mild rectosigmoid colitis.  There is no evidence for colonic obstructive change.  There is no evidence for free intraperitoneal air.   - MRI pelvis (8/13/22): Two complex perianal fistulas with branching tracks. Along the bilateral medial gluteal cleft just deep to the cutaneous surface there are multiple serpiginous peripherally enhancing tracts which may have multiple cutaneous exits. Tracts are difficult to follow.  - EUA 8/15 shows severe perianal disease with deep ulcerations, shallow vaginal ulceration without fistula, and some changes in ischiorectal fat c/f hidradenitis  -MRE shows mucosal enhancement and wall thickening of the distal descending/sigmoid colon in this patient with Crohn's disease, as above, likely representing chronic inflammatory process.  No definitive abscess collection. Persistent perianal fistula as seen on MRI pelvis 8/13/22. Pt does have some passage of air in vagina suspicious for early RV fistula    - EGD 8/18:   Impression:            - Normal esophagus.                          - Esophagogastric landmarks identified.                          - A single gastric polyp. This appears like a                          benign inflammatory polyp.                          - Congested and erythematous mucosa in the gastric                          body and antrum. Biopsied.                          - Duodenal aphthous ulcers, which are a                          non-specific finding. Biopsied.                          - Normal second portion of the duodenum and third                          portion of the duodenum.    Colonoscopy 8/18:  Impression:            - Anal fissure and perianal skin tags found on                          perianal exam.                          - Erythematous mucosa in the rectum. Biopsied.                          - Minimal patchy erythematous mucosa in the                          sigmoid colon. Biopsied.                           - The descending colon, transverse colon,                          ascending colon and cecum are normal.                          - The examined portion of the ileum was normal.                          Biopsied.                          - Biopsies were obtained in the descending colon,                          in the transverse colon, in the ascending colon                          and in the cecum.       Recommendations:   - Infliximab level on 8/12 was 0.6, subtherapeutic  - We will arrange for outpatient follow-up where infliximab dosing will be increased  - Repeat infliximab level collected 8/18 as true 14-week trough level, pending  - H. Pylori biopsies taken during EGD negative for H. pylori  - On DVT ppx: lovenox    We will sign off at this time.    Thank you for involving us in the care of Nikkie Myles. Please call with any additional questions, concerns or changes in the patient's clinical status.      Brian Selby MD  Gastroenterology Fellow PGY IV   [facility]

## 2022-08-25 NOTE — PROGRESS NOTES
[facility]  Colorectal Surgery  Progress Note    Patient Name: Nikkie Myles  MRN: [id number]  Admission Date: 8/11/2022  Hospital Length of Stay: 13 days  Attending Physician: Zuleyka Yip MD    Subjective:     Interval History: No acute overnight events, AF, VSS. Currently on LRD tolerating well. Denies N/V. Adequate ostomy output with fecal matter. Ambulating adequately. Rectal pain and discomfort has improved some.    Post-Op Info:  Procedure(s) (LRB):  CREATION, ILEOSTOMY, LAPAROSCOPIC, BIOPSY PERIANAL FISTULA (N/A)   2 Days Post-Op      Medications:  Continuous Infusions:      Scheduled Meds:   acetaminophen  1,000 mg Oral TID    carboxymethylcellulose sodium  1 drop Ophthalmic QID    enoxaparin  40 mg Subcutaneous Daily    erythromycin   Right Eye TID    folic acid  1 mg Oral Daily    gabapentin  300 mg Oral TID    prednisoLONE acetate  1 drop Left Eye TID    tobramycin sulfate 0.3%  1 drop Right Eye QID     PRN Meds:   albuterol-ipratropium    aluminum-magnesium hydroxide-simethicone    dextrose 10%    dextrose 10%    glucagon (human recombinant)    glucose    glucose    melatonin    naloxone    ondansetron    oxyCODONE    oxyCODONE    sodium chloride 0.9%    traMADoL        Objective:     Vital Signs (Most Recent):  Temp: 98.4 °F (36.9 °C) (08/25/22 0809)  Pulse: 86 (08/25/22 0809)  Resp: 18 (08/25/22 0910)  BP: (!) 93/49 (08/25/22 0809)  SpO2: 99 % (08/25/22 0809) Vital Signs (24h Range):  Temp:  [98 °F (36.7 °C)-98.6 °F (37 °C)] 98.4 °F (36.9 °C)  Pulse:  [67-98] 86  Resp:  [16-20] 18  SpO2:  [97 %-99 %] 99 %  BP: ()/(49-63) 93/49     Intake/Output - Last 3 Shifts         08/23 0700 08/24 0659 08/24 0700 08/25 0659 08/25 0700 08/26 0659    P.O. 240      Total Intake(mL/kg) 240 (3.1)      Urine (mL/kg/hr) 1400 (0.8) 300 (0.2)     Stool  350     Total Output 1400 650     Net -1160 -650            Stool Occurrence  1 x             Physical Exam  Constitutional:        General: She is not in acute distress.     Appearance: She is well-developed.   HENT:      Head: Normocephalic and atraumatic.   Eyes:      General:         Right eye: No discharge.         Left eye: No discharge.      Conjunctiva/sclera: Conjunctivae normal.   Cardiovascular:      Rate and Rhythm: Normal rate and regular rhythm.   Pulmonary:      Effort: Pulmonary effort is normal. No respiratory distress.   Abdominal:      General: There is no distension.      Palpations: Abdomen is soft.      Tenderness: There is abdominal tenderness (incisional pain, appropriate post op).      Comments: Ostomy bag w/ some fecal output   Musculoskeletal:         General: No deformity. Normal range of motion.      Cervical back: Normal range of motion.   Skin:     General: Skin is warm and dry.   Neurological:      Mental Status: She is alert and oriented to person, place, and time.   Psychiatric:         Behavior: Behavior normal.       Anorectal Exam:  Moderately tender to palpation throughout  Multiple areas of induration  No visible drainage or bleeding seen       Significant Labs:  BMP (Last 3 Results):   Recent Labs   Lab 08/19/22  1102 08/21/22  1035 08/23/22  1101   * 180* 75   * 135* 133*   K 4.4 4.9 4.4    101 101   CO2 24 28 28   BUN 14 12 11   CREATININE 0.6 0.7 0.7   CALCIUM 9.0 9.2 8.7       CBC (Last 3 Results):   Recent Labs   Lab 08/19/22  1102 08/21/22  1035 08/23/22  1100   WBC 11.20 12.40 8.34   RBC 5.34 5.70* 5.06   HGB 9.6* 9.8* 9.0*   HCT 31.1* 33.0* 29.8*    390 300   MCV 58* 58* 59*   MCH 18.0* 17.2* 17.8*   MCHC 30.9* 29.7* 30.2*         Significant Diagnostics:  I have reviewed all pertinent imaging results/findings within the past 24 hours.    Assessment/Plan:     * Crohn's disease with abscess  33 yo female with hx of Crohn's admitted with perirectal abscess. S/p EUA on 8/15 demonstrating deep anal fissures. Colonoscopy 8/18 demonstraetd erythema in distal colon and proximal  rectum.Now s/p laparoscopic diverting ileostomy 8/23/22    - Diet: LRD  - Monitor bowel function and ostomy output  - Multimodal pain management  - OOBTC/ ambulation encouraged  - Ostomy care  - Ophthalmology f/u    Dispo: Likely d/c later today or tomorrow.          Micheal Mejia MD  Colorectal Surgery  [facility]

## 2022-08-25 NOTE — PLAN OF CARE
Pt aox4 on RA, patient verbalizes understanding of POC.    Problem: Adult Inpatient Plan of Care  Goal: Plan of Care Review  Outcome: Ongoing, Progressing  Goal: Patient-Specific Goal (Individualized)  Outcome: Ongoing, Progressing  Goal: Absence of Hospital-Acquired Illness or Injury  Outcome: Ongoing, Progressing  Goal: Optimal Comfort and Wellbeing  Outcome: Ongoing, Progressing  Goal: Readiness for Transition of Care  Outcome: Ongoing, Progressing     Problem: Infection  Goal: Absence of Infection Signs and Symptoms  Outcome: Ongoing, Progressing     Problem: Diarrhea  Goal: Fluid and Electrolyte Balance  Outcome: Ongoing, Progressing     Problem: Fall Injury Risk  Goal: Absence of Fall and Fall-Related Injury  Outcome: Ongoing, Progressing

## 2022-08-25 NOTE — CONSULTS
Patient seen for new ostomy consult. Patient known to me from pre op marking. She states she is not feeling well this afternoon but wants to learn pouch change.   Pouch changed with no difficulties. Stoma is 30 mm pink round with os pointing down between 6 and 7 o'clock, Convex pouch and ring applied after no sting applied to peristomal skin.   Patient complaining of pain and being medicated by primary nurse.   Asked appropriate questions which were answered.   Supplies and educational materials at the bedside. Plan to follow up with patient tomorrow with possible ostomy lesson. Nursing to continue care.

## 2022-08-25 NOTE — NURSING
"Patient complaining of abd pain unrelieved by pain meds. Patient states, "Something is wrong. It feels like something is trying to come out of the stomach but it can't. It bulging out and you could notice it more when I stand up." She pointed around her ostomy site. Abd soft but tender. Notified MD on call of patient's concerns. MD enroute to bedside.    "

## 2022-08-25 NOTE — PLAN OF CARE
Pt aox4 on RA, patient verbalizes understanding of POC.    Problem: Adult Inpatient Plan of Care  Goal: Plan of Care Review  8/25/2022 1743 by Rachael Trevino RN  Outcome: Ongoing, Progressing  8/25/2022 1134 by Rachael Trevino RN  Outcome: Ongoing, Progressing  Goal: Patient-Specific Goal (Individualized)  8/25/2022 1743 by Rachael Trevino RN  Outcome: Ongoing, Progressing  8/25/2022 1134 by Rachael Trevino RN  Outcome: Ongoing, Progressing  Goal: Absence of Hospital-Acquired Illness or Injury  8/25/2022 1743 by Rachael Trevino RN  Outcome: Ongoing, Progressing  8/25/2022 1134 by Rachael Trevino RN  Outcome: Ongoing, Progressing  Goal: Optimal Comfort and Wellbeing  8/25/2022 1743 by Rachael Trevino RN  Outcome: Ongoing, Progressing  8/25/2022 1134 by Rachael Trevino RN  Outcome: Ongoing, Progressing  Goal: Readiness for Transition of Care  8/25/2022 1743 by Rachael Trevino RN  Outcome: Ongoing, Progressing  8/25/2022 1134 by Rachael Trevino RN  Outcome: Ongoing, Progressing     Problem: Infection  Goal: Absence of Infection Signs and Symptoms  8/25/2022 1743 by Rachael Trevino RN  Outcome: Ongoing, Progressing  8/25/2022 1134 by Rachael Trevino RN  Outcome: Ongoing, Progressing     Problem: Diarrhea  Goal: Fluid and Electrolyte Balance  8/25/2022 1743 by Rachael Trevino RN  Outcome: Ongoing, Progressing  8/25/2022 1134 by Rachael Trevino RN  Outcome: Ongoing, Progressing     Problem: Fall Injury Risk  Goal: Absence of Fall and Fall-Related Injury  8/25/2022 1743 by Rachael Trevino RN  Outcome: Ongoing, Progressing  8/25/2022 1134 by Rachael Trevino RN  Outcome: Ongoing, Progressing

## 2022-08-25 NOTE — ASSESSMENT & PLAN NOTE
31 yo female with hx of Crohn's admitted with perirectal abscess. S/p EUA on 8/15 demonstrating deep anal fissures. Colonoscopy 8/18 demonstraetd erythema in distal colon and proximal rectum.Now s/p laparoscopic diverting ileostomy 8/23/22    - Diet: LRD  - Monitor bowel function and ostomy output  - Multimodal pain management  - OOBTC/ ambulation encouraged  - Ostomy care  - Ophthalmology f/u    Dispo: Likely d/c later today or tomorrow.

## 2022-08-25 NOTE — HOSPITAL COURSE
33 yo female with hx of Crohn's admitted with perirectal abscess. S/p EUA on 8/15 demonstrating deep anal fissures with no surgical intervention at the time of examination. Colonoscopy 8/18 demonstraetd erythema in distal colon and proximal rectum. Due to severe perianal disease related to Chron's she underwent laparoscopic diverting ileostomy 8/23/22. No intraoperative complications. Diet has been advanced as tolerated, currently on regular diet. Denies nausea or vomiting. Adequate output from ostomy bag. Ostomy care has provided education about appropriate care. Pain is well controlled and patient is ambulating adequately. Will follow up outpatient with ophthalmology as well.

## 2022-08-25 NOTE — TELEPHONE ENCOUNTER
----- Message from Giselle Barkley sent at 8/25/2022 11:54 AM CDT -----  Regarding: FW: Hospital pt f/u    ----- Message -----  From: Kulwant Ortega MD  Sent: 8/25/2022  11:09 AM CDT  To: [facility] Triage  Subject: Hospital pt f/u                                  Hello,    This is a pt of Dr. 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   It must be this day unless Dr. Warner is unable to accommodate.     Kulwant Ortega  [employer] [facility] PGY2

## 2022-08-25 NOTE — PLAN OF CARE
Recommendations    1) Continue low fiber diet as ordered. Appropriate diet education provided 8/25.     2) RD to monitor and f/u.    Goals: Meet % of kcal/protein needs by RD f/u date  Nutrition Goal Status: new  Communication of RD Recs:  (POC)

## 2022-08-25 NOTE — DISCHARGE INSTRUCTIONS
Surgery Post Op Instructions:    You had a laparoscopic diverting ileostomy performed.     Wound care:  Your incision is covered with Dermabond, a type of surgical super glue. You may shower tomorrow - let soapy water run over the incision but do not scrub the area. You must avoid submerging the incision in pools, bathtubs, etc until it is fully healed in 4-6 weeks.     You need to limit yourself to light duty activities (no lifting/pulling/pushing >10 lbs) for a total of 6 weeks after surgery.    No dietary restrictions.    Medications:  A narcotic pain medication has been prescribed.  Please start to wean the pain medication over the following few days.  You can take tylenol instead of the pain medication.      Please take miralax 1 capful at night to prevent constipation associated with narcotic pain medications.      Follow up:  Return to clinic in 2 weeks for follow up and wound check.

## 2022-08-25 NOTE — SUBJECTIVE & OBJECTIVE
Subjective:     Interval History: No acute overnight events, AF, VSS. Currently on LRD tolerating well. Denies N/V. Adequate ostomy output with fecal matter. Ambulating adequately. Rectal pain and discomfort has improved some.    Post-Op Info:  Procedure(s) (LRB):  CREATION, ILEOSTOMY, LAPAROSCOPIC, BIOPSY PERIANAL FISTULA (N/A)   2 Days Post-Op      Medications:  Continuous Infusions:      Scheduled Meds:   acetaminophen  1,000 mg Oral TID    carboxymethylcellulose sodium  1 drop Ophthalmic QID    enoxaparin  40 mg Subcutaneous Daily    erythromycin   Right Eye TID    folic acid  1 mg Oral Daily    gabapentin  300 mg Oral TID    prednisoLONE acetate  1 drop Left Eye TID    tobramycin sulfate 0.3%  1 drop Right Eye QID     PRN Meds:   albuterol-ipratropium    aluminum-magnesium hydroxide-simethicone    dextrose 10%    dextrose 10%    glucagon (human recombinant)    glucose    glucose    melatonin    naloxone    ondansetron    oxyCODONE    oxyCODONE    sodium chloride 0.9%    traMADoL        Objective:     Vital Signs (Most Recent):  Temp: 98.4 °F (36.9 °C) (08/25/22 0809)  Pulse: 86 (08/25/22 0809)  Resp: 18 (08/25/22 0910)  BP: (!) 93/49 (08/25/22 0809)  SpO2: 99 % (08/25/22 0809) Vital Signs (24h Range):  Temp:  [98 °F (36.7 °C)-98.6 °F (37 °C)] 98.4 °F (36.9 °C)  Pulse:  [67-98] 86  Resp:  [16-20] 18  SpO2:  [97 %-99 %] 99 %  BP: ()/(49-63) 93/49     Intake/Output - Last 3 Shifts         08/23 0700 08/24 0659 08/24 0700 08/25 0659 08/25 0700 08/26 0659    P.O. 240      Total Intake(mL/kg) 240 (3.1)      Urine (mL/kg/hr) 1400 (0.8) 300 (0.2)     Stool  350     Total Output 1400 650     Net -1160 -650            Stool Occurrence  1 x             Physical Exam  Constitutional:       General: She is not in acute distress.     Appearance: She is well-developed.   HENT:      Head: Normocephalic and atraumatic.   Eyes:      General:         Right eye: No discharge.         Left eye: No discharge.       Conjunctiva/sclera: Conjunctivae normal.   Cardiovascular:      Rate and Rhythm: Normal rate and regular rhythm.   Pulmonary:      Effort: Pulmonary effort is normal. No respiratory distress.   Abdominal:      General: There is no distension.      Palpations: Abdomen is soft.      Tenderness: There is abdominal tenderness (incisional pain, appropriate post op).      Comments: Ostomy bag w/ some fecal output   Musculoskeletal:         General: No deformity. Normal range of motion.      Cervical back: Normal range of motion.   Skin:     General: Skin is warm and dry.   Neurological:      Mental Status: She is alert and oriented to person, place, and time.   Psychiatric:         Behavior: Behavior normal.       Anorectal Exam:  Moderately tender to palpation throughout  Multiple areas of induration  No visible drainage or bleeding seen       Significant Labs:  BMP (Last 3 Results):   Recent Labs   Lab 08/19/22  1102 08/21/22  1035 08/23/22  1101   * 180* 75   * 135* 133*   K 4.4 4.9 4.4    101 101   CO2 24 28 28   BUN 14 12 11   CREATININE 0.6 0.7 0.7   CALCIUM 9.0 9.2 8.7       CBC (Last 3 Results):   Recent Labs   Lab 08/19/22  1102 08/21/22  1035 08/23/22  1100   WBC 11.20 12.40 8.34   RBC 5.34 5.70* 5.06   HGB 9.6* 9.8* 9.0*   HCT 31.1* 33.0* 29.8*    390 300   MCV 58* 58* 59*   MCH 18.0* 17.2* 17.8*   MCHC 30.9* 29.7* 30.2*         Significant Diagnostics:  I have reviewed all pertinent imaging results/findings within the past 24 hours.

## 2022-08-25 NOTE — PROGRESS NOTES
[facility]  Adult Nutrition  Progress Note    SUMMARY       Recommendations    1) Continue low fiber diet as ordered. Appropriate diet education provided 8/25.     2) RD to monitor and f/u.    Goals: Meet % of kcal/protein needs by RD f/u date  Nutrition Goal Status: new  Communication of RD Recs:  (POC)    Assessment and Plan  Nutrition Problem  Inadequate energy intake     Related to (etiology):   Decreased appetite, altered GI structure/function    Signs and Symptoms (as evidenced by):   Meal intake ~50%     Interventions/Recommendations (treatment strategy):  Collaboration of nutrition care with other providers  Adequate meal intake - small, frequent meals for best tolerance    Nutrition Diagnosis Status:   New    Reason for Assessment    Reason For Assessment: length of stay  Diagnosis:  (Crohn's disease with abscess)  Interdisciplinary Rounds: did not attend    General Information Comments: POD#1 s/p ileostomy. Diet advanced to low fiber this morning, pt reports fair appetite, eating about half of her breakfast this morning. Pt c/o stomach pain/fullness after eating - encouraged pt to eat small, frequent meals for better tolerance. # w/ no recent changes. NFPE not warranted, no s/s of malnutrition. Pt amenable to ileostomy nutrition therapy education - diet reviewed and handout provided. Pt verbalized understanding of material.    Nutrition Discharge Planning: adequate nutrition, low fiber diet    Nutrition Risk Screen    Nutrition Risk Screen: no indicators present    Nutrition/Diet History    Spiritual, Cultural Beliefs, Church Practices, Values that Affect Care: no  Food Allergies: NKFA    Anthropometrics    Temp: 98.4 °F (36.9 °C)  Height Method: Stated  Height: 5' (152.4 cm)  Height (inches): 60 in  Weight Method: Bed Scale  Weight: 77.2 kg (170 lb 3.1 oz)  Weight (lb): 170.2 lb  Ideal Body Weight (IBW), Female: 100 lb  % Ideal Body Weight, Female (lb): 170.2 %  BMI (Calculated):  33.2  BMI Grade: 30 - 34.9- obesity - grade I    Lab/Procedures/Meds    Pertinent Labs Reviewed: reviewed  Pertinent Labs Comments: Na 133  Pertinent Medications Reviewed: reviewed  Pertinent Medications Comments: folic acid    Estimated/Assessed Needs    Weight Used For Calorie Calculations: 77.2 kg (170 lb 3.1 oz)  Energy Calorie Requirements (kcal): 8044-9890 kcal/kg (25-30 kcal/kg)  Energy Need Method: Kcal/kg  Protein Requirements: 77-92 g/kg (1-1.2 g/kg)  Weight Used For Protein Calculations: 77.2 kg (170 lb 3.1 oz)  Fluid Requirements (mL): 1ml/kcal or fluid per physician  RDA Method (mL): 1544    Nutrition Prescription Ordered    Current Diet Order: Low fiber    Evaluation of Received Nutrient/Fluid Intake    I/O: +8.5L  Comments: LBM: 8/24  % Intake of Estimated Energy Needs: 25 - 50 %  % Meal Intake: 25 - 50 %    Nutrition Risk    Level of Risk/Frequency of Follow-up:  (1x/week)     Monitor and Evaluation    Food and Nutrient Intake: energy intake, food and beverage intake  Food and Nutrient Adminstration: diet order  Knowledge/Beliefs/Attitudes: food and nutrition knowledge/skill  Anthropometric Measurements: weight change  Biochemical Data, Medical Tests and Procedures: electrolyte and renal panel, gastrointestinal profile, glucose/endocrine profile, inflammatory profile, lipid profile  Nutrition-Focused Physical Findings: overall appearance     Nutrition Follow-Up    RD Follow-up?: Yes

## 2022-08-26 LAB — CRP SERPL-MCNC: 100.18 MG/L (ref 0–3.19)

## 2022-08-26 PROCEDURE — 25000003 PHARM REV CODE 250: Performed by: STUDENT IN AN ORGANIZED HEALTH CARE EDUCATION/TRAINING PROGRAM

## 2022-08-26 PROCEDURE — 94761 N-INVAS EAR/PLS OXIMETRY MLT: CPT

## 2022-08-26 PROCEDURE — 20600001 HC STEP DOWN PRIVATE ROOM

## 2022-08-26 PROCEDURE — 25000003 PHARM REV CODE 250: Performed by: HOSPITALIST

## 2022-08-26 PROCEDURE — 36415 COLL VENOUS BLD VENIPUNCTURE: CPT | Performed by: STUDENT IN AN ORGANIZED HEALTH CARE EDUCATION/TRAINING PROGRAM

## 2022-08-26 PROCEDURE — 63600175 PHARM REV CODE 636 W HCPCS: Performed by: STUDENT IN AN ORGANIZED HEALTH CARE EDUCATION/TRAINING PROGRAM

## 2022-08-26 PROCEDURE — 86141 C-REACTIVE PROTEIN HS: CPT | Performed by: STUDENT IN AN ORGANIZED HEALTH CARE EDUCATION/TRAINING PROGRAM

## 2022-08-26 RX ORDER — DEXTROSE MONOHYDRATE, SODIUM CHLORIDE, AND POTASSIUM CHLORIDE 50; 1.49; 4.5 G/1000ML; G/1000ML; G/1000ML
INJECTION, SOLUTION INTRAVENOUS CONTINUOUS
Status: DISCONTINUED | OUTPATIENT
Start: 2022-08-26 | End: 2022-08-27

## 2022-08-26 RX ORDER — SIMETHICONE 80 MG
1 TABLET,CHEWABLE ORAL 3 TIMES DAILY PRN
Status: DISCONTINUED | OUTPATIENT
Start: 2022-08-26 | End: 2022-09-03 | Stop reason: HOSPADM

## 2022-08-26 RX ORDER — SCOLOPAMINE TRANSDERMAL SYSTEM 1 MG/1
1 PATCH, EXTENDED RELEASE TRANSDERMAL
Status: DISCONTINUED | OUTPATIENT
Start: 2022-08-26 | End: 2022-09-03 | Stop reason: HOSPADM

## 2022-08-26 RX ADMIN — PREDNISOLONE ACETATE 1 DROP: 10 SUSPENSION/ DROPS OPHTHALMIC at 02:08

## 2022-08-26 RX ADMIN — OXYCODONE HYDROCHLORIDE 10 MG: 10 TABLET ORAL at 08:08

## 2022-08-26 RX ADMIN — OXYCODONE 5 MG: 5 TABLET ORAL at 09:08

## 2022-08-26 RX ADMIN — FOLIC ACID 1 MG: 1 TABLET ORAL at 09:08

## 2022-08-26 RX ADMIN — METHOCARBAMOL 500 MG: 100 INJECTION, SOLUTION INTRAMUSCULAR; INTRAVENOUS at 06:08

## 2022-08-26 RX ADMIN — SCOPALAMINE 1 PATCH: 1 PATCH, EXTENDED RELEASE TRANSDERMAL at 08:08

## 2022-08-26 RX ADMIN — PREDNISOLONE ACETATE 1 DROP: 10 SUSPENSION/ DROPS OPHTHALMIC at 09:08

## 2022-08-26 RX ADMIN — TOBRAMYCIN 1 DROP: 3 SOLUTION/ DROPS OPHTHALMIC at 05:08

## 2022-08-26 RX ADMIN — TOBRAMYCIN 1 DROP: 3 SOLUTION/ DROPS OPHTHALMIC at 12:08

## 2022-08-26 RX ADMIN — OXYCODONE HYDROCHLORIDE 10 MG: 10 TABLET ORAL at 02:08

## 2022-08-26 RX ADMIN — CARBOXYMETHYLCELLULOSE SODIUM 1 DROP: 10 GEL OPHTHALMIC at 12:08

## 2022-08-26 RX ADMIN — TOBRAMYCIN 1 DROP: 3 SOLUTION/ DROPS OPHTHALMIC at 09:08

## 2022-08-26 RX ADMIN — GABAPENTIN 300 MG: 300 CAPSULE ORAL at 02:08

## 2022-08-26 RX ADMIN — CARBOXYMETHYLCELLULOSE SODIUM 1 DROP: 10 GEL OPHTHALMIC at 08:08

## 2022-08-26 RX ADMIN — GABAPENTIN 300 MG: 300 CAPSULE ORAL at 08:08

## 2022-08-26 RX ADMIN — SIMETHICONE 80 MG: 80 TABLET, CHEWABLE ORAL at 02:08

## 2022-08-26 RX ADMIN — OXYCODONE HYDROCHLORIDE 10 MG: 10 TABLET ORAL at 06:08

## 2022-08-26 RX ADMIN — CARBOXYMETHYLCELLULOSE SODIUM 1 DROP: 10 GEL OPHTHALMIC at 05:08

## 2022-08-26 RX ADMIN — ACETAMINOPHEN 1000 MG: 500 TABLET ORAL at 02:08

## 2022-08-26 RX ADMIN — DEXTROSE, SODIUM CHLORIDE, AND POTASSIUM CHLORIDE: 5; .45; .15 INJECTION INTRAVENOUS at 09:08

## 2022-08-26 RX ADMIN — GABAPENTIN 300 MG: 300 CAPSULE ORAL at 09:08

## 2022-08-26 RX ADMIN — ACETAMINOPHEN 1000 MG: 500 TABLET ORAL at 09:08

## 2022-08-26 RX ADMIN — TOBRAMYCIN 1 DROP: 3 SOLUTION/ DROPS OPHTHALMIC at 08:08

## 2022-08-26 RX ADMIN — CARBOXYMETHYLCELLULOSE SODIUM 1 DROP: 10 GEL OPHTHALMIC at 09:08

## 2022-08-26 RX ADMIN — METHOCARBAMOL 500 MG: 100 INJECTION, SOLUTION INTRAMUSCULAR; INTRAVENOUS at 02:08

## 2022-08-26 RX ADMIN — PREDNISOLONE ACETATE 1 DROP: 10 SUSPENSION/ DROPS OPHTHALMIC at 08:08

## 2022-08-26 RX ADMIN — ACETAMINOPHEN 1000 MG: 500 TABLET ORAL at 08:08

## 2022-08-26 RX ADMIN — METHOCARBAMOL 500 MG: 100 INJECTION, SOLUTION INTRAMUSCULAR; INTRAVENOUS at 08:08

## 2022-08-26 RX ADMIN — ENOXAPARIN SODIUM 40 MG: 100 INJECTION SUBCUTANEOUS at 05:08

## 2022-08-26 NOTE — PLAN OF CARE
"  Problem: Adult Inpatient Plan of Care  Goal: Patient-Specific Goal (Individualized)  Outcome: Ongoing, Progressing     Problem: Adult Inpatient Plan of Care  Goal: Readiness for Transition of Care  Outcome: Ongoing, Progressing     Problem: Infection  Goal: Absence of Infection Signs and Symptoms  Outcome: Ongoing, Progressing     Problem: Fall Injury Risk  Goal: Absence of Fall and Fall-Related Injury  Outcome: Ongoing, Progressing     Problem: Pain Acute  Goal: Acceptable Pain Control and Functional Ability  Outcome: Ongoing, Progressing     Patient is AAO x 3 and reports pain, patient medicated with Oxy 5 mg and Oxy 10 mg for pain rated 8-9/10.  Effectiveness noted.  Patient reported pain to ostomy site and spoke to Dr. Hilario, who has previously spoken to the patient.  Patient id get OOB to chair and showered with linen change.  New order for simethicone 80 mg chewable for gas and warm packs applied to abdomen.  D5 1/2 NS with 20 KCL infusing at 40 ml/hr.  Ostomy nurse educated patient at the bedside and provided a list of foods that she can consume.  Patient reports pain  as "tolerable".  POC reviewed with p[patient and discharge date is TBD.   "

## 2022-08-26 NOTE — SUBJECTIVE & OBJECTIVE
Subjective:     Interval History: Overnight patient complained of abdominal pain and nausea. KUB was not concerning. Currently on LRD tolerating well. Decreased ostomy output. Ambulating adequately.     Post-Op Info:  Procedure(s) (LRB):  CREATION, ILEOSTOMY, LAPAROSCOPIC, BIOPSY PERIANAL FISTULA (N/A)   3 Days Post-Op      Medications:  Continuous Infusions:      Scheduled Meds:   acetaminophen  1,000 mg Oral TID    carboxymethylcellulose sodium  1 drop Ophthalmic QID    enoxaparin  40 mg Subcutaneous Daily    erythromycin   Right Eye TID    folic acid  1 mg Oral Daily    gabapentin  300 mg Oral TID    methocarbamol (ROBAXIN) IVPB  500 mg Intravenous Q8H    prednisoLONE acetate  1 drop Left Eye TID    tobramycin sulfate 0.3%  1 drop Right Eye QID     PRN Meds:   albuterol-ipratropium    aluminum-magnesium hydroxide-simethicone    dextrose 10%    dextrose 10%    glucagon (human recombinant)    glucose    glucose    melatonin    naloxone    ondansetron    oxyCODONE    oxyCODONE    sodium chloride 0.9%    traMADoL        Objective:     Vital Signs (Most Recent):  Temp: 98 °F (36.7 °C) (08/26/22 0322)  Pulse: 84 (08/26/22 0621)  Resp: 18 (08/26/22 0622)  BP: 96/69 (08/26/22 0621)  SpO2: 99 % (08/26/22 0322) Vital Signs (24h Range):  Temp:  [97.2 °F (36.2 °C)-99.4 °F (37.4 °C)] 98 °F (36.7 °C)  Pulse:  [64-91] 84  Resp:  [18-20] 18  SpO2:  [94 %-99 %] 99 %  BP: ()/(49-69) 96/69     Intake/Output - Last 3 Shifts         08/24 0700  08/25 0659 08/25 0700  08/26 0659    Urine (mL/kg/hr) 300 (0.2) 0 (0)    Stool 350 400    Total Output 650 400    Net -650 -400          Urine Occurrence  1 x    Stool Occurrence 1 x 1 x            Physical Exam  Constitutional:       General: She is not in acute distress.     Appearance: She is well-developed.   HENT:      Head: Normocephalic and atraumatic.   Eyes:      General:         Right eye: No discharge.         Left eye: No discharge.      Conjunctiva/sclera: Conjunctivae  normal.   Cardiovascular:      Rate and Rhythm: Normal rate and regular rhythm.   Pulmonary:      Effort: Pulmonary effort is normal. No respiratory distress.   Abdominal:      General: There is no distension.      Palpations: Abdomen is soft.      Tenderness: There is abdominal tenderness (incisional pain, appropriate post op).      Comments: Ostomy bag w/ some fecal output   Musculoskeletal:         General: No deformity. Normal range of motion.      Cervical back: Normal range of motion.   Skin:     General: Skin is warm and dry.   Neurological:      Mental Status: She is alert and oriented to person, place, and time.   Psychiatric:         Behavior: Behavior normal.       Anorectal Exam:  Moderately tender to palpation throughout  Multiple areas of induration  No visible drainage or bleeding seen       Significant Labs:  BMP (Last 3 Results):   Recent Labs   Lab 08/21/22  1035 08/23/22  1101 08/25/22  1040   * 75 97   * 133* 134*   K 4.9 4.4 3.9    101 104   CO2 28 28 25   BUN 12 11 9   CREATININE 0.7 0.7 0.6   CALCIUM 9.2 8.7 8.6*       CBC (Last 3 Results):   Recent Labs   Lab 08/21/22  1035 08/23/22  1100 08/25/22  1040   WBC 12.40 8.34 7.51   RBC 5.70* 5.06 4.78   HGB 9.8* 9.0* 8.5*   HCT 33.0* 29.8* 28.0*    300 273   MCV 58* 59* 59*   MCH 17.2* 17.8* 17.8*   MCHC 29.7* 30.2* 30.4*         Significant Diagnostics:  I have reviewed all pertinent imaging results/findings within the past 24 hours.

## 2022-08-26 NOTE — SUBJECTIVE & OBJECTIVE
Subjective:     Interval History: No acute overnight events. Currently on LRD tolerating well. Minimal ostomy output, no gas. Ambulating adequately. Pain is better this morning.    Post-Op Info:  Procedure(s) (LRB):  CREATION, ILEOSTOMY, LAPAROSCOPIC, BIOPSY PERIANAL FISTULA (N/A)   4 Days Post-Op      Medications:  Continuous Infusions:   dextrose 5 % and 0.45 % NaCl with KCl 20 mEq 40 mL/hr at 08/26/22 0934       Scheduled Meds:   acetaminophen  1,000 mg Oral TID    carboxymethylcellulose sodium  1 drop Ophthalmic QID    enoxaparin  40 mg Subcutaneous Daily    erythromycin   Right Eye TID    folic acid  1 mg Oral Daily    gabapentin  300 mg Oral TID    methocarbamol (ROBAXIN) IVPB  500 mg Intravenous Q8H    prednisoLONE acetate  1 drop Left Eye TID    scopolamine  1 patch Transdermal Q3 Days    tobramycin sulfate 0.3%  1 drop Right Eye QID     PRN Meds:   albuterol-ipratropium    aluminum-magnesium hydroxide-simethicone    dextrose 10%    dextrose 10%    glucagon (human recombinant)    glucose    glucose    melatonin    naloxone    ondansetron    oxyCODONE    oxyCODONE    simethicone    sodium chloride 0.9%    traMADoL        Objective:     Vital Signs (Most Recent):  Temp: 98.3 °F (36.8 °C) (08/27/22 0519)  Pulse: 81 (08/27/22 0519)  Resp: 18 (08/27/22 0519)  BP: (!) 96/56 (08/27/22 0519)  SpO2: 100 % (08/27/22 0519) Vital Signs (24h Range):  Temp:  [97.4 °F (36.3 °C)-99.1 °F (37.3 °C)] 98.3 °F (36.8 °C)  Pulse:  [77-95] 81  Resp:  [18] 18  SpO2:  [97 %-100 %] 100 %  BP: ()/(51-62) 96/56     Intake/Output - Last 3 Shifts         08/25 0700 08/26 0659 08/26 0700 08/27 0659 08/27 0700 08/28 0659    P.O.  1680     IV Piggyback  386.3     Total Intake(mL/kg)  2066.3 (26.8)     Urine (mL/kg/hr) 0 (0) 0 (0)     Stool 400 0     Total Output 400 0     Net -400 +2066.3            Urine Occurrence 1 x 7 x     Stool Occurrence 1 x              Physical Exam  Constitutional:       General: She is not in acute  distress.     Appearance: She is well-developed.   HENT:      Head: Normocephalic and atraumatic.   Eyes:      General:         Right eye: No discharge.         Left eye: No discharge.      Conjunctiva/sclera: Conjunctivae normal.   Cardiovascular:      Rate and Rhythm: Normal rate and regular rhythm.   Pulmonary:      Effort: Pulmonary effort is normal. No respiratory distress.   Abdominal:      General: There is no distension.      Palpations: Abdomen is soft.      Tenderness: There is abdominal tenderness (incisional pain, appropriate post op).      Comments: Ostomy bag w/ minimal fecal output   Musculoskeletal:         General: No deformity. Normal range of motion.      Cervical back: Normal range of motion.   Skin:     General: Skin is warm and dry.   Neurological:      Mental Status: She is alert and oriented to person, place, and time.   Psychiatric:         Behavior: Behavior normal.       Anorectal Exam:  Moderately tender to palpation throughout  Multiple areas of induration  No visible drainage or bleeding seen       Significant Labs:  BMP (Last 3 Results):   Recent Labs   Lab 08/21/22  1035 08/23/22  1101 08/25/22  1040   * 75 97   * 133* 134*   K 4.9 4.4 3.9    101 104   CO2 28 28 25   BUN 12 11 9   CREATININE 0.7 0.7 0.6   CALCIUM 9.2 8.7 8.6*       CBC (Last 3 Results):   Recent Labs   Lab 08/21/22  1035 08/23/22  1100 08/25/22  1040   WBC 12.40 8.34 7.51   RBC 5.70* 5.06 4.78   HGB 9.8* 9.0* 8.5*   HCT 33.0* 29.8* 28.0*    300 273   MCV 58* 59* 59*   MCH 17.2* 17.8* 17.8*   MCHC 29.7* 30.2* 30.4*         Significant Diagnostics:  I have reviewed all pertinent imaging results/findings within the past 24 hours.

## 2022-08-26 NOTE — NURSING
"Patient BP 87/51/ Pulse 70   With no other symptoms, "My BP always run low". Md on call notified. [id number]  "

## 2022-08-26 NOTE — PROGRESS NOTES
[facility]  Colorectal Surgery  Progress Note    Patient Name: Nikkie Myles  MRN: [id number]  Admission Date: 8/11/2022  Hospital Length of Stay: 14 days  Attending Physician: Zuleyka Yip MD    Subjective:     Interval History: Overnight patient complained of abdominal pain and nausea. KUB was not concerning. Currently on LRD tolerating well. Decreased ostomy output. Ambulating adequately.     Post-Op Info:  Procedure(s) (LRB):  CREATION, ILEOSTOMY, LAPAROSCOPIC, BIOPSY PERIANAL FISTULA (N/A)   3 Days Post-Op      Medications:  Continuous Infusions:      Scheduled Meds:   acetaminophen  1,000 mg Oral TID    carboxymethylcellulose sodium  1 drop Ophthalmic QID    enoxaparin  40 mg Subcutaneous Daily    erythromycin   Right Eye TID    folic acid  1 mg Oral Daily    gabapentin  300 mg Oral TID    methocarbamol (ROBAXIN) IVPB  500 mg Intravenous Q8H    prednisoLONE acetate  1 drop Left Eye TID    tobramycin sulfate 0.3%  1 drop Right Eye QID     PRN Meds:   albuterol-ipratropium    aluminum-magnesium hydroxide-simethicone    dextrose 10%    dextrose 10%    glucagon (human recombinant)    glucose    glucose    melatonin    naloxone    ondansetron    oxyCODONE    oxyCODONE    sodium chloride 0.9%    traMADoL        Objective:     Vital Signs (Most Recent):  Temp: 98 °F (36.7 °C) (08/26/22 0322)  Pulse: 84 (08/26/22 0621)  Resp: 18 (08/26/22 0622)  BP: 96/69 (08/26/22 0621)  SpO2: 99 % (08/26/22 0322) Vital Signs (24h Range):  Temp:  [97.2 °F (36.2 °C)-99.4 °F (37.4 °C)] 98 °F (36.7 °C)  Pulse:  [64-91] 84  Resp:  [18-20] 18  SpO2:  [94 %-99 %] 99 %  BP: ()/(49-69) 96/69     Intake/Output - Last 3 Shifts         08/24 0700  08/25 0659 08/25 0700  08/26 0659    Urine (mL/kg/hr) 300 (0.2) 0 (0)    Stool 350 400    Total Output 650 400    Net -650 -400          Urine Occurrence  1 x    Stool Occurrence 1 x 1 x            Physical Exam  Constitutional:       General: She is not in  acute distress.     Appearance: She is well-developed.   HENT:      Head: Normocephalic and atraumatic.   Eyes:      General:         Right eye: No discharge.         Left eye: No discharge.      Conjunctiva/sclera: Conjunctivae normal.   Cardiovascular:      Rate and Rhythm: Normal rate and regular rhythm.   Pulmonary:      Effort: Pulmonary effort is normal. No respiratory distress.   Abdominal:      General: There is no distension.      Palpations: Abdomen is soft.      Tenderness: There is abdominal tenderness (incisional pain, appropriate post op).      Comments: Ostomy bag w/ some fecal output   Musculoskeletal:         General: No deformity. Normal range of motion.      Cervical back: Normal range of motion.   Skin:     General: Skin is warm and dry.   Neurological:      Mental Status: She is alert and oriented to person, place, and time.   Psychiatric:         Behavior: Behavior normal.       Anorectal Exam:  Moderately tender to palpation throughout  Multiple areas of induration  No visible drainage or bleeding seen       Significant Labs:  BMP (Last 3 Results):   Recent Labs   Lab 08/21/22  1035 08/23/22  1101 08/25/22  1040   * 75 97   * 133* 134*   K 4.9 4.4 3.9    101 104   CO2 28 28 25   BUN 12 11 9   CREATININE 0.7 0.7 0.6   CALCIUM 9.2 8.7 8.6*       CBC (Last 3 Results):   Recent Labs   Lab 08/21/22  1035 08/23/22  1100 08/25/22  1040   WBC 12.40 8.34 7.51   RBC 5.70* 5.06 4.78   HGB 9.8* 9.0* 8.5*   HCT 33.0* 29.8* 28.0*    300 273   MCV 58* 59* 59*   MCH 17.2* 17.8* 17.8*   MCHC 29.7* 30.2* 30.4*         Significant Diagnostics:  I have reviewed all pertinent imaging results/findings within the past 24 hours.    Assessment/Plan:     * Crohn's disease with abscess  33 yo female with hx of Crohn's admitted with perirectal abscess. S/p EUA on 8/15 demonstrating deep anal fissures. Colonoscopy 8/18 demonstraetd erythema in distal colon and proximal rectum.Now s/p laparoscopic  diverting ileostomy 8/23/22    - Diet: LRD  - Monitor bowel function and ostomy output  - Multimodal pain management: tylenol, gabapentin, robaxin, oxycodone, tramadol  - Antiemetics: zofran, scopolamine patch  - OOBTC/ ambulation encouraged  - Ostomy care for teachings  - Ophthalmology f/u            Micheal Mejia MD  Colorectal Surgery  [facility]

## 2022-08-26 NOTE — PLAN OF CARE
Patient AAO X 4 with respiratory unlabored. VSS. Patient states pain of 10 and has concerns with ostomy producing less fluids than before. Md notified and spoke with patient at bedside. Bed in lowest position with call light at bedside. No distress noted.     Problem: Adult Inpatient Plan of Care  Goal: Plan of Care Review  8/26/2022 0739 by Ny Otero RN  Outcome: Ongoing, Progressing  8/26/2022 0739 by Ny Otero RN  Outcome: Ongoing, Progressing  Goal: Patient-Specific Goal (Individualized)  8/26/2022 0739 by Ny Otero RN  Outcome: Ongoing, Progressing  8/26/2022 0739 by Ny Otero RN  Outcome: Ongoing, Progressing  Goal: Absence of Hospital-Acquired Illness or Injury  8/26/2022 0739 by Ny Otero RN  Outcome: Ongoing, Progressing  8/26/2022 0739 by Ny Otero RN  Outcome: Ongoing, Progressing  Goal: Optimal Comfort and Wellbeing  8/26/2022 0739 by Ny Otero RN  Outcome: Ongoing, Progressing  8/26/2022 0739 by Ny Otero RN  Outcome: Ongoing, Progressing  Goal: Readiness for Transition of Care  8/26/2022 0739 by Ny Otero RN  Outcome: Ongoing, Progressing  8/26/2022 0739 by Ny Otero RN  Outcome: Ongoing, Progressing     Problem: Infection  Goal: Absence of Infection Signs and Symptoms  8/26/2022 0739 by Ny Otero RN  Outcome: Ongoing, Progressing  8/26/2022 0739 by Ny Otero RN  Outcome: Ongoing, Progressing     Problem: Diarrhea  Goal: Fluid and Electrolyte Balance  8/26/2022 0739 by Ny Otero RN  Outcome: Ongoing, Progressing  8/26/2022 0739 by Ny Otero RN  Outcome: Ongoing, Progressing     Problem: Fall Injury Risk  Goal: Absence of Fall and Fall-Related Injury  8/26/2022 0739 by Ny Otero RN  Outcome: Ongoing, Progressing  8/26/2022 0739 by Ny Otero RN  Outcome: Ongoing, Progressing

## 2022-08-26 NOTE — ASSESSMENT & PLAN NOTE
33 yo female with hx of Crohn's admitted with perirectal abscess. S/p EUA on 8/15 demonstrating deep anal fissures. Colonoscopy 8/18 demonstraetd erythema in distal colon and proximal rectum.Now s/p laparoscopic diverting ileostomy 8/23/22    - Diet: LRD  - Monitor bowel function and ostomy output  - Multimodal pain management: tylenol, gabapentin, robaxin, oxycodone, tramadol  - Antiemetics: zofran, scopolamine patch  - OOBTC/ ambulation encouraged  - Ostomy care for teachings  - Ophthalmology f/u

## 2022-08-27 LAB
ALBUMIN SERPL BCP-MCNC: 2.9 G/DL (ref 3.5–5.2)
ALP SERPL-CCNC: 62 U/L (ref 55–135)
ALT SERPL W/O P-5'-P-CCNC: 12 U/L (ref 10–44)
ANION GAP SERPL CALC-SCNC: 8 MMOL/L (ref 8–16)
ANISOCYTOSIS BLD QL SMEAR: SLIGHT
AST SERPL-CCNC: 15 U/L (ref 10–40)
BASOPHILS # BLD AUTO: 0.02 K/UL (ref 0–0.2)
BASOPHILS NFR BLD: 0.2 % (ref 0–1.9)
BILIRUB SERPL-MCNC: 0.3 MG/DL (ref 0.1–1)
BUN SERPL-MCNC: 5 MG/DL (ref 6–20)
CALCIUM SERPL-MCNC: 9.3 MG/DL (ref 8.7–10.5)
CHLORIDE SERPL-SCNC: 100 MMOL/L (ref 95–110)
CO2 SERPL-SCNC: 22 MMOL/L (ref 23–29)
CREAT SERPL-MCNC: 0.6 MG/DL (ref 0.5–1.4)
CRP SERPL-MCNC: 115.8 MG/L (ref 0–8.2)
DIFFERENTIAL METHOD: ABNORMAL
EOSINOPHIL # BLD AUTO: 0.1 K/UL (ref 0–0.5)
EOSINOPHIL NFR BLD: 0.9 % (ref 0–8)
ERYTHROCYTE [DISTWIDTH] IN BLOOD BY AUTOMATED COUNT: 26.3 % (ref 11.5–14.5)
EST. GFR  (NO RACE VARIABLE): >60 ML/MIN/1.73 M^2
GLUCOSE SERPL-MCNC: 84 MG/DL (ref 70–110)
HCT VFR BLD AUTO: 32.6 % (ref 37–48.5)
HGB BLD-MCNC: 10 G/DL (ref 12–16)
HYPOCHROMIA BLD QL SMEAR: ABNORMAL
IMM GRANULOCYTES # BLD AUTO: 0.01 K/UL (ref 0–0.04)
IMM GRANULOCYTES NFR BLD AUTO: 0.1 % (ref 0–0.5)
LYMPHOCYTES # BLD AUTO: 1.5 K/UL (ref 1–4.8)
LYMPHOCYTES NFR BLD: 18 % (ref 18–48)
MCH RBC QN AUTO: 18 PG (ref 27–31)
MCHC RBC AUTO-ENTMCNC: 30.7 G/DL (ref 32–36)
MCV RBC AUTO: 59 FL (ref 82–98)
MONOCYTES # BLD AUTO: 0.6 K/UL (ref 0.3–1)
MONOCYTES NFR BLD: 7.2 % (ref 4–15)
NEUTROPHILS # BLD AUTO: 6 K/UL (ref 1.8–7.7)
NEUTROPHILS NFR BLD: 73.6 % (ref 38–73)
NRBC BLD-RTO: 0 /100 WBC
OVALOCYTES BLD QL SMEAR: ABNORMAL
PLATELET # BLD AUTO: 362 K/UL (ref 150–450)
PMV BLD AUTO: ABNORMAL FL (ref 9.2–12.9)
POIKILOCYTOSIS BLD QL SMEAR: SLIGHT
POLYCHROMASIA BLD QL SMEAR: ABNORMAL
POTASSIUM SERPL-SCNC: 4.1 MMOL/L (ref 3.5–5.1)
PROT SERPL-MCNC: 8.3 G/DL (ref 6–8.4)
RBC # BLD AUTO: 5.57 M/UL (ref 4–5.4)
SCHISTOCYTES BLD QL SMEAR: ABNORMAL
SODIUM SERPL-SCNC: 130 MMOL/L (ref 136–145)
WBC # BLD AUTO: 8.17 K/UL (ref 3.9–12.7)

## 2022-08-27 PROCEDURE — 80053 COMPREHEN METABOLIC PANEL: CPT | Performed by: STUDENT IN AN ORGANIZED HEALTH CARE EDUCATION/TRAINING PROGRAM

## 2022-08-27 PROCEDURE — 25000003 PHARM REV CODE 250: Performed by: STUDENT IN AN ORGANIZED HEALTH CARE EDUCATION/TRAINING PROGRAM

## 2022-08-27 PROCEDURE — 63600175 PHARM REV CODE 636 W HCPCS: Performed by: STUDENT IN AN ORGANIZED HEALTH CARE EDUCATION/TRAINING PROGRAM

## 2022-08-27 PROCEDURE — 63600175 PHARM REV CODE 636 W HCPCS

## 2022-08-27 PROCEDURE — 20600001 HC STEP DOWN PRIVATE ROOM

## 2022-08-27 PROCEDURE — 86140 C-REACTIVE PROTEIN: CPT | Performed by: STUDENT IN AN ORGANIZED HEALTH CARE EDUCATION/TRAINING PROGRAM

## 2022-08-27 PROCEDURE — 85025 COMPLETE CBC W/AUTO DIFF WBC: CPT | Performed by: STUDENT IN AN ORGANIZED HEALTH CARE EDUCATION/TRAINING PROGRAM

## 2022-08-27 PROCEDURE — 36415 COLL VENOUS BLD VENIPUNCTURE: CPT | Performed by: STUDENT IN AN ORGANIZED HEALTH CARE EDUCATION/TRAINING PROGRAM

## 2022-08-27 RX ORDER — SODIUM CHLORIDE, SODIUM LACTATE, POTASSIUM CHLORIDE, CALCIUM CHLORIDE 600; 310; 30; 20 MG/100ML; MG/100ML; MG/100ML; MG/100ML
INJECTION, SOLUTION INTRAVENOUS CONTINUOUS
Status: DISCONTINUED | OUTPATIENT
Start: 2022-08-27 | End: 2022-09-03 | Stop reason: HOSPADM

## 2022-08-27 RX ORDER — HYDROMORPHONE HYDROCHLORIDE 1 MG/ML
0.2 INJECTION, SOLUTION INTRAMUSCULAR; INTRAVENOUS; SUBCUTANEOUS ONCE AS NEEDED
Status: COMPLETED | OUTPATIENT
Start: 2022-08-27 | End: 2022-08-27

## 2022-08-27 RX ORDER — HYDROMORPHONE HYDROCHLORIDE 1 MG/ML
0.2 INJECTION, SOLUTION INTRAMUSCULAR; INTRAVENOUS; SUBCUTANEOUS ONCE
Status: COMPLETED | OUTPATIENT
Start: 2022-08-27 | End: 2022-08-27

## 2022-08-27 RX ORDER — LIDOCAINE HYDROCHLORIDE 20 MG/ML
JELLY TOPICAL ONCE
Status: DISCONTINUED | OUTPATIENT
Start: 2022-08-27 | End: 2022-09-03 | Stop reason: HOSPADM

## 2022-08-27 RX ADMIN — TOBRAMYCIN 1 DROP: 3 SOLUTION/ DROPS OPHTHALMIC at 12:08

## 2022-08-27 RX ADMIN — METHOCARBAMOL 500 MG: 100 INJECTION, SOLUTION INTRAMUSCULAR; INTRAVENOUS at 02:08

## 2022-08-27 RX ADMIN — METHOCARBAMOL 500 MG: 100 INJECTION, SOLUTION INTRAMUSCULAR; INTRAVENOUS at 09:08

## 2022-08-27 RX ADMIN — CARBOXYMETHYLCELLULOSE SODIUM 1 DROP: 10 GEL OPHTHALMIC at 12:08

## 2022-08-27 RX ADMIN — OXYCODONE HYDROCHLORIDE 10 MG: 10 TABLET ORAL at 08:08

## 2022-08-27 RX ADMIN — SODIUM CHLORIDE, SODIUM LACTATE, POTASSIUM CHLORIDE, AND CALCIUM CHLORIDE: 600; 310; 30; 20 INJECTION, SOLUTION INTRAVENOUS at 06:08

## 2022-08-27 RX ADMIN — PREDNISOLONE ACETATE 1 DROP: 10 SUSPENSION/ DROPS OPHTHALMIC at 08:08

## 2022-08-27 RX ADMIN — PREDNISOLONE ACETATE 1 DROP: 10 SUSPENSION/ DROPS OPHTHALMIC at 03:08

## 2022-08-27 RX ADMIN — METHOCARBAMOL 500 MG: 100 INJECTION, SOLUTION INTRAMUSCULAR; INTRAVENOUS at 04:08

## 2022-08-27 RX ADMIN — HYDROMORPHONE HYDROCHLORIDE 0.2 MG: 1 INJECTION, SOLUTION INTRAMUSCULAR; INTRAVENOUS; SUBCUTANEOUS at 09:08

## 2022-08-27 RX ADMIN — ACETAMINOPHEN 1000 MG: 500 TABLET ORAL at 03:08

## 2022-08-27 RX ADMIN — GABAPENTIN 300 MG: 300 CAPSULE ORAL at 08:08

## 2022-08-27 RX ADMIN — ACETAMINOPHEN 1000 MG: 500 TABLET ORAL at 08:08

## 2022-08-27 RX ADMIN — DEXTROSE, SODIUM CHLORIDE, AND POTASSIUM CHLORIDE: 5; .45; .15 INJECTION INTRAVENOUS at 08:08

## 2022-08-27 RX ADMIN — ENOXAPARIN SODIUM 40 MG: 100 INJECTION SUBCUTANEOUS at 05:08

## 2022-08-27 RX ADMIN — TOBRAMYCIN 1 DROP: 3 SOLUTION/ DROPS OPHTHALMIC at 05:08

## 2022-08-27 RX ADMIN — ONDANSETRON 4 MG: 2 INJECTION INTRAMUSCULAR; INTRAVENOUS at 10:08

## 2022-08-27 RX ADMIN — CARBOXYMETHYLCELLULOSE SODIUM 1 DROP: 10 GEL OPHTHALMIC at 08:08

## 2022-08-27 RX ADMIN — SIMETHICONE 80 MG: 80 TABLET, CHEWABLE ORAL at 12:08

## 2022-08-27 RX ADMIN — OXYCODONE HYDROCHLORIDE 10 MG: 10 TABLET ORAL at 04:08

## 2022-08-27 RX ADMIN — CARBOXYMETHYLCELLULOSE SODIUM 1 DROP: 10 GEL OPHTHALMIC at 05:08

## 2022-08-27 RX ADMIN — SIMETHICONE 80 MG: 80 TABLET, CHEWABLE ORAL at 04:08

## 2022-08-27 RX ADMIN — TRAMADOL HYDROCHLORIDE 50 MG: 50 TABLET, COATED ORAL at 01:08

## 2022-08-27 RX ADMIN — HYDROMORPHONE HYDROCHLORIDE 0.2 MG: 1 INJECTION, SOLUTION INTRAMUSCULAR; INTRAVENOUS; SUBCUTANEOUS at 06:08

## 2022-08-27 RX ADMIN — ONDANSETRON 4 MG: 2 INJECTION INTRAMUSCULAR; INTRAVENOUS at 05:08

## 2022-08-27 RX ADMIN — TOBRAMYCIN 1 DROP: 3 SOLUTION/ DROPS OPHTHALMIC at 08:08

## 2022-08-27 RX ADMIN — FOLIC ACID 1 MG: 1 TABLET ORAL at 08:08

## 2022-08-27 RX ADMIN — GABAPENTIN 300 MG: 300 CAPSULE ORAL at 03:08

## 2022-08-27 NOTE — ASSESSMENT & PLAN NOTE
33 yo female with hx of Crohn's admitted with perirectal abscess. S/p EUA on 8/15 demonstrating deep anal fissures. Colonoscopy 8/18 demonstraetd erythema in distal colon and proximal rectum.Now s/p laparoscopic diverting ileostomy 8/23/22    - Diet: LRD  - Monitor bowel function and ostomy output  - Multimodal pain management: tylenol, gabapentin, robaxin, oxycodone, tramadol  - Antiemetics: zofran, scopolamine patch  - OOBTC/ ambulation encouraged  - Ostomy care for teachings  - Ophthalmology f/u    Dispo: Continue inpatient care until improved bowel function

## 2022-08-27 NOTE — PLAN OF CARE
Problem: Adult Inpatient Plan of Care  Goal: Plan of Care Review  Outcome: Ongoing, Progressing  Goal: Patient-Specific Goal (Individualized)  Outcome: Ongoing, Progressing  Goal: Absence of Hospital-Acquired Illness or Injury  Outcome: Ongoing, Progressing  Goal: Optimal Comfort and Wellbeing  Outcome: Ongoing, Progressing  Goal: Readiness for Transition of Care  Outcome: Ongoing, Progressing     Problem: Infection  Goal: Absence of Infection Signs and Symptoms  Outcome: Ongoing, Progressing     Problem: Diarrhea  Goal: Fluid and Electrolyte Balance  Outcome: Ongoing, Progressing     Problem: Fall Injury Risk  Goal: Absence of Fall and Fall-Related Injury  Outcome: Ongoing, Progressing     Problem: Pain Acute  Goal: Acceptable Pain Control and Functional Ability  Outcome: Ongoing, Progressing

## 2022-08-27 NOTE — NURSING
Patient called nurse to room, patient vomited 300 cc of green bile.  Colorectal team made rounds and ordered NGT placement, DC D 5 1/2 NS and administer LR at 75 cc/hr.  New PIV inserted per charge nurse, R AC 20 g.  Dilaudid 0.2 mg administered and patient resting. Zofran 4 mg administered for N/V.      Patient refused NGT of and ordered a smaller NGT from [facility].  Spoke to [facility] tech and will be tubing up NGT.    Patient is resting and open eyes and respond to calling of name. to calling of name.

## 2022-08-27 NOTE — PLAN OF CARE
Problem: Adult Inpatient Plan of Care  Goal: Plan of Care Review  Outcome: Ongoing, Progressing     Problem: Adult Inpatient Plan of Care  Goal: Patient-Specific Goal (Individualized)  Outcome: Ongoing, Progressing     Problem: Pain Acute  Goal: Acceptable Pain Control and Functional Ability  Outcome: Ongoing, Progressing     Patient AAO x 4, reports pain and medicated with Oxy 10 mg and tramadol 50 mg during shift.  Patient D 5 1/2 NS at 40 cc/hr.  Patient has adequate intake and still no output in illesotomy.   Patient evaluated per Dr. Evans at the bedside and KUB ordered. KUB completed and revealed gas patterns with borderline distended loops of bowel.   Ambulated in cummins and in room with SBA and patient did have a BM and expelled a lot of flatulence.  Lab collected as ordered.  PICC consult for PIV insertion. Patient refuses to PIV per nursing staff.  Heat packs applied, HOB elevated and in lowest position.   POC reviewed with patient and updates. Discharge is TBD.

## 2022-08-27 NOTE — NURSING
"Patient called nurse to room and noted emesis in the bed and on the patient.  Patient reported," my pain is worse , 10/10.  Dr. Evans on call for [place] and arrived to evaluate the patient at bedside.  Will order a KUB and make recommendations. Patient sitting up in chair at bedside, linen changed and patient medicated with prn Zofran. Will cont to monitor.  KUB ordered .      1136  KUB completed at bedside  "

## 2022-08-27 NOTE — NURSING
Xray tech arrived to complete xray of NGT placement.  Informed will call once NGT is placed. Call xray at [phone]

## 2022-08-27 NOTE — PROGRESS NOTES
[facility]  Colorectal Surgery  Progress Note    Patient Name: Nikkie Myles  MRN: [id number]  Admission Date: 8/11/2022  Hospital Length of Stay: 15 days  Attending Physician: Zuleyka Yip MD    Subjective:     Interval History: No acute overnight events. Currently on LRD tolerating well. Minimal ostomy output, no gas. Ambulating adequately. Pain is better this morning.    Post-Op Info:  Procedure(s) (LRB):  CREATION, ILEOSTOMY, LAPAROSCOPIC, BIOPSY PERIANAL FISTULA (N/A)   4 Days Post-Op      Medications:  Continuous Infusions:   dextrose 5 % and 0.45 % NaCl with KCl 20 mEq 40 mL/hr at 08/26/22 0934       Scheduled Meds:   acetaminophen  1,000 mg Oral TID    carboxymethylcellulose sodium  1 drop Ophthalmic QID    enoxaparin  40 mg Subcutaneous Daily    erythromycin   Right Eye TID    folic acid  1 mg Oral Daily    gabapentin  300 mg Oral TID    methocarbamol (ROBAXIN) IVPB  500 mg Intravenous Q8H    prednisoLONE acetate  1 drop Left Eye TID    scopolamine  1 patch Transdermal Q3 Days    tobramycin sulfate 0.3%  1 drop Right Eye QID     PRN Meds:   albuterol-ipratropium    aluminum-magnesium hydroxide-simethicone    dextrose 10%    dextrose 10%    glucagon (human recombinant)    glucose    glucose    melatonin    naloxone    ondansetron    oxyCODONE    oxyCODONE    simethicone    sodium chloride 0.9%    traMADoL        Objective:     Vital Signs (Most Recent):  Temp: 98.3 °F (36.8 °C) (08/27/22 0519)  Pulse: 81 (08/27/22 0519)  Resp: 18 (08/27/22 0519)  BP: (!) 96/56 (08/27/22 0519)  SpO2: 100 % (08/27/22 0519) Vital Signs (24h Range):  Temp:  [97.4 °F (36.3 °C)-99.1 °F (37.3 °C)] 98.3 °F (36.8 °C)  Pulse:  [77-95] 81  Resp:  [18] 18  SpO2:  [97 %-100 %] 100 %  BP: ()/(51-62) 96/56     Intake/Output - Last 3 Shifts         08/25 0700 08/26 0659 08/26 0700 08/27 0659 08/27 0700 08/28 0659    P.O.  1680     IV Piggyback  386.3     Total Intake(mL/kg)  2066.3 (26.8)     Urine (mL/kg/hr) 0 (0) 0  (0)     Stool 400 0     Total Output 400 0     Net -400 +2066.3            Urine Occurrence 1 x 7 x     Stool Occurrence 1 x              Physical Exam  Constitutional:       General: She is not in acute distress.     Appearance: She is well-developed.   HENT:      Head: Normocephalic and atraumatic.   Eyes:      General:         Right eye: No discharge.         Left eye: No discharge.      Conjunctiva/sclera: Conjunctivae normal.   Cardiovascular:      Rate and Rhythm: Normal rate and regular rhythm.   Pulmonary:      Effort: Pulmonary effort is normal. No respiratory distress.   Abdominal:      General: There is no distension.      Palpations: Abdomen is soft.      Tenderness: There is abdominal tenderness (incisional pain, appropriate post op).      Comments: Ostomy bag w/ minimal fecal output   Musculoskeletal:         General: No deformity. Normal range of motion.      Cervical back: Normal range of motion.   Skin:     General: Skin is warm and dry.   Neurological:      Mental Status: She is alert and oriented to person, place, and time.   Psychiatric:         Behavior: Behavior normal.       Anorectal Exam:  Moderately tender to palpation throughout  Multiple areas of induration  No visible drainage or bleeding seen       Significant Labs:  BMP (Last 3 Results):   Recent Labs   Lab 08/21/22  1035 08/23/22  1101 08/25/22  1040   * 75 97   * 133* 134*   K 4.9 4.4 3.9    101 104   CO2 28 28 25   BUN 12 11 9   CREATININE 0.7 0.7 0.6   CALCIUM 9.2 8.7 8.6*       CBC (Last 3 Results):   Recent Labs   Lab 08/21/22  1035 08/23/22  1100 08/25/22  1040   WBC 12.40 8.34 7.51   RBC 5.70* 5.06 4.78   HGB 9.8* 9.0* 8.5*   HCT 33.0* 29.8* 28.0*    300 273   MCV 58* 59* 59*   MCH 17.2* 17.8* 17.8*   MCHC 29.7* 30.2* 30.4*         Significant Diagnostics:  I have reviewed all pertinent imaging results/findings within the past 24 hours.    Assessment/Plan:     * Crohn's disease with abscess  33 yo  female with hx of Crohn's admitted with perirectal abscess. S/p EUA on 8/15 demonstrating deep anal fissures. Colonoscopy 8/18 demonstraetd erythema in distal colon and proximal rectum.Now s/p laparoscopic diverting ileostomy 8/23/22    - Diet: CLD and add boost breeze supplement  - Monitor bowel function and ostomy output  - Multimodal pain management: tylenol, gabapentin, robaxin, oxycodone, tramadol  - Antiemetics: zofran, scopolamine patch  - OOBTC/ ambulation encouraged  - Ostomy care for teachings  - Ophthalmology f/u    Dispo: Continue inpatient care until improved bowel function            Micheal Mejia MD  Colorectal Surgery  [facility]

## 2022-08-28 LAB
ANION GAP SERPL CALC-SCNC: 10 MMOL/L (ref 8–16)
BUN SERPL-MCNC: 5 MG/DL (ref 6–20)
CALCIUM SERPL-MCNC: 9.2 MG/DL (ref 8.7–10.5)
CHLORIDE SERPL-SCNC: 101 MMOL/L (ref 95–110)
CO2 SERPL-SCNC: 22 MMOL/L (ref 23–29)
CREAT SERPL-MCNC: 0.6 MG/DL (ref 0.5–1.4)
CRP SERPL-MCNC: 113.2 MG/L (ref 0–8.2)
ERYTHROCYTE [DISTWIDTH] IN BLOOD BY AUTOMATED COUNT: 25.8 % (ref 11.5–14.5)
EST. GFR  (NO RACE VARIABLE): >60 ML/MIN/1.73 M^2
GLUCOSE SERPL-MCNC: 77 MG/DL (ref 70–110)
HCT VFR BLD AUTO: 30.7 % (ref 37–48.5)
HGB BLD-MCNC: 9.5 G/DL (ref 12–16)
MAGNESIUM SERPL-MCNC: 1.6 MG/DL (ref 1.6–2.6)
MCH RBC QN AUTO: 18.2 PG (ref 27–31)
MCHC RBC AUTO-ENTMCNC: 30.9 G/DL (ref 32–36)
MCV RBC AUTO: 59 FL (ref 82–98)
PHOSPHATE SERPL-MCNC: 3.2 MG/DL (ref 2.7–4.5)
PLATELET # BLD AUTO: 293 K/UL (ref 150–450)
PMV BLD AUTO: ABNORMAL FL (ref 9.2–12.9)
POTASSIUM SERPL-SCNC: 4.3 MMOL/L (ref 3.5–5.1)
RBC # BLD AUTO: 5.21 M/UL (ref 4–5.4)
SODIUM SERPL-SCNC: 133 MMOL/L (ref 136–145)
WBC # BLD AUTO: 5.9 K/UL (ref 3.9–12.7)

## 2022-08-28 PROCEDURE — 25000003 PHARM REV CODE 250: Performed by: STUDENT IN AN ORGANIZED HEALTH CARE EDUCATION/TRAINING PROGRAM

## 2022-08-28 PROCEDURE — 83735 ASSAY OF MAGNESIUM: CPT | Performed by: STUDENT IN AN ORGANIZED HEALTH CARE EDUCATION/TRAINING PROGRAM

## 2022-08-28 PROCEDURE — 86140 C-REACTIVE PROTEIN: CPT | Performed by: STUDENT IN AN ORGANIZED HEALTH CARE EDUCATION/TRAINING PROGRAM

## 2022-08-28 PROCEDURE — 63600175 PHARM REV CODE 636 W HCPCS: Performed by: STUDENT IN AN ORGANIZED HEALTH CARE EDUCATION/TRAINING PROGRAM

## 2022-08-28 PROCEDURE — 25000003 PHARM REV CODE 250

## 2022-08-28 PROCEDURE — 63600175 PHARM REV CODE 636 W HCPCS

## 2022-08-28 PROCEDURE — 20600001 HC STEP DOWN PRIVATE ROOM

## 2022-08-28 PROCEDURE — 80048 BASIC METABOLIC PNL TOTAL CA: CPT | Performed by: STUDENT IN AN ORGANIZED HEALTH CARE EDUCATION/TRAINING PROGRAM

## 2022-08-28 PROCEDURE — 36415 COLL VENOUS BLD VENIPUNCTURE: CPT | Performed by: STUDENT IN AN ORGANIZED HEALTH CARE EDUCATION/TRAINING PROGRAM

## 2022-08-28 PROCEDURE — 84100 ASSAY OF PHOSPHORUS: CPT | Performed by: STUDENT IN AN ORGANIZED HEALTH CARE EDUCATION/TRAINING PROGRAM

## 2022-08-28 PROCEDURE — 85027 COMPLETE CBC AUTOMATED: CPT | Performed by: STUDENT IN AN ORGANIZED HEALTH CARE EDUCATION/TRAINING PROGRAM

## 2022-08-28 RX ORDER — HYDROMORPHONE HYDROCHLORIDE 1 MG/ML
0.2 INJECTION, SOLUTION INTRAMUSCULAR; INTRAVENOUS; SUBCUTANEOUS EVERY 6 HOURS PRN
Status: DISCONTINUED | OUTPATIENT
Start: 2022-08-28 | End: 2022-08-29

## 2022-08-28 RX ORDER — OXYCODONE HYDROCHLORIDE 5 MG/1
5 TABLET ORAL EVERY 6 HOURS PRN
Status: CANCELLED | OUTPATIENT
Start: 2022-08-28

## 2022-08-28 RX ORDER — HYDROMORPHONE HYDROCHLORIDE 1 MG/ML
0.5 INJECTION, SOLUTION INTRAMUSCULAR; INTRAVENOUS; SUBCUTANEOUS EVERY 6 HOURS PRN
Status: DISCONTINUED | OUTPATIENT
Start: 2022-08-28 | End: 2022-08-30

## 2022-08-28 RX ADMIN — SODIUM CHLORIDE, SODIUM LACTATE, POTASSIUM CHLORIDE, AND CALCIUM CHLORIDE: 600; 310; 30; 20 INJECTION, SOLUTION INTRAVENOUS at 05:08

## 2022-08-28 RX ADMIN — ENOXAPARIN SODIUM 40 MG: 100 INJECTION SUBCUTANEOUS at 05:08

## 2022-08-28 RX ADMIN — CARBOXYMETHYLCELLULOSE SODIUM 1 DROP: 10 GEL OPHTHALMIC at 04:08

## 2022-08-28 RX ADMIN — METHOCARBAMOL 500 MG: 100 INJECTION, SOLUTION INTRAMUSCULAR; INTRAVENOUS at 02:08

## 2022-08-28 RX ADMIN — HYDROMORPHONE HYDROCHLORIDE 0.5 MG: 1 INJECTION, SOLUTION INTRAMUSCULAR; INTRAVENOUS; SUBCUTANEOUS at 06:08

## 2022-08-28 RX ADMIN — PREDNISOLONE ACETATE 1 DROP: 10 SUSPENSION/ DROPS OPHTHALMIC at 09:08

## 2022-08-28 RX ADMIN — CARBOXYMETHYLCELLULOSE SODIUM 1 DROP: 10 GEL OPHTHALMIC at 01:08

## 2022-08-28 RX ADMIN — METHOCARBAMOL 500 MG: 100 INJECTION, SOLUTION INTRAMUSCULAR; INTRAVENOUS at 05:08

## 2022-08-28 RX ADMIN — TOBRAMYCIN 1 DROP: 3 SOLUTION/ DROPS OPHTHALMIC at 09:08

## 2022-08-28 RX ADMIN — CARBOXYMETHYLCELLULOSE SODIUM 1 DROP: 10 GEL OPHTHALMIC at 09:08

## 2022-08-28 RX ADMIN — METHOCARBAMOL 500 MG: 100 INJECTION, SOLUTION INTRAMUSCULAR; INTRAVENOUS at 09:08

## 2022-08-28 RX ADMIN — HYDROMORPHONE HYDROCHLORIDE 0.2 MG: 1 INJECTION, SOLUTION INTRAMUSCULAR; INTRAVENOUS; SUBCUTANEOUS at 09:08

## 2022-08-28 RX ADMIN — PROMETHAZINE HYDROCHLORIDE 12.5 MG: 25 INJECTION INTRAMUSCULAR; INTRAVENOUS at 08:08

## 2022-08-28 RX ADMIN — SODIUM CHLORIDE, SODIUM LACTATE, POTASSIUM CHLORIDE, AND CALCIUM CHLORIDE: 600; 310; 30; 20 INJECTION, SOLUTION INTRAVENOUS at 06:08

## 2022-08-28 RX ADMIN — HYDROMORPHONE HYDROCHLORIDE 0.5 MG: 1 INJECTION, SOLUTION INTRAMUSCULAR; INTRAVENOUS; SUBCUTANEOUS at 11:08

## 2022-08-28 RX ADMIN — OXYCODONE HYDROCHLORIDE 10 MG: 10 TABLET ORAL at 03:08

## 2022-08-28 RX ADMIN — TOBRAMYCIN 1 DROP: 3 SOLUTION/ DROPS OPHTHALMIC at 05:08

## 2022-08-28 NOTE — PLAN OF CARE
Nursing staff has attempted placing NG tube 5x (three different sizes), each time resulting in intense patient discomfort and an inability to successfully place the NG tube. Patient has not vomited/felt nauseated since 5:45pm. We will hold on further attempts at placement for now, with plans to re-adjudicate NG tube placement should the patient vomit or feel nauseated. Head of bed precautions >30 degrees placed. I have discussed this with the patient, who is eager to rest, and she is in agreement with our plan.     Demetrio Evans MD  PGY1

## 2022-08-28 NOTE — PROGRESS NOTES
[facility]  Colorectal Surgery  Progress Note    Patient Name: Nikkie Myles  MRN: [id number]  Admission Date: 8/11/2022  Hospital Length of Stay: 16 days  Attending Physician: Zuleyka Yip MD    Subjective:     Interval History: Yesterday patient had episode of vomiting and abdominal pain. NGT placement was attempted and was unsuccessful, today nausea had subsided so will hold on NGT tube. Minimal ostomy output, no gas. Pain is better this morning but still with discomfort.    Post-Op Info:  Procedure(s) (LRB):  CREATION, ILEOSTOMY, LAPAROSCOPIC, BIOPSY PERIANAL FISTULA (N/A)   5 Days Post-Op      Medications:  Continuous Infusions:   lactated ringers 75 mL/hr at 08/28/22 0511       Scheduled Meds:   acetaminophen  1,000 mg Oral TID    carboxymethylcellulose sodium  1 drop Ophthalmic QID    enoxaparin  40 mg Subcutaneous Daily    erythromycin   Right Eye TID    folic acid  1 mg Oral Daily    LIDOcaine HCl 2%   Mucous Membrane Once    methocarbamol (ROBAXIN) IVPB  500 mg Intravenous Q8H    prednisoLONE acetate  1 drop Left Eye TID    scopolamine  1 patch Transdermal Q3 Days    tobramycin sulfate 0.3%  1 drop Right Eye QID     PRN Meds:   albuterol-ipratropium    aluminum-magnesium hydroxide-simethicone    dextrose 10%    dextrose 10%    glucagon (human recombinant)    glucose    glucose    HYDROmorphone    HYDROmorphone    melatonin    naloxone    ondansetron    promethazine (PHENERGAN) IVPB    simethicone    sodium chloride 0.9%    traMADoL        Objective:     Vital Signs (Most Recent):  Temp: 98.4 °F (36.9 °C) (08/28/22 0746)  Pulse: 79 (08/28/22 0746)  Resp: 18 (08/28/22 0845)  BP: 121/78 (08/28/22 0746)  SpO2: 98 % (08/28/22 0746) Vital Signs (24h Range):  Temp:  [98.3 °F (36.8 °C)-99.7 °F (37.6 °C)] 98.4 °F (36.9 °C)  Pulse:  [79-89] 79  Resp:  [18-20] 18  SpO2:  [94 %-100 %] 98 %  BP: ()/(58-78) 121/78     Intake/Output - Last 3 Shifts         08/26 0700  08/27  0659 08/27 0700  08/28 0659 08/28 0700  08/29 0659    P.O. 1680 1080     IV Piggyback 386.3      Total Intake(mL/kg) 2066.3 (26.8) 1080 (14)     Urine (mL/kg/hr) 0 (0) 1900 (1)     Emesis/NG output  300     Stool 0 0     Total Output 0 2200     Net +2066.3 -1120            Urine Occurrence 7 x      Stool Occurrence  1 x     Emesis Occurrence  301 x             Physical Exam  Constitutional:       General: She is not in acute distress.     Appearance: She is well-developed.   HENT:      Head: Normocephalic and atraumatic.   Eyes:      General:         Right eye: No discharge.         Left eye: No discharge.      Conjunctiva/sclera: Conjunctivae normal.   Cardiovascular:      Rate and Rhythm: Normal rate and regular rhythm.   Pulmonary:      Effort: Pulmonary effort is normal. No respiratory distress.   Abdominal:      General: There is no distension.      Palpations: Abdomen is soft.      Tenderness: There is abdominal tenderness (incisional pain, appropriate post op).      Comments: Ostomy bag w/ minimal fecal output   Musculoskeletal:         General: No deformity. Normal range of motion.      Cervical back: Normal range of motion.   Skin:     General: Skin is warm and dry.   Neurological:      Mental Status: She is alert and oriented to person, place, and time.   Psychiatric:         Behavior: Behavior normal.       Anorectal Exam:  Moderately tender to palpation throughout  Multiple areas of induration  No visible drainage or bleeding seen       Significant Labs:  BMP (Last 3 Results):   Recent Labs   Lab 08/23/22  1101 08/25/22  1040 08/27/22  1353   GLU 75 97 84   * 134* 130*   K 4.4 3.9 4.1    104 100   CO2 28 25 22*   BUN 11 9 5*   CREATININE 0.7 0.6 0.6   CALCIUM 8.7 8.6* 9.3       CBC (Last 3 Results):   Recent Labs   Lab 08/23/22  1100 08/25/22  1040 08/27/22  1353   WBC 8.34 7.51 8.17   RBC 5.06 4.78 5.57*   HGB 9.0* 8.5* 10.0*   HCT 29.8* 28.0* 32.6*    273 362   MCV 59* 59* 59*   MCH  17.8* 17.8* 18.0*   MCHC 30.2* 30.4* 30.7*         Significant Diagnostics:  I have reviewed all pertinent imaging results/findings within the past 24 hours.    Assessment/Plan:     * Crohn's disease with abscess  31 yo female with hx of Crohn's admitted with perirectal abscess. S/p EUA on 8/15 demonstrating deep anal fissures. Colonoscopy 8/18 demonstraetd erythema in distal colon and proximal rectum.Now s/p laparoscopic diverting ileostomy 8/23/22    - Diet: strict NPO   - will attempt NGT placement if pt continues to vomit  - Monitor bowel function and ostomy output  - Multimodal pain management: will d/c oral pain meds and transition to IV with dilaudid  - Antiemetics: zofran, scopolamine patch  - OOBTC/ ambulation encouraged  - Ophthalmology f/u outpatient    Dispo: Continue inpatient care until improved bowel function            Micheal Mejia MD  Colorectal Surgery  [facility]

## 2022-08-28 NOTE — PLAN OF CARE
Problem: Adult Inpatient Plan of Care  Goal: Plan of Care Review  Outcome: Ongoing, Progressing     Problem: Adult Inpatient Plan of Care  Goal: Patient-Specific Goal (Individualized)  Outcome: Ongoing, Progressing     Problem: Fall Injury Risk  Goal: Absence of Fall and Fall-Related Injury  Outcome: Ongoing, Progressing     Problem: Pain Acute  Goal: Acceptable Pain Control and Functional Ability  Outcome: Ongoing, Progressing     Problem: Nausea and Vomiting  Goal: Fluid and Electrolyte Balance  Outcome: Ongoing, Progressing     Patient AAO x 3 and patient reported severe pain, rated pain a 10/10.  Patient evaluated per team and physician discussed that patient does not need a NGT. Patient did vomit once and medicated with prn phenergan IVPB with relief noted.  Medicated with dilaudid 0.5 ml q 6 prn with effectiveness.  LR @ 75 cc/hr.  Patient lying on couch in room and slept well.  Patient is NPO except for meds with sips of water.  Patient po pain meds  and boost dc'd.  CRP: 113.2.   POC reviewed with patient and family here for a visit.  Discharge date TBD.

## 2022-08-28 NOTE — ASSESSMENT & PLAN NOTE
31 yo female with hx of Crohn's admitted with perirectal abscess. S/p EUA on 8/15 demonstrating deep anal fissures. Colonoscopy 8/18 demonstraetd erythema in distal colon and proximal rectum.Now s/p laparoscopic diverting ileostomy 8/23/22    - Diet: strict NPO   - will attempt NGT placement if pt continues to vomit  - Monitor bowel function and ostomy output  - Multimodal pain management: will d/c oral pain meds and transition to IV with dilaudid  - Antiemetics: zofran, scopolamine patch  - OOBTC/ ambulation encouraged  - Ophthalmology f/u outpatient    Dispo: Continue inpatient care until improved bowel function

## 2022-08-28 NOTE — SUBJECTIVE & OBJECTIVE
Subjective:     Interval History: Yesterday patient had episode of vomiting and abdominal pain. NGT placement was attempted and was unsuccessful, today nausea had subsided so will hold on NGT tube. Minimal ostomy output, no gas. Pain is better this morning but still with discomfort.    Post-Op Info:  Procedure(s) (LRB):  CREATION, ILEOSTOMY, LAPAROSCOPIC, BIOPSY PERIANAL FISTULA (N/A)   5 Days Post-Op      Medications:  Continuous Infusions:   lactated ringers 75 mL/hr at 08/28/22 0511       Scheduled Meds:   acetaminophen  1,000 mg Oral TID    carboxymethylcellulose sodium  1 drop Ophthalmic QID    enoxaparin  40 mg Subcutaneous Daily    erythromycin   Right Eye TID    folic acid  1 mg Oral Daily    LIDOcaine HCl 2%   Mucous Membrane Once    methocarbamol (ROBAXIN) IVPB  500 mg Intravenous Q8H    prednisoLONE acetate  1 drop Left Eye TID    scopolamine  1 patch Transdermal Q3 Days    tobramycin sulfate 0.3%  1 drop Right Eye QID     PRN Meds:   albuterol-ipratropium    aluminum-magnesium hydroxide-simethicone    dextrose 10%    dextrose 10%    glucagon (human recombinant)    glucose    glucose    HYDROmorphone    HYDROmorphone    melatonin    naloxone    ondansetron    promethazine (PHENERGAN) IVPB    simethicone    sodium chloride 0.9%    traMADoL        Objective:     Vital Signs (Most Recent):  Temp: 98.4 °F (36.9 °C) (08/28/22 0746)  Pulse: 79 (08/28/22 0746)  Resp: 18 (08/28/22 0845)  BP: 121/78 (08/28/22 0746)  SpO2: 98 % (08/28/22 0746) Vital Signs (24h Range):  Temp:  [98.3 °F (36.8 °C)-99.7 °F (37.6 °C)] 98.4 °F (36.9 °C)  Pulse:  [79-89] 79  Resp:  [18-20] 18  SpO2:  [94 %-100 %] 98 %  BP: ()/(58-78) 121/78     Intake/Output - Last 3 Shifts         08/26 0700  08/27 0659 08/27 0700  08/28 0659 08/28 0700 08/29 0659    P.O. 1680 1080     IV Piggyback 386.3      Total Intake(mL/kg) 2066.3 (26.8) 1080 (14)     Urine (mL/kg/hr) 0 (0) 1900 (1)     Emesis/NG output  300     Stool 0 0     Total Output 0  2200     Net +2066.3 -1120            Urine Occurrence 7 x      Stool Occurrence  1 x     Emesis Occurrence  301 x             Physical Exam  Constitutional:       General: She is not in acute distress.     Appearance: She is well-developed.   HENT:      Head: Normocephalic and atraumatic.   Eyes:      General:         Right eye: No discharge.         Left eye: No discharge.      Conjunctiva/sclera: Conjunctivae normal.   Cardiovascular:      Rate and Rhythm: Normal rate and regular rhythm.   Pulmonary:      Effort: Pulmonary effort is normal. No respiratory distress.   Abdominal:      General: There is no distension.      Palpations: Abdomen is soft.      Tenderness: There is abdominal tenderness (incisional pain, appropriate post op).      Comments: Ostomy bag w/ minimal fecal output   Musculoskeletal:         General: No deformity. Normal range of motion.      Cervical back: Normal range of motion.   Skin:     General: Skin is warm and dry.   Neurological:      Mental Status: She is alert and oriented to person, place, and time.   Psychiatric:         Behavior: Behavior normal.       Anorectal Exam:  Moderately tender to palpation throughout  Multiple areas of induration  No visible drainage or bleeding seen       Significant Labs:  BMP (Last 3 Results):   Recent Labs   Lab 08/23/22  1101 08/25/22  1040 08/27/22  1353   GLU 75 97 84   * 134* 130*   K 4.4 3.9 4.1    104 100   CO2 28 25 22*   BUN 11 9 5*   CREATININE 0.7 0.6 0.6   CALCIUM 8.7 8.6* 9.3       CBC (Last 3 Results):   Recent Labs   Lab 08/23/22  1100 08/25/22  1040 08/27/22  1353   WBC 8.34 7.51 8.17   RBC 5.06 4.78 5.57*   HGB 9.0* 8.5* 10.0*   HCT 29.8* 28.0* 32.6*    273 362   MCV 59* 59* 59*   MCH 17.8* 17.8* 18.0*   MCHC 30.2* 30.4* 30.7*         Significant Diagnostics:  I have reviewed all pertinent imaging results/findings within the past 24 hours.

## 2022-08-28 NOTE — NURSING
"Attempted NGT 16 fr for nasogastric placement and patient reported "discomfort."  And any attempts to insert NGT ceased.  Another staff nurse present for NGT insertion.   Ordered a 14 Fr for  placement.  Reported and attempt to assigned nurse and reported to charge nurse.  Patient and family made aware.    "

## 2022-08-28 NOTE — NURSING
Spoke to lab regarding collecting the patents lab. Lab reported that patient refused have labs to be collected.  Lab will complete the task,  Patient made aware.

## 2022-08-29 LAB
ALBUMIN SERPL BCP-MCNC: 2.6 G/DL (ref 3.5–5.2)
ALP SERPL-CCNC: 57 U/L (ref 55–135)
ALT SERPL W/O P-5'-P-CCNC: 10 U/L (ref 10–44)
ANION GAP SERPL CALC-SCNC: 12 MMOL/L (ref 8–16)
AST SERPL-CCNC: 12 U/L (ref 10–40)
BASOPHILS # BLD AUTO: 0.01 K/UL (ref 0–0.2)
BASOPHILS NFR BLD: 0.2 % (ref 0–1.9)
BILIRUB SERPL-MCNC: 0.3 MG/DL (ref 0.1–1)
BUN SERPL-MCNC: 4 MG/DL (ref 6–20)
CALCIUM SERPL-MCNC: 8.9 MG/DL (ref 8.7–10.5)
CHLORIDE SERPL-SCNC: 101 MMOL/L (ref 95–110)
CO2 SERPL-SCNC: 21 MMOL/L (ref 23–29)
CREAT SERPL-MCNC: 0.6 MG/DL (ref 0.5–1.4)
CRP SERPL-MCNC: 101.3 MG/L (ref 0–8.2)
DIFFERENTIAL METHOD: ABNORMAL
EOSINOPHIL # BLD AUTO: 0.1 K/UL (ref 0–0.5)
EOSINOPHIL NFR BLD: 1.1 % (ref 0–8)
ERYTHROCYTE [DISTWIDTH] IN BLOOD BY AUTOMATED COUNT: 25.4 % (ref 11.5–14.5)
EST. GFR  (NO RACE VARIABLE): >60 ML/MIN/1.73 M^2
FINAL PATHOLOGIC DIAGNOSIS: NORMAL
GLUCOSE SERPL-MCNC: 61 MG/DL (ref 70–110)
GROSS: NORMAL
HCT VFR BLD AUTO: 27.8 % (ref 37–48.5)
HGB BLD-MCNC: 8.6 G/DL (ref 12–16)
IMM GRANULOCYTES # BLD AUTO: 0.02 K/UL (ref 0–0.04)
IMM GRANULOCYTES NFR BLD AUTO: 0.4 % (ref 0–0.5)
LYMPHOCYTES # BLD AUTO: 1.5 K/UL (ref 1–4.8)
LYMPHOCYTES NFR BLD: 27.8 % (ref 18–48)
Lab: NORMAL
MCH RBC QN AUTO: 17.7 PG (ref 27–31)
MCHC RBC AUTO-ENTMCNC: 30.9 G/DL (ref 32–36)
MCV RBC AUTO: 57 FL (ref 82–98)
MONOCYTES # BLD AUTO: 0.6 K/UL (ref 0.3–1)
MONOCYTES NFR BLD: 11.2 % (ref 4–15)
NEUTROPHILS # BLD AUTO: 3.3 K/UL (ref 1.8–7.7)
NEUTROPHILS NFR BLD: 59.3 % (ref 38–73)
NRBC BLD-RTO: 0 /100 WBC
PLATELET # BLD AUTO: 281 K/UL (ref 150–450)
PMV BLD AUTO: ABNORMAL FL (ref 9.2–12.9)
POTASSIUM SERPL-SCNC: 3.9 MMOL/L (ref 3.5–5.1)
PROT SERPL-MCNC: 7.3 G/DL (ref 6–8.4)
RBC # BLD AUTO: 4.85 M/UL (ref 4–5.4)
SODIUM SERPL-SCNC: 134 MMOL/L (ref 136–145)
SUPPLEMENTAL DIAGNOSIS: NORMAL
WBC # BLD AUTO: 5.54 K/UL (ref 3.9–12.7)

## 2022-08-29 PROCEDURE — 36415 COLL VENOUS BLD VENIPUNCTURE: CPT | Performed by: STUDENT IN AN ORGANIZED HEALTH CARE EDUCATION/TRAINING PROGRAM

## 2022-08-29 PROCEDURE — 85025 COMPLETE CBC W/AUTO DIFF WBC: CPT | Performed by: STUDENT IN AN ORGANIZED HEALTH CARE EDUCATION/TRAINING PROGRAM

## 2022-08-29 PROCEDURE — 63600175 PHARM REV CODE 636 W HCPCS: Performed by: STUDENT IN AN ORGANIZED HEALTH CARE EDUCATION/TRAINING PROGRAM

## 2022-08-29 PROCEDURE — 80053 COMPREHEN METABOLIC PANEL: CPT | Performed by: STUDENT IN AN ORGANIZED HEALTH CARE EDUCATION/TRAINING PROGRAM

## 2022-08-29 PROCEDURE — 25000003 PHARM REV CODE 250: Performed by: STUDENT IN AN ORGANIZED HEALTH CARE EDUCATION/TRAINING PROGRAM

## 2022-08-29 PROCEDURE — 86140 C-REACTIVE PROTEIN: CPT | Performed by: STUDENT IN AN ORGANIZED HEALTH CARE EDUCATION/TRAINING PROGRAM

## 2022-08-29 PROCEDURE — 25500020 PHARM REV CODE 255: Performed by: SURGERY

## 2022-08-29 PROCEDURE — 20600001 HC STEP DOWN PRIVATE ROOM

## 2022-08-29 PROCEDURE — 25500020 PHARM REV CODE 255: Performed by: COLON & RECTAL SURGERY

## 2022-08-29 RX ORDER — HYDROMORPHONE HYDROCHLORIDE 1 MG/ML
0.2 INJECTION, SOLUTION INTRAMUSCULAR; INTRAVENOUS; SUBCUTANEOUS
Status: DISCONTINUED | OUTPATIENT
Start: 2022-08-29 | End: 2022-08-30

## 2022-08-29 RX ADMIN — IOHEXOL 15 ML: 350 INJECTION, SOLUTION INTRAVENOUS at 02:08

## 2022-08-29 RX ADMIN — HYDROMORPHONE HYDROCHLORIDE 0.5 MG: 1 INJECTION, SOLUTION INTRAMUSCULAR; INTRAVENOUS; SUBCUTANEOUS at 09:08

## 2022-08-29 RX ADMIN — IOHEXOL 75 ML: 350 INJECTION, SOLUTION INTRAVENOUS at 04:08

## 2022-08-29 RX ADMIN — SODIUM CHLORIDE, SODIUM LACTATE, POTASSIUM CHLORIDE, AND CALCIUM CHLORIDE: 600; 310; 30; 20 INJECTION, SOLUTION INTRAVENOUS at 08:08

## 2022-08-29 RX ADMIN — TOBRAMYCIN 1 DROP: 3 SOLUTION/ DROPS OPHTHALMIC at 05:08

## 2022-08-29 RX ADMIN — TOBRAMYCIN 1 DROP: 3 SOLUTION/ DROPS OPHTHALMIC at 01:08

## 2022-08-29 RX ADMIN — TOBRAMYCIN 1 DROP: 3 SOLUTION/ DROPS OPHTHALMIC at 08:08

## 2022-08-29 RX ADMIN — PREDNISOLONE ACETATE 1 DROP: 10 SUSPENSION/ DROPS OPHTHALMIC at 09:08

## 2022-08-29 RX ADMIN — ENOXAPARIN SODIUM 40 MG: 100 INJECTION SUBCUTANEOUS at 05:08

## 2022-08-29 RX ADMIN — CARBOXYMETHYLCELLULOSE SODIUM 1 DROP: 10 GEL OPHTHALMIC at 05:08

## 2022-08-29 RX ADMIN — HYDROMORPHONE HYDROCHLORIDE 0.5 MG: 1 INJECTION, SOLUTION INTRAMUSCULAR; INTRAVENOUS; SUBCUTANEOUS at 08:08

## 2022-08-29 RX ADMIN — METHOCARBAMOL 500 MG: 100 INJECTION, SOLUTION INTRAMUSCULAR; INTRAVENOUS at 01:08

## 2022-08-29 RX ADMIN — SODIUM CHLORIDE, SODIUM LACTATE, POTASSIUM CHLORIDE, AND CALCIUM CHLORIDE: 600; 310; 30; 20 INJECTION, SOLUTION INTRAVENOUS at 05:08

## 2022-08-29 RX ADMIN — PREDNISOLONE ACETATE 1 DROP: 10 SUSPENSION/ DROPS OPHTHALMIC at 03:08

## 2022-08-29 RX ADMIN — HYDROMORPHONE HYDROCHLORIDE 0.5 MG: 1 INJECTION, SOLUTION INTRAMUSCULAR; INTRAVENOUS; SUBCUTANEOUS at 01:08

## 2022-08-29 RX ADMIN — SCOPALAMINE 1 PATCH: 1 PATCH, EXTENDED RELEASE TRANSDERMAL at 09:08

## 2022-08-29 RX ADMIN — CARBOXYMETHYLCELLULOSE SODIUM 1 DROP: 10 GEL OPHTHALMIC at 09:08

## 2022-08-29 RX ADMIN — HYDROMORPHONE HYDROCHLORIDE 0.2 MG: 1 INJECTION, SOLUTION INTRAMUSCULAR; INTRAVENOUS; SUBCUTANEOUS at 11:08

## 2022-08-29 RX ADMIN — TOBRAMYCIN 1 DROP: 3 SOLUTION/ DROPS OPHTHALMIC at 09:08

## 2022-08-29 RX ADMIN — HYDROMORPHONE HYDROCHLORIDE 0.2 MG: 1 INJECTION, SOLUTION INTRAMUSCULAR; INTRAVENOUS; SUBCUTANEOUS at 05:08

## 2022-08-29 RX ADMIN — PREDNISOLONE ACETATE 1 DROP: 10 SUSPENSION/ DROPS OPHTHALMIC at 08:08

## 2022-08-29 RX ADMIN — METHOCARBAMOL 500 MG: 100 INJECTION, SOLUTION INTRAMUSCULAR; INTRAVENOUS at 10:08

## 2022-08-29 RX ADMIN — HYDROMORPHONE HYDROCHLORIDE 0.2 MG: 1 INJECTION, SOLUTION INTRAMUSCULAR; INTRAVENOUS; SUBCUTANEOUS at 01:08

## 2022-08-29 RX ADMIN — METHOCARBAMOL 500 MG: 100 INJECTION, SOLUTION INTRAMUSCULAR; INTRAVENOUS at 05:08

## 2022-08-29 RX ADMIN — IOHEXOL 15 ML: 350 INJECTION, SOLUTION INTRAVENOUS at 01:08

## 2022-08-29 RX ADMIN — CARBOXYMETHYLCELLULOSE SODIUM 1 DROP: 10 GEL OPHTHALMIC at 08:08

## 2022-08-29 RX ADMIN — HYDROMORPHONE HYDROCHLORIDE 0.5 MG: 1 INJECTION, SOLUTION INTRAMUSCULAR; INTRAVENOUS; SUBCUTANEOUS at 03:08

## 2022-08-29 NOTE — PLAN OF CARE
Problem: Adult Inpatient Plan of Care  Goal: Plan of Care Review  Outcome: Ongoing, Progressing  Goal: Patient-Specific Goal (Individualized)  Outcome: Ongoing, Progressing  Goal: Absence of Hospital-Acquired Illness or Injury  Outcome: Ongoing, Progressing  Goal: Optimal Comfort and Wellbeing  Outcome: Ongoing, Progressing  Goal: Readiness for Transition of Care  Outcome: Ongoing, Progressing     Problem: Infection  Goal: Absence of Infection Signs and Symptoms  Outcome: Ongoing, Progressing     Problem: Diarrhea  Goal: Fluid and Electrolyte Balance  Outcome: Ongoing, Progressing     Problem: Fall Injury Risk  Goal: Absence of Fall and Fall-Related Injury  Outcome: Ongoing, Progressing     Problem: Pain Acute  Goal: Acceptable Pain Control and Functional Ability  Outcome: Ongoing, Progressing     Problem: Electrolyte Imbalance  Goal: Electrolyte Balance  Outcome: Ongoing, Progressing     Problem: Nausea and Vomiting  Goal: Fluid and Electrolyte Balance  Outcome: Ongoing, Progressing

## 2022-08-29 NOTE — ASSESSMENT & PLAN NOTE
31 yo female with hx of Crohn's admitted with perirectal abscess. S/p EUA on 8/15 demonstrating deep anal fissures. Colonoscopy 8/18 demonstraetd erythema in distal colon and proximal rectum.Now s/p laparoscopic diverting ileostomy 8/23/22    - Diet: NPO  - Monitor bowel function and ostomy output  - Multimodal pain management: IV with dilaudid  - Antiemetics: zofran, scopolamine patch  - OOBTC/ ambulation encouraged  - Ophthalmology f/u outpatient    Dispo: Continue inpatient care until improved bowel function

## 2022-08-29 NOTE — PLAN OF CARE
Patient A&Ox4, all VSS, no family at bedside.  Patient requesting PRN pain medication.  Up independently to the bathroom.  LR @ 75/hr, IV robaxin.  Small amount of output to ostomy.  Voids spontaneously with no issue.  Two small open areas on bottom of R buttock noted with brownish, foul smelling discharge.  MD is aware.  CT pelvis/abdomen this PM, PO omnipaque given x2. Explained plan of care to patient who verbalized understanding.  Will continue to monitor.    Problem: Adult Inpatient Plan of Care  Goal: Plan of Care Review  Outcome: Ongoing, Progressing  Goal: Patient-Specific Goal (Individualized)  Outcome: Ongoing, Progressing  Goal: Absence of Hospital-Acquired Illness or Injury  Outcome: Ongoing, Progressing  Goal: Optimal Comfort and Wellbeing  Outcome: Ongoing, Progressing  Goal: Readiness for Transition of Care  Outcome: Ongoing, Progressing     Problem: Infection  Goal: Absence of Infection Signs and Symptoms  Outcome: Ongoing, Progressing     Problem: Diarrhea  Goal: Fluid and Electrolyte Balance  Outcome: Ongoing, Progressing     Problem: Fall Injury Risk  Goal: Absence of Fall and Fall-Related Injury  Outcome: Ongoing, Progressing     Problem: Pain Acute  Goal: Acceptable Pain Control and Functional Ability  Outcome: Ongoing, Progressing     Problem: Electrolyte Imbalance  Goal: Electrolyte Balance  Outcome: Ongoing, Progressing     Problem: Nausea and Vomiting  Goal: Fluid and Electrolyte Balance  Outcome: Ongoing, Progressing

## 2022-08-29 NOTE — PLAN OF CARE
[facility] - [facility]  Discharge Reassessment    Primary Care Provider: Brian Collado MD    Expected Discharge Date: 8/31/2022    Reassessment (most recent)       Discharge Reassessment - 08/29/22 1343          Discharge Reassessment    Assessment Type Discharge Planning Reassessment     Did the patient's condition or plan change since previous assessment? Yes     Discharge Plan discussed with: Patient     Communicated ENDY with patient/caregiver Yes     Discharge Plan A Home with family     Discharge Plan B Home     DME Needed Upon Discharge  --   Unknown at this time    Discharge Barriers Identified None        Post-Acute Status    Hospital Resources/Appts/Education Provided Provided patient/caregiver with written discharge plan information     Discharge Delays None known at this time                     Pt not ready for discharge due to: Not being medically ready  SW will remain available for families in [facility].  Currently pt has d/c plans in progress at this time.

## 2022-08-29 NOTE — NURSING
Patient given first dose of omnipaque at 1309.  CT advised.  Second dose to be given at 1409.  CT advises will send for patient for scan at 1509.

## 2022-08-29 NOTE — PROGRESS NOTES
[facility]  Colorectal Surgery  Progress Note    Patient Name: Nikkie Myles  MRN: [id number]  Admission Date: 8/11/2022  Hospital Length of Stay: 17 days  Attending Physician: Zuleyka Yip MD    Subjective:     Interval History: Yesterday patient had episode of vomiting but refused NGT. With antiemetic nausea resolved. Patient had some c/o of abdominal pain which resolved with current regimen. Minimal ostomy output about 100cc this morning.    Post-Op Info:  Procedure(s) (LRB):  CREATION, ILEOSTOMY, LAPAROSCOPIC, BIOPSY PERIANAL FISTULA (N/A)   6 Days Post-Op      Medications:  Continuous Infusions:   lactated ringers 75 mL/hr at 08/29/22 0528       Scheduled Meds:   acetaminophen  1,000 mg Oral TID    carboxymethylcellulose sodium  1 drop Ophthalmic QID    enoxaparin  40 mg Subcutaneous Daily    erythromycin   Right Eye TID    folic acid  1 mg Oral Daily    LIDOcaine HCl 2%   Mucous Membrane Once    methocarbamol (ROBAXIN) IVPB  500 mg Intravenous Q8H    prednisoLONE acetate  1 drop Left Eye TID    scopolamine  1 patch Transdermal Q3 Days    tobramycin sulfate 0.3%  1 drop Right Eye QID     PRN Meds:   albuterol-ipratropium    aluminum-magnesium hydroxide-simethicone    dextrose 10%    dextrose 10%    glucagon (human recombinant)    glucose    glucose    HYDROmorphone    HYDROmorphone    melatonin    naloxone    ondansetron    promethazine (PHENERGAN) IVPB    simethicone    sodium chloride 0.9%    traMADoL        Objective:     Vital Signs (Most Recent):  Temp: 98.4 °F (36.9 °C) (08/29/22 0456)  Pulse: 82 (08/29/22 0456)  Resp: 18 (08/29/22 0536)  BP: (!) 102/59 (08/29/22 0456)  SpO2: 99 % (08/29/22 0456) Vital Signs (24h Range):  Temp:  [97.8 °F (36.6 °C)-99.1 °F (37.3 °C)] 98.4 °F (36.9 °C)  Pulse:  [79-91] 82  Resp:  [17-20] 18  SpO2:  [98 %-100 %] 99 %  BP: ()/(54-78) 102/59     Intake/Output - Last 3 Shifts         08/27 0700 08/28 0659 08/28 0700 08/29 0659     P.O. 1080 120    Total Intake(mL/kg) 1080 (14) 120 (1.6)    Urine (mL/kg/hr) 1900 (1) 2050 (1.1)    Emesis/NG output 300     Stool 0 30    Total Output 2200 2080    Net -1120 -1960          Stool Occurrence 1 x 1 x    Emesis Occurrence 301 x             Physical Exam  Constitutional:       General: She is not in acute distress.     Appearance: She is well-developed.   HENT:      Head: Normocephalic and atraumatic.   Eyes:      General:         Right eye: No discharge.         Left eye: No discharge.      Conjunctiva/sclera: Conjunctivae normal.   Cardiovascular:      Rate and Rhythm: Normal rate and regular rhythm.   Pulmonary:      Effort: Pulmonary effort is normal. No respiratory distress.   Abdominal:      General: There is no distension.      Palpations: Abdomen is soft.      Tenderness: There is abdominal tenderness (incisional pain, appropriate post op).      Comments: Ostomy bag w/ minimal fecal output   Musculoskeletal:         General: No deformity. Normal range of motion.      Cervical back: Normal range of motion.   Skin:     General: Skin is warm and dry.   Neurological:      Mental Status: She is alert and oriented to person, place, and time.   Psychiatric:         Behavior: Behavior normal.       Anorectal Exam:  Moderately tender to palpation throughout  Multiple areas of induration  No visible drainage or bleeding seen       Significant Labs:  BMP (Last 3 Results):   Recent Labs   Lab 08/25/22  1040 08/27/22  1353 08/28/22  1113   GLU 97 84 77   * 130* 133*   K 3.9 4.1 4.3    100 101   CO2 25 22* 22*   BUN 9 5* 5*   CREATININE 0.6 0.6 0.6   CALCIUM 8.6* 9.3 9.2   MG  --   --  1.6       CBC (Last 3 Results):   Recent Labs   Lab 08/25/22  1040 08/27/22  1353 08/28/22  1113   WBC 7.51 8.17 5.90   RBC 4.78 5.57* 5.21   HGB 8.5* 10.0* 9.5*   HCT 28.0* 32.6* 30.7*    362 293   MCV 59* 59* 59*   MCH 17.8* 18.0* 18.2*   MCHC 30.4* 30.7* 30.9*         Significant Diagnostics:  I have  reviewed all pertinent imaging results/findings within the past 24 hours.    Assessment/Plan:     * Crohn's disease with abscess  31 yo female with hx of Crohn's admitted with perirectal abscess. S/p EUA on 8/15 demonstrating deep anal fissures. Colonoscopy 8/18 demonstraetd erythema in distal colon and proximal rectum.Now s/p laparoscopic diverting ileostomy 8/23/22    - Diet: NPO  - Monitor bowel function and ostomy output  - Multimodal pain management: IV with dilaudid  - Antiemetics: zofran, scopolamine patch  - OOBTC/ ambulation encouraged  - Ophthalmology f/u outpatient    Dispo: Continue inpatient care until improved bowel function            Micheal Mejia MD  Colorectal Surgery  [facility]

## 2022-08-29 NOTE — SUBJECTIVE & OBJECTIVE
Subjective:     Interval History: Yesterday patient had episode of vomiting but refused NGT. With antiemetic nausea resolved. Patient had some c/o of abdominal pain which resolved with current regimen. Minimal ostomy output about 100cc this morning.    Post-Op Info:  Procedure(s) (LRB):  CREATION, ILEOSTOMY, LAPAROSCOPIC, BIOPSY PERIANAL FISTULA (N/A)   6 Days Post-Op      Medications:  Continuous Infusions:   lactated ringers 75 mL/hr at 08/29/22 0528       Scheduled Meds:   acetaminophen  1,000 mg Oral TID    carboxymethylcellulose sodium  1 drop Ophthalmic QID    enoxaparin  40 mg Subcutaneous Daily    erythromycin   Right Eye TID    folic acid  1 mg Oral Daily    LIDOcaine HCl 2%   Mucous Membrane Once    methocarbamol (ROBAXIN) IVPB  500 mg Intravenous Q8H    prednisoLONE acetate  1 drop Left Eye TID    scopolamine  1 patch Transdermal Q3 Days    tobramycin sulfate 0.3%  1 drop Right Eye QID     PRN Meds:   albuterol-ipratropium    aluminum-magnesium hydroxide-simethicone    dextrose 10%    dextrose 10%    glucagon (human recombinant)    glucose    glucose    HYDROmorphone    HYDROmorphone    melatonin    naloxone    ondansetron    promethazine (PHENERGAN) IVPB    simethicone    sodium chloride 0.9%    traMADoL        Objective:     Vital Signs (Most Recent):  Temp: 98.4 °F (36.9 °C) (08/29/22 0456)  Pulse: 82 (08/29/22 0456)  Resp: 18 (08/29/22 0536)  BP: (!) 102/59 (08/29/22 0456)  SpO2: 99 % (08/29/22 0456) Vital Signs (24h Range):  Temp:  [97.8 °F (36.6 °C)-99.1 °F (37.3 °C)] 98.4 °F (36.9 °C)  Pulse:  [79-91] 82  Resp:  [17-20] 18  SpO2:  [98 %-100 %] 99 %  BP: ()/(54-78) 102/59     Intake/Output - Last 3 Shifts         08/27 0700  08/28 0659 08/28 0700  08/29 0659    P.O. 1080 120    Total Intake(mL/kg) 1080 (14) 120 (1.6)    Urine (mL/kg/hr) 1900 (1) 2050 (1.1)    Emesis/NG output 300     Stool 0 30    Total Output 2200 2080    Net -1120 -1960          Stool Occurrence 1 x 1 x    Emesis  Occurrence 301 x             Physical Exam  Constitutional:       General: She is not in acute distress.     Appearance: She is well-developed.   HENT:      Head: Normocephalic and atraumatic.   Eyes:      General:         Right eye: No discharge.         Left eye: No discharge.      Conjunctiva/sclera: Conjunctivae normal.   Cardiovascular:      Rate and Rhythm: Normal rate and regular rhythm.   Pulmonary:      Effort: Pulmonary effort is normal. No respiratory distress.   Abdominal:      General: There is no distension.      Palpations: Abdomen is soft.      Tenderness: There is abdominal tenderness (incisional pain, appropriate post op).      Comments: Ostomy bag w/ minimal fecal output   Musculoskeletal:         General: No deformity. Normal range of motion.      Cervical back: Normal range of motion.   Skin:     General: Skin is warm and dry.   Neurological:      Mental Status: She is alert and oriented to person, place, and time.   Psychiatric:         Behavior: Behavior normal.       Anorectal Exam:  Moderately tender to palpation throughout  Multiple areas of induration  No visible drainage or bleeding seen       Significant Labs:  BMP (Last 3 Results):   Recent Labs   Lab 08/25/22  1040 08/27/22  1353 08/28/22  1113   GLU 97 84 77   * 130* 133*   K 3.9 4.1 4.3    100 101   CO2 25 22* 22*   BUN 9 5* 5*   CREATININE 0.6 0.6 0.6   CALCIUM 8.6* 9.3 9.2   MG  --   --  1.6       CBC (Last 3 Results):   Recent Labs   Lab 08/25/22  1040 08/27/22  1353 08/28/22  1113   WBC 7.51 8.17 5.90   RBC 4.78 5.57* 5.21   HGB 8.5* 10.0* 9.5*   HCT 28.0* 32.6* 30.7*    362 293   MCV 59* 59* 59*   MCH 17.8* 18.0* 18.2*   MCHC 30.4* 30.7* 30.9*         Significant Diagnostics:  I have reviewed all pertinent imaging results/findings within the past 24 hours.   <-------- Click here to INCLUDE CoVID-19 Discharge Instructions

## 2022-08-30 PROCEDURE — 20600001 HC STEP DOWN PRIVATE ROOM

## 2022-08-30 PROCEDURE — 25000003 PHARM REV CODE 250: Performed by: STUDENT IN AN ORGANIZED HEALTH CARE EDUCATION/TRAINING PROGRAM

## 2022-08-30 PROCEDURE — 63600175 PHARM REV CODE 636 W HCPCS: Performed by: STUDENT IN AN ORGANIZED HEALTH CARE EDUCATION/TRAINING PROGRAM

## 2022-08-30 PROCEDURE — 63600175 PHARM REV CODE 636 W HCPCS

## 2022-08-30 PROCEDURE — 25000003 PHARM REV CODE 250

## 2022-08-30 RX ORDER — HYDROMORPHONE HYDROCHLORIDE 1 MG/ML
0.5 INJECTION, SOLUTION INTRAMUSCULAR; INTRAVENOUS; SUBCUTANEOUS EVERY 4 HOURS PRN
Status: DISCONTINUED | OUTPATIENT
Start: 2022-08-30 | End: 2022-09-01

## 2022-08-30 RX ORDER — OXYCODONE HYDROCHLORIDE 5 MG/1
5 TABLET ORAL EVERY 6 HOURS PRN
Status: DISCONTINUED | OUTPATIENT
Start: 2022-08-30 | End: 2022-08-30

## 2022-08-30 RX ORDER — OXYCODONE HYDROCHLORIDE 10 MG/1
10 TABLET ORAL EVERY 6 HOURS PRN
Status: DISCONTINUED | OUTPATIENT
Start: 2022-08-30 | End: 2022-08-30

## 2022-08-30 RX ORDER — OXYCODONE HYDROCHLORIDE 5 MG/1
5 TABLET ORAL EVERY 4 HOURS PRN
Status: DISCONTINUED | OUTPATIENT
Start: 2022-08-30 | End: 2022-09-01

## 2022-08-30 RX ADMIN — PREDNISOLONE ACETATE 1 DROP: 10 SUSPENSION/ DROPS OPHTHALMIC at 09:08

## 2022-08-30 RX ADMIN — HYDROMORPHONE HYDROCHLORIDE 0.5 MG: 1 INJECTION, SOLUTION INTRAMUSCULAR; INTRAVENOUS; SUBCUTANEOUS at 09:08

## 2022-08-30 RX ADMIN — TOBRAMYCIN 1 DROP: 3 SOLUTION/ DROPS OPHTHALMIC at 09:08

## 2022-08-30 RX ADMIN — HYDROMORPHONE HYDROCHLORIDE 0.5 MG: 1 INJECTION, SOLUTION INTRAMUSCULAR; INTRAVENOUS; SUBCUTANEOUS at 12:08

## 2022-08-30 RX ADMIN — TOBRAMYCIN 1 DROP: 3 SOLUTION/ DROPS OPHTHALMIC at 12:08

## 2022-08-30 RX ADMIN — PROMETHAZINE HYDROCHLORIDE 12.5 MG: 25 INJECTION INTRAMUSCULAR; INTRAVENOUS at 05:08

## 2022-08-30 RX ADMIN — TOBRAMYCIN 1 DROP: 3 SOLUTION/ DROPS OPHTHALMIC at 04:08

## 2022-08-30 RX ADMIN — SODIUM CHLORIDE, SODIUM LACTATE, POTASSIUM CHLORIDE, AND CALCIUM CHLORIDE: 600; 310; 30; 20 INJECTION, SOLUTION INTRAVENOUS at 10:08

## 2022-08-30 RX ADMIN — METHOCARBAMOL 500 MG: 100 INJECTION, SOLUTION INTRAMUSCULAR; INTRAVENOUS at 10:08

## 2022-08-30 RX ADMIN — CARBOXYMETHYLCELLULOSE SODIUM 1 DROP: 10 GEL OPHTHALMIC at 12:08

## 2022-08-30 RX ADMIN — CARBOXYMETHYLCELLULOSE SODIUM 1 DROP: 10 GEL OPHTHALMIC at 09:08

## 2022-08-30 RX ADMIN — HYDROMORPHONE HYDROCHLORIDE 0.5 MG: 1 INJECTION, SOLUTION INTRAMUSCULAR; INTRAVENOUS; SUBCUTANEOUS at 04:08

## 2022-08-30 RX ADMIN — ENOXAPARIN SODIUM 40 MG: 100 INJECTION SUBCUTANEOUS at 04:08

## 2022-08-30 RX ADMIN — SODIUM CHLORIDE, SODIUM LACTATE, POTASSIUM CHLORIDE, AND CALCIUM CHLORIDE: 600; 310; 30; 20 INJECTION, SOLUTION INTRAVENOUS at 11:08

## 2022-08-30 RX ADMIN — HYDROMORPHONE HYDROCHLORIDE 0.2 MG: 1 INJECTION, SOLUTION INTRAMUSCULAR; INTRAVENOUS; SUBCUTANEOUS at 01:08

## 2022-08-30 RX ADMIN — METHOCARBAMOL 500 MG: 100 INJECTION, SOLUTION INTRAMUSCULAR; INTRAVENOUS at 03:08

## 2022-08-30 RX ADMIN — CARBOXYMETHYLCELLULOSE SODIUM 1 DROP: 10 GEL OPHTHALMIC at 04:08

## 2022-08-30 RX ADMIN — ONDANSETRON 4 MG: 2 INJECTION INTRAMUSCULAR; INTRAVENOUS at 04:08

## 2022-08-30 RX ADMIN — PREDNISOLONE ACETATE 1 DROP: 10 SUSPENSION/ DROPS OPHTHALMIC at 03:08

## 2022-08-30 NOTE — PLAN OF CARE
POC reviewed with pt. AAOx4, vSS. RA. Ileostomy in place, minor green output. 2 open areas on R buttocks, yellow/ tan drainage. Mds aware, gauze and tape placed. LR @ 75. Pt receiving dilaudid frequently, still reports 10/10 pain. PRN pain med orders need to be adjusted by primary team. CLD. Refused PO meds. Nausea reported once, spit up some. Controlled by prns.     6am- Spoke with  and Dr. Mejia about PRN orders in place, stated they would adjust.

## 2022-08-30 NOTE — PLAN OF CARE
Patient is A&Ox4, all VSS, on room air/no tele.  Patient still stating pain of 10/10 and will not get out of bed to ambulate in hallway due to pain.  Prescribed oxy 5 and 10 but patient refuses stating that she can't keep anything PO down and that dilaudid is the only thing that relieves her pain.  Doctor in to see patient this AM and modified PRN pain med orders.  Ostomy with small amount of output.  Educated patient on ambulating to stimulate further ostomy output and to avoid becoming deconditioned.  Plan of care discussed with patient who states understanding.  Will continue to monitor.    Problem: Adult Inpatient Plan of Care  Goal: Plan of Care Review  Outcome: Ongoing, Progressing  Goal: Patient-Specific Goal (Individualized)  Outcome: Ongoing, Progressing  Goal: Absence of Hospital-Acquired Illness or Injury  Outcome: Ongoing, Progressing  Goal: Optimal Comfort and Wellbeing  Outcome: Ongoing, Progressing  Goal: Readiness for Transition of Care  Outcome: Ongoing, Progressing     Problem: Infection  Goal: Absence of Infection Signs and Symptoms  Outcome: Ongoing, Progressing     Problem: Diarrhea  Goal: Fluid and Electrolyte Balance  Outcome: Ongoing, Progressing     Problem: Fall Injury Risk  Goal: Absence of Fall and Fall-Related Injury  Outcome: Ongoing, Progressing     Problem: Pain Acute  Goal: Acceptable Pain Control and Functional Ability  Outcome: Ongoing, Progressing     Problem: Electrolyte Imbalance  Goal: Electrolyte Balance  Outcome: Ongoing, Progressing     Problem: Nausea and Vomiting  Goal: Fluid and Electrolyte Balance  Outcome: Ongoing, Progressing

## 2022-08-30 NOTE — SUBJECTIVE & OBJECTIVE
Subjective:     Interval History: No acute overnight events, AF, VSS. Patient had a CT done which showed no abscess or fluid collection. Ostomy output continues to improve. Patient has right buttock lesion that is draining and is tender to touch.   Post-Op Info:  Procedure(s) (LRB):  CREATION, ILEOSTOMY, LAPAROSCOPIC, BIOPSY PERIANAL FISTULA (N/A)   7 Days Post-Op      Medications:  Continuous Infusions:   lactated ringers Stopped (08/30/22 0515)       Scheduled Meds:   acetaminophen  1,000 mg Oral TID    carboxymethylcellulose sodium  1 drop Ophthalmic QID    enoxaparin  40 mg Subcutaneous Daily    erythromycin   Right Eye TID    folic acid  1 mg Oral Daily    LIDOcaine HCl 2%   Mucous Membrane Once    methocarbamol (ROBAXIN) IVPB  500 mg Intravenous Q8H    prednisoLONE acetate  1 drop Left Eye TID    scopolamine  1 patch Transdermal Q3 Days    tobramycin sulfate 0.3%  1 drop Right Eye QID     PRN Meds:   albuterol-ipratropium    aluminum-magnesium hydroxide-simethicone    dextrose 10%    dextrose 10%    glucagon (human recombinant)    glucose    glucose    melatonin    naloxone    ondansetron    oxyCODONE    oxyCODONE    promethazine (PHENERGAN) IVPB    simethicone    sodium chloride 0.9%    traMADoL        Objective:     Vital Signs (Most Recent):  Temp: 98.1 °F (36.7 °C) (08/30/22 0519)  Pulse: 75 (08/30/22 0519)  Resp: 18 (08/30/22 0519)  BP: 103/61 (08/30/22 0519)  SpO2: (!) 94 % (08/30/22 0519) Vital Signs (24h Range):  Temp:  [97.9 °F (36.6 °C)-99.1 °F (37.3 °C)] 98.1 °F (36.7 °C)  Pulse:  [64-89] 75  Resp:  [16-20] 18  SpO2:  [94 %-100 %] 94 %  BP: (101-111)/(55-68) 103/61     Intake/Output - Last 3 Shifts         08/28 0700 08/29 0659 08/29 0700  08/30 0659 08/30 0700 08/31 0659    P.O. 120 240     I.V. (mL/kg)  733 (9.5)     IV Piggyback  78.1     Total Intake(mL/kg) 120 (1.6) 1051.1 (13.6)     Urine (mL/kg/hr) 2050 (1.1) 1000 (0.5)     Emesis/NG output       Stool 30 65     Total Output 2080 1065      Net -1960 -13.9            Stool Occurrence 1 x              Physical Exam  Constitutional:       General: She is not in acute distress.     Appearance: She is well-developed.   HENT:      Head: Normocephalic and atraumatic.   Eyes:      General:         Right eye: No discharge.         Left eye: No discharge.      Conjunctiva/sclera: Conjunctivae normal.   Cardiovascular:      Rate and Rhythm: Normal rate and regular rhythm.   Pulmonary:      Effort: Pulmonary effort is normal. No respiratory distress.   Abdominal:      General: There is no distension.      Palpations: Abdomen is soft.      Tenderness: There is no guarding.      Comments: Ostomy bag w/ minimal fecal output   Genitourinary:     Comments: Right buttock lesion drainage and tender, covered with gauze  Musculoskeletal:         General: No deformity. Normal range of motion.      Cervical back: Normal range of motion.   Skin:     General: Skin is warm and dry.   Neurological:      Mental Status: She is alert and oriented to person, place, and time.   Psychiatric:         Behavior: Behavior normal.       Anorectal Exam:  Moderately tender to palpation throughout  Multiple areas of induration  No visible drainage or bleeding seen       Significant Labs:  BMP (Last 3 Results):   Recent Labs   Lab 08/27/22  1353 08/28/22  1113 08/29/22  1017   GLU 84 77 61*   * 133* 134*   K 4.1 4.3 3.9    101 101   CO2 22* 22* 21*   BUN 5* 5* 4*   CREATININE 0.6 0.6 0.6   CALCIUM 9.3 9.2 8.9   MG  --  1.6  --        CBC (Last 3 Results):   Recent Labs   Lab 08/27/22  1353 08/28/22  1113 08/29/22  1017   WBC 8.17 5.90 5.54   RBC 5.57* 5.21 4.85   HGB 10.0* 9.5* 8.6*   HCT 32.6* 30.7* 27.8*    293 281   MCV 59* 59* 57*   MCH 18.0* 18.2* 17.7*   MCHC 30.7* 30.9* 30.9*         Significant Diagnostics:  I have reviewed all pertinent imaging results/findings within the past 24 hours.

## 2022-08-30 NOTE — PROGRESS NOTES
[facility]  Colorectal Surgery  Progress Note    Patient Name: Nikkie Myles  MRN: [id number]  Admission Date: 8/11/2022  Hospital Length of Stay: 18 days  Attending Physician: Zuleyka Yip MD    Subjective:     Interval History: No acute overnight events, AF, VSS. Patient had a CT done which showed no abscess or fluid collection. Ostomy output continues to improve. Patient has right buttock lesion that is draining and is tender to touch.   Post-Op Info:  Procedure(s) (LRB):  CREATION, ILEOSTOMY, LAPAROSCOPIC, BIOPSY PERIANAL FISTULA (N/A)   7 Days Post-Op      Medications:  Continuous Infusions:   lactated ringers Stopped (08/30/22 0515)       Scheduled Meds:   acetaminophen  1,000 mg Oral TID    carboxymethylcellulose sodium  1 drop Ophthalmic QID    enoxaparin  40 mg Subcutaneous Daily    erythromycin   Right Eye TID    folic acid  1 mg Oral Daily    LIDOcaine HCl 2%   Mucous Membrane Once    methocarbamol (ROBAXIN) IVPB  500 mg Intravenous Q8H    prednisoLONE acetate  1 drop Left Eye TID    scopolamine  1 patch Transdermal Q3 Days    tobramycin sulfate 0.3%  1 drop Right Eye QID     PRN Meds:   albuterol-ipratropium    aluminum-magnesium hydroxide-simethicone    dextrose 10%    dextrose 10%    glucagon (human recombinant)    glucose    glucose    melatonin    naloxone    ondansetron    oxyCODONE    oxyCODONE    promethazine (PHENERGAN) IVPB    simethicone    sodium chloride 0.9%    traMADoL        Objective:     Vital Signs (Most Recent):  Temp: 98.1 °F (36.7 °C) (08/30/22 0519)  Pulse: 75 (08/30/22 0519)  Resp: 18 (08/30/22 0519)  BP: 103/61 (08/30/22 0519)  SpO2: (!) 94 % (08/30/22 0519) Vital Signs (24h Range):  Temp:  [97.9 °F (36.6 °C)-99.1 °F (37.3 °C)] 98.1 °F (36.7 °C)  Pulse:  [64-89] 75  Resp:  [16-20] 18  SpO2:  [94 %-100 %] 94 %  BP: (101-111)/(55-68) 103/61     Intake/Output - Last 3 Shifts         08/28 0700 08/29 0659 08/29 0700 08/30 0659 08/30 0700 08/31  0659    P.O. 120 240     I.V. (mL/kg)  733 (9.5)     IV Piggyback  78.1     Total Intake(mL/kg) 120 (1.6) 1051.1 (13.6)     Urine (mL/kg/hr) 2050 (1.1) 1000 (0.5)     Emesis/NG output       Stool 30 65     Total Output 2080 1065     Net -1960 -13.9            Stool Occurrence 1 x              Physical Exam  Constitutional:       General: She is not in acute distress.     Appearance: She is well-developed.   HENT:      Head: Normocephalic and atraumatic.   Eyes:      General:         Right eye: No discharge.         Left eye: No discharge.      Conjunctiva/sclera: Conjunctivae normal.   Cardiovascular:      Rate and Rhythm: Normal rate and regular rhythm.   Pulmonary:      Effort: Pulmonary effort is normal. No respiratory distress.   Abdominal:      General: There is no distension.      Palpations: Abdomen is soft.      Tenderness: There is no guarding.      Comments: Ostomy bag w/ minimal fecal output   Genitourinary:     Comments: Right buttock lesion drainage and tender, covered with gauze  Musculoskeletal:         General: No deformity. Normal range of motion.      Cervical back: Normal range of motion.   Skin:     General: Skin is warm and dry.   Neurological:      Mental Status: She is alert and oriented to person, place, and time.   Psychiatric:         Behavior: Behavior normal.       Anorectal Exam:  Moderately tender to palpation throughout  Multiple areas of induration  No visible drainage or bleeding seen       Significant Labs:  BMP (Last 3 Results):   Recent Labs   Lab 08/27/22  1353 08/28/22  1113 08/29/22  1017   GLU 84 77 61*   * 133* 134*   K 4.1 4.3 3.9    101 101   CO2 22* 22* 21*   BUN 5* 5* 4*   CREATININE 0.6 0.6 0.6   CALCIUM 9.3 9.2 8.9   MG  --  1.6  --        CBC (Last 3 Results):   Recent Labs   Lab 08/27/22  1353 08/28/22  1113 08/29/22  1017   WBC 8.17 5.90 5.54   RBC 5.57* 5.21 4.85   HGB 10.0* 9.5* 8.6*   HCT 32.6* 30.7* 27.8*    293 281   MCV 59* 59* 57*   MCH  18.0* 18.2* 17.7*   MCHC 30.7* 30.9* 30.9*         Significant Diagnostics:  I have reviewed all pertinent imaging results/findings within the past 24 hours.    Assessment/Plan:     * Crohn's disease with abscess  33 yo female with hx of Crohn's admitted with perirectal abscess. S/p EUA on 8/15 demonstrating deep anal fissures. Colonoscopy 8/18 demonstraetd erythema in distal colon and proximal rectum.Now s/p laparoscopic diverting ileostomy 8/23/22. AP CT w/ PO and IV contrast 8/29/22 showed no abscess or fluid collection    - Diet: NPO, sips with medications  - Monitor bowel function and ostomy output, continue to improve  - Multimodal pain management: d/c IV dilaudid, transition to oxycodone  - Antiemetics: zofran, scopolamine patch  - OOBTC/ ambulation encouraged  - Ophthalmology f/u outpatient    Dispo: Continue inpatient care until improved bowel function            Micheal Mejia MD  Colorectal Surgery  [facility]

## 2022-08-30 NOTE — ASSESSMENT & PLAN NOTE
33 yo female with hx of Crohn's admitted with perirectal abscess. S/p EUA on 8/15 demonstrating deep anal fissures. Colonoscopy 8/18 demonstraetd erythema in distal colon and proximal rectum.Now s/p laparoscopic diverting ileostomy 8/23/22. AP CT w/ PO and IV contrast 8/29/22 showed no abscess or fluid collection    - Diet: NPO, sips with medications  - Monitor bowel function and ostomy output, continue to improve  - Multimodal pain management: d/c IV dilaudid, transition to oxycodone  - Antiemetics: zofran, scopolamine patch  - OOBTC/ ambulation encouraged  - Ophthalmology f/u outpatient    Dispo: Continue inpatient care until improved bowel function

## 2022-08-30 NOTE — NURSING
Pt c/o pain 10/10. Dilaudid 0.5 mg is an hour too soon. Pt has 0.2 and 0.5 mg dilaudid ordered q 6 PRN. Clarified PRN orders with Dr. KRISTIE Shannon. Verbal order to change dilaudid 0.2 mg to q 2h instead.

## 2022-08-31 LAB
ALBUMIN SERPL BCP-MCNC: 2.7 G/DL (ref 3.5–5.2)
ALP SERPL-CCNC: 61 U/L (ref 55–135)
ALT SERPL W/O P-5'-P-CCNC: 12 U/L (ref 10–44)
ANION GAP SERPL CALC-SCNC: 8 MMOL/L (ref 8–16)
AST SERPL-CCNC: 18 U/L (ref 10–40)
BASOPHILS # BLD AUTO: 0.02 K/UL (ref 0–0.2)
BASOPHILS NFR BLD: 0.4 % (ref 0–1.9)
BILIRUB SERPL-MCNC: 0.3 MG/DL (ref 0.1–1)
BUN SERPL-MCNC: <3 MG/DL (ref 6–20)
CALCIUM SERPL-MCNC: 8.9 MG/DL (ref 8.7–10.5)
CHLORIDE SERPL-SCNC: 101 MMOL/L (ref 95–110)
CO2 SERPL-SCNC: 24 MMOL/L (ref 23–29)
CREAT SERPL-MCNC: 0.6 MG/DL (ref 0.5–1.4)
CRP SERPL-MCNC: 100.8 MG/L (ref 0–8.2)
DIFFERENTIAL METHOD: ABNORMAL
EOSINOPHIL # BLD AUTO: 0.1 K/UL (ref 0–0.5)
EOSINOPHIL NFR BLD: 1.3 % (ref 0–8)
ERYTHROCYTE [DISTWIDTH] IN BLOOD BY AUTOMATED COUNT: 25.3 % (ref 11.5–14.5)
EST. GFR  (NO RACE VARIABLE): >60 ML/MIN/1.73 M^2
GLUCOSE SERPL-MCNC: 94 MG/DL (ref 70–110)
HCT VFR BLD AUTO: 30.7 % (ref 37–48.5)
HGB BLD-MCNC: 9.4 G/DL (ref 12–16)
IMM GRANULOCYTES # BLD AUTO: 0.01 K/UL (ref 0–0.04)
IMM GRANULOCYTES NFR BLD AUTO: 0.2 % (ref 0–0.5)
LYMPHOCYTES # BLD AUTO: 1.5 K/UL (ref 1–4.8)
LYMPHOCYTES NFR BLD: 32.7 % (ref 18–48)
MCH RBC QN AUTO: 17.3 PG (ref 27–31)
MCHC RBC AUTO-ENTMCNC: 30.6 G/DL (ref 32–36)
MCV RBC AUTO: 56 FL (ref 82–98)
MONOCYTES # BLD AUTO: 0.5 K/UL (ref 0.3–1)
MONOCYTES NFR BLD: 11.3 % (ref 4–15)
NEUTROPHILS # BLD AUTO: 2.4 K/UL (ref 1.8–7.7)
NEUTROPHILS NFR BLD: 54.1 % (ref 38–73)
NRBC BLD-RTO: 0 /100 WBC
PLATELET # BLD AUTO: 316 K/UL (ref 150–450)
PMV BLD AUTO: ABNORMAL FL (ref 9.2–12.9)
POTASSIUM SERPL-SCNC: 3.4 MMOL/L (ref 3.5–5.1)
PROT SERPL-MCNC: 7.4 G/DL (ref 6–8.4)
RBC # BLD AUTO: 5.44 M/UL (ref 4–5.4)
SODIUM SERPL-SCNC: 133 MMOL/L (ref 136–145)
WBC # BLD AUTO: 4.52 K/UL (ref 3.9–12.7)

## 2022-08-31 PROCEDURE — 20600001 HC STEP DOWN PRIVATE ROOM

## 2022-08-31 PROCEDURE — 63600175 PHARM REV CODE 636 W HCPCS: Performed by: STUDENT IN AN ORGANIZED HEALTH CARE EDUCATION/TRAINING PROGRAM

## 2022-08-31 PROCEDURE — 36415 COLL VENOUS BLD VENIPUNCTURE: CPT | Performed by: STUDENT IN AN ORGANIZED HEALTH CARE EDUCATION/TRAINING PROGRAM

## 2022-08-31 PROCEDURE — 25000003 PHARM REV CODE 250: Performed by: STUDENT IN AN ORGANIZED HEALTH CARE EDUCATION/TRAINING PROGRAM

## 2022-08-31 PROCEDURE — 85025 COMPLETE CBC W/AUTO DIFF WBC: CPT | Performed by: STUDENT IN AN ORGANIZED HEALTH CARE EDUCATION/TRAINING PROGRAM

## 2022-08-31 PROCEDURE — 80053 COMPREHEN METABOLIC PANEL: CPT | Performed by: STUDENT IN AN ORGANIZED HEALTH CARE EDUCATION/TRAINING PROGRAM

## 2022-08-31 PROCEDURE — 86140 C-REACTIVE PROTEIN: CPT | Performed by: STUDENT IN AN ORGANIZED HEALTH CARE EDUCATION/TRAINING PROGRAM

## 2022-08-31 RX ORDER — METHOCARBAMOL 500 MG/1
500 TABLET, FILM COATED ORAL 3 TIMES DAILY
Status: DISCONTINUED | OUTPATIENT
Start: 2022-08-31 | End: 2022-09-03 | Stop reason: HOSPADM

## 2022-08-31 RX ADMIN — ACETAMINOPHEN 1000 MG: 500 TABLET ORAL at 09:08

## 2022-08-31 RX ADMIN — ACETAMINOPHEN 1000 MG: 500 TABLET ORAL at 02:08

## 2022-08-31 RX ADMIN — OXYCODONE 5 MG: 5 TABLET ORAL at 06:08

## 2022-08-31 RX ADMIN — FOLIC ACID 1 MG: 1 TABLET ORAL at 09:08

## 2022-08-31 RX ADMIN — PREDNISOLONE ACETATE 1 DROP: 10 SUSPENSION/ DROPS OPHTHALMIC at 09:08

## 2022-08-31 RX ADMIN — HYDROMORPHONE HYDROCHLORIDE 0.5 MG: 1 INJECTION, SOLUTION INTRAMUSCULAR; INTRAVENOUS; SUBCUTANEOUS at 06:08

## 2022-08-31 RX ADMIN — CARBOXYMETHYLCELLULOSE SODIUM 1 DROP: 10 GEL OPHTHALMIC at 08:08

## 2022-08-31 RX ADMIN — METHOCARBAMOL 500 MG: 100 INJECTION, SOLUTION INTRAMUSCULAR; INTRAVENOUS at 06:08

## 2022-08-31 RX ADMIN — HYDROMORPHONE HYDROCHLORIDE 0.5 MG: 1 INJECTION, SOLUTION INTRAMUSCULAR; INTRAVENOUS; SUBCUTANEOUS at 08:08

## 2022-08-31 RX ADMIN — HYDROMORPHONE HYDROCHLORIDE 0.5 MG: 1 INJECTION, SOLUTION INTRAMUSCULAR; INTRAVENOUS; SUBCUTANEOUS at 02:08

## 2022-08-31 RX ADMIN — ENOXAPARIN SODIUM 40 MG: 100 INJECTION SUBCUTANEOUS at 04:08

## 2022-08-31 RX ADMIN — TOBRAMYCIN 1 DROP: 3 SOLUTION/ DROPS OPHTHALMIC at 09:08

## 2022-08-31 RX ADMIN — CARBOXYMETHYLCELLULOSE SODIUM 1 DROP: 10 GEL OPHTHALMIC at 04:08

## 2022-08-31 RX ADMIN — METHOCARBAMOL 500 MG: 500 TABLET ORAL at 08:08

## 2022-08-31 RX ADMIN — SODIUM CHLORIDE, SODIUM LACTATE, POTASSIUM CHLORIDE, AND CALCIUM CHLORIDE 1000 ML: .6; .31; .03; .02 INJECTION, SOLUTION INTRAVENOUS at 03:08

## 2022-08-31 RX ADMIN — CARBOXYMETHYLCELLULOSE SODIUM 1 DROP: 10 GEL OPHTHALMIC at 09:08

## 2022-08-31 RX ADMIN — ACETAMINOPHEN 1000 MG: 500 TABLET ORAL at 08:08

## 2022-08-31 RX ADMIN — METHOCARBAMOL 500 MG: 100 INJECTION, SOLUTION INTRAMUSCULAR; INTRAVENOUS at 02:08

## 2022-08-31 RX ADMIN — SODIUM CHLORIDE, SODIUM LACTATE, POTASSIUM CHLORIDE, AND CALCIUM CHLORIDE: 600; 310; 30; 20 INJECTION, SOLUTION INTRAVENOUS at 12:08

## 2022-08-31 RX ADMIN — OXYCODONE 5 MG: 5 TABLET ORAL at 12:08

## 2022-08-31 NOTE — NURSING
Pt left the unit to go to the eye clinic by wheelchair with staff from eye clinic. AAOx4, no signs of distress. IVF temporary disconnect from pt to make transport easier. ISA

## 2022-08-31 NOTE — PLAN OF CARE
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       Follow Up Appointments:  Future Appointments   Date Time Provider Department Center   9/29/2022  9:00 AM Otis Warner MD [facility]       Allergies:Review of patient's allergies indicates:  No Known Allergies    Medications: Review discharge medications with patient and family and provide education.    Current Facility-Administered Medications   Medication Dose Route Frequency Provider Last Rate Last Admin    acetaminophen tablet 1,000 mg  1,000 mg Oral TID Clark Bridges MD   1,000 mg at 08/31/22 1457    albuterol-ipratropium 2.5 mg-0.5 mg/3 mL nebulizer solution 3 mL  3 mL Nebulization Q6H PRN Clark Bridges MD        aluminum-magnesium hydroxide-simethicone 200-200-20 mg/5 mL suspension 30 mL  30 mL Oral QID PRN Clark Bridges MD        carboxymethylcellulose sodium 1 % DpGe 1 drop  1 drop Ophthalmic QID Clark Bridges MD   1 drop at 08/31/22 0905    dextrose 10% bolus 125 mL  12.5 g Intravenous PRN Clark Bridges MD        dextrose 10% bolus 250 mL  25 g Intravenous PRN Clark Bridges MD        enoxaparin injection 40 mg  40 mg Subcutaneous Daily Clark Bridges MD   40 mg at 08/30/22 1650    folic acid tablet 1 mg  1 mg Oral Daily Clark Bridges MD   1 mg at 08/31/22 0906    glucagon (human recombinant)  injection 1 mg  1 mg Intramuscular PRN Clark Bridges MD        glucose chewable tablet 16 g  16 g Oral PRN Clark Bridges MD        glucose chewable tablet 24 g  24 g Oral PRN Clark Bridges MD        HYDROmorphone injection 0.5 mg  0.5 mg Intravenous Q4H PRN Clark Bridges MD   0.5 mg at 08/31/22 1456    lactated ringers bolus 1,000 mL  1,000 mL Intravenous Once Clark Bridges MD   Stopped at 08/31/22 1640    lactated ringers infusion   Intravenous Continuous Adrien Julian MD 75 mL/hr at 08/31/22 1230 New Bag at 08/31/22 1230    LIDOcaine HCl 2% urojet   Mucous Membrane Once Adrien Julian MD        melatonin tablet 6 mg  6 mg Oral Nightly PRN Clark Bridges MD   6 mg at 08/22/22 2033    methocarbamoL tablet 500 mg  500 mg Oral TID Clark Bridges MD        naloxone 0.4 mg/mL injection 0.02 mg  0.02 mg Intravenous PRN Clark Bridges MD        ondansetron injection 4 mg  4 mg Intravenous Q8H PRN Clark Bridges MD   4 mg at 08/30/22 0416    oxyCODONE immediate release tablet 5 mg  5 mg Oral Q4H PRN Clark Bridges MD   5 mg at 08/31/22 1230    promethazine (PHENERGAN) 12.5 mg in dextrose 5 % 50 mL IVPB  12.5 mg Intravenous Q6H PRN Sahara Eavns MD   Stopped at 08/30/22 0536    scopolamine 1.3-1.5 mg (1 mg over 3 days) 1 patch  1 patch Transdermal Q3 Days Micheal Mejia MD   1 patch at 08/29/22 0951    simethicone chewable tablet 80 mg  1 tablet Oral TID PRN Micheal Mejia MD   80 mg at 08/27/22 1235    sodium chloride 0.9% flush 10 mL  10 mL Intravenous Q12H PRN Clark Bridges MD        traMADoL tablet 50 mg  50 mg Oral Q4H PRN Clark Bridges MD   50 mg at 08/27/22 1314     Current Discharge Medication List        CONTINUE these medications which have NOT CHANGED    Details   acetaminophen (TYLENOL) 500 MG tablet Take 2 tablets (1,000 mg total) by mouth every 6 (six) hours as needed.  Qty: 60 tablet, Refills: 0      erythromycin (ROMYCIN) ophthalmic  ointment Place a 1/2 inch ribbon of ointment into the lower eyelid 2-3 times daily.  Qty: 1 g, Refills: 0      ibuprofen (ADVIL,MOTRIN) 400 MG tablet Take 1 tablet (400 mg total) by mouth every 6 (six) hours as needed.  Qty: 20 tablet, Refills: 0      ondansetron (ZOFRAN-ODT) 4 MG TbDL Take 1 tablet (4 mg total) by mouth every 8 (eight) hours as needed.  Qty: 20 tablet, Refills: 0      prednisoLONE acetate (PRED FORTE) 1 % DrpS Place 1 drop into the left eye 3 (three) times daily.  Qty: 10 mL, Refills: 4               I have seen and examined this patient within the last 30 days. My clinical findings that support the need for the home health skilled services and home bound status are the following:no   Requiring assistive device to leave home due to unsteady gait caused by  Surgery.     Diet:   regular diet    Labs:  Per protocol    Referrals/ Consults  Aide to provide assistance with personal care, ADLs, and vital signs.    Activities:   activity as tolerated    Nursing:   Agency to admit patient within 24 hours of hospital discharge unless specified on physician order or at patient request    SN to complete comprehensive assessment including routine vital signs. Instruct on disease process and s/s of complications to report to MD. Review/verify medication list sent home with the patient at time of discharge  and instruct patient/caregiver as needed. Frequency may be adjusted depending on start of care date.     Skilled nurse to perform up to 3 visits PRN for symptoms related to diagnosis    Notify MD if SBP > 160 or < 90; DBP > 90 or < 50; HR > 120 or < 50; Temp > 101; O2 < 88%;     Ok to schedule additional visits based on staff availability and patient request on consecutive days within the home health episode.    When multiple disciplines ordered:    Start of Care occurs on Sunday - Wednesday schedule remaining discipline evaluations as ordered on separate consecutive days following the start of  care.    Thursday SOC -schedule subsequent evaluations Friday and Monday the following week.     Friday - Saturday SOC - schedule subsequent discipline evaluations on consecutive days starting Monday of the following week.    For all post-discharge communication and subsequent orders please contact patient's primary care physician. If unable to reach primary care physician or do not receive response within 30 minutes, please contact our Colorectal clinic for clinical staff order clarification    Miscellaneous   Ileostomy Care:  Instruct patient/caregiver to empty bag when full and PRN., Change and clean site every 48 hours, and Monitor skin integrity.    Home Health Aide:  Nursing Three times weekly    Wound Care Orders  N/A    I certify that this patient is confined to her home and needs intermittent skilled nursing care.

## 2022-08-31 NOTE — PLAN OF CARE
POC reviewed with pt. AAOx4, vSS. RA.  2 open areas on R buttocks, yellow/ tan drainage. Gauze and tape placed. LR @ 75. Pain controlled by dilaudid PRN, reports feeling better after increased ostomy output. Pt reported walking from bathroom and hearing gas in her bag. Pt instantly put out 575 ml green liquid output at 1920. MD on call notified of sudden output. Bag has been emptied q 2 (200- 300 ml with gas each time), 1850 ml ileostomy output so far. MD on call notified. HR 90s to 100s.     Pt showered last night.

## 2022-08-31 NOTE — PLAN OF CARE
Patient seen in clinic today with Dr. Warner. Significant improvement in both eyes (see visit encounter). Discontinue all drops, continue only artificial tears (changes to orders made). Please page ophthalmology resident on call prior to discharge to arrange for follow-up in cornea clinic 1 month after discharge.

## 2022-08-31 NOTE — PLAN OF CARE
Plan of care reviewed with pt:  -AAOx4, on RA, VS stable with soft BP. 1L bolus of LR given   -3 lap site incision with DB CAMMY CDI  -Ileostomy bag with large amount flatus and liquid green stool  -Pain is under control with PRN pain meds, has not requested them too frequently  -Tolerated her low fiber diet, no nausea, no emesis  -Ambulated to the bathroom a few times, encouraged ambulation in the cummins but pt has not done it today  -Call light in reach. WCTM

## 2022-08-31 NOTE — SUBJECTIVE & OBJECTIVE
Subjective:     Interval History: Ostomy output dramatically improved overnight, 1850 cc. Her abdominal discomfort is now much better. Tolerating a CLD.     Post-Op Info:  Procedure(s) (LRB):  CREATION, ILEOSTOMY, LAPAROSCOPIC, BIOPSY PERIANAL FISTULA (N/A)   8 Days Post-Op      Medications:  Continuous Infusions:   lactated ringers 75 mL/hr at 08/30/22 2220       Scheduled Meds:   acetaminophen  1,000 mg Oral TID    carboxymethylcellulose sodium  1 drop Ophthalmic QID    enoxaparin  40 mg Subcutaneous Daily    erythromycin   Right Eye TID    folic acid  1 mg Oral Daily    LIDOcaine HCl 2%   Mucous Membrane Once    methocarbamol (ROBAXIN) IVPB  500 mg Intravenous Q8H    prednisoLONE acetate  1 drop Left Eye TID    scopolamine  1 patch Transdermal Q3 Days    tobramycin sulfate 0.3%  1 drop Right Eye QID     PRN Meds:   albuterol-ipratropium    aluminum-magnesium hydroxide-simethicone    dextrose 10%    dextrose 10%    glucagon (human recombinant)    glucose    glucose    HYDROmorphone    melatonin    naloxone    ondansetron    oxyCODONE    promethazine (PHENERGAN) IVPB    simethicone    sodium chloride 0.9%    traMADoL        Objective:     Vital Signs (Most Recent):  Temp: 99.5 °F (37.5 °C) (08/31/22 0743)  Pulse: 93 (08/31/22 0743)  Resp: 18 (08/31/22 0743)  BP: 109/70 (08/31/22 0743)  SpO2: 96 % (08/31/22 0743) Vital Signs (24h Range):  Temp:  [98.4 °F (36.9 °C)-99.8 °F (37.7 °C)] 99.5 °F (37.5 °C)  Pulse:  [] 93  Resp:  [16-20] 18  SpO2:  [92 %-100 %] 96 %  BP: (100-120)/(50-78) 109/70     Intake/Output - Last 3 Shifts         08/29 0700 08/30 0659 08/30 0700 08/31 0659 08/31 0700 09/01 0659    P.O. 240  240    I.V. (mL/kg) 733 (9.5) 693.7 (9)     IV Piggyback 78.1 240.8     Total Intake(mL/kg) 1051.1 (13.6) 934.5 (12.1) 240 (3.1)    Urine (mL/kg/hr) 1000 (0.5) 400 (0.2)     Stool 65 1925     Total Output 1065 2325     Net -13.9 -1390.5 +240           Stool Occurrence  1 x             Physical  Exam  Constitutional:       General: She is not in acute distress.     Appearance: She is well-developed.   HENT:      Head: Normocephalic and atraumatic.   Eyes:      General:         Right eye: No discharge.         Left eye: No discharge.      Conjunctiva/sclera: Conjunctivae normal.   Cardiovascular:      Rate and Rhythm: Normal rate and regular rhythm.   Pulmonary:      Effort: Pulmonary effort is normal. No respiratory distress.   Abdominal:      General: There is no distension.      Palpations: Abdomen is soft.      Tenderness: There is no abdominal tenderness. There is no guarding or rebound.      Comments: Ostomy bag w/ liquid output   Musculoskeletal:         General: No deformity. Normal range of motion.      Cervical back: Normal range of motion.   Skin:     General: Skin is warm and dry.   Neurological:      Mental Status: She is alert and oriented to person, place, and time.   Psychiatric:         Behavior: Behavior normal.     =      Significant Labs:  BMP (Last 3 Results):   Recent Labs   Lab 08/27/22  1353 08/28/22  1113 08/29/22  1017   GLU 84 77 61*   * 133* 134*   K 4.1 4.3 3.9    101 101   CO2 22* 22* 21*   BUN 5* 5* 4*   CREATININE 0.6 0.6 0.6   CALCIUM 9.3 9.2 8.9   MG  --  1.6  --        CBC (Last 3 Results):   Recent Labs   Lab 08/27/22  1353 08/28/22  1113 08/29/22  1017   WBC 8.17 5.90 5.54   RBC 5.57* 5.21 4.85   HGB 10.0* 9.5* 8.6*   HCT 32.6* 30.7* 27.8*    293 281   MCV 59* 59* 57*   MCH 18.0* 18.2* 17.7*   MCHC 30.7* 30.9* 30.9*         Significant Diagnostics:  I have reviewed all pertinent imaging results/findings within the past 24 hours.

## 2022-08-31 NOTE — ASSESSMENT & PLAN NOTE
31 yo female with hx of Crohn's admitted with perirectal abscess. S/p EUA on 8/15 demonstrating deep anal fissures. Colonoscopy 8/18 demonstraetd erythema in distal colon and proximal rectum.Now s/p laparoscopic diverting ileostomy 8/23/22. AP CT w/ PO and IV contrast 8/29/22 showed no abscess or fluid collection    - Diet: advance to LRD  - Multimodal pain management  - Antiemetics: zofran, scopolamine patch  - OOBTC/ ambulation encouraged  - Ophthalmology f/u outpatient  - Monitor ostomy output and consistency for timing of discharge

## 2022-08-31 NOTE — PROGRESS NOTES
[facility]  Colorectal Surgery  Progress Note    Patient Name: Nikkie Myles  MRN: [id number]  Admission Date: 8/11/2022  Hospital Length of Stay: 19 days  Attending Physician: Zuleyka Yip MD    Subjective:     Interval History: Ostomy output dramatically improved overnight, 1850 cc. Her abdominal discomfort is now much better. Tolerating a CLD.     Post-Op Info:  Procedure(s) (LRB):  CREATION, ILEOSTOMY, LAPAROSCOPIC, BIOPSY PERIANAL FISTULA (N/A)   8 Days Post-Op      Medications:  Continuous Infusions:   lactated ringers 75 mL/hr at 08/30/22 2220       Scheduled Meds:   acetaminophen  1,000 mg Oral TID    carboxymethylcellulose sodium  1 drop Ophthalmic QID    enoxaparin  40 mg Subcutaneous Daily    erythromycin   Right Eye TID    folic acid  1 mg Oral Daily    LIDOcaine HCl 2%   Mucous Membrane Once    methocarbamol (ROBAXIN) IVPB  500 mg Intravenous Q8H    prednisoLONE acetate  1 drop Left Eye TID    scopolamine  1 patch Transdermal Q3 Days    tobramycin sulfate 0.3%  1 drop Right Eye QID     PRN Meds:   albuterol-ipratropium    aluminum-magnesium hydroxide-simethicone    dextrose 10%    dextrose 10%    glucagon (human recombinant)    glucose    glucose    HYDROmorphone    melatonin    naloxone    ondansetron    oxyCODONE    promethazine (PHENERGAN) IVPB    simethicone    sodium chloride 0.9%    traMADoL        Objective:     Vital Signs (Most Recent):  Temp: 99.5 °F (37.5 °C) (08/31/22 0743)  Pulse: 93 (08/31/22 0743)  Resp: 18 (08/31/22 0743)  BP: 109/70 (08/31/22 0743)  SpO2: 96 % (08/31/22 0743) Vital Signs (24h Range):  Temp:  [98.4 °F (36.9 °C)-99.8 °F (37.7 °C)] 99.5 °F (37.5 °C)  Pulse:  [] 93  Resp:  [16-20] 18  SpO2:  [92 %-100 %] 96 %  BP: (100-120)/(50-78) 109/70     Intake/Output - Last 3 Shifts         08/29 0700 08/30 0659 08/30 0700 08/31 0659 08/31 0700 09/01 0659    P.O. 240  240    I.V. (mL/kg) 733 (9.5) 693.7 (9)     IV Piggyback 78.1 240.8      Total Intake(mL/kg) 1051.1 (13.6) 934.5 (12.1) 240 (3.1)    Urine (mL/kg/hr) 1000 (0.5) 400 (0.2)     Stool 65 1925     Total Output 1065 2325     Net -13.9 -1390.5 +240           Stool Occurrence  1 x             Physical Exam  Constitutional:       General: She is not in acute distress.     Appearance: She is well-developed.   HENT:      Head: Normocephalic and atraumatic.   Eyes:      General:         Right eye: No discharge.         Left eye: No discharge.      Conjunctiva/sclera: Conjunctivae normal.   Cardiovascular:      Rate and Rhythm: Normal rate and regular rhythm.   Pulmonary:      Effort: Pulmonary effort is normal. No respiratory distress.   Abdominal:      General: There is no distension.      Palpations: Abdomen is soft.      Tenderness: There is no abdominal tenderness. There is no guarding or rebound.      Comments: Ostomy bag w/ liquid output   Musculoskeletal:         General: No deformity. Normal range of motion.      Cervical back: Normal range of motion.   Skin:     General: Skin is warm and dry.   Neurological:      Mental Status: She is alert and oriented to person, place, and time.   Psychiatric:         Behavior: Behavior normal.     =      Significant Labs:  BMP (Last 3 Results):   Recent Labs   Lab 08/27/22  1353 08/28/22  1113 08/29/22  1017   GLU 84 77 61*   * 133* 134*   K 4.1 4.3 3.9    101 101   CO2 22* 22* 21*   BUN 5* 5* 4*   CREATININE 0.6 0.6 0.6   CALCIUM 9.3 9.2 8.9   MG  --  1.6  --        CBC (Last 3 Results):   Recent Labs   Lab 08/27/22  1353 08/28/22  1113 08/29/22  1017   WBC 8.17 5.90 5.54   RBC 5.57* 5.21 4.85   HGB 10.0* 9.5* 8.6*   HCT 32.6* 30.7* 27.8*    293 281   MCV 59* 59* 57*   MCH 18.0* 18.2* 17.7*   MCHC 30.7* 30.9* 30.9*         Significant Diagnostics:  I have reviewed all pertinent imaging results/findings within the past 24 hours.    Assessment/Plan:     * Crohn's disease with abscess  33 yo female with hx of Crohn's admitted with  perirectal abscess. S/p EUA on 8/15 demonstrating deep anal fissures. Colonoscopy 8/18 demonstraetd erythema in distal colon and proximal rectum.Now s/p laparoscopic diverting ileostomy 8/23/22. AP CT w/ PO and IV contrast 8/29/22 showed no abscess or fluid collection    - Diet: advance to LRD  - Multimodal pain management  - Antiemetics: zofran, scopolamine patch  - OOBTC/ ambulation encouraged  - Ophthalmology f/u outpatient  - Monitor ostomy output and consistency for timing of discharge              Clark Bridges MD  Colorectal Surgery  [facility]

## 2022-08-31 NOTE — NURSING
Notified  about ostomy output of 1050cc since 0700 this morning, BP now is 86/53-MAP 65, HR 92. Pt has been drinking water by mouth, mIVF at 75. MD ordered to give pt 1L bolus of LR. WCTM

## 2022-09-01 LAB
INFLIXIMAB AB SERPL-MCNC: <22 NG/ML
INFLIXIMAB SERPL-MCNC: 0.5 UG/ML

## 2022-09-01 PROCEDURE — 63600175 PHARM REV CODE 636 W HCPCS: Performed by: STUDENT IN AN ORGANIZED HEALTH CARE EDUCATION/TRAINING PROGRAM

## 2022-09-01 PROCEDURE — 25000003 PHARM REV CODE 250: Performed by: STUDENT IN AN ORGANIZED HEALTH CARE EDUCATION/TRAINING PROGRAM

## 2022-09-01 PROCEDURE — 25000003 PHARM REV CODE 250: Performed by: COLON & RECTAL SURGERY

## 2022-09-01 PROCEDURE — 25000242 PHARM REV CODE 250 ALT 637 W/ HCPCS: Performed by: STUDENT IN AN ORGANIZED HEALTH CARE EDUCATION/TRAINING PROGRAM

## 2022-09-01 PROCEDURE — 20600001 HC STEP DOWN PRIVATE ROOM

## 2022-09-01 RX ORDER — OXYCODONE HYDROCHLORIDE 10 MG/1
10 TABLET ORAL EVERY 4 HOURS PRN
Status: DISCONTINUED | OUTPATIENT
Start: 2022-09-01 | End: 2022-09-03 | Stop reason: HOSPADM

## 2022-09-01 RX ORDER — CIPROFLOXACIN 500 MG/1
500 TABLET ORAL EVERY 12 HOURS
Status: DISCONTINUED | OUTPATIENT
Start: 2022-09-01 | End: 2022-09-03 | Stop reason: HOSPADM

## 2022-09-01 RX ORDER — METRONIDAZOLE 500 MG/1
500 TABLET ORAL EVERY 8 HOURS
Status: DISCONTINUED | OUTPATIENT
Start: 2022-09-01 | End: 2022-09-03 | Stop reason: HOSPADM

## 2022-09-01 RX ADMIN — OXYCODONE HYDROCHLORIDE 10 MG: 10 TABLET ORAL at 11:09

## 2022-09-01 RX ADMIN — METHOCARBAMOL 500 MG: 500 TABLET ORAL at 08:09

## 2022-09-01 RX ADMIN — PSYLLIUM HUSK 1 PACKET: 3.4 POWDER ORAL at 08:09

## 2022-09-01 RX ADMIN — METRONIDAZOLE 500 MG: 500 TABLET ORAL at 09:09

## 2022-09-01 RX ADMIN — OXYCODONE HYDROCHLORIDE 10 MG: 10 TABLET ORAL at 05:09

## 2022-09-01 RX ADMIN — ENOXAPARIN SODIUM 40 MG: 100 INJECTION SUBCUTANEOUS at 05:09

## 2022-09-01 RX ADMIN — SODIUM CHLORIDE, SODIUM LACTATE, POTASSIUM CHLORIDE, AND CALCIUM CHLORIDE: 600; 310; 30; 20 INJECTION, SOLUTION INTRAVENOUS at 05:09

## 2022-09-01 RX ADMIN — METRONIDAZOLE 500 MG: 500 TABLET ORAL at 11:09

## 2022-09-01 RX ADMIN — CIPROFLOXACIN 500 MG: 500 TABLET, FILM COATED ORAL at 09:09

## 2022-09-01 RX ADMIN — CARBOXYMETHYLCELLULOSE SODIUM 1 DROP: 10 GEL OPHTHALMIC at 05:09

## 2022-09-01 RX ADMIN — SCOPALAMINE 1 PATCH: 1 PATCH, EXTENDED RELEASE TRANSDERMAL at 09:09

## 2022-09-01 RX ADMIN — PSYLLIUM HUSK 1 PACKET: 3.4 POWDER ORAL at 09:09

## 2022-09-01 RX ADMIN — OXYCODONE HYDROCHLORIDE 10 MG: 10 TABLET ORAL at 09:09

## 2022-09-01 RX ADMIN — CARBOXYMETHYLCELLULOSE SODIUM 1 DROP: 10 GEL OPHTHALMIC at 01:09

## 2022-09-01 RX ADMIN — CARBOXYMETHYLCELLULOSE SODIUM 1 DROP: 10 GEL OPHTHALMIC at 08:09

## 2022-09-01 RX ADMIN — OXYCODONE HYDROCHLORIDE 10 MG: 10 TABLET ORAL at 01:09

## 2022-09-01 RX ADMIN — OXYCODONE 5 MG: 5 TABLET ORAL at 05:09

## 2022-09-01 RX ADMIN — METHOCARBAMOL 500 MG: 500 TABLET ORAL at 09:09

## 2022-09-01 RX ADMIN — FOLIC ACID 1 MG: 1 TABLET ORAL at 09:09

## 2022-09-01 RX ADMIN — MELATONIN TAB 3 MG 6 MG: 3 TAB at 08:09

## 2022-09-01 RX ADMIN — CIPROFLOXACIN 500 MG: 500 TABLET, FILM COATED ORAL at 08:09

## 2022-09-01 RX ADMIN — METHOCARBAMOL 500 MG: 500 TABLET ORAL at 02:09

## 2022-09-01 RX ADMIN — CARBOXYMETHYLCELLULOSE SODIUM 1 DROP: 10 GEL OPHTHALMIC at 09:09

## 2022-09-01 RX ADMIN — ACETAMINOPHEN 1000 MG: 500 TABLET ORAL at 08:09

## 2022-09-01 RX ADMIN — METRONIDAZOLE 500 MG: 500 TABLET ORAL at 01:09

## 2022-09-01 RX ADMIN — ACETAMINOPHEN 1000 MG: 500 TABLET ORAL at 09:09

## 2022-09-01 RX ADMIN — HYDROMORPHONE HYDROCHLORIDE 0.5 MG: 1 INJECTION, SOLUTION INTRAMUSCULAR; INTRAVENOUS; SUBCUTANEOUS at 02:09

## 2022-09-01 NOTE — SUBJECTIVE & OBJECTIVE
Subjective:     Interval History: No acute events overnight. Still with intermittent abd cramping, but feels much better overall. Ostomy continues to have liquid output    Post-Op Info:  Procedure(s) (LRB):  CREATION, ILEOSTOMY, LAPAROSCOPIC, BIOPSY PERIANAL FISTULA (N/A)   9 Days Post-Op      Medications:  Continuous Infusions:   lactated ringers 75 mL/hr at 09/01/22 0553       Scheduled Meds:   acetaminophen  1,000 mg Oral TID    carboxymethylcellulose sodium  1 drop Ophthalmic QID    ciprofloxacin HCl  500 mg Oral Q12H    enoxaparin  40 mg Subcutaneous Daily    folic acid  1 mg Oral Daily    LIDOcaine HCl 2%   Mucous Membrane Once    methocarbamoL  500 mg Oral TID    metroNIDAZOLE  500 mg Oral Q8H    psyllium husk (aspartame)  1 packet Oral BID    scopolamine  1 patch Transdermal Q3 Days     PRN Meds:   albuterol-ipratropium    aluminum-magnesium hydroxide-simethicone    dextrose 10%    dextrose 10%    glucagon (human recombinant)    glucose    glucose    melatonin    naloxone    ondansetron    oxyCODONE    promethazine (PHENERGAN) IVPB    simethicone    sodium chloride 0.9%    traMADoL        Objective:     Vital Signs (Most Recent):  Temp: 98.1 °F (36.7 °C) (09/01/22 0439)  Pulse: 88 (09/01/22 0439)  Resp: 18 (09/01/22 0551)  BP: 108/67 (09/01/22 0439)  SpO2: 98 % (09/01/22 0439) Vital Signs (24h Range):  Temp:  [97.9 °F (36.6 °C)-99.5 °F (37.5 °C)] 98.1 °F (36.7 °C)  Pulse:  [73-93] 88  Resp:  [18-20] 18  SpO2:  [96 %-100 %] 98 %  BP: ()/(50-70) 108/67     Intake/Output - Last 3 Shifts         08/30 0700 08/31 0659 08/31 0700 09/01 0659 09/01 0700 09/02 0659    P.O.  1080     I.V. (mL/kg) 693.7 (9) 636.5 (8.2)     IV Piggyback 240.8 1119.2     Total Intake(mL/kg) 934.5 (12.1) 2835.7 (36.7)     Urine (mL/kg/hr) 400 (0.2) 1000 (0.5)     Stool 1925 2150     Total Output 2325 3150     Net -1390.5 -314.3            Urine Occurrence  4 x     Stool Occurrence 1 x 1 x             Physical  Exam  Constitutional:       General: She is not in acute distress.     Appearance: She is well-developed.   HENT:      Head: Normocephalic and atraumatic.   Eyes:      General:         Right eye: No discharge.         Left eye: No discharge.      Conjunctiva/sclera: Conjunctivae normal.   Cardiovascular:      Rate and Rhythm: Normal rate and regular rhythm.   Pulmonary:      Effort: Pulmonary effort is normal. No respiratory distress.   Abdominal:      General: There is no distension.      Palpations: Abdomen is soft.      Tenderness: There is no abdominal tenderness. There is no guarding or rebound.      Comments: Ostomy bag w/ liquid output   Musculoskeletal:         General: No deformity. Normal range of motion.      Cervical back: Normal range of motion.   Skin:     General: Skin is warm and dry.   Neurological:      Mental Status: She is alert and oriented to person, place, and time.   Psychiatric:         Behavior: Behavior normal.           Significant Labs:  BMP (Last 3 Results):   Recent Labs   Lab 08/28/22  1113 08/29/22  1017 08/31/22  1042   GLU 77 61* 94   * 134* 133*   K 4.3 3.9 3.4*    101 101   CO2 22* 21* 24   BUN 5* 4* <3*   CREATININE 0.6 0.6 0.6   CALCIUM 9.2 8.9 8.9   MG 1.6  --   --        CBC (Last 3 Results):   Recent Labs   Lab 08/28/22  1113 08/29/22  1017 08/31/22  1042   WBC 5.90 5.54 4.52   RBC 5.21 4.85 5.44*   HGB 9.5* 8.6* 9.4*   HCT 30.7* 27.8* 30.7*    281 316   MCV 59* 57* 56*   MCH 18.2* 17.7* 17.3*   MCHC 30.9* 30.9* 30.6*         Significant Diagnostics:  I have reviewed all pertinent imaging results/findings within the past 24 hours.

## 2022-09-01 NOTE — ASSESSMENT & PLAN NOTE
31 yo female with hx of Crohn's admitted with perirectal abscess. S/p EUA on 8/15 demonstrating deep anal fissures. Colonoscopy 8/18 demonstraetd erythema in distal colon and proximal rectum.Now s/p laparoscopic diverting ileostomy 8/23/22. AP CT w/ PO and IV contrast 8/29/22 showed no abscess or fluid collection    - Diet: continue LRD  - Add Metamucil BID  - Multimodal pain management  - Antiemetics: zofran, scopolamine patch  - Resume Cipro/Flagyl  - OOBTC/ ambulation encouraged  - Ophthalmology f/u outpatient  - Monitor ostomy output and consistency for timing of discharge       Home Care Instructions for Tonsillectomy       1. Soft diet x 2 weeks. 2. No strenuous physical activity x 2 weeks. 3. You may use liquid hydrocodone as needed for severe post-operative pain only. HYDROCODONE IS A NARCOTIC / OPIOID PAIN MEDICATION. All narcotic pain medications, including hydrocodone, carry a serious risk of developing a drug addiction to prescription opioid medication and illegal opioid drugs. Do not drive, drink alcohol,take other sedative medications, or operate heavy machinery while taking oxycodone or any other narcotic pain medication Other common side-effects of oxycodone include, but are not limited to, nausea, vomiting, and constipation. 4. You may alternate over-the-counter Tylenol and Motrin as needed for post-operative pain starting on the 3rd day after surgery. Be sure to follow the dosing instructions printed on the medication bottle, and do not exceed the recommended daily maximum amount specified by the drug . 6. If you experience experience bright red bleeding from the mouth / back of the throat gargle with ice water and go to your closest emergency room. 7. Return to the office as scheduled for your post-operative follow-up appointment. 8. Please Contact Dr. Nam Yao office with any questions or concerns: [phone]    ANESTHESIA DISCHARGE INSTRUCTIONS    You are under the influence of drugs- do not drink alcohol, drive a car, operate machinery(such as power tools, kitchen appliances, etc), sign legal documents, or make any important decisions for 24 hours (or while on pain medications). Children should not ride bikes or [place] or play on gym sets  for 24 hours after surgery. A responsible adult should be with you for 24 hours. Rest at home today- increase activity as tolerated. Progress slowly to a regular diet unless your physician has instructed you otherwise. Drink plenty of water.     CALL YOUR DOCTOR IF YOU:  Have moderate to severe nausea or vomiting AND are unable to hold down fluids or prescribed medications. Have bright red bloody drainage from your dressing that won't stop oozing. Do not get relief with your pain medication    NORMAL (POSSIBLE) SIDE EFFECTS FROM ANESTHESIA:     Confusion, temporary memory loss, delayed reaction times in the first 24 hours  Lightheadedness, dizziness, difficulty focusing, blurred vision  Nausea/vomiting can happen  Shivering, feeling cold, sore throat, cough and muscle aches should stop within 24-48 hours  Trouble urinating - call your surgeon if it has been more than 8 hrs  Bruising or soreness at the IV site - call if it remains red, firm or there is drainage             FEMALES OF CHILDBEARING AGE WHO ARE TAKING BIRTH CONTROL PILLS:  You may have received a medication during your procedure that interferes with the   actions of birth control pills (Bridion or Emend). Use some other kind of birth control in addition to your pills, like a condom, for 1 month after your procedure to prevent unwanted pregnancy. The following instructions are to be followed if you have a known history or diagnosis of sleep apnea: For all sleep apnea patients:  ? Sleep on your side or sitting up in a chair whenever possible, especially the first 24 hours after surgery. ? Use only medicines prescribed by your doctor. ? Do not drink alcohol. ? If you have a dental device to assist you while at rest, use it at all times for the first 24 hours. For patients using CPAP machines:  ? Use your CPAP machine during all periods of sleep as usual.  ? Use your CPAP machine during all periods of daytime rest while on pain medicines. ** Follow up with your primary care doctor for continued care. IF YOU DO NOT TAKE ALL OF YOUR NARCOTIC PAIN MEDICATION, please dispose of them responsibly. There are drop off boxes in the Emergency Departments 24/7 at both [facility] and [facility].  If these locations are not convenient, other options for discarding them can be found at:  [email or web address]    Hospital or office staff may NOT accept any medications to drop off in the cabinet for you.

## 2022-09-01 NOTE — PROGRESS NOTES
[facility]  Colorectal Surgery  Progress Note    Patient Name: Nikkie Myles  MRN: [id number]  Admission Date: 8/11/2022  Hospital Length of Stay: 20 days  Attending Physician: Zuleyka Yip MD    Subjective:     Interval History: No acute events overnight. Still with intermittent abd cramping, but feels much better overall. Ostomy continues to have liquid output    Post-Op Info:  Procedure(s) (LRB):  CREATION, ILEOSTOMY, LAPAROSCOPIC, BIOPSY PERIANAL FISTULA (N/A)   9 Days Post-Op      Medications:  Continuous Infusions:   lactated ringers 75 mL/hr at 09/01/22 0553       Scheduled Meds:   acetaminophen  1,000 mg Oral TID    carboxymethylcellulose sodium  1 drop Ophthalmic QID    ciprofloxacin HCl  500 mg Oral Q12H    enoxaparin  40 mg Subcutaneous Daily    folic acid  1 mg Oral Daily    LIDOcaine HCl 2%   Mucous Membrane Once    methocarbamoL  500 mg Oral TID    metroNIDAZOLE  500 mg Oral Q8H    psyllium husk (aspartame)  1 packet Oral BID    scopolamine  1 patch Transdermal Q3 Days     PRN Meds:   albuterol-ipratropium    aluminum-magnesium hydroxide-simethicone    dextrose 10%    dextrose 10%    glucagon (human recombinant)    glucose    glucose    melatonin    naloxone    ondansetron    oxyCODONE    promethazine (PHENERGAN) IVPB    simethicone    sodium chloride 0.9%    traMADoL        Objective:     Vital Signs (Most Recent):  Temp: 98.1 °F (36.7 °C) (09/01/22 0439)  Pulse: 88 (09/01/22 0439)  Resp: 18 (09/01/22 0551)  BP: 108/67 (09/01/22 0439)  SpO2: 98 % (09/01/22 0439) Vital Signs (24h Range):  Temp:  [97.9 °F (36.6 °C)-99.5 °F (37.5 °C)] 98.1 °F (36.7 °C)  Pulse:  [73-93] 88  Resp:  [18-20] 18  SpO2:  [96 %-100 %] 98 %  BP: ()/(50-70) 108/67     Intake/Output - Last 3 Shifts         08/30 0700 08/31 0659 08/31 0700 09/01 0659 09/01 0700 09/02 0659    P.O.  1080     I.V. (mL/kg) 693.7 (9) 636.5 (8.2)     IV Piggyback 240.8 1119.2     Total Intake(mL/kg) 934.5  (12.1) 2835.7 (36.7)     Urine (mL/kg/hr) 400 (0.2) 1000 (0.5)     Stool 1925 2150     Total Output 2325 3150     Net -1390.5 -314.3            Urine Occurrence  4 x     Stool Occurrence 1 x 1 x             Physical Exam  Constitutional:       General: She is not in acute distress.     Appearance: She is well-developed.   HENT:      Head: Normocephalic and atraumatic.   Eyes:      General:         Right eye: No discharge.         Left eye: No discharge.      Conjunctiva/sclera: Conjunctivae normal.   Cardiovascular:      Rate and Rhythm: Normal rate and regular rhythm.   Pulmonary:      Effort: Pulmonary effort is normal. No respiratory distress.   Abdominal:      General: There is no distension.      Palpations: Abdomen is soft.      Tenderness: There is no abdominal tenderness. There is no guarding or rebound.      Comments: Ostomy bag w/ liquid output   Musculoskeletal:         General: No deformity. Normal range of motion.      Cervical back: Normal range of motion.   Skin:     General: Skin is warm and dry.   Neurological:      Mental Status: She is alert and oriented to person, place, and time.   Psychiatric:         Behavior: Behavior normal.           Significant Labs:  BMP (Last 3 Results):   Recent Labs   Lab 08/28/22  1113 08/29/22  1017 08/31/22  1042   GLU 77 61* 94   * 134* 133*   K 4.3 3.9 3.4*    101 101   CO2 22* 21* 24   BUN 5* 4* <3*   CREATININE 0.6 0.6 0.6   CALCIUM 9.2 8.9 8.9   MG 1.6  --   --        CBC (Last 3 Results):   Recent Labs   Lab 08/28/22  1113 08/29/22  1017 08/31/22  1042   WBC 5.90 5.54 4.52   RBC 5.21 4.85 5.44*   HGB 9.5* 8.6* 9.4*   HCT 30.7* 27.8* 30.7*    281 316   MCV 59* 57* 56*   MCH 18.2* 17.7* 17.3*   MCHC 30.9* 30.9* 30.6*         Significant Diagnostics:  I have reviewed all pertinent imaging results/findings within the past 24 hours.    Assessment/Plan:     * Crohn's disease with abscess  33 yo female with hx of Crohn's admitted with perirectal  abscess. S/p EUA on 8/15 demonstrating deep anal fissures. Colonoscopy 8/18 demonstraetd erythema in distal colon and proximal rectum.Now s/p laparoscopic diverting ileostomy 8/23/22. AP CT w/ PO and IV contrast 8/29/22 showed no abscess or fluid collection    - Diet: continue LRD  - Add Metamucil BID  - Multimodal pain management  - Antiemetics: zofran, scopolamine patch  - Resume Cipro/Flagyl  - OOBTC/ ambulation encouraged  - Ophthalmology f/u outpatient  - Monitor ostomy output and consistency for timing of discharge              Clark Bridges MD  Colorectal Surgery  [facility]

## 2022-09-01 NOTE — CONSULTS
Placed 20 g x 1 3/4 inch PIV in FABI using real time ultrasound.    Problem: Pain:  Goal: Pain level will decrease  Description: Pain level will decrease  Outcome: Ongoing     Problem: Pain:  Goal: Control of acute pain  Description: Control of acute pain  Outcome: Ongoing     Problem: Pain:  Goal: Control of chronic pain  Description: Control of chronic pain  Outcome: Ongoing

## 2022-09-01 NOTE — NURSING
Ostomy bag changed per pt request, pt was able to demonstrate how to cut the bag and apply on her skin.   New gauze and tape applied on the 2 small puncture hole on pt buttock. Mesh panty on.   Notified night shift about high ostomy output, WCTM

## 2022-09-01 NOTE — PROGRESS NOTES
[facility]  Adult Nutrition  Progress Note    SUMMARY       Recommendations    1) Continue low fiber diet as ordered. Appropriate diet education provided 8/25.     2) Continue psyllium husk prn to bulken stool.     3) RD to monitor and f/u.    Goals: Meet % of kcal/protein needs by RD f/u date  Nutrition Goal Status: goal met  Communication of RD Recs:  (POC)    Assessment and Plan  Nutrition Problem  Inadequate energy intake      Related to (etiology):   Decreased appetite, altered GI structure/function     Signs and Symptoms (as evidenced by):   Meal intake ~50%      Interventions/Recommendations (treatment strategy):  Collaboration of nutrition care with other providers  Adequate meal intake - small, frequent meals for best tolerance     Nutrition Diagnosis Status:   Improving    Reason for Assessment    Reason For Assessment: RD follow-up  Diagnosis:  (Crohn's disease with abscess)  Interdisciplinary Rounds: did not attend    General Information Comments:   9/1: pt tolerating diet well - per nursing documentation, pt ate 100% of dinner last night and of breakfast this morning. No c/o N/V or chewing/swallowing difficulties. Pt having liquid ostomy output - started on metamucil to help bulken stools.    8/25: POD#1 s/p ileostomy. Diet advanced to low fiber this morning, pt reports fair appetite, eating about half of her breakfast this morning. Pt c/o stomach pain/fullness after eating - encouraged pt to eat small, frequent meals for better tolerance. # w/ no recent changes. NFPE not warranted, no s/s of malnutrition. Pt amenable to ileostomy nutrition therapy education - diet reviewed and handout provided. Pt verbalized understanding of material.     Nutrition Discharge Planning: adequate nutrition, low fiber diet    Nutrition Risk Screen    Nutrition Risk Screen: no indicators present    Nutrition/Diet History    Spiritual, Cultural Beliefs, Denominational Practices, Values that Affect Care:  no  Food Allergies: NKFA    Anthropometrics    Temp: 98.7 °F (37.1 °C)  Height Method: Stated  Height: 5' (152.4 cm)  Height (inches): 60 in  Weight Method: Bed Scale  Weight: 77.2 kg (170 lb 3.1 oz)  Weight (lb): 170.2 lb  Ideal Body Weight (IBW), Female: 100 lb  % Ideal Body Weight, Female (lb): 170.2 %  BMI (Calculated): 33.2  BMI Grade: 30 - 34.9- obesity - grade I    Lab/Procedures/Meds    Pertinent Labs Reviewed: reviewed  Pertinent Labs Comments: Na 133, K 3.4  Pertinent Medications Reviewed: reviewed  Pertinent Medications Comments: psyllium hus, folic acid, LR    Estimated/Assessed Needs    Weight Used For Calorie Calculations: 77.2 kg (170 lb 3.1 oz)  Energy Calorie Requirements (kcal): 4440-0399 kcal/kg (25-30 kcal/kg)  Energy Need Method: Kcal/kg  Protein Requirements: 77-92 g/kg (1-1.2 g/kg)  Weight Used For Protein Calculations: 77.2 kg (170 lb 3.1 oz)  Fluid Requirements (mL): 1ml/kcal or fluid per physician  RDA Method (mL): 1544    Nutrition Prescription Ordered    Current Diet Order: Low fiber    Evaluation of Received Nutrient/Fluid Intake    I/O: -2.4L since admit  Comments: LBM: 8/31  Tolerance: tolerating  % Intake of Estimated Energy Needs: 75 - 100 %  % Meal Intake: 75 - 100 %    Nutrition Risk    Level of Risk/Frequency of Follow-up:  (1x/week)     Monitor and Evaluation    Food and Nutrient Intake: energy intake, food and beverage intake  Food and Nutrient Adminstration: diet order  Knowledge/Beliefs/Attitudes: food and nutrition knowledge/skill  Anthropometric Measurements: weight change  Biochemical Data, Medical Tests and Procedures: electrolyte and renal panel, gastrointestinal profile, glucose/endocrine profile, inflammatory profile, lipid profile  Nutrition-Focused Physical Findings: overall appearance     Nutrition Follow-Up    RD Follow-up?: Yes

## 2022-09-01 NOTE — PLAN OF CARE
Patient AOX4, GCS 15, ambulates independently. BP maintains systolic over 90, VSS, afebrile, no c/o CP or SOB, tolerating room air. Receiving IVF. Large amount of ileostomy output noted on day shift, 100 mls since 1900 so far. Tolerating low fiber diet. Lap sites without signs of infection, dermabond intact. Old wounds on back dressing changed today clean, dry and intact. All questions answered, patient resting comfortably at this time, denies further needs.

## 2022-09-01 NOTE — PLAN OF CARE
Patient A&Ox4, all VSS, on room air, no tele.  Patient feeling much better today, pain still present but not as severe.  Patient on PO pain meds PRN.  Ostomy with 550ml liquid output.  Voiding spontaneously with no issues.   Ambulates independently.  Still receiving LR @75/hr.  Monitoring output amount and consistency to determine date of discharge.  Plan of care discussed with patient and all questions asked and answered.  Will continue to monitor.    Problem: Adult Inpatient Plan of Care  Goal: Plan of Care Review  Outcome: Ongoing, Progressing  Goal: Patient-Specific Goal (Individualized)  Outcome: Ongoing, Progressing  Goal: Absence of Hospital-Acquired Illness or Injury  Outcome: Ongoing, Progressing  Goal: Optimal Comfort and Wellbeing  Outcome: Ongoing, Progressing  Goal: Readiness for Transition of Care  Outcome: Ongoing, Progressing     Problem: Infection  Goal: Absence of Infection Signs and Symptoms  Outcome: Ongoing, Progressing     Problem: Diarrhea  Goal: Fluid and Electrolyte Balance  Outcome: Ongoing, Progressing     Problem: Fall Injury Risk  Goal: Absence of Fall and Fall-Related Injury  Outcome: Ongoing, Progressing     Problem: Pain Acute  Goal: Acceptable Pain Control and Functional Ability  Outcome: Ongoing, Progressing     Problem: Electrolyte Imbalance  Goal: Electrolyte Balance  Outcome: Ongoing, Progressing     Problem: Nausea and Vomiting  Goal: Fluid and Electrolyte Balance  Outcome: Ongoing, Progressing

## 2022-09-02 LAB
ALBUMIN SERPL BCP-MCNC: 2.6 G/DL (ref 3.5–5.2)
ALP SERPL-CCNC: 57 U/L (ref 55–135)
ALT SERPL W/O P-5'-P-CCNC: 12 U/L (ref 10–44)
ANION GAP SERPL CALC-SCNC: 8 MMOL/L (ref 8–16)
AST SERPL-CCNC: 14 U/L (ref 10–40)
BASOPHILS # BLD AUTO: 0.01 K/UL (ref 0–0.2)
BASOPHILS NFR BLD: 0.3 % (ref 0–1.9)
BILIRUB SERPL-MCNC: 0.2 MG/DL (ref 0.1–1)
BUN SERPL-MCNC: <3 MG/DL (ref 6–20)
CALCIUM SERPL-MCNC: 8.7 MG/DL (ref 8.7–10.5)
CHLORIDE SERPL-SCNC: 103 MMOL/L (ref 95–110)
CO2 SERPL-SCNC: 24 MMOL/L (ref 23–29)
CREAT SERPL-MCNC: 0.6 MG/DL (ref 0.5–1.4)
DIFFERENTIAL METHOD: ABNORMAL
EOSINOPHIL # BLD AUTO: 0.1 K/UL (ref 0–0.5)
EOSINOPHIL NFR BLD: 3.1 % (ref 0–8)
ERYTHROCYTE [DISTWIDTH] IN BLOOD BY AUTOMATED COUNT: 24.4 % (ref 11.5–14.5)
EST. GFR  (NO RACE VARIABLE): >60 ML/MIN/1.73 M^2
GLUCOSE SERPL-MCNC: 104 MG/DL (ref 70–110)
HCT VFR BLD AUTO: 27.9 % (ref 37–48.5)
HGB BLD-MCNC: 8.5 G/DL (ref 12–16)
IMM GRANULOCYTES # BLD AUTO: 0.01 K/UL (ref 0–0.04)
IMM GRANULOCYTES NFR BLD AUTO: 0.3 % (ref 0–0.5)
LYMPHOCYTES # BLD AUTO: 1.2 K/UL (ref 1–4.8)
LYMPHOCYTES NFR BLD: 30.8 % (ref 18–48)
MCH RBC QN AUTO: 17.9 PG (ref 27–31)
MCHC RBC AUTO-ENTMCNC: 30.5 G/DL (ref 32–36)
MCV RBC AUTO: 59 FL (ref 82–98)
MONOCYTES # BLD AUTO: 0.3 K/UL (ref 0.3–1)
MONOCYTES NFR BLD: 8.7 % (ref 4–15)
NEUTROPHILS # BLD AUTO: 2.2 K/UL (ref 1.8–7.7)
NEUTROPHILS NFR BLD: 56.8 % (ref 38–73)
NRBC BLD-RTO: 0 /100 WBC
PLATELET # BLD AUTO: 281 K/UL (ref 150–450)
PMV BLD AUTO: ABNORMAL FL (ref 9.2–12.9)
POTASSIUM SERPL-SCNC: 3.8 MMOL/L (ref 3.5–5.1)
PROT SERPL-MCNC: 6.8 G/DL (ref 6–8.4)
RBC # BLD AUTO: 4.75 M/UL (ref 4–5.4)
SODIUM SERPL-SCNC: 135 MMOL/L (ref 136–145)
WBC # BLD AUTO: 3.93 K/UL (ref 3.9–12.7)

## 2022-09-02 PROCEDURE — 85025 COMPLETE CBC W/AUTO DIFF WBC: CPT | Performed by: STUDENT IN AN ORGANIZED HEALTH CARE EDUCATION/TRAINING PROGRAM

## 2022-09-02 PROCEDURE — 36415 COLL VENOUS BLD VENIPUNCTURE: CPT | Performed by: STUDENT IN AN ORGANIZED HEALTH CARE EDUCATION/TRAINING PROGRAM

## 2022-09-02 PROCEDURE — 25000003 PHARM REV CODE 250: Performed by: STUDENT IN AN ORGANIZED HEALTH CARE EDUCATION/TRAINING PROGRAM

## 2022-09-02 PROCEDURE — 63600175 PHARM REV CODE 636 W HCPCS: Performed by: STUDENT IN AN ORGANIZED HEALTH CARE EDUCATION/TRAINING PROGRAM

## 2022-09-02 PROCEDURE — 25000242 PHARM REV CODE 250 ALT 637 W/ HCPCS: Performed by: STUDENT IN AN ORGANIZED HEALTH CARE EDUCATION/TRAINING PROGRAM

## 2022-09-02 PROCEDURE — 20600001 HC STEP DOWN PRIVATE ROOM

## 2022-09-02 PROCEDURE — 80053 COMPREHEN METABOLIC PANEL: CPT | Performed by: STUDENT IN AN ORGANIZED HEALTH CARE EDUCATION/TRAINING PROGRAM

## 2022-09-02 PROCEDURE — 25000003 PHARM REV CODE 250: Performed by: COLON & RECTAL SURGERY

## 2022-09-02 RX ADMIN — METHOCARBAMOL 500 MG: 500 TABLET ORAL at 03:09

## 2022-09-02 RX ADMIN — METHOCARBAMOL 500 MG: 500 TABLET ORAL at 09:09

## 2022-09-02 RX ADMIN — OXYCODONE HYDROCHLORIDE 10 MG: 10 TABLET ORAL at 05:09

## 2022-09-02 RX ADMIN — METRONIDAZOLE 500 MG: 500 TABLET ORAL at 05:09

## 2022-09-02 RX ADMIN — CARBOXYMETHYLCELLULOSE SODIUM 1 DROP: 10 GEL OPHTHALMIC at 09:09

## 2022-09-02 RX ADMIN — PSYLLIUM HUSK 1 PACKET: 3.4 POWDER ORAL at 09:09

## 2022-09-02 RX ADMIN — METRONIDAZOLE 500 MG: 500 TABLET ORAL at 09:09

## 2022-09-02 RX ADMIN — FOLIC ACID 1 MG: 1 TABLET ORAL at 09:09

## 2022-09-02 RX ADMIN — ENOXAPARIN SODIUM 40 MG: 100 INJECTION SUBCUTANEOUS at 04:09

## 2022-09-02 RX ADMIN — SODIUM CHLORIDE, SODIUM LACTATE, POTASSIUM CHLORIDE, AND CALCIUM CHLORIDE: 600; 310; 30; 20 INJECTION, SOLUTION INTRAVENOUS at 01:09

## 2022-09-02 RX ADMIN — OXYCODONE HYDROCHLORIDE 10 MG: 10 TABLET ORAL at 01:09

## 2022-09-02 RX ADMIN — CARBOXYMETHYLCELLULOSE SODIUM 1 DROP: 10 GEL OPHTHALMIC at 04:09

## 2022-09-02 RX ADMIN — ACETAMINOPHEN 1000 MG: 500 TABLET ORAL at 09:09

## 2022-09-02 RX ADMIN — SODIUM CHLORIDE, SODIUM LACTATE, POTASSIUM CHLORIDE, AND CALCIUM CHLORIDE: 600; 310; 30; 20 INJECTION, SOLUTION INTRAVENOUS at 03:09

## 2022-09-02 RX ADMIN — CIPROFLOXACIN 500 MG: 500 TABLET, FILM COATED ORAL at 09:09

## 2022-09-02 RX ADMIN — METRONIDAZOLE 500 MG: 500 TABLET ORAL at 01:09

## 2022-09-02 RX ADMIN — MELATONIN TAB 3 MG 6 MG: 3 TAB at 09:09

## 2022-09-02 RX ADMIN — OXYCODONE HYDROCHLORIDE 10 MG: 10 TABLET ORAL at 09:09

## 2022-09-02 RX ADMIN — ACETAMINOPHEN 1000 MG: 500 TABLET ORAL at 03:09

## 2022-09-02 RX ADMIN — CARBOXYMETHYLCELLULOSE SODIUM 1 DROP: 10 GEL OPHTHALMIC at 01:09

## 2022-09-02 RX ADMIN — ONDANSETRON 4 MG: 2 INJECTION INTRAMUSCULAR; INTRAVENOUS at 09:09

## 2022-09-02 NOTE — PLAN OF CARE
Patient A&Ox4, all VSS, on room air, no tele.  Patient emptying and changing own ostomy bag.  Output is not liquid and more formed today.  Patient with 300 out this shift.  Pain is well controlled with PRN oxy q4h.  Patient up independently with no issues noted.  LR @ 75/hr.  Voids spontaneously.  Good appetite.  Possible discharge tomorrow.  Plan of care discussed with patient and states no questions.  Will continue to monitor.    Problem: Adult Inpatient Plan of Care  Goal: Plan of Care Review  Outcome: Ongoing, Progressing  Goal: Patient-Specific Goal (Individualized)  Outcome: Ongoing, Progressing  Goal: Absence of Hospital-Acquired Illness or Injury  Outcome: Ongoing, Progressing  Goal: Optimal Comfort and Wellbeing  Outcome: Ongoing, Progressing  Goal: Readiness for Transition of Care  Outcome: Ongoing, Progressing     Problem: Infection  Goal: Absence of Infection Signs and Symptoms  Outcome: Ongoing, Progressing     Problem: Diarrhea  Goal: Fluid and Electrolyte Balance  Outcome: Ongoing, Progressing     Problem: Fall Injury Risk  Goal: Absence of Fall and Fall-Related Injury  Outcome: Ongoing, Progressing     Problem: Pain Acute  Goal: Acceptable Pain Control and Functional Ability  Outcome: Ongoing, Progressing     Problem: Electrolyte Imbalance  Goal: Electrolyte Balance  Outcome: Ongoing, Progressing     Problem: Nausea and Vomiting  Goal: Fluid and Electrolyte Balance  Outcome: Ongoing, Progressing

## 2022-09-02 NOTE — PROGRESS NOTES
[facility]  Colorectal Surgery  Progress Note    Patient Name: Nikkie Myles  MRN: [id number]  Admission Date: 8/11/2022  Hospital Length of Stay: 21 days  Attending Physician: Zuleyka Yip MD    Subjective:     Interval History: No acute events overnight. Feels well overall.    Post-Op Info:  Procedure(s) (LRB):  CREATION, ILEOSTOMY, LAPAROSCOPIC, BIOPSY PERIANAL FISTULA (N/A)   10 Days Post-Op      Medications:  Continuous Infusions:   lactated ringers 75 mL/hr at 09/02/22 0145     Scheduled Meds:   acetaminophen  1,000 mg Oral TID    carboxymethylcellulose sodium  1 drop Ophthalmic QID    ciprofloxacin HCl  500 mg Oral Q12H    enoxaparin  40 mg Subcutaneous Daily    folic acid  1 mg Oral Daily    LIDOcaine HCl 2%   Mucous Membrane Once    methocarbamoL  500 mg Oral TID    metroNIDAZOLE  500 mg Oral Q8H    psyllium husk (aspartame)  1 packet Oral BID    scopolamine  1 patch Transdermal Q3 Days     PRN Meds:   albuterol-ipratropium    aluminum-magnesium hydroxide-simethicone    dextrose 10%    dextrose 10%    glucagon (human recombinant)    glucose    glucose    melatonin    naloxone    ondansetron    oxyCODONE    promethazine (PHENERGAN) IVPB    simethicone    sodium chloride 0.9%    traMADoL        Objective:     Vital Signs (Most Recent):  Temp: 98.4 °F (36.9 °C) (09/02/22 0420)  Pulse: 79 (09/02/22 0420)  Resp: 18 (09/02/22 0536)  BP: (!) 94/50 (09/02/22 0420)  SpO2: 98 % (09/02/22 0420)   Vital Signs (24h Range):  Temp:  [98.4 °F (36.9 °C)-98.8 °F (37.1 °C)] 98.4 °F (36.9 °C)  Pulse:  [70-88] 79  Resp:  [16-18] 18  SpO2:  [98 %-100 %] 98 %  BP: ()/(49-65) 94/50     Intake/Output - Last 3 Shifts         08/31 0700  09/01 0659 09/01 0700  09/02 0659 09/02 0700  09/03 0659    P.O. 1080 450     I.V. (mL/kg) 636.5 (8.2) 900 (11.7)     IV Piggyback 1119.2      Total Intake(mL/kg) 2835.7 (36.7) 1350 (17.5)     Urine (mL/kg/hr) 1000 (0.5) 0 (0)     Stool 2150 1025     Total Output 3150 1025     Net  -314.3 +325            Urine Occurrence 4 x 2 x     Stool Occurrence 1 x 1 x             Physical Exam  Constitutional:       Appearance: Normal appearance.   HENT:      Head: Normocephalic and atraumatic.   Cardiovascular:      Rate and Rhythm: Normal rate and regular rhythm.   Pulmonary:      Effort: Pulmonary effort is normal.      Breath sounds: Normal breath sounds.   Abdominal:      Comments: Ileostomy in place, pink/viable appearing, air and stool in bag   Musculoskeletal:         General: Normal range of motion.   Neurological:      General: No focal deficit present.      Mental Status: She is oriented to person, place, and time.         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 S/p EUA on 8/15 demonstrating deep anal fissures. Colonoscopy 8/18 demonstraetd erythema in distal colon and proximal rectum.Now s/p laparoscopic diverting ileostomy 8/23/22. AP CT w/ PO and IV contrast 8/29/22 showed no abscess or fluid collection     - Diet: continue LRD  - Ostomy output improving - continue Psyllium   - Multimodal pain management  - PO Cipro/Flagyl  - OOBTC/ ambulation encouraged  - Ophthalmology f/u outpatient       JANUSZ PEARL MD  Colorectal Surgery  [facility]

## 2022-09-02 NOTE — PLAN OF CARE
Patient AOX4, GCS 15, ambulates independently. BP maintains systolic over 90, VSS, afebrile, no c/o CP or SOB, tolerating room air.     Receiving IVF. 450 mls ileostomy output noted on day shift, patient handling emptying ileostomy with RN guidance, teaching completed.      Tolerating low fiber diet. Lap sites without signs of infection, dermabond intact. Old wounds on back dressing changed today clean, dry and intact.     Pain medications adjusted today, patient tolerating well, pain controlled.     All questions answered, patient resting comfortably at this time, denies further needs.

## 2022-09-03 VITALS
RESPIRATION RATE: 18 BRPM | OXYGEN SATURATION: 97 % | BODY MASS INDEX: 33.41 KG/M2 | DIASTOLIC BLOOD PRESSURE: 62 MMHG | HEIGHT: 60 IN | WEIGHT: 170.19 LBS | HEART RATE: 66 BPM | TEMPERATURE: 99 F | SYSTOLIC BLOOD PRESSURE: 100 MMHG

## 2022-09-03 PROCEDURE — 63600175 PHARM REV CODE 636 W HCPCS: Performed by: STUDENT IN AN ORGANIZED HEALTH CARE EDUCATION/TRAINING PROGRAM

## 2022-09-03 PROCEDURE — 25000003 PHARM REV CODE 250: Performed by: STUDENT IN AN ORGANIZED HEALTH CARE EDUCATION/TRAINING PROGRAM

## 2022-09-03 PROCEDURE — 25000003 PHARM REV CODE 250

## 2022-09-03 PROCEDURE — 94761 N-INVAS EAR/PLS OXIMETRY MLT: CPT

## 2022-09-03 PROCEDURE — 25000003 PHARM REV CODE 250: Performed by: COLON & RECTAL SURGERY

## 2022-09-03 PROCEDURE — 63600175 PHARM REV CODE 636 W HCPCS

## 2022-09-03 RX ORDER — METRONIDAZOLE 500 MG/1
500 TABLET ORAL EVERY 8 HOURS
Qty: 42 TABLET | Refills: 0 | Status: SHIPPED | OUTPATIENT
Start: 2022-09-03 | End: 2022-09-17

## 2022-09-03 RX ORDER — OXYCODONE HYDROCHLORIDE 5 MG/1
5 TABLET ORAL EVERY 4 HOURS PRN
Qty: 12 TABLET | Refills: 0 | Status: ON HOLD | OUTPATIENT
Start: 2022-09-03 | End: 2022-12-30 | Stop reason: HOSPADM

## 2022-09-03 RX ORDER — CIPROFLOXACIN 500 MG/1
500 TABLET ORAL EVERY 12 HOURS
Qty: 28 TABLET | Refills: 0 | Status: SHIPPED | OUTPATIENT
Start: 2022-09-03 | End: 2022-09-17

## 2022-09-03 RX ADMIN — CIPROFLOXACIN 500 MG: 500 TABLET, FILM COATED ORAL at 09:09

## 2022-09-03 RX ADMIN — SODIUM CHLORIDE, SODIUM LACTATE, POTASSIUM CHLORIDE, AND CALCIUM CHLORIDE: 600; 310; 30; 20 INJECTION, SOLUTION INTRAVENOUS at 05:09

## 2022-09-03 RX ADMIN — METRONIDAZOLE 500 MG: 500 TABLET ORAL at 05:09

## 2022-09-03 RX ADMIN — OXYCODONE HYDROCHLORIDE 10 MG: 10 TABLET ORAL at 03:09

## 2022-09-03 RX ADMIN — PROMETHAZINE HYDROCHLORIDE 12.5 MG: 25 INJECTION INTRAMUSCULAR; INTRAVENOUS at 05:09

## 2022-09-03 RX ADMIN — FOLIC ACID 1 MG: 1 TABLET ORAL at 09:09

## 2022-09-03 RX ADMIN — ACETAMINOPHEN 1000 MG: 500 TABLET ORAL at 09:09

## 2022-09-03 RX ADMIN — METHOCARBAMOL 500 MG: 500 TABLET ORAL at 09:09

## 2022-09-03 NOTE — PLAN OF CARE
09/03/22 1018   Post-Acute Status   Post-Acute Authorization Home Health   Home Health Status Set-up Complete/Auth obtained     Patient accepted by [facility], will admit monday

## 2022-09-03 NOTE — SUBJECTIVE & OBJECTIVE
Subjective:     Interval History: NAEO. Pain is well controlled. No n/v. Abd cramping is getting better. Ostomy continues to have liquid output. Nurse called for low BP overnight, but this is normal for her.     Post-Op Info:  Procedure(s) (LRB):  CREATION, ILEOSTOMY, LAPAROSCOPIC, BIOPSY PERIANAL FISTULA (N/A)   11 Days Post-Op      Medications:  Continuous Infusions:   lactated ringers 75 mL/hr at 09/03/22 0559       Scheduled Meds:   acetaminophen  1,000 mg Oral TID    carboxymethylcellulose sodium  1 drop Ophthalmic QID    ciprofloxacin HCl  500 mg Oral Q12H    enoxaparin  40 mg Subcutaneous Daily    folic acid  1 mg Oral Daily    lactated ringers  1,000 mL Intravenous Once    LIDOcaine HCl 2%   Mucous Membrane Once    methocarbamoL  500 mg Oral TID    metroNIDAZOLE  500 mg Oral Q8H    psyllium husk (aspartame)  1 packet Oral BID    scopolamine  1 patch Transdermal Q3 Days     PRN Meds:   albuterol-ipratropium    aluminum-magnesium hydroxide-simethicone    dextrose 10%    dextrose 10%    glucagon (human recombinant)    glucose    glucose    melatonin    naloxone    ondansetron    oxyCODONE    promethazine (PHENERGAN) IVPB    simethicone    sodium chloride 0.9%    traMADoL        Objective:     Vital Signs (Most Recent):  Temp: 98.7 °F (37.1 °C) (09/03/22 0747)  Pulse: 79 (09/03/22 0748)  Resp: 16 (09/03/22 0747)  BP: (!) 86/54 (09/03/22 0748)  SpO2: 98 % (09/03/22 0748) Vital Signs (24h Range):  Temp:  [97.7 °F (36.5 °C)-99.3 °F (37.4 °C)] 98.7 °F (37.1 °C)  Pulse:  [72-93] 79  Resp:  [16-18] 16  SpO2:  [97 %-99 %] 98 %  BP: ()/(50-69) 86/54     Intake/Output - Last 3 Shifts         09/01 0700  09/02 0659 09/02 0700  09/03 0659 09/03 0700  09/04 0659    P.O. 450      I.V. (mL/kg) 900 (11.7)      IV Piggyback       Total Intake(mL/kg) 1350 (17.5)      Urine (mL/kg/hr) 0 (0) 800 (0.4)     Stool 1025 1100     Total Output 1025 1900     Net +325 -1900            Urine Occurrence 2 x 2 x     Stool Occurrence  1 x 1 x             Physical Exam  Constitutional:       General: She is not in acute distress.     Appearance: She is well-developed.   HENT:      Head: Normocephalic and atraumatic.   Eyes:      General:         Right eye: No discharge.         Left eye: No discharge.      Conjunctiva/sclera: Conjunctivae normal.   Cardiovascular:      Rate and Rhythm: Normal rate and regular rhythm.   Pulmonary:      Effort: Pulmonary effort is normal. No respiratory distress.   Abdominal:      General: There is no distension.      Palpations: Abdomen is soft.      Tenderness: There is no abdominal tenderness. There is no guarding or rebound.      Comments: Ostomy bag w/ liquid output   Musculoskeletal:         General: No deformity. Normal range of motion.      Cervical back: Normal range of motion.   Skin:     General: Skin is warm and dry.   Neurological:      Mental Status: She is alert and oriented to person, place, and time.   Psychiatric:         Behavior: Behavior normal.           Significant Labs:  BMP (Last 3 Results):   Recent Labs   Lab 08/28/22  1113 08/29/22  1017 08/31/22  1042 09/02/22  1028   GLU 77 61* 94 104   * 134* 133* 135*   K 4.3 3.9 3.4* 3.8    101 101 103   CO2 22* 21* 24 24   BUN 5* 4* <3* <3*   CREATININE 0.6 0.6 0.6 0.6   CALCIUM 9.2 8.9 8.9 8.7   MG 1.6  --   --   --        CBC (Last 3 Results):   Recent Labs   Lab 08/29/22  1017 08/31/22  1042 09/02/22  1028   WBC 5.54 4.52 3.93   RBC 4.85 5.44* 4.75   HGB 8.6* 9.4* 8.5*   HCT 27.8* 30.7* 27.9*    316 281   MCV 57* 56* 59*   MCH 17.7* 17.3* 17.9*   MCHC 30.9* 30.6* 30.5*         Significant Diagnostics:  I have reviewed all pertinent imaging results/findings within the past 24 hours.

## 2022-09-03 NOTE — PROGRESS NOTES
Attempted full body assessment, per report, wound on buttocks. Pt refused to turn for RN to assess wound.    Will attempt later.    SBP 86/54, still asymptomatic. No new orders still. MD on call placed order for bolus, Dr cunningham said to hold and encourage PO intake fluids.    Provide pt with extra ostomy supplies and I/o ostomy output sheet.    tm.

## 2022-09-03 NOTE — NURSING
RN informed MD on call of BP 86/50, HR 76, patient nonsymptomatic, MD to see patient at bedside. No new orders at this time.

## 2022-09-03 NOTE — PLAN OF CARE
Patient AOX4, GCS 15, ambulates independently. BP maintains systolic over 90, VSS, afebrile, no c/o CP or SOB, tolerating room air.      Receiving IVF. adequate ileostomy output, patient handling emptying ileostomy with RN guidance, teaching completed.       Tolerating low fiber diet. Lap sites without signs of infection, dermabond intact. Old wounds on back dressing changed today clean, dry and intact.      Pain medications adjusted today, patient tolerating well, pain controlled.      All questions answered, patient resting comfortably at this time, denies further needs.

## 2022-09-03 NOTE — PLAN OF CARE
Patient discharged to home. Per Dr Rodriguez, they will call pts self phone to set up Ophthalmology apt. Discharge instructions verbally given and hard copy provided to patient. Awaiting for prescriptions to be delivered bedside. Removed IV with cath tip in place. Patient tolerated IV removal well with bleeding controlled. Patient remains free of falls with no acute pain or distress noted.     Pt discharged with extra ostomy supplies, home health set up for Monday and pt verbalized understanding of ostomy output sheet.    Dr hahn with discharging patient with current bp trends, pt asymptomatic.     Provided pt with ostomy measuring containers, urine measuring device and gauze and tape for buttock wound.    Awaiting pt ride.

## 2022-09-03 NOTE — DISCHARGE SUMMARY
[facility]  Colorectal Surgery  Discharge Summary      Patient Name: Nikkie Myles  MRN: [id number]  Admission Date: 8/11/2022  Hospital Length of Stay: 22 days  Discharge Date and Time:  09/03/2022 10:58 AM  Attending Physician: Zuleyka Yip MD   Discharging Provider: Sunny Jauregui MD  Primary Care Provider: Brian Collado MD        HPI: Nikkie Myles is a 31 yo with significant history for perianal/UGI duodneal/ileal crohn's disease dx March 2022 on infliximab/MTX , perianal fistula, vitamin d deficiency, right eye corneal ulcer, left eye uveitis, erythema nodosum prior to dx now resolved, and WILLY who presented to hospital for diffused abdominal cramps lower>upper with bloody diarrhea and right eye burning discomfort and vision loss. She is now s/p laparoscopic diverting ileostomy on 08/23/22.    Procedure(s) (LRB):  CREATION, ILEOSTOMY, LAPAROSCOPIC, BIOPSY PERIANAL FISTULA (N/A)      Hospital Course: 31 yo female with hx of Crohn's admitted with perirectal abscess. S/p EUA on 8/15 demonstrating deep anal fissures with no surgical intervention at the time of examination. Colonoscopy 8/18 demonstraetd erythema in distal colon and proximal rectum. Due to severe perianal disease related to Chron's she underwent laparoscopic diverting ileostomy 8/23/22. No intraoperative complications. She had some abdominal cramping and decreased ostomy output during hospital stay, but this has progressively improved and ostomy output has increased. Diet has been advanced as tolerated, currently on regular diet. Denies nausea or vomiting. Adequate output from ostomy bag. Ostomy care has provided education about appropriate care. Pain is well controlled and patient is ambulating adequately. She is medically stable for discharge. Home health has been ordered and will see her on Monday (9/5). Will follow up outpatient with ophthalmology as well.     Consults:   Consults (From admission, onward)          Status Ordering  Provider     Inpatient consult to Midline team  Once        Provider:  (Not yet assigned)    Completed MARK YIP     Inpatient consult to PICC team ([facility])  Once        Provider:  (Not yet assigned)    Completed MARK YIP     Inpatient consult to Midline team  Once        Provider:  (Not yet assigned)    Completed DAY ARANA     Inpatient consult to Colorectal Surgery  Once        Provider:  (Not yet assigned)    Completed ALEXEY LYONS     Inpatient consult to Infectious Diseases  Once        Provider:  (Not yet assigned)    Completed DAY ARANA     Inpatient consult to Gastroenterology  Once        Provider:  (Not yet assigned)    Completed LACHO BOJORQUEZ     Inpatient consult to Ophthalmology  Once        Provider:  (Not yet assigned)    Completed PATIENCE BELCHER     Inpatient consult to General surgery  Once        Provider:  (Not yet assigned)    Completed MERLENE KEENE     Inpatient consult to Gastroenterology  Once        Provider:  Kristine Pereyra MD    Completed JOSIAH BLANCHARD            Final Active Diagnoses:    Diagnosis Date Noted POA    PRINCIPAL PROBLEM:  Crohn's disease with abscess [K50.914] 08/12/2022 Yes    Acute pain [R52] 08/17/2022 Yes    Crohn's disease of both small and large intestine with fistula [K50.813]  Yes    Corneal ulcer [H16.009]  Yes    Central corneal ulcer, right [H16.011] 08/12/2022 Yes      Problems Resolved During this Admission:      Discharged Condition: good    Disposition: Home or Self Care    Follow Up: FU with opthomology and Dr. Yip    Patient Instructions:      Notify your health care provider if you experience any of the following:  increased confusion or weakness     Notify your health care provider if you experience any of the following:  persistent dizziness, light-headedness, or visual disturbances     Notify your health care provider if you experience any of the following:  worsening rash     Notify your health care  provider if you experience any of the following:  severe persistent headache     Notify your health care provider if you experience any of the following:  difficulty breathing or increased cough     Notify your health care provider if you experience any of the following:  redness, tenderness, or signs of infection (pain, swelling, redness, odor or green/yellow discharge around incision site)     Notify your health care provider if you experience any of the following:  severe uncontrolled pain     Notify your health care provider if you experience any of the following:  persistent nausea and vomiting or diarrhea     Notify your health care provider if you experience any of the following:  temperature >100.4     Medications:  Reconciled Home Medications:      Medication List        START taking these medications      ciprofloxacin HCl 500 MG tablet  Commonly known as: CIPRO  Take 1 tablet (500 mg total) by mouth every 12 (twelve) hours. for 14 days     metroNIDAZOLE 500 MG tablet  Commonly known as: FLAGYL  Take 1 tablet (500 mg total) by mouth every 8 (eight) hours. for 14 days     oxyCODONE 5 MG immediate release tablet  Commonly known as: ROXICODONE  Take 1 tablet (5 mg total) by mouth every 4 (four) hours as needed for Pain.            CONTINUE taking these medications      acetaminophen 500 MG tablet  Commonly known as: TYLENOL  Take 2 tablets (1,000 mg total) by mouth every 6 (six) hours as needed.     ibuprofen 400 MG tablet  Commonly known as: ADVIL,MOTRIN  Take 1 tablet (400 mg total) by mouth every 6 (six) hours as needed.     ondansetron 4 MG Tbdl  Commonly known as: ZOFRAN-ODT  Take 1 tablet (4 mg total) by mouth every 8 (eight) hours as needed.     prednisoLONE acetate 1 % Drps  Commonly known as: PRED FORTE  Place 1 drop into the left eye 3 (three) times daily.            STOP taking these medications      erythromycin ophthalmic ointment  Commonly known as: ROMYCIN              Significant Diagnostic  Studies: Labs: CMP   Recent Labs   Lab 09/02/22  1028   *   K 3.8      CO2 24      BUN <3*   CREATININE 0.6   CALCIUM 8.7   PROT 6.8   ALBUMIN 2.6*   BILITOT 0.2   ALKPHOS 57   AST 14   ALT 12   ANIONGAP 8   , CBC   Recent Labs   Lab 09/02/22  1028   WBC 3.93   HGB 8.5*   HCT 27.9*      , and All labs within the past 24 hours have been reviewed    Pending Diagnostic Studies:       None          Indwelling Lines/Drains at time of discharge:   Lines/Drains/Airways       Drain  Duration                  Ileostomy 08/23/22 1531 RLQ 10 days                    Time spent on the discharge of patient: 10 minutes         Sunny Jauregui MD  Colorectal Surgery  [facility]

## 2022-09-03 NOTE — ASSESSMENT & PLAN NOTE
33 yo female with hx of Crohn's admitted with perirectal abscess. S/p EUA on 8/15 demonstrating deep anal fissures. Colonoscopy 8/18 demonstraetd erythema in distal colon and proximal rectum.Now s/p laparoscopic diverting ileostomy 8/23/22. AP CT w/ PO and IV contrast 8/29/22 showed no abscess or fluid collection    - Diet: continue LRD  - Metamucil BID  - Multimodal pain management  - Antiemetics: zofran, scopolamine patch  - Will continue cipro PO at home  - OOBTC/ ambulation encouraged  - Ophthalmology f/u outpatient  - Monitor ostomy output and consistency for timing of discharge

## 2022-09-03 NOTE — PROGRESS NOTES
[facility]  Colorectal Surgery  Progress Note    Patient Name: Nikkie Myles  MRN: [id number]  Admission Date: 8/11/2022  Hospital Length of Stay: 22 days  Attending Physician: Zuleyak Yip MD    Subjective:     Interval History: NAEO. Pain is well controlled. No n/v. Abd cramping is getting better. Ostomy continues to have liquid output. Nurse called for low BP overnight, but this is normal for her.     Post-Op Info:  Procedure(s) (LRB):  CREATION, ILEOSTOMY, LAPAROSCOPIC, BIOPSY PERIANAL FISTULA (N/A)   11 Days Post-Op      Medications:  Continuous Infusions:   lactated ringers 75 mL/hr at 09/03/22 0559       Scheduled Meds:   acetaminophen  1,000 mg Oral TID    carboxymethylcellulose sodium  1 drop Ophthalmic QID    ciprofloxacin HCl  500 mg Oral Q12H    enoxaparin  40 mg Subcutaneous Daily    folic acid  1 mg Oral Daily    lactated ringers  1,000 mL Intravenous Once    LIDOcaine HCl 2%   Mucous Membrane Once    methocarbamoL  500 mg Oral TID    metroNIDAZOLE  500 mg Oral Q8H    psyllium husk (aspartame)  1 packet Oral BID    scopolamine  1 patch Transdermal Q3 Days     PRN Meds:   albuterol-ipratropium    aluminum-magnesium hydroxide-simethicone    dextrose 10%    dextrose 10%    glucagon (human recombinant)    glucose    glucose    melatonin    naloxone    ondansetron    oxyCODONE    promethazine (PHENERGAN) IVPB    simethicone    sodium chloride 0.9%    traMADoL        Objective:     Vital Signs (Most Recent):  Temp: 98.7 °F (37.1 °C) (09/03/22 0747)  Pulse: 79 (09/03/22 0748)  Resp: 16 (09/03/22 0747)  BP: (!) 86/54 (09/03/22 0748)  SpO2: 98 % (09/03/22 0748) Vital Signs (24h Range):  Temp:  [97.7 °F (36.5 °C)-99.3 °F (37.4 °C)] 98.7 °F (37.1 °C)  Pulse:  [72-93] 79  Resp:  [16-18] 16  SpO2:  [97 %-99 %] 98 %  BP: ()/(50-69) 86/54     Intake/Output - Last 3 Shifts         09/01 0700 09/02 0659 09/02 0700 09/03 0659 09/03 0700 09/04 0659    P.O. 450      I.V. (mL/kg) 900 (11.7)      IV  Piggyback       Total Intake(mL/kg) 1350 (17.5)      Urine (mL/kg/hr) 0 (0) 800 (0.4)     Stool 1025 1100     Total Output 1025 1900     Net +325 -1900            Urine Occurrence 2 x 2 x     Stool Occurrence 1 x 1 x             Physical Exam  Constitutional:       General: She is not in acute distress.     Appearance: She is well-developed.   HENT:      Head: Normocephalic and atraumatic.   Eyes:      General:         Right eye: No discharge.         Left eye: No discharge.      Conjunctiva/sclera: Conjunctivae normal.   Cardiovascular:      Rate and Rhythm: Normal rate and regular rhythm.   Pulmonary:      Effort: Pulmonary effort is normal. No respiratory distress.   Abdominal:      General: There is no distension.      Palpations: Abdomen is soft.      Tenderness: There is no abdominal tenderness. There is no guarding or rebound.      Comments: Ostomy bag w/ liquid output   Musculoskeletal:         General: No deformity. Normal range of motion.      Cervical back: Normal range of motion.   Skin:     General: Skin is warm and dry.   Neurological:      Mental Status: She is alert and oriented to person, place, and time.   Psychiatric:         Behavior: Behavior normal.           Significant Labs:  BMP (Last 3 Results):   Recent Labs   Lab 08/28/22  1113 08/29/22  1017 08/31/22  1042 09/02/22  1028   GLU 77 61* 94 104   * 134* 133* 135*   K 4.3 3.9 3.4* 3.8    101 101 103   CO2 22* 21* 24 24   BUN 5* 4* <3* <3*   CREATININE 0.6 0.6 0.6 0.6   CALCIUM 9.2 8.9 8.9 8.7   MG 1.6  --   --   --        CBC (Last 3 Results):   Recent Labs   Lab 08/29/22  1017 08/31/22  1042 09/02/22  1028   WBC 5.54 4.52 3.93   RBC 4.85 5.44* 4.75   HGB 8.6* 9.4* 8.5*   HCT 27.8* 30.7* 27.9*    316 281   MCV 57* 56* 59*   MCH 17.7* 17.3* 17.9*   MCHC 30.9* 30.6* 30.5*         Significant Diagnostics:  I have reviewed all pertinent imaging results/findings within the past 24 hours.    Assessment/Plan:     * Crohn's disease  with abscess  33 yo female with hx of Crohn's admitted with perirectal abscess. S/p EUA on 8/15 demonstrating deep anal fissures. Colonoscopy 8/18 demonstraetd erythema in distal colon and proximal rectum.Now s/p laparoscopic diverting ileostomy 8/23/22. AP CT w/ PO and IV contrast 8/29/22 showed no abscess or fluid collection    - Diet: continue LRD  - Metamucil BID  - Multimodal pain management  - Antiemetics: zofran, scopolamine patch  - Will continue cipro PO at home  - OOBTC/ ambulation encouraged  - Ophthalmology f/u outpatient    Dispo: home today          Sunny Jauregui MD  Colorectal Surgery  [facility]

## 2022-09-05 PROCEDURE — G0180 MD CERTIFICATION HHA PATIENT: HCPCS | Mod: ,,, | Performed by: COLON & RECTAL SURGERY

## 2022-09-05 PROCEDURE — G0180 PR HOME HEALTH MD CERTIFICATION: ICD-10-PCS | Mod: ,,, | Performed by: COLON & RECTAL SURGERY

## 2022-09-06 ENCOUNTER — TELEPHONE (OUTPATIENT)
Dept: GASTROENTEROLOGY | Facility: CLINIC | Age: 32
End: 2022-09-06
Payer: MEDICAID

## 2022-09-06 ENCOUNTER — TELEPHONE (OUTPATIENT)
Dept: SURGERY | Facility: CLINIC | Age: 32
End: 2022-09-06
Payer: MEDICAID

## 2022-09-06 ENCOUNTER — TELEPHONE (OUTPATIENT)
Dept: OPHTHALMOLOGY | Facility: CLINIC | Age: 32
End: 2022-09-06
Payer: MEDICAID

## 2022-09-06 NOTE — TELEPHONE ENCOUNTER
Contacted Dr. Casanova and let her know okay to resume remicade with plans to resume at remicade 10 mg/kg q 4 weeks ( missed dose 8/19) due to surgery.     TOMY Warner    ----- Message from Zuleyka Yip MD sent at 9/6/2022  1:50 PM CDT -----  She should be ok start the remicaide.     ----- Message -----  From: Otis Warner MD  Sent: 9/6/2022  11:49 AM CDT  To: Otis Warner MD, MD Dr. Phi Garcia  Thanks. Looks like she was discharged and has appt with you on 9/14. Trying to help coordinate with Dr. Susie Mckenna at [facility] IBD clinic too.     She missed her remicade dose 8/19 and has undetectable levels/abs.  How do you feel about her getting her infusion restarted this week or would you like to wait to advise on this after her postop visit with you?     - she has appt with me 9/29 and with Dr. Casanova a week later.     Thanks!    SS  ----- Message -----  From: Zuleyka Yip MD  Sent: 9/2/2022   7:31 AM CDT  To: Otis Warner MD    She is still inpatient    ----- Message -----  From: Otis Warner MD  Sent: 9/1/2022   4:35 PM CDT  To: Zuleyka Yip MD    [facility] I spoke with Susie (Nikkie's IBD doctor at [facility]) and they would like to restart remicade. I dont see postop appt with you scheduled and I know you would likely need to clear her.  Tenatively she is aiming to schedule her for remicade on 9/8 or 9/9 next week.     Susie's cell if you need is [phone]    I am seeing her on 9/29/22. Susie is seeing her first week of October 2022.     SS

## 2022-09-06 NOTE — TELEPHONE ENCOUNTER
----- Message from Lama Michael MD sent at 9/3/2022 10:56 AM CDT -----  Rosendo     Ms. Myles is Dr. Warner's patient, she needs follow-up for corneal opacity and neovascularization, right eye, in 1 month.     Phone number: [phone]    Thank you so much!    Best,

## 2022-09-13 ENCOUNTER — TELEPHONE (OUTPATIENT)
Dept: SURGERY | Facility: CLINIC | Age: 32
End: 2022-09-13
Payer: MEDICAID

## 2022-09-13 LAB — FUNGUS SPEC CULT: NORMAL

## 2022-09-14 ENCOUNTER — EXTERNAL HOME HEALTH (OUTPATIENT)
Dept: HOME HEALTH SERVICES | Facility: HOSPITAL | Age: 32
End: 2022-09-14
Payer: MEDICAID

## 2022-09-14 ENCOUNTER — OFFICE VISIT (OUTPATIENT)
Dept: SURGERY | Facility: CLINIC | Age: 32
End: 2022-09-14
Attending: COLON & RECTAL SURGERY
Payer: MEDICAID

## 2022-09-14 ENCOUNTER — OFFICE VISIT (OUTPATIENT)
Dept: SURGERY | Facility: CLINIC | Age: 32
End: 2022-09-14
Payer: MEDICAID

## 2022-09-14 VITALS
HEART RATE: 99 BPM | WEIGHT: 166.88 LBS | SYSTOLIC BLOOD PRESSURE: 123 MMHG | BODY MASS INDEX: 32.76 KG/M2 | HEIGHT: 60 IN | DIASTOLIC BLOOD PRESSURE: 76 MMHG

## 2022-09-14 DIAGNOSIS — Z98.890 POST-OPERATIVE STATE: Primary | ICD-10-CM

## 2022-09-14 DIAGNOSIS — Z93.2 ILEOSTOMY PRESENT: Primary | ICD-10-CM

## 2022-09-14 DIAGNOSIS — K50.919 CROHN'S DISEASE WITH COMPLICATION, UNSPECIFIED GASTROINTESTINAL TRACT LOCATION: ICD-10-CM

## 2022-09-14 PROCEDURE — 3008F BODY MASS INDEX DOCD: CPT | Mod: CPTII,,, | Performed by: COLON & RECTAL SURGERY

## 2022-09-14 PROCEDURE — 99212 OFFICE O/P EST SF 10 MIN: CPT | Mod: PBBFAC | Performed by: NURSE PRACTITIONER

## 2022-09-14 PROCEDURE — 3074F PR MOST RECENT SYSTOLIC BLOOD PRESSURE < 130 MM HG: ICD-10-PCS | Mod: CPTII,,, | Performed by: COLON & RECTAL SURGERY

## 2022-09-14 PROCEDURE — 3074F SYST BP LT 130 MM HG: CPT | Mod: CPTII,,, | Performed by: COLON & RECTAL SURGERY

## 2022-09-14 PROCEDURE — 3078F PR MOST RECENT DIASTOLIC BLOOD PRESSURE < 80 MM HG: ICD-10-PCS | Mod: CPTII,,, | Performed by: COLON & RECTAL SURGERY

## 2022-09-14 PROCEDURE — 3008F PR BODY MASS INDEX (BMI) DOCUMENTED: ICD-10-PCS | Mod: CPTII,,, | Performed by: COLON & RECTAL SURGERY

## 2022-09-14 PROCEDURE — 3078F DIAST BP <80 MM HG: CPT | Mod: CPTII,,, | Performed by: COLON & RECTAL SURGERY

## 2022-09-14 PROCEDURE — 99999 PR PBB SHADOW E&M-EST. PATIENT-LVL II: ICD-10-PCS | Mod: PBBFAC,,, | Performed by: NURSE PRACTITIONER

## 2022-09-14 PROCEDURE — 99024 PR POST-OP FOLLOW-UP VISIT: ICD-10-PCS | Mod: ,,, | Performed by: NURSE PRACTITIONER

## 2022-09-14 PROCEDURE — 99999 PR PBB SHADOW E&M-EST. PATIENT-LVL II: CPT | Mod: PBBFAC,,, | Performed by: NURSE PRACTITIONER

## 2022-09-14 PROCEDURE — 99999 PR PBB SHADOW E&M-EST. PATIENT-LVL III: CPT | Mod: PBBFAC,,, | Performed by: COLON & RECTAL SURGERY

## 2022-09-14 PROCEDURE — 1159F PR MEDICATION LIST DOCUMENTED IN MEDICAL RECORD: ICD-10-PCS | Mod: CPTII,,, | Performed by: COLON & RECTAL SURGERY

## 2022-09-14 PROCEDURE — 99024 POSTOP FOLLOW-UP VISIT: CPT | Mod: ,,, | Performed by: NURSE PRACTITIONER

## 2022-09-14 PROCEDURE — 99999 PR PBB SHADOW E&M-EST. PATIENT-LVL III: ICD-10-PCS | Mod: PBBFAC,,, | Performed by: COLON & RECTAL SURGERY

## 2022-09-14 PROCEDURE — 99024 PR POST-OP FOLLOW-UP VISIT: ICD-10-PCS | Mod: ,,, | Performed by: COLON & RECTAL SURGERY

## 2022-09-14 PROCEDURE — 99213 OFFICE O/P EST LOW 20 MIN: CPT | Mod: PBBFAC | Performed by: COLON & RECTAL SURGERY

## 2022-09-14 PROCEDURE — 1160F PR REVIEW ALL MEDS BY PRESCRIBER/CLIN PHARMACIST DOCUMENTED: ICD-10-PCS | Mod: CPTII,,, | Performed by: COLON & RECTAL SURGERY

## 2022-09-14 PROCEDURE — 1159F MED LIST DOCD IN RCRD: CPT | Mod: CPTII,,, | Performed by: COLON & RECTAL SURGERY

## 2022-09-14 PROCEDURE — 1160F RVW MEDS BY RX/DR IN RCRD: CPT | Mod: CPTII,,, | Performed by: COLON & RECTAL SURGERY

## 2022-09-14 PROCEDURE — 1160F RVW MEDS BY RX/DR IN RCRD: CPT | Mod: CPTII,,, | Performed by: NURSE PRACTITIONER

## 2022-09-14 PROCEDURE — 1159F PR MEDICATION LIST DOCUMENTED IN MEDICAL RECORD: ICD-10-PCS | Mod: CPTII,,, | Performed by: NURSE PRACTITIONER

## 2022-09-14 PROCEDURE — 99024 POSTOP FOLLOW-UP VISIT: CPT | Mod: ,,, | Performed by: COLON & RECTAL SURGERY

## 2022-09-14 PROCEDURE — 1159F MED LIST DOCD IN RCRD: CPT | Mod: CPTII,,, | Performed by: NURSE PRACTITIONER

## 2022-09-14 PROCEDURE — 1160F PR REVIEW ALL MEDS BY PRESCRIBER/CLIN PHARMACIST DOCUMENTED: ICD-10-PCS | Mod: CPTII,,, | Performed by: NURSE PRACTITIONER

## 2022-09-14 RX ORDER — TRAMADOL HYDROCHLORIDE 50 MG/1
50 TABLET ORAL EVERY 6 HOURS PRN
Qty: 30 TABLET | Refills: 0 | Status: ON HOLD | OUTPATIENT
Start: 2022-09-14 | End: 2022-12-30 | Stop reason: SDUPTHER

## 2022-09-14 NOTE — PROGRESS NOTES
Ms. Myles is unknown to me and comes today after surgery on 8/23/22 with Dr. Yip. Pt is seeing me for post op evaluation of ileostomy . The patient reports some problems with  new ostomy. Pain level today is 6 to perineal wound.  ASSESSMENT:  The ileostomy is red moist, slightly budded, os to 6 o'clock.    The pt wearing a 1piece pouching system by [employer].  Average wear time is 1 days.   Peristomal skin is erythema around site No rashes, no ulcers, no induration    There is  mucocutaneous separation 12 to 2 o'clock.  Pt is not coping well with new ostomy.  Support being  provided by Home Health and Family member(s)      PLAN:  Patient instructed to do the following routine for pouching:  Cleanse skin with water, dry well.  Fill mucocutaneous separation with powder  Apply skin barrier film. Allow to dry  Apply Thompsons barrier ring (7805) to bottom aspect of stoma covering erythematous weeping skin  Apply deep convex pouch sized appropriately for stoma.  Apply belt worn snuggly.  Pt counseled on DME suppliers for  ostomy pouches. Rx to [facility].   Pt also counseled on skin care, nutrition, hydration and ADL.  I spent greater than 50% of this 30 minute visit in face to face counseling.  Return clinic visit recommended in 4 weeks.

## 2022-09-15 NOTE — PROGRESS NOTES
"CRS Office Visit Follow-up    SUBJECTIVE:     History of Present Illness:  Patient is a 32 y.o. female who presents following laparoscopic loop ileostomy and perianal biopsy for severe perianal Crohn's disease on 8/23/2022. Their post-operative course was uncomplicated. Having some pouching/skin issues, seeing Stephanie today.  Output has been pudding consistency.  Still having perianal pain, on Cipro and Flagyl, restarted Remicaide last week.    Final pathology:  SPECIMEN DESIGNATED "PERIANAL FISTULA":   Benign reactive skin showing marked acute and chronic inflammation, subepithelial edema, fibrosis and vascular ectasia.   No granulomas identified.   Negative for malignancy.    Review of Systems:  ROS    OBJECTIVE:     Vital Signs (Most Recent)  /76 (BP Location: Left arm, Patient Position: Sitting, BP Method: Large (Automatic))   Pulse 99   Ht 5' (1.524 m)   Wt 75.7 kg (166 lb 14.4 oz)   BMI 32.60 kg/m²     Physical Exam:  General: Black or  female in no distress   Neuro: Alert and oriented x 4.  Moves all extremities.     HEENT: No icterus.  Trachea midline  Respiratory: Respirations are even and unlabored  Cardiac: Regular rate  Abdomen: Ileostomy healthy, mild skin excoriation inferiorly  Extremities: Warm dry and intact  Skin: No rashes  Anorectal: Persistent inflammation of skin tag and non-healing fistula sites      ASSESSMENT/PLAN:     31yo F s/p  laparoscopic loop ileostomy and perianal biopsy for severe perianal Crohn's disease on 8/23/2022, doing as expected    Continue Remicaide, Cipro, Flagyl  Path review here  RTC 3-4 weeks    Zuleyka Yip MD  Staff Surgeon, Colon and Rectal Surgery  [facility]     "

## 2022-09-16 ENCOUNTER — TELEPHONE (OUTPATIENT)
Dept: SURGERY | Facility: CLINIC | Age: 32
End: 2022-09-16
Payer: MEDICAID

## 2022-09-16 ENCOUNTER — TELEPHONE (OUTPATIENT)
Dept: GASTROENTEROLOGY | Facility: CLINIC | Age: 32
End: 2022-09-16
Payer: MEDICAID

## 2022-09-16 NOTE — TELEPHONE ENCOUNTER
Checked with Dr. Yip and she is ok with discharging patient from home health.     Called patient. No answer, unable to leave message due to voicemail box being full.

## 2022-09-16 NOTE — TELEPHONE ENCOUNTER
----- Message from Zuleyka Yip MD sent at 9/16/2022  4:31 PM CDT -----  Regarding: RE: Discharging  Yes, that's fine    ----- Message -----  From: Kristopher Wang RN  Sent: 9/16/2022   4:10 PM CDT  To: Zuleyka Yip MD  Subject: FW: Discharging                                  Dr. Yip,    Are you ok with discharging the patient from home health? I called her, but no answer and voicemail box is full.     ~Leon  ----- Message -----  From: Janel Corona  Sent: 9/16/2022   4:05 PM CDT  To: Phi Gardiner Staff  Subject: Discharging                                      Caller (Zandra) [facility] is requesting a call back Regarding     Pt is requesting for Discharge from Home Health please call to discuss further

## 2022-09-19 ENCOUNTER — TELEPHONE (OUTPATIENT)
Dept: SURGERY | Facility: CLINIC | Age: 32
End: 2022-09-19
Payer: MEDICAID

## 2022-09-19 NOTE — TELEPHONE ENCOUNTER
----- Message from Kristopher Wang RN sent at 9/16/2022  5:02 PM CDT -----  Regarding: FW: Discharging    ----- Message -----  From: Zuleyka Yip MD  Sent: 9/16/2022   4:31 PM CDT  To: Kristopher Wang RN  Subject: RE: Discharging                                  Yes, that's fine    ----- Message -----  From: Kristopher Wang RN  Sent: 9/16/2022   4:10 PM CDT  To: Zuleyka Yip MD  Subject: FW: Discharging                                  Dr. Yip,    Are you ok with discharging the patient from home health? I called her, but no answer and voicemail box is full.     ~Leon  ----- Message -----  From: Janel Corona  Sent: 9/16/2022   4:05 PM CDT  To: Phi Gardiner Staff  Subject: Discharging                                      Caller ([facility] is requesting a call back Regarding     Pt is requesting for Discharge from Home Health please call to discuss further

## 2022-09-22 ENCOUNTER — DOCUMENT SCAN (OUTPATIENT)
Dept: HOME HEALTH SERVICES | Facility: HOSPITAL | Age: 32
End: 2022-09-22
Payer: MEDICAID

## 2022-09-23 ENCOUNTER — DOCUMENT SCAN (OUTPATIENT)
Dept: HOME HEALTH SERVICES | Facility: HOSPITAL | Age: 32
End: 2022-09-23
Payer: MEDICAID

## 2022-09-29 ENCOUNTER — TELEPHONE (OUTPATIENT)
Dept: GASTROENTEROLOGY | Facility: CLINIC | Age: 32
End: 2022-09-29
Payer: MEDICAID

## 2022-10-05 ENCOUNTER — OFFICE VISIT (OUTPATIENT)
Dept: OPHTHALMOLOGY | Facility: CLINIC | Age: 32
End: 2022-10-05
Payer: MEDICAID

## 2022-10-05 DIAGNOSIS — H16.401 CORNEAL NEOVASCULARIZATION OF RIGHT EYE: ICD-10-CM

## 2022-10-05 DIAGNOSIS — Z22.322 MRSA (METHICILLIN RESISTANT STAPH AUREUS) CULTURE POSITIVE: ICD-10-CM

## 2022-10-05 DIAGNOSIS — H16.9 KERATITIS: Primary | ICD-10-CM

## 2022-10-05 DIAGNOSIS — H17.9 CORNEAL OPACITY: ICD-10-CM

## 2022-10-05 PROCEDURE — 99999 PR PBB SHADOW E&M-EST. PATIENT-LVL III: CPT | Mod: PBBFAC,,, | Performed by: OPHTHALMOLOGY

## 2022-10-05 PROCEDURE — 99214 PR OFFICE/OUTPT VISIT, EST, LEVL IV, 30-39 MIN: ICD-10-PCS | Mod: S$PBB,,, | Performed by: OPHTHALMOLOGY

## 2022-10-05 PROCEDURE — 1160F PR REVIEW ALL MEDS BY PRESCRIBER/CLIN PHARMACIST DOCUMENTED: ICD-10-PCS | Mod: CPTII,,, | Performed by: OPHTHALMOLOGY

## 2022-10-05 PROCEDURE — 1159F PR MEDICATION LIST DOCUMENTED IN MEDICAL RECORD: ICD-10-PCS | Mod: CPTII,,, | Performed by: OPHTHALMOLOGY

## 2022-10-05 PROCEDURE — 99213 OFFICE O/P EST LOW 20 MIN: CPT | Mod: PBBFAC | Performed by: OPHTHALMOLOGY

## 2022-10-05 PROCEDURE — 1159F MED LIST DOCD IN RCRD: CPT | Mod: CPTII,,, | Performed by: OPHTHALMOLOGY

## 2022-10-05 PROCEDURE — 99999 PR PBB SHADOW E&M-EST. PATIENT-LVL III: ICD-10-PCS | Mod: PBBFAC,,, | Performed by: OPHTHALMOLOGY

## 2022-10-05 PROCEDURE — 1160F RVW MEDS BY RX/DR IN RCRD: CPT | Mod: CPTII,,, | Performed by: OPHTHALMOLOGY

## 2022-10-05 PROCEDURE — 99214 OFFICE O/P EST MOD 30 MIN: CPT | Mod: S$PBB,,, | Performed by: OPHTHALMOLOGY

## 2022-10-05 NOTE — PROGRESS NOTES
HPI    ER f/u    Patient is here for 1 month ER f/u no complaints  Eye meds NONE  Last edited by CANDIDA Andre on 10/5/2022 10:07 AM.            Assessment /Plan     For exam results, see Encounter Report.    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

## 2022-10-06 ENCOUNTER — TELEPHONE (OUTPATIENT)
Dept: SURGERY | Facility: CLINIC | Age: 32
End: 2022-10-06
Payer: MEDICAID

## 2022-10-06 LAB
FINAL PATHOLOGIC DIAGNOSIS: NORMAL
Lab: NORMAL
SUPPLEMENTAL DIAGNOSIS: NORMAL

## 2022-10-06 NOTE — PROGRESS NOTES
"CRS Office Visit Follow-up    SUBJECTIVE:     History of Present Illness:  Patient is a 32 y.o. female who presents following laparoscopic loop ileostomy and perianal biopsy for severe perianal Crohn's disease on 8/23/2022.     Feeling better today!  Ostomy is putting much better since she saw Stephanie during her last visit.  She thinks that some of her perianal lesions are starting to heal, still experiencing a lot of irritation from the gluteal cleft lesion.    Final pathology:  SPECIMEN DESIGNATED "PERIANAL FISTULA":   Benign reactive skin showing marked acute and chronic inflammation, subepithelial edema, fibrosis and vascular ectasia.   No granulomas identified.   Negative for malignancy.    Review of Systems:  ROS    OBJECTIVE:     Vital Signs (Most Recent)  /74 (BP Location: Right arm, Patient Position: Sitting, BP Method: Large (Automatic))   Pulse 86   Ht 5' (1.524 m)   Wt 79.5 kg (175 lb 6 oz)   BMI 34.25 kg/m²     Physical Exam:  General: Black or  female in no distress   Neuro: Alert and oriented x 4.  Moves all extremities.     HEENT: No icterus.  Trachea midline  Respiratory: Respirations are even and unlabored  Cardiac: Regular rate  Abdomen: Ileostomy healthy,   Extremities: Warm dry and intact  Skin: No rashes  Anorectal:  There is healing with scab formation of the perianal subcutaneous fistulas, the skin tag is less prominent and tender, there is still some raw granulation tissue deep within the gluteal cleft      ASSESSMENT/PLAN:     31yo F s/p  laparoscopic loop ileostomy and perianal biopsy for severe perianal Crohn's disease on 8/23/2022, starting to see some clinical improvement and wound healing.  Would continue current medication regimen for Crohn's disease.  Will have her see Dio for consideration of silver impregnated dressing to gluteal cleft wounds.  Return to see me in 3-4 weeks.        Zuleyka Yip MD  Staff Surgeon, Colon and Rectal " Surgery  [facility]

## 2022-10-07 ENCOUNTER — OFFICE VISIT (OUTPATIENT)
Dept: SURGERY | Facility: CLINIC | Age: 32
End: 2022-10-07
Attending: COLON & RECTAL SURGERY
Payer: MEDICAID

## 2022-10-07 ENCOUNTER — PATIENT MESSAGE (OUTPATIENT)
Dept: SURGERY | Facility: CLINIC | Age: 32
End: 2022-10-07
Payer: MEDICAID

## 2022-10-07 VITALS
WEIGHT: 175.38 LBS | BODY MASS INDEX: 34.43 KG/M2 | DIASTOLIC BLOOD PRESSURE: 74 MMHG | HEART RATE: 86 BPM | SYSTOLIC BLOOD PRESSURE: 108 MMHG | HEIGHT: 60 IN

## 2022-10-07 DIAGNOSIS — Z93.2 ILEOSTOMY PRESENT: Primary | ICD-10-CM

## 2022-10-07 PROCEDURE — 1159F MED LIST DOCD IN RCRD: CPT | Mod: CPTII,,, | Performed by: COLON & RECTAL SURGERY

## 2022-10-07 PROCEDURE — 3074F PR MOST RECENT SYSTOLIC BLOOD PRESSURE < 130 MM HG: ICD-10-PCS | Mod: CPTII,,, | Performed by: COLON & RECTAL SURGERY

## 2022-10-07 PROCEDURE — 3008F BODY MASS INDEX DOCD: CPT | Mod: CPTII,,, | Performed by: COLON & RECTAL SURGERY

## 2022-10-07 PROCEDURE — 99213 OFFICE O/P EST LOW 20 MIN: CPT | Mod: PBBFAC | Performed by: COLON & RECTAL SURGERY

## 2022-10-07 PROCEDURE — 3078F DIAST BP <80 MM HG: CPT | Mod: CPTII,,, | Performed by: COLON & RECTAL SURGERY

## 2022-10-07 PROCEDURE — 1160F RVW MEDS BY RX/DR IN RCRD: CPT | Mod: CPTII,,, | Performed by: COLON & RECTAL SURGERY

## 2022-10-07 PROCEDURE — 99999 PR PBB SHADOW E&M-EST. PATIENT-LVL III: CPT | Mod: PBBFAC,,, | Performed by: COLON & RECTAL SURGERY

## 2022-10-07 PROCEDURE — 1160F PR REVIEW ALL MEDS BY PRESCRIBER/CLIN PHARMACIST DOCUMENTED: ICD-10-PCS | Mod: CPTII,,, | Performed by: COLON & RECTAL SURGERY

## 2022-10-07 PROCEDURE — 3074F SYST BP LT 130 MM HG: CPT | Mod: CPTII,,, | Performed by: COLON & RECTAL SURGERY

## 2022-10-07 PROCEDURE — 99024 POSTOP FOLLOW-UP VISIT: CPT | Mod: ,,, | Performed by: COLON & RECTAL SURGERY

## 2022-10-07 PROCEDURE — 3078F PR MOST RECENT DIASTOLIC BLOOD PRESSURE < 80 MM HG: ICD-10-PCS | Mod: CPTII,,, | Performed by: COLON & RECTAL SURGERY

## 2022-10-07 PROCEDURE — 1159F PR MEDICATION LIST DOCUMENTED IN MEDICAL RECORD: ICD-10-PCS | Mod: CPTII,,, | Performed by: COLON & RECTAL SURGERY

## 2022-10-07 PROCEDURE — 3008F PR BODY MASS INDEX (BMI) DOCUMENTED: ICD-10-PCS | Mod: CPTII,,, | Performed by: COLON & RECTAL SURGERY

## 2022-10-07 PROCEDURE — 99024 PR POST-OP FOLLOW-UP VISIT: ICD-10-PCS | Mod: ,,, | Performed by: COLON & RECTAL SURGERY

## 2022-10-07 PROCEDURE — 99999 PR PBB SHADOW E&M-EST. PATIENT-LVL III: ICD-10-PCS | Mod: PBBFAC,,, | Performed by: COLON & RECTAL SURGERY

## 2022-10-12 ENCOUNTER — OFFICE VISIT (OUTPATIENT)
Dept: WOUND CARE | Facility: CLINIC | Age: 32
End: 2022-10-12
Payer: MEDICAID

## 2022-10-12 DIAGNOSIS — K50.813 CROHN'S DISEASE OF BOTH SMALL AND LARGE INTESTINE WITH FISTULA: Primary | ICD-10-CM

## 2022-10-12 DIAGNOSIS — L98.419 SKIN ULCER OF BUTTOCK, UNSPECIFIED ULCER STAGE: ICD-10-CM

## 2022-10-12 PROCEDURE — 1160F PR REVIEW ALL MEDS BY PRESCRIBER/CLIN PHARMACIST DOCUMENTED: ICD-10-PCS | Mod: CPTII,,, | Performed by: CLINICAL NURSE SPECIALIST

## 2022-10-12 PROCEDURE — 99999 PR PBB SHADOW E&M-EST. PATIENT-LVL II: ICD-10-PCS | Mod: PBBFAC,,, | Performed by: CLINICAL NURSE SPECIALIST

## 2022-10-12 PROCEDURE — 99202 PR OFFICE/OUTPT VISIT, NEW, LEVL II, 15-29 MIN: ICD-10-PCS | Mod: S$PBB,,, | Performed by: CLINICAL NURSE SPECIALIST

## 2022-10-12 PROCEDURE — 1160F RVW MEDS BY RX/DR IN RCRD: CPT | Mod: CPTII,,, | Performed by: CLINICAL NURSE SPECIALIST

## 2022-10-12 PROCEDURE — 99202 OFFICE O/P NEW SF 15 MIN: CPT | Mod: S$PBB,,, | Performed by: CLINICAL NURSE SPECIALIST

## 2022-10-12 PROCEDURE — 1159F MED LIST DOCD IN RCRD: CPT | Mod: CPTII,,, | Performed by: CLINICAL NURSE SPECIALIST

## 2022-10-12 PROCEDURE — 99999 PR PBB SHADOW E&M-EST. PATIENT-LVL II: CPT | Mod: PBBFAC,,, | Performed by: CLINICAL NURSE SPECIALIST

## 2022-10-12 PROCEDURE — 99212 OFFICE O/P EST SF 10 MIN: CPT | Mod: PBBFAC | Performed by: CLINICAL NURSE SPECIALIST

## 2022-10-12 PROCEDURE — 1159F PR MEDICATION LIST DOCUMENTED IN MEDICAL RECORD: ICD-10-PCS | Mod: CPTII,,, | Performed by: CLINICAL NURSE SPECIALIST

## 2022-10-12 NOTE — PROGRESS NOTES
Subjective:       Patient ID: Nikkie Myles is a 32 y.o. female.    Chief Complaint: Wound Check, Ileostomy, and Skin Ulcer      This is pt who is post laparoscopic loop ileostomy and perianal biopsy for severe perianal Crohn's disease on 8/23/2022. No issues with ileo since seeing Stephanie . Her perianal issues and ulcers are better but still has full thickness ulcer in glut cleft that is very painful, here for assessment and recs     Review of Systems   Constitutional:  Negative for activity change, appetite change and fever.   HENT: Negative.     Respiratory: Negative.     Cardiovascular: Negative.    Gastrointestinal:         ILEOSTOMY   Genitourinary: Negative.        Objective:      Physical Exam  Constitutional:       Appearance: Normal appearance.   Pulmonary:      Effort: Pulmonary effort is normal.   Abdominal:      General: Bowel sounds are normal.      Tenderness: There is no abdominal tenderness.   Skin:     General: Skin is warm and dry.   Neurological:      Mental Status: She is alert.       Very painful ulceration , it improved per pt and Dr Yip.  I have advised she try TRIAD to gluteal crease bid,  CAN USE GUAZE OR BSURE FOR ABSORPTION IF DESIRED  Assessment:       Problem List Items Addressed This Visit          Unprioritized    Crohn's disease of both small and large intestine with fistula - Primary     Other Visit Diagnoses       Skin ulcer of buttock, unspecified ulcer stage                  Plan:       TRIAD to perinal ulcers   Fu as needed   I have reviewed the plan of care with the patient  and she express understanding. I spent over 50% of this 20 minute visit in face to face counseling.

## 2022-10-21 ENCOUNTER — TELEPHONE (OUTPATIENT)
Dept: SURGERY | Facility: CLINIC | Age: 32
End: 2022-10-21
Payer: MEDICAID

## 2022-11-18 ENCOUNTER — TELEPHONE (OUTPATIENT)
Dept: OPHTHALMOLOGY | Facility: CLINIC | Age: 32
End: 2022-11-18
Payer: MEDICAID

## 2022-11-18 DIAGNOSIS — H16.9 KERATITIS: Primary | ICD-10-CM

## 2022-12-01 NOTE — PROGRESS NOTES
CRS Office Visit Follow-up    SUBJECTIVE:     History of Present Illness:  Patient is a 32 y.o. female who presents following laparoscopic loop ileostomy and perianal biopsy for severe perianal Crohn's disease on 2022.     Continues to heal (slowly). Has a new wound near her  scar that has formed in the last month.  No pus/blood/stool draining. Having corneal transplant surgery soon.    Review of Systems:  ROS    OBJECTIVE:     Vital Signs (Most Recent)  BP (!) 113/56 (BP Location: Left arm, Patient Position: Sitting, BP Method: Large (Automatic))   Pulse 94   Ht 5' (1.524 m)   Wt 83.2 kg (183 lb 6.8 oz)   BMI 35.82 kg/m²     Physical Exam:  General: Black or  female in no distress   Neuro: Alert and oriented x 4.  Moves all extremities.     HEENT: No icterus.  Trachea midline  Respiratory: Respirations are even and unlabored  Cardiac: Regular rate  Abdomen: Ileostomy healthy,   Extremities: Warm dry and intact  Skin: No rashes  Anorectal:  The perianal fistula's have completely healed/scabbed. The skin tag is less prominent and tender, there is still some raw granulation tissue deep within the gluteal cleft although this is better than last time I saw her.      ASSESSMENT/PLAN:     31yo F s/p  laparoscopic loop ileostomy and perianal biopsy for severe perianal Crohn's disease on 2022, starting to see some clinical improvement and wound healing.  Would continue current medication regimen for Crohn's disease.  RTC 3 months.    Zuleyka Yip MD  Staff Surgeon, Colon and Rectal Surgery  [facility]

## 2022-12-02 ENCOUNTER — OFFICE VISIT (OUTPATIENT)
Dept: SURGERY | Facility: CLINIC | Age: 32
End: 2022-12-02
Attending: COLON & RECTAL SURGERY
Payer: MEDICAID

## 2022-12-02 VITALS
BODY MASS INDEX: 36.02 KG/M2 | DIASTOLIC BLOOD PRESSURE: 56 MMHG | WEIGHT: 183.44 LBS | HEART RATE: 94 BPM | SYSTOLIC BLOOD PRESSURE: 113 MMHG | HEIGHT: 60 IN

## 2022-12-02 DIAGNOSIS — K50.919 CROHN'S DISEASE WITH COMPLICATION, UNSPECIFIED GASTROINTESTINAL TRACT LOCATION: Primary | ICD-10-CM

## 2022-12-02 PROCEDURE — 3074F SYST BP LT 130 MM HG: CPT | Mod: CPTII,,, | Performed by: COLON & RECTAL SURGERY

## 2022-12-02 PROCEDURE — 3078F DIAST BP <80 MM HG: CPT | Mod: CPTII,,, | Performed by: COLON & RECTAL SURGERY

## 2022-12-02 PROCEDURE — 3078F PR MOST RECENT DIASTOLIC BLOOD PRESSURE < 80 MM HG: ICD-10-PCS | Mod: CPTII,,, | Performed by: COLON & RECTAL SURGERY

## 2022-12-02 PROCEDURE — 1159F PR MEDICATION LIST DOCUMENTED IN MEDICAL RECORD: ICD-10-PCS | Mod: CPTII,,, | Performed by: COLON & RECTAL SURGERY

## 2022-12-02 PROCEDURE — 3074F PR MOST RECENT SYSTOLIC BLOOD PRESSURE < 130 MM HG: ICD-10-PCS | Mod: CPTII,,, | Performed by: COLON & RECTAL SURGERY

## 2022-12-02 PROCEDURE — 1160F PR REVIEW ALL MEDS BY PRESCRIBER/CLIN PHARMACIST DOCUMENTED: ICD-10-PCS | Mod: CPTII,,, | Performed by: COLON & RECTAL SURGERY

## 2022-12-02 PROCEDURE — 3008F PR BODY MASS INDEX (BMI) DOCUMENTED: ICD-10-PCS | Mod: CPTII,,, | Performed by: COLON & RECTAL SURGERY

## 2022-12-02 PROCEDURE — 99213 PR OFFICE/OUTPT VISIT, EST, LEVL III, 20-29 MIN: ICD-10-PCS | Mod: S$PBB,,, | Performed by: COLON & RECTAL SURGERY

## 2022-12-02 PROCEDURE — 99999 PR PBB SHADOW E&M-EST. PATIENT-LVL III: CPT | Mod: PBBFAC,,, | Performed by: COLON & RECTAL SURGERY

## 2022-12-02 PROCEDURE — 99213 OFFICE O/P EST LOW 20 MIN: CPT | Mod: S$PBB,,, | Performed by: COLON & RECTAL SURGERY

## 2022-12-02 PROCEDURE — 3008F BODY MASS INDEX DOCD: CPT | Mod: CPTII,,, | Performed by: COLON & RECTAL SURGERY

## 2022-12-02 PROCEDURE — 99999 PR PBB SHADOW E&M-EST. PATIENT-LVL III: ICD-10-PCS | Mod: PBBFAC,,, | Performed by: COLON & RECTAL SURGERY

## 2022-12-02 PROCEDURE — 1159F MED LIST DOCD IN RCRD: CPT | Mod: CPTII,,, | Performed by: COLON & RECTAL SURGERY

## 2022-12-02 PROCEDURE — 1160F RVW MEDS BY RX/DR IN RCRD: CPT | Mod: CPTII,,, | Performed by: COLON & RECTAL SURGERY

## 2022-12-02 PROCEDURE — 99213 OFFICE O/P EST LOW 20 MIN: CPT | Mod: PBBFAC | Performed by: COLON & RECTAL SURGERY

## 2022-12-02 NOTE — Clinical Note
Hey- she is getting her infusions and healing (slowly). Can you send me her GIs number again?  I know you sent it before, I'm so sorry, but can't find it.  Thx!

## 2022-12-27 ENCOUNTER — HOSPITAL ENCOUNTER (OUTPATIENT)
Facility: HOSPITAL | Age: 32
Discharge: HOME | End: 2022-12-30
Attending: STUDENT IN AN ORGANIZED HEALTH CARE EDUCATION/TRAINING PROGRAM | Admitting: STUDENT IN AN ORGANIZED HEALTH CARE EDUCATION/TRAINING PROGRAM
Payer: MEDICAID

## 2022-12-27 DIAGNOSIS — R07.9 CHEST PAIN: ICD-10-CM

## 2022-12-27 DIAGNOSIS — K50.919 CROHN'S DISEASE WITH COMPLICATION, UNSPECIFIED GASTROINTESTINAL TRACT LOCATION: Primary | ICD-10-CM

## 2022-12-27 DIAGNOSIS — A07.2 INFECTION DUE TO CRYPTOSPORIDIUM: ICD-10-CM

## 2022-12-27 DIAGNOSIS — H16.9 KERATITIS: ICD-10-CM

## 2022-12-27 DIAGNOSIS — U07.1 COVID-19: ICD-10-CM

## 2022-12-27 DIAGNOSIS — U07.1 COVID: ICD-10-CM

## 2022-12-27 PROBLEM — K50.119: Status: ACTIVE | Noted: 2022-03-29

## 2022-12-27 PROBLEM — D64.9 SYMPTOMATIC ANEMIA: Status: ACTIVE | Noted: 2022-04-20

## 2022-12-27 PROBLEM — K60.2 ANAL FISSURE: Status: ACTIVE | Noted: 2022-03-09

## 2022-12-27 PROBLEM — D50.0 IRON DEFICIENCY ANEMIA DUE TO CHRONIC BLOOD LOSS: Status: ACTIVE | Noted: 2022-04-21

## 2022-12-27 LAB
ALBUMIN SERPL BCP-MCNC: 2.8 G/DL (ref 3.5–5.2)
ALP SERPL-CCNC: 74 U/L (ref 55–135)
ALT SERPL W/O P-5'-P-CCNC: 10 U/L (ref 10–44)
ANION GAP SERPL CALC-SCNC: 6 MMOL/L (ref 8–16)
AST SERPL-CCNC: 13 U/L (ref 10–40)
B-HCG UR QL: NEGATIVE
BACTERIA #/AREA URNS AUTO: ABNORMAL /HPF
BASOPHILS # BLD AUTO: 0.03 K/UL (ref 0–0.2)
BASOPHILS NFR BLD: 0.3 % (ref 0–1.9)
BILIRUB SERPL-MCNC: 0.2 MG/DL (ref 0.1–1)
BILIRUB UR QL STRIP: NEGATIVE
BUN SERPL-MCNC: 10 MG/DL (ref 6–20)
CALCIUM SERPL-MCNC: 8.5 MG/DL (ref 8.7–10.5)
CHLORIDE SERPL-SCNC: 106 MMOL/L (ref 95–110)
CLARITY UR REFRACT.AUTO: CLEAR
CO2 SERPL-SCNC: 23 MMOL/L (ref 23–29)
COLOR UR AUTO: YELLOW
CREAT SERPL-MCNC: 0.6 MG/DL (ref 0.5–1.4)
CRP SERPL-MCNC: 19.6 MG/L (ref 0–8.2)
CTP QC/QA: YES
DIFFERENTIAL METHOD: ABNORMAL
EOSINOPHIL # BLD AUTO: 0.2 K/UL (ref 0–0.5)
EOSINOPHIL NFR BLD: 1.7 % (ref 0–8)
ERYTHROCYTE [DISTWIDTH] IN BLOOD BY AUTOMATED COUNT: 20.7 % (ref 11.5–14.5)
ERYTHROCYTE [SEDIMENTATION RATE] IN BLOOD BY PHOTOMETRIC METHOD: >120 MM/HR (ref 0–36)
EST. GFR  (NO RACE VARIABLE): >60 ML/MIN/1.73 M^2
GLUCOSE SERPL-MCNC: 74 MG/DL (ref 70–110)
GLUCOSE UR QL STRIP: NEGATIVE
HCT VFR BLD AUTO: 32.3 % (ref 37–48.5)
HCV AB SERPL QL IA: NORMAL
HGB BLD-MCNC: 9.8 G/DL (ref 12–16)
HGB UR QL STRIP: NEGATIVE
HIV 1+2 AB+HIV1 P24 AG SERPL QL IA: NORMAL
IMM GRANULOCYTES # BLD AUTO: 0.03 K/UL (ref 0–0.04)
IMM GRANULOCYTES NFR BLD AUTO: 0.3 % (ref 0–0.5)
INFLUENZA A, MOLECULAR: NOT DETECTED
INFLUENZA B, MOLECULAR: NOT DETECTED
KETONES UR QL STRIP: NEGATIVE
LEUKOCYTE ESTERASE UR QL STRIP: ABNORMAL
LIPASE SERPL-CCNC: 18 U/L (ref 4–60)
LYMPHOCYTES # BLD AUTO: 4 K/UL (ref 1–4.8)
LYMPHOCYTES NFR BLD: 42.7 % (ref 18–48)
MCH RBC QN AUTO: 17.9 PG (ref 27–31)
MCHC RBC AUTO-ENTMCNC: 30.3 G/DL (ref 32–36)
MCV RBC AUTO: 59 FL (ref 82–98)
MICROSCOPIC COMMENT: ABNORMAL
MONOCYTES # BLD AUTO: 0.7 K/UL (ref 0.3–1)
MONOCYTES NFR BLD: 7.3 % (ref 4–15)
NEUTROPHILS # BLD AUTO: 4.5 K/UL (ref 1.8–7.7)
NEUTROPHILS NFR BLD: 47.7 % (ref 38–73)
NITRITE UR QL STRIP: NEGATIVE
NRBC BLD-RTO: 0 /100 WBC
PH UR STRIP: 5 [PH] (ref 5–8)
PLATELET # BLD AUTO: 396 K/UL (ref 150–450)
PMV BLD AUTO: ABNORMAL FL (ref 9.2–12.9)
POTASSIUM SERPL-SCNC: 3.9 MMOL/L (ref 3.5–5.1)
PROT SERPL-MCNC: 8.7 G/DL (ref 6–8.4)
PROT UR QL STRIP: NEGATIVE
RBC # BLD AUTO: 5.48 M/UL (ref 4–5.4)
RBC #/AREA URNS AUTO: 1 /HPF (ref 0–4)
RSV AG BY MOLECULAR METHOD: NOT DETECTED
SARS-COV-2 RNA RESP QL NAA+PROBE: DETECTED
SODIUM SERPL-SCNC: 135 MMOL/L (ref 136–145)
SP GR UR STRIP: 1.02 (ref 1–1.03)
SQUAMOUS #/AREA URNS AUTO: 1 /HPF
URN SPEC COLLECT METH UR: ABNORMAL
WBC # BLD AUTO: 9.46 K/UL (ref 3.9–12.7)
WBC #/AREA URNS AUTO: 13 /HPF (ref 0–5)

## 2022-12-27 PROCEDURE — 81001 URINALYSIS AUTO W/SCOPE: CPT | Performed by: NURSE PRACTITIONER

## 2022-12-27 PROCEDURE — 96374 THER/PROPH/DIAG INJ IV PUSH: CPT

## 2022-12-27 PROCEDURE — 25000003 PHARM REV CODE 250: Performed by: NURSE PRACTITIONER

## 2022-12-27 PROCEDURE — 63600175 PHARM REV CODE 636 W HCPCS: Performed by: PHYSICIAN ASSISTANT

## 2022-12-27 PROCEDURE — 86803 HEPATITIS C AB TEST: CPT | Performed by: PHYSICIAN ASSISTANT

## 2022-12-27 PROCEDURE — 81025 URINE PREGNANCY TEST: CPT | Performed by: PHYSICIAN ASSISTANT

## 2022-12-27 PROCEDURE — 96375 TX/PRO/DX INJ NEW DRUG ADDON: CPT

## 2022-12-27 PROCEDURE — 80053 COMPREHEN METABOLIC PANEL: CPT | Performed by: STUDENT IN AN ORGANIZED HEALTH CARE EDUCATION/TRAINING PROGRAM

## 2022-12-27 PROCEDURE — 96361 HYDRATE IV INFUSION ADD-ON: CPT

## 2022-12-27 PROCEDURE — 87389 HIV-1 AG W/HIV-1&-2 AB AG IA: CPT | Performed by: PHYSICIAN ASSISTANT

## 2022-12-27 PROCEDURE — 85652 RBC SED RATE AUTOMATED: CPT | Performed by: PHYSICIAN ASSISTANT

## 2022-12-27 PROCEDURE — 85025 COMPLETE CBC W/AUTO DIFF WBC: CPT | Performed by: NURSE PRACTITIONER

## 2022-12-27 PROCEDURE — 25500020 PHARM REV CODE 255: Performed by: STUDENT IN AN ORGANIZED HEALTH CARE EDUCATION/TRAINING PROGRAM

## 2022-12-27 PROCEDURE — 96376 TX/PRO/DX INJ SAME DRUG ADON: CPT

## 2022-12-27 PROCEDURE — 99285 EMERGENCY DEPT VISIT HI MDM: CPT | Mod: ,,, | Performed by: PHYSICIAN ASSISTANT

## 2022-12-27 PROCEDURE — 99285 PR EMERGENCY DEPT VISIT,LEVEL V: ICD-10-PCS | Mod: ,,, | Performed by: PHYSICIAN ASSISTANT

## 2022-12-27 PROCEDURE — 87086 URINE CULTURE/COLONY COUNT: CPT | Performed by: NURSE PRACTITIONER

## 2022-12-27 PROCEDURE — 83690 ASSAY OF LIPASE: CPT | Performed by: NURSE PRACTITIONER

## 2022-12-27 PROCEDURE — 86140 C-REACTIVE PROTEIN: CPT | Performed by: STUDENT IN AN ORGANIZED HEALTH CARE EDUCATION/TRAINING PROGRAM

## 2022-12-27 PROCEDURE — 0241U SARS-COV2 (COVID) WITH FLU/RSV BY PCR: CPT | Performed by: PHYSICIAN ASSISTANT

## 2022-12-27 PROCEDURE — 99285 EMERGENCY DEPT VISIT HI MDM: CPT | Mod: 25

## 2022-12-27 RX ORDER — MORPHINE SULFATE 2 MG/ML
6 INJECTION, SOLUTION INTRAMUSCULAR; INTRAVENOUS
Status: COMPLETED | OUTPATIENT
Start: 2022-12-27 | End: 2022-12-27

## 2022-12-27 RX ORDER — ONDANSETRON 2 MG/ML
4 INJECTION INTRAMUSCULAR; INTRAVENOUS
Status: COMPLETED | OUTPATIENT
Start: 2022-12-27 | End: 2022-12-27

## 2022-12-27 RX ORDER — PROPARACAINE HYDROCHLORIDE 5 MG/ML
1 SOLUTION/ DROPS OPHTHALMIC
Status: COMPLETED | OUTPATIENT
Start: 2022-12-27 | End: 2022-12-27

## 2022-12-27 RX ORDER — LIDOCAINE HYDROCHLORIDE 20 MG/ML
JELLY TOPICAL
Status: DISPENSED | OUTPATIENT
Start: 2022-12-27 | End: 2022-12-28

## 2022-12-27 RX ADMIN — SODIUM CHLORIDE, POTASSIUM CHLORIDE, SODIUM LACTATE AND CALCIUM CHLORIDE 1000 ML: 600; 310; 30; 20 INJECTION, SOLUTION INTRAVENOUS at 06:12

## 2022-12-27 RX ADMIN — MORPHINE SULFATE 6 MG: 2 INJECTION, SOLUTION INTRAMUSCULAR; INTRAVENOUS at 06:12

## 2022-12-27 RX ADMIN — FLUORESCEIN SODIUM 1 EACH: 1 STRIP OPHTHALMIC at 04:12

## 2022-12-27 RX ADMIN — PROPARACAINE HYDROCHLORIDE 1 DROP: 5 SOLUTION/ DROPS OPHTHALMIC at 04:12

## 2022-12-27 RX ADMIN — ONDANSETRON 4 MG: 2 INJECTION INTRAMUSCULAR; INTRAVENOUS at 06:12

## 2022-12-27 RX ADMIN — IOHEXOL 75 ML: 350 INJECTION, SOLUTION INTRAVENOUS at 10:12

## 2022-12-27 RX ADMIN — ONDANSETRON 4 MG: 2 INJECTION INTRAMUSCULAR; INTRAVENOUS at 11:12

## 2022-12-27 RX ADMIN — MORPHINE SULFATE 6 MG: 2 INJECTION, SOLUTION INTRAMUSCULAR; INTRAVENOUS at 11:12
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
## 2022-12-27 NOTE — FIRST PROVIDER EVALUATION
Emergency Department TeleTriage Encounter Note      CHIEF COMPLAINT    Chief Complaint   Patient presents with    Abdominal Pain     Hx crohn's    Eye Drainage       VITAL SIGNS   Initial Vitals [12/27/22 1503]   BP Pulse Resp Temp SpO2   139/73 (!) 111 18 98.5 °F (36.9 °C) 99 %      MAP       --            ALLERGIES    Review of patient's allergies indicates:  No Known Allergies    PROVIDER TRIAGE NOTE  Pt reports chroh's flare with abd cramping and rectal pain as well as right eye drainage.  Pt valorie an ileostomy, states passing mucous per rectum is very painful.        ORDERS  Labs Reviewed   HIV 1 / 2 ANTIBODY   HEPATITIS C ANTIBODY       ED Orders (720h ago, onward)      Start Ordered     Status Ordering Provider    12/27/22 1615 12/27/22 1609  fluorescein ophthalmic strip 1 each  ED 1 Time         Ordered CHAMPAGNE, BRAULIO A.    12/27/22 1615 12/27/22 1609  proparacaine 0.5 % ophthalmic solution 1 drop  ED 1 Time         Ordered CHAMPAGNE, BRAULIO A.    12/27/22 1610 12/27/22 1609  Bring Tonopen to Bedside  Once         Ordered CHAMPAGNE, BRAULIO A.    12/27/22 1610 12/27/22 1609  Brain natriuretic peptide  STAT         Ordered CHAMPAGNE, BRAULIO A.    12/27/22 1610 12/27/22 1609  CBC auto differential  STAT         Ordered CHAMPAGNE, BRAULIO A.    12/27/22 1610 12/27/22 1609  Comprehensive metabolic panel  STAT         Ordered CHAMPAGNE, BRAULIO A.    12/27/22 1610 12/27/22 1609  Lipase  STAT         Ordered CHAMPAGNE, BRAULIO A.    12/27/22 1610 12/27/22 1609  Urinalysis, Reflex to Urine Culture Urine, Clean Catch  STAT         Ordered CHAMPAGNE, BRAULIO A.    12/27/22 1609 12/27/22 1609  Visual acuity screening  Once         Ordered CHAMPAGNE, BRAULIO A.    12/27/22 1609 12/27/22 1609  Bring Wood's Lamp to Bedside  Once         Ordered CHAMPAGNE, BRAULIO A.    12/27/22 1505 12/27/22 1505  HIV 1/2 Ag/Ab (4th Gen)  STAT         Ordered JEFFREY DUMONT    12/27/22 1505 12/27/22 1505  Hepatitis C Antibody  STAT         Ordered  JEFFREY DUMONT              Virtual Visit Note: The provider triage portion of this emergency department evaluation and documentation was performed via [employer], a HIPAA-compliant telemedicine application, in concert with a tele-presenter in the room. A face to face patient evaluation with one of my colleagues will occur once the patient is placed in an emergency department room.      DISCLAIMER: This note was prepared with [employer] voice recognition transcription software. Garbled syntax, mangled pronouns, and other bizarre constructions may be attributed to that software system.

## 2022-12-27 NOTE — Clinical Note
Diagnosis: Crohn's disease with complication, unspecified gastrointestinal tract location [[id number]]   Future Attending Provider: MILDRED OLVERA [[id number]]   Is the patient being sent to ED Observation?: No   Admitting Provider:: MILDRED OLVERA [[id number]]

## 2022-12-28 PROBLEM — R10.84 GENERALIZED ABDOMINAL PAIN: Status: ACTIVE | Noted: 2022-12-28

## 2022-12-28 PROBLEM — K50.919 CROHN'S DISEASE WITH COMPLICATION: Status: ACTIVE | Noted: 2022-12-28

## 2022-12-28 PROBLEM — U07.1 COVID: Status: ACTIVE | Noted: 2022-12-28

## 2022-12-28 PROBLEM — R11.0 NAUSEA: Status: ACTIVE | Noted: 2022-12-28

## 2022-12-28 LAB
ANION GAP SERPL CALC-SCNC: 8 MMOL/L (ref 8–16)
BASOPHILS # BLD AUTO: 0.02 K/UL (ref 0–0.2)
BASOPHILS NFR BLD: 0.3 % (ref 0–1.9)
BUN SERPL-MCNC: 7 MG/DL (ref 6–20)
C DIFF GDH STL QL: NEGATIVE
C DIFF TOX A+B STL QL IA: NEGATIVE
CALCIUM SERPL-MCNC: 8.9 MG/DL (ref 8.7–10.5)
CHLORIDE SERPL-SCNC: 106 MMOL/L (ref 95–110)
CO2 SERPL-SCNC: 23 MMOL/L (ref 23–29)
CREAT SERPL-MCNC: 0.7 MG/DL (ref 0.5–1.4)
CRYPTOSP AG STL QL IA: POSITIVE
DIFFERENTIAL METHOD: ABNORMAL
EOSINOPHIL # BLD AUTO: 0.2 K/UL (ref 0–0.5)
EOSINOPHIL NFR BLD: 3.2 % (ref 0–8)
ERYTHROCYTE [DISTWIDTH] IN BLOOD BY AUTOMATED COUNT: 19.2 % (ref 11.5–14.5)
EST. GFR  (NO RACE VARIABLE): >60 ML/MIN/1.73 M^2
FERRITIN SERPL-MCNC: 31 NG/ML (ref 20–300)
G LAMBLIA AG STL QL IA: NEGATIVE
GLUCOSE SERPL-MCNC: 75 MG/DL (ref 70–110)
GLUCOSE SERPL-MCNC: 83 MG/DL (ref 70–110)
HCT VFR BLD AUTO: 30.4 % (ref 37–48.5)
HGB BLD-MCNC: 9.1 G/DL (ref 12–16)
IMM GRANULOCYTES # BLD AUTO: 0.01 K/UL (ref 0–0.04)
IMM GRANULOCYTES NFR BLD AUTO: 0.2 % (ref 0–0.5)
IRON SERPL-MCNC: 27 UG/DL (ref 30–160)
LYMPHOCYTES # BLD AUTO: 3.1 K/UL (ref 1–4.8)
LYMPHOCYTES NFR BLD: 50.8 % (ref 18–48)
MCH RBC QN AUTO: 17.8 PG (ref 27–31)
MCHC RBC AUTO-ENTMCNC: 29.9 G/DL (ref 32–36)
MCV RBC AUTO: 60 FL (ref 82–98)
MONOCYTES # BLD AUTO: 0.5 K/UL (ref 0.3–1)
MONOCYTES NFR BLD: 8.5 % (ref 4–15)
NEUTROPHILS # BLD AUTO: 2.2 K/UL (ref 1.8–7.7)
NEUTROPHILS NFR BLD: 37 % (ref 38–73)
NRBC BLD-RTO: 0 /100 WBC
OB PNL STL: POSITIVE
PLATELET # BLD AUTO: 358 K/UL (ref 150–450)
PMV BLD AUTO: 9.9 FL (ref 9.2–12.9)
POCT GLUCOSE: 83 MG/DL (ref 70–110)
POTASSIUM SERPL-SCNC: 3.6 MMOL/L (ref 3.5–5.1)
RBC # BLD AUTO: 5.11 M/UL (ref 4–5.4)
RV AG STL QL IA.RAPID: NEGATIVE
SATURATED IRON: 7 % (ref 20–50)
SODIUM SERPL-SCNC: 137 MMOL/L (ref 136–145)
TOTAL IRON BINDING CAPACITY: 371 UG/DL (ref 250–450)
TRANSFERRIN SERPL-MCNC: 251 MG/DL (ref 200–375)
WBC # BLD AUTO: 6.02 K/UL (ref 3.9–12.7)
WBC #/AREA STL HPF: NORMAL /[HPF]

## 2022-12-28 PROCEDURE — 96375 TX/PRO/DX INJ NEW DRUG ADDON: CPT

## 2022-12-28 PROCEDURE — 63600175 PHARM REV CODE 636 W HCPCS

## 2022-12-28 PROCEDURE — G0378 HOSPITAL OBSERVATION PER HR: HCPCS

## 2022-12-28 PROCEDURE — 80048 BASIC METABOLIC PNL TOTAL CA: CPT

## 2022-12-28 PROCEDURE — 87045 FECES CULTURE AEROBIC BACT: CPT

## 2022-12-28 PROCEDURE — 85025 COMPLETE CBC W/AUTO DIFF WBC: CPT

## 2022-12-28 PROCEDURE — 87449 NOS EACH ORGANISM AG IA: CPT

## 2022-12-28 PROCEDURE — 25000003 PHARM REV CODE 250: Performed by: STUDENT IN AN ORGANIZED HEALTH CARE EDUCATION/TRAINING PROGRAM

## 2022-12-28 PROCEDURE — 87209 SMEAR COMPLEX STAIN: CPT

## 2022-12-28 PROCEDURE — 25000003 PHARM REV CODE 250

## 2022-12-28 PROCEDURE — 83993 ASSAY FOR CALPROTECTIN FECAL: CPT

## 2022-12-28 PROCEDURE — 82653 EL-1 FECAL QUANTITATIVE: CPT

## 2022-12-28 PROCEDURE — 82272 OCCULT BLD FECES 1-3 TESTS: CPT

## 2022-12-28 PROCEDURE — 87046 STOOL CULTR AEROBIC BACT EA: CPT

## 2022-12-28 PROCEDURE — 99220 PR INITIAL OBSERVATION CARE,LEVL III: CPT | Mod: CR,,,

## 2022-12-28 PROCEDURE — 96361 HYDRATE IV INFUSION ADD-ON: CPT

## 2022-12-28 PROCEDURE — 87425 ROTAVIRUS AG IA: CPT

## 2022-12-28 PROCEDURE — 82705 FATS/LIPIDS FECES QUAL: CPT

## 2022-12-28 PROCEDURE — 87338 HPYLORI STOOL AG IA: CPT

## 2022-12-28 PROCEDURE — 84466 ASSAY OF TRANSFERRIN: CPT

## 2022-12-28 PROCEDURE — 82962 GLUCOSE BLOOD TEST: CPT

## 2022-12-28 PROCEDURE — 96372 THER/PROPH/DIAG INJ SC/IM: CPT | Mod: 59

## 2022-12-28 PROCEDURE — 82728 ASSAY OF FERRITIN: CPT

## 2022-12-28 PROCEDURE — 89055 LEUKOCYTE ASSESSMENT FECAL: CPT

## 2022-12-28 PROCEDURE — 87427 SHIGA-LIKE TOXIN AG IA: CPT | Mod: 59

## 2022-12-28 PROCEDURE — 99220 PR INITIAL OBSERVATION CARE,LEVL III: ICD-10-PCS | Mod: CR,,,

## 2022-12-28 PROCEDURE — 25000003 PHARM REV CODE 250: Performed by: PHYSICIAN ASSISTANT

## 2022-12-28 PROCEDURE — 87329 GIARDIA AG IA: CPT

## 2022-12-28 RX ORDER — SIMETHICONE 80 MG
1 TABLET,CHEWABLE ORAL 4 TIMES DAILY PRN
Status: DISCONTINUED | OUTPATIENT
Start: 2022-12-28 | End: 2022-12-30 | Stop reason: HOSPADM

## 2022-12-28 RX ORDER — KETOROLAC TROMETHAMINE 30 MG/ML
15 INJECTION, SOLUTION INTRAMUSCULAR; INTRAVENOUS EVERY 6 HOURS PRN
Status: DISCONTINUED | OUTPATIENT
Start: 2022-12-28 | End: 2022-12-30 | Stop reason: HOSPADM

## 2022-12-28 RX ORDER — MAG HYDROX/ALUMINUM HYD/SIMETH 200-200-20
30 SUSPENSION, ORAL (FINAL DOSE FORM) ORAL 4 TIMES DAILY PRN
Status: DISCONTINUED | OUTPATIENT
Start: 2022-12-28 | End: 2022-12-30 | Stop reason: HOSPADM

## 2022-12-28 RX ORDER — ONDANSETRON 8 MG/1
8 TABLET, ORALLY DISINTEGRATING ORAL EVERY 8 HOURS PRN
Status: DISCONTINUED | OUTPATIENT
Start: 2022-12-28 | End: 2022-12-30 | Stop reason: HOSPADM

## 2022-12-28 RX ORDER — IPRATROPIUM BROMIDE AND ALBUTEROL SULFATE 2.5; .5 MG/3ML; MG/3ML
3 SOLUTION RESPIRATORY (INHALATION) EVERY 4 HOURS PRN
Status: DISCONTINUED | OUTPATIENT
Start: 2022-12-28 | End: 2022-12-30 | Stop reason: HOSPADM

## 2022-12-28 RX ORDER — TOBRAMYCIN 3 MG/ML
1 SOLUTION/ DROPS OPHTHALMIC EVERY 6 HOURS
Status: DISCONTINUED | OUTPATIENT
Start: 2022-12-28 | End: 2022-12-30

## 2022-12-28 RX ORDER — SODIUM CHLORIDE 0.9 % (FLUSH) 0.9 %
5 SYRINGE (ML) INJECTION
Status: DISCONTINUED | OUTPATIENT
Start: 2022-12-28 | End: 2022-12-30 | Stop reason: HOSPADM

## 2022-12-28 RX ORDER — NALOXONE HCL 0.4 MG/ML
0.02 VIAL (ML) INJECTION
Status: DISCONTINUED | OUTPATIENT
Start: 2022-12-28 | End: 2022-12-30 | Stop reason: HOSPADM

## 2022-12-28 RX ORDER — CARBOXYMETHYLCELLULOSE SODIUM 10 MG/ML
1 GEL OPHTHALMIC 4 TIMES DAILY
Status: DISCONTINUED | OUTPATIENT
Start: 2022-12-28 | End: 2022-12-30 | Stop reason: HOSPADM

## 2022-12-28 RX ORDER — POLYETHYLENE GLYCOL 3350 17 G/17G
17 POWDER, FOR SOLUTION ORAL 2 TIMES DAILY PRN
Status: DISCONTINUED | OUTPATIENT
Start: 2022-12-28 | End: 2022-12-30 | Stop reason: HOSPADM

## 2022-12-28 RX ORDER — PROCHLORPERAZINE EDISYLATE 5 MG/ML
5 INJECTION INTRAMUSCULAR; INTRAVENOUS EVERY 6 HOURS PRN
Status: DISCONTINUED | OUTPATIENT
Start: 2022-12-28 | End: 2022-12-30 | Stop reason: HOSPADM

## 2022-12-28 RX ORDER — ENOXAPARIN SODIUM 100 MG/ML
40 INJECTION SUBCUTANEOUS EVERY 24 HOURS
Status: DISCONTINUED | OUTPATIENT
Start: 2022-12-28 | End: 2022-12-30 | Stop reason: HOSPADM

## 2022-12-28 RX ORDER — IBUPROFEN 200 MG
16 TABLET ORAL
Status: DISCONTINUED | OUTPATIENT
Start: 2022-12-28 | End: 2022-12-30 | Stop reason: HOSPADM

## 2022-12-28 RX ORDER — ACETAMINOPHEN 325 MG/1
650 TABLET ORAL EVERY 4 HOURS PRN
Status: DISCONTINUED | OUTPATIENT
Start: 2022-12-28 | End: 2022-12-30 | Stop reason: HOSPADM

## 2022-12-28 RX ORDER — TALC
6 POWDER (GRAM) TOPICAL NIGHTLY PRN
Status: DISCONTINUED | OUTPATIENT
Start: 2022-12-28 | End: 2022-12-30 | Stop reason: HOSPADM

## 2022-12-28 RX ORDER — IBUPROFEN 200 MG
24 TABLET ORAL
Status: DISCONTINUED | OUTPATIENT
Start: 2022-12-28 | End: 2022-12-30 | Stop reason: HOSPADM

## 2022-12-28 RX ORDER — HYDROCODONE BITARTRATE AND ACETAMINOPHEN 10; 325 MG/1; MG/1
1 TABLET ORAL EVERY 6 HOURS PRN
Status: DISCONTINUED | OUTPATIENT
Start: 2022-12-28 | End: 2022-12-30 | Stop reason: HOSPADM

## 2022-12-28 RX ORDER — ERYTHROMYCIN 5 MG/G
OINTMENT OPHTHALMIC EVERY 8 HOURS
Status: DISCONTINUED | OUTPATIENT
Start: 2022-12-28 | End: 2022-12-30 | Stop reason: HOSPADM

## 2022-12-28 RX ORDER — SODIUM CHLORIDE 0.9 % (FLUSH) 0.9 %
10 SYRINGE (ML) INJECTION
Status: DISCONTINUED | OUTPATIENT
Start: 2022-12-28 | End: 2022-12-30 | Stop reason: HOSPADM

## 2022-12-28 RX ORDER — GLUCAGON 1 MG
1 KIT INJECTION
Status: DISCONTINUED | OUTPATIENT
Start: 2022-12-28 | End: 2022-12-30 | Stop reason: HOSPADM

## 2022-12-28 RX ADMIN — ERYTHROMYCIN: 5 OINTMENT OPHTHALMIC at 09:12

## 2022-12-28 RX ADMIN — SODIUM CHLORIDE 1000 ML: 9 INJECTION, SOLUTION INTRAVENOUS at 10:12

## 2022-12-28 RX ADMIN — KETOROLAC TROMETHAMINE 15 MG: 30 INJECTION, SOLUTION INTRAMUSCULAR; INTRAVENOUS at 07:12

## 2022-12-28 RX ADMIN — HYDROCODONE BITARTRATE AND ACETAMINOPHEN 1 TABLET: 10; 325 TABLET ORAL at 09:12

## 2022-12-28 RX ADMIN — HYDROCODONE BITARTRATE AND ACETAMINOPHEN 1 TABLET: 10; 325 TABLET ORAL at 04:12

## 2022-12-28 RX ADMIN — Medication 6 MG: at 05:12

## 2022-12-28 RX ADMIN — TOBRAMYCIN OPHTHALMIC SOLUTION 1 DROP: 3 SOLUTION/ DROPS OPHTHALMIC at 05:12

## 2022-12-28 RX ADMIN — ENOXAPARIN SODIUM 40 MG: 40 INJECTION SUBCUTANEOUS at 04:12

## 2022-12-28 RX ADMIN — CARBOXYMETHYLCELLULOSE SODIUM 1 DROP: 10 GEL OPHTHALMIC at 09:12

## 2022-12-28 RX ADMIN — HYDROCODONE BITARTRATE AND ACETAMINOPHEN 1 TABLET: 10; 325 TABLET ORAL at 05:12

## 2022-12-28 NOTE — ED NOTES
Report received from CLEMENCIA Stiles. Pt resting on hospital bed reporting continued back/buttock pain 10/10. Pt informed of PRN pain med schedule. Pt requesting to eat, diet requested from attending. Pt changed into hospital gown. Updated on plan of care and requested admit bed, verbalized understanding. Call light within reach.

## 2022-12-28 NOTE — ASSESSMENT & PLAN NOTE
Generalized abdominal pain  Nausea  32-year-old female with Crohn's disease on methotrexate/infliximab, s/p ileostomy, perianal fistula, anemia, corneal ulcer and keratitis of the right eye scheduled for keratoplasty (1/9/23- Dr. Warner) presents for diffuse abdominal cramping, rectal pain and right eye drainage and increased pain for about a week.  States symptoms are consistent with prior Crohn's flares.    - In the ED, AFVSS.  on admit, improved.   - CBC with stable anemia H/H 9.8/32.3. CMP unremarkable.    - Sed rate >120. CRP 19.6.   - UA with 1+ leukocytes, 13 WBC, denies symptoms  - CT abdomen and pelvis with contrast with No evidence of bowel obstruction.  Right lower quadrant ileostomy is without significant abnormality.  No abscess. Mild diffuse colonic wall thickening without pericolic fat stranding.  Correlate clinically for possible nonspecific pancolitis.   - Given 1L LR bolus x1. Morphine 6 mg IV x2, Zofran 4 mg IV x2 in the ED  - ED spoke with Gastroenterology who recommended admission and CT.   - ED discussed with Ophthalmology who will see the patient in the morning. Consult placed.   - ED discussed with CRS who reviewed the patient's imaging.  No acute surgical pathology, recommends admission to . Consult placed.  - Stool studies ordered  - GI consulted. apprec recs  -- Hold off on corticosteroids at this time for Crohn's disease in setting of COVID and current immunosuppression with infliximab and MTX  -- Would treat cryptosporidium infection, likely nitazoxanide, could discuss with ID  -- Will discuss with her IBD specialist Dr. Casanova about checking trough level with next infusion  -- Avoid NSAIDS including toradol, for minor pain control Acetaminophen is preferred  -- DVT ppx: lovenox (confirmed via MAR)  -- Minimize use of opiate/sedating medications; if necessary, IV morphine is preferred due to short acting nature
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
## 2022-12-28 NOTE — ASSESSMENT & PLAN NOTE
- Scheduled to get a corneal transplant done in the beginning of January  - Gets eye pain associated with Crohn's flares  - Not on any eye gtts  - Ophthalmology was consulted in ED  - Follow up ophthalmology recs
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
## 2022-12-28 NOTE — ASSESSMENT & PLAN NOTE
Generalized abdominal pain  Nausea  32-year-old female with Crohn's disease on methotrexate, perianal fistula, anemia, corneal ulcer and keratitis of the right eye scheduled for keratoplasty (1/9/23- Dr. Warner) presents for diffuse abdominal cramping, rectal pain and right eye drainage and increased pain for about a week.  States symptoms are consistent with prior Crohn's flares.    - In the ED, AFVSS.  on admit, improved.   - CBC with stable anemia H/H 9.8/32.3. CMP unremarkable.    - Sed rate >120. CRP 19.6.   - UA with 1+ leukocytes, 13 WBC, denies symptoms  - CT abdomen and pelvis with contrast with No evidence of bowel obstruction.  Right lower quadrant ileostomy is without significant abnormality.  No abscess. Mild diffuse colonic wall thickening without pericolic fat stranding.  Correlate clinically for possible nonspecific pancolitis.   - Given 1L LR bolus x1. Morphine 6 mg IV x2, Zofran 4 mg IV x2 in the ED  - ED spoke with Gastroenterology who recommended admission and CT.   - ED discussed with Ophthalmology who will see the patient in the morning. Consult placed.   - ED discussed with CRS who reviewed the patient's imaging.  No acute surgical pathology, recommends admission to . Consult placed.  - Stool studies ordered  - Start methylprednisolone 60 IV daily if no contraindication (such as c.diff results positivite)  - Prn pain and nausea medications  - monitor daily CBC, vitals

## 2022-12-28 NOTE — ASSESSMENT & PLAN NOTE
- Current CBC reviewed-   Lab Results   Component Value Date    HGB 9.8 (L) 12/27/2022    HCT 32.3 (L) 12/27/2022     - Patient's anemia is currently controlled. S/p 0 units of PRBCs.   - Etiology likely d/t iron deficiency, crohn's disease  - Monitor serial CBC and transfuse if patient becomes hemodynamically unstable, symptomatic or H/H drops below 7/21.   - Used to get iron infusions

## 2022-12-28 NOTE — HPI
Nikkie Myles 32-year-old female with Crohn's disease on methotrexate/folic acid weekly and infliximab every 8 weeks, perianal fistula, anemia, corneal ulcer and keratitis of the right eye scheduled for keratoplasty (1/9/23- Dr. Warner) who presents for diffuse abdominal cramping, rectal pain and right eye drainage and increased pain for about a week.  States symptoms are consistent with prior Crohn's flares. Denies any palliating factors, she is not taking any medication for pain.  Reports associated decreased p.o. intake, nausea and fatigue and nonproductive cough. She notes decreased output from her ileostomy but denies bloody stool output or change in consistency. Denies fevers, chills, vomiting, chest pain, dysuria.  Reports last Chron's flare was prior to ileostomy surgery in August of this year. Follows with GI and CRS.     In the ED, AFVSS.  on admit, now improved. CBC with stable anemia H/H 9.8/32.3. CMP unremarkable.  Sed rate >120. CRP 19.6. UA with 1+ leukocytes, 13 WBC. Urine culture pending. Flu/RSV negative. Covid positive. CT abdomen and pelvis with contrast with No evidence of bowel obstruction.  Right lower quadrant ileostomy is without significant abnormality.  No abscess. Mild diffuse colonic wall thickening without pericolic fat stranding.  Correlate clinically for possible nonspecific pancolitis. ED spoke with Gastroenterology who recommended admission and CT. Case discussed with Ophthalmology who will see the patient in the morning. Consulted CRS and with fellow who reviewed the patient's imaging.  No acute surgical pathology, recommends admission to .
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
## 2022-12-28 NOTE — ASSESSMENT & PLAN NOTE
Patient COVID risk score of 1. Vitals stable. Mild cough, but not SOB.  Initiate standard COVID protocols; COVID-19 testing Collection Date: 8/22/2022 Collection Time:  11:11 AM ,Infection Control notification  and isolation- respiratory, contact and droplet per protocol    Diagnostics:  CBC, CMP, CRP and Rapid Flu    Management: Inhaled bronchodilators as needed for shortness of breath.

## 2022-12-29 LAB
ANION GAP SERPL CALC-SCNC: 6 MMOL/L (ref 8–16)
BACTERIA UR CULT: NO GROWTH
BASOPHILS # BLD AUTO: 0.02 K/UL (ref 0–0.2)
BASOPHILS NFR BLD: 0.4 % (ref 0–1.9)
BUN SERPL-MCNC: 9 MG/DL (ref 6–20)
CALCIUM SERPL-MCNC: 8.2 MG/DL (ref 8.7–10.5)
CHLORIDE SERPL-SCNC: 108 MMOL/L (ref 95–110)
CO2 SERPL-SCNC: 22 MMOL/L (ref 23–29)
CREAT SERPL-MCNC: 0.6 MG/DL (ref 0.5–1.4)
DIFFERENTIAL METHOD: ABNORMAL
E COLI SXT1 STL QL IA: NEGATIVE
E COLI SXT2 STL QL IA: NEGATIVE
EOSINOPHIL # BLD AUTO: 0.1 K/UL (ref 0–0.5)
EOSINOPHIL NFR BLD: 2.6 % (ref 0–8)
ERYTHROCYTE [DISTWIDTH] IN BLOOD BY AUTOMATED COUNT: 18.6 % (ref 11.5–14.5)
EST. GFR  (NO RACE VARIABLE): >60 ML/MIN/1.73 M^2
FAT STL QL: NORMAL
GLUCOSE SERPL-MCNC: 77 MG/DL (ref 70–110)
HCT VFR BLD AUTO: 27.7 % (ref 37–48.5)
HGB BLD-MCNC: 8.1 G/DL (ref 12–16)
IMM GRANULOCYTES # BLD AUTO: 0.01 K/UL (ref 0–0.04)
IMM GRANULOCYTES NFR BLD AUTO: 0.2 % (ref 0–0.5)
LYMPHOCYTES # BLD AUTO: 2.8 K/UL (ref 1–4.8)
LYMPHOCYTES NFR BLD: 55.7 % (ref 18–48)
MCH RBC QN AUTO: 17.1 PG (ref 27–31)
MCHC RBC AUTO-ENTMCNC: 29.2 G/DL (ref 32–36)
MCV RBC AUTO: 59 FL (ref 82–98)
MONOCYTES # BLD AUTO: 0.4 K/UL (ref 0.3–1)
MONOCYTES NFR BLD: 8.5 % (ref 4–15)
NEUTRAL FAT STL QL: NORMAL
NEUTROPHILS # BLD AUTO: 1.7 K/UL (ref 1.8–7.7)
NEUTROPHILS NFR BLD: 32.6 % (ref 38–73)
NRBC BLD-RTO: 0 /100 WBC
PLATELET # BLD AUTO: 327 K/UL (ref 150–450)
PMV BLD AUTO: 10.1 FL (ref 9.2–12.9)
POTASSIUM SERPL-SCNC: 3.6 MMOL/L (ref 3.5–5.1)
RBC # BLD AUTO: 4.73 M/UL (ref 4–5.4)
SODIUM SERPL-SCNC: 136 MMOL/L (ref 136–145)
WBC # BLD AUTO: 5.06 K/UL (ref 3.9–12.7)

## 2022-12-29 PROCEDURE — 36415 COLL VENOUS BLD VENIPUNCTURE: CPT | Performed by: STUDENT IN AN ORGANIZED HEALTH CARE EDUCATION/TRAINING PROGRAM

## 2022-12-29 PROCEDURE — 80048 BASIC METABOLIC PNL TOTAL CA: CPT | Performed by: STUDENT IN AN ORGANIZED HEALTH CARE EDUCATION/TRAINING PROGRAM

## 2022-12-29 PROCEDURE — G0378 HOSPITAL OBSERVATION PER HR: HCPCS

## 2022-12-29 PROCEDURE — 85025 COMPLETE CBC W/AUTO DIFF WBC: CPT | Performed by: STUDENT IN AN ORGANIZED HEALTH CARE EDUCATION/TRAINING PROGRAM

## 2022-12-29 PROCEDURE — 99226 PR SUBSEQUENT OBSERVATION CARE,LEVEL III: ICD-10-PCS | Mod: CR,,, | Performed by: INTERNAL MEDICINE

## 2022-12-29 PROCEDURE — 63600175 PHARM REV CODE 636 W HCPCS

## 2022-12-29 PROCEDURE — 94761 N-INVAS EAR/PLS OXIMETRY MLT: CPT

## 2022-12-29 PROCEDURE — 25000003 PHARM REV CODE 250

## 2022-12-29 PROCEDURE — 96372 THER/PROPH/DIAG INJ SC/IM: CPT

## 2022-12-29 PROCEDURE — 99214 PR OFFICE/OUTPT VISIT, EST, LEVL IV, 30-39 MIN: ICD-10-PCS | Mod: ,,, | Performed by: INTERNAL MEDICINE

## 2022-12-29 PROCEDURE — 99226 PR SUBSEQUENT OBSERVATION CARE,LEVEL III: CPT | Mod: CR,,, | Performed by: INTERNAL MEDICINE

## 2022-12-29 PROCEDURE — 99214 OFFICE O/P EST MOD 30 MIN: CPT | Mod: ,,, | Performed by: INTERNAL MEDICINE

## 2022-12-29 PROCEDURE — 25000003 PHARM REV CODE 250: Performed by: STUDENT IN AN ORGANIZED HEALTH CARE EDUCATION/TRAINING PROGRAM

## 2022-12-29 RX ADMIN — CARBOXYMETHYLCELLULOSE SODIUM 1 DROP: 10 GEL OPHTHALMIC at 01:12

## 2022-12-29 RX ADMIN — TOBRAMYCIN OPHTHALMIC SOLUTION 1 DROP: 3 SOLUTION/ DROPS OPHTHALMIC at 11:12

## 2022-12-29 RX ADMIN — ERYTHROMYCIN 1 INCH: 5 OINTMENT OPHTHALMIC at 10:12

## 2022-12-29 RX ADMIN — HYDROCODONE BITARTRATE AND ACETAMINOPHEN 1 TABLET: 10; 325 TABLET ORAL at 05:12

## 2022-12-29 RX ADMIN — TOBRAMYCIN OPHTHALMIC SOLUTION 1 DROP: 3 SOLUTION/ DROPS OPHTHALMIC at 01:12

## 2022-12-29 RX ADMIN — TOBRAMYCIN OPHTHALMIC SOLUTION 1 DROP: 3 SOLUTION/ DROPS OPHTHALMIC at 12:12

## 2022-12-29 RX ADMIN — CARBOXYMETHYLCELLULOSE SODIUM 1 DROP: 10 GEL OPHTHALMIC at 09:12

## 2022-12-29 RX ADMIN — CARBOXYMETHYLCELLULOSE SODIUM 1 DROP: 10 GEL OPHTHALMIC at 10:12

## 2022-12-29 RX ADMIN — HYDROCODONE BITARTRATE AND ACETAMINOPHEN 1 TABLET: 10; 325 TABLET ORAL at 08:12

## 2022-12-29 RX ADMIN — HYDROCODONE BITARTRATE AND ACETAMINOPHEN 1 TABLET: 10; 325 TABLET ORAL at 01:12

## 2022-12-29 RX ADMIN — ENOXAPARIN SODIUM 40 MG: 40 INJECTION SUBCUTANEOUS at 05:12

## 2022-12-29 RX ADMIN — TOBRAMYCIN OPHTHALMIC SOLUTION 1 DROP: 3 SOLUTION/ DROPS OPHTHALMIC at 05:12

## 2022-12-29 RX ADMIN — CARBOXYMETHYLCELLULOSE SODIUM 1 DROP: 10 GEL OPHTHALMIC at 05:12

## 2022-12-29 RX ADMIN — TOBRAMYCIN OPHTHALMIC SOLUTION 1 DROP: 3 SOLUTION/ DROPS OPHTHALMIC at 06:12

## 2022-12-29 RX ADMIN — ERYTHROMYCIN: 5 OINTMENT OPHTHALMIC at 01:12

## 2022-12-29 RX ADMIN — ERYTHROMYCIN: 5 OINTMENT OPHTHALMIC at 05:12

## 2022-12-29 NOTE — PLAN OF CARE
Pt progressing towards goals.abd pain and cramping eased with Lortab.denies nausea.continue antibiotic drops and ointment to r eye.pt reports cloudy vision to r eye.r eye remains red and swollen.Ophth following.CRS following.Iliostomy intact draining a brown/yellow liquid stool.pt independent in ostomy care.safety precautions maintained.bed in low position.rails up x2.call bell in reach.continue plan of care.

## 2022-12-29 NOTE — PROGRESS NOTES
SUBJECTIVE:   Interval history: No changes from yesterday, patient lying comfortably in bed.     Meds:     Current Facility-Administered Medications:     acetaminophen tablet 650 mg, 650 mg, Oral, Q4H PRN, Larisa Grewal PA-C    albuterol-ipratropium 2.5 mg-0.5 mg/3 mL nebulizer solution 3 mL, 3 mL, Nebulization, Q4H PRN, Larisa Grewal PA-C    aluminum-magnesium hydroxide-simethicone 200-200-20 mg/5 mL suspension 30 mL, 30 mL, Oral, QID PRN, Larisa Grewal PA-C    carboxymethylcellulose sodium 1 % DpGe 1 drop, 1 drop, Ophthalmic, QID, Tom Padilla MD, 1 drop at 12/29/22 1306    dextrose 10% bolus 125 mL 125 mL, 12.5 g, Intravenous, PRN, Larisa Grewal PA-C    dextrose 10% bolus 250 mL 250 mL, 25 g, Intravenous, PRN, Larisa Grewal PA-C    enoxaparin injection 40 mg, 40 mg, Subcutaneous, Daily, Larisa Grewal PA-C, 40 mg at 12/28/22 1601    erythromycin 5 mg/gram (0.5 %) ophthalmic ointment, , Right Eye, Q8H, Tom Padilla MD, Given at 12/29/22 1307    glucagon (human recombinant) injection 1 mg, 1 mg, Intramuscular, PRN, Larisa Grewal PA-C    glucose chewable tablet 16 g, 16 g, Oral, PRN, Larisa Grewal PA-C    glucose chewable tablet 24 g, 24 g, Oral, PRN, Larisa Grewal PA-C    HYDROcodone-acetaminophen  mg per tablet 1 tablet, 1 tablet, Oral, Q6H PRN, Larisa Grewal PA-C, 1 tablet at 12/29/22 1306    ketorolac injection 15 mg, 15 mg, Intravenous, Q6H PRN, Larisa Grewal PA-C, 15 mg at 12/28/22 0735    melatonin tablet 6 mg, 6 mg, Oral, Nightly PRN, Kayleigh Cannon PA-C, 6 mg at 12/28/22 0503    naloxone 0.4 mg/mL injection 0.02 mg, 0.02 mg, Intravenous, PRN, Larisa Grewal PA-C    ondansetron disintegrating tablet 8 mg, 8 mg, Oral, Q8H PRN, Larisa Grewal PA-C    polyethylene glycol packet 17 g, 17 g, Oral, BID PRN, Larisa Grewal PA-C    prochlorperazine injection Soln 5 mg, 5 mg, Intravenous, Q6H PRN, Larisa Grewal PA-C     simethicone chewable tablet 80 mg, 1 tablet, Oral, QID PRN, Larisa Grewal PA-C    sodium chloride 0.9% flush 10 mL, 10 mL, Intravenous, PRN, Kayleigh Cannon PA-C    sodium chloride 0.9% flush 5 mL, 5 mL, Intravenous, PRN, Larisa Grewal PA-C    tobramycin sulfate 0.3% ophthalmic solution 1 drop, 1 drop, Right Eye, Q6H, Tom Padilla MD, 1 drop at 12/29/22 1306    OBJECTIVE:       Vital Signs (Most Recent):  Vitals:    12/29/22 1306   BP:    Pulse:    Resp: 18   Temp:          Eye Examination:    Base Eye Exam       Visual Acuity (Snellen - Linear)         Right Left    Dist sc CF@face 20/20              Extraocular Movement         Right Left     Full Full              Neuro/Psych       Oriented x3: Yes    Mood/Affect: Normal                  Slit Lamp and Fundus Exam       External Exam         Right Left    External trace edema Normal              Slit Lamp Exam         Right Left    Lids/Lashes mild Ptosis Normal    Conjunctiva/Sclera 2+ Injection diffusely, limbal thickening White and quiet    Cornea Central scar/stromal haze with 3+ NV and thinning of central/ST portion, lindsay negative Clear with inferior and superior limbal wedge defects    Anterior Chamber Deep and formed Deep and formed    Iris Round, minimally reactive Round and reactive    Lens Poor view Clear    Anterior Vitreous Poor view Normal                      ASSESSMENT/PLAN:     1. Crohn's Flare with Ocular involvement, OD  2. 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   - Patient w/ 1wk hx of Crohn's flare and OD pain, photophobia, redness, irritation, and discharge  - Admitted 8/12/22 for similar presentation OD with onset coinciding with Crohn's flare, pt found to have corneal ulcerations that time   - Limited VA OD from previous ulcers with OD baseline vision of CF @3ft   - OD Exam 12/28/22: VA CF @ Face, IOP wnl, 2+ injection, 3+ KNV with central and ST thinning, irregular epithelium but no epi defects or  discreet areas of staining, difficult to assess AC for cell/flare through corneal scarring/neovascularization but no hypopyon visible   - Ocular inflammation likely related to flare up of Crohn's disease based on timeline of sx and previous presentations  - Exam 12/29/22: Unchanged exam. Vision OS stable at 20/20    Recommendations:  - Start Tobrex QID, Right eye   - Start Erythromycin ointment TID, Right eye   - Start gel ATs QID, Right eye   - Continue mgmt of Crohn's flare per GI/primary team      De Bruner MD  Ophthalmology PGY2  12/29/2022  3:31 PM

## 2022-12-29 NOTE — NURSING
Report received from BETTY Trinidad RN. Patient awake, alert, and oriented x 4. Lying in bed-semi fowlers position. NAD noted. Respirations are even and unlabored. Airborne/Contact Isolation in place. Plan of care reviewed. Education provided. Instruction given on use of call light. Bed locked and in lowest position. All safety measures are in place. Communication board updated with direct nursing extension. RN will continue to monitor.
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
## 2022-12-29 NOTE — PLAN OF CARE
[facility]  Initial Discharge Assessment       Primary Care Provider: Primary Doctor No    Admission Diagnosis: Chest pain [R07.9]  Keratitis [H16.9]  Crohn's disease with complication, unspecified gastrointestinal tract location [K50.919]  COVID-19 [U07.1]    Admission Date: 12/27/2022  Expected Discharge Date: 12/30/2022         Payor: MEDICAID / Plan: [place]UE [place] / Product Type: Managed Medicaid /     Extended Emergency Contact Information  Primary Emergency Contact: Franky Rivera  Mobile Phone: [phone]  Relation: Mother  Secondary Emergency Contact: RobynPrabhakarbinaníbal  Mobile Phone: [phone]  Relation: Sister    Discharge Plan A: (P) Home with family  Discharge Plan B: (P) Home with family      [facility] - [place] - [address]  [address]  [place] [address]  Phone: [phone] Fax: [phone]    [id number] - [place] - [address] AT [facility] [address]  [address]  [place] [address]  Phone: [phone] Fax: [phone]    [facility]  - [place] - [place] - [address]  [address]  [place] [address]  Phone: [phone] Fax: [phone]    [id number] - [place], [place] - [address]  [facility] [address]  Phone: [phone] Fax: [phone]             SW completed Discharge Planning Assessment with patient via telephone. Discharge planning booklet given to patient/family and whiteboard updated with ENDY and phone #. All questions answered.    Patient reported that she will have transportation upon discharge.     Patient reported that she lives at home with her children, and prior to hospitalization she was independent with her ADL's. Patient reported that she is not on dialysis and does not go to a Coumadin clinic.      Patient lives in a two story home  with 14 steps inside. Patient reported that she does not have difficulty climbing the stairs.      Fatou Esposito LMSW  [facility]  Ext. [phone]

## 2022-12-30 ENCOUNTER — PATIENT MESSAGE (OUTPATIENT)
Dept: ADMINISTRATIVE | Facility: CLINIC | Age: 32
End: 2022-12-30
Payer: MEDICAID

## 2022-12-30 ENCOUNTER — PATIENT MESSAGE (OUTPATIENT)
Dept: SURGERY | Facility: OTHER | Age: 32
End: 2022-12-30
Payer: MEDICAID

## 2022-12-30 ENCOUNTER — NURSE TRIAGE (OUTPATIENT)
Dept: ADMINISTRATIVE | Facility: CLINIC | Age: 32
End: 2022-12-30
Payer: MEDICAID

## 2022-12-30 VITALS
BODY MASS INDEX: 35.34 KG/M2 | HEART RATE: 65 BPM | SYSTOLIC BLOOD PRESSURE: 97 MMHG | OXYGEN SATURATION: 98 % | RESPIRATION RATE: 18 BRPM | WEIGHT: 180 LBS | HEIGHT: 60 IN | DIASTOLIC BLOOD PRESSURE: 62 MMHG | TEMPERATURE: 99 F

## 2022-12-30 PROBLEM — A07.2: Status: ACTIVE | Noted: 2022-12-30

## 2022-12-30 LAB
ANION GAP SERPL CALC-SCNC: 8 MMOL/L (ref 8–16)
BASOPHILS # BLD AUTO: 0.02 K/UL (ref 0–0.2)
BASOPHILS NFR BLD: 0.4 % (ref 0–1.9)
BUN SERPL-MCNC: 9 MG/DL (ref 6–20)
CALCIUM SERPL-MCNC: 8.5 MG/DL (ref 8.7–10.5)
CHLORIDE SERPL-SCNC: 105 MMOL/L (ref 95–110)
CO2 SERPL-SCNC: 20 MMOL/L (ref 23–29)
CREAT SERPL-MCNC: 0.7 MG/DL (ref 0.5–1.4)
DIFFERENTIAL METHOD: ABNORMAL
ELASTASE 1, FECAL: >500 MCG/G
EOSINOPHIL # BLD AUTO: 0.1 K/UL (ref 0–0.5)
EOSINOPHIL NFR BLD: 1.9 % (ref 0–8)
ERYTHROCYTE [DISTWIDTH] IN BLOOD BY AUTOMATED COUNT: 19 % (ref 11.5–14.5)
EST. GFR  (NO RACE VARIABLE): >60 ML/MIN/1.73 M^2
GLUCOSE SERPL-MCNC: 87 MG/DL (ref 70–110)
HCT VFR BLD AUTO: 28.3 % (ref 37–48.5)
HGB BLD-MCNC: 7.9 G/DL (ref 12–16)
IMM GRANULOCYTES # BLD AUTO: 0.02 K/UL (ref 0–0.04)
IMM GRANULOCYTES NFR BLD AUTO: 0.4 % (ref 0–0.5)
LYMPHOCYTES # BLD AUTO: 2.3 K/UL (ref 1–4.8)
LYMPHOCYTES NFR BLD: 44.2 % (ref 18–48)
MCH RBC QN AUTO: 17.2 PG (ref 27–31)
MCHC RBC AUTO-ENTMCNC: 27.9 G/DL (ref 32–36)
MCV RBC AUTO: 62 FL (ref 82–98)
MONOCYTES # BLD AUTO: 0.5 K/UL (ref 0.3–1)
MONOCYTES NFR BLD: 9 % (ref 4–15)
NEUTROPHILS # BLD AUTO: 2.3 K/UL (ref 1.8–7.7)
NEUTROPHILS NFR BLD: 44.1 % (ref 38–73)
NRBC BLD-RTO: 0 /100 WBC
O+P STL MICRO: NORMAL
PLATELET # BLD AUTO: 310 K/UL (ref 150–450)
PMV BLD AUTO: 9.8 FL (ref 9.2–12.9)
POTASSIUM SERPL-SCNC: 3.6 MMOL/L (ref 3.5–5.1)
RBC # BLD AUTO: 4.6 M/UL (ref 4–5.4)
SODIUM SERPL-SCNC: 133 MMOL/L (ref 136–145)
WBC # BLD AUTO: 5.25 K/UL (ref 3.9–12.7)

## 2022-12-30 PROCEDURE — 99225 PR SUBSEQUENT OBSERVATION CARE,LEVEL II: CPT | Mod: ,,, | Performed by: INTERNAL MEDICINE

## 2022-12-30 PROCEDURE — 85025 COMPLETE CBC W/AUTO DIFF WBC: CPT | Performed by: STUDENT IN AN ORGANIZED HEALTH CARE EDUCATION/TRAINING PROGRAM

## 2022-12-30 PROCEDURE — 80048 BASIC METABOLIC PNL TOTAL CA: CPT | Performed by: STUDENT IN AN ORGANIZED HEALTH CARE EDUCATION/TRAINING PROGRAM

## 2022-12-30 PROCEDURE — 25000242 PHARM REV CODE 250 ALT 637 W/ HCPCS: Performed by: INTERNAL MEDICINE

## 2022-12-30 PROCEDURE — 36415 COLL VENOUS BLD VENIPUNCTURE: CPT | Performed by: STUDENT IN AN ORGANIZED HEALTH CARE EDUCATION/TRAINING PROGRAM

## 2022-12-30 PROCEDURE — G0378 HOSPITAL OBSERVATION PER HR: HCPCS

## 2022-12-30 PROCEDURE — 25000003 PHARM REV CODE 250: Performed by: STUDENT IN AN ORGANIZED HEALTH CARE EDUCATION/TRAINING PROGRAM

## 2022-12-30 PROCEDURE — 25000003 PHARM REV CODE 250

## 2022-12-30 PROCEDURE — 99225 PR SUBSEQUENT OBSERVATION CARE,LEVEL II: ICD-10-PCS | Mod: ,,, | Performed by: INTERNAL MEDICINE

## 2022-12-30 RX ORDER — ONDANSETRON 4 MG/1
4 TABLET, ORALLY DISINTEGRATING ORAL EVERY 8 HOURS PRN
Qty: 30 TABLET | Refills: 1 | Status: SHIPPED | OUTPATIENT
Start: 2022-12-30 | End: 2022-12-30 | Stop reason: SDUPTHER

## 2022-12-30 RX ORDER — ONDANSETRON 4 MG/1
4 TABLET, ORALLY DISINTEGRATING ORAL EVERY 8 HOURS PRN
Qty: 30 TABLET | Refills: 1 | Status: SHIPPED | OUTPATIENT
Start: 2022-12-30

## 2022-12-30 RX ORDER — NITAZOXANIDE 500 MG/1
500 TABLET ORAL EVERY 12 HOURS
Status: DISCONTINUED | OUTPATIENT
Start: 2022-12-30 | End: 2022-12-30 | Stop reason: HOSPADM

## 2022-12-30 RX ORDER — SIMETHICONE 80 MG
80 TABLET,CHEWABLE ORAL 4 TIMES DAILY PRN
Qty: 60 TABLET | Refills: 1 | Status: SHIPPED | OUTPATIENT
Start: 2022-12-30

## 2022-12-30 RX ORDER — NITAZOXANIDE 500 MG/1
500 TABLET ORAL EVERY 12 HOURS
Qty: 28 TABLET | Refills: 0 | Status: SHIPPED | OUTPATIENT
Start: 2022-12-30 | End: 2023-01-13

## 2022-12-30 RX ORDER — TOBRAMYCIN AND DEXAMETHASONE 3; 1 MG/ML; MG/ML
1 SUSPENSION/ DROPS OPHTHALMIC 4 TIMES DAILY
Qty: 10 ML | Refills: 3 | Status: SHIPPED | OUTPATIENT
Start: 2022-12-30 | End: 2022-12-30 | Stop reason: SDUPTHER

## 2022-12-30 RX ORDER — ACETAMINOPHEN 500 MG
1000 TABLET ORAL EVERY 6 HOURS PRN
Qty: 90 TABLET | Refills: 1 | Status: SHIPPED | OUTPATIENT
Start: 2022-12-30 | End: 2022-12-30 | Stop reason: SDUPTHER

## 2022-12-30 RX ORDER — TRAMADOL HYDROCHLORIDE 50 MG/1
50 TABLET ORAL EVERY 6 HOURS PRN
Qty: 28 TABLET | Refills: 0 | Status: SHIPPED | OUTPATIENT
Start: 2022-12-30 | End: 2023-01-06

## 2022-12-30 RX ORDER — TOBRAMYCIN AND DEXAMETHASONE 3; 1 MG/ML; MG/ML
1 SUSPENSION/ DROPS OPHTHALMIC 4 TIMES DAILY
Qty: 10 ML | Refills: 3 | Status: SHIPPED | OUTPATIENT
Start: 2022-12-30

## 2022-12-30 RX ORDER — CARBOXYMETHYLCELLULOSE SODIUM 10 MG/ML
1 GEL OPHTHALMIC 4 TIMES DAILY
Qty: 28 EACH | Refills: 3 | Status: SHIPPED | OUTPATIENT
Start: 2022-12-30

## 2022-12-30 RX ORDER — NITAZOXANIDE 500 MG/1
500 TABLET ORAL EVERY 12 HOURS
Status: CANCELLED | OUTPATIENT
Start: 2022-12-30

## 2022-12-30 RX ORDER — TOBRAMYCIN AND DEXAMETHASONE 3; 1 MG/ML; MG/ML
1 SUSPENSION/ DROPS OPHTHALMIC 4 TIMES DAILY
Status: DISCONTINUED | OUTPATIENT
Start: 2022-12-30 | End: 2022-12-30 | Stop reason: HOSPADM

## 2022-12-30 RX ORDER — ERYTHROMYCIN 5 MG/G
OINTMENT OPHTHALMIC EVERY 8 HOURS
Qty: 3.5 G | Refills: 3 | Status: SHIPPED | OUTPATIENT
Start: 2022-12-30

## 2022-12-30 RX ORDER — ACETAMINOPHEN 500 MG
1000 TABLET ORAL EVERY 6 HOURS PRN
Qty: 90 TABLET | Refills: 1 | Status: SHIPPED | OUTPATIENT
Start: 2022-12-30

## 2022-12-30 RX ADMIN — HYDROCODONE BITARTRATE AND ACETAMINOPHEN 1 TABLET: 10; 325 TABLET ORAL at 02:12

## 2022-12-30 RX ADMIN — TOBRAMYCIN OPHTHALMIC SOLUTION 1 DROP: 3 SOLUTION/ DROPS OPHTHALMIC at 06:12

## 2022-12-30 RX ADMIN — ERYTHROMYCIN 1 INCH: 5 OINTMENT OPHTHALMIC at 06:12

## 2022-12-30 RX ADMIN — CARBOXYMETHYLCELLULOSE SODIUM 1 DROP: 10 GEL OPHTHALMIC at 09:12

## 2022-12-30 RX ADMIN — NITAZOXANIDE 500 MG: 500 TABLET, FILM COATED ORAL at 09:12

## 2022-12-30 NOTE — PLAN OF CARE
Pt progressing towards goals.reports adequate pain control with Lortab.Ileostomy functioning, soft brown stool.right eye remains swollen and red,tearing.reports cloudy vision.continue eye gtts and ointment.safety precautions maintained.bed in low position.rails up x2.call bell in reach.continue plan of care.

## 2022-12-30 NOTE — PROGRESS NOTES
Progress Note  Ophthalmology Service    Date: 12/30/2022    Chief complaint: Crohn's flare with right eye involvement      Interval History:   The patient is still reporting right eye irritation, photophobia, tearing. Denies changes in vision though reports poor vision in the right eye at baseline. Patient denies any visual changes and ocular discomfort in the left eye.     Current eye gtts:   - Artificial tears QID right eye  - Erythromycin TID right eye  - Tobrex QID right eye     Medications this encounter:    carboxymethylcellulose sodium  1 drop Ophthalmic QID    enoxaparin  40 mg Subcutaneous Daily    erythromycin   Right Eye Q8H    nitazoxanide  500 mg Oral Q12H    tobramycin sulfate 0.3%  1 drop Right Eye Q6H       Allergies: has No Known Allergies.     ROS: As per HPI    Ocular examination/Dilated fundus examination:  Base Eye Exam       Visual Acuity (Snellen - Linear)         Right Left    Dist sc CF at 2 ft 20/20              Tonometry (Tonopen, 11:09 AM)         Right Left    Pressure 11 15              Neuro/Psych       Oriented x3: Yes    Mood/Affect: Normal                  Slit Lamp and Fundus Exam       External Exam         Right Left    External trace edema Normal              Slit Lamp Exam         Right Left    Lids/Lashes mild Ptosis Normal    Conjunctiva/Sclera 2+ Injection diffusely, limbal thickening White and quiet    Cornea Central scar/stromal haze with 3+ NV and thinning of central/ST portion, diffuse PEEs, no epithelial defects Clear with inferior and superior limbal wedge defects    Anterior Chamber Poor view - deep and formed Deep and formed    Iris Round, poor view - minimally reactive Round and reactive    Lens Poor view Clear    Anterior Vitreous Poor view Normal                    Assessment/Plan:     1. Crohn's Flare with Ocular involvement, OD  2. 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 Similar to prior episode of eye symptoms and Crohn's flare during previous admission 8/12/22, at the time, she had corneal ulcerations as well.   - Limited visual acuity from previous ulcers - baseline CF at 3 feet.   - No evidence of epithelial defects/corneal ulcers on exam 12/30/22 - overall cornea appears hazy from chronic changes, consistent with prior exams. Ocular inflammation likely related to flare up of Crohn's disease based on timeline of symptoms and previous presentations.   - Current flare complicated by positive COVID testing (asymptomatic) and immunosuppression with infliximab and MTX - GI holding off starting systemic steroids for now - may consider 5 days from positive COVID test.   - Per chart review, patient planned for PKP under general anesthesia on 1/9/23 with Dr. Warner. Will talk with Dr. Warner about plans to proceed vs postpone prior to discharge or closer to planned date.      Recommendations:  - Switch from tobrex QID to tobradex QID, Right eye (order changed)  - Continue gel ATs QID, Right eye   - Continue erythromycin ointment (or lubricating gel) QID, Right eye   - Continue management of Crohn's flare per GI/primary team    Lama Michael MD PGY-2  [facility] Ophthalmology Resident  12/30/2022  11:11 AM

## 2022-12-30 NOTE — PROGRESS NOTES
[facility] [address] [place] [address] Medicine  Progress Note    Patient Name: Nikkie Myles  MRN: [id number]  Patient Class: OP- Observation   Admission Date: 12/27/2022  Length of Stay: 0 days  Attending Physician: Shayne Newell MD  Primary Care Provider: Primary Doctor No        Subjective:     Principal Problem:Crohn's disease with complication        HPI:  Nikkie Myles 32-year-old female with Crohn's disease on methotrexate/folic acid weekly and infliximab every 8 weeks, perianal fistula, anemia, corneal ulcer and keratitis of the right eye scheduled for keratoplasty (1/9/23- Dr. Warner) who presents for diffuse abdominal cramping, rectal pain and right eye drainage and increased pain for about a week.  States symptoms are consistent with prior Crohn's flares. Denies any palliating factors, she is not taking any medication for pain.  Reports associated decreased p.o. intake, nausea and fatigue and nonproductive cough. She notes decreased output from her ileostomy but denies bloody stool output or change in consistency. Denies fevers, chills, vomiting, chest pain, dysuria.  Reports last Chron's flare was prior to ileostomy surgery in August of this year. Follows with GI and CRS.     In the ED, AFVSS.  on admit, now improved. CBC with stable anemia H/H 9.8/32.3. CMP unremarkable.  Sed rate >120. CRP 19.6. UA with 1+ leukocytes, 13 WBC. Urine culture pending. Flu/RSV negative. Covid positive. CT abdomen and pelvis with contrast with No evidence of bowel obstruction.  Right lower quadrant ileostomy is without significant abnormality.  No abscess. Mild diffuse colonic wall thickening without pericolic fat stranding.  Correlate clinically for possible nonspecific pancolitis. ED spoke with Gastroenterology who recommended admission and CT. Case discussed with Ophthalmology who will see the patient in the morning. Consulted CRS and with fellow who reviewed the patient's imaging.  No acute surgical pathology,  recommends admission to .      Overview/Hospital Course:  No notes on file    Interval History: Patient lying in bed, in moderate distress due to abdominal pain. No acute events overnight. Patient reports good appetite. Denies fever, chills, SOB, blood in stool or diarrhea. GI evaluated the patient and suspect that mild Crohns flare related to COVID infection and will hold off on starting steroids since patient is already immunosuppressed from home infliximab and methotrexate.       Review of Systems   Constitutional:  Positive for activity change, appetite change and fatigue. Negative for chills and fever.   HENT:  Negative for congestion and trouble swallowing.    Eyes:  Negative for visual disturbance.   Respiratory:  Negative for cough and shortness of breath.    Cardiovascular:  Negative for chest pain and leg swelling.   Gastrointestinal:  Positive for abdominal pain. Negative for abdominal distention, nausea and vomiting.   Endocrine: Negative for cold intolerance, heat intolerance, polydipsia and polyuria.   Genitourinary:  Negative for difficulty urinating and dysuria.   Musculoskeletal:  Positive for myalgias. Negative for back pain.   Skin:  Negative for rash and wound.   Neurological:  Positive for weakness. Negative for dizziness and light-headedness.   Hematological:  Negative for adenopathy. Does not bruise/bleed easily.   Psychiatric/Behavioral:  Negative for confusion and sleep disturbance.    Objective:     Vital Signs (Most Recent):  Temp: 98.5 °F (36.9 °C) (12/29/22 2341)  Pulse: 70 (12/29/22 2341)  Resp: 17 (12/29/22 2341)  BP: 104/61 (12/29/22 2341)  SpO2: 99 % (12/29/22 2341) Vital Signs (24h Range):  Temp:  [32 °F (0 °C)-99 °F (37.2 °C)] 98.5 °F (36.9 °C)  Pulse:  [70-87] 70  Resp:  [16-18] 17  SpO2:  [98 %-100 %] 99 %  BP: ()/(53-70) 104/61     Weight: 81.6 kg (180 lb)  Body mass index is 35.15 kg/m².    Intake/Output Summary (Last 24 hours) at 12/30/2022 0123  Last data filed at  12/29/2022 0622  Gross per 24 hour   Intake 120 ml   Output 30 ml   Net 90 ml      Physical Exam  Constitutional:       General: She is in acute distress.      Appearance: She is well-developed.   HENT:      Head: Normocephalic and atraumatic.   Eyes:      General: No scleral icterus.     Pupils: Pupils are equal, round, and reactive to light.   Neck:      Vascular: No JVD.   Cardiovascular:      Rate and Rhythm: Normal rate and regular rhythm.      Heart sounds: No murmur heard.    No friction rub. No gallop.   Pulmonary:      Effort: Pulmonary effort is normal. No respiratory distress.      Breath sounds: Normal breath sounds. No wheezing or rales.   Abdominal:      General: Bowel sounds are normal. There is no distension.      Palpations: Abdomen is soft.      Tenderness: There is abdominal tenderness. There is no guarding or rebound.   Musculoskeletal:         General: No deformity. Normal range of motion.      Cervical back: Neck supple.   Lymphadenopathy:      Cervical: No cervical adenopathy.   Skin:     General: Skin is warm and dry.      Capillary Refill: Capillary refill takes less than 2 seconds.      Findings: No erythema or rash.   Neurological:      Mental Status: She is alert and oriented to person, place, and time.      Cranial Nerves: No cranial nerve deficit.      Sensory: No sensory deficit.   Psychiatric:         Judgment: Judgment normal.       Significant Labs: A1C: No results for input(s): HGBA1C in the last 4320 hours.  CBC:   Recent Labs   Lab 12/28/22 0459 12/29/22 0317   WBC 6.02 5.06   HGB 9.1* 8.1*   HCT 30.4* 27.7*    327     CMP:   Recent Labs   Lab 12/28/22 0459 12/29/22 0317    136   K 3.6 3.6    108   CO2 23 22*   GLU 75 77   BUN 7 9   CREATININE 0.7 0.6   CALCIUM 8.9 8.2*   ANIONGAP 8 6*     Coagulation: No results for input(s): PT, INR, APTT in the last 48 hours.  Magnesium: No results for input(s): MG in the last 48 hours.  POCT Glucose:   Recent Labs   Lab  12/28/22  0714   POCTGLUCOSE 83       Significant Imaging: I have reviewed all pertinent imaging results/findings within the past 24 hours.      Assessment/Plan:      * Crohn's disease with complication  Generalized abdominal pain  Nausea  32-year-old female with Crohn's disease on methotrexate/infliximab, s/p ileostomy, perianal fistula, anemia, corneal ulcer and keratitis of the right eye scheduled for keratoplasty (1/9/23- Dr. Warner) presents for diffuse abdominal cramping, rectal pain and right eye drainage and increased pain for about a week.  States symptoms are consistent with prior Crohn's flares.    - In the ED, AFVSS.  on admit, improved.   - CBC with stable anemia H/H 9.8/32.3. CMP unremarkable.    - Sed rate >120. CRP 19.6.   - UA with 1+ leukocytes, 13 WBC, denies symptoms  - CT abdomen and pelvis with contrast with No evidence of bowel obstruction.  Right lower quadrant ileostomy is without significant abnormality.  No abscess. Mild diffuse colonic wall thickening without pericolic fat stranding.  Correlate clinically for possible nonspecific pancolitis.   - Given 1L LR bolus x1. Morphine 6 mg IV x2, Zofran 4 mg IV x2 in the ED  - ED spoke with Gastroenterology who recommended admission and CT.   - ED discussed with Ophthalmology who will see the patient in the morning. Consult placed.   - ED discussed with CRS who reviewed the patient's imaging.  No acute surgical pathology, recommends admission to . Consult placed.  - Stool studies ordered  - GI consulted. apprec recs  -- Hold off on corticosteroids at this time for Crohn's disease in setting of COVID and current immunosuppression with infliximab and MTX  -- Would treat cryptosporidium infection, likely nitazoxanide, could discuss with ID  -- Will discuss with her IBD specialist Dr. 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 Vitals stable. Mild cough, but not SOB.  Initiate standard COVID protocols; COVID-19 testing Collection Date: 8/22/2022 Collection Time:  11:11 AM ,Infection Control notification  and isolation- respiratory, contact and droplet per protocol    Diagnostics:  CBC, CMP, CRP and Rapid Flu    Management: Inhaled bronchodilators as needed for shortness of breath.    Iron deficiency anemia due to chronic blood loss  - Current CBC reviewed-   Lab Results   Component Value Date    HGB 9.8 (L) 12/27/2022    HCT 32.3 (L) 12/27/2022     - Patient's anemia is currently controlled. S/p 0 units of PRBCs.   - Etiology likely d/t iron deficiency, crohn's disease  - Monitor serial CBC and transfuse if patient becomes hemodynamically unstable, symptomatic or H/H drops below 7/21.   - Used to get iron infusions    Central corneal ulcer, right  - Scheduled to get a corneal transplant done in the beginning of January  - Gets eye pain associated with Crohn's flares  - Ophthalmology was consulted in ED  - Optho consulted. apprec recs  -- continue eye drops per ID recs       VTE Risk Mitigation (From admission, onward)         Ordered     enoxaparin injection 40 mg  Daily         12/28/22 0107     IP VTE HIGH RISK PATIENT  Once         12/28/22 0107     Place sequential compression device  Until discontinued         12/28/22 0107     Place sequential compression device  Until discontinued         12/28/22 0107                Discharge Planning   ENDY: 12/30/2022     Code Status: Full Code   Is the patient medically ready for discharge?: No    Reason for patient still in hospital (select all that apply): " Patient unstable, Patient trending condition, Laboratory test, Treatment, Consult recommendations and PT / OT recommendations  Discharge Plan A: Home with family          Time spent in care of patient: > 35 minutes           Shayne Newell MD  Department of Hospital Medicine   [facility] - Observation 11H

## 2022-12-30 NOTE — ASSESSMENT & PLAN NOTE
- Scheduled to get a corneal transplant done in the beginning of January  - Gets eye pain associated with Crohn's flares  - Ophthalmology was consulted in ED  - Optho consulted. apprec recs  -- continue eye drops per ID recs

## 2022-12-30 NOTE — TREATMENT PLAN
GI Treatment Plan    Nikkie Myles is a 32 y.o. female admitted to hospital 12/27/2022 (Hospital Day: 4) due to Crohn's disease with complication.     Interval History  Abdominal pain, rectal pain stable  Requesting discharge today    Objective  Temp:  [96.8 °F (36 °C)-98.7 °F (37.1 °C)] 98.6 °F (37 °C) (12/30 1109)  Pulse:  [51-75] 65 (12/30 1109)  BP: ()/(54-69) 97/62 (12/30 1109)  Resp:  [16-18] 18 (12/30 1109)  SpO2:  [97 %-100 %] 98 % (12/30 1109)    General: Alert, Oriented x3, no distress  Abdomen: Non-distended. Normal tympany. Soft. Non-tender. No peritoneal signs.    Laboratory    Recent Labs   Lab 12/28/22  0459 12/29/22  0317 12/30/22  0314   HGB 9.1* 8.1* 7.9*         Assessment and Plan    Nikkie Myles is a 32 y.o. female with history of Crohn's disease (duodenal, rectosigmoid, perianal involvement; on infliximab 10 mg/kg q4 weeks LD 12/23/22, follows at [facility]) s/p loop ileostomy in 08/2022, keratitis and corneal ulcer (upcoming keratoplasty 1/9/23) who presents for one week of cramping diffuse abdominal pain, rectal pain, and eye pain, redness, discharge in setting of +COVID test. Not hypoxic, denies respiratory symptoms. CRP only mildly elevated. Suspect COVID provoked mild flare in Crohn's disease activity. Recent infliximab infusions have been delayed be a few days each time, so not truly receiving q4 week. Cryptosporidium Ag positive but ostomy output is mildly decreased, not having diarrhea and would not explain rectal pain.     Problem List:  Crohn's disease (duodenal, rectosigmoid, perianal involvementperianal involvement; on infliximab and MTX; s/p loop ileostomy 08/2022)  Cryptosporidium infection  COVID+     Recommendations:  - Hold off on corticosteroids at this time for Crohn's disease in setting of COVID and current immunosuppression with infliximab and MTX  - OK with discharge from GI perspective  - If still symptomatic 5 days from COVID+ test, could start prednisone taper as  outpatient at that time. This was discussed with her IBD specialist Dr. Hatfiedl at [facility] and will be managed in their clinic. They are arranging for follow-up appointment.  - Continue treatment for cryptosporidium  - Avoid NSAIDS including toradol, for minor pain control Acetaminophen is preferred  - DVT ppx: lovenox (confirmed via MAR)  - Minimize use of opiate/sedating medications; if necessary, IV morphine is preferred due to short acting nature    Thank you for involving us in the care of Nikkie Myles. Please call with any additional questions, concerns or changes in the patient's clinical status.    Brian Selby MD  Gastroenterology Fellow, PGY IV

## 2022-12-30 NOTE — NURSING
Discharged home, IV removed with catheter intact. Discharge instructions provided to patient. Declined transport, ambulated off unit independently, no signs of distress noted. Medications picked up from pharmacy.

## 2022-12-30 NOTE — SUBJECTIVE & OBJECTIVE
Interval History: Patient lying in bed, in moderate distress due to abdominal pain. No acute events overnight. Patient reports good appetite. Denies fever, chills, SOB, blood in stool or diarrhea. GI evaluated the patient and suspect that mild Crohns flare related to COVID infection and will hold off on starting steroids since patient is already immunosuppressed from home infliximab and methotrexate.       Review of Systems   Constitutional:  Positive for activity change, appetite change and fatigue. Negative for chills and fever.   HENT:  Negative for congestion and trouble swallowing.    Eyes:  Negative for visual disturbance.   Respiratory:  Negative for cough and shortness of breath.    Cardiovascular:  Negative for chest pain and leg swelling.   Gastrointestinal:  Positive for abdominal pain. Negative for abdominal distention, nausea and vomiting.   Endocrine: Negative for cold intolerance, heat intolerance, polydipsia and polyuria.   Genitourinary:  Negative for difficulty urinating and dysuria.   Musculoskeletal:  Positive for myalgias. Negative for back pain.   Skin:  Negative for rash and wound.   Neurological:  Positive for weakness. Negative for dizziness and light-headedness.   Hematological:  Negative for adenopathy. Does not bruise/bleed easily.   Psychiatric/Behavioral:  Negative for confusion and sleep disturbance.    Objective:     Vital Signs (Most Recent):  Temp: 98.5 °F (36.9 °C) (12/29/22 2341)  Pulse: 70 (12/29/22 2341)  Resp: 17 (12/29/22 2341)  BP: 104/61 (12/29/22 2341)  SpO2: 99 % (12/29/22 2341) Vital Signs (24h Range):  Temp:  [32 °F (0 °C)-99 °F (37.2 °C)] 98.5 °F (36.9 °C)  Pulse:  [70-87] 70  Resp:  [16-18] 17  SpO2:  [98 %-100 %] 99 %  BP: ()/(53-70) 104/61     Weight: 81.6 kg (180 lb)  Body mass index is 35.15 kg/m².    Intake/Output Summary (Last 24 hours) at 12/30/2022 0123  Last data filed at 12/29/2022 0622  Gross per 24 hour   Intake 120 ml   Output 30 ml   Net 90 ml       Physical Exam  Constitutional:       General: She is in acute distress.      Appearance: She is well-developed.   HENT:      Head: Normocephalic and atraumatic.   Eyes:      General: No scleral icterus.     Pupils: Pupils are equal, round, and reactive to light.   Neck:      Vascular: No JVD.   Cardiovascular:      Rate and Rhythm: Normal rate and regular rhythm.      Heart sounds: No murmur heard.    No friction rub. No gallop.   Pulmonary:      Effort: Pulmonary effort is normal. No respiratory distress.      Breath sounds: Normal breath sounds. No wheezing or rales.   Abdominal:      General: Bowel sounds are normal. There is no distension.      Palpations: Abdomen is soft.      Tenderness: There is abdominal tenderness. There is no guarding or rebound.   Musculoskeletal:         General: No deformity. Normal range of motion.      Cervical back: Neck supple.   Lymphadenopathy:      Cervical: No cervical adenopathy.   Skin:     General: Skin is warm and dry.      Capillary Refill: Capillary refill takes less than 2 seconds.      Findings: No erythema or rash.   Neurological:      Mental Status: She is alert and oriented to person, place, and time.      Cranial Nerves: No cranial nerve deficit.      Sensory: No sensory deficit.   Psychiatric:         Judgment: Judgment normal.       Significant Labs: A1C: No results for input(s): HGBA1C in the last 4320 hours.  CBC:   Recent Labs   Lab 12/28/22 0459 12/29/22 0317   WBC 6.02 5.06   HGB 9.1* 8.1*   HCT 30.4* 27.7*    327     CMP:   Recent Labs   Lab 12/28/22 0459 12/29/22 0317    136   K 3.6 3.6    108   CO2 23 22*   GLU 75 77   BUN 7 9   CREATININE 0.7 0.6   CALCIUM 8.9 8.2*   ANIONGAP 8 6*     Coagulation: No results for input(s): PT, INR, APTT in the last 48 hours.  Magnesium: No results for input(s): MG in the last 48 hours.  POCT Glucose:   Recent Labs   Lab 12/28/22  0714   POCTGLUCOSE 83       Significant Imaging: I have reviewed all  pertinent imaging results/findings within the past 24 hours.

## 2022-12-30 NOTE — PLAN OF CARE
Problem: Adult Inpatient Plan of Care  Goal: Plan of Care Review  Outcome: Met     Problem: Adult Inpatient Plan of Care  Goal: Absence of Hospital-Acquired Illness or Injury  Outcome: Met     Problem: Adult Inpatient Plan of Care  Goal: Optimal Comfort and Wellbeing  Outcome: Met     Problem: Infection  Goal: Absence of Infection Signs and Symptoms  Outcome: Met

## 2022-12-31 ENCOUNTER — NURSE TRIAGE (OUTPATIENT)
Dept: ADMINISTRATIVE | Facility: CLINIC | Age: 32
End: 2022-12-31
Payer: MEDICAID

## 2022-12-31 ENCOUNTER — PATIENT MESSAGE (OUTPATIENT)
Dept: ADMINISTRATIVE | Facility: CLINIC | Age: 32
End: 2022-12-31
Payer: MEDICAID

## 2022-12-31 LAB — BACTERIA STL CULT: NORMAL

## 2022-12-31 NOTE — TELEPHONE ENCOUNTER
2nd attempt to reach Pt for enrollment into cc program.    Spoken to Pt about cc program. Pt said she wants to sign up for it later by reading self enrollment instructions. Pt said she is in the middle of something right now and denies that she is having any difficulties or questions. Invited Pt to all ooc at [phone] if any arise. Alert and oriented, Pt agrees with above.    No [facility] pcp listed in chart to route encounter to at this time.  Reason for Disposition   General information question, no triage required and triager able to answer question    Protocols used: Information Only Call - No Triage-A-

## 2022-12-31 NOTE — TELEPHONE ENCOUNTER
Patient was discharged from the hospital today. She was prescribed Tobradex eye drops which she realized was not in her prescriptions she received at discharge. She is attempting to get the medication. Upon reviewing the chart, the medication was sent to [facility] pharmacy on [address] in [place]. Informed patient the prescription was sent there and can contact the pharmacy. Apologized for the confusion with the prescription.     Reason for Disposition   [1] Prescription prescribed recently is not at pharmacy AND [2] triager has access to patient's EMR AND [3] prescription is recorded in the EMR    Additional Information   Negative: [1] Prescription refill request for ESSENTIAL medicine (i.e., likelihood of harm to patient if not taken) AND [2] triager unable to refill per department policy   Negative: [1] Prescription not at pharmacy AND [2] was prescribed by PCP recently  (Exception: triager has access to EMR and prescription is recorded there. Go to Home Care and confirm for pharmacy.)   Negative: [1] Pharmacy calling with prescription questions AND [2] triager unable to answer question   Negative: Prescription request for new medicine (not a refill)   Negative: Caller requesting a CONTROLLED substance prescription refill (e.g., narcotics, ADHD medicines)   Negative: [1] Prescription refill request for NON-ESSENTIAL medicine (i.e., no harm to patient if med not taken) AND [2] triager unable to refill per department policy   Negative: [1] Caller has NON-URGENT medicine question about med that PCP prescribed AND [2] triager unable to answer question    Protocols used: Medication Refill and Renewal Call-A-

## 2022-12-31 NOTE — TELEPHONE ENCOUNTER
Reason for Disposition   [1] Caller has medicine question about med NOT prescribed by PCP AND [2] triager unable to answer question (e.g., compatibility with other med, storage)    Additional Information   Negative: [1] Intentional drug overdose AND [2] suicidal thoughts or ideas   Negative: Drug overdose and triager unable to answer question   Negative: Caller requesting a renewal or refill of a medicine patient is currently taking   Negative: Caller requesting information unrelated to medicine   Negative: Caller requesting information about COVID-19 Vaccine   Negative: Caller requesting information about Emergency Contraception   Negative: Caller requesting information about Combined Birth Control Pills   Negative: Caller requesting information about Progestin Birth Control Pills   Negative: Caller requesting information about Post-Op pain or medicines   Negative: Caller requesting a prescription antibiotic (such as Penicillin) for Strep throat and has a positive culture result   Negative: Caller requesting a prescription anti-viral med (such as Tamiflu) and has influenza (flu) symptoms   Negative: Immunization reaction suspected   Negative: Rash while taking a medicine or within 3 days of stopping it   Negative: [1] Asthma and [2] having symptoms of asthma (cough, wheezing, etc.)   Negative: [1] Symptom of illness (e.g., headache, abdominal pain, earache, vomiting) AND [2] more than mild   Negative: Breastfeeding questions about mother's medicines and diet   Negative: MORE THAN A DOUBLE DOSE of a prescription or over-the-counter (OTC) drug   Negative: [1] DOUBLE DOSE (an extra dose or lesser amount) of prescription drug AND [2] any symptoms (e.g., dizziness, nausea, pain, sleepiness)   Negative: [1] DOUBLE DOSE (an extra dose or lesser amount) of over-the-counter (OTC) drug AND [2] any symptoms (e.g., dizziness, nausea, pain, sleepiness)   Negative: Took another person's prescription drug   Negative: [1] DOUBLE  DOSE (an extra dose or lesser amount) of prescription drug AND [2] NO symptoms (Exception: a double dose of antibiotics)   Negative: Diabetes drug error or overdose (e.g., took wrong type of insulin or took extra dose)   Negative: [1] Prescription not at pharmacy AND [2] was prescribed by PCP recently (Exception: triager has access to EMR and prescription is recorded there. Go to Home Care and confirm for pharmacy.)   Negative: [1] Pharmacy calling with prescription question AND [2] triager unable to answer question   Negative: [1] Caller has URGENT medicine question about med that PCP or specialist prescribed AND [2] triager unable to answer question   Negative: Medicine patch causing local rash or itching    Protocols used: Medication Question Call-A-  pt called re eye gtt rx. was to be sent to [facility]. Spoke with pharmacist on conference call with pt. Pharmacy has rx and pharmacy is asking about pts new medical ins card. Pt will call pharmacy back.

## 2022-12-31 NOTE — TELEPHONE ENCOUNTER
[employer] message sent by another RN.    1st attempt made to contact Pt for enrollment into cc program.    VM left for Pt explaining attempt to contact her for enrollment into cc program and to check [employer] messages for self enrollment instruction or if having a medical emergency to call 911 now or go to the nearest ER or if wishing to speak with a triage nurse to call ooc at [phone]. Another attempt to reach you will be conducted at another time to enroll.    No [facility] pcp listed to route encounter to at this time.  Reason for Disposition   Message left on unidentified voice mail.  Phone number verified.    Protocols used: No Contact or Duplicate Contact Call-A-

## 2023-01-01 ENCOUNTER — NURSE TRIAGE (OUTPATIENT)
Dept: ADMINISTRATIVE | Facility: CLINIC | Age: 33
End: 2023-01-01
Payer: MEDICAID

## 2023-01-01 NOTE — TELEPHONE ENCOUNTER
3rd enrollment call - unable to make phone contact to complete enrollment process. LM with family member and Ooc [phone] number given . Pt previously spoke with Nt and may self enroll so surveillance task left in place and placed pt in [facility] until then.   Reason for Disposition   Message left with person in household.    Protocols used: No Contact or Duplicate Contact Call-A-AH

## 2023-01-03 ENCOUNTER — TELEPHONE (OUTPATIENT)
Dept: PHARMACY | Facility: CLINIC | Age: 33
End: 2023-01-03
Payer: MEDICAID

## 2023-01-03 ENCOUNTER — TELEPHONE (OUTPATIENT)
Dept: OPHTHALMOLOGY | Facility: CLINIC | Age: 33
End: 2023-01-03
Payer: MEDICAID

## 2023-01-03 NOTE — TELEPHONE ENCOUNTER
----- Message from Lama Michael MD sent at 12/30/2022  4:58 PM CST -----  Regarding: Dr. Warner patient  Hello,     This patient has surgery scheduled with Dr. Warner 1/9. She was admitted for Crohn's flare up including ocular involvement. She needs follow-up with him next week for the flare up. She had a covid+ test (asymptomatic) during this admission.    Thank you!

## 2023-01-03 NOTE — PLAN OF CARE
[facility]  Discharge Final Note    Primary Care Provider: Primary Doctor No    Expected Discharge Date: 12/30/2022    Patient discharged to home via personal transportation.     Patient's bedside nurse and patient notified of the above.      Final Discharge Note (most recent)       Final Note - 01/03/23 0935          Final Note    Assessment Type Final Discharge Note (P)      Anticipated Discharge Disposition Home or Self Care (P)         Post-Acute Status    Post-Acute Authorization Other (P)      Other Status No Post-Acute Service Needs (P)                      Important Message from Medicare             Contact Info       [address] [address]       Next Steps: Follow up on 1/12/2023    Instructions: Hospital Follow Up with Dr. Giovana Dyer at 3:00PM; Bring Discharge Summary, ID, Insurance Card, and Medication List. Arrive 30 mins early to complete paperwork. If you have any questions contact the clinic at [phone].            Future Appointments   Date Time Provider Department Center   1/4/2023  9:30 AM PRE-ADMIT, [facility]   3/3/2023 10:40 AM Zuleyka Yip MD [facility] COLS[facility]        scheduled post-discharge follow-up appointment and information added to AVS.     Fatou Esposito LMSW  [facility]  Ext. [phone]

## 2023-01-04 LAB
CALPROTECTIN STL-MCNT: <27.1 MCG/G
H PYLORI AG STL QL IA: DETECTED
SPECIMEN SOURCE: ABNORMAL

## 2023-01-11 NOTE — DISCHARGE SUMMARY
[facility] [address] [place] [address] Medicine  Discharge Summary      Patient Name: Nikkie Myles  MRN: [id number]  MERISSA: [id number]  Patient Class: OP- Observation  Admission Date: 12/27/2022  Hospital Length of Stay: 0 days  Discharge Date and Time: 12/30/2022  4:27 PM  Attending Physician: Kena att. providers found   Discharging Provider: Shayne Newell MD  Primary Care Provider: Primary Doctor Kena  [facility] Medicine Team: [facility] HOSP MED R Shayne Newell MD  Primary Care Team: [facility] HOSP MED R    HPI:   Nikkie Myles 32-year-old female with Crohn's disease on methotrexate/folic acid weekly and infliximab every 8 weeks, perianal fistula, anemia, corneal ulcer and keratitis of the right eye scheduled for keratoplasty (1/9/23- Dr. Warner) who presents for diffuse abdominal cramping, rectal pain and right eye drainage and increased pain for about a week.  States symptoms are consistent with prior Crohn's flares. Denies any palliating factors, she is not taking any medication for pain.  Reports associated decreased p.o. intake, nausea and fatigue and nonproductive cough. She notes decreased output from her ileostomy but denies bloody stool output or change in consistency. Denies fevers, chills, vomiting, chest pain, dysuria.  Reports last Chron's flare was prior to ileostomy surgery in August of this year. Follows with GI and CRS.     In the ED, AFVSS.  on admit, now improved. CBC with stable anemia H/H 9.8/32.3. CMP unremarkable.  Sed rate >120. CRP 19.6. UA with 1+ leukocytes, 13 WBC. Urine culture pending. Flu/RSV negative. Covid positive. CT abdomen and pelvis with contrast with No evidence of bowel obstruction.  Right lower quadrant ileostomy is without significant abnormality.  No abscess. Mild diffuse colonic wall thickening without pericolic fat stranding.  Correlate clinically for possible nonspecific pancolitis. ED spoke with Gastroenterology who recommended admission and CT. Case discussed with  Ophthalmology who will see the patient in the morning. Consulted CRS and with fellow who reviewed the patient's imaging.  No acute surgical pathology, recommends admission to .      * No surgery found *      Hospital Course:   GI evaluated the patient and suspected that mild Crohns flare related to COVID infection and will hold off on starting steroids since patient is already immunosuppressed from home infliximab and methotrexate. Discharged on prednisone taper. GI discussed  plan of care with her IBD specialist Dr. Hatfield at [facility] and will be managed in their clinic. He was also found to be positive for cryptosporidium. Ophthalmology evaluated the patient and patient was started on tobradex QID, gel ATs QID, erythromycin ointment (or lubricating gel) QID, Right eye.       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  - patient discharged on nitazoxanide          Final Active Diagnoses:    Diagnosis Date Noted POA    PRINCIPAL PROBLEM:  Crohn's disease with complication [K50.919] 12/28/2022 Yes    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    Why: Hospital Follow Up with Dr. Giovana Dyer at 3:00PM; Bring Discharge Summary, ID, Insurance Card, and Medication List. Arrive 30 mins early to complete paperwork. If you have any questions contact the clinic at [phone].  Contact information:  [address] [facility] [address]                     Patient Instructions:      Diet Adult Regular     Notify your health care provider if you experience any of the following:  temperature >100.4     Notify your health care provider if you experience any of the following:  persistent nausea and vomiting or diarrhea     Notify your health care provider if you experience any of the following:  severe uncontrolled pain     Notify your health care provider if you experience any of the following:  redness, tenderness, or signs of infection (pain, swelling, redness, odor or green/yellow discharge around incision site)     Notify your health care provider if you experience any of the following:  difficulty breathing or increased cough     Notify your health care provider if you experience any of the following:  severe persistent headache     Notify your health care provider if you experience any of the following:  worsening rash     Notify your health care provider if you experience any of the following:  persistent dizziness, light-headedness, or visual disturbances     Notify your health care provider if you experience any of the following:  increased confusion or weakness     COVID-19 Surveillance Program     Order Specific Question Answer Comments   Does patient have a smartphone? Yes    Does patient have the [facility] on their smartphone? Yes    While in surveillance program, will patient be using home oxygen? No      Activity as tolerated       Significant Diagnostic Studies: Labs:     CBC:     Latest Reference Range & Units  12/30/22 03:14   WBC 3.90 - 12.70 K/uL 5.25   RBC 4.00 - 5.40 M/uL 4.60   Hemoglobin 12.0 - 16.0 g/dL 7.9 (L)   Hematocrit 37.0 - 48.5 % 28.3 (L)   MCV 82 - 98 fL 62 (L)   MCH 27.0 - 31.0 pg 17.2 (L)   MCHC 32.0 - 36.0 g/dL 27.9 (L)   RDW 11.5 - 14.5 % 19.0 (H)   Platelets 150 - 450 K/uL 310   (L): Data is abnormally low  (H): Data is abnormally high      CMP:     Latest Reference Range & Units 12/30/22 03:14   Sodium 136 - 145 mmol/L 133 (L)   Potassium 3.5 - 5.1 mmol/L 3.6   Chloride 95 - 110 mmol/L 105   CO2 23 - 29 mmol/L 20 (L)   Anion Gap 8 - 16 mmol/L 8   BUN 6 - 20 mg/dL 9   Creatinine 0.5 - 1.4 mg/dL 0.7   eGFR >60 mL/min/1.73 m^2 >60.0   Glucose 70 - 110 mg/dL 87   Calcium 8.7 - 10.5 mg/dL 8.5 (L)   (L): Data is abnormally low    Microbiology:   Microbiology Results (last 7 days)     Procedure Component Value Units Date/Time    E. coli 0157 antigen [[id number]] Collected: 12/28/22 0454    Order Status: Completed Specimen: Stool Updated: 12/29/22 1221     Shiga Toxin 1 E.coli Negative     Shiga Toxin 2 E.coli Negative    Urine culture [[id number]] Collected: 12/27/22 2053    Order Status: Completed Specimen: Urine Updated: 12/29/22 0104     Urine Culture, Routine No growth    Narrative:      Specimen Source->Urine    Clostridium difficile EIA [[id number]] Collected: 12/28/22 0454    Order Status: Completed Specimen: Stool Updated: 12/28/22 1212     C. diff Antigen Negative     C difficile Toxins A+B, EIA Negative     Comment: Testing not recommended for children <24 months old.             Radiology:   Imaging Results          CT Abdomen Pelvis With Contrast (Final result)  Result time 12/28/22 00:01:42    Final result by Sage Wong MD (12/28/22 00:01:42)                 Impression:      No evidence of bowel obstruction.  Right lower quadrant ileostomy is without significant abnormality.  No abscess.    Mild diffuse colonic wall thickening without pericolic fat stranding.  Correlate clinically for  possible nonspecific pancolitis.    Interval development atelectasis versus scarring within the right lower lobe.    Hepatomegaly.    Electronically signed by resident: Charly Redman  Date:    12/27/2022  Time:    23:35    Electronically signed by: Sage Wong MD  Date:    12/28/2022  Time:    00:01             Narrative:    EXAMINATION:  CT ABDOMEN PELVIS WITH CONTRAST    CLINICAL HISTORY:  Abdominal pain, acute, nonlocalized;Abdominal pain, decreased output from ileostomy, Crohn's disease, concern for inflammatory cause versus obstruction;    TECHNIQUE:  The patient was surveyed from the lung bases through the pelvis after the administration of 75 cc Omni 350 IV contrast. No oral contrast was administered. The data was reconstructed for coronal, sagittal, and axial images.    COMPARISON:  CT abdomen pelvis 08/29/2022    FINDINGS:  CHEST:    Lungs/Pleura: Bandlike opacity in the right lower lobe, representing atelectasis versus scarring.  Previously seen right lower lobe nodule is not seen on today's examination.  No focal consolidation or pleural effusion in the visualized lungs.    Heart: Normal size.  No pericardial effusion.    Thoracic soft tissues: No significant abnormality.    ABDOMEN/PELVIS:    Liver: Enlarged measuring 19.6 cm with smooth contours.  Normal attenuation.  No focal abnormality.  Portal vasculature is patent.    Gallbladder: Normally distended without calcified gallstones.  No pericholecystic inflammatory changes.    Bile ducts: No intrahepatic or extrahepatic biliary ductal dilatation.    Spleen: Normal size.    Pancreas: No mass or peripancreatic fat stranding.    Adrenals: Unremarkable.    Renal/Ureters: The kidneys are normal in size and enhance symmetrically.  No hydronephrosis.  The visualized portions of the ureters are normal in course and caliber. The urinary bladder is distended with smooth wall contours.    Reproductive: Uterus is unremarkable.    Stomach/Bowel: Stomach is  without significant abnormality.  Postoperative changes of right lower quadrant ileostomy.  Small bowel is normal caliber without obstruction or inflammation.  The appendix is not confidently visualized; however, no pericecal inflammatory changes.  Large bowel is normal caliber without obstruction.  Mild diffuse colonic wall thickening without pericolic fat stranding.    Peritoneum: No free fluid.  No intraperitoneal free air.    Lymph Nodes: No pathologic hernán enlargement in the abdomen or pelvis.    Vasculature: Abdominal aorta is normal in course and caliber.  No atherosclerotic calcifications.    Bones: No acute fractures or osseous destructive lesions.    Soft Tissues: Right lower quadrant ileostomy creation.  No fluid collections.                                  Pending Diagnostic Studies:     None         Medications:  Reconciled Home Medications:      Medication List      START taking these medications    carboxymethylcellulose sodium 1 % Dpge  Apply 1 drop to eye 4 (four) times daily.     erythromycin ophthalmic ointment  Commonly known as: ROMYCIN  Place into the right eye every 8 (eight) hours.     nitazoxanide 500 MG Tab  Commonly known as: ALINIA  Take 1 tablet (500 mg total) by mouth every 12 (twelve) hours. for 14 days     pulse oximeter device  Commonly known as: pulse oximeter  by Apply Externally route 2 (two) times a day. Use twice daily at 8 AM and 3 PM and record the value in [employer] as directed.     simethicone 80 MG chewable tablet  Commonly known as: MYLICON  Take 1 tablet (80 mg total) by mouth 4 (four) times daily as needed for Flatulence.     tobramycin-dexAMETHasone 0.3-0.1% 0.3-0.1 % Drps  Commonly known as: TOBRADEX  Place 1 drop into both eyes 4 (four) times daily.        CHANGE how you take these medications    acetaminophen 500 MG tablet  Commonly known as: TYLENOL  Take 2 tablets (1,000 mg total) by mouth every 6 (six) hours as needed for Pain.  What changed: reasons to take  this     ondansetron 4 MG Tbdl  Commonly known as: ZOFRAN-ODT  Take 1 tablet (4 mg total) by mouth every 8 (eight) hours as needed (nausea/vomiting).  What changed: reasons to take this        STOP taking these medications    ibuprofen 400 MG tablet  Commonly known as: ADVIL,MOTRIN     oxyCODONE 5 MG immediate release tablet  Commonly known as: ROXICODONE     prednisoLONE acetate 1 % Drps  Commonly known as: PRED FORTE     traMADoL 50 mg tablet  Commonly known as: ULTRAM        ASK your doctor about these medications    traMADoL 50 mg tablet  Commonly known as: ULTRAM  Take 1 tablet (50 mg total) by mouth every 6 (six) hours as needed for Pain.  Ask about: Should I take this medication?            Indwelling Lines/Drains at time of discharge:   Lines/Drains/Airways     Drain  Duration                Ileostomy 08/23/22 1531  days                Time spent on the discharge of patient: 45 minutes         Shayne Newell MD  Department of Hospital Medicine  [facility] - Observation 11H

## 2023-01-11 NOTE — HOSPITAL COURSE
GI evaluated the patient and suspected that mild Crohns flare related to COVID infection and will hold off on starting steroids since patient is already immunosuppressed from home infliximab and methotrexate. Discharged on prednisone taper. GI discussed  plan of care with her IBD specialist Dr. Hatfield at [facility] and will be managed in their clinic. He was also found to be positive for cryptosporidium. Ophthalmology evaluated the patient and patient was started on tobradex QID, gel ATs QID, erythromycin ointment (or lubricating gel) QID, Right eye.

## 2023-03-02 ENCOUNTER — TELEPHONE (OUTPATIENT)
Dept: SURGERY | Facility: CLINIC | Age: 33
End: 2023-03-02
Payer: MEDICAID

## 2023-03-02 NOTE — PROGRESS NOTES
"CRS Office Visit Follow-up    SUBJECTIVE:     History of Present Illness:  Patient is a 32 y.o. female who presents following laparoscopic loop ileostomy and perianal biopsy for severe perianal Crohn's disease on 8/23/2022.     Doing ok. Continues to have some perianal irritation.  Using an antifungal cream to lower abdominal crease. Still on medical therapy with her primary GI.    Review of Systems:  ROS    OBJECTIVE:     Vital Signs (Most Recent)  BP (!) 81/54 (BP Location: Left arm, Patient Position: Sitting, BP Method: Medium (Automatic))   Pulse 102   Resp 19   Ht 5' 1" (1.549 m)   Wt 70.8 kg (156 lb 3.1 oz)   SpO2 95%   BMI 29.51 kg/m²     Physical Exam:  General: Black or  female in no distress   Neuro: Alert and oriented x 4.  Moves all extremities.     HEENT: No icterus.  Trachea midline  Respiratory: Respirations are even and unlabored  Cardiac: Regular rate  Abdomen: Ileostomy healthy,   Extremities: Warm dry and intact  Skin: No rashes  Anorectal:  Persistent area of skin breakdown and granulation tissue at gluteal crease (see below), persistent large skin tag                ASSESSMENT/PLAN:     33yo F s/p  laparoscopic loop ileostomy and perianal biopsy for severe perianal Crohn's disease on 8/23/2022, starting to see some clinical improvement and wound healing.  Would continue current medication regimen for Crohn's disease.  Will trial topical Clobetasol to areas of skin breakdown.  Derm referral placed.    Zuleyka Yip MD  Staff Surgeon, Colon and Rectal Surgery  [facility]           "

## 2023-03-03 ENCOUNTER — OFFICE VISIT (OUTPATIENT)
Dept: SURGERY | Facility: CLINIC | Age: 33
End: 2023-03-03
Attending: COLON & RECTAL SURGERY
Payer: MEDICAID

## 2023-03-03 VITALS
OXYGEN SATURATION: 95 % | WEIGHT: 156.19 LBS | DIASTOLIC BLOOD PRESSURE: 54 MMHG | HEIGHT: 61 IN | HEART RATE: 102 BPM | SYSTOLIC BLOOD PRESSURE: 81 MMHG | BODY MASS INDEX: 29.49 KG/M2 | RESPIRATION RATE: 19 BRPM

## 2023-03-03 DIAGNOSIS — K50.919 CROHN'S DISEASE WITH COMPLICATION, UNSPECIFIED GASTROINTESTINAL TRACT LOCATION: Primary | ICD-10-CM

## 2023-03-03 PROCEDURE — 99999 PR PBB SHADOW E&M-EST. PATIENT-LVL IV: ICD-10-PCS | Mod: PBBFAC,,, | Performed by: COLON & RECTAL SURGERY

## 2023-03-03 PROCEDURE — 1159F MED LIST DOCD IN RCRD: CPT | Mod: CPTII,,, | Performed by: COLON & RECTAL SURGERY

## 2023-03-03 PROCEDURE — 3008F BODY MASS INDEX DOCD: CPT | Mod: CPTII,,, | Performed by: COLON & RECTAL SURGERY

## 2023-03-03 PROCEDURE — 3074F SYST BP LT 130 MM HG: CPT | Mod: CPTII,,, | Performed by: COLON & RECTAL SURGERY

## 2023-03-03 PROCEDURE — 1160F RVW MEDS BY RX/DR IN RCRD: CPT | Mod: CPTII,,, | Performed by: COLON & RECTAL SURGERY

## 2023-03-03 PROCEDURE — 1159F PR MEDICATION LIST DOCUMENTED IN MEDICAL RECORD: ICD-10-PCS | Mod: CPTII,,, | Performed by: COLON & RECTAL SURGERY

## 2023-03-03 PROCEDURE — 3008F PR BODY MASS INDEX (BMI) DOCUMENTED: ICD-10-PCS | Mod: CPTII,,, | Performed by: COLON & RECTAL SURGERY

## 2023-03-03 PROCEDURE — 3078F DIAST BP <80 MM HG: CPT | Mod: CPTII,,, | Performed by: COLON & RECTAL SURGERY

## 2023-03-03 PROCEDURE — 3078F PR MOST RECENT DIASTOLIC BLOOD PRESSURE < 80 MM HG: ICD-10-PCS | Mod: CPTII,,, | Performed by: COLON & RECTAL SURGERY

## 2023-03-03 PROCEDURE — 99214 OFFICE O/P EST MOD 30 MIN: CPT | Mod: PBBFAC | Performed by: COLON & RECTAL SURGERY

## 2023-03-03 PROCEDURE — 99213 OFFICE O/P EST LOW 20 MIN: CPT | Mod: S$PBB,,, | Performed by: COLON & RECTAL SURGERY

## 2023-03-03 PROCEDURE — 3074F PR MOST RECENT SYSTOLIC BLOOD PRESSURE < 130 MM HG: ICD-10-PCS | Mod: CPTII,,, | Performed by: COLON & RECTAL SURGERY

## 2023-03-03 PROCEDURE — 1160F PR REVIEW ALL MEDS BY PRESCRIBER/CLIN PHARMACIST DOCUMENTED: ICD-10-PCS | Mod: CPTII,,, | Performed by: COLON & RECTAL SURGERY

## 2023-03-03 PROCEDURE — 99213 PR OFFICE/OUTPT VISIT, EST, LEVL III, 20-29 MIN: ICD-10-PCS | Mod: S$PBB,,, | Performed by: COLON & RECTAL SURGERY

## 2023-03-03 PROCEDURE — 99999 PR PBB SHADOW E&M-EST. PATIENT-LVL IV: CPT | Mod: PBBFAC,,, | Performed by: COLON & RECTAL SURGERY

## 2023-03-03 RX ORDER — CLOBETASOL PROPIONATE 0.5 MG/G
OINTMENT TOPICAL 2 TIMES DAILY
Qty: 30 G | Refills: 3 | Status: SHIPPED | OUTPATIENT
Start: 2023-03-03

## 2023-03-30 NOTE — PROGRESS NOTES
Patient seen and examined.  No significant changes.  Discussed with Dr. Warner this a.m..  Plans for upper and lower endoscopy tomorrow.  Discussed with patient that I am going to put her on the schedule on Tuesday for diverting loop ileostomy, pending final results of endoscopies.  Will have her seen in marked for ileostomy preoperatively.  Discussed anticipated postoperative course, as well as how this may impact work and school schedule.    Zuleyka Yip     Pt admits to recurrent chest congestion, cough, sore throat x 4 days. Had a previous URI that resolved 2 weeks prior.  States that her symptoms are worse now than her previous infection.  Cough throughout the day and worse at night.  Mucinex and albuterol are not helping.  No fever, but has felt chills.  Has done several COVID test at home -all of which have been negative.

## 2023-05-29 PROCEDURE — 99284 EMERGENCY DEPT VISIT MOD MDM: CPT | Mod: 25

## 2023-05-29 PROCEDURE — 10060 I&D ABSCESS SIMPLE/SINGLE: CPT

## 2023-05-30 ENCOUNTER — HOSPITAL ENCOUNTER (EMERGENCY)
Facility: HOSPITAL | Age: 33
Discharge: HOME | End: 2023-05-30
Attending: STUDENT IN AN ORGANIZED HEALTH CARE EDUCATION/TRAINING PROGRAM
Payer: MEDICAID

## 2023-05-30 VITALS
HEIGHT: 60 IN | TEMPERATURE: 99 F | RESPIRATION RATE: 20 BRPM | HEART RATE: 88 BPM | DIASTOLIC BLOOD PRESSURE: 72 MMHG | SYSTOLIC BLOOD PRESSURE: 122 MMHG | OXYGEN SATURATION: 100 % | BODY MASS INDEX: 37.3 KG/M2 | WEIGHT: 190 LBS

## 2023-05-30 DIAGNOSIS — L02.91 ABSCESS: Primary | ICD-10-CM

## 2023-05-30 LAB
B-HCG UR QL: NEGATIVE
CTP QC/QA: YES

## 2023-05-30 PROCEDURE — 10060 I&D ABSCESS SIMPLE/SINGLE: CPT

## 2023-05-30 PROCEDURE — 63600175 PHARM REV CODE 636 W HCPCS: Performed by: STUDENT IN AN ORGANIZED HEALTH CARE EDUCATION/TRAINING PROGRAM

## 2023-05-30 PROCEDURE — 25000003 PHARM REV CODE 250: Performed by: STUDENT IN AN ORGANIZED HEALTH CARE EDUCATION/TRAINING PROGRAM

## 2023-05-30 PROCEDURE — 81025 URINE PREGNANCY TEST: CPT | Performed by: PHYSICIAN ASSISTANT

## 2023-05-30 PROCEDURE — 96372 THER/PROPH/DIAG INJ SC/IM: CPT | Performed by: STUDENT IN AN ORGANIZED HEALTH CARE EDUCATION/TRAINING PROGRAM

## 2023-05-30 PROCEDURE — 99284 EMERGENCY DEPT VISIT MOD MDM: CPT | Mod: 25

## 2023-05-30 RX ORDER — MORPHINE SULFATE 4 MG/ML
4 INJECTION, SOLUTION INTRAMUSCULAR; INTRAVENOUS
Status: COMPLETED | OUTPATIENT
Start: 2023-05-30 | End: 2023-05-30

## 2023-05-30 RX ORDER — LIDOCAINE HYDROCHLORIDE 10 MG/ML
1 INJECTION, SOLUTION EPIDURAL; INFILTRATION; INTRACAUDAL; PERINEURAL ONCE
Status: COMPLETED | OUTPATIENT
Start: 2023-05-30 | End: 2023-05-30

## 2023-05-30 RX ORDER — CLINDAMYCIN HYDROCHLORIDE 150 MG/1
300 CAPSULE ORAL 4 TIMES DAILY
Qty: 56 CAPSULE | Refills: 0 | Status: SHIPPED | OUTPATIENT
Start: 2023-05-30 | End: 2023-06-06

## 2023-05-30 RX ADMIN — MORPHINE SULFATE 4 MG: 4 INJECTION, SOLUTION INTRAMUSCULAR; INTRAVENOUS at 01:05

## 2023-05-30 RX ADMIN — LIDOCAINE HYDROCHLORIDE 10 MG: 10 INJECTION, SOLUTION EPIDURAL; INFILTRATION; INTRACAUDAL at 01:05
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
## 2023-05-30 NOTE — DISCHARGE INSTRUCTIONS
Thank you for coming to our Emergency Department today. It is important to remember that some problems or medical conditions are difficult to diagnose and may not be found during your Emergency Department visit.     Be sure to follow up with your primary care doctor and review all labs/imaging/tests that were performed during your ER visit with them. Some labs/tests may be outside of the normal range and require non-emergent follow-up and further investigation to help diagnose/exclude/prevent complications or other potentially serious medical conditions that were not addressed during your ER visit.    If you do not have a primary care doctor, you may contact the one listed on your discharge paperwork or you may also call the [facility] Appointment Desk at [phone] to schedule an appointment and establish care with one. It is important to your health that you have a primary care doctor.    Please take all medications as directed. All medications may potentially have side-effects and it is impossible to predict which medications may give you side-effects or what side-effects (if any) they will give you.. If you feel that you are having a negative effect or side-effect of any medication you should immediately stop taking them and seek medical attention. If you feel that you are having a life-threatening reaction call 911.    Return to the ER with any questions/concerns, new/concerning symptoms, worsening or failure to improve.     Do not drive, swim, climb to height, take a bath, operate heavy machinery, drink alcohol or take potentially sedating medications, sign any legal documents or make any important decisions for 24 hours if you have received any pain medications, sedatives or mood altering drugs during your ER visit or within 24 hours of taking them if they have been prescribed to you.     You can find additional resources for Dentists, hearing aids, durable medical equipment, low cost pharmacies and  other resources at https://[facility].org    BELOW THIS LINE ONLY APPLIES IF YOU HAVE A COVID TEST PENDING OR IF YOU HAVE BEEN DIAGNOSED WITH COVID:  Please access [facility] to review the results of your test. Until the results of your COVID test return, you should isolate yourself so as not to potentially spread illness to others.   If your COVID test returns positive, you should isolate yourself so as not to spread illness to others. After five full days, if you are feeling better and you have not had fever for 24 hours, you can return to your typical daily activities, but you must wear a mask around others for an additional 5 days.   If your COVID test returns negative and you are either unvaccinated or more than six months out from your two-dose vaccine and are not yet boosted, you should still quarantine for 5 full days followed by strict mask use for an additional 5 full days.   If your COVID test returns negative and you have received your 2-dose initial vaccine as well as a booster, you should continue strict mask use for 10 full days after the exposure.  For all those exposed, best practice includes a test at day 5 after the exposure. This can be a home test or a test through one of the many testing centers throughout our community.   Masking is always advised to limit the spread of COVID. Cdc.gov is an excellent site to obtain the latest up to date recommendations regarding COVID and COVID testing.     CDC Testing and Quarantine Guidelines for patients with exposure to a known-positive COVID-19 person:  A close exposure is defined as anyone who has had an exposure (masked or unmasked) to a known COVID -19 positive person within 6 feet of someone for a cumulative total of 15 minutes or more over a 24-hour period.   Vaccinated and/or if you recently had a positive covid test within 90 days do NOT need to quarantine after contact with someone who had COVID-19 unless you develop symptoms.   Fully vaccinated  people who have not had a positive test within 90 days, should get tested 3-5 days after their exposure, even if they don't have symptoms and wear a mask indoors in public for 14 days following exposure or until their test result is negative.      Unvaccinated and/or NOT had a positive test within 90 days and meet close exposure  You are required by CDC guidelines to quarantine for at least 5 days from time of exposure followed by 5 days of strict masking. It is recommended, but not required to test after 5 days, unless you develop symptoms, in which case you should test at that time.  If you get tested after 5 days and your test is positive, your 5 day period of isolation starts the day of the positive test.    If your exposure does not meet the above definition, you can return to your normal daily activities to include social distancing, wearing a mask and frequent handwashing.      Here is a link to guidance from the CDC:  https://www.cdc.gov/media/releases/2021/s1227-isolation-quarantine-guidance.html      [place] Dept Of Health Testing Sites:  https://ldh.la.gov/page/3934      [facility] website with testing locations and guidance:  https://[email or web address]

## 2023-05-30 NOTE — Clinical Note
"Nikkie Espinosaen" Shar was seen and treated in our emergency department on 5/29/2023.  She may return to work on 06/01/2023.       If you have any questions or concerns, please don't hesitate to call.      Nereida KHANNA    "

## 2023-06-01 ENCOUNTER — PATIENT OUTREACH (OUTPATIENT)
Dept: EMERGENCY MEDICINE | Facility: HOSPITAL | Age: 33
End: 2023-06-01
Payer: MEDICAID

## 2023-06-01 NOTE — PROGRESS NOTES
Zonia Henson  ED Navigator  Emergency Department    Project: [facility] ED Navigator  Role: Community Health Worker    Date: 06/01/2023  Patient Name: Nikkie Myles  MRN: [id number]  PCP: Primary Doctor No    Assessment:     Nikkie Myles is a 33 y.o. female who has presented to ED for abscess. Patient has visited the ED 1 times in the past 3 months. Patient did not contact PCP.     ED Navigator Initial Assessment    ED Navigator Enrollment Documentation  Consent to Services  Does patient consent to completing the assessment?: Yes  Contact  Method of Initial Contact: Phone  Transportation  Does the patient have issues with Transportation?: No  Does the patient have transportation to and from healthcare appointments?: Yes  Insurance Coverage  Do you have coverage/adequate coverage?: Yes  Type/kind of coverage: Primary Cvg:  Medicaid/Healthy Blue [place] [place]  Is patient able to afford co-pays/deductibles?: Yes  Is patient able to afford HME or supplies?: Yes  Does patient have an established [facility] PCP?: Yes  Able to access?: Yes  Does the patient have a lack of adequate coverage?: No  Specialist Appointment  Did the patient come to the ED to see a specialist?: No  Does the patient have a pending specialist referral?: No  Does the patient have a specialist appointment made?: No  PCP Follow Up Appointment  Does the patient have a follow up appontment with a PCP?: No  When was the last time you saw your PCP?: 6/1/22  Why does the patient not have a follow up scheduled?: Inconvenient appointment times  Medications  Is patient able to afford medication?: Yes  Is patient unable to get medication due to lack of transportation?: No  Psychological  Does the patient have psycho-social concerns?: Yes  What concerns does the patient have?: Anxiety and/or Depression  Food  Does the patient have concerns about food?: Yes  What concerns does the patient have regarding food?: Lack of access to food  Communication/Education  Does  the patient have limited English proficiency/English not primary language?: No  Does patient have low literacy and/or low health literacy?: Yes  Does patient have concerns with care?: No  Does patient have dissatisfaction with care?: No  Other Financial Concerns  Does the patient have immediate financial distress?: No  Does the patient have general financial concerns?: No  Other Social Barriers/Concerns  Does the patient have any additional barriers or concerns?: Work  Primary Barrier  Barriers identified: Cognitive barrier (health literacy, language and communication, etc.)  Root Cause of ED Utilization: Patient Knowledge/Low Health Literacy  Plan to address Patient Knowledge/Low Health Literacy: Provided information for [facility] 24/7 Nurse triage line [phone] or 1-866-[facility] ([phone])  Next steps: Provided Education  Was education/educational materials provided surrounding PCP services/creating a medical home?: Yes Was education verbal or written?: Written, Verbal     Was education/educational materials provided surrounding low cost, healthy foods?: Yes Was education verbal or written?: Written     Was education/educational materials provided surrounding other items? If so, use comment to explain.: Yes Was education verbal or written?: Written   Plan: Utility payment assistance resource given for their region  Expected Date of Follow Up 1: 7/13/23  Additional Documentation: ED Navigator spoke with patient regarding her ED visit. Patient stated she is still having having some discomfort. Patient denied having a follow-up with PCP. Assessment completed. Patient requested resources for food, utility assistance, and Mental Health Provider. In addition to the requested resources, Right Care Right Place form, [place] Virtual Visit Flyer, [facility] PCP scheduling assistance, [facility] on call RN#, and Heart Healthy Diet education were sent via verified e-mail.  Zonia Henson          Social History      Socioeconomic History    Marital status: Single   Tobacco Use    Smoking status: Former     Packs/day: 1.00     Years: 5.00     Pack years: 5.00     Types: Cigarettes    Smokeless tobacco: Never   Substance and Sexual Activity    Alcohol use: Yes     Comment: RARELY    Drug use: No    Sexual activity: Yes     Partners: Male     Birth control/protection: None   Social History Narrative    Together since last year, 2019    He is a garvin    She is a CNA at [employer].     Social Determinants of Health     Financial Resource Strain: Medium Risk    Difficulty of Paying Living Expenses: Somewhat hard   Food Insecurity: Food Insecurity Present    Worried About Running Out of Food in the Last Year: Sometimes true    Ran Out of Food in the Last Year: Sometimes true   Transportation Needs: No Transportation Needs    Lack of Transportation (Medical): No    Lack of Transportation (Non-Medical): No   Physical Activity: Inactive    Days of Exercise per Week: 0 days    Minutes of Exercise per Session: 0 min   Stress: Stress Concern Present    Feeling of Stress : To some extent   Social Connections: Unknown    Frequency of Communication with Friends and Family: More than three times a week    Frequency of Social Gatherings with Friends and Family: More than three times a week    Attends [employer] Services: Patient refused    Active Member of Clubs or Organizations: Patient refused    Attends Club or Organization Meetings: Patient refused    Marital Status: Never    Housing Stability: Low Risk     Unable to Pay for Housing in the Last Year: No    Number of Places Lived in the Last Year: 1    Unstable Housing in the Last Year: No       Plan:   ED Navigator spoke with patient regarding her ED visit. Patient stated she is still having having some discomfort. Patient denied having a follow-up with PCP. Assessment completed. Patient requested resources for food, utility assistance, and Mental Health Provider. In  addition to the requested resources, Right Care Right Place form, [place] Virtual Visit Flyer, [facility] PCP scheduling assistance, [facility] on call RN#, and Heart Healthy Diet education were sent via verified e-mail.  Zonia Henson       Appointment made with: Primary Doctor No

## 2023-07-04 ENCOUNTER — HOSPITAL ENCOUNTER (EMERGENCY)
Facility: HOSPITAL | Age: 33
Discharge: HOME | End: 2023-07-04
Attending: EMERGENCY MEDICINE
Payer: MEDICAID

## 2023-07-04 VITALS
OXYGEN SATURATION: 100 % | RESPIRATION RATE: 18 BRPM | TEMPERATURE: 99 F | DIASTOLIC BLOOD PRESSURE: 68 MMHG | HEIGHT: 60 IN | WEIGHT: 185 LBS | BODY MASS INDEX: 36.32 KG/M2 | SYSTOLIC BLOOD PRESSURE: 117 MMHG | HEART RATE: 99 BPM

## 2023-07-04 DIAGNOSIS — L02.91 ABSCESS: Primary | ICD-10-CM

## 2023-07-04 LAB
B-HCG UR QL: NEGATIVE
CTP QC/QA: YES

## 2023-07-04 PROCEDURE — 99283 EMERGENCY DEPT VISIT LOW MDM: CPT | Mod: 25

## 2023-07-04 PROCEDURE — 25000003 PHARM REV CODE 250

## 2023-07-04 PROCEDURE — 81025 URINE PREGNANCY TEST: CPT

## 2023-07-04 PROCEDURE — 10061 I&D ABSCESS COMP/MULTIPLE: CPT

## 2023-07-04 RX ORDER — METHOTREXATE 2.5 MG/1
TABLET ORAL
COMMUNITY

## 2023-07-04 RX ORDER — ACETAMINOPHEN 500 MG
1000 TABLET ORAL
Status: COMPLETED | OUTPATIENT
Start: 2023-07-04 | End: 2023-07-04

## 2023-07-04 RX ORDER — LIDOCAINE HYDROCHLORIDE 10 MG/ML
10 INJECTION INFILTRATION; PERINEURAL
Status: COMPLETED | OUTPATIENT
Start: 2023-07-04 | End: 2023-07-04

## 2023-07-04 RX ORDER — SULFAMETHOXAZOLE AND TRIMETHOPRIM 800; 160 MG/1; MG/1
1 TABLET ORAL 2 TIMES DAILY
Qty: 14 TABLET | Refills: 0 | Status: SHIPPED | OUTPATIENT
Start: 2023-07-04 | End: 2023-07-11

## 2023-07-04 RX ORDER — MUPIROCIN 20 MG/G
1 OINTMENT TOPICAL
Status: COMPLETED | OUTPATIENT
Start: 2023-07-04 | End: 2023-07-04

## 2023-07-04 RX ORDER — FOLIC ACID 1 MG/1
1 TABLET ORAL DAILY
COMMUNITY

## 2023-07-04 RX ADMIN — MUPIROCIN 22 G: 20 OINTMENT TOPICAL at 05:07

## 2023-07-04 RX ADMIN — LIDOCAINE HYDROCHLORIDE 10 ML: 10 INJECTION, SOLUTION INFILTRATION; PERINEURAL at 05:07

## 2023-07-04 RX ADMIN — ACETAMINOPHEN 1000 MG: 500 TABLET ORAL at 05:07
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
## 2023-07-06 ENCOUNTER — PATIENT OUTREACH (OUTPATIENT)
Dept: EMERGENCY MEDICINE | Facility: HOSPITAL | Age: 33
End: 2023-07-06
Payer: MEDICAID

## 2023-08-21 ENCOUNTER — OFFICE VISIT (OUTPATIENT)
Dept: SURGERY | Facility: CLINIC | Age: 33
End: 2023-08-21
Payer: MEDICAID

## 2023-08-21 VITALS
HEART RATE: 92 BPM | DIASTOLIC BLOOD PRESSURE: 89 MMHG | SYSTOLIC BLOOD PRESSURE: 130 MMHG | BODY MASS INDEX: 38.79 KG/M2 | RESPIRATION RATE: 16 BRPM | HEIGHT: 60 IN | WEIGHT: 197.56 LBS

## 2023-08-21 DIAGNOSIS — Z93.2 ILEOSTOMY PRESENT: Primary | ICD-10-CM

## 2023-08-21 DIAGNOSIS — K50.919 CROHN'S DISEASE WITH COMPLICATION, UNSPECIFIED GASTROINTESTINAL TRACT LOCATION: ICD-10-CM

## 2023-08-21 PROCEDURE — 3079F PR MOST RECENT DIASTOLIC BLOOD PRESSURE 80-89 MM HG: ICD-10-PCS | Mod: CPTII,,, | Performed by: NURSE PRACTITIONER

## 2023-08-21 PROCEDURE — 3008F BODY MASS INDEX DOCD: CPT | Mod: CPTII,,, | Performed by: NURSE PRACTITIONER

## 2023-08-21 PROCEDURE — 99213 OFFICE O/P EST LOW 20 MIN: CPT | Mod: PBBFAC | Performed by: NURSE PRACTITIONER

## 2023-08-21 PROCEDURE — 3008F PR BODY MASS INDEX (BMI) DOCUMENTED: ICD-10-PCS | Mod: CPTII,,, | Performed by: NURSE PRACTITIONER

## 2023-08-21 PROCEDURE — 99213 PR OFFICE/OUTPT VISIT, EST, LEVL III, 20-29 MIN: ICD-10-PCS | Mod: S$PBB,,, | Performed by: NURSE PRACTITIONER

## 2023-08-21 PROCEDURE — 1159F MED LIST DOCD IN RCRD: CPT | Mod: CPTII,,, | Performed by: NURSE PRACTITIONER

## 2023-08-21 PROCEDURE — 3079F DIAST BP 80-89 MM HG: CPT | Mod: CPTII,,, | Performed by: NURSE PRACTITIONER

## 2023-08-21 PROCEDURE — 3075F SYST BP GE 130 - 139MM HG: CPT | Mod: CPTII,,, | Performed by: NURSE PRACTITIONER

## 2023-08-21 PROCEDURE — 1160F RVW MEDS BY RX/DR IN RCRD: CPT | Mod: CPTII,,, | Performed by: NURSE PRACTITIONER

## 2023-08-21 PROCEDURE — 1159F PR MEDICATION LIST DOCUMENTED IN MEDICAL RECORD: ICD-10-PCS | Mod: CPTII,,, | Performed by: NURSE PRACTITIONER

## 2023-08-21 PROCEDURE — 3075F PR MOST RECENT SYSTOLIC BLOOD PRESS GE 130-139MM HG: ICD-10-PCS | Mod: CPTII,,, | Performed by: NURSE PRACTITIONER

## 2023-08-21 PROCEDURE — 99999 PR PBB SHADOW E&M-EST. PATIENT-LVL III: CPT | Mod: PBBFAC,,, | Performed by: NURSE PRACTITIONER

## 2023-08-21 PROCEDURE — 99999 PR PBB SHADOW E&M-EST. PATIENT-LVL III: ICD-10-PCS | Mod: PBBFAC,,, | Performed by: NURSE PRACTITIONER

## 2023-08-21 PROCEDURE — 99213 OFFICE O/P EST LOW 20 MIN: CPT | Mod: S$PBB,,, | Performed by: NURSE PRACTITIONER

## 2023-08-21 PROCEDURE — 1160F PR REVIEW ALL MEDS BY PRESCRIBER/CLIN PHARMACIST DOCUMENTED: ICD-10-PCS | Mod: CPTII,,, | Performed by: NURSE PRACTITIONER
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
## 2023-09-19 ENCOUNTER — TELEPHONE (OUTPATIENT)
Dept: SURGERY | Facility: CLINIC | Age: 33
End: 2023-09-19
Payer: MEDICAID

## 2023-09-19 NOTE — PROGRESS NOTES
[facility] Office Visit Follow-up    SUBJECTIVE:     History of Present Illness:  Patient is a 33 y.o. female who presents following laparoscopic loop ileostomy and perianal biopsy for severe perianal Crohn's disease on 8/23/2022.     Continuing on Remicaide. Says she had scopes 3 months ago and that she was told everything was good.  Anal pain is 75% better, still aggravated by skin tag.  Had I+D of axillary abscess recently.  Scheduled to see Derm in Nov at [facility].    Review of Systems:  ROS    OBJECTIVE:     Vital Signs (Most Recent)  /67   Pulse 75   Wt 87.6 kg (193 lb 2 oz)   BMI 37.72 kg/m²     Physical Exam:  General: Black or  female in no distress   Neuro: Alert and oriented x 4.  Moves all extremities.     HEENT: No icterus.  Trachea midline  Respiratory: Respirations are even and unlabored  Cardiac: Regular rate  Abdomen: Ileostomy healthy,   Extremities: Warm dry and intact  Skin: No rashes  Anorectal:  Persistent area of skin breakdown and granulation tissue at gluteal crease (see below), persistent large skin tag, no evidence of active/lateral fistulas                  ASSESSMENT/PLAN:     33yo F s/p  laparoscopic loop ileostomy and perianal biopsy for severe perianal Crohn's disease on 8/23/2022, starting to see some clinical improvement and wound healing.      Discussed with Stephanie and Dr Warner today, reviewed photos.  I am hesitant to reverse her ileostomy given persistent skin ulceration.  Will call Derm she is seeing to discuss treatment for cutaneous Crohn's and whether they want to review biopsy slides from prior EUA. Will try topical antifungal. Continue Remicaide and Methotrexate for now.    Zuleyka Yip MD  Staff Surgeon, Colon and Rectal Surgery  [facility]

## 2023-09-20 ENCOUNTER — TELEPHONE (OUTPATIENT)
Dept: SURGERY | Facility: CLINIC | Age: 33
End: 2023-09-20
Payer: MEDICAID

## 2023-09-20 ENCOUNTER — OFFICE VISIT (OUTPATIENT)
Dept: SURGERY | Facility: CLINIC | Age: 33
End: 2023-09-20
Attending: COLON & RECTAL SURGERY
Payer: MEDICAID

## 2023-09-20 ENCOUNTER — OFFICE VISIT (OUTPATIENT)
Dept: SURGERY | Facility: CLINIC | Age: 33
End: 2023-09-20
Payer: MEDICAID

## 2023-09-20 VITALS
WEIGHT: 193.13 LBS | DIASTOLIC BLOOD PRESSURE: 67 MMHG | HEART RATE: 75 BPM | BODY MASS INDEX: 37.72 KG/M2 | SYSTOLIC BLOOD PRESSURE: 101 MMHG

## 2023-09-20 DIAGNOSIS — Z93.2 ILEOSTOMY PRESENT: ICD-10-CM

## 2023-09-20 DIAGNOSIS — K50.919 CROHN'S DISEASE WITH COMPLICATION, UNSPECIFIED GASTROINTESTINAL TRACT LOCATION: Primary | ICD-10-CM

## 2023-09-20 PROCEDURE — 1159F MED LIST DOCD IN RCRD: CPT | Mod: CPTII,,, | Performed by: NURSE PRACTITIONER

## 2023-09-20 PROCEDURE — 99999 PR PBB SHADOW E&M-EST. PATIENT-LVL III: ICD-10-PCS | Mod: PBBFAC,,, | Performed by: COLON & RECTAL SURGERY

## 2023-09-20 PROCEDURE — 1159F PR MEDICATION LIST DOCUMENTED IN MEDICAL RECORD: ICD-10-PCS | Mod: CPTII,,, | Performed by: NURSE PRACTITIONER

## 2023-09-20 PROCEDURE — 1160F PR REVIEW ALL MEDS BY PRESCRIBER/CLIN PHARMACIST DOCUMENTED: ICD-10-PCS | Mod: CPTII,,, | Performed by: COLON & RECTAL SURGERY

## 2023-09-20 PROCEDURE — 1159F MED LIST DOCD IN RCRD: CPT | Mod: CPTII,,, | Performed by: COLON & RECTAL SURGERY

## 2023-09-20 PROCEDURE — 99999 PR PBB SHADOW E&M-EST. PATIENT-LVL II: ICD-10-PCS | Mod: PBBFAC,,, | Performed by: NURSE PRACTITIONER

## 2023-09-20 PROCEDURE — 99213 PR OFFICE/OUTPT VISIT, EST, LEVL III, 20-29 MIN: ICD-10-PCS | Mod: S$PBB,,, | Performed by: COLON & RECTAL SURGERY

## 2023-09-20 PROCEDURE — 99999 PR PBB SHADOW E&M-EST. PATIENT-LVL II: CPT | Mod: PBBFAC,,, | Performed by: NURSE PRACTITIONER

## 2023-09-20 PROCEDURE — 1160F PR REVIEW ALL MEDS BY PRESCRIBER/CLIN PHARMACIST DOCUMENTED: ICD-10-PCS | Mod: CPTII,,, | Performed by: NURSE PRACTITIONER

## 2023-09-20 PROCEDURE — 99212 OFFICE O/P EST SF 10 MIN: CPT | Mod: PBBFAC | Performed by: NURSE PRACTITIONER

## 2023-09-20 PROCEDURE — 99213 OFFICE O/P EST LOW 20 MIN: CPT | Mod: S$PBB,,, | Performed by: COLON & RECTAL SURGERY

## 2023-09-20 PROCEDURE — 1159F PR MEDICATION LIST DOCUMENTED IN MEDICAL RECORD: ICD-10-PCS | Mod: CPTII,,, | Performed by: COLON & RECTAL SURGERY

## 2023-09-20 PROCEDURE — 3008F BODY MASS INDEX DOCD: CPT | Mod: CPTII,,, | Performed by: COLON & RECTAL SURGERY

## 2023-09-20 PROCEDURE — 3074F PR MOST RECENT SYSTOLIC BLOOD PRESSURE < 130 MM HG: ICD-10-PCS | Mod: CPTII,,, | Performed by: COLON & RECTAL SURGERY

## 2023-09-20 PROCEDURE — 3078F DIAST BP <80 MM HG: CPT | Mod: CPTII,,, | Performed by: COLON & RECTAL SURGERY

## 2023-09-20 PROCEDURE — 3008F PR BODY MASS INDEX (BMI) DOCUMENTED: ICD-10-PCS | Mod: CPTII,,, | Performed by: COLON & RECTAL SURGERY

## 2023-09-20 PROCEDURE — 3074F SYST BP LT 130 MM HG: CPT | Mod: CPTII,,, | Performed by: COLON & RECTAL SURGERY

## 2023-09-20 PROCEDURE — 1160F RVW MEDS BY RX/DR IN RCRD: CPT | Mod: CPTII,,, | Performed by: COLON & RECTAL SURGERY

## 2023-09-20 PROCEDURE — 1160F RVW MEDS BY RX/DR IN RCRD: CPT | Mod: CPTII,,, | Performed by: NURSE PRACTITIONER

## 2023-09-20 PROCEDURE — 99213 OFFICE O/P EST LOW 20 MIN: CPT | Mod: PBBFAC,27 | Performed by: COLON & RECTAL SURGERY

## 2023-09-20 PROCEDURE — 99999 PR PBB SHADOW E&M-EST. PATIENT-LVL III: CPT | Mod: PBBFAC,,, | Performed by: COLON & RECTAL SURGERY

## 2023-09-20 PROCEDURE — 3078F PR MOST RECENT DIASTOLIC BLOOD PRESSURE < 80 MM HG: ICD-10-PCS | Mod: CPTII,,, | Performed by: COLON & RECTAL SURGERY

## 2023-09-20 NOTE — TELEPHONE ENCOUNTER
Called [facility] Dermatology at [phone], message sent to Dermatology staff. Dr Yip would like to give her phone number to Dr Smith to discuss patients condition and possibly a sooner appointment.
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
## 2023-09-21 ENCOUNTER — TELEPHONE (OUTPATIENT)
Dept: SURGERY | Facility: CLINIC | Age: 33
End: 2023-09-21
Payer: MEDICAID

## 2023-09-21 NOTE — TELEPHONE ENCOUNTER
Called Dr Smith's (dermatology) [facility] office at [phone]. Spoke with Leticia. Dr Smith's cell number given to Dr Yip via text.

## 2023-09-21 NOTE — TELEPHONE ENCOUNTER
Called [facility], spoke with Jeffrey in the call center. States he does not have a direct clinic number only the main line. Message sent to Dr Smith's staff. Dr Yip would like to give Dr Smith her number to call and discuss patient.

## 2023-09-27 ENCOUNTER — HOSPITAL ENCOUNTER (OUTPATIENT)
Dept: RADIOLOGY | Facility: HOSPITAL | Age: 33
Discharge: HOME | End: 2023-09-27
Attending: COLON & RECTAL SURGERY
Payer: MEDICAID

## 2023-09-27 DIAGNOSIS — K50.919 CROHN'S DISEASE WITH COMPLICATION, UNSPECIFIED GASTROINTESTINAL TRACT LOCATION: ICD-10-CM

## 2023-09-27 PROCEDURE — A9585 GADOBUTROL INJECTION: HCPCS | Performed by: COLON & RECTAL SURGERY

## 2023-09-27 PROCEDURE — 72197 MRI PELVIS W/O & W/DYE: CPT | Mod: 26,,, | Performed by: INTERNAL MEDICINE

## 2023-09-27 PROCEDURE — 72197 MRI ANAL SPHINCTER W W/O CONTRAST: ICD-10-PCS | Mod: 26,,, | Performed by: INTERNAL MEDICINE

## 2023-09-27 PROCEDURE — 72197 MRI PELVIS W/O & W/DYE: CPT | Mod: TC

## 2023-09-27 PROCEDURE — 25500020 PHARM REV CODE 255: Performed by: COLON & RECTAL SURGERY

## 2023-09-27 RX ORDER — GADOBUTROL 604.72 MG/ML
10 INJECTION INTRAVENOUS
Status: COMPLETED | OUTPATIENT
Start: 2023-09-27 | End: 2023-09-27

## 2023-09-27 RX ADMIN — GADOBUTROL 10 ML: 604.72 INJECTION INTRAVENOUS at 04:09

## 2023-11-21 NOTE — CONSULTS
"Chief Complaint   Patient presents with    Hyperlipidemia     New general cardiology for Mixed hyperlipidemia       Initial /88 (BP Location: Left arm, Patient Position: Chair, Cuff Size: Adult Large)   Pulse 91   Wt 83.5 kg (184 lb)   SpO2 100%   BMI 29.70 kg/m   Estimated body mass index is 29.7 kg/m  as calculated from the following:    Height as of 6/28/23: 1.676 m (5' 6\").    Weight as of this encounter: 83.5 kg (184 lb)..  BP completed using cuff size: BONITA Ugalde    " "Consultation Report  Ophthalmology Service    Date: 2022    Chief complaint/Reason for Consult: "Eye pain- Crohn's"     History of Present Illness: Nikkie Myles is a 32 y.o. 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  States symptoms are consistent with prior Crohn's flares. Patient reports associated redness and photophobia OD, but denies any visual sx OS.  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 family history includes Glaucoma in her maternal grandmother; Hypertension in her father and mother; No Known Problems in her brother, maternal aunt, maternal grandfather, maternal uncle, paternal aunt, paternal grandfather, paternal grandmother, paternal uncle, and sister.     PMHx:  has a past medical history of Anal fistula, Chronic diarrhea, Crohn's disease (2022), Enteropathic arthropathy, and Eye injury.     PSurgHx:  has a past surgical history that includes  section, low transverse (2013);  section with tubal ligation (Bilateral, 2020);  section (); Examination under anesthesia (N/A, 8/15/2022); Flexible sigmoidoscopy (N/A, 8/15/2022); Esophagogastroduodenoscopy (N/A, 2022); Colonoscopy (N/A, 2022); and Laparoscopic ileostomy (N/A, 2022).     Home Medications:   Prior to Admission medications    Medication Sig Start Date End Date Taking? Authorizing Provider   acetaminophen (TYLENOL) 500 MG tablet Take 2 tablets (1,000 mg total) " by mouth every 6 (six) hours as needed. 12/28/21   Micah Prince MD   ibuprofen (ADVIL,MOTRIN) 400 MG tablet Take 1 tablet (400 mg total) by mouth every 6 (six) hours as needed. 12/28/21   Micah Prince MD   ondansetron (ZOFRAN-ODT) 4 MG TbDL Take 1 tablet (4 mg total) by mouth every 8 (eight) hours as needed. 12/28/21   Micah Prince MD   oxyCODONE (ROXICODONE) 5 MG immediate release tablet Take 1 tablet (5 mg total) by mouth every 4 (four) hours as needed for Pain. 9/3/22   Sunny Jauregui MD   prednisoLONE acetate (PRED FORTE) 1 % DrpS Place 1 drop into the left eye 3 (three) times daily. 5/4/22   Doug Warner MD   traMADoL (ULTRAM) 50 mg tablet Take 1 tablet (50 mg total) by mouth every 6 (six) hours as needed for Pain. 9/14/22   Zuleyka Yip MD        Medications this encounter:    enoxaparin  40 mg Subcutaneous Daily       Allergies: has No Known Allergies.     Social:  reports that she has quit smoking. She has a 5.00 pack-year smoking history. She has never used smokeless tobacco. She reports current alcohol use. She reports that she does not use drugs.     ROS: As per HPI    Ocular examination/Dilated fundus examination:  Base Eye Exam       Visual Acuity (Snellen - Linear)         Right Left    Dist sc CF at Face 20/25              Tonometry (Tonopen, 9:20 AM)         Right Left    Pressure 9 13              Visual Fields         Right Left      Full              Extraocular Movement         Right Left     Full, Ortho Full, Ortho              Neuro/Psych       Oriented x3: Yes    Mood/Affect: Normal              Dilation       Both eyes: 1% Mydriacyl, 2.5% Phenylephrine @ 9:21 AM                  Slit Lamp and Fundus Exam       External Exam         Right Left    External trace edema Normal              Pen Light Exam         Right Left    Lids/Lashes mild Ptosis Normal    Conjunctiva/Sclera 2+ Injection diffusely, limbal thickening White and quiet    Cornea Central  scar/stromal haze with 3+ NV and thinning of central/ST portion, lindsay negative Whorling epitheliopathy w/ inferior and superior limbal wedge defects    Anterior Chamber Deep and formed with no hypopyon appreciated, jennyfer for cell/flare 2/2 corneal haze Deep and formed    Iris Round, minimally reactive Round and reactive    Lens Poor view - appears clear Clear    Anterior Vitreous Poor view - appears clear Normal              Fundus Exam         Right Left    Disc Poor view, appears pink and sharp Pink & sharp    C/D Ratio  0.2    Macula Poor view, appears flat Flat    Vessels no view Normal    Periphery no view Flat w/o holes/tears/detachment                       Assessment/Plan:     1. Crohn's Flare with Ocular involvement, OD  2. 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   - Patient w/ 1wk hx of Crohn's flare and OD pain, photophobia, redness, irritation, and discharge  - Admitted 8/12/22 for similar presentation OD with onset coinciding with Crohn's flare, pt found to have corneal ulcerations that time   - Limited VA OD from previous ulcers with OD baseline vision of CF @3ft   - OD Exam 12/28/22: VA CF @ Face, IOP wnl, 2+ injection, 3+ KNV with central and ST thinning, irregular epithelium but no epi defects or discreet areas of staining, difficult to assess AC for cell/flare through corneal scarring/neovascularization but no hypopyon visible   - Ocular inflammation likely related to flare up of Crohn's disease based on timeline of sx and previous presentations    Recommendations:  - Start Tobrex QID, Right eye   - Start Erythromycin ointment TID, Right eye   - Start gel ATs QID, Right eye   - Continue mgmt of Crohn's flare per GI/primary team       Patient and plan discussed w/ Dr Warner. Please contact Ophthalmology for any new/worsening visual changes or concerns.       Carlos Manuel Rodriguez MD  [facility], PGY-2  12/28/2022  9:06 AM

## 2024-03-04 ENCOUNTER — TELEPHONE (OUTPATIENT)
Dept: WOUND CARE | Facility: CLINIC | Age: 34
End: 2024-03-04
Payer: MEDICAID

## 2024-08-14 ENCOUNTER — TELEPHONE (OUTPATIENT)
Dept: OPHTHALMOLOGY | Facility: CLINIC | Age: 34
End: 2024-08-14
Payer: MEDICAID

## 2024-08-14 NOTE — TELEPHONE ENCOUNTER
----- Message from Goldy Lau MD sent at 8/10/2024  2:41 PM CDT -----  Regarding: Hospital/ED Follow Up  Hello,    This patient is stating that she is having trouble with her vision and recently went to ED for exacerbation of her condition. Can you please make them a follow up appointment in [facility] in 1-2 weeks?    Thank you!    Goldy Lau  [facility], PGY-2   Jaida Shepherd (:  1949) is a 68 y.o. female,Established patient, here for evaluation of the following chief complaint(s): Mass (Left forearm, had 5 iron infusions last one  had a blood clot there but still feels as if there is bumps in there. )         ASSESSMENT/PLAN:  1. Superficial vein thrombosis   -No signs of continued inflammation, it is possible there is still some clot in the vein but at this point it would be more chronic. No other concerning findings on exam.  I suspect some of the iron infiltrated as hyperpigmentation has lasted longer than I would expect for simple bruising  2. Ganglion cyst of wrist, right   -Consistent with a ganglion cyst, can refer to hand surgery if needed  3. Morning joint stiffness of hand, unspecified laterality  -Based on exam more likely OA rather than inflammatory arthritis, though she is having some morning stiffness. We will check inflammatory markers, referral placed  -     RUBEN; Future  -     Rheumatoid Factor; Future  -     Sedimentation Rate; Future  -     C-Reactive Protein; Future  -     Cyclic Citrul Peptide Antibody, IgG; Future  -     Guillermina Gale MD, Rheumatology, [facility]  4. Type 2 diabetes mellitus without complication, without long-term current use of insulin ([address]  -Due for A1c, continue metformin  -     Comprehensive Metabolic Panel; Future  -     Hemoglobin A1C; Future  5. Bilateral thumb pain  -Suspect OA as above  -     Kristen Harper MD, Rheumatology, [place]    Return in about 6 months (around 2023), or if symptoms worsen or fail to improve, for AWV. Subjective   SUBJECTIVE/OBJECTIVE:  HPI    She had iron injections in the fall, on the last 1 the IV infiltrated and she had significant bruising in the left forearm. She still noticing some knots in that area. She was diagnosed with thrombophlebitis after the IV infiltrated, she used hot compresses for that.   It is no longer swollen or tender. There is still discoloration there. There is a cyst on her right wrist that she has noticed recently. CMC joints bothering her - feels sharp pain when moving the thumbs  Does notice morning stiffness, that improves during the day. No swelling. No pain or swelling in the MCP. She would like to see Dr. Jessica Burt, a friend of hers sees her. Seeing Dr. Yue Warren for the back - looking at getting injections, if insurance won't approve may get another ablation    Review of Systems       Objective   Physical Exam  Vitals reviewed. Constitutional:       General: She is not in acute distress. Appearance: Normal appearance. She is well-developed. HENT:      Head: Normocephalic and atraumatic. Cardiovascular:      Rate and Rhythm: Normal rate and regular rhythm. Heart sounds: Normal heart sounds. Pulmonary:      Effort: Pulmonary effort is normal. No respiratory distress. Breath sounds: Normal breath sounds. Musculoskeletal:      Comments: Ganglion cyst lateral right wrist   Skin:     General: Skin is warm and dry. Comments: Hyperpigmentation over the anterior left forearm. No swelling, some area of palpable cord without any tenderness, erythema, or other signs of inflammation. Couple other small knots either calcification versus fatty collection   Neurological:      Mental Status: She is alert. Psychiatric:         Behavior: Behavior normal.         Thought Content: Thought content normal.         Judgment: Judgment normal.                An electronic signature was used to authenticate this note.     --Radha Sales MD

## 2024-08-15 ENCOUNTER — TELEPHONE (OUTPATIENT)
Dept: OPHTHALMOLOGY | Facility: CLINIC | Age: 34
End: 2024-08-15
Payer: MEDICAID

## 2025-01-23 ENCOUNTER — HOSPITAL ENCOUNTER (INPATIENT)
Facility: HOSPITAL | Age: 35
LOS: 2 days | Discharge: HOME | DRG: 386 | End: 2025-01-27
Attending: EMERGENCY MEDICINE | Admitting: STUDENT IN AN ORGANIZED HEALTH CARE EDUCATION/TRAINING PROGRAM
Payer: COMMERCIAL

## 2025-01-23 DIAGNOSIS — K85.90 ACUTE PANCREATITIS, UNSPECIFIED COMPLICATION STATUS, UNSPECIFIED PANCREATITIS TYPE: ICD-10-CM

## 2025-01-23 DIAGNOSIS — K60.30 PERIANAL FISTULA: ICD-10-CM

## 2025-01-23 DIAGNOSIS — H16.011 CENTRAL CORNEAL ULCER, RIGHT: ICD-10-CM

## 2025-01-23 DIAGNOSIS — K51.00 PANCOLITIS: Primary | ICD-10-CM

## 2025-01-23 DIAGNOSIS — R07.9 CHEST PAIN: ICD-10-CM

## 2025-01-23 LAB
ALBUMIN SERPL BCP-MCNC: 3 G/DL (ref 3.5–5.2)
ALP SERPL-CCNC: 66 U/L (ref 40–150)
ALT SERPL W/O P-5'-P-CCNC: 10 U/L (ref 10–44)
ANION GAP SERPL CALC-SCNC: 11 MMOL/L (ref 8–16)
AST SERPL-CCNC: 16 U/L (ref 10–40)
BACTERIA #/AREA URNS HPF: ABNORMAL /HPF
BASOPHILS # BLD AUTO: 0.05 K/UL (ref 0–0.2)
BASOPHILS NFR BLD: 0.5 % (ref 0–1.9)
BILIRUB SERPL-MCNC: 0.2 MG/DL (ref 0.1–1)
BILIRUB UR QL STRIP: NEGATIVE
BUN SERPL-MCNC: 7 MG/DL (ref 6–20)
CALCIUM SERPL-MCNC: 9.4 MG/DL (ref 8.7–10.5)
CHLORIDE SERPL-SCNC: 102 MMOL/L (ref 95–110)
CLARITY UR: ABNORMAL
CO2 SERPL-SCNC: 21 MMOL/L (ref 23–29)
COLOR UR: YELLOW
CREAT SERPL-MCNC: 0.8 MG/DL (ref 0.5–1.4)
DIFFERENTIAL METHOD BLD: ABNORMAL
EOSINOPHIL # BLD AUTO: 0.1 K/UL (ref 0–0.5)
EOSINOPHIL NFR BLD: 1.3 % (ref 0–8)
ERYTHROCYTE [DISTWIDTH] IN BLOOD BY AUTOMATED COUNT: 16 % (ref 11.5–14.5)
EST. GFR  (NO RACE VARIABLE): >60 ML/MIN/1.73 M^2
GLUCOSE SERPL-MCNC: 101 MG/DL (ref 70–110)
GLUCOSE UR QL STRIP: NEGATIVE
HCT VFR BLD AUTO: 34.9 % (ref 37–48.5)
HGB BLD-MCNC: 11 G/DL (ref 12–16)
HGB UR QL STRIP: ABNORMAL
HYALINE CASTS #/AREA URNS LPF: 0 /LPF
IMM GRANULOCYTES # BLD AUTO: 0.03 K/UL (ref 0–0.04)
IMM GRANULOCYTES NFR BLD AUTO: 0.3 % (ref 0–0.5)
KETONES UR QL STRIP: ABNORMAL
LEUKOCYTE ESTERASE UR QL STRIP: ABNORMAL
LIPASE SERPL-CCNC: 219 U/L (ref 4–60)
LYMPHOCYTES # BLD AUTO: 3.5 K/UL (ref 1–4.8)
LYMPHOCYTES NFR BLD: 36.1 % (ref 18–48)
MCH RBC QN AUTO: 19.3 PG (ref 27–31)
MCHC RBC AUTO-ENTMCNC: 31.5 G/DL (ref 32–36)
MCV RBC AUTO: 61 FL (ref 82–98)
MICROSCOPIC COMMENT: ABNORMAL
MONOCYTES # BLD AUTO: 1.2 K/UL (ref 0.3–1)
MONOCYTES NFR BLD: 12.2 % (ref 4–15)
NEUTROPHILS # BLD AUTO: 4.8 K/UL (ref 1.8–7.7)
NEUTROPHILS NFR BLD: 49.6 % (ref 38–73)
NITRITE UR QL STRIP: NEGATIVE
NRBC BLD-RTO: 0 /100 WBC
PH UR STRIP: 6 [PH] (ref 5–8)
PLATELET # BLD AUTO: 513 K/UL (ref 150–450)
PMV BLD AUTO: 9.4 FL (ref 9.2–12.9)
POTASSIUM SERPL-SCNC: 3.7 MMOL/L (ref 3.5–5.1)
PROT SERPL-MCNC: 10.3 G/DL (ref 6–8.4)
PROT UR QL STRIP: ABNORMAL
RBC # BLD AUTO: 5.7 M/UL (ref 4–5.4)
RBC #/AREA URNS HPF: 31 /HPF (ref 0–4)
SODIUM SERPL-SCNC: 134 MMOL/L (ref 136–145)
SP GR UR STRIP: 1.03 (ref 1–1.03)
SQUAMOUS #/AREA URNS HPF: 3 /HPF
URN SPEC COLLECT METH UR: ABNORMAL
UROBILINOGEN UR STRIP-ACNC: NEGATIVE EU/DL
WBC # BLD AUTO: 9.6 K/UL (ref 3.9–12.7)
WBC #/AREA URNS HPF: >100 /HPF (ref 0–5)

## 2025-01-23 PROCEDURE — G0378 HOSPITAL OBSERVATION PER HR: HCPCS

## 2025-01-23 PROCEDURE — 96374 THER/PROPH/DIAG INJ IV PUSH: CPT

## 2025-01-23 PROCEDURE — 80053 COMPREHEN METABOLIC PANEL: CPT | Performed by: NURSE PRACTITIONER

## 2025-01-23 PROCEDURE — 85025 COMPLETE CBC W/AUTO DIFF WBC: CPT | Performed by: NURSE PRACTITIONER

## 2025-01-23 PROCEDURE — 87088 URINE BACTERIA CULTURE: CPT | Performed by: NURSE PRACTITIONER

## 2025-01-23 PROCEDURE — 96376 TX/PRO/DX INJ SAME DRUG ADON: CPT

## 2025-01-23 PROCEDURE — 87086 URINE CULTURE/COLONY COUNT: CPT | Performed by: NURSE PRACTITIONER

## 2025-01-23 PROCEDURE — 96361 HYDRATE IV INFUSION ADD-ON: CPT

## 2025-01-23 PROCEDURE — 63600175 PHARM REV CODE 636 W HCPCS: Mod: JZ,TB

## 2025-01-23 PROCEDURE — 99285 EMERGENCY DEPT VISIT HI MDM: CPT | Mod: 25

## 2025-01-23 PROCEDURE — 81000 URINALYSIS NONAUTO W/SCOPE: CPT | Performed by: NURSE PRACTITIONER

## 2025-01-23 PROCEDURE — 25000003 PHARM REV CODE 250

## 2025-01-23 PROCEDURE — 83690 ASSAY OF LIPASE: CPT | Performed by: NURSE PRACTITIONER

## 2025-01-23 PROCEDURE — 96375 TX/PRO/DX INJ NEW DRUG ADDON: CPT

## 2025-01-23 PROCEDURE — 63600175 PHARM REV CODE 636 W HCPCS: Mod: JZ,TB | Performed by: EMERGENCY MEDICINE

## 2025-01-23 PROCEDURE — 87186 SC STD MICRODIL/AGAR DIL: CPT | Performed by: NURSE PRACTITIONER

## 2025-01-23 PROCEDURE — 25000003 PHARM REV CODE 250: Performed by: EMERGENCY MEDICINE

## 2025-01-23 PROCEDURE — 25500020 PHARM REV CODE 255: Performed by: EMERGENCY MEDICINE

## 2025-01-23 RX ORDER — DICYCLOMINE HYDROCHLORIDE 10 MG/1
20 CAPSULE ORAL 4 TIMES DAILY
Status: DISCONTINUED | OUTPATIENT
Start: 2025-01-23 | End: 2025-01-27 | Stop reason: HOSPADM

## 2025-01-23 RX ORDER — POLYETHYLENE GLYCOL 3350 17 G/17G
17 POWDER, FOR SOLUTION ORAL DAILY
Status: DISCONTINUED | OUTPATIENT
Start: 2025-01-24 | End: 2025-01-24

## 2025-01-23 RX ORDER — ALUMINUM HYDROXIDE, MAGNESIUM HYDROXIDE, AND SIMETHICONE 1200; 120; 1200 MG/30ML; MG/30ML; MG/30ML
30 SUSPENSION ORAL 4 TIMES DAILY PRN
Status: DISCONTINUED | OUTPATIENT
Start: 2025-01-24 | End: 2025-01-27 | Stop reason: HOSPADM

## 2025-01-23 RX ORDER — IBUPROFEN 200 MG
16 TABLET ORAL
Status: DISCONTINUED | OUTPATIENT
Start: 2025-01-24 | End: 2025-01-27 | Stop reason: HOSPADM

## 2025-01-23 RX ORDER — TALC
6 POWDER (GRAM) TOPICAL NIGHTLY PRN
Status: DISCONTINUED | OUTPATIENT
Start: 2025-01-24 | End: 2025-01-27 | Stop reason: HOSPADM

## 2025-01-23 RX ORDER — ONDANSETRON HYDROCHLORIDE 2 MG/ML
4 INJECTION, SOLUTION INTRAVENOUS
Status: COMPLETED | OUTPATIENT
Start: 2025-01-23 | End: 2025-01-23

## 2025-01-23 RX ORDER — SODIUM CHLORIDE 0.9 % (FLUSH) 0.9 %
10 SYRINGE (ML) INJECTION EVERY 12 HOURS PRN
Status: DISCONTINUED | OUTPATIENT
Start: 2025-01-24 | End: 2025-01-27 | Stop reason: HOSPADM

## 2025-01-23 RX ORDER — ONDANSETRON HYDROCHLORIDE 2 MG/ML
4 INJECTION, SOLUTION INTRAVENOUS EVERY 8 HOURS PRN
Status: DISCONTINUED | OUTPATIENT
Start: 2025-01-24 | End: 2025-01-27 | Stop reason: HOSPADM

## 2025-01-23 RX ORDER — METHYLPREDNISOLONE SOD SUCC 125 MG
60 VIAL (EA) INJECTION DAILY
Status: DISCONTINUED | OUTPATIENT
Start: 2025-01-23 | End: 2025-01-27 | Stop reason: HOSPADM

## 2025-01-23 RX ORDER — METHYLPREDNISOLONE SOD SUCC 125 MG
125 VIAL (EA) INJECTION
Status: COMPLETED | OUTPATIENT
Start: 2025-01-23 | End: 2025-01-23

## 2025-01-23 RX ORDER — IBUPROFEN 200 MG
24 TABLET ORAL
Status: DISCONTINUED | OUTPATIENT
Start: 2025-01-24 | End: 2025-01-27 | Stop reason: HOSPADM

## 2025-01-23 RX ORDER — GLUCAGON 1 MG
1 KIT INJECTION
Status: DISCONTINUED | OUTPATIENT
Start: 2025-01-24 | End: 2025-01-27 | Stop reason: HOSPADM

## 2025-01-23 RX ORDER — HYDROMORPHONE HYDROCHLORIDE 1 MG/ML
1 INJECTION, SOLUTION INTRAMUSCULAR; INTRAVENOUS; SUBCUTANEOUS
Status: COMPLETED | OUTPATIENT
Start: 2025-01-23 | End: 2025-01-23

## 2025-01-23 RX ORDER — DEXTROMETHORPHAN POLISTIREX 30 MG/5 ML
1 SUSPENSION, EXTENDED RELEASE 12 HR ORAL DAILY PRN
Status: DISCONTINUED | OUTPATIENT
Start: 2025-01-24 | End: 2025-01-27 | Stop reason: HOSPADM

## 2025-01-23 RX ORDER — PROCHLORPERAZINE EDISYLATE 5 MG/ML
5 INJECTION INTRAMUSCULAR; INTRAVENOUS EVERY 6 HOURS PRN
Status: DISCONTINUED | OUTPATIENT
Start: 2025-01-24 | End: 2025-01-27 | Stop reason: HOSPADM

## 2025-01-23 RX ORDER — GABAPENTIN 300 MG/1
300 CAPSULE ORAL NIGHTLY
Status: DISCONTINUED | OUTPATIENT
Start: 2025-01-23 | End: 2025-01-27 | Stop reason: HOSPADM

## 2025-01-23 RX ORDER — ACETAMINOPHEN 325 MG/1
650 TABLET ORAL EVERY 8 HOURS PRN
Status: DISCONTINUED | OUTPATIENT
Start: 2025-01-24 | End: 2025-01-27 | Stop reason: HOSPADM

## 2025-01-23 RX ORDER — ACETAMINOPHEN 325 MG/1
650 TABLET ORAL EVERY 4 HOURS PRN
Status: DISCONTINUED | OUTPATIENT
Start: 2025-01-24 | End: 2025-01-27 | Stop reason: HOSPADM

## 2025-01-23 RX ORDER — NALOXONE HCL 0.4 MG/ML
0.02 VIAL (ML) INJECTION
Status: DISCONTINUED | OUTPATIENT
Start: 2025-01-24 | End: 2025-01-27 | Stop reason: HOSPADM

## 2025-01-23 RX ADMIN — GABAPENTIN 300 MG: 300 CAPSULE ORAL at 11:01

## 2025-01-23 RX ADMIN — IOHEXOL 85 ML: 350 INJECTION, SOLUTION INTRAVENOUS at 10:01

## 2025-01-23 RX ADMIN — METHYLPREDNISOLONE SODIUM SUCCINATE 125 MG: 125 INJECTION, POWDER, FOR SOLUTION INTRAMUSCULAR; INTRAVENOUS at 06:01

## 2025-01-23 RX ADMIN — HYDROMORPHONE HYDROCHLORIDE 1 MG: 1 INJECTION, SOLUTION INTRAMUSCULAR; INTRAVENOUS; SUBCUTANEOUS at 06:01

## 2025-01-23 RX ADMIN — SODIUM CHLORIDE 1000 ML: 9 INJECTION, SOLUTION INTRAVENOUS at 06:01

## 2025-01-23 RX ADMIN — METHYLPREDNISOLONE SODIUM SUCCINATE 60 MG: 125 INJECTION, POWDER, FOR SOLUTION INTRAMUSCULAR; INTRAVENOUS at 11:01

## 2025-01-23 RX ADMIN — DICYCLOMINE HYDROCHLORIDE 20 MG: 10 CAPSULE ORAL at 11:01

## 2025-01-23 RX ADMIN — ONDANSETRON 4 MG: 2 INJECTION INTRAMUSCULAR; INTRAVENOUS at 06:01

## 2025-01-24 LAB
ALBUMIN SERPL BCP-MCNC: 3 G/DL (ref 3.5–5.2)
ALP SERPL-CCNC: 60 U/L (ref 40–150)
ALT SERPL W/O P-5'-P-CCNC: 9 U/L (ref 10–44)
ANION GAP SERPL CALC-SCNC: 9 MMOL/L (ref 8–16)
AST SERPL-CCNC: 15 U/L (ref 10–40)
BASOPHILS # BLD AUTO: 0.02 K/UL (ref 0–0.2)
BASOPHILS NFR BLD: 0.3 % (ref 0–1.9)
BILIRUB SERPL-MCNC: 0.3 MG/DL (ref 0.1–1)
BUN SERPL-MCNC: 8 MG/DL (ref 6–20)
C DIFF GDH STL QL: NEGATIVE
C DIFF TOX A+B STL QL IA: NEGATIVE
CALCIUM SERPL-MCNC: 9.5 MG/DL (ref 8.7–10.5)
CHLORIDE SERPL-SCNC: 105 MMOL/L (ref 95–110)
CO2 SERPL-SCNC: 20 MMOL/L (ref 23–29)
CREAT SERPL-MCNC: 0.8 MG/DL (ref 0.5–1.4)
CRP SERPL-MCNC: 97.2 MG/L (ref 0–8.2)
DIFFERENTIAL METHOD BLD: ABNORMAL
EOSINOPHIL # BLD AUTO: 0 K/UL (ref 0–0.5)
EOSINOPHIL NFR BLD: 0 % (ref 0–8)
ERYTHROCYTE [DISTWIDTH] IN BLOOD BY AUTOMATED COUNT: 16 % (ref 11.5–14.5)
EST. GFR  (NO RACE VARIABLE): >60 ML/MIN/1.73 M^2
GLUCOSE SERPL-MCNC: 141 MG/DL (ref 70–110)
HAV IGG SER QL IA: REACTIVE
HBV CORE AB SERPL QL IA: NORMAL
HBV SURFACE AB SER-ACNC: 78.96 MIU/ML
HBV SURFACE AB SER-ACNC: REACTIVE M[IU]/ML
HBV SURFACE AG SERPL QL IA: NORMAL
HCT VFR BLD AUTO: 34.5 % (ref 37–48.5)
HGB BLD-MCNC: 10.5 G/DL (ref 12–16)
IMM GRANULOCYTES # BLD AUTO: 0.05 K/UL (ref 0–0.04)
IMM GRANULOCYTES NFR BLD AUTO: 0.6 % (ref 0–0.5)
IRON SERPL-MCNC: 20 UG/DL (ref 30–160)
LYMPHOCYTES # BLD AUTO: 1.8 K/UL (ref 1–4.8)
LYMPHOCYTES NFR BLD: 23.8 % (ref 18–48)
MAGNESIUM SERPL-MCNC: 2.2 MG/DL (ref 1.6–2.6)
MCH RBC QN AUTO: 18.5 PG (ref 27–31)
MCHC RBC AUTO-ENTMCNC: 30.4 G/DL (ref 32–36)
MCV RBC AUTO: 61 FL (ref 82–98)
MONOCYTES # BLD AUTO: 0.1 K/UL (ref 0.3–1)
MONOCYTES NFR BLD: 1.2 % (ref 4–15)
NEUTROPHILS # BLD AUTO: 5.7 K/UL (ref 1.8–7.7)
NEUTROPHILS NFR BLD: 74.1 % (ref 38–73)
NRBC BLD-RTO: 0 /100 WBC
PHOSPHATE SERPL-MCNC: 4 MG/DL (ref 2.7–4.5)
PLATELET # BLD AUTO: 488 K/UL (ref 150–450)
PMV BLD AUTO: 9.3 FL (ref 9.2–12.9)
POTASSIUM SERPL-SCNC: 4.6 MMOL/L (ref 3.5–5.1)
PROT SERPL-MCNC: 10.6 G/DL (ref 6–8.4)
RBC # BLD AUTO: 5.67 M/UL (ref 4–5.4)
SATURATED IRON: 6 % (ref 20–50)
SODIUM SERPL-SCNC: 134 MMOL/L (ref 136–145)
TOTAL IRON BINDING CAPACITY: 314 UG/DL (ref 250–450)
TRANSFERRIN SERPL-MCNC: 212 MG/DL (ref 200–375)
WBC # BLD AUTO: 7.73 K/UL (ref 3.9–12.7)
WBC #/AREA STL HPF: ABNORMAL /[HPF]

## 2025-01-24 PROCEDURE — 87338 HPYLORI STOOL AG IA: CPT | Performed by: NURSE PRACTITIONER

## 2025-01-24 PROCEDURE — G0378 HOSPITAL OBSERVATION PER HR: HCPCS

## 2025-01-24 PROCEDURE — 84466 ASSAY OF TRANSFERRIN: CPT | Performed by: NURSE PRACTITIONER

## 2025-01-24 PROCEDURE — 83735 ASSAY OF MAGNESIUM: CPT

## 2025-01-24 PROCEDURE — 87449 NOS EACH ORGANISM AG IA: CPT | Performed by: NURSE PRACTITIONER

## 2025-01-24 PROCEDURE — 87340 HEPATITIS B SURFACE AG IA: CPT | Performed by: NURSE PRACTITIONER

## 2025-01-24 PROCEDURE — 85025 COMPLETE CBC W/AUTO DIFF WBC: CPT

## 2025-01-24 PROCEDURE — 84100 ASSAY OF PHOSPHORUS: CPT

## 2025-01-24 PROCEDURE — 63600175 PHARM REV CODE 636 W HCPCS: Performed by: PHYSICIAN ASSISTANT

## 2025-01-24 PROCEDURE — 99223 1ST HOSP IP/OBS HIGH 75: CPT | Mod: 25,,, | Performed by: NURSE PRACTITIONER

## 2025-01-24 PROCEDURE — 86364 TISS TRNSGLTMNASE EA IG CLAS: CPT | Performed by: NURSE PRACTITIONER

## 2025-01-24 PROCEDURE — 63600175 PHARM REV CODE 636 W HCPCS: Performed by: INTERNAL MEDICINE

## 2025-01-24 PROCEDURE — 87186 SC STD MICRODIL/AGAR DIL: CPT | Performed by: NURSE PRACTITIONER

## 2025-01-24 PROCEDURE — 96375 TX/PRO/DX INJ NEW DRUG ADDON: CPT

## 2025-01-24 PROCEDURE — 25000003 PHARM REV CODE 250: Performed by: PHYSICIAN ASSISTANT

## 2025-01-24 PROCEDURE — 86787 VARICELLA-ZOSTER ANTIBODY: CPT | Performed by: NURSE PRACTITIONER

## 2025-01-24 PROCEDURE — 87329 GIARDIA AG IA: CPT | Performed by: NURSE PRACTITIONER

## 2025-01-24 PROCEDURE — 63600175 PHARM REV CODE 636 W HCPCS: Mod: JZ,TB

## 2025-01-24 PROCEDURE — 87427 SHIGA-LIKE TOXIN AG IA: CPT | Performed by: NURSE PRACTITIONER

## 2025-01-24 PROCEDURE — 87046 STOOL CULTR AEROBIC BACT EA: CPT | Mod: 59 | Performed by: NURSE PRACTITIONER

## 2025-01-24 PROCEDURE — 87449 NOS EACH ORGANISM AG IA: CPT | Mod: 91 | Performed by: NURSE PRACTITIONER

## 2025-01-24 PROCEDURE — 83993 ASSAY FOR CALPROTECTIN FECAL: CPT | Performed by: NURSE PRACTITIONER

## 2025-01-24 PROCEDURE — 86140 C-REACTIVE PROTEIN: CPT | Performed by: NURSE PRACTITIONER

## 2025-01-24 PROCEDURE — 80053 COMPREHEN METABOLIC PANEL: CPT

## 2025-01-24 PROCEDURE — 87045 FECES CULTURE AEROBIC BACT: CPT | Performed by: NURSE PRACTITIONER

## 2025-01-24 PROCEDURE — 96376 TX/PRO/DX INJ SAME DRUG ADON: CPT

## 2025-01-24 PROCEDURE — 89055 LEUKOCYTE ASSESSMENT FECAL: CPT | Performed by: NURSE PRACTITIONER

## 2025-01-24 PROCEDURE — 82607 VITAMIN B-12: CPT | Performed by: NURSE PRACTITIONER

## 2025-01-24 PROCEDURE — 86790 VIRUS ANTIBODY NOS: CPT | Performed by: NURSE PRACTITIONER

## 2025-01-24 PROCEDURE — 25000003 PHARM REV CODE 250

## 2025-01-24 PROCEDURE — 86706 HEP B SURFACE ANTIBODY: CPT | Performed by: NURSE PRACTITIONER

## 2025-01-24 PROCEDURE — 86704 HEP B CORE ANTIBODY TOTAL: CPT | Performed by: NURSE PRACTITIONER

## 2025-01-24 RX ORDER — HYDROCORTISONE 25 MG/G
CREAM TOPICAL
Status: DISCONTINUED | OUTPATIENT
Start: 2025-01-24 | End: 2025-01-27 | Stop reason: HOSPADM

## 2025-01-24 RX ORDER — MORPHINE SULFATE 4 MG/ML
2 INJECTION, SOLUTION INTRAMUSCULAR; INTRAVENOUS EVERY 6 HOURS PRN
Status: DISCONTINUED | OUTPATIENT
Start: 2025-01-24 | End: 2025-01-27 | Stop reason: HOSPADM

## 2025-01-24 RX ORDER — CEFTRIAXONE 1 G/1
1 INJECTION, POWDER, FOR SOLUTION INTRAMUSCULAR; INTRAVENOUS
Status: DISCONTINUED | OUTPATIENT
Start: 2025-01-24 | End: 2025-01-26

## 2025-01-24 RX ORDER — POLYETHYLENE GLYCOL 3350 17 G/17G
17 POWDER, FOR SOLUTION ORAL 2 TIMES DAILY PRN
Status: DISCONTINUED | OUTPATIENT
Start: 2025-01-24 | End: 2025-01-27 | Stop reason: HOSPADM

## 2025-01-24 RX ORDER — POLYETHYLENE GLYCOL 3350 17 G/17G
17 POWDER, FOR SOLUTION ORAL DAILY
Status: DISCONTINUED | OUTPATIENT
Start: 2025-01-24 | End: 2025-01-24

## 2025-01-24 RX ORDER — MORPHINE SULFATE 4 MG/ML
1 INJECTION, SOLUTION INTRAMUSCULAR; INTRAVENOUS EVERY 6 HOURS PRN
Status: DISCONTINUED | OUTPATIENT
Start: 2025-01-24 | End: 2025-01-27 | Stop reason: HOSPADM

## 2025-01-24 RX ORDER — ENOXAPARIN SODIUM 100 MG/ML
40 INJECTION SUBCUTANEOUS EVERY 24 HOURS
Status: DISCONTINUED | OUTPATIENT
Start: 2025-01-25 | End: 2025-01-27 | Stop reason: HOSPADM

## 2025-01-24 RX ORDER — MORPHINE SULFATE 4 MG/ML
1 INJECTION, SOLUTION INTRAMUSCULAR; INTRAVENOUS ONCE
Status: COMPLETED | OUTPATIENT
Start: 2025-01-24 | End: 2025-01-24

## 2025-01-24 RX ADMIN — METHYLPREDNISOLONE SODIUM SUCCINATE 60 MG: 125 INJECTION, POWDER, FOR SOLUTION INTRAMUSCULAR; INTRAVENOUS at 09:01

## 2025-01-24 RX ADMIN — TOBRAMYCIN AND DEXAMETHASONE: 3; 1 OINTMENT OPHTHALMIC at 05:01

## 2025-01-24 RX ADMIN — MORPHINE SULFATE 2 MG: 4 INJECTION, SOLUTION INTRAMUSCULAR; INTRAVENOUS at 01:01

## 2025-01-24 RX ADMIN — DICYCLOMINE HYDROCHLORIDE 20 MG: 10 CAPSULE ORAL at 09:01

## 2025-01-24 RX ADMIN — CEFTRIAXONE 1 G: 1 INJECTION, POWDER, FOR SOLUTION INTRAMUSCULAR; INTRAVENOUS at 09:01

## 2025-01-24 RX ADMIN — MORPHINE SULFATE 1 MG: 4 INJECTION, SOLUTION INTRAMUSCULAR; INTRAVENOUS at 05:01

## 2025-01-24 RX ADMIN — TOBRAMYCIN AND DEXAMETHASONE: 3; 1 OINTMENT OPHTHALMIC at 10:01

## 2025-01-24 RX ADMIN — DICYCLOMINE HYDROCHLORIDE 20 MG: 10 CAPSULE ORAL at 01:01

## 2025-01-24 RX ADMIN — Medication 6 MG: at 09:01

## 2025-01-24 RX ADMIN — GABAPENTIN 300 MG: 300 CAPSULE ORAL at 09:01

## 2025-01-24 RX ADMIN — DICYCLOMINE HYDROCHLORIDE 20 MG: 10 CAPSULE ORAL at 05:01

## 2025-01-24 RX ADMIN — TOBRAMYCIN AND DEXAMETHASONE: 3; 1 OINTMENT OPHTHALMIC at 01:01

## 2025-01-24 RX ADMIN — MORPHINE SULFATE 2 MG: 4 INJECTION, SOLUTION INTRAMUSCULAR; INTRAVENOUS at 11:01

## 2025-01-24 NOTE — PROGRESS NOTES
[facility] Emergency Dept  [facility] Medicine  Progress Note    Patient Name: Nikkie Myles  MRN: [id number]  Patient Class: OP- Observation   Admission Date: 1/23/2025  Length of Stay: 0 days  Attending Physician: Michele Pulliam,   Primary Care Provider: Kena, Primary Doctor        Subjective     Principal Problem:Crohn's disease with complication        HPI:  34F with Crohn's with multiple complications inculding fistulas and abscess and who has an ostomy p/w concern for a recurrent flare. Patient has had multiple flare-ups of Crohn's in the past. Patient has had an ostomy in her left lower quadrant for 2 years plus now. 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  She reports she missed her last infliximab infusion because she had to be seen in clinic first, and as a result she started having diffuse abdo cramping and vision changes a few days later.  This same pattern happened in June during her last flare, per her report.  Patient states when she gets like this with the change in bowel habitus and the pain that she usually gets admitted to the hospital especially when she has blood present in her ostomy site.  She tried to go to [facility] where she typically is cared for, but traffic was too bad due to the snow.  She has no other complaints of at the present time.  Afebrile, hypotensive.  Tender to palpation.  WBC WNL, Lipase elevated.  CT with pancolitis.  She was placed in Obs.    Overview/Hospital Course:  Nikkie Myles 34 y.o. female placed in observation for Crohn's flare.  She presented to the hospital with abdominal pain and diarrhea.  Her CT scan had findings concerning for pancolitis.  She is started on IV steroids and GI was consulted with plans for flex sig. she also had complaints of photophobia.  This has attributed to episcleritis or possible uveitis associated associated with her  Crohn's flare.  This was discussed with ophthalmology and she was started on TobraDex drops.    Interval History: complaints of photophobia eye irritation and continued abdominal pain    Review of Systems   Constitutional:  Negative for chills and fever.   Eyes:  Negative for photophobia and visual disturbance.   Respiratory:  Negative for chest tightness and shortness of breath.    Cardiovascular:  Negative for chest pain and palpitations.   Gastrointestinal:  Positive for abdominal pain and diarrhea. Negative for nausea and vomiting.   Genitourinary:  Negative for dysuria and hematuria.     Objective:     Vital Signs (Most Recent):  Temp: 98.3 °F (36.8 °C) (01/24/25 0701)  Pulse: 78 (01/24/25 0720)  Resp: 16 (01/24/25 0701)  BP: 96/63 (01/24/25 0701)  SpO2: 99 % (01/24/25 0400) Vital Signs (24h Range):  Temp:  [98 °F (36.7 °C)-99.1 °F (37.3 °C)] 98.3 °F (36.8 °C)  Pulse:  [] 78  Resp:  [16-18] 16  SpO2:  [98 %-100 %] 99 %  BP: ()/(49-82) 96/63     Weight: 86.2 kg (190 lb)  Body mass index is 37.11 kg/m².    Intake/Output Summary (Last 24 hours) at 1/24/2025 1040  Last data filed at 1/23/2025 2331  Gross per 24 hour   Intake 1000 ml   Output --   Net 1000 ml         Physical Exam  Vitals and nursing note reviewed.   Constitutional:       General: She is not in acute distress.     Appearance: She is well-developed. She is not diaphoretic.   HENT:      Head: Normocephalic and atraumatic.      Right Ear: External ear normal.      Left Ear: External ear normal.   Eyes:      General:         Right eye: No discharge.         Left eye: No discharge.      Conjunctiva/sclera: Conjunctivae normal.      Comments: Right eye with conjunctival infiltrates. Denies changes in vision. Able to use phone    Neck:      Thyroid: No thyromegaly.   Cardiovascular:      Rate and Rhythm: Normal rate and regular rhythm.      Heart sounds: No murmur heard.  Pulmonary:      Effort: Pulmonary effort is normal. No respiratory  distress.      Breath sounds: Normal breath sounds.   Abdominal:      General: Bowel sounds are normal. There is no distension.      Palpations: Abdomen is soft. There is no mass.      Tenderness: There is no abdominal tenderness.   Musculoskeletal:         General: No deformity.      Cervical back: Normal range of motion and neck supple.      Right lower leg: No edema.      Left lower leg: No edema.   Skin:     General: Skin is warm and dry.   Neurological:      Mental Status: She is alert and oriented to person, place, and time.      Sensory: No sensory deficit.   Psychiatric:         Mood and Affect: Mood normal.         Behavior: Behavior normal.             Significant Labs: CBC:   Recent Labs   Lab 01/23/25 1857 01/24/25  0506   WBC 9.60 7.73   HGB 11.0* 10.5*   HCT 34.9* 34.5*   * 488*     CMP:   Recent Labs   Lab 01/23/25 1857 01/24/25  0506   * 134*   K 3.7 4.6    105   CO2 21* 20*    141*   BUN 7 8   CREATININE 0.8 0.8   CALCIUM 9.4 9.5   PROT 10.3* 10.6*   ALBUMIN 3.0* 3.0*   BILITOT 0.2 0.3   ALKPHOS 66 60   AST 16 15   ALT 10 9*   ANIONGAP 11 9     Urine Studies:   Recent Labs   Lab 01/23/25  1515   COLORU Yellow   APPEARANCEUA Hazy*   PHUR 6.0   SPECGRAV 1.030   PROTEINUA 1+*   GLUCUA Negative   KETONESU 1+*   BILIRUBINUA Negative   OCCULTUA 2+*   NITRITE Negative   UROBILINOGEN Negative   LEUKOCYTESUR 3+*   RBCUA 31*   WBCUA >100*   BACTERIA Moderate*   SQUAMEPITHEL 3   HYALINECASTS 0       Significant Imaging:   Imaging Results              CT Abdomen Pelvis With IV Contrast NO Oral Contrast (Final result)  Result time 01/23/25 22:49:15      Final result by Sage Wong MD (01/23/25 22:49:15)                   Impression:      Diffuse colonic wall thickening suggesting pancolitis.  No abscess identified.    Right lower quadrant ileostomy, similar compared to prior.      Electronically signed by: Sage Wong MD  Date:    01/23/2025  Time:    22:49                "Narrative:    EXAMINATION:  CT ABDOMEN PELVIS WITH IV CONTRAST    CLINICAL HISTORY:  Abdominal abscess/infection suspected;    TECHNIQUE:  Low dose axial images, sagittal and coronal reformations were obtained from the lung bases to the pubic symphysis following the IV administration of 85 mL of Omnipaque 350 .  Oral contrast was not administered.    COMPARISON:  12/27/2022.    FINDINGS:  Abdomen:    - Lower thorax:Unremarkable.    - Lung bases: Dependent atelectasis at the lung bases.    - Liver: No focal mass.    - Gallbladder: No calcified gallstones.    - Bile Ducts: No evidence of intra or extra hepatic biliary ductal dilation.    - Spleen: Negative.    - Kidneys: No mass or hydronephrosis.    - Adrenals: Unremarkable.    - Pancreas: No mass or peripancreatic fat stranding.    - Retroperitoneum:  No significant adenopathy.    - Vascular: No abdominal aortic aneurysm.    - Abdominal wall:  Postsurgical change of right lower quadrant ileostomy, similar compared to prior.    Pelvis:    No pelvic mass, adenopathy, or free fluid.    Bowel/Mesentery:    No bowel obstruction.  Diffuse colonic wall thickening suggesting pancolitis.  No abscess identified.    Bones:  No acute osseous abnormality and no suspicious lytic or blastic lesion.                                        Assessment and Plan     * Crohn's disease with complication  Described by pt as "worst ever."  Follows her missing a dose of infliximab because her OSH GI doc wanted to first see her in clinic.  Most recent flare: June 2024, also in setting of missed outpatient infusion.  CT with pancolitis.    IV methylprednisolone 60 daily  GI with plans for flex sig today  Bentyl 20 QID  Clear liquid diet  IV fluids  Check stool studies      Iron deficiency anemia due to chronic blood loss  Anemia is likely due to Iron deficiency. Most recent hemoglobin and hematocrit are listed below.  Recent Labs     01/23/25  1857 01/24/25  0506   HGB 11.0* 10.5*   HCT 34.9* " 34.5*     Plan  - Monitor serial CBC: Daily  - Transfuse PRBC if patient becomes hemodynamically unstable, symptomatic or H/H drops below 7/21.  - Patient has not received any PRBC transfusions to date  - Patient's anemia is currently stable  - hemoglobin stable and at baseline. No indication for transfusion    Corneal ulcer  History of previous corneal ulcer. Reports photophobia and eye irritation, but denies visual changes. Reports consistent with previous crohn's flare symptoms  Discussed with ophtho via transfer center will start tobradex drops      VTE Risk Mitigation (From admission, onward)           Ordered     enoxaparin injection 40 mg  Every 24 hours         01/24/25 1009     IP VTE HIGH RISK PATIENT  Once         01/23/25 2335     Place sequential compression device  Until discontinued         01/23/25 2335     Reason for No Pharmacological VTE Prophylaxis  Once        Question:  Reasons:  Answer:  Patient is Ambulatory    01/23/25 2335                    Discharge Planning   ENDY:      Code Status: Full Code   Medical Readiness for Discharge Date:                Praveen Macdonald PA-C  Department of Hospital Medicine   [facility] - Emergency Dept

## 2025-01-24 NOTE — HPI
34F with Crohn's with multiple complications inculding fistulas and abscess and who has an ostomy p/w concern for a recurrent flare. Patient has had multiple flare-ups of Crohn's in the past. Patient has had an ostomy in her left lower quadrant for 2 years plus now. 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  She reports she missed her last infliximab infusion because she had to be seen in clinic first, and as a result she started having diffuse abdo cramping and vision changes a few days later.  This same pattern happened in June during her last flare, per her report.  Patient states when she gets like this with the change in bowel habitus and the pain that she usually gets admitted to the hospital especially when she has blood present in her ostomy site.  She tried to go to [facility] where she typically is cared for, but traffic was too bad due to the snow.  She has no other complaints of at the present time.  Afebrile, hypotensive.  Tender to palpation.  WBC WNL, Lipase elevated.  CT with pancolitis.  She was placed in Obs.
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
## 2025-01-24 NOTE — ASSESSMENT & PLAN NOTE
"Described by pt as "worst ever."  Follows her missing a dose of infliximab because her OSH GI doc wanted to first see her in clinic.  Most recent flare: June 2024, also in setting of missed outpatient infusion.  CT with pancolitis.    IV methylprednisolone 60 daily  GI with plans for flex sig today  Bentyl 20 QID  Clear liquid diet  IV fluids    "
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
## 2025-01-24 NOTE — ASSESSMENT & PLAN NOTE
"Described by pt as "worst ever."  Follows her missing a dose of infliximab because her OSH GI doc wanted to first see her in clinic.  Most recent flare: June 2024, also in setting of missed outpatient infusion.  CT with pancolitis.    Place in Obs  IV methylprednisolone 60 daily  Bentyl 20 QID  GI consult for TNF-alpha + immunomodulator consideration  Avoiding opioids and NSAIDs  Clear liquid diet  IV fluids    "

## 2025-01-24 NOTE — HOSPITAL COURSE
Nikkie Myles 34 y.o. female placed in observation for Crohn's flare.  She presented to the hospital with abdominal pain and diarrhea.  Her CT scan had findings concerning for pancolitis.  She is started on IV steroids and GI was consulted with plans for flex sig. she also had complaints of photophobia.  This has attributed to episcleritis or possible uveitis associated associated with her Crohn's flare.  This was discussed with ophthalmology and she was started on TobraDex drops.

## 2025-01-24 NOTE — SUBJECTIVE & OBJECTIVE
Interval History: complaints of photophobia eye irritation and continued abdominal pain    Review of Systems   Constitutional:  Negative for chills and fever.   Eyes:  Negative for photophobia and visual disturbance.   Respiratory:  Negative for chest tightness and shortness of breath.    Cardiovascular:  Negative for chest pain and palpitations.   Gastrointestinal:  Positive for abdominal pain and diarrhea. Negative for nausea and vomiting.   Genitourinary:  Negative for dysuria and hematuria.     Objective:     Vital Signs (Most Recent):  Temp: 98.3 °F (36.8 °C) (01/24/25 0701)  Pulse: 78 (01/24/25 0720)  Resp: 16 (01/24/25 0701)  BP: 96/63 (01/24/25 0701)  SpO2: 99 % (01/24/25 0400) Vital Signs (24h Range):  Temp:  [98 °F (36.7 °C)-99.1 °F (37.3 °C)] 98.3 °F (36.8 °C)  Pulse:  [] 78  Resp:  [16-18] 16  SpO2:  [98 %-100 %] 99 %  BP: ()/(49-82) 96/63     Weight: 86.2 kg (190 lb)  Body mass index is 37.11 kg/m².    Intake/Output Summary (Last 24 hours) at 1/24/2025 1040  Last data filed at 1/23/2025 2331  Gross per 24 hour   Intake 1000 ml   Output --   Net 1000 ml         Physical Exam  Vitals and nursing note reviewed.   Constitutional:       General: She is not in acute distress.     Appearance: She is well-developed. She is not diaphoretic.   HENT:      Head: Normocephalic and atraumatic.      Right Ear: External ear normal.      Left Ear: External ear normal.   Eyes:      General:         Right eye: No discharge.         Left eye: No discharge.      Conjunctiva/sclera: Conjunctivae normal.      Comments: Right eye with conjunctival infiltrates. Denies changes in vision. Able to use phone    Neck:      Thyroid: No thyromegaly.   Cardiovascular:      Rate and Rhythm: Normal rate and regular rhythm.      Heart sounds: No murmur heard.  Pulmonary:      Effort: Pulmonary effort is normal. No respiratory distress.      Breath sounds: Normal breath sounds.   Abdominal:      General: Bowel sounds are  normal. There is no distension.      Palpations: Abdomen is soft. There is no mass.      Tenderness: There is no abdominal tenderness.   Musculoskeletal:         General: No deformity.      Cervical back: Normal range of motion and neck supple.      Right lower leg: No edema.      Left lower leg: No edema.   Skin:     General: Skin is warm and dry.   Neurological:      Mental Status: She is alert and oriented to person, place, and time.      Sensory: No sensory deficit.   Psychiatric:         Mood and Affect: Mood normal.         Behavior: Behavior normal.             Significant Labs: CBC:   Recent Labs   Lab 01/23/25 1857 01/24/25  0506   WBC 9.60 7.73   HGB 11.0* 10.5*   HCT 34.9* 34.5*   * 488*     CMP:   Recent Labs   Lab 01/23/25 1857 01/24/25  0506   * 134*   K 3.7 4.6    105   CO2 21* 20*    141*   BUN 7 8   CREATININE 0.8 0.8   CALCIUM 9.4 9.5   PROT 10.3* 10.6*   ALBUMIN 3.0* 3.0*   BILITOT 0.2 0.3   ALKPHOS 66 60   AST 16 15   ALT 10 9*   ANIONGAP 11 9     Urine Studies:   Recent Labs   Lab 01/23/25  1515   COLORU Yellow   APPEARANCEUA Hazy*   PHUR 6.0   SPECGRAV 1.030   PROTEINUA 1+*   GLUCUA Negative   KETONESU 1+*   BILIRUBINUA Negative   OCCULTUA 2+*   NITRITE Negative   UROBILINOGEN Negative   LEUKOCYTESUR 3+*   RBCUA 31*   WBCUA >100*   BACTERIA Moderate*   SQUAMEPITHEL 3   HYALINECASTS 0       Significant Imaging:   Imaging Results              CT Abdomen Pelvis With IV Contrast NO Oral Contrast (Final result)  Result time 01/23/25 22:49:15      Final result by Sage Wong MD (01/23/25 22:49:15)                   Impression:      Diffuse colonic wall thickening suggesting pancolitis.  No abscess identified.    Right lower quadrant ileostomy, similar compared to prior.      Electronically signed by: aSge Wong MD  Date:    01/23/2025  Time:    22:49               Narrative:    EXAMINATION:  CT ABDOMEN PELVIS WITH IV CONTRAST    CLINICAL HISTORY:  Abdominal  abscess/infection suspected;    TECHNIQUE:  Low dose axial images, sagittal and coronal reformations were obtained from the lung bases to the pubic symphysis following the IV administration of 85 mL of Omnipaque 350 .  Oral contrast was not administered.    COMPARISON:  12/27/2022.    FINDINGS:  Abdomen:    - Lower thorax:Unremarkable.    - Lung bases: Dependent atelectasis at the lung bases.    - Liver: No focal mass.    - Gallbladder: No calcified gallstones.    - Bile Ducts: No evidence of intra or extra hepatic biliary ductal dilation.    - Spleen: Negative.    - Kidneys: No mass or hydronephrosis.    - Adrenals: Unremarkable.    - Pancreas: No mass or peripancreatic fat stranding.    - Retroperitoneum:  No significant adenopathy.    - Vascular: No abdominal aortic aneurysm.    - Abdominal wall:  Postsurgical change of right lower quadrant ileostomy, similar compared to prior.    Pelvis:    No pelvic mass, adenopathy, or free fluid.    Bowel/Mesentery:    No bowel obstruction.  Diffuse colonic wall thickening suggesting pancolitis.  No abscess identified.    Bones:  No acute osseous abnormality and no suspicious lytic or blastic lesion.
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
## 2025-01-24 NOTE — ASSESSMENT & PLAN NOTE
History of previous corneal ulcer. Reports photophobia and eye irritation, but denies visual changes. Reports consistent with previous crohn's flare symptoms  Discussed with ophtho via transfer center will start tobradex drops

## 2025-01-24 NOTE — ASSESSMENT & PLAN NOTE
Anemia is likely due to Iron deficiency. Most recent hemoglobin and hematocrit are listed below.  Recent Labs     01/23/25  1857 01/24/25  0506   HGB 11.0* 10.5*   HCT 34.9* 34.5*     Plan  - Monitor serial CBC: Daily  - Transfuse PRBC if patient becomes hemodynamically unstable, symptomatic or H/H drops below 7/21.  - Patient has not received any PRBC transfusions to date  - Patient's anemia is currently stable  - hemoglobin stable and at baseline. No indication for transfusion
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
## 2025-01-24 NOTE — ASSESSMENT & PLAN NOTE
"Described by pt as "worst ever."  Follows her missing a dose of infliximab because her OSH GI doc wanted to first see her in clinic.  Most recent flare: June 2024, also in setting of missed outpatient infusion.  CT with pancolitis.    IV methylprednisolone 60 daily  GI following  Bentyl 20 QID  Clear liquid diet if able to tolerate  IV fluids  Check stool studies    "

## 2025-01-25 PROBLEM — N30.90 CYSTITIS: Status: ACTIVE | Noted: 2025-01-25

## 2025-01-25 LAB
ALBUMIN SERPL BCP-MCNC: 2.9 G/DL (ref 3.5–5.2)
ALP SERPL-CCNC: 57 U/L (ref 40–150)
ALT SERPL W/O P-5'-P-CCNC: 6 U/L (ref 10–44)
ANION GAP SERPL CALC-SCNC: 8 MMOL/L (ref 8–16)
AST SERPL-CCNC: 13 U/L (ref 10–40)
BACTERIA UR CULT: ABNORMAL
BASOPHILS # BLD AUTO: 0.01 K/UL (ref 0–0.2)
BASOPHILS NFR BLD: 0.1 % (ref 0–1.9)
BILIRUB SERPL-MCNC: 0.2 MG/DL (ref 0.1–1)
BUN SERPL-MCNC: 14 MG/DL (ref 6–20)
CALCIUM SERPL-MCNC: 9.5 MG/DL (ref 8.7–10.5)
CHLORIDE SERPL-SCNC: 107 MMOL/L (ref 95–110)
CO2 SERPL-SCNC: 21 MMOL/L (ref 23–29)
CREAT SERPL-MCNC: 0.8 MG/DL (ref 0.5–1.4)
CRP SERPL-MCNC: 43.5 MG/L (ref 0–8.2)
DIFFERENTIAL METHOD BLD: ABNORMAL
EOSINOPHIL # BLD AUTO: 0 K/UL (ref 0–0.5)
EOSINOPHIL NFR BLD: 0 % (ref 0–8)
ERYTHROCYTE [DISTWIDTH] IN BLOOD BY AUTOMATED COUNT: 16.1 % (ref 11.5–14.5)
EST. GFR  (NO RACE VARIABLE): >60 ML/MIN/1.73 M^2
GLUCOSE SERPL-MCNC: 112 MG/DL (ref 70–110)
HCT VFR BLD AUTO: 32.3 % (ref 37–48.5)
HGB BLD-MCNC: 9.9 G/DL (ref 12–16)
IMM GRANULOCYTES # BLD AUTO: 0.07 K/UL (ref 0–0.04)
IMM GRANULOCYTES NFR BLD AUTO: 0.8 % (ref 0–0.5)
LYMPHOCYTES # BLD AUTO: 2.5 K/UL (ref 1–4.8)
LYMPHOCYTES NFR BLD: 27.5 % (ref 18–48)
MAGNESIUM SERPL-MCNC: 2.1 MG/DL (ref 1.6–2.6)
MCH RBC QN AUTO: 18.8 PG (ref 27–31)
MCHC RBC AUTO-ENTMCNC: 30.7 G/DL (ref 32–36)
MCV RBC AUTO: 61 FL (ref 82–98)
MONOCYTES # BLD AUTO: 1.2 K/UL (ref 0.3–1)
MONOCYTES NFR BLD: 13.4 % (ref 4–15)
NEUTROPHILS # BLD AUTO: 5.2 K/UL (ref 1.8–7.7)
NEUTROPHILS NFR BLD: 58.2 % (ref 38–73)
NRBC BLD-RTO: 0 /100 WBC
PHOSPHATE SERPL-MCNC: 3.7 MG/DL (ref 2.7–4.5)
PLATELET # BLD AUTO: 480 K/UL (ref 150–450)
PMV BLD AUTO: 10 FL (ref 9.2–12.9)
POTASSIUM SERPL-SCNC: 4.4 MMOL/L (ref 3.5–5.1)
PROT SERPL-MCNC: 9.7 G/DL (ref 6–8.4)
RBC # BLD AUTO: 5.27 M/UL (ref 4–5.4)
SODIUM SERPL-SCNC: 136 MMOL/L (ref 136–145)
VARICELLA INTERPRETATION: POSITIVE
VARICELLA ZOSTER IGG: 7.91 S/CO
WBC # BLD AUTO: 8.95 K/UL (ref 3.9–12.7)

## 2025-01-25 PROCEDURE — 85025 COMPLETE CBC W/AUTO DIFF WBC: CPT

## 2025-01-25 PROCEDURE — 80053 COMPREHEN METABOLIC PANEL: CPT

## 2025-01-25 PROCEDURE — 36415 COLL VENOUS BLD VENIPUNCTURE: CPT | Performed by: STUDENT IN AN ORGANIZED HEALTH CARE EDUCATION/TRAINING PROGRAM

## 2025-01-25 PROCEDURE — 63600175 PHARM REV CODE 636 W HCPCS: Mod: JZ,TB

## 2025-01-25 PROCEDURE — 99232 SBSQ HOSP IP/OBS MODERATE 35: CPT | Mod: ,,, | Performed by: STUDENT IN AN ORGANIZED HEALTH CARE EDUCATION/TRAINING PROGRAM

## 2025-01-25 PROCEDURE — 96376 TX/PRO/DX INJ SAME DRUG ADON: CPT

## 2025-01-25 PROCEDURE — 83735 ASSAY OF MAGNESIUM: CPT

## 2025-01-25 PROCEDURE — 11000001 HC ACUTE MED/SURG PRIVATE ROOM

## 2025-01-25 PROCEDURE — 63600175 PHARM REV CODE 636 W HCPCS: Performed by: PHYSICIAN ASSISTANT

## 2025-01-25 PROCEDURE — 86140 C-REACTIVE PROTEIN: CPT | Performed by: STUDENT IN AN ORGANIZED HEALTH CARE EDUCATION/TRAINING PROGRAM

## 2025-01-25 PROCEDURE — 25000003 PHARM REV CODE 250

## 2025-01-25 PROCEDURE — 82397 CHEMILUMINESCENT ASSAY: CPT | Performed by: NURSE PRACTITIONER

## 2025-01-25 PROCEDURE — 80230 DRUG ASSAY INFLIXIMAB: CPT | Performed by: NURSE PRACTITIONER

## 2025-01-25 PROCEDURE — 36415 COLL VENOUS BLD VENIPUNCTURE: CPT

## 2025-01-25 PROCEDURE — 84100 ASSAY OF PHOSPHORUS: CPT

## 2025-01-25 RX ADMIN — DICYCLOMINE HYDROCHLORIDE 20 MG: 10 CAPSULE ORAL at 12:01

## 2025-01-25 RX ADMIN — DICYCLOMINE HYDROCHLORIDE 20 MG: 10 CAPSULE ORAL at 08:01

## 2025-01-25 RX ADMIN — TOBRAMYCIN AND DEXAMETHASONE: 3; 1 OINTMENT OPHTHALMIC at 04:01

## 2025-01-25 RX ADMIN — CEFTRIAXONE 1 G: 1 INJECTION, POWDER, FOR SOLUTION INTRAMUSCULAR; INTRAVENOUS at 09:01

## 2025-01-25 RX ADMIN — ENOXAPARIN SODIUM 40 MG: 40 INJECTION SUBCUTANEOUS at 04:01

## 2025-01-25 RX ADMIN — MORPHINE SULFATE 2 MG: 4 INJECTION, SOLUTION INTRAMUSCULAR; INTRAVENOUS at 04:01

## 2025-01-25 RX ADMIN — METHYLPREDNISOLONE SODIUM SUCCINATE 60 MG: 125 INJECTION, POWDER, FOR SOLUTION INTRAMUSCULAR; INTRAVENOUS at 09:01

## 2025-01-25 RX ADMIN — TOBRAMYCIN AND DEXAMETHASONE: 3; 1 OINTMENT OPHTHALMIC at 08:01

## 2025-01-25 RX ADMIN — TOBRAMYCIN AND DEXAMETHASONE: 3; 1 OINTMENT OPHTHALMIC at 09:01

## 2025-01-25 RX ADMIN — DICYCLOMINE HYDROCHLORIDE 20 MG: 10 CAPSULE ORAL at 09:01

## 2025-01-25 RX ADMIN — MORPHINE SULFATE 2 MG: 4 INJECTION, SOLUTION INTRAMUSCULAR; INTRAVENOUS at 11:01

## 2025-01-25 RX ADMIN — GABAPENTIN 300 MG: 300 CAPSULE ORAL at 08:01

## 2025-01-25 RX ADMIN — TOBRAMYCIN AND DEXAMETHASONE: 3; 1 OINTMENT OPHTHALMIC at 12:01

## 2025-01-25 RX ADMIN — MORPHINE SULFATE 2 MG: 4 INJECTION, SOLUTION INTRAMUSCULAR; INTRAVENOUS at 06:01

## 2025-01-25 RX ADMIN — DICYCLOMINE HYDROCHLORIDE 20 MG: 10 CAPSULE ORAL at 04:01

## 2025-01-25 NOTE — ASSESSMENT & PLAN NOTE
Complaints of dysuria. UA with wbc and bacteria  Urine culture with e coli sensitive to rocephin  Complete rocephin tomorrow

## 2025-01-25 NOTE — NURSING
NPO, plan for ileoscopy per GI  CHG bath done  Complains of pain, on PRN Morphine  No distress noted  No new/acute changes overnight

## 2025-01-25 NOTE — PLAN OF CARE
Case Management Assessment     PCP: Kena, Primary Doctor    Pharmacy:   [id number] - [place], [place] [address]  [facility] [place] [address]  Phone: [phone] Fax: [phone]        Patient Arrived From: Home  Existing Help at Home: Franky Rivera (Mother) [phone]    Barriers to Discharge: None    Discharge Plan:    A. Home   B. Home wit family    Spoke with patient at bedside; she stated she lives at home with her minor children, but her mother is able to assist her at home as needed.  Patient stated she is independent with ADLs and does not use any DME at home.  Patient reported not having a PCP currently.  CM discussed with patient contacting her insurance for an in-network PCP or receiving care at [facility]; pt verbalized understanding.  [facility] added to patient AVS.  Pt stated her mother will provide transportation home upon discharge.   CM to continue to assess for and until discharge.    01/25/25 [facility]   Discharge Assessment   Assessment Type Discharge Planning Assessment   Confirmed/corrected address, phone number and insurance Yes   Confirmed Demographics Correct on Facesheet   Source of Information patient   Does patient/caregiver understand observation status Yes   Communicated ENDY with patient/caregiver Date not available/Unable to determine   Reason For Admission Crohn's disease with complication   People in Home child(ayad), dependent   Facility Arrived From: Home   Do you expect to return to your current living situation? Yes   Do you have help at home or someone to help you manage your care at home? Yes   Who are your caregiver(s) and their phone number(s)? Franky Rivera (Mother)  [phone]   Prior to hospitilization cognitive status: Unable to Assess   Current cognitive status: Alert/Oriented   Walking or Climbing Stairs Difficulty no   Dressing/Bathing Difficulty no   Equipment  Currently Used at Home none   Readmission within 30 days? No   Patient currently being followed by outpatient case management? No   Do you currently have service(s) that help you manage your care at home? No   Do you take prescription medications? Yes   Do you have prescription coverage? Yes   Coverage [employer]   Do you have any problems affording any of your prescribed medications? No   Is the patient taking medications as prescribed? yes   Who is going to help you get home at discharge? Franky Rivera (Mother)  [phone]   How do you get to doctors appointments? car, drives self   Are you on dialysis? No   Do you take coumadin? No   Discharge Plan A Home   Discharge Plan B Home with family   DME Needed Upon Discharge  other (see comments)  (TBD)   Discharge Plan discussed with: Patient   Transition of Care Barriers None

## 2025-01-25 NOTE — NURSING
[facility] [place]  Nurses Note -- 4 Eyes      1/25/2025       Skin assessed on: Q Shift      [x] No Pressure Injuries Present    [x]Prevention Measures Documented    [] Yes LDA  for Pressure Injury Previously documented     [] Yes New Pressure Injury Discovered   [] LDA for New Pressure Injury Added      Attending RN:  Maggi Montilla LPN     Second RN:  CLEMENCIA Chirinos

## 2025-01-25 NOTE — NURSING
Patient arrived in the unit via wheelchair from ED  Awake, alert, and oriented  No distress noted  On room air  With IV line in placed  With RLQ ileostomy noted  Ambulated to the bathroom and took a shower with steady gait noted  VS taken and recorded  Will continue to monitor    [facility]  Nurses Note -- 4 Eyes      1/25/2025       Skin assessed on: Admit      [x] No Pressure Injuries Present    [x]Prevention Measures Documented    [] Yes LDA  for Pressure Injury Previously documented     [] Yes New Pressure Injury Discovered   [] LDA for New Pressure Injury Added      Attending RN:  Candis Gatica RN     Second RN:  CLEMENCIA Treadwell

## 2025-01-25 NOTE — PLAN OF CARE
Problem: Pain Acute  Goal: Optimal Pain Control and Function  Outcome: Progressing  Intervention: Develop Pain Management Plan  Flowsheets (Taken 1/25/2025 1306)  Pain Management Interventions: relaxation techniques promoted  Intervention: Prevent or Manage Pain  Flowsheets (Taken 1/25/2025 1306)  Sleep/Rest Enhancement: relaxation techniques promoted  Medication Review/Management:   medications reviewed   high-risk medications identified     Problem: Fall Injury Risk  Goal: Absence of Fall and Fall-Related Injury  Outcome: Progressing  Intervention: Identify and Manage Contributors  Flowsheets (Taken 1/25/2025 1306)  Self-Care Promotion:   independence encouraged   BADL personal objects within reach   BADL personal routines maintained   meal set-up provided   adaptive equipment use encouraged  Medication Review/Management:   medications reviewed   high-risk medications identified  Intervention: Promote Injury-Free Environment  Flowsheets (Taken 1/25/2025 1306)  Safety Promotion/Fall Prevention:   assistive device/personal item within reach   bed alarm set   Fall Risk reviewed with patient/family   high risk medications identified   instructed to call staff for mobility   medications reviewed   nonskid shoes/socks when out of bed   side rails raised x 2     Problem: Fatigue  Goal: Improved Activity Tolerance  Outcome: Progressing  Intervention: Promote Improved Energy  Flowsheets (Taken 1/25/2025 1306)  Sleep/Rest Enhancement: relaxation techniques promoted  Activity Management:   Ankle pumps - L1   Arm raise - L1   Rolling - L1   Straight leg raise - L1     Problem: Infection  Goal: Absence of Infection Signs and Symptoms  Outcome: Progressing  Intervention: Prevent or Manage Infection  Flowsheets (Taken 1/25/2025 1306)  Infection Management: aseptic technique maintained   Pt alert able to make needs known,kary meds well,IV abx remains in progress,no s/s adverse reaction noted,reposition self q 2hrs,pain  controlled by prn pain medication,POC explained,remains free from falls and pressure injuries,safety maintained,continue monitoring.

## 2025-01-25 NOTE — PLAN OF CARE
Problem: Pain Acute  Goal: Optimal Pain Control and Function  Outcome: Progressing     Problem: Fall Injury Risk  Goal: Absence of Fall and Fall-Related Injury  Outcome: Progressing     Problem: Fatigue  Goal: Improved Activity Tolerance  Outcome: Progressing     Problem: Infection  Goal: Absence of Infection Signs and Symptoms  Outcome: Progressing

## 2025-01-25 NOTE — PROGRESS NOTES
[facility] Medicine  Progress Note    Patient Name: Nikkie Myles  MRN: [id number]  Patient Class: OP- Observation   Admission Date: 1/23/2025  Length of Stay: 0 days  Attending Physician: Michele Pulliam DO  Primary Care Provider: Kena, Primary Doctor        Subjective     Principal Problem:Crohn's disease with complication        HPI:  34F with Crohn's with multiple complications inculding fistulas and abscess and who has an ostomy p/w concern for a recurrent flare. Patient has had multiple flare-ups of Crohn's in the past. Patient has had an ostomy in her left lower quadrant for 2 years plus now. 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  She reports she missed her last infliximab infusion because she had to be seen in clinic first, and as a result she started having diffuse abdo cramping and vision changes a few days later.  This same pattern happened in June during her last flare, per her report.  Patient states when she gets like this with the change in bowel habitus and the pain that she usually gets admitted to the hospital especially when she has blood present in her ostomy site.  She tried to go to [facility] where she typically is cared for, but traffic was too bad due to the snow.  She has no other complaints of at the present time.  Afebrile, hypotensive.  Tender to palpation.  WBC WNL, Lipase elevated.  CT with pancolitis.  She was placed in Obs.    Overview/Hospital Course:  Nikkie Myles 34 y.o. female placed in observation for Crohn's flare.  She presented to the hospital with abdominal pain and diarrhea.  Her CT scan had findings concerning for pancolitis.  She is started on IV steroids and GI was consulted with plans for flex sig. she also had complaints of photophobia.  This has attributed to episcleritis or possible uveitis associated associated with her Crohn's  flare.  This was discussed with ophthalmology and she was started on TobraDex drops.    Interval History: reports improved photophobia. Less abdominal discomfort, no nausea or vomiting.     Review of Systems   Constitutional:  Negative for chills and fever.   Eyes:  Positive for photophobia. Negative for visual disturbance.   Respiratory:  Negative for chest tightness and shortness of breath.    Cardiovascular:  Negative for chest pain and palpitations.   Gastrointestinal:  Positive for abdominal pain. Negative for nausea and vomiting.   Genitourinary:  Negative for dysuria and hematuria.     Objective:     Vital Signs (Most Recent):  Temp: 98.1 °F (36.7 °C) (01/25/25 0745)  Pulse: 62 (01/25/25 0747)  Resp: 18 (01/25/25 0745)  BP: 108/73 (01/25/25 0745)  SpO2: 98 % (01/25/25 0747) Vital Signs (24h Range):  Temp:  [97.9 °F (36.6 °C)-98.7 °F (37.1 °C)] 98.1 °F (36.7 °C)  Pulse:  [56-78] 62  Resp:  [17-19] 18  SpO2:  [98 %-100 %] 98 %  BP: ()/(55-73) 108/73     Weight: 87.3 kg (192 lb 7.4 oz)  Body mass index is 37.59 kg/m².  No intake or output data in the 24 hours ending 01/25/25 0930      Physical Exam  Vitals and nursing note reviewed.   Constitutional:       General: She is not in acute distress.     Appearance: She is well-developed. She is not diaphoretic.   HENT:      Head: Normocephalic and atraumatic.      Right Ear: External ear normal.      Left Ear: External ear normal.   Eyes:      General:         Right eye: No discharge.         Left eye: No discharge.      Conjunctiva/sclera: Conjunctivae normal.      Comments: Right eye with conjunctival infiltrates. Denies changes in vision. Able to use phone    Neck:      Thyroid: No thyromegaly.   Cardiovascular:      Rate and Rhythm: Normal rate and regular rhythm.      Heart sounds: No murmur heard.  Pulmonary:      Effort: Pulmonary effort is normal. No respiratory distress.      Breath sounds: Normal breath sounds.   Abdominal:      General: Bowel sounds  are normal. There is no distension.      Palpations: Abdomen is soft. There is no mass.      Tenderness: There is no abdominal tenderness.   Musculoskeletal:         General: No deformity.      Cervical back: Normal range of motion and neck supple.      Right lower leg: No edema.      Left lower leg: No edema.   Skin:     General: Skin is warm and dry.   Neurological:      Mental Status: She is alert and oriented to person, place, and time.      Sensory: No sensory deficit.   Psychiatric:         Mood and Affect: Mood normal.         Behavior: Behavior normal.             Significant Labs: CBC:   Recent Labs   Lab 01/23/25 1857 01/24/25  0506 01/25/25  0431   WBC 9.60 7.73 8.95   HGB 11.0* 10.5* 9.9*   HCT 34.9* 34.5* 32.3*   * 488* 480*     CMP:   Recent Labs   Lab 01/23/25 1857 01/24/25  0506   * 134*   K 3.7 4.6    105   CO2 21* 20*    141*   BUN 7 8   CREATININE 0.8 0.8   CALCIUM 9.4 9.5   PROT 10.3* 10.6*   ALBUMIN 3.0* 3.0*   BILITOT 0.2 0.3   ALKPHOS 66 60   AST 16 15   ALT 10 9*   ANIONGAP 11 9       Significant Imaging:   Imaging Results              CT Abdomen Pelvis With IV Contrast NO Oral Contrast (Final result)  Result time 01/23/25 22:49:15      Final result by Sage Wong MD (01/23/25 22:49:15)                   Impression:      Diffuse colonic wall thickening suggesting pancolitis.  No abscess identified.    Right lower quadrant ileostomy, similar compared to prior.      Electronically signed by: Sage Wong MD  Date:    01/23/2025  Time:    22:49               Narrative:    EXAMINATION:  CT ABDOMEN PELVIS WITH IV CONTRAST    CLINICAL HISTORY:  Abdominal abscess/infection suspected;    TECHNIQUE:  Low dose axial images, sagittal and coronal reformations were obtained from the lung bases to the pubic symphysis following the IV administration of 85 mL of Omnipaque 350 .  Oral contrast was not administered.    COMPARISON:  12/27/2022.    FINDINGS:  Abdomen:    -  "Lower thorax:Unremarkable.    - Lung bases: Dependent atelectasis at the lung bases.    - Liver: No focal mass.    - Gallbladder: No calcified gallstones.    - Bile Ducts: No evidence of intra or extra hepatic biliary ductal dilation.    - Spleen: Negative.    - Kidneys: No mass or hydronephrosis.    - Adrenals: Unremarkable.    - Pancreas: No mass or peripancreatic fat stranding.    - Retroperitoneum:  No significant adenopathy.    - Vascular: No abdominal aortic aneurysm.    - Abdominal wall:  Postsurgical change of right lower quadrant ileostomy, similar compared to prior.    Pelvis:    No pelvic mass, adenopathy, or free fluid.    Bowel/Mesentery:    No bowel obstruction.  Diffuse colonic wall thickening suggesting pancolitis.  No abscess identified.    Bones:  No acute osseous abnormality and no suspicious lytic or blastic lesion.                                        Assessment and Plan     * Crohn's disease with complication  Described by pt as "worst ever."  Follows her missing a dose of infliximab because her OSH GI doc wanted to first see her in clinic.  Most recent flare: June 2024, also in setting of missed outpatient infusion.  CT with pancolitis.    IV methylprednisolone 60 daily  GI following  Bentyl 20 QID  Clear liquid diet if able to tolerate  IV fluids  Check stool studies      Cystitis  Complaints of dysuria. UA with wbc and bacteria  Urine culture with e coli sensitive to rocephin  Complete rocephin tomorrow    Iron deficiency anemia due to chronic blood loss  Anemia is likely due to Iron deficiency. Most recent hemoglobin and hematocrit are listed below.  Recent Labs     01/23/25  1857 01/24/25  0506   HGB 11.0* 10.5*   HCT 34.9* 34.5*     Plan  - Monitor serial CBC: Daily  - Transfuse PRBC if patient becomes hemodynamically unstable, symptomatic or H/H drops below 7/21.  - Patient has not received any PRBC transfusions to date  - Patient's anemia is currently stable  - hemoglobin stable and " at baseline. No indication for transfusion    Corneal ulcer  History of previous corneal ulcer. Reports photophobia and eye irritation, but denies visual changes. Reports consistent with previous crohn's flare symptoms  Discussed with ophtho via transfer center will start tobradex drops      VTE Risk Mitigation (From admission, onward)           Ordered     enoxaparin injection 40 mg  Every 24 hours         01/24/25 1009     IP VTE HIGH RISK PATIENT  Once         01/23/25 2335     Place sequential compression device  Until discontinued         01/23/25 2335     Reason for No Pharmacological VTE Prophylaxis  Once        Question:  Reasons:  Answer:  Patient is Ambulatory    01/23/25 2335                    Discharge Planning   ENDY:      Code Status: Full Code   Medical Readiness for Discharge Date:              Praveen Macdonald PA-C  Department of Hospital Medicine   [facility]

## 2025-01-25 NOTE — PROGRESS NOTES
GI Progress Note - 1/25/2025   See GI consult note for full H&P      Subjective:   Patient seen and examined at bedside.  She is feeling better today. Abdominal pain is better as well as her eyes. Small amount of green ostomy output noted in bag. She feels she would like to try to eat today. No fever, chills. So far stool testing is negative but several still in process. No leukocytosis. CRP yesterday was 97.       Objective:    Vital signs in last 24 hours:  Temp:  [97.9 °F (36.6 °C)-98.7 °F (37.1 °C)] 98 °F (36.7 °C)  Pulse:  [51-72] 51  Resp:  [17-19] 18  SpO2:  [98 %-100 %] 98 %  BP: ()/(54-73) 91/54    Intake/Output last 3 shifts:  I/O last 3 completed shifts:  In: 1000 [IV Piggyback:1000]  Out: -   Intake/Output this shift:  No intake/output data recorded.    Gen: no apparent distress, appears stated age  Heart: RRR, no murmurs, rub or gallops appreciated  Lung: No w/r/c. CTA bilaterally   Abdomen: soft, mildly tender around ostomy site. Ostomy appears healthy with small amount of output in bag   Ext: 2+ pulses, no lower ext. edema  Neuro: A&O X 3       Recent Labs   Lab 01/23/25 1857 01/24/25  0506 01/25/25  0431   WBC 9.60 7.73 8.95   HGB 11.0* 10.5* 9.9*   HCT 34.9* 34.5* 32.3*   * 488* 480*   MCV 61* 61* 61*      Recent Labs   Lab 01/23/25 1857 01/24/25  0506 01/25/25  0431   * 134* 136   K 3.7 4.6 4.4    105 107   CO2 21* 20* 21*   BUN 7 8 14   CALCIUM 9.4 9.5 9.5   PHOS  --  4.0 3.7     Recent Labs   Lab 01/23/25 1857 01/24/25  0506 01/25/25  0431   AST 16 15 13   ALT 10 9* 6*   ALKPHOS 66 60 57   BILITOT 0.2 0.3 0.2       Scheduled Meds:   cefTRIAXone (Rocephin) IV (PEDS and ADULTS)  1 g Intravenous Q24H    dicyclomine  20 mg Oral QID    enoxparin  40 mg Subcutaneous Q24H (treatment, non-standard time)    gabapentin  300 mg Oral QHS    methylPREDNISolone injection (PEDS and ADULTS)  60 mg Intravenous Daily    tobramycin-dexAMETHasone 0.3-0.1%   Right Eye QID     Continuous  Infusions:      Assessment:    1. Complicated Crohn's Disease with acute flare   2. Uveitis     - Patient doing better today after IV steroids   - So far infection work up is negative, will follow results   - Trend CRP   - Patient would like to continue to follow with her [facility] doctor, will need close follow up after discharge to ensure she is resumed on biologics and also that she has repeat endoscopy if needed   - Diet as tolerated   - She is also being followed by kathleen while here for uveitis   - Will continue to follow       Barbara Richardson   Gastroenterology   [facility]

## 2025-01-25 NOTE — SUBJECTIVE & OBJECTIVE
Interval History: reports improved photophobia. Less abdominal discomfort, no nausea or vomiting.     Review of Systems   Constitutional:  Negative for chills and fever.   Eyes:  Positive for photophobia. Negative for visual disturbance.   Respiratory:  Negative for chest tightness and shortness of breath.    Cardiovascular:  Negative for chest pain and palpitations.   Gastrointestinal:  Positive for abdominal pain. Negative for nausea and vomiting.   Genitourinary:  Negative for dysuria and hematuria.     Objective:     Vital Signs (Most Recent):  Temp: 98.1 °F (36.7 °C) (01/25/25 0745)  Pulse: 62 (01/25/25 0747)  Resp: 18 (01/25/25 0745)  BP: 108/73 (01/25/25 0745)  SpO2: 98 % (01/25/25 0747) Vital Signs (24h Range):  Temp:  [97.9 °F (36.6 °C)-98.7 °F (37.1 °C)] 98.1 °F (36.7 °C)  Pulse:  [56-78] 62  Resp:  [17-19] 18  SpO2:  [98 %-100 %] 98 %  BP: ()/(55-73) 108/73     Weight: 87.3 kg (192 lb 7.4 oz)  Body mass index is 37.59 kg/m².  No intake or output data in the 24 hours ending 01/25/25 0930      Physical Exam  Vitals and nursing note reviewed.   Constitutional:       General: She is not in acute distress.     Appearance: She is well-developed. She is not diaphoretic.   HENT:      Head: Normocephalic and atraumatic.      Right Ear: External ear normal.      Left Ear: External ear normal.   Eyes:      General:         Right eye: No discharge.         Left eye: No discharge.      Conjunctiva/sclera: Conjunctivae normal.      Comments: Right eye with conjunctival infiltrates. Denies changes in vision. Able to use phone    Neck:      Thyroid: No thyromegaly.   Cardiovascular:      Rate and Rhythm: Normal rate and regular rhythm.      Heart sounds: No murmur heard.  Pulmonary:      Effort: Pulmonary effort is normal. No respiratory distress.      Breath sounds: Normal breath sounds.   Abdominal:      General: Bowel sounds are normal. There is no distension.      Palpations: Abdomen is soft. There is no mass.       Tenderness: There is no abdominal tenderness.   Musculoskeletal:         General: No deformity.      Cervical back: Normal range of motion and neck supple.      Right lower leg: No edema.      Left lower leg: No edema.   Skin:     General: Skin is warm and dry.   Neurological:      Mental Status: She is alert and oriented to person, place, and time.      Sensory: No sensory deficit.   Psychiatric:         Mood and Affect: Mood normal.         Behavior: Behavior normal.             Significant Labs: CBC:   Recent Labs   Lab 01/23/25  1857 01/24/25  0506 01/25/25  0431   WBC 9.60 7.73 8.95   HGB 11.0* 10.5* 9.9*   HCT 34.9* 34.5* 32.3*   * 488* 480*     CMP:   Recent Labs   Lab 01/23/25 1857 01/24/25  0506   * 134*   K 3.7 4.6    105   CO2 21* 20*    141*   BUN 7 8   CREATININE 0.8 0.8   CALCIUM 9.4 9.5   PROT 10.3* 10.6*   ALBUMIN 3.0* 3.0*   BILITOT 0.2 0.3   ALKPHOS 66 60   AST 16 15   ALT 10 9*   ANIONGAP 11 9       Significant Imaging:   Imaging Results              CT Abdomen Pelvis With IV Contrast NO Oral Contrast (Final result)  Result time 01/23/25 22:49:15      Final result by Sage Wong MD (01/23/25 22:49:15)                   Impression:      Diffuse colonic wall thickening suggesting pancolitis.  No abscess identified.    Right lower quadrant ileostomy, similar compared to prior.      Electronically signed by: Sage Wong MD  Date:    01/23/2025  Time:    22:49               Narrative:    EXAMINATION:  CT ABDOMEN PELVIS WITH IV CONTRAST    CLINICAL HISTORY:  Abdominal abscess/infection suspected;    TECHNIQUE:  Low dose axial images, sagittal and coronal reformations were obtained from the lung bases to the pubic symphysis following the IV administration of 85 mL of Omnipaque 350 .  Oral contrast was not administered.    COMPARISON:  12/27/2022.    FINDINGS:  Abdomen:    - Lower thorax:Unremarkable.    - Lung bases: Dependent atelectasis at the lung  bases.    - Liver: No focal mass.    - Gallbladder: No calcified gallstones.    - Bile Ducts: No evidence of intra or extra hepatic biliary ductal dilation.    - Spleen: Negative.    - Kidneys: No mass or hydronephrosis.    - Adrenals: Unremarkable.    - Pancreas: No mass or peripancreatic fat stranding.    - Retroperitoneum:  No significant adenopathy.    - Vascular: No abdominal aortic aneurysm.    - Abdominal wall:  Postsurgical change of right lower quadrant ileostomy, similar compared to prior.    Pelvis:    No pelvic mass, adenopathy, or free fluid.    Bowel/Mesentery:    No bowel obstruction.  Diffuse colonic wall thickening suggesting pancolitis.  No abscess identified.    Bones:  No acute osseous abnormality and no suspicious lytic or blastic lesion.

## 2025-01-25 NOTE — NURSING
Report received, care assumed. Pt resting comfortably. No c/o pain or discomfort at this time. Pt AAOx4. Q shift skin assessment done, pt has a colostomy bag that she manages at home, no other skin changes noted. Board updated. Stretcher in lowest position, 2x bedside rails up, wheels locked.

## 2025-01-26 LAB
ALBUMIN SERPL BCP-MCNC: 2.8 G/DL (ref 3.5–5.2)
ALP SERPL-CCNC: 69 U/L (ref 40–150)
ALT SERPL W/O P-5'-P-CCNC: 10 U/L (ref 10–44)
ANION GAP SERPL CALC-SCNC: 10 MMOL/L (ref 8–16)
AST SERPL-CCNC: 29 U/L (ref 10–40)
BACTERIA STL CULT: NORMAL
BASOPHILS # BLD AUTO: 0.01 K/UL (ref 0–0.2)
BASOPHILS NFR BLD: 0.1 % (ref 0–1.9)
BILIRUB SERPL-MCNC: 0.1 MG/DL (ref 0.1–1)
BUN SERPL-MCNC: 20 MG/DL (ref 6–20)
CALCIUM SERPL-MCNC: 8.9 MG/DL (ref 8.7–10.5)
CHLORIDE SERPL-SCNC: 108 MMOL/L (ref 95–110)
CO2 SERPL-SCNC: 18 MMOL/L (ref 23–29)
CREAT SERPL-MCNC: 0.8 MG/DL (ref 0.5–1.4)
CRYPTOSP AG STL QL IA: NEGATIVE
DIFFERENTIAL METHOD BLD: ABNORMAL
E COLI SXT1 STL QL IA: NEGATIVE
E COLI SXT2 STL QL IA: NEGATIVE
EOSINOPHIL # BLD AUTO: 0 K/UL (ref 0–0.5)
EOSINOPHIL NFR BLD: 0 % (ref 0–8)
ERYTHROCYTE [DISTWIDTH] IN BLOOD BY AUTOMATED COUNT: 16.1 % (ref 11.5–14.5)
EST. GFR  (NO RACE VARIABLE): >60 ML/MIN/1.73 M^2
G LAMBLIA AG STL QL IA: NEGATIVE
GLUCOSE SERPL-MCNC: 106 MG/DL (ref 70–110)
HCT VFR BLD AUTO: 32.8 % (ref 37–48.5)
HGB BLD-MCNC: 9.6 G/DL (ref 12–16)
IMM GRANULOCYTES # BLD AUTO: 0.08 K/UL (ref 0–0.04)
IMM GRANULOCYTES NFR BLD AUTO: 0.9 % (ref 0–0.5)
LYMPHOCYTES # BLD AUTO: 3.5 K/UL (ref 1–4.8)
LYMPHOCYTES NFR BLD: 41.7 % (ref 18–48)
MAGNESIUM SERPL-MCNC: 2 MG/DL (ref 1.6–2.6)
MCH RBC QN AUTO: 18.5 PG (ref 27–31)
MCHC RBC AUTO-ENTMCNC: 29.3 G/DL (ref 32–36)
MCV RBC AUTO: 63 FL (ref 82–98)
MONOCYTES # BLD AUTO: 0.9 K/UL (ref 0.3–1)
MONOCYTES NFR BLD: 10.6 % (ref 4–15)
NEUTROPHILS # BLD AUTO: 4 K/UL (ref 1.8–7.7)
NEUTROPHILS NFR BLD: 46.7 % (ref 38–73)
NRBC BLD-RTO: 0 /100 WBC
PHOSPHATE SERPL-MCNC: 2.7 MG/DL (ref 2.7–4.5)
PLATELET # BLD AUTO: 487 K/UL (ref 150–450)
PMV BLD AUTO: 10.2 FL (ref 9.2–12.9)
POTASSIUM SERPL-SCNC: 4.9 MMOL/L (ref 3.5–5.1)
PROT SERPL-MCNC: 9.8 G/DL (ref 6–8.4)
RBC # BLD AUTO: 5.2 M/UL (ref 4–5.4)
SODIUM SERPL-SCNC: 136 MMOL/L (ref 136–145)
WBC # BLD AUTO: 8.47 K/UL (ref 3.9–12.7)

## 2025-01-26 PROCEDURE — 85025 COMPLETE CBC W/AUTO DIFF WBC: CPT | Performed by: STUDENT IN AN ORGANIZED HEALTH CARE EDUCATION/TRAINING PROGRAM

## 2025-01-26 PROCEDURE — 36415 COLL VENOUS BLD VENIPUNCTURE: CPT | Performed by: STUDENT IN AN ORGANIZED HEALTH CARE EDUCATION/TRAINING PROGRAM

## 2025-01-26 PROCEDURE — 84100 ASSAY OF PHOSPHORUS: CPT

## 2025-01-26 PROCEDURE — 80053 COMPREHEN METABOLIC PANEL: CPT

## 2025-01-26 PROCEDURE — 63600175 PHARM REV CODE 636 W HCPCS: Mod: JZ,TB

## 2025-01-26 PROCEDURE — 36415 COLL VENOUS BLD VENIPUNCTURE: CPT

## 2025-01-26 PROCEDURE — 83735 ASSAY OF MAGNESIUM: CPT

## 2025-01-26 PROCEDURE — 25000003 PHARM REV CODE 250

## 2025-01-26 PROCEDURE — 99232 SBSQ HOSP IP/OBS MODERATE 35: CPT | Mod: ,,, | Performed by: STUDENT IN AN ORGANIZED HEALTH CARE EDUCATION/TRAINING PROGRAM

## 2025-01-26 PROCEDURE — 11000001 HC ACUTE MED/SURG PRIVATE ROOM

## 2025-01-26 PROCEDURE — 63600175 PHARM REV CODE 636 W HCPCS: Performed by: PHYSICIAN ASSISTANT

## 2025-01-26 RX ORDER — CEFTRIAXONE 1 G/1
1 INJECTION, POWDER, FOR SOLUTION INTRAMUSCULAR; INTRAVENOUS
Status: COMPLETED | OUTPATIENT
Start: 2025-01-26 | End: 2025-01-26

## 2025-01-26 RX ADMIN — GABAPENTIN 300 MG: 300 CAPSULE ORAL at 08:01

## 2025-01-26 RX ADMIN — TOBRAMYCIN AND DEXAMETHASONE: 3; 1 OINTMENT OPHTHALMIC at 04:01

## 2025-01-26 RX ADMIN — DICYCLOMINE HYDROCHLORIDE 20 MG: 10 CAPSULE ORAL at 09:01

## 2025-01-26 RX ADMIN — DICYCLOMINE HYDROCHLORIDE 20 MG: 10 CAPSULE ORAL at 04:01

## 2025-01-26 RX ADMIN — MORPHINE SULFATE 2 MG: 4 INJECTION, SOLUTION INTRAMUSCULAR; INTRAVENOUS at 02:01

## 2025-01-26 RX ADMIN — DICYCLOMINE HYDROCHLORIDE 20 MG: 10 CAPSULE ORAL at 08:01

## 2025-01-26 RX ADMIN — MORPHINE SULFATE 2 MG: 4 INJECTION, SOLUTION INTRAMUSCULAR; INTRAVENOUS at 10:01

## 2025-01-26 RX ADMIN — TOBRAMYCIN AND DEXAMETHASONE: 3; 1 OINTMENT OPHTHALMIC at 08:01

## 2025-01-26 RX ADMIN — MORPHINE SULFATE 2 MG: 4 INJECTION, SOLUTION INTRAMUSCULAR; INTRAVENOUS at 07:01

## 2025-01-26 RX ADMIN — DICYCLOMINE HYDROCHLORIDE 20 MG: 10 CAPSULE ORAL at 12:01

## 2025-01-26 RX ADMIN — ENOXAPARIN SODIUM 40 MG: 40 INJECTION SUBCUTANEOUS at 04:01

## 2025-01-26 RX ADMIN — METHYLPREDNISOLONE SODIUM SUCCINATE 60 MG: 125 INJECTION, POWDER, FOR SOLUTION INTRAMUSCULAR; INTRAVENOUS at 09:01

## 2025-01-26 RX ADMIN — TOBRAMYCIN AND DEXAMETHASONE: 3; 1 OINTMENT OPHTHALMIC at 12:01

## 2025-01-26 RX ADMIN — CEFTRIAXONE 1 G: 1 INJECTION, POWDER, FOR SOLUTION INTRAMUSCULAR; INTRAVENOUS at 09:01

## 2025-01-26 RX ADMIN — TOBRAMYCIN AND DEXAMETHASONE: 3; 1 OINTMENT OPHTHALMIC at 09:01

## 2025-01-26 NOTE — NURSING
[facility], [facility]  Nurses Note -- 4 Eyes      1/26/2025       Skin assessed on: Q Shift      [x] No Pressure Injuries Present    [x]Prevention Measures Documented    [] Yes LDA  for Pressure Injury Previously documented     [] Yes New Pressure Injury Discovered   [] LDA for New Pressure Injury Added      Attending RN:  Maggi Montilla LPN     Second RN:  CLEMENCIA Garner

## 2025-01-26 NOTE — NURSING
Pt states her ostomy bag is leaking.  Ostomy bag, linen, and gown changed, pt tolerated well, pt performed ostomy care on her own, no distress noted, will continue to monitor.

## 2025-01-26 NOTE — ASSESSMENT & PLAN NOTE
"Described by pt as "worst ever."  Follows her missing a dose of infliximab because her O[facility] GI doc wanted to first see her in clinic.  Most recent flare: June 2024, also in setting of missed outpatient infusion.  CT with pancolitis.    IV methylprednisolone 60 daily  GI following  Bentyl 20 QID  Advancing diet, will attempt regular and if can tolerate plans to D/C 1/27  Check stool studies-neutraphils present C diff, giardia, and crypto negative    "

## 2025-01-26 NOTE — SUBJECTIVE & OBJECTIVE
Interval History: improvement in eye pain/photophobia as well as abdominal pain. Requesting to stay 1 more day to ensure able to tolerate regular diet.     Review of Systems   Constitutional:  Negative for chills and fever.   Eyes:  Negative for photophobia and visual disturbance.   Respiratory:  Negative for chest tightness and shortness of breath.    Cardiovascular:  Negative for chest pain and palpitations.   Gastrointestinal:  Positive for nausea. Negative for abdominal pain and vomiting.   Genitourinary:  Negative for dysuria and hematuria.     Objective:     Vital Signs (Most Recent):  Temp: 97.8 °F (36.6 °C) (01/26/25 0732)  Pulse: (!) 53 (01/26/25 0732)  Resp: 18 (01/26/25 0732)  BP: 107/68 (01/26/25 0732)  SpO2: 97 % (01/26/25 0732) Vital Signs (24h Range):  Temp:  [97.5 °F (36.4 °C)-98.1 °F (36.7 °C)] 97.8 °F (36.6 °C)  Pulse:  [51-77] 53  Resp:  [17-18] 18  SpO2:  [97 %-99 %] 97 %  BP: ()/(53-74) 107/68     Weight: 87.3 kg (192 lb 7.4 oz)  Body mass index is 37.59 kg/m².    Intake/Output Summary (Last 24 hours) at 1/26/2025 0943  Last data filed at 1/26/2025 0607  Gross per 24 hour   Intake 960 ml   Output 210 ml   Net 750 ml         Physical Exam  Vitals and nursing note reviewed.   Constitutional:       General: She is not in acute distress.     Appearance: She is well-developed. She is not diaphoretic.   HENT:      Head: Normocephalic and atraumatic.      Right Ear: External ear normal.      Left Ear: External ear normal.   Eyes:      General:         Right eye: No discharge.         Left eye: No discharge.      Conjunctiva/sclera: Conjunctivae normal.      Comments: Right eye with decreased conjunctival infiltrates. Denies changes in vision. Able to use phone    Neck:      Thyroid: No thyromegaly.   Cardiovascular:      Rate and Rhythm: Normal rate and regular rhythm.      Heart sounds: No murmur heard.  Pulmonary:      Effort: Pulmonary effort is normal. No respiratory distress.      Breath  sounds: Normal breath sounds.   Abdominal:      General: Bowel sounds are normal. There is no distension.      Palpations: Abdomen is soft. There is no mass.      Tenderness: There is no abdominal tenderness.      Comments: Ostomy present without bleeding   Musculoskeletal:         General: No deformity.      Cervical back: Normal range of motion and neck supple.      Right lower leg: No edema.      Left lower leg: No edema.   Skin:     General: Skin is warm and dry.   Neurological:      Mental Status: She is alert and oriented to person, place, and time.      Sensory: No sensory deficit.   Psychiatric:         Mood and Affect: Mood normal.         Behavior: Behavior normal.             Significant Labs: CBC:   Recent Labs   Lab 01/25/25  0431   WBC 8.95   HGB 9.9*   HCT 32.3*   *     CMP:   Recent Labs   Lab 01/25/25  0431 01/26/25  0443    136   K 4.4 4.9    108   CO2 21* 18*   * 106   BUN 14 20   CREATININE 0.8 0.8   CALCIUM 9.5 8.9   PROT 9.7* 9.8*   ALBUMIN 2.9* 2.8*   BILITOT 0.2 0.1   ALKPHOS 57 69   AST 13 29   ALT 6* 10   ANIONGAP 8 10       Significant Imaging:   Imaging Results              CT Abdomen Pelvis With IV Contrast NO Oral Contrast (Final result)  Result time 01/23/25 22:49:15      Final result by Sage Wong MD (01/23/25 22:49:15)                   Impression:      Diffuse colonic wall thickening suggesting pancolitis.  No abscess identified.    Right lower quadrant ileostomy, similar compared to prior.      Electronically signed by: Sage Wong MD  Date:    01/23/2025  Time:    22:49               Narrative:    EXAMINATION:  CT ABDOMEN PELVIS WITH IV CONTRAST    CLINICAL HISTORY:  Abdominal abscess/infection suspected;    TECHNIQUE:  Low dose axial images, sagittal and coronal reformations were obtained from the lung bases to the pubic symphysis following the IV administration of 85 mL of Omnipaque 350 .  Oral contrast was not  administered.    COMPARISON:  12/27/2022.    FINDINGS:  Abdomen:    - Lower thorax:Unremarkable.    - Lung bases: Dependent atelectasis at the lung bases.    - Liver: No focal mass.    - Gallbladder: No calcified gallstones.    - Bile Ducts: No evidence of intra or extra hepatic biliary ductal dilation.    - Spleen: Negative.    - Kidneys: No mass or hydronephrosis.    - Adrenals: Unremarkable.    - Pancreas: No mass or peripancreatic fat stranding.    - Retroperitoneum:  No significant adenopathy.    - Vascular: No abdominal aortic aneurysm.    - Abdominal wall:  Postsurgical change of right lower quadrant ileostomy, similar compared to prior.    Pelvis:    No pelvic mass, adenopathy, or free fluid.    Bowel/Mesentery:    No bowel obstruction.  Diffuse colonic wall thickening suggesting pancolitis.  No abscess identified.    Bones:  No acute osseous abnormality and no suspicious lytic or blastic lesion.

## 2025-01-26 NOTE — PLAN OF CARE
Problem: Pain Acute  Goal: Optimal Pain Control and Function  Outcome: Progressing  Intervention: Develop Pain Management Plan  Flowsheets (Taken 1/26/2025 1147)  Pain Management Interventions: pain management plan reviewed with patient/caregiver  Intervention: Prevent or Manage Pain  Flowsheets (Taken 1/26/2025 1147)  Sleep/Rest Enhancement: relaxation techniques promoted  Medication Review/Management:   medications reviewed   high-risk medications identified  Intervention: Optimize Psychosocial Wellbeing  Flowsheets (Taken 1/26/2025 1147)  Supportive Measures:   active listening utilized   relaxation techniques promoted   self-care encouraged   positive reinforcement provided   problem-solving facilitated   self-reflection promoted   self-responsibility promoted     Problem: Fall Injury Risk  Goal: Absence of Fall and Fall-Related Injury  Outcome: Progressing  Intervention: Identify and Manage Contributors  Flowsheets (Taken 1/26/2025 1147)  Self-Care Promotion:   independence encouraged   BADL personal objects within reach   BADL personal routines maintained   meal set-up provided   adaptive equipment use encouraged  Medication Review/Management:   medications reviewed   high-risk medications identified  Intervention: Promote Injury-Free Environment  Flowsheets (Taken 1/26/2025 1147)  Safety Promotion/Fall Prevention:   assistive device/personal item within reach   bed alarm set   high risk medications identified   instructed to call staff for mobility   medications reviewed   nonskid shoes/socks when out of bed   side rails raised x 2     Problem: Fatigue  Goal: Improved Activity Tolerance  Outcome: Progressing  Intervention: Promote Improved Energy  Flowsheets (Taken 1/26/2025 1147)  Sleep/Rest Enhancement: relaxation techniques promoted  Activity Management: Ambulated to bathroom - L4  Environmental Support: calm environment promoted     Problem: Infection  Goal: Absence of Infection Signs and  Symptoms  Outcome: Progressing  Intervention: Prevent or Manage Infection  Flowsheets (Taken 1/26/2025 1147)  Infection Management: aseptic technique maintained  Pt alert able to make needs known,kary meds well,no s/s adverse reaction noted,reposition self q 2hrs,pain controlled by prn pain medication,POC explained,remains free from falls and pressure injuries,safety maintained,continue monitoring.

## 2025-01-26 NOTE — PLAN OF CARE
Pt complained of pain, PRN morphine given.  Ostomy bag changed once per shift, output monitored.  VSS, no signs of distress, will continue to monitor.    Problem: Pain Acute  Goal: Optimal Pain Control and Function  Outcome: Progressing     Problem: Fatigue  Goal: Improved Activity Tolerance  Outcome: Progressing     Problem: Infection  Goal: Absence of Infection Signs and Symptoms  Outcome: Progressing

## 2025-01-26 NOTE — ASSESSMENT & PLAN NOTE
Complaints of dysuria. UA with wbc and bacteria  Urine culture with e coli sensitive to rocephin  Complete rocephin today

## 2025-01-26 NOTE — PROGRESS NOTES
[facility] Medicine  Progress Note    Patient Name: Nikkie Myles  MRN: [id number]  Patient Class: IP- Inpatient   Admission Date: 1/23/2025  Length of Stay: 1 days  Attending Physician: Michele Pulliam,   Primary Care Provider: Kena, Primary Doctor        Subjective     Principal Problem:Crohn's disease with complication        HPI:  34F with Crohn's with multiple complications inculding fistulas and abscess and who has an ostomy p/w concern for a recurrent flare. Patient has had multiple flare-ups of Crohn's in the past. Patient has had an ostomy in her left lower quadrant for 2 years plus now. 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  She reports she missed her last infliximab infusion because she had to be seen in clinic first, and as a result she started having diffuse abdo cramping and vision changes a few days later.  This same pattern happened in June during her last flare, per her report.  Patient states when she gets like this with the change in bowel habitus and the pain that she usually gets admitted to the hospital especially when she has blood present in her ostomy site.  She tried to go to [facility] where she typically is cared for, but traffic was too bad due to the snow.  She has no other complaints of at the present time.  Afebrile, hypotensive.  Tender to palpation.  WBC WNL, Lipase elevated.  CT with pancolitis.  She was placed in Obs.    Overview/Hospital Course:  Nikkie Myles 34 y.o. female placed in observation for Crohn's flare.  She presented to the hospital with abdominal pain and diarrhea.  Her CT scan had findings concerning for pancolitis.  She is started on IV steroids and GI was consulted with plans for flex sig. she also had complaints of photophobia.  This has attributed to episcleritis or possible uveitis associated associated with her Crohn's  flare.  This was discussed with ophthalmology and she was started on TobraDex drops.    Interval History: improvement in eye pain/photophobia as well as abdominal pain. Requesting to stay 1 more day to ensure able to tolerate regular diet.     Review of Systems   Constitutional:  Negative for chills and fever.   Eyes:  Negative for photophobia and visual disturbance.   Respiratory:  Negative for chest tightness and shortness of breath.    Cardiovascular:  Negative for chest pain and palpitations.   Gastrointestinal:  Positive for nausea. Negative for abdominal pain and vomiting.   Genitourinary:  Negative for dysuria and hematuria.     Objective:     Vital Signs (Most Recent):  Temp: 97.8 °F (36.6 °C) (01/26/25 0732)  Pulse: (!) 53 (01/26/25 0732)  Resp: 18 (01/26/25 0732)  BP: 107/68 (01/26/25 0732)  SpO2: 97 % (01/26/25 0732) Vital Signs (24h Range):  Temp:  [97.5 °F (36.4 °C)-98.1 °F (36.7 °C)] 97.8 °F (36.6 °C)  Pulse:  [51-77] 53  Resp:  [17-18] 18  SpO2:  [97 %-99 %] 97 %  BP: ()/(53-74) 107/68     Weight: 87.3 kg (192 lb 7.4 oz)  Body mass index is 37.59 kg/m².    Intake/Output Summary (Last 24 hours) at 1/26/2025 0943  Last data filed at 1/26/2025 0607  Gross per 24 hour   Intake 960 ml   Output 210 ml   Net 750 ml         Physical Exam  Vitals and nursing note reviewed.   Constitutional:       General: She is not in acute distress.     Appearance: She is well-developed. She is not diaphoretic.   HENT:      Head: Normocephalic and atraumatic.      Right Ear: External ear normal.      Left Ear: External ear normal.   Eyes:      General:         Right eye: No discharge.         Left eye: No discharge.      Conjunctiva/sclera: Conjunctivae normal.      Comments: Right eye with decreased conjunctival infiltrates. Denies changes in vision. Able to use phone    Neck:      Thyroid: No thyromegaly.   Cardiovascular:      Rate and Rhythm: Normal rate and regular rhythm.      Heart sounds: No murmur  heard.  Pulmonary:      Effort: Pulmonary effort is normal. No respiratory distress.      Breath sounds: Normal breath sounds.   Abdominal:      General: Bowel sounds are normal. There is no distension.      Palpations: Abdomen is soft. There is no mass.      Tenderness: There is no abdominal tenderness.      Comments: Ostomy present without bleeding   Musculoskeletal:         General: No deformity.      Cervical back: Normal range of motion and neck supple.      Right lower leg: No edema.      Left lower leg: No edema.   Skin:     General: Skin is warm and dry.   Neurological:      Mental Status: She is alert and oriented to person, place, and time.      Sensory: No sensory deficit.   Psychiatric:         Mood and Affect: Mood normal.         Behavior: Behavior normal.             Significant Labs: CBC:   Recent Labs   Lab 01/25/25  0431   WBC 8.95   HGB 9.9*   HCT 32.3*   *     CMP:   Recent Labs   Lab 01/25/25  0431 01/26/25  0443    136   K 4.4 4.9    108   CO2 21* 18*   * 106   BUN 14 20   CREATININE 0.8 0.8   CALCIUM 9.5 8.9   PROT 9.7* 9.8*   ALBUMIN 2.9* 2.8*   BILITOT 0.2 0.1   ALKPHOS 57 69   AST 13 29   ALT 6* 10   ANIONGAP 8 10       Significant Imaging:   Imaging Results              CT Abdomen Pelvis With IV Contrast NO Oral Contrast (Final result)  Result time 01/23/25 22:49:15      Final result by Sage Wong MD (01/23/25 22:49:15)                   Impression:      Diffuse colonic wall thickening suggesting pancolitis.  No abscess identified.    Right lower quadrant ileostomy, similar compared to prior.      Electronically signed by: Sage Wong MD  Date:    01/23/2025  Time:    22:49               Narrative:    EXAMINATION:  CT ABDOMEN PELVIS WITH IV CONTRAST    CLINICAL HISTORY:  Abdominal abscess/infection suspected;    TECHNIQUE:  Low dose axial images, sagittal and coronal reformations were obtained from the lung bases to the pubic symphysis following the  "IV administration of 85 mL of Omnipaque 350 .  Oral contrast was not administered.    COMPARISON:  12/27/2022.    FINDINGS:  Abdomen:    - Lower thorax:Unremarkable.    - Lung bases: Dependent atelectasis at the lung bases.    - Liver: No focal mass.    - Gallbladder: No calcified gallstones.    - Bile Ducts: No evidence of intra or extra hepatic biliary ductal dilation.    - Spleen: Negative.    - Kidneys: No mass or hydronephrosis.    - Adrenals: Unremarkable.    - Pancreas: No mass or peripancreatic fat stranding.    - Retroperitoneum:  No significant adenopathy.    - Vascular: No abdominal aortic aneurysm.    - Abdominal wall:  Postsurgical change of right lower quadrant ileostomy, similar compared to prior.    Pelvis:    No pelvic mass, adenopathy, or free fluid.    Bowel/Mesentery:    No bowel obstruction.  Diffuse colonic wall thickening suggesting pancolitis.  No abscess identified.    Bones:  No acute osseous abnormality and no suspicious lytic or blastic lesion.                                        Assessment and Plan     * Crohn's disease with complication  Described by pt as "worst ever."  Follows her missing a dose of infliximab because her OSH GI doc wanted to first see her in clinic.  Most recent flare: June 2024, also in setting of missed outpatient infusion.  CT with pancolitis.    IV methylprednisolone 60 daily  GI following  Bentyl 20 QID  Advancing diet, will attempt regular and if can tolerate plans to D/C 1/27  Check stool studies-neutraphils present C diff, giardia, and crypto negative      Cystitis  Complaints of dysuria. UA with wbc and bacteria  Urine culture with e coli sensitive to rocephin  Complete rocephin today    Iron deficiency anemia due to chronic blood loss  Anemia is likely due to Iron deficiency. Most recent hemoglobin and hematocrit are listed below.  Recent Labs     01/23/25  1857 01/24/25  0506   HGB 11.0* 10.5*   HCT 34.9* 34.5*     Plan  - Monitor serial CBC: Daily  - " Transfuse PRBC if patient becomes hemodynamically unstable, symptomatic or H/H drops below 7/21.  - Patient has not received any PRBC transfusions to date  - Patient's anemia is currently stable  - hemoglobin stable and at baseline. No indication for transfusion    Corneal ulcer  History of previous corneal ulcer. Reports photophobia and eye irritation, but denies visual changes. Reports consistent with previous crohn's flare symptoms  Discussed with ophtho via transfer center will start tobradex drops      VTE Risk Mitigation (From admission, onward)           Ordered     enoxaparin injection 40 mg  Every 24 hours         01/24/25 1009     IP VTE HIGH RISK PATIENT  Once         01/23/25 2335     Place sequential compression device  Until discontinued         01/23/25 2335     Reason for No Pharmacological VTE Prophylaxis  Once        Question:  Reasons:  Answer:  Patient is Ambulatory    01/23/25 2335                    Discharge Planning   ENDY:      Code Status: Full Code   Medical Readiness for Discharge Date:   Discharge Plan A: Home              Praveen Macdonald PA-C  Department of Hospital Medicine   [facility]

## 2025-01-26 NOTE — PROGRESS NOTES
GI Progress Note - 1/26/2025   See GI consult note for full H&P      Subjective:   Patient seen and examined at bedside. Patient feeling much better. CRP down trending. She was able to tolerate oral intake yesterday. Wanting to make sure she can tolerate regular diet prior to going home. She would prefer to follow with OSH GI doctor for repeat endoscopy and resuming biologics and she has messaged this doctor through the [facility] portal.       Objective:    Vital signs in last 24 hours:  Temp:  [97.5 °F (36.4 °C)-98.1 °F (36.7 °C)] 97.8 °F (36.6 °C)  Pulse:  [51-77] 53  Resp:  [17-18] 18  SpO2:  [97 %-99 %] 97 %  BP: ()/(53-74) 107/68    Intake/Output last 3 shifts:  I/O last 3 completed shifts:  In: 960 [P.O.:960]  Out: 210 [Stool:210]  Intake/Output this shift:  No intake/output data recorded.    Gen: no apparent distress, appears stated age  Heart: RRR, no murmurs, rub or gallops appreciated  Lung: No w/r/c. CTA bilaterally   Abdomen: NTND, BS present, soft. Ostomy noted with small amount of green output   Ext: 2+ pulses, no lower ext. edema  Neuro: A&O X 3       Recent Labs   Lab 01/23/25  1857 01/24/25  0506 01/25/25  0431   WBC 9.60 7.73 8.95   HGB 11.0* 10.5* 9.9*   HCT 34.9* 34.5* 32.3*   * 488* 480*   MCV 61* 61* 61*      Recent Labs   Lab 01/24/25  0506 01/25/25  0431 01/26/25  0443   * 136 136   K 4.6 4.4 4.9    107 108   CO2 20* 21* 18*   BUN 8 14 20   CALCIUM 9.5 9.5 8.9   PHOS 4.0 3.7 2.7     Recent Labs   Lab 01/24/25  0506 01/25/25  0431 01/26/25  0443   AST 15 13 29   ALT 9* 6* 10   ALKPHOS 60 57 69   BILITOT 0.3 0.2 0.1       Scheduled Meds:   dicyclomine  20 mg Oral QID    enoxparin  40 mg Subcutaneous Q24H (treatment, non-standard time)    gabapentin  300 mg Oral QHS    methylPREDNISolone injection (PEDS and ADULTS)  60 mg Intravenous Daily    tobramycin-dexAMETHasone 0.3-0.1%   Right Eye QID     Continuous Infusions:    1. Complicated Crohn's Disease with acute flare   2.  Uveitis      - Patient clinically improving. CRP down trending   - So far infection work up is negative, will follow results of tests still in process   - Trend CRP daily   - Diet as tolerated, patient requesting to stay until tomorrow to ensure can tolerate regular diet   - Patient prefers to follow up with her [facility] GI doctor for endoscopy and resuming biologics. She has messaged this doctor already and feels she has a reliable way to get in touch with them. We did discuss, however, if pain worsens today with diet advancement and she will remain in the hospital would consider endoscopy as inpatient   - She is also being followed by kathleen while here for uveitis   - Will continue to follow     Barbara Richardson   Gastroenterology   [facility]

## 2025-01-26 NOTE — NURSING
Pt resting in bed,  no distress noted.  Colostomy to RUQ intact with stool noted.  Pt denies pain at this time, pt able to ambulate to the restroom independently.  Bed in low position wheels locked, call light in reach for assistance, will continue to monitor.    [facility]  Nurses Note -- 4 Eyes      1/25/2025       Skin assessed on: Q Shift      [x] No Pressure Injuries Present    [x]Prevention Measures Documented    [] Yes LDA  for Pressure Injury Previously documented     [] Yes New Pressure Injury Discovered   [] LDA for New Pressure Injury Added      Attending RN:  Patsy Callaway RN     Second RN:  ADE Tay

## 2025-01-27 ENCOUNTER — E-CONSULT (OUTPATIENT)
Dept: OPHTHALMOLOGY | Facility: CLINIC | Age: 35
End: 2025-01-27
Payer: COMMERCIAL

## 2025-01-27 ENCOUNTER — TELEPHONE (OUTPATIENT)
Dept: OPHTHALMOLOGY | Facility: CLINIC | Age: 35
End: 2025-01-27
Payer: COMMERCIAL

## 2025-01-27 VITALS
HEIGHT: 60 IN | TEMPERATURE: 98 F | BODY MASS INDEX: 37.78 KG/M2 | HEART RATE: 51 BPM | SYSTOLIC BLOOD PRESSURE: 100 MMHG | OXYGEN SATURATION: 98 % | DIASTOLIC BLOOD PRESSURE: 70 MMHG | WEIGHT: 192.44 LBS | RESPIRATION RATE: 19 BRPM

## 2025-01-27 LAB
CMV DNA SPEC QL NAA+PROBE: NORMAL
CRP SERPL-MCNC: 10.2 MG/L (ref 0–8.2)
CYTOMEGALOVIRUS PCR, QUANT: NOT DETECTED IU/ML
VIT B12 SERPL-MCNC: 694 NG/L (ref 180–914)

## 2025-01-27 PROCEDURE — 99233 SBSQ HOSP IP/OBS HIGH 50: CPT | Mod: ,,, | Performed by: NURSE PRACTITIONER

## 2025-01-27 PROCEDURE — 25000003 PHARM REV CODE 250

## 2025-01-27 PROCEDURE — 63600175 PHARM REV CODE 636 W HCPCS: Mod: JZ,TB

## 2025-01-27 PROCEDURE — 36415 COLL VENOUS BLD VENIPUNCTURE: CPT | Performed by: NURSE PRACTITIONER

## 2025-01-27 PROCEDURE — 86140 C-REACTIVE PROTEIN: CPT | Performed by: NURSE PRACTITIONER

## 2025-01-27 PROCEDURE — 63600175 PHARM REV CODE 636 W HCPCS: Performed by: PHYSICIAN ASSISTANT

## 2025-01-27 RX ORDER — TOBRAMYCIN AND DEXAMETHASONE 3; 1 MG/ML; MG/ML
1 SUSPENSION/ DROPS OPHTHALMIC 4 TIMES DAILY
Qty: 5 ML | Refills: 0 | Status: SHIPPED | OUTPATIENT
Start: 2025-01-27

## 2025-01-27 RX ORDER — PREDNISONE 20 MG/1
TABLET ORAL
Qty: 74 TABLET | Refills: 0 | Status: SHIPPED | OUTPATIENT
Start: 2025-01-27 | End: 2025-03-10

## 2025-01-27 RX ADMIN — DICYCLOMINE HYDROCHLORIDE 20 MG: 10 CAPSULE ORAL at 08:01

## 2025-01-27 RX ADMIN — DICYCLOMINE HYDROCHLORIDE 20 MG: 10 CAPSULE ORAL at 12:01

## 2025-01-27 RX ADMIN — TOBRAMYCIN AND DEXAMETHASONE: 3; 1 OINTMENT OPHTHALMIC at 08:01

## 2025-01-27 RX ADMIN — METHYLPREDNISOLONE SODIUM SUCCINATE 60 MG: 125 INJECTION, POWDER, FOR SOLUTION INTRAMUSCULAR; INTRAVENOUS at 08:01

## 2025-01-27 RX ADMIN — TOBRAMYCIN AND DEXAMETHASONE: 3; 1 OINTMENT OPHTHALMIC at 12:01

## 2025-01-27 RX ADMIN — MORPHINE SULFATE 2 MG: 4 INJECTION, SOLUTION INTRAMUSCULAR; INTRAVENOUS at 07:01

## 2025-01-27 NOTE — PLAN OF CARE
Case Management Final Discharge Note      Discharge Disposition: Home    New DME ordered / company name: None    Relevant SDOH / Transition of Care Barriers:  None    Person available to provide assistance at home when needed and their contact information: Franky Martinez [phone]    Scheduled followup appointment: PCP - Information for [facility] placed on AVS for pt to establish care; [facility] information placed on AVS with instructions for pt to call and schedule appointments. CM unable to schedule appointments with these clinics.    Referrals placed: None    Transportation: private vehicle    Patient educated on discharge services and updated on DC plan. Bedside RN notified, patient clear to discharge from Case Management Perspective.      01/27/25 1034   Final Note   Assessment Type Final Discharge Note   Anticipated Discharge Disposition Home   Hospital Resources/Appts/Education Provided Appointments scheduled and added to AVS;Provided patient/caregiver with written discharge plan information   Post-Acute Status   Coverage UMR   Discharge Delays None known at this time

## 2025-01-27 NOTE — TELEPHONE ENCOUNTER
----- Message from Rachael Richey MD sent at 1/27/2025  9:36 AM CST -----  Needs hospital follow up with cornea this week - thank you

## 2025-01-27 NOTE — DISCHARGE INSTRUCTIONS
Our goal at [facility] is to always give you outstanding care and exceptional service. You may receive a survey by mail, text or e-mail in the next 7-10 days from Maria Ines Vieyra and our leadership team asking about the care you received with us. The survey should only take 5-10 minutes to complete and is very important to us.     Your feedback provides us with a way to recognize our staff who work tirelessly to provide the best care! Also, your responses help us learn how to improve when your experience was below our aspiration of excellence. We WILL use your feedback to continue making improvements to help us provide the highest quality care. We keep your personal information and feedback confidential. We appreciate your time completing this survey and can't wait to hear from you!!!     We want you to leave us today feeling like you can DEFINITELY recommend us to others! We look forward to your continued care with us! Thanks so much for choosing [facility] for your healthcare needs!

## 2025-01-27 NOTE — PROGRESS NOTES
AVS virtually reviewed with Ms Myles in its entirety with emphasis on diet, medications, follow-up appointments and reasons to return to the ED or contact the [facility] On [facility] Nurse Care Line. Patient also encouraged to utilize their patient portal. Ease and convenience of use reiterated. Education complete and patient voiced understanding. All questions answered. Discharge teaching complete.

## 2025-01-27 NOTE — PLAN OF CARE
Problem: Pain Acute  Goal: Optimal Pain Control and Function  Outcome: Progressing     Problem: Fall Injury Risk  Goal: Absence of Fall and Fall-Related Injury  Outcome: Progressing     Problem: Fatigue  Goal: Improved Activity Tolerance  Outcome: Progressing
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
## 2025-01-27 NOTE — NURSING
[facility]  Nurses Note -- 4 Eyes      1/27/2025       Skin assessed on: Q Shift      [x] No Pressure Injuries Present    []Prevention Measures Documented    [] Yes LDA  for Pressure Injury Previously documented     [] Yes New Pressure Injury Discovered   [] LDA for New Pressure Injury Added      Attending RN:  Sarkis Guido LPN     Second RN:  CLEMENCIA Garner

## 2025-01-27 NOTE — CONSULTS
[facility] - [address] [place] [address]  Response for E-Consult     Patient Name: Nikkie Myles  MRN: [id number]  Primary Care Provider: No, Primary Doctor   Requesting Provider: Michele Pulliam, DO  Consults    Recommendation: continue tobradex drops 4 times per day and follow up this week with ophthalmology - cornea clinic     Additional future steps to consider: importance of close follow up this week     Total time of Consultation: 10 minute    I did speak to the requesting provider verbally about this.     *This eConsult is based on the clinical data available to me and is furnished without benefit of a physical examination. The eConsult will need to be interpreted in light of any clinical issues or changes in patient status not available to me at the time of filing this eConsults. Significant changes in patient condition or level of acuity should result in immediate formal consultation and reevaluation. Please alert me if you have further questions.    Thank you for this eConsult referral.     MD Ej Bergman [facility] - [address] [place] [address]

## 2025-01-27 NOTE — PLAN OF CARE
Problem: Pain Acute  Goal: Optimal Pain Control and Function  Outcome: Met     Problem: Fall Injury Risk  Goal: Absence of Fall and Fall-Related Injury  Outcome: Met     Problem: Fatigue  Goal: Improved Activity Tolerance  Outcome: Met     Problem: Infection  Goal: Absence of Infection Signs and Symptoms  Outcome: Met

## 2025-01-27 NOTE — PROGRESS NOTES
GI Progress Note - 1/27/2025   See GI consult note for full H&P      Subjective:   Patient seen and examined at bedside. Patient feeling much better. CRP down trending. Able to tolerate oral. She would prefer to follow with [facility] GI doctor for repeat endoscopy and resuming biologics and she has messaged this doctor through the [facility] portal.       Objective:    Vital signs in last 24 hours:  Temp:  [97.6 °F (36.4 °C)-98.4 °F (36.9 °C)] 98.4 °F (36.9 °C)  Pulse:  [44-64] 51  Resp:  [17-19] 19  SpO2:  [97 %-100 %] 98 %  BP: ()/(64-76) 100/70    Intake/Output last 3 shifts:  I/O last 3 completed shifts:  In: 2760 [P.O.:2760]  Out: 210 [Stool:210]  Intake/Output this shift:  I/O this shift:  In: 240 [P.O.:240]  Out: -     Gen: no apparent distress, appears stated age  Heart: RRR, no murmurs, rub or gallops appreciated  Lung: No w/r/c. CTA bilaterally   Abdomen: NTND, BS present, soft. Ostomy noted with small amount of green output   Ext: 2+ pulses, no lower ext. edema  Neuro: A&O X 3       Recent Labs   Lab 01/24/25  0506 01/25/25  0431 01/26/25  0918   WBC 7.73 8.95 8.47   HGB 10.5* 9.9* 9.6*   HCT 34.5* 32.3* 32.8*   * 480* 487*   MCV 61* 61* 63*      Recent Labs   Lab 01/24/25  0506 01/25/25  0431 01/26/25  0443   * 136 136   K 4.6 4.4 4.9    107 108   CO2 20* 21* 18*   BUN 8 14 20   CALCIUM 9.5 9.5 8.9   PHOS 4.0 3.7 2.7     Recent Labs   Lab 01/24/25  0506 01/25/25  0431 01/26/25  0443   AST 15 13 29   ALT 9* 6* 10   ALKPHOS 60 57 69   BILITOT 0.3 0.2 0.1       Scheduled Meds:   dicyclomine  20 mg Oral QID    enoxparin  40 mg Subcutaneous Q24H (treatment, non-standard time)    gabapentin  300 mg Oral QHS    methylPREDNISolone injection (PEDS and ADULTS)  60 mg Intravenous Daily    tobramycin-dexAMETHasone 0.3-0.1%   Right Eye QID     Continuous Infusions:    1. Complicated Crohn's Disease with acute flare   2. Uveitis      - Patient clinically improving. CRP down trending   - stool cultures  negative, urine pos for e coli, HM managing  - Tolerating diet  - Patient prefers to follow up with her [facility] GI doctor for endoscopy and resuming biologics. She has messaged this doctor already and feels she has a reliable way to get in touch with them.   - She is also being followed by optho while here for uveitis   - ok to transition to po prednisone 60mg daily on tomorrow as she has received IV on today. Rec to taper by 10mg weekly until she can f/u with GI provider.  -ok to d/c from GI standpoint    Zaina Rasheed NP  Gastroenterology   [facility]

## 2025-01-27 NOTE — NURSING
Pt in the bathroom requesting new ostomy bag, bag provided.  Pt states she can apply the new bag independently, no distress noted, will continue to monitor.    [facility]  Nurses Note -- 4 Eyes      1/26/2025       Skin assessed on: Q Shift      [x] No Pressure Injuries Present    []Prevention Measures Documented    [] Yes LDA  for Pressure Injury Previously documented     [] Yes New Pressure Injury Discovered   [] LDA for New Pressure Injury Added      Attending RN:  Patsy Callaway RN     Second RN:  ADE Tay

## 2025-01-28 LAB
INFLIXIMAB DRUG LEVEL: <1 MCG/ML
INFLIXIMAB INTERPRETATION: ABNORMAL

## 2025-01-29 DIAGNOSIS — K29.70 HELICOBACTER PYLORI GASTRITIS: Primary | ICD-10-CM

## 2025-01-29 DIAGNOSIS — B96.81 HELICOBACTER PYLORI GASTRITIS: Primary | ICD-10-CM

## 2025-01-29 LAB
ANTI-INFLIXIMAB ANTIBODY: <20 U/ML
CALPROTECTIN STL-MCNT: 44 MCG/G
H PYLORI AG STL QL IA: DETECTED
INFLIXIMAB ANTIBODY INTERPRETATION: NORMAL
TTG IGA SER-ACNC: 3.2 U/ML

## 2025-01-29 RX ORDER — LANSOPRAZOLE, AMOXICILLIN, CLARITHROMYCIN 30-500-500
KIT ORAL 2 TIMES DAILY
Qty: 1 KIT | Refills: 0 | Status: SHIPPED | OUTPATIENT
Start: 2025-01-29 | End: 2025-02-12

## 2025-01-29 NOTE — PROGRESS NOTES
GI ABNORMAL LAB RESULT  H PYLORI STOOL POSITIVE  ATTEMPTED TO CALL PATIENT VIA CELLULAR   PREVPAC SENT TO PHARMACY ON FILE    LEFT MESSAGE ON VM  WILL ATTEMPT TO CALL IN AM

## 2025-04-26 NOTE — TELEPHONE ENCOUNTER
I called pt after checking with [facility] and she had told [facility] to hold shipments after 12/21/23 went out for possible pouch change but when I called pt she said that she did not remember saying this . NTL I told her to confirm with Susan at [facility] to send and I would send message as well through Teams   Gave her [facility] phone [phone]      ----- Message from Fatuma Lopes RN sent at 3/4/2024  8:43 AM CST -----  Regarding: FW: Pt Advice  Contact: Pt [phone]  Please advise. Thank you!    - Lorie  ----- Message -----  From: Jay Corona  Sent: 3/4/2024   8:11 AM CST  To: [facility] Gardiner Staff  Subject: Pt Advice                                        Pt calling regarding supplies for ostomy. States she has not been receiving supplies and would like to discuss. Please call [phone]       Constricted

## 2025-05-27 ENCOUNTER — HOSPITAL ENCOUNTER (INPATIENT)
Facility: HOSPITAL | Age: 35
LOS: 9 days | Discharge: NURSING, REHAB OR LONG-TERM CARE | DRG: 386 | End: 2025-06-05
Attending: STUDENT IN AN ORGANIZED HEALTH CARE EDUCATION/TRAINING PROGRAM | Admitting: COLON & RECTAL SURGERY
Payer: MEDICAID

## 2025-05-27 DIAGNOSIS — L98.8 FISTULA: ICD-10-CM

## 2025-05-27 DIAGNOSIS — R05.9 COUGH: ICD-10-CM

## 2025-05-27 DIAGNOSIS — K50.813 CROHN'S DISEASE OF BOTH SMALL AND LARGE INTESTINE WITH FISTULA: ICD-10-CM

## 2025-05-27 DIAGNOSIS — K50.119 CROHN'S DISEASE OF PERIANAL REGION WITH COMPLICATION: ICD-10-CM

## 2025-05-27 DIAGNOSIS — R10.9 ABDOMINAL PAIN: ICD-10-CM

## 2025-05-27 DIAGNOSIS — R23.8 SKIN BREAKDOWN: Primary | ICD-10-CM

## 2025-05-27 LAB
ABSOLUTE EOSINOPHIL (OHS): 0.15 K/UL
ABSOLUTE MONOCYTE (OHS): 1.57 K/UL (ref 0.3–1)
ABSOLUTE NEUTROPHIL COUNT (OHS): 10.41 K/UL (ref 1.8–7.7)
ALBUMIN SERPL BCP-MCNC: 2.8 G/DL (ref 3.5–5.2)
ALP SERPL-CCNC: 88 UNIT/L (ref 40–150)
ALT SERPL W/O P-5'-P-CCNC: 13 UNIT/L (ref 10–44)
ANION GAP (OHS): 16 MMOL/L (ref 8–16)
AST SERPL-CCNC: 23 UNIT/L (ref 11–45)
BASOPHILS # BLD AUTO: 0.11 K/UL
BASOPHILS NFR BLD AUTO: 0.6 %
BILIRUB SERPL-MCNC: 0.3 MG/DL (ref 0.1–1)
BIPAP: 0
BUN SERPL-MCNC: 14 MG/DL (ref 6–20)
CALCIUM SERPL-MCNC: 9.4 MG/DL (ref 8.7–10.5)
CHLORIDE SERPL-SCNC: 99 MMOL/L (ref 95–110)
CO2 SERPL-SCNC: 16 MMOL/L (ref 23–29)
CORRECTED TEMPERATURE (PCO2): 32.9 MMHG
CORRECTED TEMPERATURE (PH): 7.42
CORRECTED TEMPERATURE (PO2): 56.9 MMHG
CREAT SERPL-MCNC: 0.9 MG/DL (ref 0.5–1.4)
CRP SERPL-MCNC: 213.3 MG/L
ERYTHROCYTE [DISTWIDTH] IN BLOOD BY AUTOMATED COUNT: 21.1 % (ref 11.5–14.5)
FIO2: 21 %
GFR SERPLBLD CREATININE-BSD FMLA CKD-EPI: >60 ML/MIN/1.73/M2
GLUCOSE SERPL-MCNC: 108 MG/DL (ref 70–110)
HCT VFR BLD AUTO: 30.5 % (ref 37–48.5)
HCT VFR BLD CALC: 27.9 % (ref 36–54)
HGB BLD-MCNC: 9 GM/DL (ref 12–16)
IMM GRANULOCYTES # BLD AUTO: 0.12 K/UL (ref 0–0.04)
IMM GRANULOCYTES NFR BLD AUTO: 0.7 % (ref 0–0.5)
INDIRECT COOMBS: NORMAL
INFLUENZA A MOLECULAR (OHS): NEGATIVE
INFLUENZA B MOLECULAR (OHS): NEGATIVE
LDH SERPL L TO P-CCNC: 1 MMOL/L (ref 0.5–2.2)
LIPASE SERPL-CCNC: 11 U/L (ref 4–60)
LYMPHOCYTES # BLD AUTO: 4.97 K/UL (ref 1–4.8)
MCH RBC QN AUTO: 16 PG (ref 27–31)
MCHC RBC AUTO-ENTMCNC: 29.5 G/DL (ref 32–36)
MCV RBC AUTO: 54 FL (ref 82–98)
NUCLEATED RBC (/100WBC) (OHS): 0 /100 WBC
PCO2 BLDA: 32.9 MMHG (ref 35–45)
PH SMN: 7.42 [PH] (ref 7.35–7.45)
PLATELET # BLD AUTO: 680 K/UL (ref 150–450)
PLATELET BLD QL SMEAR: ABNORMAL
PMV BLD AUTO: 9.8 FL (ref 9.2–12.9)
PO2 BLDA: 56.9 MMHG (ref 40–60)
POC BASE DEFICIT: -2.6 MMOL/L
POC HCO3: 22.1 MMOL/L (ref 24–28)
POC IONIZED CALCIUM: 1.14 MMOL/L (ref 1.06–1.42)
POC PERFORMED BY: ABNORMAL
POC SATURATED O2: 88.9 %
POC TEMPERATURE: 37 C
POTASSIUM BLD-SCNC: 3.9 MMOL/L (ref 3.5–5.1)
POTASSIUM SERPL-SCNC: 3.9 MMOL/L (ref 3.5–5.1)
PROT SERPL-MCNC: 10.2 GM/DL (ref 6–8.4)
RBC # BLD AUTO: 5.64 M/UL (ref 4–5.4)
RELATIVE EOSINOPHIL (OHS): 0.9 %
RELATIVE LYMPHOCYTE (OHS): 28.7 % (ref 18–48)
RELATIVE MONOCYTE (OHS): 9.1 % (ref 4–15)
RELATIVE NEUTROPHIL (OHS): 60 % (ref 38–73)
RH BLD: NORMAL
SARS-COV-2 RDRP RESP QL NAA+PROBE: NEGATIVE
SITE: ABNORMAL
SODIUM BLD-SCNC: 135 MMOL/L (ref 136–145)
SODIUM SERPL-SCNC: 131 MMOL/L (ref 136–145)
SPECIMEN OUTDATE: NORMAL
SPECIMEN SOURCE: ABNORMAL
WBC # BLD AUTO: 17.33 K/UL (ref 3.9–12.7)

## 2025-05-27 PROCEDURE — 12000002 HC ACUTE/MED SURGE SEMI-PRIVATE ROOM

## 2025-05-27 PROCEDURE — 86850 RBC ANTIBODY SCREEN: CPT

## 2025-05-27 PROCEDURE — 87150 DNA/RNA AMPLIFIED PROBE: CPT

## 2025-05-27 PROCEDURE — 82803 BLOOD GASES ANY COMBINATION: CPT

## 2025-05-27 PROCEDURE — U0002 COVID-19 LAB TEST NON-CDC: HCPCS

## 2025-05-27 PROCEDURE — 93005 ELECTROCARDIOGRAM TRACING: CPT

## 2025-05-27 PROCEDURE — 99900035 HC TECH TIME PER 15 MIN (STAT)

## 2025-05-27 PROCEDURE — 93010 ELECTROCARDIOGRAM REPORT: CPT | Mod: ,,, | Performed by: INTERNAL MEDICINE

## 2025-05-27 PROCEDURE — 80053 COMPREHEN METABOLIC PANEL: CPT | Performed by: STUDENT IN AN ORGANIZED HEALTH CARE EDUCATION/TRAINING PROGRAM

## 2025-05-27 PROCEDURE — 85025 COMPLETE CBC W/AUTO DIFF WBC: CPT | Performed by: NURSE PRACTITIONER

## 2025-05-27 PROCEDURE — 63600175 PHARM REV CODE 636 W HCPCS

## 2025-05-27 PROCEDURE — 83690 ASSAY OF LIPASE: CPT | Performed by: STUDENT IN AN ORGANIZED HEALTH CARE EDUCATION/TRAINING PROGRAM

## 2025-05-27 PROCEDURE — 96375 TX/PRO/DX INJ NEW DRUG ADDON: CPT

## 2025-05-27 PROCEDURE — 99285 EMERGENCY DEPT VISIT HI MDM: CPT | Mod: 25

## 2025-05-27 PROCEDURE — 96367 TX/PROPH/DG ADDL SEQ IV INF: CPT

## 2025-05-27 PROCEDURE — 81025 URINE PREGNANCY TEST: CPT | Performed by: NURSE PRACTITIONER

## 2025-05-27 PROCEDURE — 25000003 PHARM REV CODE 250

## 2025-05-27 PROCEDURE — 87502 INFLUENZA DNA AMP PROBE: CPT | Performed by: STUDENT IN AN ORGANIZED HEALTH CARE EDUCATION/TRAINING PROGRAM

## 2025-05-27 PROCEDURE — 87040 BLOOD CULTURE FOR BACTERIA: CPT

## 2025-05-27 PROCEDURE — 86140 C-REACTIVE PROTEIN: CPT

## 2025-05-27 PROCEDURE — 96365 THER/PROPH/DIAG IV INF INIT: CPT

## 2025-05-27 PROCEDURE — 83605 ASSAY OF LACTIC ACID: CPT

## 2025-05-27 RX ORDER — HYDROMORPHONE HYDROCHLORIDE 1 MG/ML
1 INJECTION, SOLUTION INTRAMUSCULAR; INTRAVENOUS; SUBCUTANEOUS
Refills: 0 | Status: COMPLETED | OUTPATIENT
Start: 2025-05-27 | End: 2025-05-27

## 2025-05-27 RX ORDER — DROPERIDOL 2.5 MG/ML
1.25 INJECTION, SOLUTION INTRAMUSCULAR; INTRAVENOUS
Status: COMPLETED | OUTPATIENT
Start: 2025-05-27 | End: 2025-05-27

## 2025-05-27 RX ORDER — ACETAMINOPHEN 500 MG
1000 TABLET ORAL
Status: COMPLETED | OUTPATIENT
Start: 2025-05-27 | End: 2025-05-27

## 2025-05-27 RX ORDER — OXYCODONE HYDROCHLORIDE 10 MG/1
10 TABLET ORAL EVERY 4 HOURS PRN
Refills: 0 | Status: DISCONTINUED | OUTPATIENT
Start: 2025-05-27 | End: 2025-05-29

## 2025-05-27 RX ORDER — ONDANSETRON HYDROCHLORIDE 2 MG/ML
4 INJECTION, SOLUTION INTRAVENOUS
Status: COMPLETED | OUTPATIENT
Start: 2025-05-27 | End: 2025-05-27

## 2025-05-27 RX ADMIN — HYDROMORPHONE HYDROCHLORIDE 1 MG: 1 INJECTION, SOLUTION INTRAMUSCULAR; INTRAVENOUS; SUBCUTANEOUS at 09:05

## 2025-05-27 RX ADMIN — SODIUM CHLORIDE, POTASSIUM CHLORIDE, SODIUM LACTATE AND CALCIUM CHLORIDE 1000 ML: 600; 310; 30; 20 INJECTION, SOLUTION INTRAVENOUS at 10:05

## 2025-05-27 RX ADMIN — ACETAMINOPHEN 1000 MG: 500 TABLET ORAL at 11:05

## 2025-05-27 RX ADMIN — PIPERACILLIN AND TAZOBACTAM 4.5 G: 4; .5 INJECTION, POWDER, LYOPHILIZED, FOR SOLUTION INTRAVENOUS; PARENTERAL at 10:05

## 2025-05-27 RX ADMIN — VANCOMYCIN HYDROCHLORIDE 1000 MG: 1 INJECTION, POWDER, LYOPHILIZED, FOR SOLUTION INTRAVENOUS at 11:05

## 2025-05-27 RX ADMIN — DROPERIDOL 1.25 MG: 2.5 INJECTION, SOLUTION INTRAMUSCULAR; INTRAVENOUS at 11:05

## 2025-05-27 RX ADMIN — ONDANSETRON 4 MG: 2 INJECTION INTRAMUSCULAR; INTRAVENOUS at 09:05

## 2025-05-28 ENCOUNTER — ANESTHESIA EVENT (OUTPATIENT)
Dept: SURGERY | Facility: HOSPITAL | Age: 35
End: 2025-05-28
Payer: MEDICAID

## 2025-05-28 ENCOUNTER — ANESTHESIA (OUTPATIENT)
Dept: SURGERY | Facility: HOSPITAL | Age: 35
End: 2025-05-28
Payer: MEDICAID

## 2025-05-28 LAB
ABSOLUTE EOSINOPHIL (OHS): 0.13 K/UL
ABSOLUTE MONOCYTE (OHS): 1.25 K/UL (ref 0.3–1)
ABSOLUTE NEUTROPHIL COUNT (OHS): 10.71 K/UL (ref 1.8–7.7)
ALBUMIN SERPL BCP-MCNC: 2.5 G/DL (ref 3.5–5.2)
ALBUMIN SERPL BCP-MCNC: 2.6 G/DL (ref 3.5–5.2)
ALP SERPL-CCNC: 79 UNIT/L (ref 40–150)
ALP SERPL-CCNC: 82 UNIT/L (ref 40–150)
ALT SERPL W/O P-5'-P-CCNC: 11 UNIT/L (ref 10–44)
ALT SERPL W/O P-5'-P-CCNC: 12 UNIT/L (ref 10–44)
ANION GAP (OHS): 10 MMOL/L (ref 8–16)
ANION GAP (OHS): 15 MMOL/L (ref 8–16)
AST SERPL-CCNC: 21 UNIT/L (ref 11–45)
AST SERPL-CCNC: 24 UNIT/L (ref 11–45)
B-HCG UR QL: NEGATIVE
BACTERIA #/AREA URNS AUTO: ABNORMAL /HPF
BASOPHILS # BLD AUTO: 0.05 K/UL
BASOPHILS NFR BLD AUTO: 0.3 %
BILIRUB SERPL-MCNC: 0.2 MG/DL (ref 0.1–1)
BILIRUB SERPL-MCNC: 0.2 MG/DL (ref 0.1–1)
BILIRUB UR QL STRIP.AUTO: NEGATIVE
BUN SERPL-MCNC: 12 MG/DL (ref 6–20)
BUN SERPL-MCNC: 14 MG/DL (ref 6–20)
CALCIUM SERPL-MCNC: 8.8 MG/DL (ref 8.7–10.5)
CALCIUM SERPL-MCNC: 8.9 MG/DL (ref 8.7–10.5)
CHLORIDE SERPL-SCNC: 101 MMOL/L (ref 95–110)
CHLORIDE SERPL-SCNC: 101 MMOL/L (ref 95–110)
CLARITY UR: ABNORMAL
CO2 SERPL-SCNC: 14 MMOL/L (ref 23–29)
CO2 SERPL-SCNC: 21 MMOL/L (ref 23–29)
COLOR UR AUTO: COLORLESS
CREAT SERPL-MCNC: 0.8 MG/DL (ref 0.5–1.4)
CREAT SERPL-MCNC: 0.9 MG/DL (ref 0.5–1.4)
CTP QC/QA: YES
ERYTHROCYTE [DISTWIDTH] IN BLOOD BY AUTOMATED COUNT: 20.2 % (ref 11.5–14.5)
GFR SERPLBLD CREATININE-BSD FMLA CKD-EPI: >60 ML/MIN/1.73/M2
GFR SERPLBLD CREATININE-BSD FMLA CKD-EPI: >60 ML/MIN/1.73/M2
GLUCOSE SERPL-MCNC: 117 MG/DL (ref 70–110)
GLUCOSE SERPL-MCNC: 99 MG/DL (ref 70–110)
GLUCOSE UR QL STRIP: NEGATIVE
HCT VFR BLD AUTO: 26.4 % (ref 37–48.5)
HGB BLD-MCNC: 7.7 GM/DL (ref 12–16)
HGB UR QL STRIP: ABNORMAL
HOLD SPECIMEN: NORMAL
IMM GRANULOCYTES # BLD AUTO: 0.13 K/UL (ref 0–0.04)
IMM GRANULOCYTES NFR BLD AUTO: 0.9 % (ref 0–0.5)
IRON SATN MFR SERPL: 8 % (ref 20–50)
IRON SERPL-MCNC: 18 UG/DL (ref 30–160)
KETONES UR QL STRIP: NEGATIVE
LEUKOCYTE ESTERASE UR QL STRIP: ABNORMAL
LIPASE SERPL-CCNC: 9 U/L (ref 4–60)
LYMPHOCYTES # BLD AUTO: 2.14 K/UL (ref 1–4.8)
MAGNESIUM SERPL-MCNC: 1.7 MG/DL (ref 1.6–2.6)
MCH RBC QN AUTO: 15.7 PG (ref 27–31)
MCHC RBC AUTO-ENTMCNC: 29.2 G/DL (ref 32–36)
MCV RBC AUTO: 54 FL (ref 82–98)
MICROSCOPIC COMMENT: ABNORMAL
NITRITE UR QL STRIP: NEGATIVE
NUCLEATED RBC (/100WBC) (OHS): 0 /100 WBC
OHS QRS DURATION: 66 MS
OHS QTC CALCULATION: 433 MS
PH UR STRIP: 6 [PH]
PHOSPHATE SERPL-MCNC: 4.6 MG/DL (ref 2.7–4.5)
PLATELET # BLD AUTO: 535 K/UL (ref 150–450)
PMV BLD AUTO: 9.7 FL (ref 9.2–12.9)
POTASSIUM SERPL-SCNC: 4.3 MMOL/L (ref 3.5–5.1)
POTASSIUM SERPL-SCNC: 4.7 MMOL/L (ref 3.5–5.1)
PROT SERPL-MCNC: 9.1 GM/DL (ref 6–8.4)
PROT SERPL-MCNC: 9.3 GM/DL (ref 6–8.4)
PROT UR QL STRIP: ABNORMAL
RBC # BLD AUTO: 4.9 M/UL (ref 4–5.4)
RBC #/AREA URNS AUTO: 27 /HPF (ref 0–4)
RELATIVE EOSINOPHIL (OHS): 0.9 %
RELATIVE LYMPHOCYTE (OHS): 14.9 % (ref 18–48)
RELATIVE MONOCYTE (OHS): 8.7 % (ref 4–15)
RELATIVE NEUTROPHIL (OHS): 74.3 % (ref 38–73)
SODIUM SERPL-SCNC: 130 MMOL/L (ref 136–145)
SODIUM SERPL-SCNC: 132 MMOL/L (ref 136–145)
SP GR UR STRIP: >=1.03
SQUAMOUS #/AREA URNS AUTO: 11 /HPF
TIBC SERPL-MCNC: 231 UG/DL (ref 250–450)
TRANSFERRIN SERPL-MCNC: 156 MG/DL (ref 200–375)
UROBILINOGEN UR STRIP-ACNC: NEGATIVE EU/DL
WBC # BLD AUTO: 14.41 K/UL (ref 3.9–12.7)
WBC #/AREA URNS AUTO: >100 /HPF (ref 0–5)

## 2025-05-28 PROCEDURE — 20600001 HC STEP DOWN PRIVATE ROOM

## 2025-05-28 PROCEDURE — 63600175 PHARM REV CODE 636 W HCPCS: Performed by: SURGERY

## 2025-05-28 PROCEDURE — 71000015 HC POSTOP RECOV 1ST HR: Performed by: COLON & RECTAL SURGERY

## 2025-05-28 PROCEDURE — 83735 ASSAY OF MAGNESIUM: CPT | Performed by: STUDENT IN AN ORGANIZED HEALTH CARE EDUCATION/TRAINING PROGRAM

## 2025-05-28 PROCEDURE — 63600175 PHARM REV CODE 636 W HCPCS: Mod: JZ,TB

## 2025-05-28 PROCEDURE — 83690 ASSAY OF LIPASE: CPT | Performed by: NURSE PRACTITIONER

## 2025-05-28 PROCEDURE — 71000016 HC POSTOP RECOV ADDL HR: Performed by: COLON & RECTAL SURGERY

## 2025-05-28 PROCEDURE — 80053 COMPREHEN METABOLIC PANEL: CPT | Performed by: STUDENT IN AN ORGANIZED HEALTH CARE EDUCATION/TRAINING PROGRAM

## 2025-05-28 PROCEDURE — 80053 COMPREHEN METABOLIC PANEL: CPT | Performed by: NURSE PRACTITIONER

## 2025-05-28 PROCEDURE — 11105 PUNCH BX SKIN EA SEP/ADDL: CPT | Mod: ,,, | Performed by: COLON & RECTAL SURGERY

## 2025-05-28 PROCEDURE — 83540 ASSAY OF IRON: CPT

## 2025-05-28 PROCEDURE — 99223 1ST HOSP IP/OBS HIGH 75: CPT | Mod: ,,, | Performed by: INTERNAL MEDICINE

## 2025-05-28 PROCEDURE — 94761 N-INVAS EAR/PLS OXIMETRY MLT: CPT

## 2025-05-28 PROCEDURE — 63600175 PHARM REV CODE 636 W HCPCS: Mod: JZ,TB | Performed by: SURGERY

## 2025-05-28 PROCEDURE — 36000707: Performed by: COLON & RECTAL SURGERY

## 2025-05-28 PROCEDURE — 25500020 PHARM REV CODE 255: Performed by: STUDENT IN AN ORGANIZED HEALTH CARE EDUCATION/TRAINING PROGRAM

## 2025-05-28 PROCEDURE — 88305 TISSUE EXAM BY PATHOLOGIST: CPT | Mod: 26,,, | Performed by: PATHOLOGY

## 2025-05-28 PROCEDURE — 37000009 HC ANESTHESIA EA ADD 15 MINS: Performed by: COLON & RECTAL SURGERY

## 2025-05-28 PROCEDURE — 63600175 PHARM REV CODE 636 W HCPCS: Performed by: COLON & RECTAL SURGERY

## 2025-05-28 PROCEDURE — 84100 ASSAY OF PHOSPHORUS: CPT | Performed by: STUDENT IN AN ORGANIZED HEALTH CARE EDUCATION/TRAINING PROGRAM

## 2025-05-28 PROCEDURE — 25000003 PHARM REV CODE 250: Performed by: SURGERY

## 2025-05-28 PROCEDURE — 63600175 PHARM REV CODE 636 W HCPCS: Performed by: STUDENT IN AN ORGANIZED HEALTH CARE EDUCATION/TRAINING PROGRAM

## 2025-05-28 PROCEDURE — 37000008 HC ANESTHESIA 1ST 15 MINUTES: Performed by: COLON & RECTAL SURGERY

## 2025-05-28 PROCEDURE — 11104 PUNCH BX SKIN SINGLE LESION: CPT | Mod: ,,, | Performed by: COLON & RECTAL SURGERY

## 2025-05-28 PROCEDURE — 85025 COMPLETE CBC W/AUTO DIFF WBC: CPT | Performed by: STUDENT IN AN ORGANIZED HEALTH CARE EDUCATION/TRAINING PROGRAM

## 2025-05-28 PROCEDURE — 81003 URINALYSIS AUTO W/O SCOPE: CPT | Performed by: NURSE PRACTITIONER

## 2025-05-28 PROCEDURE — 36000706: Performed by: COLON & RECTAL SURGERY

## 2025-05-28 PROCEDURE — 25000003 PHARM REV CODE 250: Performed by: STUDENT IN AN ORGANIZED HEALTH CARE EDUCATION/TRAINING PROGRAM

## 2025-05-28 PROCEDURE — 25000003 PHARM REV CODE 250

## 2025-05-28 PROCEDURE — 0HB7XZX EXCISION OF ABDOMEN SKIN, EXTERNAL APPROACH, DIAGNOSTIC: ICD-10-PCS | Performed by: COLON & RECTAL SURGERY

## 2025-05-28 PROCEDURE — 71000033 HC RECOVERY, INTIAL HOUR: Performed by: COLON & RECTAL SURGERY

## 2025-05-28 PROCEDURE — 45990 SURG DX EXAM ANORECTAL: CPT | Mod: ,,, | Performed by: COLON & RECTAL SURGERY

## 2025-05-28 PROCEDURE — 88307 TISSUE EXAM BY PATHOLOGIST: CPT | Mod: TC | Performed by: COLON & RECTAL SURGERY

## 2025-05-28 PROCEDURE — 0HBAXZX EXCISION OF INGUINAL SKIN, EXTERNAL APPROACH, DIAGNOSTIC: ICD-10-PCS | Performed by: COLON & RECTAL SURGERY

## 2025-05-28 PROCEDURE — 87086 URINE CULTURE/COLONY COUNT: CPT | Performed by: NURSE PRACTITIONER

## 2025-05-28 RX ORDER — ENOXAPARIN SODIUM 100 MG/ML
40 INJECTION SUBCUTANEOUS EVERY 24 HOURS
Status: DISCONTINUED | OUTPATIENT
Start: 2025-05-28 | End: 2025-06-05 | Stop reason: HOSPADM

## 2025-05-28 RX ORDER — BUPIVACAINE HYDROCHLORIDE 2.5 MG/ML
INJECTION, SOLUTION EPIDURAL; INFILTRATION; INTRACAUDAL; PERINEURAL
Status: DISCONTINUED | OUTPATIENT
Start: 2025-05-28 | End: 2025-05-28 | Stop reason: HOSPADM

## 2025-05-28 RX ORDER — OXYCODONE HYDROCHLORIDE 5 MG/1
5 TABLET ORAL EVERY 4 HOURS PRN
Refills: 0 | Status: DISCONTINUED | OUTPATIENT
Start: 2025-05-28 | End: 2025-05-29

## 2025-05-28 RX ORDER — ACETAMINOPHEN 500 MG
1000 TABLET ORAL EVERY 8 HOURS PRN
Status: DISCONTINUED | OUTPATIENT
Start: 2025-05-28 | End: 2025-05-28

## 2025-05-28 RX ORDER — MIDAZOLAM HYDROCHLORIDE 1 MG/ML
INJECTION INTRAMUSCULAR; INTRAVENOUS
Status: DISCONTINUED | OUTPATIENT
Start: 2025-05-28 | End: 2025-05-28

## 2025-05-28 RX ORDER — FENTANYL CITRATE 50 UG/ML
INJECTION, SOLUTION INTRAMUSCULAR; INTRAVENOUS
Status: DISCONTINUED | OUTPATIENT
Start: 2025-05-28 | End: 2025-05-28

## 2025-05-28 RX ORDER — PHENYLEPHRINE HYDROCHLORIDE 10 MG/ML
INJECTION INTRAVENOUS
Status: DISCONTINUED | OUTPATIENT
Start: 2025-05-28 | End: 2025-05-28

## 2025-05-28 RX ORDER — CEFAZOLIN SODIUM 1 G/3ML
INJECTION, POWDER, FOR SOLUTION INTRAMUSCULAR; INTRAVENOUS
Status: DISCONTINUED | OUTPATIENT
Start: 2025-05-28 | End: 2025-05-28

## 2025-05-28 RX ORDER — METRONIDAZOLE 500 MG/100ML
INJECTION, SOLUTION INTRAVENOUS
Status: DISCONTINUED | OUTPATIENT
Start: 2025-05-28 | End: 2025-05-28

## 2025-05-28 RX ORDER — DOXYCYCLINE HYCLATE 100 MG
100 TABLET ORAL EVERY 12 HOURS
Status: DISCONTINUED | OUTPATIENT
Start: 2025-05-28 | End: 2025-05-29

## 2025-05-28 RX ORDER — HYDROMORPHONE HYDROCHLORIDE 1 MG/ML
INJECTION, SOLUTION INTRAMUSCULAR; INTRAVENOUS; SUBCUTANEOUS
Status: DISPENSED
Start: 2025-05-28 | End: 2025-05-29

## 2025-05-28 RX ORDER — HYDROMORPHONE HYDROCHLORIDE 1 MG/ML
0.2 INJECTION, SOLUTION INTRAMUSCULAR; INTRAVENOUS; SUBCUTANEOUS EVERY 5 MIN PRN
Status: DISCONTINUED | OUTPATIENT
Start: 2025-05-28 | End: 2025-05-28 | Stop reason: HOSPADM

## 2025-05-28 RX ORDER — LIDOCAINE HYDROCHLORIDE 20 MG/ML
INJECTION INTRAVENOUS
Status: DISCONTINUED | OUTPATIENT
Start: 2025-05-28 | End: 2025-05-28

## 2025-05-28 RX ORDER — SODIUM CHLORIDE 0.9 % (FLUSH) 0.9 %
10 SYRINGE (ML) INJECTION
Status: DISCONTINUED | OUTPATIENT
Start: 2025-05-28 | End: 2025-06-05 | Stop reason: HOSPADM

## 2025-05-28 RX ORDER — ONDANSETRON HYDROCHLORIDE 2 MG/ML
4 INJECTION, SOLUTION INTRAVENOUS DAILY PRN
Status: DISCONTINUED | OUTPATIENT
Start: 2025-05-28 | End: 2025-05-28 | Stop reason: HOSPADM

## 2025-05-28 RX ORDER — GLUCAGON 1 MG
1 KIT INJECTION
Status: DISCONTINUED | OUTPATIENT
Start: 2025-05-28 | End: 2025-05-28 | Stop reason: HOSPADM

## 2025-05-28 RX ORDER — ACETAMINOPHEN 500 MG
1000 TABLET ORAL EVERY 8 HOURS
Status: DISCONTINUED | OUTPATIENT
Start: 2025-05-29 | End: 2025-05-31

## 2025-05-28 RX ORDER — KETOROLAC TROMETHAMINE 30 MG/ML
30 INJECTION, SOLUTION INTRAMUSCULAR; INTRAVENOUS ONCE
Status: COMPLETED | OUTPATIENT
Start: 2025-05-28 | End: 2025-05-28

## 2025-05-28 RX ORDER — ACETAMINOPHEN 10 MG/ML
1000 INJECTION, SOLUTION INTRAVENOUS EVERY 8 HOURS
Status: COMPLETED | OUTPATIENT
Start: 2025-05-28 | End: 2025-05-29

## 2025-05-28 RX ORDER — NALOXONE HCL 0.4 MG/ML
0.02 VIAL (ML) INJECTION
Status: DISCONTINUED | OUTPATIENT
Start: 2025-05-28 | End: 2025-06-05 | Stop reason: HOSPADM

## 2025-05-28 RX ORDER — DEXTROSE MONOHYDRATE AND SODIUM CHLORIDE 5; .9 G/100ML; G/100ML
INJECTION, SOLUTION INTRAVENOUS CONTINUOUS
Status: DISCONTINUED | OUTPATIENT
Start: 2025-05-28 | End: 2025-05-29

## 2025-05-28 RX ORDER — PROPOFOL 10 MG/ML
VIAL (ML) INTRAVENOUS CONTINUOUS PRN
Status: DISCONTINUED | OUTPATIENT
Start: 2025-05-28 | End: 2025-05-28

## 2025-05-28 RX ORDER — DEXMEDETOMIDINE HYDROCHLORIDE 100 UG/ML
INJECTION, SOLUTION INTRAVENOUS
Status: DISCONTINUED | OUTPATIENT
Start: 2025-05-28 | End: 2025-05-28

## 2025-05-28 RX ORDER — KETOROLAC TROMETHAMINE 30 MG/ML
15 INJECTION, SOLUTION INTRAMUSCULAR; INTRAVENOUS EVERY 6 HOURS
Status: COMPLETED | OUTPATIENT
Start: 2025-05-28 | End: 2025-05-31

## 2025-05-28 RX ORDER — ONDANSETRON HYDROCHLORIDE 2 MG/ML
4 INJECTION, SOLUTION INTRAVENOUS EVERY 8 HOURS PRN
Status: DISCONTINUED | OUTPATIENT
Start: 2025-05-28 | End: 2025-06-05 | Stop reason: HOSPADM

## 2025-05-28 RX ORDER — KETAMINE HCL IN 0.9 % NACL 50 MG/5 ML
SYRINGE (ML) INTRAVENOUS
Status: DISCONTINUED | OUTPATIENT
Start: 2025-05-28 | End: 2025-05-28

## 2025-05-28 RX ADMIN — FENTANYL CITRATE 25 MCG: 50 INJECTION, SOLUTION INTRAMUSCULAR; INTRAVENOUS at 02:05

## 2025-05-28 RX ADMIN — METRONIDAZOLE 500 MG: 500 INJECTION, SOLUTION INTRAVENOUS at 02:05

## 2025-05-28 RX ADMIN — OXYCODONE 5 MG: 5 TABLET ORAL at 04:05

## 2025-05-28 RX ADMIN — PROPOFOL 100 MCG/KG/MIN: 10 INJECTION, EMULSION INTRAVENOUS at 02:05

## 2025-05-28 RX ADMIN — ACETAMINOPHEN 1000 MG: 10 INJECTION INTRAVENOUS at 12:05

## 2025-05-28 RX ADMIN — ENOXAPARIN SODIUM 40 MG: 40 INJECTION SUBCUTANEOUS at 04:05

## 2025-05-28 RX ADMIN — KETOROLAC TROMETHAMINE 15 MG: 30 INJECTION, SOLUTION INTRAMUSCULAR; INTRAVENOUS at 11:05

## 2025-05-28 RX ADMIN — DOXYCYCLINE HYCLATE 100 MG: 100 TABLET, COATED ORAL at 08:05

## 2025-05-28 RX ADMIN — SODIUM CHLORIDE: 9 INJECTION, SOLUTION INTRAVENOUS at 02:05

## 2025-05-28 RX ADMIN — CEFAZOLIN 2 G: 330 INJECTION, POWDER, FOR SOLUTION INTRAMUSCULAR; INTRAVENOUS at 02:05

## 2025-05-28 RX ADMIN — PHENYLEPHRINE HYDROCHLORIDE 100 MCG: 10 INJECTION INTRAVENOUS at 02:05

## 2025-05-28 RX ADMIN — KETOROLAC TROMETHAMINE 30 MG: 30 INJECTION, SOLUTION INTRAMUSCULAR; INTRAVENOUS at 11:05

## 2025-05-28 RX ADMIN — IOHEXOL 100 ML: 350 INJECTION, SOLUTION INTRAVENOUS at 01:05

## 2025-05-28 RX ADMIN — OXYCODONE 10 MG: 5 TABLET ORAL at 05:05

## 2025-05-28 RX ADMIN — DEXMEDETOMIDINE 12 MCG: 100 INJECTION, SOLUTION, CONCENTRATE INTRAVENOUS at 02:05

## 2025-05-28 RX ADMIN — ACETAMINOPHEN 1000 MG: 10 INJECTION INTRAVENOUS at 09:05

## 2025-05-28 RX ADMIN — DEXTROSE AND SODIUM CHLORIDE: 5; 900 INJECTION, SOLUTION INTRAVENOUS at 08:05

## 2025-05-28 RX ADMIN — MIDAZOLAM HYDROCHLORIDE 2 MG: 2 INJECTION, SOLUTION INTRAMUSCULAR; INTRAVENOUS at 01:05

## 2025-05-28 RX ADMIN — OXYCODONE 10 MG: 5 TABLET ORAL at 01:05

## 2025-05-28 RX ADMIN — DEXTROSE AND SODIUM CHLORIDE: 5; 900 INJECTION, SOLUTION INTRAVENOUS at 07:05

## 2025-05-28 RX ADMIN — OXYCODONE 10 MG: 5 TABLET ORAL at 08:05

## 2025-05-28 RX ADMIN — DEXMEDETOMIDINE 8 MCG: 100 INJECTION, SOLUTION, CONCENTRATE INTRAVENOUS at 02:05

## 2025-05-28 RX ADMIN — HYDROMORPHONE HYDROCHLORIDE 0.2 MG: 1 INJECTION, SOLUTION INTRAMUSCULAR; INTRAVENOUS; SUBCUTANEOUS at 05:05

## 2025-05-28 RX ADMIN — OXYCODONE 10 MG: 5 TABLET ORAL at 11:05

## 2025-05-28 RX ADMIN — OXYCODONE 10 MG: 5 TABLET ORAL at 09:05

## 2025-05-28 RX ADMIN — LIDOCAINE HYDROCHLORIDE 80 MG: 20 INJECTION INTRAVENOUS at 02:05

## 2025-05-28 RX ADMIN — PHENYLEPHRINE HYDROCHLORIDE 100 MCG: 10 INJECTION INTRAVENOUS at 03:05

## 2025-05-28 RX ADMIN — KETOROLAC TROMETHAMINE 15 MG: 30 INJECTION, SOLUTION INTRAMUSCULAR; INTRAVENOUS at 05:05

## 2025-05-28 RX ADMIN — Medication 10 MG: at 02:05

## 2025-05-28 RX ADMIN — Medication 10 MG: at 03:05
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
## 2025-05-28 NOTE — NURSING TRANSFER
Nursing Transfer Note      5/28/2025   6:49 PM    Nurse giving handoff:CLEMENCIA Bhatti  Nurse receiving handoff:CLEMENCIA Ledesma    Reason patient is being transferred: Post op care    Transfer To: [id number]    Transfer via bed    Transfer with belongings    Transported by PCT x2      Additional Lines: None    Medicines sent: N/A    Any special needs or follow-up needed: Routine care    Patient belongings transferred with patient: Yes    Chart send with patient: Yes    Notified: Mother    Patient reassessed at: 6050 5/28/25

## 2025-05-28 NOTE — FIRST PROVIDER EVALUATION
Emergency Department TeleTriage Encounter Note      CHIEF COMPLAINT    Chief Complaint   Patient presents with    Abdominal Pain     Reports having chron's flare for several months. Reports rectal pain and bleeding from rectum for several months. Pt states her PCP hasn't been helping and her symptoms have worsened.        VITAL SIGNS   Initial Vitals [05/27/25 1857]   BP Pulse Resp Temp SpO2   105/74 (!) 135 20 98.3 °F (36.8 °C) 99 %      MAP       --            ALLERGIES    Review of patient's allergies indicates:  No Known Allergies    PROVIDER TRIAGE NOTE  Verbal consent for the teletriage evaluation was given by the patient at the start of the evaluation.  All efforts will be made to maintain patient's privacy during the evaluation.      This is a teletriage evaluation of a 35 y.o. female presenting to the ED with c/o Crohn's Flare - body pain, rectal pain, abd pain, decreased appetite, rectal bleeding that started months ago.  No meds at present (unable to get Remicade infusion due to infusion; Dr. Salas [facility]. Limited physical exam via telehealth: The patient is awake, alert, answering questions appropriately and is not in respiratory distress.  As the Teletriage provider, I performed an initial assessment and ordered appropriate labs and imaging studies, if any, to facilitate the patient's care once placed in the ED. Once a room is available, care and a full evaluation will be completed by an alternate ED provider.  Any additional orders and the final disposition will be determined by that provider.  All imaging and labs will not be followed-up by the Teletriage Team, including myself.          ORDERS  Labs Reviewed - No data to display    ED Orders (720h ago, onward)      Start Ordered     Status Ordering Provider    05/27/25 1952 05/27/25 1952  Diet NPO  Diet effective now         Ordered JESICA AUGUST    05/27/25 1900 05/27/25 1859  EKG 12-lead  Once         Completed by LISA DURON on 5/27/2025 at   7:08 PM MARILEE BOLDEN    Unscheduled 05/27/25 1952  Vital signs  Every 2 hours         Ordered MARIANGEL JESICA INES    Unscheduled 05/27/25 1952  Insert peripheral IV  Once         Ordered JESICA AUGUST    Unscheduled 05/27/25 1952  CBC W/ AUTO DIFFERENTIAL  STAT         Ordered MARIANGEL JESICA INES    Unscheduled 05/27/25 1952  Comp. Metabolic Panel  STAT         Ordered JESICA AUGUST    Unscheduled 05/27/25 1952  Lipase  STAT         Ordered JESICA AUGUST    Unscheduled 05/27/25 1952  Urinalysis, Reflex to Urine Culture Urine, Clean Catch  STAT         Ordered JESICA AUGUST    Unscheduled 05/27/25 1952  POCT urine pregnancy  Once         Ordered JESICA AUGUST              Virtual Visit Note: The provider triage portion of this emergency department evaluation and documentation was performed via [employer], a HIPAA-compliant telemedicine application, in concert with a tele-presenter in the room. A face to face patient evaluation with one of my colleagues will occur once the patient is placed in an emergency department room.      DISCLAIMER: This note was prepared with [employer] voice recognition transcription software. Garbled syntax, mangled pronouns, and other bizarre constructions may be attributed to that software system.

## 2025-05-28 NOTE — TRANSFER OF CARE
Anesthesia Transfer of Care Note    Patient: Nikkie Myles    Procedure(s) Performed: Procedure(s) (LRB):  EXAM UNDER ANESTHESIA, DIGITAL, RECTUM (N/A)  BIOPSY, ANUS (N/A)    Patient location: PACU    Anesthesia Type: general    Transport from OR: Transported from OR on 6-10 L/min O2 by face mask with adequate spontaneous ventilation    Post pain: adequate analgesia    Post assessment: no apparent anesthetic complications and tolerated procedure well    Post vital signs: stable    Level of consciousness: awake and alert    Nausea/Vomiting: no nausea/vomiting    Complications: none    Transfer of care protocol was followed      Last vitals: Visit Vitals  BP (!) 91/51 (BP Location: Right arm, Patient Position: Lying)   Pulse 90   Temp 36.4 °C (97.5 °F) (Temporal)   Resp 14   Ht 5' (1.524 m)   Wt 87.1 kg (192 lb)   LMP 05/05/2025 (Approximate)   SpO2 99%   Breastfeeding No   BMI 37.50 kg/m²

## 2025-05-28 NOTE — PROVIDER PROGRESS NOTES - EMERGENCY DEPT.
Encounter Date: 5/27/2025    ED Physician Progress Notes        I assumed care of patient at sign out pending: CT.    34yo F hx of crohns, not currently on meds, here with abd and rectal pain. Has a diverting iliostomy and having mucous + blood from rectum.  Open skin under panus and perineum. Pending CT - likely admit to medicine, may need gen surg consult for wound management        Summary of Results:    CT Abdomen Pelvis With IV Contrast NO Oral Contrast   Preliminary Result   Abnormal      Suspected perirectal fistula with tract extending through the right    gluteal fold soft tissues and superiorly towards the sacrum.  Recommend    correlation with physical examination.      Anterior abdominal wall wound.  Recommend correlation with physical exam.      Chronic inflammatory changes of the colon and terminal ileum compatible    with Crohn's disease.  Diverting loop ileostomy with parastomal hernia    containing bowel.      This report was flagged in Epic as abnormal.      Electronically signed by resident: Hiram Murry   Date:    05/28/2025   Time:    03:05         X-Ray Chest AP Portable   Final Result      No acute findings in the chest.         Electronically signed by: Sage Wong MD   Date:    05/27/2025   Time:    22:55        .discussed with colorectal surgery. Consult pending.     May need HM admit + IBD consult but pending final CRS recs. NPO. Signed out to Dr Bustillos    Disposition:  pending.

## 2025-05-28 NOTE — BRIEF OP NOTE
[facility] ([facility])  Brief Operative Note    SUMMARY     Surgery Date: 5/28/2025     Surgeons and Role:     * Zuleyka Yip MD - Primary     * Geri Mahmood MD - Resident - Assisting        Pre-op Diagnosis:  Crohn's disease of both small and large intestine with fistula [K50.813]    Post-op Diagnosis:  Post-Op Diagnosis Codes:     * Crohn's disease of both small and large intestine with fistula [K50.813]    Procedure(s) (LRB):  EXAM UNDER ANESTHESIA, DIGITAL, RECTUM (N/A)  BIOPSY, ANUS (N/A)    Anesthesia: General/MAC    Implants:  * No implants in log *    Operative Findings: severe hidradenitis and leesa-anal crohns; biopsies taken    Estimated Blood Loss: 1 cc  Specimens:   Specimen (24h ago, onward)       Start     Ordered    05/28/25 1553  Specimen to Pathology Other (Colon Rectal)  RELEASE UPON ORDERING        References:    Click here for ordering Quick Tip   Question:  Release to patient  Answer:  Immediate    05/28/25 1553                  ID Type Source Tests Collected by Time Destination   1 : 1. Perianal ulcer - perm Tissue Anus SPECIMEN TO PATHOLOGY Zuleyka Yip MD 5/28/2025 1521    2 : 2. Abdominal wall ulcer - perm Tissue Abdomen SPECIMEN TO PATHOLOGY Zuleyka Yip MD 5/28/2025 1523        [id number]

## 2025-05-28 NOTE — ED TRIAGE NOTES
Patient reports generalized body pains, states feels like muscle aches. Reports that she is also having pain to her abdomen. Reports ostomy, and states that output has been normal. States that she has been having some drainage from her rectum, blood and mucus, as well as pain to her rectum.

## 2025-05-28 NOTE — ED NOTES
Bed: [facility]  Expected date:   Expected time:   Means of arrival:   Comments:  Shar

## 2025-05-28 NOTE — ED NOTES
Assumed care of patient. Patient is alert and resting comfortably in bed in NAD. BP, cardiac, and O2 monitoring continued. Family member at bedside. Patient updated on plan of care, pt denies any needs or complaints at this time. Bed locked in lowest position, side rails up x2, call light within reach. VSS.

## 2025-05-28 NOTE — PLAN OF CARE
Hospital Medicine ICU Acceptance Note    Date of Admit: 5/27/2025  Date of Transfer / Stepdown: 5/29/2025  Team stepping patient down:CRS  Team member giving handoff: Dr. Geri Mahmood  Accepting  team: HOS MED T    Brief History of Present Illness:      Nikkie Myles is a 35F with long-standing Crohn's disease, with perianal disease s/p diverting loop ileostomy (08/2022), as well as chronic abdominal pain and joint pain. She has been off all treatment since December 2024 due to insurance issues - she previously was maintained on Remicade and Methotrexate. She was just approved for Remicade with plans for infusion in June. She [place] [facility] [place], worsening perineal, rectal and abdominal pain, with increased purulent drainage from her chronic abdominal wound and pannus.     Hospital/ICU Course:     Eileentent admitted to CRS and taken for EUA. Two biopsies obtained. Severe hidradenitis involving her pannus, groins, vulva, perineum, and anus likely melding into an aspect of leesa-anal crohns was reported. Per CRS, there is nothing to drain or debride, and no acute need for surgery at this point. Patient would benefit from continued hospitalization for pain control, management of SSTI infection, ID/Dermatology/wound care consults. GI are consulted and following.     Consultants and Procedures:     Consultants:  GI, Dermatology, ID    Procedures:    EUA    Transfer Information:     Diet:  regular    Physical Activity:  As tolerated    Patient has been accepted by [facility] Medicine Team T, who will assume care of the patient at 7am tomorrow. Please contact cross cover team with any concerns prior to arrival.     Consuelo Clarke MD  Hospital Medicine Staff

## 2025-05-28 NOTE — OP NOTE
[facility]  Operative Note    Date: 05/28/2025    Name: Nikkie Myles    MRN: [id number]    Pre-Op Diagnosis: Crohn's disease, hydradenitis    Post-Op Diagnosis: Same    Procedure(s) Performed:   Rectal exam under anesthesia with biopsy    Specimen(s): Perianal ulcer biopsy, abdominal wall ulcer biopsy    Staff Surgeon: Zuleyka Yip    Assistant Surgeon: Geri Mahmood (resident)    Anesthesia: MAC    Details of procedure:  The patient was taken to the operating room and transferred to the OR table.  SCD boots were applied and she was placed under sedation by the anesthesia team.  She was put into lithotomy position.  A time-out was performed.  Her abdominal, inguinal, and perineal wounds were carefully examined and photographed (please see photo below).  She had evidence of deep ulceration wounds tracking up her gluteal cleft, in her pannus, and in both labia.  She had a deep ulcerated fistula track along the right posterior perianal skin.  A digital rectal exam and anoscopy were performed.  There was no evidence of any contained abscess or linear fistula tract.  The wounds were more consistent with a hidradenitis or pyoderma.  Given this we elected to take 2 punch biopsies for pathology.  We then performed a block of the area using a mixture of 0.25% Marcaine and Exparel.  Dressings were applied.  The procedure was then terminated.  All sponge instrument counts were correct.  The patient was awakened and transferred to recovery in good condition.    Estimated Blood Loss: 3mL    Drains/Implants: None    Wound Class: IV    Zuleyka Yip     Anus and perineum     Gluteal cleft      Vulva      Pannus

## 2025-05-28 NOTE — ANESTHESIA PREPROCEDURE EVALUATION
[facility]  Anesthesia Pre-Operative Evaluation         Patient Name: Nikkie Myles  YOB: 1990  MRN: [id number]    SUBJECTIVE:     Pre-operative evaluation for Procedure(s) (LRB):  EXAM UNDER ANESTHESIA, DIGITAL, RECTUM (N/A)     05/28/2025    Nikkie Myles is a 35 y.o. female w/ a significant PMHx of long-standing Crohn's disease, with perianal disease s/p diverting loop ileostomy (08/2022), as well as chronic abdominal pain and join pain. Now with progressive symptoms since being off any consistent treatment and with possible redevelopment of a perirectal fistula radiologically.     Patient now presents for the above procedure(s).      LDA:        Peripheral IV - Single Lumen 05/27/25 1958 20 G Anterior;Left;Proximal Forearm (Active)   Site Assessment Clean;Dry;Intact 05/27/25 1958   Extremity Assessment Distal to IV No abnormal discoloration;No redness;No swelling 05/27/25 1958   Line Status Blood return noted;Saline locked;Flushed 05/27/25 1958   Dressing Status Clean;Dry;Intact 05/27/25 1958   Dressing Intervention Integrity maintained 05/27/25 1958   Number of days: 0            Ileostomy 08/23/22 1531 RLQ (Active)   Stoma Appearance dry 05/28/25 0856   Appliance 1-piece 05/28/25 0856   Peristomal Assessment Clean;Intact 05/28/25 0856   Tolerance no signs/symptoms of discomfort 05/28/25 0856   Number of days: 1008       Prev airway: Intubation:     Induction:  Intravenous    Intubated:  Postinduction    Mask Ventilation:  Easy mask    Attempts:  1    Attempted By:  CRNA    Method of Intubation:  Direct    Blade:  Kenyon 3    Laryngeal View Grade: Grade I - full view of cords      Difficult Airway Encountered?: Yes      Complications:  None    Airway Device:  Oral endotracheal tube    Airway Device Size:  7.0    Style/Cuff Inflation:  Cuffed    Inflation Amount (mL):  7    Tube secured:  23    Secured at:  The lips    Placement Verified By:  Capnometry    Complicating  Factors:  None    Findings Post-Intubation:  BS equal bilateral and atraumatic/condition of teeth unchanged    Drips:    D5 and 0.9% NaCl   Intravenous Continuous 100 mL/hr at 25 0740 New Bag at 25 0740       Problem List[1]    Review of patient's allergies indicates:  No Known Allergies    Current Inpatient Medications:   acetaminophen  1,000 mg Intravenous Q8H    Followed by    [START ON 2025] acetaminophen  1,000 mg Oral Q8H    enoxparin  40 mg Subcutaneous Daily    ketorolac  15 mg Intravenous Q6H       Medications Ordered Prior to Encounter[2]    Past Surgical History:   Procedure Laterality Date     SECTION       SECTION WITH TUBAL LIGATION Bilateral 2020    Procedure:  SECTION, WITH TUBAL LIGATION;  Surgeon: Jasper Hester MD;  Location: [facility] OR;  Service: OB/GYN;  Laterality: Bilateral;     SECTION, LOW TRANSVERSE  2013    COLONOSCOPY N/A 2022    Procedure: COLONOSCOPY;  Surgeon: Luigi Jasmine MD;  Location: [facility] ([address] [place] [address]);  Service: Endoscopy;  Laterality: N/A;    ESOPHAGOGASTRODUODENOSCOPY N/A 2022    Procedure: EGD (ESOPHAGOGASTRODUODENOSCOPY);  Surgeon: Luigi Jasmine MD;  [facility]  Service: Endoscopy;  Laterality: N/A;    EXAMINATION UNDER ANESTHESIA N/A 8/15/2022    Procedure: Exam under anesthesia;  Surgeon: Zuleyka Yip MD;  [address]  Service: Endoscopy;  Laterality: N/A;  Possible seton    FLEXIBLE SIGMOIDOSCOPY N/A 8/15/2022    Procedure: SIGMOIDOSCOPY, FLEXIBLE;  Surgeon: Zuleyka Yip MD;  [address];  Service: Endoscopy;  Laterality: N/A;    LAPAROSCOPIC ILEOSTOMY N/A 2022    Procedure: CREATION, ILEOSTOMY, LAPAROSCOPIC, BIOPSY PERIANAL FISTULA;  Surgeon: Zuleyka Yip MD;  [facility];  Service: Colon and Rectal;  Laterality: N/A;       Social History[3]    OBJECTIVE:     Vital Signs Range (Last 24H):  Temp:  [36.8 °C  (98.3 °F)-38 °C (100.4 °F)]   Pulse:  []   Resp:  [16-20]   BP: ()/(54-77)   SpO2:  [96 %-100 %]       Significant Labs:  Lab Results   Component Value Date    WBC 14.41 (H) 05/28/2025    HGB 7.7 (L) 05/28/2025    HCT 26.4 (L) 05/28/2025     (H) 05/28/2025    CHOL 118 04/20/2022    TRIG 85 04/20/2022    HDL 35 (L) 04/20/2022    ALT 11 05/28/2025    AST 21 05/28/2025     (L) 05/28/2025    K 4.3 05/28/2025     05/28/2025    CREATININE 0.9 05/28/2025    BUN 12 05/28/2025    CO2 21 (L) 05/28/2025    TSH 0.90 04/16/2025    INR 1.2 04/15/2025    HGBA1C 5.7 (H) 04/21/2022       Diagnostic Studies: No relevant studies.    EKG:   Results for orders placed or performed during the hospital encounter of 05/27/25   EKG 12-lead    Collection Time: 05/27/25  7:02 PM   Result Value Ref Range    QRS Duration 66 ms    [facility] QTC Calculation 433 ms    Narrative    Test Reason : R10.9,    Vent. Rate : 129 BPM     Atrial Rate : 129 BPM     P-R Int : 112 ms          QRS Dur :  66 ms      QT Int : 296 ms       P-R-T Axes :  69  62  25 degrees    QTcB Int : 433 ms    Sinus tachycardia  Nonspecific T wave abnormality  Abnormal ECG  When compared with ECG of 16-Aug-2022 17:31,  Vent. rate has increased by  73 bpm  Confirmed by Nicholas Smith ([id number]) on 5/28/2025 10:40:36 AM    Referred By: AAAREFERRAL SELF           Confirmed By: Nicholas Smith       2D ECHO:  TTE:  No results found for this or any previous visit.    KARAN:  No results found for this or any previous visit.    ASSESSMENT/PLAN:         Pre-op Assessment    I have reviewed the Patient Summary Reports.     I have reviewed the Nursing Notes. I have reviewed the NPO Status.   I have reviewed the Medications.     Review of Systems  Anesthesia Hx:  No problems with previous Anesthesia   History of prior surgery of interest to airway management or planning:          Denies Family Hx of Anesthesia complications.    Denies Personal Hx of Anesthesia complications.                     Hematology/Oncology:    Oncology Normal    -- Anemia:                                  EENT/Dental:  EENT/Dental Normal           Cardiovascular:     Hypertension                                    Hypertension         Pulmonary:  Pulmonary Normal                       Renal/:  Renal/ Normal                 Hepatic/GI:        Crohn's             Neurological:  Neurology Normal                                      Endocrine:  Endocrine Normal                Physical Exam  General: Well nourished, Cooperative, Alert and Oriented    Airway:  Mallampati: II   Mouth Opening: Normal  TM Distance: Normal  Tongue: Normal  Neck ROM: Normal ROM    Dental:  Intact    Chest/Lungs:  Normal Respiratory Rate    Heart:  Rate: Normal        Anesthesia Plan  Type of Anesthesia, risks & benefits discussed:    Anesthesia Type: Gen ETT, Gen Natural Airway  Intra-op Monitoring Plan: Standard ASA Monitors  Post Op Pain Control Plan: multimodal analgesia and IV/PO Opioids PRN  Induction:  IV  Airway Plan: Direct, Post-Induction  Informed Consent: Informed consent signed with the Patient and all parties understand the risks and agree with anesthesia plan.  All questions answered.   ASA Score: 3  Day of Surgery Review of History & Physical: H&P Update referred to the surgeon/provider.    Ready For Surgery From Anesthesia Perspective.     .           [1]   Patient Active Problem List  Diagnosis    Status post  section    Elevated blood pressure affecting pregnancy, antepartum    Anemia of mother during pregnancy, delivered    Central corneal ulcer, right    Crohn's disease with abscess    Vaginal ulcer    Perianal fistula    Crohn's disease of both small and large intestine with fistula    Helicobacter pylori gastritis    History of erythema nodosum    Enteropathic arthropathy    Corneal ulcer    Acute pain    Anal fissure    Iron deficiency anemia due to chronic blood loss    Symptomatic anemia    Crohn's disease  of perianal region with complication    Crohn's disease with complication    COVID    Generalized abdominal pain    Nausea    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     Current Outpatient Medications on File Prior to Encounter   Medication Sig Dispense Refill    tobramycin-dexAMETHasone 0.3-0.1% (TOBRADEX) 0.3-0.1 % DrpS Place 1 drop into the right eye 4 (four) times daily. 5 mL 0   [3]   Social History  Socioeconomic History    Marital status: Single   Tobacco Use    Smoking status: Former     Current packs/day: 1.00     Average packs/day: 1 pack/day for 5.0 years (5.0 ttl pk-yrs)     Types: Cigarettes    Smokeless tobacco: Never   Substance and Sexual Activity    Alcohol use: Yes     Comment: RARELY    Drug use: No    Sexual activity: Yes     Partners: Male     Birth control/protection: None   Social History Narrative    Together since last year, 2019    He is a garvin    She is a CNA at [employer].     Social Drivers of Health     Financial Resource Strain: Low Risk  (1/25/2025)    Overall Financial Resource Strain (CARDIA)     Difficulty of Paying Living Expenses: Not hard at all   Food Insecurity: No Food Insecurity (4/16/2025)    Received from [facility]    Hunger Vital Sign     Worried About Running Out of Food in the Last Year: Never true     Ran Out of Food in the Last Year: Never true   Transportation Needs: No Transportation Needs (4/16/2025)    Received from [facility]    PRAPARE - Transportation     Lack of Transportation (Medical): No     Lack of Transportation (Non-Medical): No   Physical Activity: Inactive (6/1/2023)    Exercise Vital Sign     Days of Exercise per Week: 0 days     Minutes of Exercise per Session: 0 min   Stress: No Stress Concern Present (1/25/2025)    [employer] - Occupational Stress Questionnaire     Feeling of Stress : Not at all   Housing Stability:  Low Risk  (4/16/2025)    Received from [facility]    Housing Stability Vital Sign     Unable to Pay for Housing in the Last Year: No     Number of Times Moved in the Last Year: 0     Homeless in the Last Year: No

## 2025-05-28 NOTE — CARE UPDATE
[facility] Sepsis Follow Up Note     Patient's sepsis care was reviewed and a Yes - I attest a sepsis perfusion exam was performed within 6 hours of sepsis, severe sepsis, or septic shock presentation, following fluid resuscitation. on 05/28/2025 at 8:49 AM.    Patient has received appropriate sepsis care and a fluid challenge of Other- Patient to receive 1L volume other than 30cc/kg due to not hypotensive and lactic acid < 4.0.    Ziggy Harris, PA-C  [facility]

## 2025-05-28 NOTE — ED NOTES
Received report from CLEMENCIA Taveras    LOC: The patient is awake, alert and aware of environment with an appropriate affect, the patient is oriented x 3 and speaking appropriately.   APPEARANCE: Patient appears comfortable and in no acute distress, patient is clean and well groomed.  SKIN: The skin is warm and dry, color consistent with ethnicity, patient has normal skin turgor and moist mucus membranes, skin intact, no breakdown or bruising noted.   MUSCULOSKELETAL: Patient moving all extremities spontaneously, no swelling noted. Pt is ambulatory.  RESPIRATORY: Airway is open and patent, respirations are spontaneous, patient has a normal effort and rate, no accessory muscle. Denies SOB, currently on RA, no labored breathing noted.  CARDIAC: Pt placed on cardiac monitor. Patient has a normal rate and regular rhythm, no edema noted, capillary refill < 3 seconds. Denies CP.  GASTRO: Soft and tender to palpation, no distention noted. Pt reports abdominal pain 10/10, denies N/V/D.  : Pt denies any pain or frequency with urination.  NEURO: Pt opens eyes spontaneously, behavior appropriate to situation, follows commands, facial expression symmetrical, bilateral hand grasp equal and even, purposeful motor response noted, normal sensation in all extremities when touched with a finger.
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
## 2025-05-29 LAB
ABSOLUTE EOSINOPHIL (OHS): 0.23 K/UL
ABSOLUTE MONOCYTE (OHS): 0.94 K/UL (ref 0.3–1)
ABSOLUTE NEUTROPHIL COUNT (OHS): 9.07 K/UL (ref 1.8–7.7)
ANION GAP (OHS): 8 MMOL/L (ref 8–16)
BACTERIA UR CULT: NO GROWTH
BASOPHILS # BLD AUTO: 0.05 K/UL
BASOPHILS NFR BLD AUTO: 0.4 %
BUN SERPL-MCNC: 13 MG/DL (ref 6–20)
CALCIUM SERPL-MCNC: 8.9 MG/DL (ref 8.7–10.5)
CHLORIDE SERPL-SCNC: 107 MMOL/L (ref 95–110)
CO2 SERPL-SCNC: 19 MMOL/L (ref 23–29)
CREAT SERPL-MCNC: 0.8 MG/DL (ref 0.5–1.4)
CRP SERPL-MCNC: 213.5 MG/L
ERYTHROCYTE [DISTWIDTH] IN BLOOD BY AUTOMATED COUNT: 20 % (ref 11.5–14.5)
GFR SERPLBLD CREATININE-BSD FMLA CKD-EPI: >60 ML/MIN/1.73/M2
GLUCOSE SERPL-MCNC: 78 MG/DL (ref 70–110)
HCT VFR BLD AUTO: 23 % (ref 37–48.5)
HGB BLD-MCNC: 6.6 GM/DL (ref 12–16)
IMM GRANULOCYTES # BLD AUTO: 0.13 K/UL (ref 0–0.04)
IMM GRANULOCYTES NFR BLD AUTO: 1.1 % (ref 0–0.5)
LYMPHOCYTES # BLD AUTO: 1.47 K/UL (ref 1–4.8)
MCH RBC QN AUTO: 15.9 PG (ref 27–31)
MCHC RBC AUTO-ENTMCNC: 28.7 G/DL (ref 32–36)
MCV RBC AUTO: 55 FL (ref 82–98)
MRSA ID BY PCR (OHS): NEGATIVE
NUCLEATED RBC (/100WBC) (OHS): 0 /100 WBC
PLATELET # BLD AUTO: 461 K/UL (ref 150–450)
PMV BLD AUTO: 9.1 FL (ref 9.2–12.9)
POTASSIUM SERPL-SCNC: 3.9 MMOL/L (ref 3.5–5.1)
RBC # BLD AUTO: 4.15 M/UL (ref 4–5.4)
RELATIVE EOSINOPHIL (OHS): 1.9 %
RELATIVE LYMPHOCYTE (OHS): 12.4 % (ref 18–48)
RELATIVE MONOCYTE (OHS): 7.9 % (ref 4–15)
RELATIVE NEUTROPHIL (OHS): 76.3 % (ref 38–73)
SODIUM SERPL-SCNC: 134 MMOL/L (ref 136–145)
STAPH AUREUS ID BY PCR (OHS): NEGATIVE
WBC # BLD AUTO: 11.89 K/UL (ref 3.9–12.7)

## 2025-05-29 PROCEDURE — 87040 BLOOD CULTURE FOR BACTERIA: CPT | Performed by: INTERNAL MEDICINE

## 2025-05-29 PROCEDURE — 63600175 PHARM REV CODE 636 W HCPCS: Performed by: INTERNAL MEDICINE

## 2025-05-29 PROCEDURE — 80048 BASIC METABOLIC PNL TOTAL CA: CPT | Performed by: STUDENT IN AN ORGANIZED HEALTH CARE EDUCATION/TRAINING PROGRAM

## 2025-05-29 PROCEDURE — 36415 COLL VENOUS BLD VENIPUNCTURE: CPT | Performed by: INTERNAL MEDICINE

## 2025-05-29 PROCEDURE — 25000003 PHARM REV CODE 250: Performed by: SURGERY

## 2025-05-29 PROCEDURE — 63600175 PHARM REV CODE 636 W HCPCS: Performed by: HOSPITALIST

## 2025-05-29 PROCEDURE — 86140 C-REACTIVE PROTEIN: CPT | Performed by: STUDENT IN AN ORGANIZED HEALTH CARE EDUCATION/TRAINING PROGRAM

## 2025-05-29 PROCEDURE — 36415 COLL VENOUS BLD VENIPUNCTURE: CPT | Performed by: STUDENT IN AN ORGANIZED HEALTH CARE EDUCATION/TRAINING PROGRAM

## 2025-05-29 PROCEDURE — 20600001 HC STEP DOWN PRIVATE ROOM

## 2025-05-29 PROCEDURE — 94799 UNLISTED PULMONARY SVC/PX: CPT

## 2025-05-29 PROCEDURE — 25000003 PHARM REV CODE 250: Performed by: HOSPITALIST

## 2025-05-29 PROCEDURE — 99233 SBSQ HOSP IP/OBS HIGH 50: CPT | Mod: ,,, | Performed by: COLON & RECTAL SURGERY

## 2025-05-29 PROCEDURE — 94761 N-INVAS EAR/PLS OXIMETRY MLT: CPT

## 2025-05-29 PROCEDURE — 85025 COMPLETE CBC W/AUTO DIFF WBC: CPT | Performed by: STUDENT IN AN ORGANIZED HEALTH CARE EDUCATION/TRAINING PROGRAM

## 2025-05-29 PROCEDURE — 25000003 PHARM REV CODE 250

## 2025-05-29 PROCEDURE — 63600175 PHARM REV CODE 636 W HCPCS: Performed by: STUDENT IN AN ORGANIZED HEALTH CARE EDUCATION/TRAINING PROGRAM

## 2025-05-29 PROCEDURE — 63600175 PHARM REV CODE 636 W HCPCS: Performed by: SURGERY

## 2025-05-29 PROCEDURE — 25000003 PHARM REV CODE 250: Performed by: INTERNAL MEDICINE

## 2025-05-29 RX ORDER — HYDROMORPHONE HYDROCHLORIDE 2 MG/ML
1 INJECTION, SOLUTION INTRAMUSCULAR; INTRAVENOUS; SUBCUTANEOUS 2 TIMES DAILY PRN
Status: DISCONTINUED | OUTPATIENT
Start: 2025-05-29 | End: 2025-05-30

## 2025-05-29 RX ORDER — KETOCONAZOLE 20 MG/G
CREAM TOPICAL 2 TIMES DAILY
Status: DISCONTINUED | OUTPATIENT
Start: 2025-05-29 | End: 2025-06-05 | Stop reason: HOSPADM

## 2025-05-29 RX ORDER — PREGABALIN 75 MG/1
75 CAPSULE ORAL 2 TIMES DAILY
Status: DISCONTINUED | OUTPATIENT
Start: 2025-05-29 | End: 2025-06-05 | Stop reason: HOSPADM

## 2025-05-29 RX ORDER — ADHESIVE BANDAGE
30 BANDAGE TOPICAL 2 TIMES DAILY
Status: DISCONTINUED | OUTPATIENT
Start: 2025-05-29 | End: 2025-06-05 | Stop reason: HOSPADM

## 2025-05-29 RX ORDER — TRIAMCINOLONE ACETONIDE 1 MG/G
CREAM TOPICAL 2 TIMES DAILY
Status: DISCONTINUED | OUTPATIENT
Start: 2025-05-29 | End: 2025-06-05 | Stop reason: HOSPADM

## 2025-05-29 RX ORDER — BENZOYL PEROXIDE 10 G/100G
GEL TOPICAL DAILY
Status: DISCONTINUED | OUTPATIENT
Start: 2025-05-29 | End: 2025-05-29

## 2025-05-29 RX ORDER — CLINDAMYCIN PHOSPHATE 10 MG/G
GEL TOPICAL 2 TIMES DAILY
Status: DISCONTINUED | OUTPATIENT
Start: 2025-05-29 | End: 2025-06-05 | Stop reason: HOSPADM

## 2025-05-29 RX ORDER — HYDROMORPHONE HYDROCHLORIDE 2 MG/1
2 TABLET ORAL EVERY 4 HOURS PRN
Refills: 0 | Status: DISCONTINUED | OUTPATIENT
Start: 2025-05-29 | End: 2025-05-31

## 2025-05-29 RX ORDER — ACETAMINOPHEN 500 MG
10000 TABLET ORAL DAILY
Status: DISCONTINUED | OUTPATIENT
Start: 2025-05-29 | End: 2025-06-05 | Stop reason: HOSPADM

## 2025-05-29 RX ADMIN — KETOROLAC TROMETHAMINE 15 MG: 30 INJECTION, SOLUTION INTRAMUSCULAR; INTRAVENOUS at 06:05

## 2025-05-29 RX ADMIN — ACETAMINOPHEN 1000 MG: 500 TABLET ORAL at 08:05

## 2025-05-29 RX ADMIN — PREGABALIN 75 MG: 75 CAPSULE ORAL at 01:05

## 2025-05-29 RX ADMIN — HYDROMORPHONE HYDROCHLORIDE 2 MG: 2 TABLET ORAL at 01:05

## 2025-05-29 RX ADMIN — OXYCODONE 10 MG: 5 TABLET ORAL at 12:05

## 2025-05-29 RX ADMIN — OXYCODONE 10 MG: 5 TABLET ORAL at 07:05

## 2025-05-29 RX ADMIN — KETOROLAC TROMETHAMINE 15 MG: 30 INJECTION, SOLUTION INTRAMUSCULAR; INTRAVENOUS at 05:05

## 2025-05-29 RX ADMIN — ACETAMINOPHEN 1000 MG: 500 TABLET ORAL at 01:05

## 2025-05-29 RX ADMIN — VANCOMYCIN HYDROCHLORIDE 1250 MG: 1.25 INJECTION, POWDER, LYOPHILIZED, FOR SOLUTION INTRAVENOUS at 05:05

## 2025-05-29 RX ADMIN — VANCOMYCIN HYDROCHLORIDE 1250 MG: 1.25 INJECTION, POWDER, LYOPHILIZED, FOR SOLUTION INTRAVENOUS at 04:05

## 2025-05-29 RX ADMIN — CLINDAMYCIN PHOSPHATE: 10 GEL TOPICAL at 08:05

## 2025-05-29 RX ADMIN — CHOLECALCIFEROL TAB 125 MCG (5000 UNIT) 10000 UNITS: 125 TAB at 01:05

## 2025-05-29 RX ADMIN — KETOROLAC TROMETHAMINE 15 MG: 30 INJECTION, SOLUTION INTRAMUSCULAR; INTRAVENOUS at 11:05

## 2025-05-29 RX ADMIN — ENOXAPARIN SODIUM 40 MG: 40 INJECTION SUBCUTANEOUS at 05:05

## 2025-05-29 RX ADMIN — HYDROMORPHONE HYDROCHLORIDE 1 MG: 2 INJECTION, SOLUTION INTRAMUSCULAR; INTRAVENOUS; SUBCUTANEOUS at 02:05

## 2025-05-29 RX ADMIN — ONDANSETRON 4 MG: 2 INJECTION INTRAMUSCULAR; INTRAVENOUS at 04:05

## 2025-05-29 RX ADMIN — ACETAMINOPHEN 1000 MG: 10 INJECTION INTRAVENOUS at 06:05

## 2025-05-29 RX ADMIN — HYDROMORPHONE HYDROCHLORIDE 2 MG: 2 TABLET ORAL at 08:05

## 2025-05-29 RX ADMIN — PREGABALIN 75 MG: 75 CAPSULE ORAL at 09:05
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
## 2025-05-29 NOTE — NURSING
Md Wall notified positive blood culture on 5/27 in aerobic bottle gram + cocci in clusters resembling staff.

## 2025-05-29 NOTE — PLAN OF CARE
Pt aao x4. Call bell within reach. She is up with 1 person assist due to pain. She is able to shift her weight. Speciality bed ordered per wound care. Wounds care saw today, took pictures of wounds, did wound dressing and placed wound care orders. Ileostomy bag also changed today. Pt did not tolerate dressing change well due to pain. IV diluadid added prn dressing changes. She continues on IV vancomycin. She verbalized understanding of plan of care.

## 2025-05-29 NOTE — PROGRESS NOTES
[facility]  Colorectal Surgery  Progress Note    Patient Name: Nikkie Myles  MRN: [id number]  Admission Date: 5/27/2025  Hospital Length of Stay: 1 days  Attending Physician: Jen Webster MD    Subjective:     Interval History: NAEON  EUA yesterday, perineal long acting local anesthetic injection during case  Pain controlled overnight but pain is coming back now   Awaiting multiple specialty consultations    Post-Op Info:  Procedure(s) (LRB):  EXAM UNDER ANESTHESIA, DIGITAL, RECTUM (N/A)  BIOPSY, ANUS (N/A)   1 Day Post-Op      Medications:  Continuous Infusions:   D5 and 0.9% NaCl   Intravenous Continuous 50 mL/hr at 05/28/25 2002 New Bag at 05/28/25 2002     Scheduled Meds:   acetaminophen  1,000 mg Oral Q8H    enoxparin  40 mg Subcutaneous Daily    ketorolac  15 mg Intravenous Q6H    vancomycin (VANCOCIN) IV (PEDS and ADULTS)  15 mg/kg Intravenous Q12H     PRN Meds:   acetaminophen tablet 1,000 mg    enoxaparin injection 40 mg    ketorolac injection 15 mg    vancomycin 1,250 mg in 0.9% NaCl 250 mL IVPB (admixture device)        Objective:     Vital Signs (Most Recent):  Temp: 98.1 °F (36.7 °C) (05/29/25 0753)  Pulse: 79 (05/29/25 0753)  Resp: 18 (05/29/25 0753)  BP: (!) 86/48 (05/29/25 0900)  SpO2: 100 % (05/29/25 0753) Vital Signs (24h Range):  Temp:  [97.5 °F (36.4 °C)-100.4 °F (38 °C)] 98.1 °F (36.7 °C)  Pulse:  [] 79  Resp:  [14-19] 18  SpO2:  [94 %-100 %] 100 %  BP: ()/(44-83) 86/48     Intake/Output - Last 3 Shifts         05/27 0700  05/28 0659 05/28 0700 05/29 0659 05/29 0700  05/30 0659    P.O.  480     I.V. (mL/kg)  516.7 (5.9)     IV Piggyback  1148.1     Total Intake(mL/kg)  2144.7 (24.7)     Urine (mL/kg/hr)  200 (0.1)     Emesis/NG output  55     Stool  200     Total Output  455     Net  +1689.7                     Physical Exam  Vitals and nursing note reviewed.   Constitutional:       Appearance: Normal appearance.   HENT:      Head: Normocephalic and  atraumatic.   Cardiovascular:      Rate and Rhythm: Normal rate and regular rhythm.   Pulmonary:      Effort: Pulmonary effort is normal. No respiratory distress.   Abdominal:      General: Abdomen is flat. There is no distension.      Palpations: Abdomen is soft. There is no mass.      Tenderness: There is no abdominal tenderness.      Hernia: No hernia is present.      Comments: Wounds per chart review   Genitourinary:     Comments: deferred  Musculoskeletal:      Right lower leg: No edema.      Left lower leg: No edema.   Skin:     General: Skin is warm and dry.   Neurological:      General: No focal deficit present.      Mental Status: She is alert and oriented to person, place, and time.   Psychiatric:         Mood and Affect: Mood normal.         Behavior: Behavior normal.          Significant Labs:  BMP (Last 3 Results):   Recent Labs   Lab 05/28/25  0035 05/28/25  0808 05/29/25  0729   GLU 99 117* 78   * 132* 134*   K 4.7 4.3 3.9    101 107   CO2 14* 21* 19*   BUN 14 12 13   CREATININE 0.8 0.9 0.8   CALCIUM 8.9 8.8 8.9   MG  --  1.7  --      CBC (Last 3 Results):   Recent Labs   Lab 05/27/25  1957 05/27/25  2302 05/28/25  0808 05/29/25  0729   WBC 17.33*  --  14.41* 11.89   RBC 5.64*  --  4.90 4.15   HGB 9.0*  --  7.7* 6.6*   HCT 30.5* 27.9* 26.4* 23.0*   *  --  535* 461*   MCV 54*  --  54* 55*   MCH 16.0*  --  15.7* 15.9*   MCHC 29.5*  --  29.2* 28.7*       Significant Diagnostics:  I have reviewed all pertinent imaging results/findings within the past 24 hours.  Assessment/Plan:     Crohn's disease of perianal region with complication  35 year old female presenting with worsening of HS and leesa-anal crohns flare now s/p EUA with biopsies 5/28    - Recommend palliative or pain consult for severe, difficult to control and chronic pain related to open wounds   - No drainable abscess, no debridement indicated on EUA 5/27; no acute surgical intervention at this time   - Appreciate GI,  dermatology, wound care input for optimization of medical treatment   - Consider ID consult for MRSA bacteremia   - Needs adequate wound care, follow up, and social support in place prior to discharge as she has failed outpatient management of this flare and these wounds prior to this admission  - We will follow peripherally             Geri Mahmood MD  Colorectal Surgery  [facility] - [facility]

## 2025-05-29 NOTE — ASSESSMENT & PLAN NOTE
35 year old female presenting with worsening of HS and leesa-anal crohns flare now s/p EUA with biopsies 5/28    - Recommend palliative or pain consult for severe, difficult to control and chronic pain related to open wounds   - No drainable abscess, no debridement indicated on EUA 5/27; no acute surgical intervention at this time   - Appreciate GI, dermatology, wound care input for optimization of medical treatment   - Consider ID consult for MRSA bacteremia   - Needs adequate wound care, follow up, and social support in place prior to discharge as she has failed outpatient management of this flare and these wounds prior to this admission  - We will follow peripherally

## 2025-05-29 NOTE — CARE UPDATE
Blood cultures resulted positive for GPC resembling staph. MRSA PCR pending. Will replace oral doxycycline with IV vancomycin. Follow up with PCR MRSA.     Kym Wall DO  Staff Physician, Hospital Medicine.

## 2025-05-29 NOTE — ANESTHESIA POSTPROCEDURE EVALUATION
Anesthesia Post Evaluation    Patient: Nikkie Myles    Procedure(s) Performed: Procedure(s) (LRB):  EXAM UNDER ANESTHESIA, DIGITAL, RECTUM (N/A)  BIOPSY, ANUS (N/A)    Final Anesthesia Type: general      Patient location during evaluation: PACU  Patient participation: Yes- Able to Participate  Level of consciousness: awake and alert  Post-procedure vital signs: reviewed and stable  Pain management: adequate  Airway patency: patent    PONV status at discharge: No PONV  Anesthetic complications: no      Cardiovascular status: blood pressure returned to baseline  Respiratory status: spontaneous ventilation and room air  Hydration status: euvolemic  Follow-up not needed.              Vitals Value Taken Time   BP 74/44 05/29/25 09:16   Temp 36.7 °C (98.1 °F) 05/29/25 07:53   Pulse 79 05/29/25 07:53   Resp 18 05/29/25 07:53   SpO2 100 % 05/29/25 07:53   Vitals shown include unfiled device data.      Event Time   Out of Recovery 05/28/2025 16:45:00         Pain/Giovanni Score: Pain Rating Prior to Med Admin: 10 (5/29/2025  7:46 AM)  Pain Rating Post Med Admin: 7 (5/29/2025 12:28 AM)  Giovanni Score: 9 (5/28/2025  4:45 PM)

## 2025-05-29 NOTE — PLAN OF CARE
Pt aaox4 vswnl and c/o pain to wounds(pannus.labia.and rectum) with mild relief after pain meds given. Bed in low position and callbell within reach. Pt up to bedside commode with 1 assist. Rt ostomy with one piece bag intact with watery brown stool secondary to diverting loop illeostomy in 8/2022. Pt had 2 biopies and clean out of wounds on dayshift. Drsg to labia and rectum changed after urination. Wound care consult ordered. D5NS at 50cc/hr infusing. Pt refused SCDS saying they were too hot. Toradol and IV Tylenol continued. Positive blood culture from 5/27 gram + cocci in clusters resembling staph. Md Wall notified.

## 2025-05-29 NOTE — PROGRESS NOTES
"Pharmacokinetic Initial Assessment: IV Vancomycin    Assessment/Plan:    Patient received 1000mg vancomycin on 5/27 @~2300.   Will start regimen of 1250mg every 12hours.  Desired empiric serum trough concentration is 15 to 20 mcg/mL  Draw vancomycin trough level 60 min prior to fourth dose on 5/30 at approximately 1600  Pharmacy will continue to follow and monitor vancomycin.      Please contact pharmacy at extension [phone] with any questions regarding this assessment.     Thank you for the consult,   Mallory Trevino       Patient brief summary:  Nikkie Myles is a 35 y.o. female initiated on antimicrobial therapy with IV Vancomycin for treatment of suspected bacteremia    Drug Allergies:   Review of patient's allergies indicates:  No Known Allergies    Actual Body Weight:   87kg    Renal Function:   Estimated Creatinine Clearance: 86.3 mL/min (based on SCr of 0.9 mg/dL).,     Dialysis Method (if applicable):  N/A    CBC (last 72 hours):  Recent Labs   Lab Result Units 05/27/25  1957 05/28/25  0808   WBC K/uL 17.33* 14.41*   HGB gm/dL 9.0* 7.7*   HCT % 30.5* 26.4*   Platelet Count K/uL 680* 535*   Lymph % % 28.7 14.9*   Mono % % 9.1 8.7   Eos % % 0.9 0.9   Basophil % % 0.6 0.3       Metabolic Panel (last 72 hours):  Recent Labs   Lab Result Units 05/27/25  2109 05/28/25  0035 05/28/25  0458 05/28/25  0808   Sodium mmol/L 131* 130*  --  132*   Potassium mmol/L 3.9 4.7  --  4.3   Chloride mmol/L 99 101  --  101   CO2 mmol/L 16* 14*  --  21*   Glucose mg/dL 108 99  --  117*   Glucose, UA   --   --  Negative  --    BUN mg/dL 14 14  --  12   Creatinine mg/dL 0.9 0.8  --  0.9   Albumin g/dL 2.8* 2.5*  --  2.6*   Bilirubin Total mg/dL 0.3 0.2  --  0.2   ALP unit/L 88 79  --  82   AST unit/L 23 24  --  21   ALT unit/L 13 12  --  11   Magnesium  mg/dL  --   --   --  1.7   Phosphorus Level mg/dL  --   --   --  4.6*       Drug levels (last 3 results):  No results for input(s): "VANCOMYCINRA", "VANCORANDOM", "VANCOMYCINPE", " ""VANCOPEAK", "VANCOMYCINTR", "VANCOTROUGH" in the last 72 hours.    Microbiologic Results:  Microbiology Results (last 7 days)       Procedure Component Value Units Date/Time    MRSA/SA Rapid ID by PCR from Blood culture [[id number]] Collected: 05/27/25 2259    Order Status: Sent Specimen: Blood from Peripheral, Hand, Left Updated: 05/29/25 0314    Blood culture #2 **CANNOT BE ORDERED STAT** [[id number]]  (Abnormal) Collected: 05/27/25 2259    Order Status: Completed Specimen: Blood from Peripheral, Hand, Left Updated: 05/29/25 0312     Blood Culture Positive - Aerobic/Pediatric Bottle     GRAM STAIN Gram positive cocci in clusters resembling Staph     Comment: Aerobic Bottle       Narrative:      Aerobic Bottle Positive     Blood culture #1 **CANNOT BE ORDERED STAT** [[id number]]  (Normal) Collected: 05/27/25 2259    Order Status: Completed Specimen: Blood from Peripheral, Antecubital, Left Updated: 05/29/25 0303     Blood Culture No Growth After 24 Hours    Urine culture [[id number]] Collected: 05/28/25 0458    Order Status: Sent Specimen: Urine Updated: 05/28/25 0517    Influenza A & B by Molecular [[id number]]  (Normal) Collected: 05/27/25 2109    Order Status: Completed Specimen: Nasal Swab Updated: 05/27/25 2210     INFLUENZA A MOLECULAR Negative     INFLUENZA B MOLECULAR  Negative            "

## 2025-05-30 PROBLEM — B95.8 BACTEREMIA DUE TO STAPHYLOCOCCUS: Status: ACTIVE | Noted: 2025-05-30

## 2025-05-30 PROBLEM — R78.81 BACTEREMIA DUE TO STAPHYLOCOCCUS: Status: ACTIVE | Noted: 2025-05-30

## 2025-05-30 LAB
25(OH)D3+25(OH)D2 SERPL-MCNC: 13 NG/ML (ref 30–96)
ABSOLUTE EOSINOPHIL (OHS): 0.45 K/UL
ABSOLUTE MONOCYTE (OHS): 1.05 K/UL (ref 0.3–1)
ABSOLUTE NEUTROPHIL COUNT (OHS): 10.26 K/UL (ref 1.8–7.7)
BASOPHILS # BLD AUTO: 0.05 K/UL
BASOPHILS NFR BLD AUTO: 0.4 %
CRP SERPL-MCNC: 242.2 MG/L
ERYTHROCYTE [DISTWIDTH] IN BLOOD BY AUTOMATED COUNT: 20.3 % (ref 11.5–14.5)
HCT VFR BLD AUTO: 23.8 % (ref 37–48.5)
HGB BLD-MCNC: 6.8 GM/DL (ref 12–16)
IMM GRANULOCYTES # BLD AUTO: 0.14 K/UL (ref 0–0.04)
IMM GRANULOCYTES NFR BLD AUTO: 1 % (ref 0–0.5)
LYMPHOCYTES # BLD AUTO: 2.13 K/UL (ref 1–4.8)
MCH RBC QN AUTO: 15.9 PG (ref 27–31)
MCHC RBC AUTO-ENTMCNC: 28.6 G/DL (ref 32–36)
MCV RBC AUTO: 56 FL (ref 82–98)
NUCLEATED RBC (/100WBC) (OHS): 0 /100 WBC
PLATELET # BLD AUTO: 537 K/UL (ref 150–450)
PMV BLD AUTO: 9.7 FL (ref 9.2–12.9)
RBC # BLD AUTO: 4.27 M/UL (ref 4–5.4)
RELATIVE EOSINOPHIL (OHS): 3.2 %
RELATIVE LYMPHOCYTE (OHS): 15.1 % (ref 18–48)
RELATIVE MONOCYTE (OHS): 7.5 % (ref 4–15)
RELATIVE NEUTROPHIL (OHS): 72.8 % (ref 38–73)
WBC # BLD AUTO: 14.08 K/UL (ref 3.9–12.7)

## 2025-05-30 PROCEDURE — 63600175 PHARM REV CODE 636 W HCPCS: Mod: JZ,TB | Performed by: SURGERY

## 2025-05-30 PROCEDURE — 63600175 PHARM REV CODE 636 W HCPCS: Performed by: STUDENT IN AN ORGANIZED HEALTH CARE EDUCATION/TRAINING PROGRAM

## 2025-05-30 PROCEDURE — 25000003 PHARM REV CODE 250: Performed by: SURGERY

## 2025-05-30 PROCEDURE — 85025 COMPLETE CBC W/AUTO DIFF WBC: CPT | Performed by: STUDENT IN AN ORGANIZED HEALTH CARE EDUCATION/TRAINING PROGRAM

## 2025-05-30 PROCEDURE — 99499 UNLISTED E&M SERVICE: CPT | Mod: ,,,

## 2025-05-30 PROCEDURE — 25000003 PHARM REV CODE 250: Performed by: HOSPITALIST

## 2025-05-30 PROCEDURE — 25000003 PHARM REV CODE 250

## 2025-05-30 PROCEDURE — 25000003 PHARM REV CODE 250: Performed by: INTERNAL MEDICINE

## 2025-05-30 PROCEDURE — 63600175 PHARM REV CODE 636 W HCPCS: Performed by: HOSPITALIST

## 2025-05-30 PROCEDURE — 86140 C-REACTIVE PROTEIN: CPT | Performed by: STUDENT IN AN ORGANIZED HEALTH CARE EDUCATION/TRAINING PROGRAM

## 2025-05-30 PROCEDURE — 63600175 PHARM REV CODE 636 W HCPCS: Performed by: INTERNAL MEDICINE

## 2025-05-30 PROCEDURE — 20600001 HC STEP DOWN PRIVATE ROOM

## 2025-05-30 PROCEDURE — 82306 VITAMIN D 25 HYDROXY: CPT | Performed by: INTERNAL MEDICINE

## 2025-05-30 PROCEDURE — 36415 COLL VENOUS BLD VENIPUNCTURE: CPT | Performed by: INTERNAL MEDICINE

## 2025-05-30 PROCEDURE — 99223 1ST HOSP IP/OBS HIGH 75: CPT | Mod: ,,, | Performed by: INTERNAL MEDICINE

## 2025-05-30 RX ORDER — HYDROMORPHONE HYDROCHLORIDE 2 MG/ML
2 INJECTION, SOLUTION INTRAMUSCULAR; INTRAVENOUS; SUBCUTANEOUS 2 TIMES DAILY PRN
Status: DISCONTINUED | OUTPATIENT
Start: 2025-05-30 | End: 2025-06-01

## 2025-05-30 RX ADMIN — MAGNESIUM HYDROXIDE 2400 MG: 400 SUSPENSION ORAL at 08:05

## 2025-05-30 RX ADMIN — HYDROMORPHONE HYDROCHLORIDE 2 MG: 2 TABLET ORAL at 09:05

## 2025-05-30 RX ADMIN — HYDROMORPHONE HYDROCHLORIDE 2 MG: 2 TABLET ORAL at 12:05

## 2025-05-30 RX ADMIN — KETOROLAC TROMETHAMINE 15 MG: 30 INJECTION, SOLUTION INTRAMUSCULAR; INTRAVENOUS at 05:05

## 2025-05-30 RX ADMIN — HYDROMORPHONE HYDROCHLORIDE 2 MG: 2 TABLET ORAL at 05:05

## 2025-05-30 RX ADMIN — KETOCONAZOLE: 20 CREAM TOPICAL at 08:05

## 2025-05-30 RX ADMIN — ACETAMINOPHEN 1000 MG: 500 TABLET ORAL at 03:05

## 2025-05-30 RX ADMIN — PREGABALIN 75 MG: 75 CAPSULE ORAL at 08:05

## 2025-05-30 RX ADMIN — CLINDAMYCIN PHOSPHATE: 10 GEL TOPICAL at 08:05

## 2025-05-30 RX ADMIN — ACETAMINOPHEN 1000 MG: 500 TABLET ORAL at 05:05

## 2025-05-30 RX ADMIN — KETOROLAC TROMETHAMINE 15 MG: 30 INJECTION, SOLUTION INTRAMUSCULAR; INTRAVENOUS at 12:05

## 2025-05-30 RX ADMIN — ACETAMINOPHEN 1000 MG: 500 TABLET ORAL at 09:05

## 2025-05-30 RX ADMIN — VANCOMYCIN HYDROCHLORIDE 1250 MG: 1.25 INJECTION, POWDER, LYOPHILIZED, FOR SOLUTION INTRAVENOUS at 05:05

## 2025-05-30 RX ADMIN — ENOXAPARIN SODIUM 40 MG: 40 INJECTION SUBCUTANEOUS at 05:05

## 2025-05-30 RX ADMIN — HYDROMORPHONE HYDROCHLORIDE 2 MG: 2 INJECTION, SOLUTION INTRAMUSCULAR; INTRAVENOUS; SUBCUTANEOUS at 03:05

## 2025-05-30 RX ADMIN — TRIAMCINOLONE ACETONIDE: 1 CREAM TOPICAL at 08:05

## 2025-05-30 RX ADMIN — CHOLECALCIFEROL TAB 125 MCG (5000 UNIT) 10000 UNITS: 125 TAB at 11:05

## 2025-05-30 RX ADMIN — HYDROMORPHONE HYDROCHLORIDE 1 MG: 2 INJECTION, SOLUTION INTRAMUSCULAR; INTRAVENOUS; SUBCUTANEOUS at 11:05

## 2025-05-30 NOTE — PROGRESS NOTES
"[facility] Medicine  Progress Note    Patient Name: Nikkie Myles  MRN: [id number]  Patient Class: IP- Inpatient   Admission Date: 5/27/2025  Length of Stay: 2 days  Attending Physician: Jen Webster MD  Primary Care Provider: Kena, Primary Doctor    Principal Problem:Crohn's disease of perianal region with complication    Interval history: Not tolerating wound care with dialudid 1 mg IV,. Will change to 2 mg     PEx  Temp:  [98.4 °F (36.9 °C)-99.2 °F (37.3 °C)]   Pulse:  []   Resp:  [16-20]   BP: ()/(52-70)   SpO2:  [97 %-99 %]     Intake/Output Summary (Last 24 hours) at 5/30/2025 1617  Last data filed at 5/30/2025 0514  Gross per 24 hour   Intake 345.13 ml   Output --   Net 345.13 ml       General Appearance: no acute distress, WD, ill-appearing  Heart: regular rate and rhythm, no heave  Respiratory: Normal respiratory effort, symmetric excursion, bilateral vesicular breath sounds   Abdomen: Soft, non-tender; bowel sounds active  Skin: intact, no rash, no ulcers  Neurologic:  No focal numbness or weakness  Mental status: Alert, oriented x 4, affect appropriate    Recent Labs   Lab 05/28/25  0808 05/29/25  0729 05/30/25  0559   WBC 14.41* 11.89 14.08*   HGB 7.7* 6.6* 6.8*   HCT 26.4* 23.0* 23.8*   * 461* 537*     Recent Labs   Lab 05/27/25  2109 05/28/25  0035 05/28/25  0808 05/29/25  0729   * 130* 132* 134*   K 3.9 4.7 4.3 3.9   CL 99 101 101 107   CO2 16* 14* 21* 19*   BUN 14 14 12 13   CREATININE 0.9 0.8 0.9 0.8    99 117* 78   CALCIUM 9.4 8.9 8.8 8.9   MG  --   --  1.7  --    PHOS  --   --  4.6*  --    LIPASE 11 9  --   --      Recent Labs   Lab 05/27/25  2109 05/28/25  0035 05/28/25  0808   ALKPHOS 88 79 82   ALT 13 12 11   AST 23 24 21   ALBUMIN 2.8* 2.5* 2.6*   PROT 10.2* 9.1* 9.3*   BILITOT 0.3 0.2 0.2        No results for input(s): "POCTGLUCOSE" in the last 168 hours.    Scheduled Meds:   acetaminophen  1,000 mg Oral Q8H    cholecalciferol " (vitamin D3)  10,000 Units Oral Daily    clindamycin phosphate 1%   Topical (Top) BID    enoxparin  40 mg Subcutaneous Daily    triamcinolone acetonide 0.1%   Topical (Top) BID    And    ketoconazole   Topical (Top) BID    And    magnesium hydroxide 400 mg/5 ml  30 mL Oral BID    ketorolac  15 mg Intravenous Q6H    pregabalin  75 mg Oral BID     Continuous Infusions:  As Needed:    Current Facility-Administered Medications:     HYDROmorphone, 2 mg, Intravenous, BID PRN    HYDROmorphone, 2 mg, Oral, Q4H PRN    naloxone, 0.02 mg, Intravenous, PRN    ondansetron, 4 mg, Intravenous, Q8H PRN    sodium chloride 0.9%, 10 mL, Intravenous, PRN    Assessment & Plan  Crohn's disease of perianal region with complication  Wound care as per dermatology  IV dilaudid with pain management  Patient to go to LTAC for a couple weeks of wound care with IV pain management  Discussed to conitnue remicaid infusions at [facility] while establishing care here.  We clarified that she can not receive Remicaid while in LTAC  Bacteremia due to Staphylococcus  Awaiting speciation    VTE Risk Mitigation (From admission, onward)           Ordered     enoxaparin injection 40 mg  Daily         05/28/25 0711     IP VTE HIGH RISK PATIENT  Once         05/28/25 0711     Place sequential compression device  Until discontinued         05/28/25 0711                    Discharge Planning   ENDY: 6/3/2025     Code Status: Full Code   Medical Readiness for Discharge Date:   Discharge Plan A: Long-term acute care facility (LTAC)   Discharge Delays: (!) Post-Acute Set-up  Jen Webster MD  Department of Hospital Medicine   [facility] - Transplant Stepdown

## 2025-05-30 NOTE — PLAN OF CARE
Problem: Wound  Goal: Optimal Coping  Outcome: Ongoing     Problem: Wound  Goal: Absence of Infection Signs and Symptoms  Outcome: Ongoing     Problem: Wound  Goal: Improved Oral Intake  Outcome: Ongoing   Progressing towards goals of care. Oral intake is poor. Ileostomy with moderate amount of stool. Copious drainage from wounds and wound care per orders. Pt did not tolerate well 2/2 increased pain despite being premedicated. New orders noted to increase pain med doses. Pt awake most of the shift with family members at bedside.
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
## 2025-05-30 NOTE — ASSESSMENT & PLAN NOTE
Wound care as per dermatology  IV dilaudid with pain management  Patient to go to LTAC for a couple weeks of wound care with IV pain management  Discussed to yennifer remicaid infusions at [facility] while establishing care here.  We clarified that she can not receive Remicaid while in LTAC

## 2025-05-30 NOTE — PLAN OF CARE
05/30/25 1501   Post-Acute Status   Post-Acute Authorization Placement   Post-Acute Placement Status Referrals Sent   Discharge Delays (!) Post-Acute Set-up   Discharge Plan   Discharge Plan A Long-term acute care facility (LTAC)   Discharge Plan B Home with family;Home Health     CM spoke with sister at the bedside with patient about discharge planning of LTAC due to extensive wound therapy. Family and patient in agreement for discharge planning. CM sent referral to [facility] as family preference. CM will continue to follow up.     Discharge Plan A and Plan B have been determined by review of patient's clinical status, future medical and therapeutic needs, and coverage/benefits for post-acute care in coordination with multidisciplinary team members.     Ahsan Duque, RN, BSN  Case Management  [phone]

## 2025-05-30 NOTE — ASSESSMENT & PLAN NOTE
34 y/o F with long-standing Crohn's disease with fistula, with perianal disease s/p diverting loop ileostomy (08/2022), followed by [facility], who presents for progression of wounds. On admit afebrile with WBC of 17. Did spike fever of 100.4 on 5/28. Admit blood cultures 5/27 with CNS in 1/4 bottles. BCID negative for MRSA. Repeat blood cultures no growth to date. Slight leukocytosis today of 14. Patient reports pain is the same, but denies fever, chills, or sweats, or new symptoms. ID consulted for possible bacteremia.       Recommendations / Plan:  Suspect bld cx with coag neg staph in 1/4 bottles is likely containment. Repeat blood culture NGTD. Received 4 days of Vancomycin. Can stop Vanc today.   No further antibiotics indicated at this time, would monitor off antibiotics.      -- Discussed with ID staff.  -- ID will sign off at this time.  Please re-consult for any new infectious concerns.

## 2025-05-30 NOTE — PLAN OF CARE
[facility] Transplant Stepdown  Initial Discharge Assessment       Primary Care Provider: No, Primary Doctor    Admission Diagnosis: Cough [R05.9]  Fistula [L98.8]  Crohn's disease of both small and large intestine with fistula [K50.813]  Abdominal pain [R10.9]  Skin breakdown [R23.8]    Admission Date: 5/27/2025  Expected Discharge Date: 6/3/2025    Transition of Care Barriers: None    Payor: MEDICAID / Plan: HEALTHY BLUE [place] / Product Type: Managed Medicaid /     Extended Emergency Contact Information  Primary Emergency Contact: Franky Rivera  Mobile Phone: [phone]  Relation: Mother  Secondary Emergency Contact: RobynJeaníbal  Mobile Phone: [phone]  Relation: Sister    Discharge Plan A: Long-term acute care facility (LTAC)  Discharge Plan B: Home Health, Home with family      [id number] - [place], [place] - [address]  [address]  [facility] [address]  Phone: [phone] Fax: [phone]    [facility] - [place] - [address]  [address]  [facility] [address]  Phone: [phone] Fax: [phone]    [id number] - [place] - [address] AT [facility] [address]  [address]  [place] [address]  Phone: [phone] Fax: [phone]    [facility] - [place], [place] - [address]  [address]  [facility] [address]  Phone: [phone] Fax: [phone]    [facility] [address]  [address]   [facility] [address]  Phone: [phone] Fax: [phone]      Initial Assessment (most recent)       Adult Discharge Assessment - 05/30/25 1052          Discharge Assessment    Assessment Type Discharge Planning Assessment     Confirmed/corrected address, phone number and insurance Yes     Confirmed Demographics Correct on Facesheet     Source of Information family     Communicated  ENDY with patient/caregiver Yes     People in Home parent(s);child(ayad), dependent     Name(s) of People in Home Franky Rivera (Mother)  [phone] (Mobile)     Do you expect to return to your current living situation? No     Do you have help at home or someone to help you manage your care at home? No     Prior to hospitilization cognitive status: Alert/Oriented;No Deficits     Current cognitive status: Alert/Oriented     Walking or Climbing Stairs Difficulty no     Dressing/Bathing Difficulty no     Home Accessibility not wheelchair accessible     Home Layout Able to live on 1st floor     Equipment Currently Used at Home none     Readmission within 30 days? No     Patient currently being followed by outpatient case management? No     Do you currently have service(s) that help you manage your care at home? No     Do you take prescription medications? Yes     Do you have prescription coverage? Yes     Do you have any problems affording any of your prescribed medications? No     Is the patient taking medications as prescribed? yes     Who is going to help you get home at discharge? Franky Rivera (Mother)  [phone] (Mobile)     How do you get to doctors appointments? car, drives self     Are you on dialysis? No     Do you take coumadin? No     Discharge Plan A Long-term acute care facility (LTAC)     Discharge Plan B Home Health;Home with family     DME Needed Upon Discharge  none     Discharge Plan discussed with: Sibling     Name(s) and Number(s) Karsten Carlson (sister) [phone]     Transition of Care Barriers None        Financial Resource Strain    How hard is it for you to pay for the very basics like food, housing, medical care, and heating? Not hard at all        Housing Stability    In the last 12 months, was there a time when you were not able to pay the mortgage or rent on time? No     At any time in the past 12 months, were you homeless or living in a shelter (including now)? No        Transportation  Needs    In the past 12 months, has lack of transportation kept you from medical appointments or from getting medications? No     In the past 12 months, has lack of transportation kept you from meetings, work, or from getting things needed for daily living? No        Food Insecurity    Within the past 12 months, you worried that your food would run out before you got the money to buy more. Never true     Within the past 12 months, the food you bought just didn't last and you didn't have money to get more. Never true        Stress    Do you feel stress - tense, restless, nervous, or anxious, or unable to sleep at night because your mind is troubled all the time - these days? Not at all        Social Isolation    How often do you feel lonely or isolated from those around you?  Never        Alcohol Use    Q1: How often do you have a drink containing alcohol? Never     Q2: How many drinks containing alcohol do you have on a typical day when you are drinking? Patient does not drink     Q3: How often do you have six or more drinks on one occasion? Never        Owlparrot    In the past 12 months has the electric, gas, oil, or water company threatened to shut off services in your home? No        Health Literacy    How often do you need to have someone help you when you read instructions, pamphlets, or other written material from your doctor or pharmacy? Never                      CM spoke with patient's sister Karsten in Room 44616 at bedside. All information was verified on facesheet. Patient lives in a 2 story house with Mother and her 3 kids, 1 step to get inside with no pets. Patient states no assistance was needed previously. Patient can ambulate, drive, run errands, and complete ADL's independently. Patient does not use any DME or any in-home assistive equipment. Patient has not used Home Health previously. Patient is not on dialysis nor use Coumadin as a blood thinner. Patient takes medication as prescribed and has  resources for all prescriptive needs. Patient will have help from family member upon discharge. Family will provide transportation home. All Questions and concerns addressed and whiteboard updated with CM contact information. Will continue to follow for course of hospitalization.      Patient will more than likely need LTAC placement for Wound therapy. Family is in agreement for patient to go.     Discharge Plan A and Plan B have been determined by review of patient's clinical status, future medical and therapeutic needs, and coverage/benefits for post-acute care in coordination with multidisciplinary team members.     Ahsan Duque RN, BSN  Case Management  [phone]

## 2025-05-30 NOTE — PLAN OF CARE
Pt AAOx4, VSS on RA. Cont vanc for positive blood cx. Pt with severe pain r/t perineal wounds; managed with tylenol, toradol, and PO dilaudid. Dressings remain CDI, pt requests to change dressings as minimally as possibly/PRN. Pt up with 1x assist to BSC, voiding yellow urine--see flowsheet. Remains free from falls/injury throughout night. Pt provided fluid immersion mattress for pressure reduction, pt turns/shifts weight independently. Bed in lowest locked position, call light within reach, POC ongoing.

## 2025-05-30 NOTE — ASSESSMENT & PLAN NOTE
Wound care as per dermatology  IV dilaudid with pain management  Patient to decide on home health care with family or LTAC

## 2025-05-30 NOTE — HPI
34 y/o F with long-standing Crohn's disease with fistula, with perianal disease s/p diverting loop ileostomy (08/2022), as well as chronic abdominal pain and joint pain. She was previously followed by IBD and CRS at [facility] in 2023, but has since followed with [facility]. She reports that she has not received any biologics or anti-inflammatory treatment since at least December 2024 due to insurance issues (previously maintained on Remicade and Methotrexate). She was just approved for Remicade with plans for infusion in 6/12/25. She has been seen multiple times at [facility] for pain in abdomen, multiple joints, perineum and rectum. She presents to [facility] 5/27 for evaluation of progressive symptoms. Denies fever or chills. Describes purulent drainage from chronic abdominal wall sinuses/pustules located below her ileostomy and at the pannus (see media for photos). Underwent colonoscopy at [facility] at the beginning of May 2025 that showed severe rectosigmoid inflammation. Dermatology, GI, and CRS consulted. Palliative Care consulted for pain management. Per GI, patient is likely going to require a proctocolectomy in the future. Underwent rectal exam under anesthesia with CRS on 5/28, where 2 punch biopsies obtained. In OR revealed extensive, ulcerated, draining wounds around anus, gluteal cleft, pannus, labia and abdominal wall. No evidence of abscess. CRS felt presentation may be related to pyoderma or hydradenitis superimposed on her IBD.  Dermatology consulted and determined no indication for steroids or inpatient infliximab in the setting of acute infections. On admit afebrile with WBC of 17. Did spike fever of 100.4 on 5/28. CRP elevated at 213. UA infectious, but Ucx no growth. Admit blood cultures 5/27 with CNS in 1/4 bottles. BCID negative for MRSA. Repeat blood cultures no growth to date. Given dose of Zosyn and Vanc, and continued on Vancomycin. Slight leukocytosis today of 14. Patient reports pain is the same, but denies fever,  chills, or sweats, or new symptoms. ID consulted for possible bacteremia. Patient follows with [facility], but would like to transfer all her care to [facility].
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
## 2025-05-30 NOTE — PROGRESS NOTES
"[facility] Medicine  Progress Note    Patient Name: Nikkie Myles  MRN: [id number]  Patient Class: IP- Inpatient   Admission Date: 5/27/2025  Length of Stay: 2 days  Attending Physician: Jen Webster MD  Primary Care Provider: No, Primary Doctor    Principal Problem:<principal problem not specified>    Interval history:     PEx  Temp:  [98.1 °F (36.7 °C)-99.2 °F (37.3 °C)]   Pulse:  []   Resp:  [14-20]   BP: ()/(44-70)   SpO2:  [96 %-100 %]     Intake/Output Summary (Last 24 hours) at 5/30/2025 0651  Last data filed at 5/30/2025 0514  Gross per 24 hour   Intake 1280.96 ml   Output 75 ml   Net 1205.96 ml       General Appearance: no acute distress, WD, ill-appearing  Heart: regular rate and rhythm, no heave  Respiratory: Normal respiratory effort, symmetric excursion, bilateral vesicular breath sounds   Abdomen: Soft, non-tender; bowel sounds active  Skin: intact, no rash, no ulcers  Neurologic:  No focal numbness or weakness  Mental status: Alert, oriented x 4, affect appropriate    Recent Labs   Lab 05/28/25  0808 05/29/25  0729 05/30/25  0559   WBC 14.41* 11.89 14.08*   HGB 7.7* 6.6* 6.8*   HCT 26.4* 23.0* 23.8*   * 461* 537*     Recent Labs   Lab 05/27/25 2109 05/28/25  0035 05/28/25  0808 05/29/25  0729   * 130* 132* 134*   K 3.9 4.7 4.3 3.9   CL 99 101 101 107   CO2 16* 14* 21* 19*   BUN 14 14 12 13   CREATININE 0.9 0.8 0.9 0.8    99 117* 78   CALCIUM 9.4 8.9 8.8 8.9   MG  --   --  1.7  --    PHOS  --   --  4.6*  --    LIPASE 11 9  --   --      Recent Labs   Lab 05/27/25 2109 05/28/25  0035 05/28/25  0808   ALKPHOS 88 79 82   ALT 13 12 11   AST 23 24 21   ALBUMIN 2.8* 2.5* 2.6*   PROT 10.2* 9.1* 9.3*   BILITOT 0.3 0.2 0.2        No results for input(s): "POCTGLUCOSE" in the last 168 hours.    Scheduled Meds:   acetaminophen  1,000 mg Oral Q8H    cholecalciferol (vitamin D3)  10,000 Units Oral Daily    clindamycin phosphate 1%   Topical (Top) " BID    enoxparin  40 mg Subcutaneous Daily    triamcinolone acetonide 0.1%   Topical (Top) BID    And    ketoconazole   Topical (Top) BID    And    magnesium hydroxide 400 mg/5 ml  30 mL Oral BID    ketorolac  15 mg Intravenous Q6H    pregabalin  75 mg Oral BID    vancomycin (VANCOCIN) IV (PEDS and ADULTS)  15 mg/kg Intravenous Q12H     Continuous Infusions:  As Needed:    Current Facility-Administered Medications:     HYDROmorphone, 1 mg, Intravenous, BID PRN    HYDROmorphone, 2 mg, Oral, Q4H PRN    naloxone, 0.02 mg, Intravenous, PRN    ondansetron, 4 mg, Intravenous, Q8H PRN    sodium chloride 0.9%, 10 mL, Intravenous, PRN    Pharmacy to dose Vancomycin consult, , , Once **AND** vancomycin - pharmacy to dose, , Intravenous, pharmacy to manage frequency    Assessment & Plan  Crohn's disease of perianal region with complication  Wound care as per dermatology  IV dilaudid with pain management  Patient to decide on home health care with family or LTAC  Bacteremia due to Staphylococcus  Awaiting speciation  VTE Risk Mitigation (From admission, onward)           Ordered     enoxaparin injection 40 mg  Daily         05/28/25 0711     IP VTE HIGH RISK PATIENT  Once         05/28/25 0711     Place sequential compression device  Until discontinued         05/28/25 0711                    Discharge Planning   ENDY: 6/3/2025     Code Status: Full Code   Medical Readiness for Discharge Date:          Jen Webster MD  Department of Hospital Medicine   [facility]

## 2025-05-31 PROBLEM — K62.89 PERIANAL PAIN: Status: ACTIVE | Noted: 2025-05-31

## 2025-05-31 LAB
ABSOLUTE EOSINOPHIL (OHS): 0.32 K/UL
ABSOLUTE MONOCYTE (OHS): 0.9 K/UL (ref 0.3–1)
ABSOLUTE NEUTROPHIL COUNT (OHS): 8.76 K/UL (ref 1.8–7.7)
BACTERIA BLD CULT: ABNORMAL
BACTERIA BLD CULT: ABNORMAL
BASOPHILS # BLD AUTO: 0.05 K/UL
BASOPHILS NFR BLD AUTO: 0.4 %
CRP SERPL-MCNC: 197.7 MG/L
ERYTHROCYTE [DISTWIDTH] IN BLOOD BY AUTOMATED COUNT: 20.5 % (ref 11.5–14.5)
GRAM STN SPEC: ABNORMAL
HCT VFR BLD AUTO: 22.3 % (ref 37–48.5)
HGB BLD-MCNC: 6.5 GM/DL (ref 12–16)
IMM GRANULOCYTES # BLD AUTO: 0.13 K/UL (ref 0–0.04)
IMM GRANULOCYTES NFR BLD AUTO: 1.1 % (ref 0–0.5)
LYMPHOCYTES # BLD AUTO: 2.03 K/UL (ref 1–4.8)
MCH RBC QN AUTO: 15.9 PG (ref 27–31)
MCHC RBC AUTO-ENTMCNC: 29.1 G/DL (ref 32–36)
MCV RBC AUTO: 55 FL (ref 82–98)
NUCLEATED RBC (/100WBC) (OHS): 0 /100 WBC
PLATELET # BLD AUTO: 531 K/UL (ref 150–450)
PMV BLD AUTO: 9.5 FL (ref 9.2–12.9)
RBC # BLD AUTO: 4.08 M/UL (ref 4–5.4)
RELATIVE EOSINOPHIL (OHS): 2.6 %
RELATIVE LYMPHOCYTE (OHS): 16.7 % (ref 18–48)
RELATIVE MONOCYTE (OHS): 7.4 % (ref 4–15)
RELATIVE NEUTROPHIL (OHS): 71.8 % (ref 38–73)
WBC # BLD AUTO: 12.19 K/UL (ref 3.9–12.7)

## 2025-05-31 PROCEDURE — 63600175 PHARM REV CODE 636 W HCPCS: Mod: JZ,TB | Performed by: SURGERY

## 2025-05-31 PROCEDURE — 20600001 HC STEP DOWN PRIVATE ROOM

## 2025-05-31 PROCEDURE — 63600175 PHARM REV CODE 636 W HCPCS: Performed by: STUDENT IN AN ORGANIZED HEALTH CARE EDUCATION/TRAINING PROGRAM

## 2025-05-31 PROCEDURE — 86140 C-REACTIVE PROTEIN: CPT | Performed by: STUDENT IN AN ORGANIZED HEALTH CARE EDUCATION/TRAINING PROGRAM

## 2025-05-31 PROCEDURE — 25000003 PHARM REV CODE 250: Performed by: INTERNAL MEDICINE

## 2025-05-31 PROCEDURE — 63600175 PHARM REV CODE 636 W HCPCS: Performed by: INTERNAL MEDICINE

## 2025-05-31 PROCEDURE — 25000003 PHARM REV CODE 250: Performed by: SURGERY

## 2025-05-31 PROCEDURE — 85025 COMPLETE CBC W/AUTO DIFF WBC: CPT | Performed by: STUDENT IN AN ORGANIZED HEALTH CARE EDUCATION/TRAINING PROGRAM

## 2025-05-31 PROCEDURE — 36415 COLL VENOUS BLD VENIPUNCTURE: CPT | Performed by: STUDENT IN AN ORGANIZED HEALTH CARE EDUCATION/TRAINING PROGRAM

## 2025-05-31 RX ORDER — OXYCODONE HYDROCHLORIDE 5 MG/1
5 TABLET ORAL EVERY 4 HOURS PRN
Refills: 0 | Status: DISCONTINUED | OUTPATIENT
Start: 2025-05-31 | End: 2025-06-01

## 2025-05-31 RX ORDER — FAMOTIDINE 20 MG/1
20 TABLET, FILM COATED ORAL 2 TIMES DAILY
Status: DISCONTINUED | OUTPATIENT
Start: 2025-05-31 | End: 2025-06-05 | Stop reason: HOSPADM

## 2025-05-31 RX ORDER — ACETAMINOPHEN 500 MG
500 TABLET ORAL 4 TIMES DAILY
Status: DISCONTINUED | OUTPATIENT
Start: 2025-05-31 | End: 2025-06-05 | Stop reason: HOSPADM

## 2025-05-31 RX ORDER — KETOROLAC TROMETHAMINE 15 MG/ML
15 INJECTION, SOLUTION INTRAMUSCULAR; INTRAVENOUS EVERY 6 HOURS
Status: DISCONTINUED | OUTPATIENT
Start: 2025-05-31 | End: 2025-05-31

## 2025-05-31 RX ORDER — IBUPROFEN 400 MG/1
400 TABLET, FILM COATED ORAL 4 TIMES DAILY
Status: DISCONTINUED | OUTPATIENT
Start: 2025-05-31 | End: 2025-06-05 | Stop reason: HOSPADM

## 2025-05-31 RX ADMIN — KETOROLAC TROMETHAMINE 15 MG: 30 INJECTION, SOLUTION INTRAMUSCULAR; INTRAVENOUS at 05:05

## 2025-05-31 RX ADMIN — CHOLECALCIFEROL TAB 125 MCG (5000 UNIT) 10000 UNITS: 125 TAB at 08:05

## 2025-05-31 RX ADMIN — IBUPROFEN 400 MG: 400 TABLET ORAL at 08:05

## 2025-05-31 RX ADMIN — ACETAMINOPHEN 1000 MG: 500 TABLET ORAL at 05:05

## 2025-05-31 RX ADMIN — FAMOTIDINE 20 MG: 20 TABLET, FILM COATED ORAL at 08:05

## 2025-05-31 RX ADMIN — HYDROMORPHONE HYDROCHLORIDE 2 MG: 2 TABLET ORAL at 10:05

## 2025-05-31 RX ADMIN — TRIAMCINOLONE ACETONIDE: 1 CREAM TOPICAL at 08:05

## 2025-05-31 RX ADMIN — KETOCONAZOLE: 20 CREAM TOPICAL at 08:05

## 2025-05-31 RX ADMIN — IBUPROFEN 400 MG: 400 TABLET ORAL at 05:05

## 2025-05-31 RX ADMIN — CLINDAMYCIN PHOSPHATE: 10 GEL TOPICAL at 08:05

## 2025-05-31 RX ADMIN — KETOROLAC TROMETHAMINE 15 MG: 30 INJECTION, SOLUTION INTRAMUSCULAR; INTRAVENOUS at 12:05

## 2025-05-31 RX ADMIN — ENOXAPARIN SODIUM 40 MG: 40 INJECTION SUBCUTANEOUS at 05:05

## 2025-05-31 RX ADMIN — ACETAMINOPHEN 1000 MG: 500 TABLET ORAL at 02:05

## 2025-05-31 RX ADMIN — HYDROMORPHONE HYDROCHLORIDE 2 MG: 2 INJECTION, SOLUTION INTRAMUSCULAR; INTRAVENOUS; SUBCUTANEOUS at 03:05

## 2025-05-31 RX ADMIN — ACETAMINOPHEN 500 MG: 500 TABLET ORAL at 08:05

## 2025-05-31 RX ADMIN — PREGABALIN 75 MG: 75 CAPSULE ORAL at 08:05

## 2025-05-31 RX ADMIN — OXYCODONE 5 MG: 5 TABLET ORAL at 08:05

## 2025-05-31 RX ADMIN — MAGNESIUM HYDROXIDE 2400 MG: 400 SUSPENSION ORAL at 08:05

## 2025-05-31 NOTE — NURSING
AAOX4. Pain mgt ongoing, dilaudid PO administered X1 and Toradol per MAR.  Pt declined wound care d/t pain. Ileostomy site excoriated;site cleaned and bag changed. Bed locked and in lowest position. Call light within reach.

## 2025-05-31 NOTE — PROGRESS NOTES
"[facility] Medicine  Progress Note    Patient Name: Nikkie Myles  MRN: [id number]  Patient Class: IP- Inpatient   Admission Date: 5/27/2025  Length of Stay: 3 days  Attending Physician: Jen Webster MD  Primary Care Provider: Kena, Primary Doctor    Principal Problem:Crohn's disease of perianal region with complication    Interval history: Patient with on-going perinal pain. Wound care more toleratble    PEx  Temp:  [98.1 °F (36.7 °C)-98.7 °F (37.1 °C)]   Pulse:  [80-90]   Resp:  [16-22]   BP: ()/(62-74)   SpO2:  [97 %-100 %]     Intake/Output Summary (Last 24 hours) at 5/31/2025 1643  Last data filed at 5/30/2025 2100  Gross per 24 hour   Intake 240 ml   Output --   Net 240 ml       General Appearance: no acute distress, WD, ill-appearing  Heart: regular rate and rhythm, no heave  Respiratory: Normal respiratory effort, symmetric excursion, bilateral vesicular breath sounds   Abdomen: Soft, non-tender; bowel sounds active  Skin: intact, no rash, no ulcers  Neurologic:  No focal numbness or weakness  Mental status: Alert, oriented x 4, affect appropriate    Recent Labs   Lab 05/29/25  0729 05/30/25  0559 05/31/25  0650   WBC 11.89 14.08* 12.19   HGB 6.6* 6.8* 6.5*   HCT 23.0* 23.8* 22.3*   * 537* 531*     Recent Labs   Lab 05/27/25  2109 05/28/25  0035 05/28/25  0808 05/29/25  0729   * 130* 132* 134*   K 3.9 4.7 4.3 3.9   CL 99 101 101 107   CO2 16* 14* 21* 19*   BUN 14 14 12 13   CREATININE 0.9 0.8 0.9 0.8    99 117* 78   CALCIUM 9.4 8.9 8.8 8.9   MG  --   --  1.7  --    PHOS  --   --  4.6*  --    LIPASE 11 9  --   --      Recent Labs   Lab 05/27/25  2109 05/28/25  0035 05/28/25  0808   ALKPHOS 88 79 82   ALT 13 12 11   AST 23 24 21   ALBUMIN 2.8* 2.5* 2.6*   PROT 10.2* 9.1* 9.3*   BILITOT 0.3 0.2 0.2        No results for input(s): "POCTGLUCOSE" in the last 168 hours.    Scheduled Meds:   acetaminophen  1,000 mg Oral Q8H    cholecalciferol (vitamin D3)  " 10,000 Units Oral Daily    clindamycin phosphate 1%   Topical (Top) BID    enoxparin  40 mg Subcutaneous Daily    triamcinolone acetonide 0.1%   Topical (Top) BID    And    ketoconazole   Topical (Top) BID    And    magnesium hydroxide 400 mg/5 ml  30 mL Oral BID    pregabalin  75 mg Oral BID     Continuous Infusions:  As Needed:    Current Facility-Administered Medications:     HYDROmorphone, 2 mg, Intravenous, BID PRN    HYDROmorphone, 2 mg, Oral, Q4H PRN    naloxone, 0.02 mg, Intravenous, PRN    ondansetron, 4 mg, Intravenous, Q8H PRN    sodium chloride 0.9%, 10 mL, Intravenous, PRN    Assessment & Plan  Crohn's disease of perianal region with complication  Wound care as per dermatology  IV dilaudid with pain management  Patient to go to LTAC for a couple weeks of wound care with IV pain management  Discussed to conkeciaue remicaid infusions at [facility] while establishing care here.  We clarified that she can not receive Remicaid while in LTAC  Bacteremia due to Staphylococcus  Awaiting speciation  Perianal pain  Start multi-modal pain control  Acetaminophen 1g TID  Ketorolac 15 mg QID  Pregablain 75 mg BID  Hydromorphone 2 mg IV BID prn wound care  Oxycodone 5 mg q6h prn pain    VTE Risk Mitigation (From admission, onward)           Ordered     enoxaparin injection 40 mg  Daily         05/28/25 0711     IP VTE HIGH RISK PATIENT  Once         05/28/25 0711     Place sequential compression device  Until discontinued         05/28/25 0711                    Discharge Planning   ENDY: 6/3/2025     Code Status: Full Code   Medical Readiness for Discharge Date:   Discharge Plan A: Long-term acute care facility (LTAC)   Discharge Delays: (!) Post-Acute Set-up  Jen Webster MD  Department of Hospital Medicine   [facility] - Transplant Stepdown

## 2025-05-31 NOTE — ASSESSMENT & PLAN NOTE
Start multi-modal pain control  Acetaminophen 1g TID  Ketorolac 15 mg QID  Pregablain 75 mg BID  Hydromorphone 2 mg IV BID prn wound care  Oxycodone 5 mg q6h prn pain

## 2025-05-31 NOTE — SUBJECTIVE & OBJECTIVE
Subjective:     Interval History: NAEON, patient seen and examined at bedside. Denies n/v, tolerating diet and continuing to have bowel function. No clinical changes at this time.CRP downtrending. No additional concerns or complaints.  Post-Op Info:  Procedure(s) (LRB):  EXAM UNDER ANESTHESIA, DIGITAL, RECTUM (N/A)  BIOPSY, ANUS (N/A)   3 Days Post-Op      Medications:  Continuous Infusions:  Scheduled Meds:   acetaminophen  1,000 mg Oral Q8H    cholecalciferol (vitamin D3)  10,000 Units Oral Daily    clindamycin phosphate 1%   Topical (Top) BID    enoxparin  40 mg Subcutaneous Daily    triamcinolone acetonide 0.1%   Topical (Top) BID    And    ketoconazole   Topical (Top) BID    And    magnesium hydroxide 400 mg/5 ml  30 mL Oral BID    pregabalin  75 mg Oral BID     PRN Meds:   acetaminophen tablet 1,000 mg    cholecalciferol (vitamin D3) 125 mcg (5,000 unit) tablet 10,000 Units    clindamycin phosphate 1% gel    enoxaparin injection 40 mg    triamcinolone acetonide 0.1% cream [SEE ADMIN INSTRUCTIONS]    And    ketoconazole 2 % cream [SEE ADMIN INSTRUCTIONS]    And    magnesium hydroxide 400 mg/5 ml suspension 2,400 mg mixed with cream and applied topically [SEE ADMIN INSTRUCTIONS]    pregabalin capsule 75 mg        Objective:     Vital Signs (Most Recent):  Temp: 98.7 °F (37.1 °C) (05/31/25 1131)  Pulse: 88 (05/31/25 1131)  Resp: 18 (05/31/25 1131)  BP: 96/64 (05/31/25 1131)  SpO2: 100 % (05/31/25 1131) Vital Signs (24h Range):  Temp:  [98.1 °F (36.7 °C)-98.7 °F (37.1 °C)] 98.7 °F (37.1 °C)  Pulse:  [] 88  Resp:  [16-18] 18  SpO2:  [97 %-100 %] 100 %  BP: ()/(52-74) 96/64     Intake/Output - Last 3 Shifts         05/29 0700  05/30 0659 05/30 0700  05/31 0659 05/31 0700  06/01 0659    P.O. 700 240     I.V. (mL/kg) 395.8 (4.5)      IV Piggyback 185.1      Total Intake(mL/kg) 1281 (14.7) 240 (2.8)     Urine (mL/kg/hr) 0 (0)      Emesis/NG output       Stool 75      Total Output 75      Net +1206 +240             Unmeasured Urine Occurrence 2 x 1 x              Physical Exam  Vitals and nursing note reviewed.   Constitutional:       General: She is not in acute distress.     Appearance: She is ill-appearing. She is not toxic-appearing.   HENT:      Head: Normocephalic and atraumatic.   Eyes:      Extraocular Movements: Extraocular movements intact.   Cardiovascular:      Rate and Rhythm: Normal rate.   Pulmonary:      Effort: Pulmonary effort is normal. No respiratory distress.   Abdominal:      General: Abdomen is flat. There is no distension.      Palpations: Abdomen is soft.   Skin:     General: Skin is warm.      Comments: Wounds, see media in chart reivew   Neurological:      Mental Status: She is alert and oriented to person, place, and time.   Psychiatric:         Mood and Affect: Mood normal.         Behavior: Behavior normal.            Significant Labs:  CBC (Last 3 Results):   Recent Labs   Lab 05/30/25  0559 05/31/25  0650 06/01/25  0644   WBC 14.08* 12.19 10.17   RBC 4.27 4.08 4.45   HGB 6.8* 6.5* 7.2*   HCT 23.8* 22.3* 26.3*   * 531* 509*   MCV 56* 55* 59*   MCH 15.9* 15.9* 16.2*   MCHC 28.6* 29.1* 27.4*     CMP (Last 3 Results):   Recent Labs   Lab 05/27/25  2109 05/28/25  0035 05/28/25  0808 05/29/25  0729    99 117* 78   CALCIUM 9.4 8.9 8.8 8.9   ALBUMIN 2.8* 2.5* 2.6*  --    PROT 10.2* 9.1* 9.3*  --    * 130* 132* 134*   K 3.9 4.7 4.3 3.9   CO2 16* 14* 21* 19*   CL 99 101 101 107   BUN 14 14 12 13   CREATININE 0.9 0.8 0.9 0.8   ALKPHOS 88 79 82  --    ALT 13 12 11  --    AST 23 24 21  --    BILITOT 0.3 0.2 0.2  --      CRP (Last 3 Results):   Recent Labs   Lab 05/30/25  0559 05/31/25  0650 06/01/25  0644   .2* 197.7* 151.6*       Significant Diagnostics:  I have reviewed all pertinent imaging results/findings within the past 24 hours.

## 2025-05-31 NOTE — CONSULTS
[facility]ESS [facility]       Bed:86860/46439 A    20G x 1.75IN PIV placed in Left Forearm by PEGGY using Ultrasound Guidance.    Indication: PVA  Attempts: 1    Yelitza Cutler RN

## 2025-05-31 NOTE — PLAN OF CARE
Problem: Adult Inpatient Plan of Care  Goal: Absence of Hospital-Acquired Illness or Injury  Outcome: Ongoing     Problem: Adult Inpatient Plan of Care  Goal: Readiness for Transition of Care  Outcome: Ongoing     Problem: Wound  Goal: Optimal Functional Ability  Outcome: Ongoing   Progressing towards goals of care. AAOx4. Pain adequately managed with current regimen. Wound care per orders. Copious amount of brown creamy drainage from perianal wounds. Pt sleeping between care. States she only voids once per day due to pain. Appetite and oral intake remain poor. Afebrile and VS stable.

## 2025-06-01 LAB
ABSOLUTE EOSINOPHIL (OHS): 0.31 K/UL
ABSOLUTE MONOCYTE (OHS): 0.88 K/UL (ref 0.3–1)
ABSOLUTE NEUTROPHIL COUNT (OHS): 6.7 K/UL (ref 1.8–7.7)
BASOPHILS # BLD AUTO: 0.09 K/UL
BASOPHILS NFR BLD AUTO: 0.9 %
CRP SERPL-MCNC: 151.6 MG/L
ERYTHROCYTE [DISTWIDTH] IN BLOOD BY AUTOMATED COUNT: 21.4 % (ref 11.5–14.5)
HCT VFR BLD AUTO: 26.3 % (ref 37–48.5)
HGB BLD-MCNC: 7.2 GM/DL (ref 12–16)
IMM GRANULOCYTES # BLD AUTO: 0.1 K/UL (ref 0–0.04)
IMM GRANULOCYTES NFR BLD AUTO: 1 % (ref 0–0.5)
LYMPHOCYTES # BLD AUTO: 2.09 K/UL (ref 1–4.8)
MCH RBC QN AUTO: 16.2 PG (ref 27–31)
MCHC RBC AUTO-ENTMCNC: 27.4 G/DL (ref 32–36)
MCV RBC AUTO: 59 FL (ref 82–98)
NUCLEATED RBC (/100WBC) (OHS): 0 /100 WBC
PLATELET # BLD AUTO: 509 K/UL (ref 150–450)
PMV BLD AUTO: 9.1 FL (ref 9.2–12.9)
RBC # BLD AUTO: 4.45 M/UL (ref 4–5.4)
RELATIVE EOSINOPHIL (OHS): 3 %
RELATIVE LYMPHOCYTE (OHS): 20.6 % (ref 18–48)
RELATIVE MONOCYTE (OHS): 8.7 % (ref 4–15)
RELATIVE NEUTROPHIL (OHS): 65.8 % (ref 38–73)
WBC # BLD AUTO: 10.17 K/UL (ref 3.9–12.7)

## 2025-06-01 PROCEDURE — 63600175 PHARM REV CODE 636 W HCPCS: Performed by: INTERNAL MEDICINE

## 2025-06-01 PROCEDURE — 36415 COLL VENOUS BLD VENIPUNCTURE: CPT | Performed by: STUDENT IN AN ORGANIZED HEALTH CARE EDUCATION/TRAINING PROGRAM

## 2025-06-01 PROCEDURE — 25000003 PHARM REV CODE 250: Performed by: INTERNAL MEDICINE

## 2025-06-01 PROCEDURE — 20600001 HC STEP DOWN PRIVATE ROOM

## 2025-06-01 PROCEDURE — 86140 C-REACTIVE PROTEIN: CPT | Performed by: STUDENT IN AN ORGANIZED HEALTH CARE EDUCATION/TRAINING PROGRAM

## 2025-06-01 PROCEDURE — 85025 COMPLETE CBC W/AUTO DIFF WBC: CPT | Performed by: STUDENT IN AN ORGANIZED HEALTH CARE EDUCATION/TRAINING PROGRAM

## 2025-06-01 PROCEDURE — 63600175 PHARM REV CODE 636 W HCPCS: Performed by: STUDENT IN AN ORGANIZED HEALTH CARE EDUCATION/TRAINING PROGRAM

## 2025-06-01 RX ORDER — HYDROMORPHONE HYDROCHLORIDE 2 MG/ML
2 INJECTION, SOLUTION INTRAMUSCULAR; INTRAVENOUS; SUBCUTANEOUS 2 TIMES DAILY PRN
Status: DISCONTINUED | OUTPATIENT
Start: 2025-06-01 | End: 2025-06-05 | Stop reason: HOSPADM

## 2025-06-01 RX ORDER — HYDROMORPHONE HYDROCHLORIDE 2 MG/1
4 TABLET ORAL 2 TIMES DAILY PRN
Refills: 0 | Status: DISCONTINUED | OUTPATIENT
Start: 2025-06-01 | End: 2025-06-05 | Stop reason: HOSPADM

## 2025-06-01 RX ORDER — OXYCODONE HYDROCHLORIDE 5 MG/1
5 TABLET ORAL 4 TIMES DAILY
Status: DISCONTINUED | OUTPATIENT
Start: 2025-06-01 | End: 2025-06-05 | Stop reason: HOSPADM

## 2025-06-01 RX ORDER — OXYCODONE HYDROCHLORIDE 10 MG/1
10 TABLET ORAL EVERY 4 HOURS PRN
Status: DISCONTINUED | OUTPATIENT
Start: 2025-06-01 | End: 2025-06-02

## 2025-06-01 RX ADMIN — ACETAMINOPHEN 500 MG: 500 TABLET ORAL at 01:06

## 2025-06-01 RX ADMIN — IBUPROFEN 400 MG: 400 TABLET ORAL at 05:06

## 2025-06-01 RX ADMIN — CLINDAMYCIN PHOSPHATE: 10 GEL TOPICAL at 09:06

## 2025-06-01 RX ADMIN — ACETAMINOPHEN 500 MG: 500 TABLET ORAL at 09:06

## 2025-06-01 RX ADMIN — OXYCODONE 5 MG: 5 TABLET ORAL at 05:06

## 2025-06-01 RX ADMIN — OXYCODONE 5 MG: 5 TABLET ORAL at 09:06

## 2025-06-01 RX ADMIN — FAMOTIDINE 20 MG: 20 TABLET, FILM COATED ORAL at 09:06

## 2025-06-01 RX ADMIN — CLINDAMYCIN PHOSPHATE: 10 GEL TOPICAL at 02:06

## 2025-06-01 RX ADMIN — FAMOTIDINE 20 MG: 20 TABLET, FILM COATED ORAL at 08:06

## 2025-06-01 RX ADMIN — KETOCONAZOLE: 20 CREAM TOPICAL at 02:06

## 2025-06-01 RX ADMIN — HYDROMORPHONE HYDROCHLORIDE 2 MG: 2 INJECTION, SOLUTION INTRAMUSCULAR; INTRAVENOUS; SUBCUTANEOUS at 01:06

## 2025-06-01 RX ADMIN — ACETAMINOPHEN 500 MG: 500 TABLET ORAL at 05:06

## 2025-06-01 RX ADMIN — IBUPROFEN 400 MG: 400 TABLET ORAL at 08:06

## 2025-06-01 RX ADMIN — TRIAMCINOLONE ACETONIDE: 1 CREAM TOPICAL at 02:06

## 2025-06-01 RX ADMIN — OXYCODONE 5 MG: 5 TABLET ORAL at 06:06

## 2025-06-01 RX ADMIN — IBUPROFEN 400 MG: 400 TABLET ORAL at 09:06

## 2025-06-01 RX ADMIN — ACETAMINOPHEN 500 MG: 500 TABLET ORAL at 08:06

## 2025-06-01 RX ADMIN — ENOXAPARIN SODIUM 40 MG: 40 INJECTION SUBCUTANEOUS at 05:06

## 2025-06-01 RX ADMIN — MAGNESIUM HYDROXIDE 2400 MG: 400 SUSPENSION ORAL at 02:06

## 2025-06-01 RX ADMIN — CHOLECALCIFEROL TAB 125 MCG (5000 UNIT) 10000 UNITS: 125 TAB at 09:06

## 2025-06-01 RX ADMIN — PREGABALIN 75 MG: 75 CAPSULE ORAL at 09:06

## 2025-06-01 RX ADMIN — IBUPROFEN 400 MG: 400 TABLET ORAL at 01:06

## 2025-06-01 RX ADMIN — PREGABALIN 75 MG: 75 CAPSULE ORAL at 08:06

## 2025-06-01 NOTE — ASSESSMENT & PLAN NOTE
Start multi-modal pain control  Acetaminophen 500 mg QID/ibuprofen 400 mg QID/oxycodone 5 mg QID  Pregablain 75 mg BID  Hydromorphone 2 mg IV BID + dilaudid 4 mg BID prn wound care  Oxycodone 10 mg q6h prn pain

## 2025-06-01 NOTE — PROGRESS NOTES
[facility] - Transplant Stepdown  Colorectal Surgery  Progress Note    Patient Name: Nikkie Myles  MRN: [id number]  Admission Date: 5/27/2025  Hospital Length of Stay: 4 days  Attending Physician: Jen Webster MD    Subjective:     Interval History: NAEON, patient seen and examined at bedside. Denies n/v, tolerating diet and continuing to have bowel function. No clinical changes at this time.CRP downtrending. No additional concerns or complaints.  Post-Op Info:  Procedure(s) (LRB):  EXAM UNDER ANESTHESIA, DIGITAL, RECTUM (N/A)  BIOPSY, ANUS (N/A)   3 Days Post-Op      Medications:  Continuous Infusions:  Scheduled Meds:   acetaminophen  1,000 mg Oral Q8H    cholecalciferol (vitamin D3)  10,000 Units Oral Daily    clindamycin phosphate 1%   Topical (Top) BID    enoxparin  40 mg Subcutaneous Daily    triamcinolone acetonide 0.1%   Topical (Top) BID    And    ketoconazole   Topical (Top) BID    And    magnesium hydroxide 400 mg/5 ml  30 mL Oral BID    pregabalin  75 mg Oral BID     PRN Meds:   acetaminophen tablet 1,000 mg    cholecalciferol (vitamin D3) 125 mcg (5,000 unit) tablet 10,000 Units    clindamycin phosphate 1% gel    enoxaparin injection 40 mg    triamcinolone acetonide 0.1% cream [SEE ADMIN INSTRUCTIONS]    And    ketoconazole 2 % cream [SEE ADMIN INSTRUCTIONS]    And    magnesium hydroxide 400 mg/5 ml suspension 2,400 mg mixed with cream and applied topically [SEE ADMIN INSTRUCTIONS]    pregabalin capsule 75 mg        Objective:     Vital Signs (Most Recent):  Temp: 98.7 °F (37.1 °C) (05/31/25 1131)  Pulse: 88 (05/31/25 1131)  Resp: 18 (05/31/25 1131)  BP: 96/64 (05/31/25 1131)  SpO2: 100 % (05/31/25 1131) Vital Signs (24h Range):  Temp:  [98.1 °F (36.7 °C)-98.7 °F (37.1 °C)] 98.7 °F (37.1 °C)  Pulse:  [] 88  Resp:  [16-18] 18  SpO2:  [97 %-100 %] 100 %  BP: ()/(52-74) 96/64     Intake/Output - Last 3 Shifts         05/29 0700 05/30 0659 05/30 0700 05/31 0659 05/31 0700 06/01 0659     P.O. 700 240     I.V. (mL/kg) 395.8 (4.5)      IV Piggyback 185.1      Total Intake(mL/kg) 1281 (14.7) 240 (2.8)     Urine (mL/kg/hr) 0 (0)      Emesis/NG output       Stool 75      Total Output 75      Net +1206 +240            Unmeasured Urine Occurrence 2 x 1 x              Physical Exam  Vitals and nursing note reviewed.   Constitutional:       General: She is not in acute distress.     Appearance: She is ill-appearing. She is not toxic-appearing.   HENT:      Head: Normocephalic and atraumatic.   Eyes:      Extraocular Movements: Extraocular movements intact.   Cardiovascular:      Rate and Rhythm: Normal rate.   Pulmonary:      Effort: Pulmonary effort is normal. No respiratory distress.   Abdominal:      General: Abdomen is flat. There is no distension.      Palpations: Abdomen is soft.   Skin:     General: Skin is warm.      Comments: Wounds, see media in chart reivew   Neurological:      Mental Status: She is alert and oriented to person, place, and time.   Psychiatric:         Mood and Affect: Mood normal.         Behavior: Behavior normal.            Significant Labs:  CBC (Last 3 Results):   Recent Labs   Lab 05/30/25  0559 05/31/25  0650 06/01/25  0644   WBC 14.08* 12.19 10.17   RBC 4.27 4.08 4.45   HGB 6.8* 6.5* 7.2*   HCT 23.8* 22.3* 26.3*   * 531* 509*   MCV 56* 55* 59*   MCH 15.9* 15.9* 16.2*   MCHC 28.6* 29.1* 27.4*     CMP (Last 3 Results):   Recent Labs   Lab 05/27/25  2109 05/28/25  0035 05/28/25  0808 05/29/25  0729    99 117* 78   CALCIUM 9.4 8.9 8.8 8.9   ALBUMIN 2.8* 2.5* 2.6*  --    PROT 10.2* 9.1* 9.3*  --    * 130* 132* 134*   K 3.9 4.7 4.3 3.9   CO2 16* 14* 21* 19*   CL 99 101 101 107   BUN 14 14 12 13   CREATININE 0.9 0.8 0.9 0.8   ALKPHOS 88 79 82  --    ALT 13 12 11  --    AST 23 24 21  --    BILITOT 0.3 0.2 0.2  --      CRP (Last 3 Results):   Recent Labs   Lab 05/30/25  0559 05/31/25  0650 06/01/25  0644   .2* 197.7* 151.6*       Significant  Diagnostics:  I have reviewed all pertinent imaging results/findings within the past 24 hours.  Assessment/Plan:     * Crohn's disease of perianal region with complication  35 year old female presenting with worsening of HS and leesa-anal crohns flare now s/p EUA with biopsies 5/28    - Recommend palliative or pain consult for severe, difficult to control and chronic pain related to open wounds   - No drainable abscess, no debridement indicated on EUA 5/27; no acute surgical intervention at this time   - Appreciate GI, dermatology, wound care input for optimization of medical treatment   - Consider ID consult for MRSA bacteremia   - Needs adequate wound care, follow up, and social support in place prior to discharge as she has failed outpatient management of this flare and these wounds prior to this admission  - We will follow peripherally             Melissa Gutierrez MD  Colorectal Surgery  [facility] - Transplant Stepdown    Attestation:     Patient seen and examined with the fellow.  Corrections made to the note above

## 2025-06-01 NOTE — PROGRESS NOTES
[facility] - Transplant [facility] Medicine  Progress Note    Patient Name: Nikkie Myles  MRN: [id number]  Patient Class: IP- Inpatient   Admission Date: 5/27/2025  Length of Stay: 4 days  Attending Physician: Jen Webster MD  Primary Care Provider: Kena, Primary Doctor    Principal Problem:Crohn's disease of perianal region with complication    Interval history: Patient with on-going perianal pain. Refused wound care last night as it is very painful afterward.     PEx  Temp:  [97.9 °F (36.6 °C)-99.1 °F (37.3 °C)]   Pulse:  []   Resp:  [16-20]   BP: ()/(56-75)   SpO2:  [96 %-100 %]     Intake/Output Summary (Last 24 hours) at 6/1/2025 1558  Last data filed at 6/1/2025 1508  Gross per 24 hour   Intake 498 ml   Output 1300 ml   Net -802 ml       General Appearance: no acute distress, WD, ill-appearing  Heart: regular rate and rhythm, no heave  Respiratory: Normal respiratory effort, symmetric excursion, bilateral vesicular breath sounds   Abdomen: Soft, non-tender; bowel sounds active  Skin: intact, no rash, no ulcers  Neurologic:  No focal numbness or weakness  Mental status: Alert, oriented x 4, affect appropriate    Recent Labs   Lab 05/30/25  0559 05/31/25  0650 06/01/25  0644   WBC 14.08* 12.19 10.17   HGB 6.8* 6.5* 7.2*   HCT 23.8* 22.3* 26.3*   * 531* 509*     Recent Labs   Lab 05/27/25  2109 05/28/25  0035 05/28/25  0808 05/29/25  0729   * 130* 132* 134*   K 3.9 4.7 4.3 3.9   CL 99 101 101 107   CO2 16* 14* 21* 19*   BUN 14 14 12 13   CREATININE 0.9 0.8 0.9 0.8    99 117* 78   CALCIUM 9.4 8.9 8.8 8.9   MG  --   --  1.7  --    PHOS  --   --  4.6*  --    LIPASE 11 9  --   --      Recent Labs   Lab 05/27/25  2109 05/28/25  0035 05/28/25  0808   ALKPHOS 88 79 82   ALT 13 12 11   AST 23 24 21   ALBUMIN 2.8* 2.5* 2.6*   PROT 10.2* 9.1* 9.3*   BILITOT 0.3 0.2 0.2        Scheduled Meds:   ibuprofen  400 mg Oral QID    And    acetaminophen  500 mg Oral QID    cholecalciferol  (vitamin D3)  10,000 Units Oral Daily    clindamycin phosphate 1%   Topical (Top) BID    enoxparin  40 mg Subcutaneous Daily    famotidine  20 mg Oral BID    triamcinolone acetonide 0.1%   Topical (Top) BID    And    ketoconazole   Topical (Top) BID    And    magnesium hydroxide 400 mg/5 ml  30 mL Oral BID    pregabalin  75 mg Oral BID     Continuous Infusions:  As Needed:    Current Facility-Administered Medications:     HYDROmorphone, 2 mg, Intravenous, BID PRN    naloxone, 0.02 mg, Intravenous, PRN    ondansetron, 4 mg, Intravenous, Q8H PRN    oxyCODONE, 5 mg, Oral, Q4H PRN    sodium chloride 0.9%, 10 mL, Intravenous, PRN    Assessment & Plan  Crohn's disease of perianal region with complication  Wound care as per dermatology  IV dilaudid with pain management  Patient to go to LTAC for a couple weeks of wound care with IV pain management  Discussed to conitnue remicaid infusions at [facility] while establishing care here.  We clarified that she can not receive Remicaid while in LTAC  Bacteremia due to Staphylococcus  Awaiting speciation  Perianal pain  Start multi-modal pain control  Acetaminophen 500 mg QID/ibuprofen 400 mg QID/oxycodone 5 mg QID  Pregablain 75 mg BID  Hydromorphone 2 mg IV BID + dilaudid 4 mg BID prn wound care  Oxycodone 10 mg q6h prn pain    VTE Risk Mitigation (From admission, onward)           Ordered     enoxaparin injection 40 mg  Daily         05/28/25 0711     IP VTE HIGH RISK PATIENT  Once         05/28/25 0711     Place sequential compression device  Until discontinued         05/28/25 0711                    Discharge Planning   ENDY: 6/3/2025     Code Status: Full Code   Medical Readiness for Discharge Date:   Discharge Plan A: Long-term acute care facility (LTAC)   Discharge Delays: (!) Post-Acute Set-up  Jen Webster MD  Department of Hospital Medicine   [facility] - Transplant Stepdown

## 2025-06-01 NOTE — PLAN OF CARE
Wound care done this afternoon per orders. AAOx4, VSS on RA. Up independent to BSC. Remained free from falls/injuries. Call light and personal belongings with in reach.Family at bedside, attentive to pt's needs.

## 2025-06-01 NOTE — NURSING
AAOX4. Pain mgt ongoing; oxy 5 mg administered X1. One urine occurrence recorded. Pt refused wound care d/t pain. VS wnl. Bed locked and in lowest position; call light within reach.

## 2025-06-02 LAB
ABSOLUTE EOSINOPHIL (OHS): 0.33 K/UL
ABSOLUTE MONOCYTE (OHS): 0.83 K/UL (ref 0.3–1)
ABSOLUTE NEUTROPHIL COUNT (OHS): 5.07 K/UL (ref 1.8–7.7)
BACTERIA BLD CULT: NORMAL
BASOPHILS # BLD AUTO: 0.05 K/UL
BASOPHILS NFR BLD AUTO: 0.6 %
CRP SERPL-MCNC: 119.7 MG/L
DHEA SERPL-MCNC: NORMAL
ERYTHROCYTE [DISTWIDTH] IN BLOOD BY AUTOMATED COUNT: 20.8 % (ref 11.5–14.5)
ESTROGEN SERPL-MCNC: NORMAL PG/ML
HCT VFR BLD AUTO: 24.6 % (ref 37–48.5)
HGB BLD-MCNC: 7.2 GM/DL (ref 12–16)
IMM GRANULOCYTES # BLD AUTO: 0.11 K/UL (ref 0–0.04)
IMM GRANULOCYTES NFR BLD AUTO: 1.3 % (ref 0–0.5)
INSULIN SERPL-ACNC: NORMAL U[IU]/ML
LAB AP CLINICAL INFORMATION: NORMAL
LAB AP GROSS DESCRIPTION: NORMAL
LAB AP PERFORMING LOCATION(S): NORMAL
LAB AP REPORT FOOTNOTES: NORMAL
LYMPHOCYTES # BLD AUTO: 2.26 K/UL (ref 1–4.8)
MCH RBC QN AUTO: 16.3 PG (ref 27–31)
MCHC RBC AUTO-ENTMCNC: 29.3 G/DL (ref 32–36)
MCV RBC AUTO: 56 FL (ref 82–98)
NUCLEATED RBC (/100WBC) (OHS): 0 /100 WBC
PLATELET # BLD AUTO: 546 K/UL (ref 150–450)
PMV BLD AUTO: 9 FL (ref 9.2–12.9)
RBC # BLD AUTO: 4.42 M/UL (ref 4–5.4)
RELATIVE EOSINOPHIL (OHS): 3.8 %
RELATIVE LYMPHOCYTE (OHS): 26.1 % (ref 18–48)
RELATIVE MONOCYTE (OHS): 9.6 % (ref 4–15)
RELATIVE NEUTROPHIL (OHS): 58.6 % (ref 38–73)
WBC # BLD AUTO: 8.65 K/UL (ref 3.9–12.7)

## 2025-06-02 PROCEDURE — 36415 COLL VENOUS BLD VENIPUNCTURE: CPT | Performed by: STUDENT IN AN ORGANIZED HEALTH CARE EDUCATION/TRAINING PROGRAM

## 2025-06-02 PROCEDURE — 20600001 HC STEP DOWN PRIVATE ROOM

## 2025-06-02 PROCEDURE — 86140 C-REACTIVE PROTEIN: CPT | Performed by: STUDENT IN AN ORGANIZED HEALTH CARE EDUCATION/TRAINING PROGRAM

## 2025-06-02 PROCEDURE — 63600175 PHARM REV CODE 636 W HCPCS: Performed by: INTERNAL MEDICINE

## 2025-06-02 PROCEDURE — 63600175 PHARM REV CODE 636 W HCPCS: Performed by: STUDENT IN AN ORGANIZED HEALTH CARE EDUCATION/TRAINING PROGRAM

## 2025-06-02 PROCEDURE — 25000003 PHARM REV CODE 250: Performed by: INTERNAL MEDICINE

## 2025-06-02 PROCEDURE — 99233 SBSQ HOSP IP/OBS HIGH 50: CPT | Mod: ,,, | Performed by: COLON & RECTAL SURGERY

## 2025-06-02 PROCEDURE — 85025 COMPLETE CBC W/AUTO DIFF WBC: CPT | Performed by: STUDENT IN AN ORGANIZED HEALTH CARE EDUCATION/TRAINING PROGRAM

## 2025-06-02 RX ORDER — OXYCODONE HYDROCHLORIDE 10 MG/1
10 TABLET ORAL EVERY 4 HOURS PRN
Refills: 0 | Status: DISCONTINUED | OUTPATIENT
Start: 2025-06-02 | End: 2025-06-05 | Stop reason: HOSPADM

## 2025-06-02 RX ADMIN — CLINDAMYCIN PHOSPHATE: 10 GEL TOPICAL at 08:06

## 2025-06-02 RX ADMIN — KETOCONAZOLE: 20 CREAM TOPICAL at 10:06

## 2025-06-02 RX ADMIN — OXYCODONE 5 MG: 5 TABLET ORAL at 08:06

## 2025-06-02 RX ADMIN — HYDROMORPHONE HYDROCHLORIDE 4 MG: 2 TABLET ORAL at 10:06

## 2025-06-02 RX ADMIN — OXYCODONE 5 MG: 5 TABLET ORAL at 05:06

## 2025-06-02 RX ADMIN — PREGABALIN 75 MG: 75 CAPSULE ORAL at 08:06

## 2025-06-02 RX ADMIN — CHOLECALCIFEROL TAB 125 MCG (5000 UNIT) 10000 UNITS: 125 TAB at 08:06

## 2025-06-02 RX ADMIN — IBUPROFEN 400 MG: 400 TABLET ORAL at 05:06

## 2025-06-02 RX ADMIN — ACETAMINOPHEN 500 MG: 500 TABLET ORAL at 02:06

## 2025-06-02 RX ADMIN — FAMOTIDINE 20 MG: 20 TABLET, FILM COATED ORAL at 08:06

## 2025-06-02 RX ADMIN — IBUPROFEN 400 MG: 400 TABLET ORAL at 08:06

## 2025-06-02 RX ADMIN — ACETAMINOPHEN 500 MG: 500 TABLET ORAL at 08:06

## 2025-06-02 RX ADMIN — TRIAMCINOLONE ACETONIDE: 1 CREAM TOPICAL at 10:06

## 2025-06-02 RX ADMIN — HYDROMORPHONE HYDROCHLORIDE 2 MG: 2 INJECTION, SOLUTION INTRAMUSCULAR; INTRAVENOUS; SUBCUTANEOUS at 10:06

## 2025-06-02 RX ADMIN — IBUPROFEN 400 MG: 400 TABLET ORAL at 02:06

## 2025-06-02 RX ADMIN — MAGNESIUM HYDROXIDE 2400 MG: 400 SUSPENSION ORAL at 10:06

## 2025-06-02 RX ADMIN — ENOXAPARIN SODIUM 40 MG: 40 INJECTION SUBCUTANEOUS at 05:06

## 2025-06-02 RX ADMIN — ACETAMINOPHEN 500 MG: 500 TABLET ORAL at 05:06

## 2025-06-02 NOTE — PLAN OF CARE
WOCN changed dressings & ostomy pouch today.  Premedicated with hydromorphone 2mg IV & hydromorphone 4mg PO.  Oxy 5mg, acetaminophen 500 mg & ibuprofen 400 mg all scheduled Q4H around the clock for pain management.  PRN Oxy 10 mg also available if needed.  Patient skipped 1300 dose oxy r/t planning for visitors and wish to decrease sleepiness.  Assist with turns and mobility as needed.  BSC available - following instructions to minimize wiping, utilizing leesa bottle.  Keep bed low & locked, call light in reach, nonslip footwear in use, calls appropriately for assistance.  ID consulted to clear for remicaid infusion
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
## 2025-06-02 NOTE — NURSING
AAOX4.   Refused wound care d/t pain  Pain mgt ongoing  Oxy administered x1 per MAR  plan of care ongoing

## 2025-06-02 NOTE — PROGRESS NOTES
Colon and Rectal Surgery Progress Note    Patient name: Nikkie Myles   YOB: 1990   MRN: [id number]  Date: 6/2/2025    Subjective:  No acute events overnight. Pathology results discussed, pending further path review to assess for component of PG or hydradenitis    Medications:  Current Medications[1]    Vital Signs:  Vitals:    06/02/25 1144   BP: (!) 93/59   Pulse: 76   Resp: 18   Temp: 98 °F (36.7 °C)       In/Out:  Intake/Output - Last 3 Shifts         05/31 0700 06/01 0659 06/01 0700 06/02 0659 06/02 0700 06/03 0659    P.O. 200 738     I.V. (mL/kg)  0 (0)     Other  0     Total Intake(mL/kg) 200 (2.3) 738 (8.5)     Urine (mL/kg/hr) 1000 (0.5) 0 (0) 0 (0)    Stool  300 300    Total Output 1000 300 300    Net -800 +438 -300           Unmeasured Urine Occurrence 2 x 1 x 1 x    Unmeasured Stool Occurrence  1 x             Physical Exam:  General: Alert, oriented, in no apparent distress  HEENT: Sclera anicteric, trachea midline  Lungs: Normal respiratory rate and effort on room air  Abdomen: Soft, non-distended  Extremities: Warm, well perfused, no edema, SCDs in place  Neuro: Grossly intact, moves all extremities  Psych: Appropriate affect    Laboratory Studies:  Complete Blood Count:  Lab Results   Component Value Date/Time    WBC 8.65 06/02/2025 05:39 AM    HGB 7.2 (L) 06/02/2025 05:39 AM    HGB 7.6 (L) 04/17/2025 02:38 PM    HGB 9.6 (L) 01/26/2025 09:18 AM    HCT 24.6 (L) 06/02/2025 05:39 AM    HCT 27.9 (L) 05/27/2025 11:02 PM    RBC 4.42 06/02/2025 05:39 AM    RBC 5.20 01/26/2025 09:18 AM     (H) 06/02/2025 05:39 AM     (H) 01/26/2025 09:18 AM       Basic Chemistry Panel:  Lab Results   Component Value Date/Time     (L) 05/29/2025 07:29 AM     04/17/2025 05:36 AM     01/26/2025 04:43 AM    K 3.9 05/29/2025 07:29 AM    K 3.8 04/17/2025 05:36 AM    K 4.9 01/26/2025 04:43 AM     05/29/2025 07:29 AM     01/26/2025 04:43 AM    CO2 19 (L) 05/29/2025  07:29 AM    CO2 24 04/17/2025 05:36 AM    CO2 18 (L) 01/26/2025 04:43 AM    BUN 13 05/29/2025 07:29 AM    CREATININE 0.8 05/29/2025 07:29 AM    GLUCOSE 92 04/17/2025 05:36 AM    CALCIUM 8.9 05/29/2025 07:29 AM    CALCIUM 8.6 04/17/2025 05:36 AM    CALCIUM 8.9 01/26/2025 04:43 AM       Lab Results   Component Value Date/Time    .7 (H) 06/02/2025 05:39 AM       Imaging Studies:  None new    Assessment:  Nikkie Myles is a 35 y.o. year old female with Crohn's disease admitted with severe progression of perianal, gluteal, inguinal, vulvar, and abdominal wounds    Plan:  Discussed with Derm, GI, and Pathology teams- will review path for pyoderma or hydradenitis  Continue Wound Care   Will need help with long-term pain management for wounds. Palliative Care not able to see non cancer-related patients. Can consider Pain Consult team  Discussed possibility of inpatient Remicaide with other teams  No role for surgical debridement at this time    Zuleyka Yip           [1]   Current Facility-Administered Medications:     ibuprofen tablet 400 mg, 400 mg, Oral, QID, 400 mg at 06/02/25 0810 **AND** acetaminophen tablet 500 mg, 500 mg, Oral, QID, Jen Webster MD, 500 mg at 06/02/25 0810    cholecalciferol (vitamin D3) 125 mcg (5,000 unit) tablet 10,000 Units, 10,000 Units, Oral, Daily, Jen Webster MD, 10,000 Units at 06/02/25 0816    clindamycin phosphate 1% gel, , Topical (Top), BID, Jen Webster MD, Given at 06/02/25 0811    enoxaparin injection 40 mg, 40 mg, Subcutaneous, Daily, Juan Medina MD, 40 mg at 06/01/25 1741    famotidine tablet 20 mg, 20 mg, Oral, BID, Jen Webster MD, 20 mg at 06/02/25 0810    HYDROmorphone (PF) injection 2 mg, 2 mg, Intravenous, BID PRN, 2 mg at 06/02/25 1006 **AND** HYDROmorphone tablet 4 mg, 4 mg, Oral, BID PRN, Jen Webster MD, 4 mg at 06/02/25 1005    triamcinolone acetonide 0.1% cream [SEE ADMIN INSTRUCTIONS], , Topical (Top), BID, Given at 06/02/25 1011  **AND** ketoconazole 2 % cream [SEE ADMIN INSTRUCTIONS], , Topical (Top), BID, Given at 06/02/25 1012 **AND** magnesium hydroxide 400 mg/5 ml suspension 2,400 mg mixed with cream and applied topically [SEE ADMIN INSTRUCTIONS], 30 mL, Oral, BID, Jen Webster MD, 2,400 mg at 06/02/25 1011    naloxone 0.4 mg/mL injection 0.02 mg, 0.02 mg, Intravenous, PRN, Juan Medina MD    ondansetron injection 4 mg, 4 mg, Intravenous, Q8H PRN, Juan Medina MD, 4 mg at 05/29/25 0426    oxyCODONE immediate release tablet 5 mg, 5 mg, Oral, QID, Jen Webster MD, 5 mg at 06/02/25 0810    oxyCODONE immediate release tablet Tab 10 mg, 10 mg, Oral, Q4H PRN, Jen Webster MD    pregabalin capsule 75 mg, 75 mg, Oral, BID, Jen Webster MD, 75 mg at 06/02/25 0810    sodium chloride 0.9% flush 10 mL, 10 mL, Intravenous, PRN, Juan Medina MD

## 2025-06-02 NOTE — PLAN OF CARE
06/02/25 1425   Post-Acute Status   Post-Acute Authorization Placement   Post-Acute Placement Status Pending payor medical review/second level review   Discharge Delays (!) Post-Acute Set-up   Discharge Plan   Discharge Plan A Long-term acute care facility (LTAC)   Discharge Plan B Home Health;Home with family     CM spoke to Nicky Dias with [facility], patient has been accepted to LTAC  pending insurance approval. CM will continue to follow up.     Discharge Plan A and Plan B have been determined by review of patient's clinical status, future medical and therapeutic needs, and coverage/benefits for post-acute care in coordination with multidisciplinary team members.     Ahsan Duque, RN, BSN  Case Management  [phone]
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
## 2025-06-03 LAB
ABSOLUTE EOSINOPHIL (OHS): 0.26 K/UL
ABSOLUTE MONOCYTE (OHS): 0.74 K/UL (ref 0.3–1)
ABSOLUTE NEUTROPHIL COUNT (OHS): 5.82 K/UL (ref 1.8–7.7)
BACTERIA BLD CULT: NORMAL
BASOPHILS # BLD AUTO: 0.03 K/UL
BASOPHILS NFR BLD AUTO: 0.3 %
CRP SERPL-MCNC: 117.6 MG/L
ERYTHROCYTE [DISTWIDTH] IN BLOOD BY AUTOMATED COUNT: 20.8 % (ref 11.5–14.5)
HCT VFR BLD AUTO: 22.6 % (ref 37–48.5)
HCV AB SERPL QL IA: NORMAL
HGB BLD-MCNC: 6.5 GM/DL (ref 12–16)
HIV 1+2 AB+HIV1 P24 AG SERPL QL IA: NORMAL
IMM GRANULOCYTES # BLD AUTO: 0.1 K/UL (ref 0–0.04)
IMM GRANULOCYTES NFR BLD AUTO: 1.1 % (ref 0–0.5)
LYMPHOCYTES # BLD AUTO: 2.01 K/UL (ref 1–4.8)
MCH RBC QN AUTO: 15.8 PG (ref 27–31)
MCHC RBC AUTO-ENTMCNC: 28.8 G/DL (ref 32–36)
MCV RBC AUTO: 55 FL (ref 82–98)
NUCLEATED RBC (/100WBC) (OHS): 0 /100 WBC
PLATELET # BLD AUTO: 530 K/UL (ref 150–450)
PMV BLD AUTO: 9.3 FL (ref 9.2–12.9)
RBC # BLD AUTO: 4.12 M/UL (ref 4–5.4)
RELATIVE EOSINOPHIL (OHS): 2.9 %
RELATIVE LYMPHOCYTE (OHS): 22.4 % (ref 18–48)
RELATIVE MONOCYTE (OHS): 8.3 % (ref 4–15)
RELATIVE NEUTROPHIL (OHS): 65 % (ref 38–73)
T PALLIDUM IGG+IGM SER QL: NORMAL
WBC # BLD AUTO: 8.96 K/UL (ref 3.9–12.7)

## 2025-06-03 PROCEDURE — 99232 SBSQ HOSP IP/OBS MODERATE 35: CPT | Mod: ,,, | Performed by: INTERNAL MEDICINE

## 2025-06-03 PROCEDURE — 36415 COLL VENOUS BLD VENIPUNCTURE: CPT | Performed by: REGISTERED NURSE

## 2025-06-03 PROCEDURE — 25000003 PHARM REV CODE 250: Performed by: INTERNAL MEDICINE

## 2025-06-03 PROCEDURE — 86803 HEPATITIS C AB TEST: CPT | Performed by: REGISTERED NURSE

## 2025-06-03 PROCEDURE — 86480 TB TEST CELL IMMUN MEASURE: CPT | Performed by: REGISTERED NURSE

## 2025-06-03 PROCEDURE — 86593 SYPHILIS TEST NON-TREP QUANT: CPT | Performed by: REGISTERED NURSE

## 2025-06-03 PROCEDURE — 63600175 PHARM REV CODE 636 W HCPCS: Performed by: INTERNAL MEDICINE

## 2025-06-03 PROCEDURE — 85025 COMPLETE CBC W/AUTO DIFF WBC: CPT | Performed by: STUDENT IN AN ORGANIZED HEALTH CARE EDUCATION/TRAINING PROGRAM

## 2025-06-03 PROCEDURE — 20600001 HC STEP DOWN PRIVATE ROOM

## 2025-06-03 PROCEDURE — 86140 C-REACTIVE PROTEIN: CPT | Performed by: STUDENT IN AN ORGANIZED HEALTH CARE EDUCATION/TRAINING PROGRAM

## 2025-06-03 PROCEDURE — 36415 COLL VENOUS BLD VENIPUNCTURE: CPT | Performed by: STUDENT IN AN ORGANIZED HEALTH CARE EDUCATION/TRAINING PROGRAM

## 2025-06-03 PROCEDURE — 63600175 PHARM REV CODE 636 W HCPCS: Performed by: STUDENT IN AN ORGANIZED HEALTH CARE EDUCATION/TRAINING PROGRAM

## 2025-06-03 PROCEDURE — 87389 HIV-1 AG W/HIV-1&-2 AB AG IA: CPT | Performed by: REGISTERED NURSE

## 2025-06-03 RX ADMIN — OXYCODONE 5 MG: 5 TABLET ORAL at 01:06

## 2025-06-03 RX ADMIN — CLINDAMYCIN PHOSPHATE: 10 GEL TOPICAL at 08:06

## 2025-06-03 RX ADMIN — IBUPROFEN 400 MG: 400 TABLET ORAL at 08:06

## 2025-06-03 RX ADMIN — ACETAMINOPHEN 500 MG: 500 TABLET ORAL at 01:06

## 2025-06-03 RX ADMIN — ENOXAPARIN SODIUM 40 MG: 40 INJECTION SUBCUTANEOUS at 05:06

## 2025-06-03 RX ADMIN — FAMOTIDINE 20 MG: 20 TABLET, FILM COATED ORAL at 08:06

## 2025-06-03 RX ADMIN — IBUPROFEN 400 MG: 400 TABLET ORAL at 01:06

## 2025-06-03 RX ADMIN — IBUPROFEN 400 MG: 400 TABLET ORAL at 05:06

## 2025-06-03 RX ADMIN — ACETAMINOPHEN 500 MG: 500 TABLET ORAL at 08:06

## 2025-06-03 RX ADMIN — OXYCODONE 5 MG: 5 TABLET ORAL at 08:06

## 2025-06-03 RX ADMIN — HYDROMORPHONE HYDROCHLORIDE 2 MG: 2 INJECTION, SOLUTION INTRAMUSCULAR; INTRAVENOUS; SUBCUTANEOUS at 02:06

## 2025-06-03 RX ADMIN — OXYCODONE HYDROCHLORIDE 10 MG: 10 TABLET ORAL at 05:06

## 2025-06-03 RX ADMIN — HYDROMORPHONE HYDROCHLORIDE 4 MG: 2 TABLET ORAL at 02:06

## 2025-06-03 RX ADMIN — PREGABALIN 75 MG: 75 CAPSULE ORAL at 08:06

## 2025-06-03 RX ADMIN — OXYCODONE 5 MG: 5 TABLET ORAL at 05:06

## 2025-06-03 RX ADMIN — ACETAMINOPHEN 500 MG: 500 TABLET ORAL at 05:06

## 2025-06-03 RX ADMIN — OXYCODONE HYDROCHLORIDE 10 MG: 10 TABLET ORAL at 02:06

## 2025-06-03 RX ADMIN — CHOLECALCIFEROL TAB 125 MCG (5000 UNIT) 10000 UNITS: 125 TAB at 08:06

## 2025-06-03 NOTE — CONSULTS
[facility] - Transplant Stepdown  Infectious Disease  Consult Note    Patient Name: Nikkie Myles  MRN: [id number]  Admission Date: 5/27/2025  Hospital Length of Stay: 6 days  Attending Physician: Jen Webster MD  Primary Care Provider: Kena, Primary Doctor     Isolation Status: No active isolations    See progress note      Inpatient consult to Infectious Diseases  Consult performed by: Nury Barakat NP  Consult ordered by: Jen Webster MD Reghan R. DeVay, NP  Infectious Disease  [facility]

## 2025-06-03 NOTE — PLAN OF CARE
Pt AAOx4. Afebrile. VSS. Scheduled NSAIDs and oxy given for pain. Mo report of injury or fall. Bed in the lowest setting, call light within reach.
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
## 2025-06-03 NOTE — PROGRESS NOTES
[facility] Transplant [facility] Medicine  Progress Note    Patient Name: Nikkie Myles  MRN: [id number]  Patient Class: IP- Inpatient   Admission Date: 5/27/2025  Length of Stay: 6 days  Attending Physician: Jen Webster MD  Primary Care Provider: Kena, Primary Doctor    Principal Problem:Crohn's disease of perianal region with complication    Interval history: Patient with improved pain    PEx  Temp:  [97.4 °F (36.3 °C)-98.6 °F (37 °C)]   Pulse:  [80-89]   Resp:  [16-18]   BP: ()/(55-64)   SpO2:  [97 %-100 %]     Intake/Output Summary (Last 24 hours) at 6/3/2025 1729  Last data filed at 6/3/2025 1538  Gross per 24 hour   Intake --   Output 300 ml   Net -300 ml       General Appearance: no acute distress, WD, ill-appearing  Heart: regular rate and rhythm, no heave  Respiratory: Normal respiratory effort, symmetric excursion, bilateral vesicular breath sounds   Abdomen: Soft, non-tender; bowel sounds active  Skin: intact, no rash, no ulcers  Neurologic:  No focal numbness or weakness  Mental status: Alert, oriented x 4, affect appropriate    Recent Labs   Lab 06/01/25  0644 06/02/25  0539 06/03/25  0549   WBC 10.17 8.65 8.96   HGB 7.2* 7.2* 6.5*   HCT 26.3* 24.6* 22.6*   * 546* 530*     Recent Labs   Lab 05/27/25 2109 05/28/25  0035 05/28/25  0808 05/29/25  0729   * 130* 132* 134*   K 3.9 4.7 4.3 3.9   CL 99 101 101 107   CO2 16* 14* 21* 19*   BUN 14 14 12 13   CREATININE 0.9 0.8 0.9 0.8    99 117* 78   CALCIUM 9.4 8.9 8.8 8.9   MG  --   --  1.7  --    PHOS  --   --  4.6*  --    LIPASE 11 9  --   --      Recent Labs   Lab 05/27/25 2109 05/28/25  0035 05/28/25  0808   ALKPHOS 88 79 82   ALT 13 12 11   AST 23 24 21   ALBUMIN 2.8* 2.5* 2.6*   PROT 10.2* 9.1* 9.3*   BILITOT 0.3 0.2 0.2        Scheduled Meds:   ibuprofen  400 mg Oral QID    And    acetaminophen  500 mg Oral QID    cholecalciferol (vitamin D3)  10,000 Units Oral Daily    clindamycin phosphate 1%   Topical (Top) BID     enoxparin  40 mg Subcutaneous Daily    famotidine  20 mg Oral BID    triamcinolone acetonide 0.1%   Topical (Top) BID    And    ketoconazole   Topical (Top) BID    And    magnesium hydroxide 400 mg/5 ml  30 mL Oral BID    oxyCODONE  5 mg Oral QID    pregabalin  75 mg Oral BID     Continuous Infusions:  As Needed:    Current Facility-Administered Medications:     HYDROmorphone, 2 mg, Intravenous, BID PRN **AND** HYDROmorphone, 4 mg, Oral, BID PRN    naloxone, 0.02 mg, Intravenous, PRN    ondansetron, 4 mg, Intravenous, Q8H PRN    oxyCODONE, 10 mg, Oral, Q4H PRN    sodium chloride 0.9%, 10 mL, Intravenous, PRN    Assessment & Plan  Crohn's disease of perianal region with complication  Wound care as per dermatology  IV dilaudid with pain management  Patient to go to LTAC for a couple weeks of wound care with IV pain management  Discussed to yennifer remicaid infusions at [facility] while establishing care here.  We clarified that she can not receive Remicaid while in LTAC  Bacteremia due to Staphylococcus  Awaiting speciation  Perianal pain  Start multi-modal pain control  Acetaminophen 500 mg QID/ibuprofen 400 mg QID/oxycodone 5 mg QID  Pregablain 75 mg BID  Hydromorphone 2 mg IV BID + dilaudid 4 mg BID prn wound care  Oxycodone 10 mg q6h prn pain    VTE Risk Mitigation (From admission, onward)           Ordered     enoxaparin injection 40 mg  Daily         05/28/25 0711     IP VTE HIGH RISK PATIENT  Once         05/28/25 0711     Place sequential compression device  Until discontinued         05/28/25 0711                    Discharge Planning   ENDY: 6/4/2025     Code Status: Full Code   Medical Readiness for Discharge Date:   Discharge Plan A: Long-term acute care facility (LTAC)   Discharge Delays: None known at this time  Jen Webster MD  Department of Hospital Medicine   [facility] - [facility] [place]

## 2025-06-03 NOTE — PROGRESS NOTES
[facility] - Transplant [facility] Medicine  Progress Note    Patient Name: Nikkie Myles  MRN: [id number]  Patient Class: IP- Inpatient   Admission Date: 5/27/2025  Length of Stay: 6 days  Attending Physician: Jen Webster MD  Primary Care Provider: Kena, Primary Doctor    Principal Problem:Crohn's disease of perianal region with complication    Interval history: Patient with on-going perianal pain. Refused wound care last night as it is very painful afterward.     PEx  Temp:  [98 °F (36.7 °C)-98.8 °F (37.1 °C)]   Pulse:  [76-84]   Resp:  [16-18]   BP: ()/(56-70)   SpO2:  [98 %-100 %]     Intake/Output Summary (Last 24 hours) at 6/3/2025 0406  Last data filed at 6/2/2025 0817  Gross per 24 hour   Intake --   Output 300 ml   Net -300 ml       General Appearance: no acute distress, WD, ill-appearing  Heart: regular rate and rhythm, no heave  Respiratory: Normal respiratory effort, symmetric excursion, bilateral vesicular breath sounds   Abdomen: Soft, non-tender; bowel sounds active  Skin: intact, no rash, no ulcers  Neurologic:  No focal numbness or weakness  Mental status: Alert, oriented x 4, affect appropriate    Recent Labs   Lab 05/31/25  0650 06/01/25  0644 06/02/25  0539   WBC 12.19 10.17 8.65   HGB 6.5* 7.2* 7.2*   HCT 22.3* 26.3* 24.6*   * 509* 546*     Recent Labs   Lab 05/27/25 2109 05/28/25  0035 05/28/25  0808 05/29/25  0729   * 130* 132* 134*   K 3.9 4.7 4.3 3.9   CL 99 101 101 107   CO2 16* 14* 21* 19*   BUN 14 14 12 13   CREATININE 0.9 0.8 0.9 0.8    99 117* 78   CALCIUM 9.4 8.9 8.8 8.9   MG  --   --  1.7  --    PHOS  --   --  4.6*  --    LIPASE 11 9  --   --      Recent Labs   Lab 05/27/25  2109 05/28/25  0035 05/28/25  0808   ALKPHOS 88 79 82   ALT 13 12 11   AST 23 24 21   ALBUMIN 2.8* 2.5* 2.6*   PROT 10.2* 9.1* 9.3*   BILITOT 0.3 0.2 0.2        Scheduled Meds:   ibuprofen  400 mg Oral QID    And    acetaminophen  500 mg Oral QID    cholecalciferol (vitamin D3)   10,000 Units Oral Daily    clindamycin phosphate 1%   Topical (Top) BID    enoxparin  40 mg Subcutaneous Daily    famotidine  20 mg Oral BID    triamcinolone acetonide 0.1%   Topical (Top) BID    And    ketoconazole   Topical (Top) BID    And    magnesium hydroxide 400 mg/5 ml  30 mL Oral BID    oxyCODONE  5 mg Oral QID    pregabalin  75 mg Oral BID     Continuous Infusions:  As Needed:    Current Facility-Administered Medications:     HYDROmorphone, 2 mg, Intravenous, BID PRN **AND** HYDROmorphone, 4 mg, Oral, BID PRN    naloxone, 0.02 mg, Intravenous, PRN    ondansetron, 4 mg, Intravenous, Q8H PRN    oxyCODONE, 10 mg, Oral, Q4H PRN    sodium chloride 0.9%, 10 mL, Intravenous, PRN    Assessment & Plan  Crohn's disease of perianal region with complication  Wound care as per dermatology  IV dilaudid with pain management  Patient to go to LTAC for a couple weeks of wound care with IV pain management  Discussed to yennifer remicaid infusions at [facility] while establishing care here.  We clarified that she can not receive Remicaid while in LTAC  Bacteremia due to Staphylococcus  Awaiting speciation  Perianal pain  Start multi-modal pain control  Acetaminophen 500 mg QID/ibuprofen 400 mg QID/oxycodone 5 mg QID  Pregablain 75 mg BID  Hydromorphone 2 mg IV BID + dilaudid 4 mg BID prn wound care  Oxycodone 10 mg q6h prn pain    VTE Risk Mitigation (From admission, onward)           Ordered     enoxaparin injection 40 mg  Daily         05/28/25 0711     IP VTE HIGH RISK PATIENT  Once         05/28/25 0711     Place sequential compression device  Until discontinued         05/28/25 0711                    Discharge Planning   ENDY: 6/3/2025     Code Status: Full Code   Medical Readiness for Discharge Date:   Discharge Plan A: Long-term acute care facility (LTAC)   Discharge Delays: (!) Post-Acute Set-up  Jen Webster MD  Department of Hospital Medicine   [facility] - Transplant Stepdown

## 2025-06-03 NOTE — PROGRESS NOTES
[facility]  Gastroenterology Inflammatory Bowel Disease Inpatient Service   Progress Note    Patient Name: Nikkie Myles  MRN: [id number]  Admission Date: 5/27/2025  Hospital Length of Stay: 6 days  Code Status: Full Code   Attending Provider: Jen Webster MD   Consulting Provider: Rita Nogueira MD  Primary Care Physician: Kena, Primary Doctor  Principal Problem:Crohn's disease of perianal region with complication  Consults  Subjective:   Interval History:   Still continues to have perianal pain, IBD contacted to [facility] for inpatient infliximab    Current IBD meds:  Infliximab 10mg/kg q4week --> 15mg/kg q4week for undetectable drug levels; last dose Dec 2024 due to loss of insurance     ER/Hospital Course:  35 y.o. 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 IBD consulted for Crohn's history.                          She came in 5/27 to the ED complaining of abdominal pain and rectal pain associated with whole body pain, joint pain, chills, but no fevers. Vitally stable, CT showing probable perirectal fistula and anterior abdominal wall wound, chronic inflammatory changes of the colon and TI, diverting loop ileostomy and parastomal hernia containing bowel. CRS consulted and planning EUA for the perirectal fistula. Labs significant for leukocytosis, CRP of 213.3, iron deficiency anemia, urinary tract infection. She is s/p one dose vanc and zosyn in the ED. She denies increase in ostomy output or bleeding from her ostomy site.     IBD Previous History:   Ileocolonic Crohn's disease that has been complicated by severe perianal disease that necessitated diverting ileostomy in 2022, follows with GI at [facility]      Scheduled Meds:   ibuprofen  400 mg Oral QID    And    acetaminophen  500 mg Oral QID    cholecalciferol (vitamin D3)  10,000 Units Oral Daily    clindamycin phosphate 1%   Topical (Top) BID    enoxparin  40 mg Subcutaneous Daily    famotidine  20 mg Oral BID    triamcinolone acetonide 0.1%   Topical (Top) BID    And    ketoconazole   Topical (Top) BID    And    magnesium hydroxide 400 mg/5 ml  30 mL Oral BID    oxyCODONE  5 mg Oral QID    pregabalin  75 mg Oral BID     Continuous Infusions:  PRN Meds:.  Current Facility-Administered Medications:     HYDROmorphone, 2 mg, Intravenous, BID PRN **AND** HYDROmorphone, 4 mg, Oral, BID PRN    naloxone, 0.02 mg, Intravenous, PRN    ondansetron, 4 mg, Intravenous, Q8H PRN    oxyCODONE, 10 mg, Oral, Q4H PRN    sodium chloride 0.9%, 10 mL, Intravenous, PRN    ROS - negative except for otherwise stated in HPI      Objective:   /64 (BP Location: Right arm, Patient Position: Lying)   Pulse 88   Temp 98.1 °F (36.7 °C) (Oral)   Resp 18   Ht 5' (1.524 m)   Wt 87 kg (191 lb 12.8 oz)   LMP 05/05/2025 (Approximate)   SpO2 99%   Breastfeeding No   BMI 37.46 kg/m²   Physical Exam:  Constitutional:  Obese, not in acute distress  HENT: Head: Normal, normocephalic.  Eyes: conjunctiva clear and sclera nonicteric  GI: soft, non-tender, ileostomy with normal output   Skin: perianal and pannus wounds, see media   Neurological: alert, oriented    Assessment/Plan:   Nikkie Myles is a 35 y.o. 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 IBD consulted for Crohn's history and possible inpt infliximab.      Problem List:  Crohn's disease; ileocolonic, perianal  Perianal fistula/abscess  Suprapubic, lower  abdominal skin infection; panniculitis?   Uveitis  Diffuse joint pain  Psoriasis      Recommendations:  - appreciate assistance from dermatology, ID and CRS    - patient has infliximab 10mg/kg infusion due June 12, consider starting tomorrow if cleared from ID   - Avoid NSAIDs given risk of IBD exacerbation  - DVT prophylaxis- IBD pts at 3-4 fold higher likelihood of DVT, DVT prophylaxis- lovenox, MAR reviewed  - Narcotics increase risk of infection along with morbidity/mortality in IBD patients, tylenol preferred and if pain not controlled with tylenol then short-acting morphine preferredain not controlled with tylenol then short-acting morphine preferred    Thank you for involving us in the care of Nikkie Myles. Please call with any additional questions, concerns or changes in the patient's clinical status.     Rita Nogueira MD   Gastroenterology Fellow PGY   [facility]
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
## 2025-06-03 NOTE — ASSESSMENT & PLAN NOTE
Start multi-modal pain control  Acetaminophen 500 mg QID/ibuprofen 400 mg QID/oxycodone 5 mg QID  Pregablain 75 mg BID  Hydromorphone 2 mg IV BID + dilaudid 4 mg BID prn wound care  Oxycodone 10 mg q6h prn pain   Curvilinear Excision Additional Text (Leave Blank If You Do Not Want): The margin was drawn around the clinically apparent lesion.  A curvilinear shape was then drawn on the skin incorporating the lesion and margins.  Incisions were then made along these lines to the appropriate tissue plane and the lesion was extirpated.

## 2025-06-03 NOTE — MEDICAL/APP STUDENT
Subjective     One-liner: 34 y/o F w/hx of Crohn's disease with perianal fistula s/p loop ileostomy, WILLY and enteropathic arthropathy, presents to [facility] for pain related complications of her Crohn's disease. She is currently receiving wound care, pain management for perianal pain and her hospital course is complicated by possible S. Aureus bacteremia.       HPI: 34 y/o F with long-standing Crohn's disease with fistula, with perianal disease s/p diverting loop ileostomy (08/2022), as well as chronic abdominal pain and joint pain. She was previously followed by IBD and CRS at [facility] in 2023, but has since followed with [facility]. She reports that she has not received any biologics or anti-inflammatory treatment since at least December 2024 due to insurance issues (previously maintained on Remicade and Methotrexate). She was just approved for Remicade with plans for infusion in 6/12/25. She has been seen multiple times at [facility] for pain in abdomen, multiple joints, perineum and rectum. She presents to [facility] 5/27 for evaluation of progressive symptoms. Denies fever or chills. Describes purulent drainage from chronic abdominal wall sinuses/pustules located below her ileostomy and at the pannus (see media for photos). Underwent colonoscopy at [facility] at the beginning of May 2025 that showed severe rectosigmoid inflammation. Dermatology, GI, and CRS consulted. Palliative Care consulted for pain management. Per GI, patient is likely going to require a proctocolectomy in the future. Underwent rectal exam under anesthesia with CRS on 5/28, where 2 punch biopsies obtained. In OR revealed extensive, ulcerated, draining wounds around anus, gluteal cleft, pannus, labia and abdominal wall. No evidence of abscess. CRS felt presentation may be related to pyoderma or hydradenitis superimposed on her IBD.  Dermatology consulted and determined no indication for steroids or inpatient infliximab in the setting of acute infections. On admit afebrile with  WBC of 17. Did spike fever of 100.4 on 5/28. CRP elevated at 213. UA infectious, but Ucx no growth. Admit blood cultures 5/27 with CNS in 1/4 bottles. BCID negative for MRSA. Repeat blood cultures no growth to date. Given dose of Zosyn and Vanc, and continued on Vancomycin. Slight leukocytosis today of 14. Patient reports pain is the same, but denies fever, chills, or sweats, or new symptoms. ID consulted for possible bacteremia. Patient follows with [facility], but would like to transfer all her care to [facility].      Interval History (6/3/25): Pt reports no acute overnight events. Reports better pain control under oral oxycodone 10mg Q4H PRN. Still reports pain in her LLQ and rectum. Pain in abdomen rated at 7/10 and rectal pain rated at 5/10. Reports no fatigue, sleepiness or any side effects of altered medication dose. Reported wound care experience was better as well as there was less pain than before. Endorses decreasing wound drainage with no new tenderness, smells, or bleeding. Denies erythema and swelling at wound site. No issues with bowel movement and her ostomy bag output was reported to be normal. Denies N/V and any urinary issues. She is ambulating on her own and reports mild diffuse pain throughout her body. Overall pt reported she has slight improvements.     Interval History (6/2/25): Pt reports no acute events overnight. Complains of severe (9/10) abdominal pain localized to her LLQ. She had not received any pain medication at the time of the visit and reported her oxycodone 5mg was doing too little while the dilaudid was causing excess fatigue. She had not received wound care at the time of the visit, but endorses complying with it daily, despite severe pain afterwards, which she rated to be worse than her current pain. Reports wound drainage is decreasing but there is still tenderness. Denies any smell, erythema, swelling at the site of the wound. She reports no issues with her bowel movements and that  her ostomy bag output is normal. She denies any N/V. She is ambulating on her own, with support from staff and reports diffuse pain throughout her body when she walks.       Review of Systems   Constitutional:  Negative for chills, fatigue, fever and night sweats.   Respiratory:  Negative for cough, chest tightness and shortness of breath.    Cardiovascular:  Negative for chest pain and leg swelling.   Gastrointestinal:  Positive for abdominal pain and rectal pain. Negative for abdominal distention, anal bleeding, diarrhea, nausea, vomiting and fecal incontinence.   Genitourinary:  Negative for decreased urine volume, difficulty urinating, dysuria and urgency.   Neurological:  Negative for dizziness, light-headedness and headaches.          Objective     Current vitals:    Vitals:    06/04/25 0903   BP:    Pulse:    Resp: 18   Temp:       Vitals Ranges  Temp:  [98 °F (36.7 °C)-98.6 °F (37 °C)] 98.3 °F (36.8 °C)  Pulse:  [81-88] 85  Resp:  [16-18] 18  SpO2:  [95 %-100 %] 95 %  BP: ()/(49-70) 102/70   Physical Exam  Constitutional:       Appearance: Normal appearance. She is ill-appearing.   Cardiovascular:      Rate and Rhythm: Normal rate and regular rhythm.      Pulses: Normal pulses.      Heart sounds: Normal heart sounds. No murmur heard.     No friction rub.   Abdominal:      General: Bowel sounds are normal. There is no distension.      Palpations: Abdomen is soft.      Tenderness: There is abdominal tenderness. There is no guarding or rebound.      Comments: Significant tenderness to palpation at epigastric region with mild tenderness at RUQ.    Musculoskeletal:      Right lower leg: No edema.      Left lower leg: No edema.      Comments: Mild tenderness to palpation of the LL near sites of bruising.    Skin:     General: Skin is warm and dry.      Findings: No erythema or rash.   Neurological:      Mental Status: She is oriented to person, place, and time.   Psychiatric:         Mood and Affect: Mood  normal.         Behavior: Behavior normal.     Significant Labs: All pertinent labs within the past 24 hours have been reviewed.  Recent Lab Results         06/03/25  0549        Baso # 0.03       Basophil % 0.3       .6       Eos # 0.26       Eos % 2.9       Gran # (ANC) 5.82       Hematocrit 22.6       Hemoglobin 6.5       Immature Grans (Abs) 0.10  Comment: Mild elevation in immature granulocytes is non specific and can be seen in a variety of conditions including stress response, acute inflammation, trauma and pregnancy. Correlation with other laboratory and clinical findings is essential.       Immature Granulocytes 1.1       Lymph # 2.01       Lymph % 22.4       MCH 15.8       MCHC 28.8       MCV 55       Mono # 0.74       Mono % 8.3       MPV 9.3       Neut % 65.0       nRBC 0       Platelet Count 530       RBC 4.12       RDW 20.8       WBC 8.96           Significant Labs: All pertinent labs within the past 24 hours have been reviewed.  Recent Lab Results         06/04/25  0603   06/03/25  1610        Baso # 0.04         Basophil % 0.4         .0         Eos # 0.31         Eos % 3.2         Gran # (ANC) 6.17         Hematocrit 23.9         Hemoglobin 6.8         Hepatitis C Ab   Non-Reactive       Immature Grans (Abs) 0.07  Comment: Mild elevation in immature granulocytes is non specific and can be seen in a variety of conditions including stress response, acute inflammation, trauma and pregnancy. Correlation with other laboratory and clinical findings is essential.         Immature Granulocytes 0.7         Lymph # 2.36         Lymph % 24.4         MCH 15.6         MCHC 28.5         MCV 55         Mono # 0.74         Mono % 7.6         MPV 9.2         Neut % 63.7         nRBC 0         Platelet Count 558         RBC 4.35         RDW 21.0         Treponema Pallidum Antibodies (IgG, IgM)   Non-Reactive  Comment: RPR and/or RPR Titer to follow on all reactive results.       WBC 9.69            "    Significant Labs: {Results:65554::"All pertinent labs within the past 24 hours have been reviewed."}  Imaging:  No new imaging of note.        Assessment and Plan     Crohn's disease  Pt presented to [facility] for complications due to her Crohn's disease. She currently reports 9/10 LLQ and rectal pain. Imaging on 5/28 indicated diffuse colonic fatty infiltration, rectal thickening and stranding suggesting chronic inflammation supporting dx of Crohn's disease. A perirectal fistula is also suggested. Pain control has improved with increasing dosage of PRN oral oxycodone from 5mg to 10mg.      Plan:  Pt is to receive Remicade (infliximab) infusions in June.   Continue wound care.  Continue pain management with Oxycodone 5mg & IV dilaudid PRN.    Overall, pt has improved slightly.       "

## 2025-06-03 NOTE — PLAN OF CARE
06/03/25 1324   Post-Acute Status   Post-Acute Authorization Placement   Post-Acute Placement Status Set-up Complete/Auth obtained   Discharge Delays None known at this time   Discharge Plan   Discharge Plan A Long-term acute care facility (LTAC)   Discharge Plan B Home with family;Home Health     CM spoke with Nicky Yadavs at [facility], patient has been accepted and authorized for LTAC placement. CM will notify family of Admission. CM will continue to follow up until discharged.     Discharge Plan A and Plan B have been determined by review of patient's clinical status, future medical and therapeutic needs, and coverage/benefits for post-acute care in coordination with multidisciplinary team members.     Ahsan Duque, RN, BSN  Case Management  [phone]

## 2025-06-03 NOTE — PLAN OF CARE
Lower abdominal & posterior dressings changed per orders. Premedicated with dilaudid 2mg IV & dilaudid 4mg PO. Pt independently changed ostomy.  Per CM patient accepted to LTAC.  ID evaluated patient for Remicade appropriateness.  Keep bed low & locked, call light in reach, nonslip footwear in use, calls appropriately for assistance.   Maintain regular diet  MVI w/ minerals daily to ensure 100% RDA met   Ensure plus tid  Consider adding thiamine 100 mg daily 2/2 poor PO intake/ malnutrition  Suggest add Vit C 500 mg BID, add Zinc Sulfate 220 mg x 10 days to promote wound healing   Record PO intake in EMR after each meal (nursing.)   Confirm Goals of Care regarding nutrition support   Monitor PO intake, tolerance, labs and weight.

## 2025-06-04 PROBLEM — R79.9 CONTAMINATION OF BLOOD CULTURE: Status: ACTIVE | Noted: 2025-05-30

## 2025-06-04 LAB
ABSOLUTE EOSINOPHIL (OHS): 0.31 K/UL
ABSOLUTE MONOCYTE (OHS): 0.74 K/UL (ref 0.3–1)
ABSOLUTE NEUTROPHIL COUNT (OHS): 6.17 K/UL (ref 1.8–7.7)
BASOPHILS # BLD AUTO: 0.04 K/UL
BASOPHILS NFR BLD AUTO: 0.4 %
CRP SERPL-MCNC: 120 MG/L
ERYTHROCYTE [DISTWIDTH] IN BLOOD BY AUTOMATED COUNT: 21 % (ref 11.5–14.5)
HCT VFR BLD AUTO: 23.9 % (ref 37–48.5)
HGB BLD-MCNC: 6.8 GM/DL (ref 12–16)
IMM GRANULOCYTES # BLD AUTO: 0.07 K/UL (ref 0–0.04)
IMM GRANULOCYTES NFR BLD AUTO: 0.7 % (ref 0–0.5)
LYMPHOCYTES # BLD AUTO: 2.36 K/UL (ref 1–4.8)
MCH RBC QN AUTO: 15.6 PG (ref 27–31)
MCHC RBC AUTO-ENTMCNC: 28.5 G/DL (ref 32–36)
MCV RBC AUTO: 55 FL (ref 82–98)
MITOGEN MINUS NIL (OHS): 9.85
NIL TB SYNCED (OHS): 0.15
NUCLEATED RBC (/100WBC) (OHS): 0 /100 WBC
PLATELET # BLD AUTO: 558 K/UL (ref 150–450)
PMV BLD AUTO: 9.2 FL (ref 9.2–12.9)
QUANTIFERON GOLD INTERP (OHS): NEGATIVE
RBC # BLD AUTO: 4.35 M/UL (ref 4–5.4)
RELATIVE EOSINOPHIL (OHS): 3.2 %
RELATIVE LYMPHOCYTE (OHS): 24.4 % (ref 18–48)
RELATIVE MONOCYTE (OHS): 7.6 % (ref 4–15)
RELATIVE NEUTROPHIL (OHS): 63.7 % (ref 38–73)
TB1 AG MINUS NIL (OHS): 0.05
TB2 AG MINUS NIL (OHS): 0.01
WBC # BLD AUTO: 9.69 K/UL (ref 3.9–12.7)

## 2025-06-04 PROCEDURE — 63600175 PHARM REV CODE 636 W HCPCS: Performed by: INTERNAL MEDICINE

## 2025-06-04 PROCEDURE — 25000003 PHARM REV CODE 250: Performed by: INTERNAL MEDICINE

## 2025-06-04 PROCEDURE — 85025 COMPLETE CBC W/AUTO DIFF WBC: CPT | Performed by: STUDENT IN AN ORGANIZED HEALTH CARE EDUCATION/TRAINING PROGRAM

## 2025-06-04 PROCEDURE — 86140 C-REACTIVE PROTEIN: CPT | Performed by: STUDENT IN AN ORGANIZED HEALTH CARE EDUCATION/TRAINING PROGRAM

## 2025-06-04 PROCEDURE — 63600175 PHARM REV CODE 636 W HCPCS: Performed by: STUDENT IN AN ORGANIZED HEALTH CARE EDUCATION/TRAINING PROGRAM

## 2025-06-04 PROCEDURE — 99232 SBSQ HOSP IP/OBS MODERATE 35: CPT | Mod: ,,, | Performed by: INTERNAL MEDICINE

## 2025-06-04 PROCEDURE — 20600001 HC STEP DOWN PRIVATE ROOM

## 2025-06-04 PROCEDURE — 36415 COLL VENOUS BLD VENIPUNCTURE: CPT | Performed by: STUDENT IN AN ORGANIZED HEALTH CARE EDUCATION/TRAINING PROGRAM

## 2025-06-04 RX ORDER — SODIUM CHLORIDE 0.9 % (FLUSH) 0.9 %
10 SYRINGE (ML) INJECTION
Status: DISCONTINUED | OUTPATIENT
Start: 2025-06-04 | End: 2025-06-04

## 2025-06-04 RX ORDER — HEPARIN 100 UNIT/ML
500 SYRINGE INTRAVENOUS
OUTPATIENT
Start: 2025-06-04

## 2025-06-04 RX ORDER — DIPHENHYDRAMINE HYDROCHLORIDE 50 MG/ML
50 INJECTION, SOLUTION INTRAMUSCULAR; INTRAVENOUS ONCE AS NEEDED
OUTPATIENT
Start: 2025-06-04

## 2025-06-04 RX ORDER — DIPHENHYDRAMINE HYDROCHLORIDE 50 MG/ML
50 INJECTION, SOLUTION INTRAMUSCULAR; INTRAVENOUS ONCE AS NEEDED
Status: DISCONTINUED | OUTPATIENT
Start: 2025-06-04 | End: 2025-06-05

## 2025-06-04 RX ORDER — DIPHENHYDRAMINE HYDROCHLORIDE 50 MG/ML
50 INJECTION, SOLUTION INTRAMUSCULAR; INTRAVENOUS ONCE AS NEEDED
Status: DISCONTINUED | OUTPATIENT
Start: 2025-06-04 | End: 2025-06-04

## 2025-06-04 RX ORDER — EPINEPHRINE 0.3 MG/.3ML
0.3 INJECTION SUBCUTANEOUS ONCE AS NEEDED
Status: DISCONTINUED | OUTPATIENT
Start: 2025-06-04 | End: 2025-06-05 | Stop reason: HOSPADM

## 2025-06-04 RX ORDER — HEPARIN 100 UNIT/ML
500 SYRINGE INTRAVENOUS
Status: DISCONTINUED | OUTPATIENT
Start: 2025-06-04 | End: 2025-06-04

## 2025-06-04 RX ORDER — ACETAMINOPHEN 325 MG/1
650 TABLET ORAL
OUTPATIENT
Start: 2025-06-04

## 2025-06-04 RX ORDER — ACETAMINOPHEN 325 MG/1
650 TABLET ORAL
Status: COMPLETED | OUTPATIENT
Start: 2025-06-04 | End: 2025-06-04

## 2025-06-04 RX ORDER — SODIUM CHLORIDE 0.9 % (FLUSH) 0.9 %
10 SYRINGE (ML) INJECTION
OUTPATIENT
Start: 2025-06-04

## 2025-06-04 RX ORDER — EPINEPHRINE 0.3 MG/.3ML
0.3 INJECTION SUBCUTANEOUS ONCE AS NEEDED
OUTPATIENT
Start: 2025-06-04

## 2025-06-04 RX ORDER — ACETAMINOPHEN 325 MG/1
650 TABLET ORAL
Status: ACTIVE | OUTPATIENT
Start: 2025-06-04 | End: 2025-06-04

## 2025-06-04 RX ORDER — IPRATROPIUM BROMIDE AND ALBUTEROL SULFATE 2.5; .5 MG/3ML; MG/3ML
3 SOLUTION RESPIRATORY (INHALATION)
OUTPATIENT
Start: 2025-06-04

## 2025-06-04 RX ORDER — IPRATROPIUM BROMIDE AND ALBUTEROL SULFATE 2.5; .5 MG/3ML; MG/3ML
3 SOLUTION RESPIRATORY (INHALATION)
Status: ACTIVE | OUTPATIENT
Start: 2025-06-04 | End: 2025-06-04

## 2025-06-04 RX ORDER — ACETAMINOPHEN 325 MG/1
650 TABLET ORAL
Status: DISCONTINUED | OUTPATIENT
Start: 2025-06-04 | End: 2025-06-04

## 2025-06-04 RX ADMIN — HYDROCORTISONE SODIUM SUCCINATE 200 MG: 100 INJECTION, POWDER, FOR SOLUTION INTRAMUSCULAR; INTRAVENOUS at 02:06

## 2025-06-04 RX ADMIN — HYDROMORPHONE HYDROCHLORIDE 2 MG: 2 INJECTION, SOLUTION INTRAMUSCULAR; INTRAVENOUS; SUBCUTANEOUS at 11:06

## 2025-06-04 RX ADMIN — CLINDAMYCIN PHOSPHATE: 10 GEL TOPICAL at 09:06

## 2025-06-04 RX ADMIN — CLINDAMYCIN PHOSPHATE: 10 GEL TOPICAL at 08:06

## 2025-06-04 RX ADMIN — IBUPROFEN 400 MG: 400 TABLET ORAL at 05:06

## 2025-06-04 RX ADMIN — ACETAMINOPHEN 500 MG: 500 TABLET ORAL at 08:06

## 2025-06-04 RX ADMIN — IBUPROFEN 400 MG: 400 TABLET ORAL at 01:06

## 2025-06-04 RX ADMIN — PREGABALIN 75 MG: 75 CAPSULE ORAL at 09:06

## 2025-06-04 RX ADMIN — SODIUM CHLORIDE 800 MG: 9 INJECTION, SOLUTION INTRAVENOUS at 04:06

## 2025-06-04 RX ADMIN — OXYCODONE 5 MG: 5 TABLET ORAL at 08:06

## 2025-06-04 RX ADMIN — PREGABALIN 75 MG: 75 CAPSULE ORAL at 08:06

## 2025-06-04 RX ADMIN — ENOXAPARIN SODIUM 40 MG: 40 INJECTION SUBCUTANEOUS at 05:06

## 2025-06-04 RX ADMIN — OXYCODONE 5 MG: 5 TABLET ORAL at 01:06

## 2025-06-04 RX ADMIN — ACETAMINOPHEN 500 MG: 500 TABLET ORAL at 01:06

## 2025-06-04 RX ADMIN — HYDROMORPHONE HYDROCHLORIDE 4 MG: 2 TABLET ORAL at 11:06

## 2025-06-04 RX ADMIN — ACETAMINOPHEN 500 MG: 500 TABLET ORAL at 09:06

## 2025-06-04 RX ADMIN — ACETAMINOPHEN 500 MG: 500 TABLET ORAL at 05:06

## 2025-06-04 RX ADMIN — ACETAMINOPHEN 650 MG: 325 TABLET ORAL at 02:06

## 2025-06-04 RX ADMIN — OXYCODONE 5 MG: 5 TABLET ORAL at 09:06

## 2025-06-04 RX ADMIN — IBUPROFEN 400 MG: 400 TABLET ORAL at 09:06

## 2025-06-04 RX ADMIN — IBUPROFEN 400 MG: 400 TABLET ORAL at 08:06

## 2025-06-04 RX ADMIN — OXYCODONE 5 MG: 5 TABLET ORAL at 05:06

## 2025-06-04 RX ADMIN — FAMOTIDINE 20 MG: 20 TABLET, FILM COATED ORAL at 09:06

## 2025-06-04 RX ADMIN — CHOLECALCIFEROL TAB 125 MCG (5000 UNIT) 10000 UNITS: 125 TAB at 09:06

## 2025-06-04 RX ADMIN — FAMOTIDINE 20 MG: 20 TABLET, FILM COATED ORAL at 08:06

## 2025-06-04 NOTE — PROGRESS NOTES
[facility]  Progress Note    Patient Name: Nikkei Myles  MRN: [id number]  Patient Class: IP- Inpatient   Admission Date: 5/27/2025  Length of Stay: 7 days  Attending Physician: Jen Webster MD  Primary Care Provider: Kena, Primary Doctor    Principal Problem:Crohn's disease of perianal region with complication    Interval history: Patient with improved pain    PEx  Temp:  [98 °F (36.7 °C)-98.6 °F (37 °C)]   Pulse:  [81-87]   Resp:  [16-18]   BP: ()/(49-81)   SpO2:  [95 %-100 %]     Intake/Output Summary (Last 24 hours) at 6/4/2025 1448  Last data filed at 6/4/2025 1300  Gross per 24 hour   Intake 600 ml   Output 300 ml   Net 300 ml       General Appearance: no acute distress, WD, ill-appearing  Heart: regular rate and rhythm, no heave  Respiratory: Normal respiratory effort, symmetric excursion, bilateral vesicular breath sounds   Abdomen: Soft, non-tender; bowel sounds active  Skin: intact, no rash, no ulcers  Neurologic:  No focal numbness or weakness  Mental status: Alert, oriented x 4, affect appropriate    Recent Labs   Lab 06/02/25  0539 06/03/25  0549 06/04/25  0603   WBC 8.65 8.96 9.69   HGB 7.2* 6.5* 6.8*   HCT 24.6* 22.6* 23.9*   * 530* 558*     Recent Labs   Lab 05/29/25  0729   *   K 3.9      CO2 19*   BUN 13   CREATININE 0.8   GLU 78   CALCIUM 8.9     Scheduled Meds:   ibuprofen  400 mg Oral QID    And    acetaminophen  500 mg Oral QID    cholecalciferol (vitamin D3)  10,000 Units Oral Daily    clindamycin phosphate 1%   Topical (Top) BID    enoxparin  40 mg Subcutaneous Daily    famotidine  20 mg Oral BID    inFLIXimab-dyyb (INFLECTRA) 800 mg in 0.9% NaCl 285 mL IVPB  800 mg Intravenous Once    triamcinolone acetonide 0.1%   Topical (Top) BID    And    ketoconazole   Topical (Top) BID    And    magnesium hydroxide 400 mg/5 ml  30 mL Oral BID    oxyCODONE  5 mg Oral QID    pregabalin  75 mg Oral BID     Continuous Infusions:  As Needed:     Current Facility-Administered Medications:     acetaminophen, 650 mg, Oral, PRN    albuterol-ipratropium, 3 mL, Nebulization, PRN    diphenhydrAMINE, 50 mg, Intravenous, Once PRN    EPINEPHrine, 0.3 mg, Intramuscular, Once PRN    HYDROmorphone, 2 mg, Intravenous, BID PRN **AND** HYDROmorphone, 4 mg, Oral, BID PRN    naloxone, 0.02 mg, Intravenous, PRN    ondansetron, 4 mg, Intravenous, Q8H PRN    oxyCODONE, 10 mg, Oral, Q4H PRN    sodium chloride 0.9%, 10 mL, Intravenous, PRN    Assessment & Plan  Crohn's disease of perianal region with complication  Wound care as per dermatology  IV dilaudid with pain management  Patient to go to LTAC for a couple weeks of wound care with IV pain management  Discussed to conitnue remicaid infusions at [facility] while establishing care here.  We clarified that she can not receive Remicaid while in LTAC  GI consulted and agreed patient would benefit from starting remicaide as in-patient given the severity of the disease  Contamination of blood culture  Culture grew coagulation negative staphylococcal  Perianal pain  Start multi-modal pain control  Acetaminophen 500 mg QID/ibuprofen 400 mg QID/oxycodone 5 mg QID  Pregablain 75 mg BID  Hydromorphone 2 mg IV BID + dilaudid 4 mg BID prn wound care  Oxycodone 10 mg q6h prn pain    VTE Risk Mitigation (From admission, onward)           Ordered     enoxaparin injection 40 mg  Daily         05/28/25 0711     IP VTE HIGH RISK PATIENT  Once         05/28/25 0711     Place sequential compression device  Until discontinued         05/28/25 0711                    Discharge Planning   ENDY: 6/4/2025     Code Status: Full Code   Medical Readiness for Discharge Date:   Discharge Plan A: Long-term acute care facility (LTAC)   Discharge Delays: None known at this time  Jen Webster MD  Department of Hospital Medicine   [facility]

## 2025-06-04 NOTE — PROGRESS NOTES
[facility]  Gastroenterology Inflammatory Bowel Disease Inpatient Service   Progress Note    Patient Name: Nikkie Myles  MRN: [id number]  Admission Date: 5/27/2025  Hospital Length of Stay: 7 days  Code Status: Full Code   Attending Provider: Jen Webster MD   Consulting Provider: Rita Nogueira MD  Primary Care Physician: Kena, Primary Doctor  Principal Problem:Crohn's disease of perianal region with complication  Consults  Subjective:   Interval History:   Still continues to have perianal pain, ID cleared patient to start infliximab     Current IBD meds:  Infliximab 10mg/kg q4week --> 15mg/kg q4week for undetectable drug levels; last dose Dec 2024 due to loss of insurance     ER/Hospital Course:  35 y.o. 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 IBD consulted for Crohn's history.                          She came in 5/27 to the ED complaining of abdominal pain and rectal pain associated with whole body pain, joint pain, chills, but no fevers. Vitally stable, CT showing probable perirectal fistula and anterior abdominal wall wound, chronic inflammatory changes of the colon and TI, diverting loop ileostomy and parastomal hernia containing bowel. CRS consulted and planning EUA for the perirectal fistula. Labs significant for leukocytosis, CRP of 213.3, iron deficiency anemia, urinary tract infection. She is s/p one dose vanc and zosyn in the ED. She denies increase in ostomy output or bleeding from her ostomy site.     IBD Previous History:   Ileocolonic Crohn's disease that has been complicated by severe perianal disease that necessitated diverting ileostomy in 2022, follows with GI at [facility]     Scheduled  Meds:   ibuprofen  400 mg Oral QID    And    acetaminophen  500 mg Oral QID    cholecalciferol (vitamin D3)  10,000 Units Oral Daily    clindamycin phosphate 1%   Topical (Top) BID    enoxparin  40 mg Subcutaneous Daily    famotidine  20 mg Oral BID    inFLIXimab-dyyb (INFLECTRA) 800 mg in 0.9% NaCl 285 mL IVPB  800 mg Intravenous Once    triamcinolone acetonide 0.1%   Topical (Top) BID    And    ketoconazole   Topical (Top) BID    And    magnesium hydroxide 400 mg/5 ml  30 mL Oral BID    oxyCODONE  5 mg Oral QID    pregabalin  75 mg Oral BID     Continuous Infusions:  PRN Meds:.  Current Facility-Administered Medications:     acetaminophen, 650 mg, Oral, PRN    albuterol-ipratropium, 3 mL, Nebulization, PRN    diphenhydrAMINE, 50 mg, Intravenous, Once PRN    EPINEPHrine, 0.3 mg, Intramuscular, Once PRN    HYDROmorphone, 2 mg, Intravenous, BID PRN **AND** HYDROmorphone, 4 mg, Oral, BID PRN    naloxone, 0.02 mg, Intravenous, PRN    ondansetron, 4 mg, Intravenous, Q8H PRN    oxyCODONE, 10 mg, Oral, Q4H PRN    sodium chloride 0.9%, 10 mL, Intravenous, PRN    ROS - negative except for otherwise stated in HPI      Objective:   /81 (BP Location: Left arm, Patient Position: Lying)   Pulse 86   Temp 98.1 °F (36.7 °C) (Oral)   Resp 18   Ht 5' (1.524 m)   Wt 87 kg (191 lb 12.8 oz)   LMP 05/05/2025 (Approximate)   SpO2 98%   Breastfeeding No   BMI 37.46 kg/m²   Physical Exam:  Constitutional:  Obese, not in acute distress  HENT: Head: Normal, normocephalic.  Eyes: conjunctiva clear and sclera nonicteric  GI: soft, non-tender, ileostomy with normal output   Skin: perianal and pannus wounds, see media   Neurological: alert, oriented    Assessment/Plan:   Nikkie Myles is a 35 y.o. 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 IBD consulted for Crohn's history and possible inpt infliximab.      Problem List:  Crohn's disease; ileocolonic, perianal  Perianal fistula/abscess  Suprapubic, lower abdominal skin infection; panniculitis?   Uveitis  Diffuse joint pain  Psoriasis      Recommendations:  - appreciate assistance from dermatology, ID and CRS    - patient has infliximab 10mg/kg infusion due June 12, will start today   - Avoid NSAIDs given risk of IBD exacerbation  - DVT prophylaxis- IBD pts at 3-4 fold higher likelihood of DVT, DVT prophylaxis- lovenox, MAR reviewed  - Narcotics increase risk of infection along with morbidity/mortality in IBD patients, tylenol preferred and if pain not controlled with tylenol then short-acting morphine preferredain not controlled with tylenol then short-acting morphine preferred    Thank you for involving us in the care of Nikkie Myles. Please call with any additional questions, concerns or changes in the patient's clinical status.     Rita Nogueira MD   Gastroenterology Fellow PGY   [facility]

## 2025-06-04 NOTE — PLAN OF CARE
06/04/25 1104   Rounds   Attendance Provider;Nurse    Discharge Plan A Long-term acute care facility (LTAC)   Why the patient remains in the hospital Requires continued medical care   Transition of Care Barriers None     ENDY changed to 3 more days due to possible start of infliximab (ID/Gastro recs). CM notified [facility] of ENDY change. CM will continue to follow.     Discharge Plan A and Plan B have been determined by review of patient's clinical status, future medical and therapeutic needs, and coverage/benefits for post-acute care in coordination with multidisciplinary team members.     Ahsan Duque, RN, BSN  Case Management  [phone]

## 2025-06-04 NOTE — ASSESSMENT & PLAN NOTE
Wound care as per dermatology  IV dilaudid with pain management  Patient to go to LTAC for a couple weeks of wound care with IV pain management  Discussed to luiznue remicaid infusions at [facility] while establishing care here.  We clarified that she can not receive Remicaid while in LTAC  GI consulted and agreed patient would benefit from starting remicaide as in-patient given the severity of the disease

## 2025-06-04 NOTE — PLAN OF CARE
Pt Aaox4. Afebrile. VSS. Pain managed with current plan. No report of injury or fall. Bed in the lowest setting, call light within reach.

## 2025-06-05 ENCOUNTER — HOSPITAL ENCOUNTER (INPATIENT)
Age: 35
LOS: 14 days | Discharge: HOME | DRG: 387 | End: 2025-06-19
Attending: HOSPITALIST | Admitting: FAMILY MEDICINE
Payer: COMMERCIAL

## 2025-06-05 VITALS
OXYGEN SATURATION: 98 % | WEIGHT: 191.81 LBS | SYSTOLIC BLOOD PRESSURE: 119 MMHG | HEIGHT: 60 IN | BODY MASS INDEX: 37.66 KG/M2 | DIASTOLIC BLOOD PRESSURE: 74 MMHG | HEART RATE: 84 BPM | RESPIRATION RATE: 16 BRPM | TEMPERATURE: 98 F

## 2025-06-05 DIAGNOSIS — K50.119 CROHN'S DISEASE OF PERIANAL REGION WITH COMPLICATION: ICD-10-CM

## 2025-06-05 DIAGNOSIS — K50.813 CROHN'S DISEASE OF BOTH SMALL AND LARGE INTESTINE WITH FISTULA: ICD-10-CM

## 2025-06-05 DIAGNOSIS — B95.8 BACTEREMIA DUE TO STAPHYLOCOCCUS: ICD-10-CM

## 2025-06-05 DIAGNOSIS — R78.81 BACTEREMIA DUE TO STAPHYLOCOCCUS: ICD-10-CM

## 2025-06-05 PROBLEM — D75.839 THROMBOCYTOSIS: Status: ACTIVE | Noted: 2025-06-05

## 2025-06-05 LAB
ABSOLUTE EOSINOPHIL (OHS): 0.08 K/UL
ABSOLUTE EOSINOPHIL (OHS): 0.09 K/UL
ABSOLUTE MONOCYTE (OHS): 0.78 K/UL (ref 0.3–1)
ABSOLUTE MONOCYTE (OHS): 0.82 K/UL (ref 0.3–1)
ABSOLUTE NEUTROPHIL COUNT (OHS): 6.61 K/UL (ref 1.8–7.7)
ABSOLUTE NEUTROPHIL COUNT (OHS): 7.08 K/UL (ref 1.8–7.7)
BASOPHILS # BLD AUTO: 0.04 K/UL
BASOPHILS # BLD AUTO: 0.04 K/UL
BASOPHILS NFR BLD AUTO: 0.3 %
BASOPHILS NFR BLD AUTO: 0.3 %
CRP SERPL-MCNC: 95.2 MG/L
ERYTHROCYTE [DISTWIDTH] IN BLOOD BY AUTOMATED COUNT: 20.9 % (ref 11.5–14.5)
ERYTHROCYTE [DISTWIDTH] IN BLOOD BY AUTOMATED COUNT: 21.1 % (ref 11.5–14.5)
HCT VFR BLD AUTO: 24.1 % (ref 37–48.5)
HCT VFR BLD AUTO: 24.6 % (ref 37–48.5)
HGB BLD-MCNC: 7 GM/DL (ref 12–16)
HGB BLD-MCNC: 7.1 GM/DL (ref 12–16)
IMM GRANULOCYTES # BLD AUTO: 0.29 K/UL (ref 0–0.04)
IMM GRANULOCYTES # BLD AUTO: 0.31 K/UL (ref 0–0.04)
IMM GRANULOCYTES NFR BLD AUTO: 2.4 % (ref 0–0.5)
IMM GRANULOCYTES NFR BLD AUTO: 2.7 % (ref 0–0.5)
LYMPHOCYTES # BLD AUTO: 3.62 K/UL (ref 1–4.8)
LYMPHOCYTES # BLD AUTO: 3.89 K/UL (ref 1–4.8)
MCH RBC QN AUTO: 16 PG (ref 27–31)
MCH RBC QN AUTO: 16 PG (ref 27–31)
MCHC RBC AUTO-ENTMCNC: 28.9 G/DL (ref 32–36)
MCHC RBC AUTO-ENTMCNC: 29 G/DL (ref 32–36)
MCV RBC AUTO: 55 FL (ref 82–98)
MCV RBC AUTO: 56 FL (ref 82–98)
NUCLEATED RBC (/100WBC) (OHS): 0 /100 WBC
NUCLEATED RBC (/100WBC) (OHS): 0 /100 WBC
PLATELET # BLD AUTO: 509 K/UL (ref 150–450)
PLATELET # BLD AUTO: 513 K/UL (ref 150–450)
PMV BLD AUTO: 8.9 FL (ref 9.2–12.9)
PMV BLD AUTO: 9.2 FL (ref 9.2–12.9)
RBC # BLD AUTO: 4.38 M/UL (ref 4–5.4)
RBC # BLD AUTO: 4.43 M/UL (ref 4–5.4)
RELATIVE EOSINOPHIL (OHS): 0.7 %
RELATIVE EOSINOPHIL (OHS): 0.8 %
RELATIVE LYMPHOCYTE (OHS): 31.5 % (ref 18–48)
RELATIVE LYMPHOCYTE (OHS): 32 % (ref 18–48)
RELATIVE MONOCYTE (OHS): 6.4 % (ref 4–15)
RELATIVE MONOCYTE (OHS): 7.1 % (ref 4–15)
RELATIVE NEUTROPHIL (OHS): 57.6 % (ref 38–73)
RELATIVE NEUTROPHIL (OHS): 58.2 % (ref 38–73)
WBC # BLD AUTO: 11.49 K/UL (ref 3.9–12.7)
WBC # BLD AUTO: 12.16 K/UL (ref 3.9–12.7)

## 2025-06-05 PROCEDURE — 85025 COMPLETE CBC W/AUTO DIFF WBC: CPT | Performed by: INTERNAL MEDICINE

## 2025-06-05 PROCEDURE — 11000001 HC ACUTE MED/SURG PRIVATE ROOM

## 2025-06-05 PROCEDURE — 85025 COMPLETE CBC W/AUTO DIFF WBC: CPT | Performed by: STUDENT IN AN ORGANIZED HEALTH CARE EDUCATION/TRAINING PROGRAM

## 2025-06-05 PROCEDURE — 30233N1 TRANSFUSION OF NONAUTOLOGOUS RED BLOOD CELLS INTO PERIPHERAL VEIN, PERCUTANEOUS APPROACH: ICD-10-PCS | Performed by: HOSPITALIST

## 2025-06-05 PROCEDURE — 76937 US GUIDE VASCULAR ACCESS: CPT

## 2025-06-05 PROCEDURE — 63600175 PHARM REV CODE 636 W HCPCS: Performed by: INTERNAL MEDICINE

## 2025-06-05 PROCEDURE — 36415 COLL VENOUS BLD VENIPUNCTURE: CPT | Performed by: STUDENT IN AN ORGANIZED HEALTH CARE EDUCATION/TRAINING PROGRAM

## 2025-06-05 PROCEDURE — 36410 VNPNXR 3YR/> PHY/QHP DX/THER: CPT

## 2025-06-05 PROCEDURE — 86140 C-REACTIVE PROTEIN: CPT | Performed by: STUDENT IN AN ORGANIZED HEALTH CARE EDUCATION/TRAINING PROGRAM

## 2025-06-05 PROCEDURE — C1751 CATH, INF, PER/CENT/MIDLINE: HCPCS

## 2025-06-05 PROCEDURE — 25000003 PHARM REV CODE 250: Performed by: INTERNAL MEDICINE

## 2025-06-05 RX ORDER — SODIUM FERRIC GLUCONATE COMPLEX IN SUCROSE 12.5 MG/ML
250 INJECTION INTRAVENOUS DAILY
Status: CANCELLED | OUTPATIENT
Start: 2025-06-05 | End: 2025-06-08

## 2025-06-05 RX ORDER — EPINEPHRINE 0.3 MG/.3ML
0.3 INJECTION SUBCUTANEOUS ONCE AS NEEDED
Status: CANCELLED | OUTPATIENT
Start: 2025-06-04 | End: 2036-10-31

## 2025-06-05 RX ORDER — HYDROMORPHONE HYDROCHLORIDE 2 MG/ML
2 INJECTION, SOLUTION INTRAMUSCULAR; INTRAVENOUS; SUBCUTANEOUS 2 TIMES DAILY PRN
Status: DISCONTINUED | OUTPATIENT
Start: 2025-06-05 | End: 2025-06-12

## 2025-06-05 RX ORDER — FAMOTIDINE 20 MG/1
20 TABLET, FILM COATED ORAL 2 TIMES DAILY
Status: DISCONTINUED | OUTPATIENT
Start: 2025-06-05 | End: 2025-06-19 | Stop reason: HOSPADM

## 2025-06-05 RX ORDER — ACETAMINOPHEN 500 MG
500 TABLET ORAL 4 TIMES DAILY
Status: DISCONTINUED | OUTPATIENT
Start: 2025-06-05 | End: 2025-06-06

## 2025-06-05 RX ORDER — NALOXONE HCL 0.4 MG/ML
0.02 VIAL (ML) INJECTION
Status: DISCONTINUED | OUTPATIENT
Start: 2025-06-05 | End: 2025-06-19 | Stop reason: HOSPADM

## 2025-06-05 RX ORDER — MUPIROCIN 20 MG/G
OINTMENT TOPICAL 2 TIMES DAILY
Status: CANCELLED | OUTPATIENT
Start: 2025-06-05 | End: 2025-06-10

## 2025-06-05 RX ORDER — SODIUM FERRIC GLUCONATE COMPLEX IN SUCROSE 12.5 MG/ML
250 INJECTION INTRAVENOUS ONCE
Status: COMPLETED | OUTPATIENT
Start: 2025-06-05 | End: 2025-06-05

## 2025-06-05 RX ORDER — OXYCODONE HYDROCHLORIDE 5 MG/1
5 TABLET ORAL 4 TIMES DAILY
Status: DISCONTINUED | OUTPATIENT
Start: 2025-06-05 | End: 2025-06-19 | Stop reason: HOSPADM

## 2025-06-05 RX ORDER — PREGABALIN 75 MG/1
150 CAPSULE ORAL 2 TIMES DAILY
Status: CANCELLED | OUTPATIENT
Start: 2025-06-05

## 2025-06-05 RX ORDER — TRIAMCINOLONE ACETONIDE 1 MG/G
CREAM TOPICAL 2 TIMES DAILY
Status: CANCELLED | OUTPATIENT
Start: 2025-06-05

## 2025-06-05 RX ORDER — ONDANSETRON HYDROCHLORIDE 2 MG/ML
4 INJECTION, SOLUTION INTRAVENOUS EVERY 8 HOURS PRN
Status: DISCONTINUED | OUTPATIENT
Start: 2025-06-05 | End: 2025-06-19 | Stop reason: HOSPADM

## 2025-06-05 RX ORDER — SODIUM CHLORIDE 0.9 % (FLUSH) 0.9 %
10 SYRINGE (ML) INJECTION
Status: DISCONTINUED | OUTPATIENT
Start: 2025-06-05 | End: 2025-06-19 | Stop reason: HOSPADM

## 2025-06-05 RX ORDER — CLINDAMYCIN PHOSPHATE 10 MG/G
GEL TOPICAL 2 TIMES DAILY
Status: DISCONTINUED | OUTPATIENT
Start: 2025-06-05 | End: 2025-06-19 | Stop reason: HOSPADM

## 2025-06-05 RX ORDER — OXYCODONE HYDROCHLORIDE 10 MG/1
10 TABLET ORAL EVERY 4 HOURS PRN
Status: DISCONTINUED | OUTPATIENT
Start: 2025-06-05 | End: 2025-06-12

## 2025-06-05 RX ORDER — ACETAMINOPHEN 500 MG
500 TABLET ORAL 4 TIMES DAILY
Status: CANCELLED | OUTPATIENT
Start: 2025-06-05

## 2025-06-05 RX ORDER — IBUPROFEN 400 MG/1
400 TABLET, FILM COATED ORAL 4 TIMES DAILY
Status: DISCONTINUED | OUTPATIENT
Start: 2025-06-05 | End: 2025-06-06

## 2025-06-05 RX ORDER — HYDROMORPHONE HYDROCHLORIDE 2 MG/1
4 TABLET ORAL 2 TIMES DAILY PRN
Refills: 0 | Status: CANCELLED | OUTPATIENT
Start: 2025-06-05

## 2025-06-05 RX ORDER — ENOXAPARIN SODIUM 100 MG/ML
40 INJECTION SUBCUTANEOUS EVERY 24 HOURS
Status: CANCELLED | OUTPATIENT
Start: 2025-06-05

## 2025-06-05 RX ORDER — OXYCODONE HYDROCHLORIDE 5 MG/1
5 TABLET ORAL 4 TIMES DAILY
Refills: 0 | Status: CANCELLED | OUTPATIENT
Start: 2025-06-05

## 2025-06-05 RX ORDER — SODIUM CHLORIDE 0.9 % (FLUSH) 0.9 %
10 SYRINGE (ML) INJECTION
Status: CANCELLED | OUTPATIENT
Start: 2025-06-05

## 2025-06-05 RX ORDER — ADHESIVE BANDAGE
30 BANDAGE TOPICAL 2 TIMES DAILY
Status: DISCONTINUED | OUTPATIENT
Start: 2025-06-05 | End: 2025-06-17

## 2025-06-05 RX ORDER — ACETAMINOPHEN 500 MG
10000 TABLET ORAL DAILY
Status: DISCONTINUED | OUTPATIENT
Start: 2025-06-06 | End: 2025-06-06

## 2025-06-05 RX ORDER — FAMOTIDINE 20 MG/1
20 TABLET, FILM COATED ORAL 2 TIMES DAILY
Status: CANCELLED | OUTPATIENT
Start: 2025-06-05

## 2025-06-05 RX ORDER — KETOCONAZOLE 20 MG/G
CREAM TOPICAL 2 TIMES DAILY
Status: CANCELLED | OUTPATIENT
Start: 2025-06-05

## 2025-06-05 RX ORDER — ADHESIVE BANDAGE
30 BANDAGE TOPICAL 2 TIMES DAILY
Status: CANCELLED | OUTPATIENT
Start: 2025-06-05

## 2025-06-05 RX ORDER — OXYCODONE HYDROCHLORIDE 10 MG/1
10 TABLET ORAL EVERY 4 HOURS PRN
Refills: 0 | Status: CANCELLED | OUTPATIENT
Start: 2025-06-05

## 2025-06-05 RX ORDER — ACETAMINOPHEN 500 MG
10000 TABLET ORAL DAILY
Status: CANCELLED | OUTPATIENT
Start: 2025-06-06

## 2025-06-05 RX ORDER — ONDANSETRON HYDROCHLORIDE 2 MG/ML
4 INJECTION, SOLUTION INTRAVENOUS EVERY 8 HOURS PRN
Status: CANCELLED | OUTPATIENT
Start: 2025-06-05

## 2025-06-05 RX ORDER — CLINDAMYCIN PHOSPHATE 10 MG/G
GEL TOPICAL 2 TIMES DAILY
Status: CANCELLED | OUTPATIENT
Start: 2025-06-05

## 2025-06-05 RX ORDER — TRIAMCINOLONE ACETONIDE 1 MG/G
CREAM TOPICAL 2 TIMES DAILY
Status: DISCONTINUED | OUTPATIENT
Start: 2025-06-05 | End: 2025-06-10

## 2025-06-05 RX ORDER — HYDROMORPHONE HYDROCHLORIDE 4 MG/1
4 TABLET ORAL 2 TIMES DAILY PRN
Status: DISCONTINUED | OUTPATIENT
Start: 2025-06-05 | End: 2025-06-12

## 2025-06-05 RX ORDER — KETOCONAZOLE 20 MG/G
CREAM TOPICAL 2 TIMES DAILY
Status: DISCONTINUED | OUTPATIENT
Start: 2025-06-05 | End: 2025-06-10

## 2025-06-05 RX ORDER — HYDROMORPHONE HYDROCHLORIDE 2 MG/ML
2 INJECTION, SOLUTION INTRAMUSCULAR; INTRAVENOUS; SUBCUTANEOUS 2 TIMES DAILY PRN
Refills: 0 | Status: CANCELLED | OUTPATIENT
Start: 2025-06-05

## 2025-06-05 RX ORDER — SODIUM CHLORIDE 0.9 % (FLUSH) 0.9 %
10 SYRINGE (ML) INJECTION EVERY 12 HOURS PRN
Status: DISCONTINUED | OUTPATIENT
Start: 2025-06-05 | End: 2025-06-05 | Stop reason: HOSPADM

## 2025-06-05 RX ORDER — IBUPROFEN 400 MG/1
400 TABLET, FILM COATED ORAL 4 TIMES DAILY
Status: CANCELLED | OUTPATIENT
Start: 2025-06-05

## 2025-06-05 RX ORDER — PREGABALIN 75 MG/1
150 CAPSULE ORAL 2 TIMES DAILY
Status: DISCONTINUED | OUTPATIENT
Start: 2025-06-05 | End: 2025-06-19 | Stop reason: HOSPADM

## 2025-06-05 RX ORDER — NALOXONE HCL 0.4 MG/ML
0.02 VIAL (ML) INJECTION
Status: CANCELLED | OUTPATIENT
Start: 2025-06-05

## 2025-06-05 RX ORDER — EPINEPHRINE 0.3 MG/.3ML
0.3 INJECTION SUBCUTANEOUS ONCE AS NEEDED
Status: DISCONTINUED | OUTPATIENT
Start: 2025-06-05 | End: 2025-06-05

## 2025-06-05 RX ORDER — ENOXAPARIN SODIUM 100 MG/ML
40 INJECTION SUBCUTANEOUS EVERY 24 HOURS
Status: DISCONTINUED | OUTPATIENT
Start: 2025-06-05 | End: 2025-06-19 | Stop reason: HOSPADM

## 2025-06-05 RX ADMIN — IBUPROFEN 400 MG: 400 TABLET, FILM COATED ORAL at 06:06

## 2025-06-05 RX ADMIN — PREGABALIN 75 MG: 75 CAPSULE ORAL at 09:06

## 2025-06-05 RX ADMIN — IBUPROFEN 400 MG: 400 TABLET ORAL at 09:06

## 2025-06-05 RX ADMIN — OXYCODONE 5 MG: 5 TABLET ORAL at 09:06

## 2025-06-05 RX ADMIN — OXYCODONE 5 MG: 5 TABLET ORAL at 01:06

## 2025-06-05 RX ADMIN — ACETAMINOPHEN 500 MG: 500 TABLET ORAL at 01:06

## 2025-06-05 RX ADMIN — IBUPROFEN 400 MG: 400 TABLET, FILM COATED ORAL at 09:06

## 2025-06-05 RX ADMIN — OXYCODONE HYDROCHLORIDE 5 MG: 5 TABLET ORAL at 09:06

## 2025-06-05 RX ADMIN — HYDROMORPHONE HYDROCHLORIDE 4 MG: 2 TABLET ORAL at 11:06

## 2025-06-05 RX ADMIN — FAMOTIDINE 20 MG: 20 TABLET, FILM COATED ORAL at 09:06

## 2025-06-05 RX ADMIN — IBUPROFEN 400 MG: 400 TABLET ORAL at 01:06

## 2025-06-05 RX ADMIN — HYDROMORPHONE HYDROCHLORIDE 2 MG: 2 INJECTION, SOLUTION INTRAMUSCULAR; INTRAVENOUS; SUBCUTANEOUS at 11:06

## 2025-06-05 RX ADMIN — ACETAMINOPHEN 500 MG: 500 TABLET, FILM COATED ORAL at 09:06

## 2025-06-05 RX ADMIN — SODIUM CHLORIDE 250 MG: 9 INJECTION, SOLUTION INTRAVENOUS at 12:06

## 2025-06-05 RX ADMIN — PREGABALIN 150 MG: 75 CAPSULE ORAL at 09:06

## 2025-06-05 RX ADMIN — OXYCODONE HYDROCHLORIDE 5 MG: 5 TABLET ORAL at 06:06

## 2025-06-05 RX ADMIN — ACETAMINOPHEN 500 MG: 500 TABLET ORAL at 09:06

## 2025-06-05 RX ADMIN — ENOXAPARIN SODIUM 40 MG: 40 INJECTION SUBCUTANEOUS at 06:06

## 2025-06-05 RX ADMIN — CLINDAMYCIN PHOSPHATE: 10 GEL TOPICAL at 09:06

## 2025-06-05 RX ADMIN — ACETAMINOPHEN 500 MG: 500 TABLET, FILM COATED ORAL at 06:06

## 2025-06-05 RX ADMIN — CHOLECALCIFEROL TAB 125 MCG (5000 UNIT) 10000 UNITS: 125 TAB at 09:06

## 2025-06-05 NOTE — PLAN OF CARE
06/05/25 1116   Post-Acute Status   Post-Acute Authorization Placement   Post-Acute Placement Status Set-up Complete/Auth obtained   Discharge Delays None known at this time     Patient will be transferring to [facility]. Report to be called [phone], patient will go to room 235. Transport ordered.     Discharge Plan A and Plan B have been determined by review of patient's clinical status, future medical and therapeutic needs, and coverage/benefits for post-acute care in coordination with multidisciplinary team members.     Ahsan Duque, RN, BSN  Case Management  [phone]

## 2025-06-05 NOTE — PLAN OF CARE
[facility]  Discharge Final Note    Primary Care Provider: Kena, Primary Doctor    Expected Discharge Date: 6/5/2025    Patient discharged to [facility] via [facility] transportation.     Patient's bedside nurse called report to facility, provided discharge instructions and made father aware of discharge.    Discharge Plan A and Plan B have been determined by review of patient's clinical status, future medical and therapeutic needs, and coverage/benefits for post-acute care in coordination with multidisciplinary team members.        Final Discharge Note (most recent)       Final Note - 06/05/25 1116          Post-Acute Status    Post-Acute Authorization Placement     Post-Acute Placement Status Set-up Complete/Auth obtained     Discharge Delays None known at this time                     Important Message from Medicare             After-discharge care                Destination       *[facility]   Service: Long Term Acute Care    [address] [place] [address]   Phone: [phone]                       No future appointments.    SW scheduled post-discharge follow-up appointment and information added to AVS.     Ahsan Duque, RN, BSN  Case Management  [phone]

## 2025-06-05 NOTE — CONSULTS
[facility] VASCULAR ACCESS NOTE       Bed:96896/95569 A    Single lumen 18G X 10CM Midline placed in the Left Brachial using Ultrasound Guidance.    Vessel image recorded and saved to EMR.    Indication: Pain Treatment at   Technique: Over the Wire (PowerGlide)    Attempts: 1  Max dwell date: 7/4/25  Lot number: [id number]    Per INS Standards of Practice:     Do NOT infuse irritants/vesicants, pressors, or parenteral nutrition via Midline because catheter tip is located in a deep vein and early signs/symptoms of extravasation may not be detected.     Vesicant:  pH <5 or >9  Osmolarity >600 mOsm/L    Common antimicrobial medications labeled as vesicants include Vancomycin, Acyclovir, and Remdesivir.    Katt Pablo RN

## 2025-06-05 NOTE — PLAN OF CARE
POC updated and reviewed at bedside   Ms. Myles arrived to unit this afternoon (see prior note). POC reviewed, concerns and questions answered.  Monitoring closely.           Problem: Adult Inpatient Plan of Care  Goal: Plan of Care Review  Outcome: Ongoing  Goal: Patient-Specific Goal (Individualized)  Outcome: Ongoing  Goal: Absence of Hospital-Acquired Illness or Injury  Outcome: Ongoing  Goal: Optimal Comfort and Wellbeing  Outcome: Ongoing  Goal: Readiness for Transition of Care  Outcome: Ongoing     Problem: Infection  Goal: Absence of Infection Signs and Symptoms  Outcome: Ongoing     Problem: Wound  Goal: Optimal Coping  Outcome: Ongoing  Goal: Optimal Functional Ability  Outcome: Ongoing  Goal: Absence of Infection Signs and Symptoms  Outcome: Ongoing  Goal: Improved Oral Intake  Outcome: Ongoing  Goal: Optimal Pain Control and Function  Outcome: Ongoing  Goal: Skin Health and Integrity  Outcome: Ongoing  Goal: Optimal Wound Healing  Outcome: Ongoing     Problem: Adult Inpatient Plan of Care  Goal: Plan of Care Review  Outcome: Ongoing  Goal: Patient-Specific Goal (Individualized)  Outcome: Ongoing  Goal: Absence of Hospital-Acquired Illness or Injury  Outcome: Ongoing  Goal: Optimal Comfort and Wellbeing  Outcome: Ongoing  Goal: Readiness for Transition of Care  Outcome: Ongoing     Problem: Infection  Goal: Absence of Infection Signs and Symptoms  Outcome: Ongoing     Problem: Wound  Goal: Optimal Coping  Outcome: Ongoing  Goal: Optimal Functional Ability  Outcome: Ongoing  Goal: Absence of Infection Signs and Symptoms  Outcome: Ongoing  Goal: Improved Oral Intake  Outcome: Ongoing  Goal: Optimal Pain Control and Function  Outcome: Ongoing  Goal: Skin Health and Integrity  Outcome: Ongoing  Goal: Optimal Wound Healing  Outcome: Ongoing

## 2025-06-05 NOTE — TREATMENT PLAN
GI Treatment Plan    Nikkie Myles is a 35 y.o. female admitted to hospital 5/27/2025 (Hospital Day: 10) due to Crohn's disease of perianal region with complication.     Interval History  Tolerated infliximab infusion well.     Objective  Temp:  [98.1 °F (36.7 °C)-98.5 °F (36.9 °C)] 98.1 °F (36.7 °C) (06/05 1152)  Pulse:  [68-91] 84 (06/05 1152)  BP: ()/(55-79) 119/74 (06/05 1152)  Resp:  [16-19] 16 (06/05 1323)  SpO2:  [96 %-100 %] 98 % (06/05 1152)    Laboratory  Recent Labs   Lab 06/03/25  0549 06/04/25  0603 06/05/25  0909   HGB 6.5* 6.8* 7.1*  7.0*     Assessment and Plan  Nikkie Myles is a 35 y.o. 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 IBD consulted for Crohn's history and possible inpt infliximab.      Problem List:  Crohn's disease; ileocolonic, perianal  Perianal fistula/abscess  Suprapubic, lower abdominal skin infection; panniculitis?   Uveitis  Diffuse joint pain  Psoriasis      Recommendations:  - appreciate assistance from dermatology, ID and CRS    - patient has infliximab 10mg/kg infusion due June 12, started here, s/p infusion yesterday    - Avoid NSAIDs given risk of IBD exacerbation  - DVT prophylaxis- IBD pts at 3-4 fold higher likelihood of DVT, DVT prophylaxis- lovenox, MAR reviewed  - Narcotics increase risk of infection along with morbidity/mortality in IBD patients, tylenol preferred and if pain not controlled with tylenol then short-acting morphine preferredain not controlled with tylenol then short-acting morphine preferred    Thank you for involving us in the care of Nikkie Myles. Please call with any additional questions, concerns or changes in the patient's clinical status.    Rita Nogueira,  MD  Gastroenterology Fellow, PGY IV

## 2025-06-05 NOTE — NURSING
PT arrived to unit (room 235) via stretcher on room air with EMT x2. Three patient belonging bags containing personal belongings and personal ostomy care supplies with PT- placed in closet in room 235. This RN accepting PT.    PT's father at bedside with PT, as well as cousin Shannon.

## 2025-06-05 NOTE — PLAN OF CARE
Pt aaox4 vswnl and c/o pain tolerable with scheduled pain meds. Bed in low position and callbell within reach. Wound care done on dayshift for labia, rectum and abd. Illeostomy bag intact with liquid brown stool. Left axilla wound with antibiotic ointment applied. Pt ambulates independently to bathroom.

## 2025-06-05 NOTE — NURSING
Phone report called to [facility], patient transported via [facility] to facility.Personal belongings sent with patient. Father at  and aware of patient new location.

## 2025-06-06 LAB
ABO + RH BLD: NORMAL
ALBUMIN SERPL BCP-MCNC: 2.3 G/DL (ref 3.5–5.2)
ALP SERPL-CCNC: 64 UNIT/L (ref 40–150)
ALT SERPL W/O P-5'-P-CCNC: 10 UNIT/L (ref 10–44)
ANION GAP (OHS): 8 MMOL/L (ref 8–16)
AST SERPL-CCNC: 15 UNIT/L (ref 11–45)
BILIRUB DIRECT SERPL-MCNC: 0.1 MG/DL (ref 0.1–0.3)
BILIRUB SERPL-MCNC: 0.1 MG/DL (ref 0.1–1)
BLD PROD TYP BPU: NORMAL
BLOOD UNIT EXPIRATION DATE: NORMAL
BLOOD UNIT TYPE CODE: 5100
BUN SERPL-MCNC: 13 MG/DL (ref 6–20)
CALCIUM SERPL-MCNC: 8.5 MG/DL (ref 8.7–10.5)
CHLORIDE SERPL-SCNC: 110 MMOL/L (ref 95–110)
CO2 SERPL-SCNC: 20 MMOL/L (ref 23–29)
CREAT SERPL-MCNC: 0.6 MG/DL (ref 0.5–1.4)
CROSSMATCH INTERPRETATION: NORMAL
DISPENSE STATUS: NORMAL
ERYTHROCYTE [DISTWIDTH] IN BLOOD BY AUTOMATED COUNT: 21.5 % (ref 11.5–14.5)
FERRITIN SERPL-MCNC: 197 NG/ML (ref 20–300)
GFR SERPLBLD CREATININE-BSD FMLA CKD-EPI: >60 ML/MIN/1.73/M2
GLUCOSE SERPL-MCNC: 72 MG/DL (ref 70–110)
HCT VFR BLD AUTO: 23.3 % (ref 37–48.5)
HGB BLD-MCNC: 6.8 GM/DL (ref 12–16)
INDIRECT COOMBS: NORMAL
IRON SATN MFR SERPL: 59 % (ref 20–50)
IRON SERPL-MCNC: 132 UG/DL (ref 30–160)
MAGNESIUM SERPL-MCNC: 1.5 MG/DL (ref 1.6–2.6)
MCH RBC QN AUTO: 16.3 PG (ref 27–31)
MCHC RBC AUTO-ENTMCNC: 29.2 G/DL (ref 32–36)
MCV RBC AUTO: 56 FL (ref 82–98)
PHOSPHATE SERPL-MCNC: 4.2 MG/DL (ref 2.7–4.5)
PLATELET # BLD AUTO: 529 K/UL (ref 150–450)
PMV BLD AUTO: 9.4 FL (ref 9.2–12.9)
POTASSIUM SERPL-SCNC: 4 MMOL/L (ref 3.5–5.1)
PROT SERPL-MCNC: 7.8 GM/DL (ref 6–8.4)
RBC # BLD AUTO: 4.16 M/UL (ref 4–5.4)
RH BLD: NORMAL
SODIUM SERPL-SCNC: 138 MMOL/L (ref 136–145)
SPECIMEN OUTDATE: NORMAL
TIBC SERPL-MCNC: 222 UG/DL (ref 250–450)
TRANSFERRIN SERPL-MCNC: 150 MG/DL (ref 200–375)
UNIT NUMBER: NORMAL
WBC # BLD AUTO: 9.1 K/UL (ref 3.9–12.7)

## 2025-06-06 PROCEDURE — 63600175 PHARM REV CODE 636 W HCPCS: Performed by: INTERNAL MEDICINE

## 2025-06-06 PROCEDURE — 82728 ASSAY OF FERRITIN: CPT | Performed by: STUDENT IN AN ORGANIZED HEALTH CARE EDUCATION/TRAINING PROGRAM

## 2025-06-06 PROCEDURE — 84466 ASSAY OF TRANSFERRIN: CPT | Performed by: STUDENT IN AN ORGANIZED HEALTH CARE EDUCATION/TRAINING PROGRAM

## 2025-06-06 PROCEDURE — 25000003 PHARM REV CODE 250: Performed by: INTERNAL MEDICINE

## 2025-06-06 PROCEDURE — 36430 TRANSFUSION BLD/BLD COMPNT: CPT

## 2025-06-06 PROCEDURE — 84100 ASSAY OF PHOSPHORUS: CPT | Performed by: HOSPITALIST

## 2025-06-06 PROCEDURE — 97535 SELF CARE MNGMENT TRAINING: CPT

## 2025-06-06 PROCEDURE — 86920 COMPATIBILITY TEST SPIN: CPT | Performed by: STUDENT IN AN ORGANIZED HEALTH CARE EDUCATION/TRAINING PROGRAM

## 2025-06-06 PROCEDURE — 11000001 HC ACUTE MED/SURG PRIVATE ROOM

## 2025-06-06 PROCEDURE — 82248 BILIRUBIN DIRECT: CPT | Performed by: HOSPITALIST

## 2025-06-06 PROCEDURE — 80053 COMPREHEN METABOLIC PANEL: CPT | Performed by: HOSPITALIST

## 2025-06-06 PROCEDURE — 97530 THERAPEUTIC ACTIVITIES: CPT

## 2025-06-06 PROCEDURE — 97162 PT EVAL MOD COMPLEX 30 MIN: CPT

## 2025-06-06 PROCEDURE — 85027 COMPLETE CBC AUTOMATED: CPT | Performed by: HOSPITALIST

## 2025-06-06 PROCEDURE — P9016 RBC LEUKOCYTES REDUCED: HCPCS | Performed by: STUDENT IN AN ORGANIZED HEALTH CARE EDUCATION/TRAINING PROGRAM

## 2025-06-06 PROCEDURE — 83735 ASSAY OF MAGNESIUM: CPT | Performed by: HOSPITALIST

## 2025-06-06 PROCEDURE — 25000003 PHARM REV CODE 250: Performed by: HOSPITALIST

## 2025-06-06 PROCEDURE — 97165 OT EVAL LOW COMPLEX 30 MIN: CPT

## 2025-06-06 PROCEDURE — 36415 COLL VENOUS BLD VENIPUNCTURE: CPT | Performed by: STUDENT IN AN ORGANIZED HEALTH CARE EDUCATION/TRAINING PROGRAM

## 2025-06-06 PROCEDURE — 25000003 PHARM REV CODE 250: Performed by: STUDENT IN AN ORGANIZED HEALTH CARE EDUCATION/TRAINING PROGRAM

## 2025-06-06 PROCEDURE — 86901 BLOOD TYPING SEROLOGIC RH(D): CPT | Performed by: HOSPITALIST

## 2025-06-06 PROCEDURE — 36415 COLL VENOUS BLD VENIPUNCTURE: CPT | Performed by: HOSPITALIST

## 2025-06-06 RX ORDER — MUPIROCIN 20 MG/G
OINTMENT TOPICAL 2 TIMES DAILY
Status: DISPENSED | OUTPATIENT
Start: 2025-06-06 | End: 2025-06-11

## 2025-06-06 RX ORDER — ERGOCALCIFEROL 1.25 MG/1
50000 CAPSULE ORAL
Status: DISCONTINUED | OUTPATIENT
Start: 2025-06-07 | End: 2025-06-19 | Stop reason: HOSPADM

## 2025-06-06 RX ORDER — ACETAMINOPHEN 500 MG
1000 TABLET ORAL 3 TIMES DAILY
Status: DISCONTINUED | OUTPATIENT
Start: 2025-06-06 | End: 2025-06-19 | Stop reason: HOSPADM

## 2025-06-06 RX ORDER — HYDROCODONE BITARTRATE AND ACETAMINOPHEN 500; 5 MG/1; MG/1
TABLET ORAL
Status: DISCONTINUED | OUTPATIENT
Start: 2025-06-06 | End: 2025-06-19 | Stop reason: HOSPADM

## 2025-06-06 RX ADMIN — MUPIROCIN: 20 OINTMENT TOPICAL at 09:06

## 2025-06-06 RX ADMIN — HYDROMORPHONE HYDROCHLORIDE 2 MG: 2 INJECTION, SOLUTION INTRAMUSCULAR; INTRAVENOUS; SUBCUTANEOUS at 09:06

## 2025-06-06 RX ADMIN — CLINDAMYCIN PHOSPHATE: 10 GEL TOPICAL at 09:06

## 2025-06-06 RX ADMIN — PREGABALIN 150 MG: 75 CAPSULE ORAL at 08:06

## 2025-06-06 RX ADMIN — OXYCODONE HYDROCHLORIDE 5 MG: 5 TABLET ORAL at 12:06

## 2025-06-06 RX ADMIN — MUPIROCIN: 20 OINTMENT TOPICAL at 12:06

## 2025-06-06 RX ADMIN — ACETAMINOPHEN 500 MG: 500 TABLET, FILM COATED ORAL at 08:06

## 2025-06-06 RX ADMIN — ENOXAPARIN SODIUM 40 MG: 40 INJECTION SUBCUTANEOUS at 05:06

## 2025-06-06 RX ADMIN — IBUPROFEN 400 MG: 400 TABLET, FILM COATED ORAL at 08:06

## 2025-06-06 RX ADMIN — OXYCODONE HYDROCHLORIDE 5 MG: 5 TABLET ORAL at 09:06

## 2025-06-06 RX ADMIN — FAMOTIDINE 20 MG: 20 TABLET, FILM COATED ORAL at 09:06

## 2025-06-06 RX ADMIN — PREGABALIN 150 MG: 75 CAPSULE ORAL at 09:06

## 2025-06-06 RX ADMIN — CLINDAMYCIN PHOSPHATE: 10 GEL TOPICAL at 08:06

## 2025-06-06 RX ADMIN — ACETAMINOPHEN 1000 MG: 500 TABLET, FILM COATED ORAL at 02:06

## 2025-06-06 RX ADMIN — KETOCONAZOLE: 20 CREAM TOPICAL at 08:06

## 2025-06-06 RX ADMIN — TRIAMCINOLONE ACETONIDE: 1 CREAM TOPICAL at 08:06

## 2025-06-06 RX ADMIN — MAGNESIUM HYDROXIDE 2400 MG: 1200 LIQUID ORAL at 08:06

## 2025-06-06 RX ADMIN — FAMOTIDINE 20 MG: 20 TABLET, FILM COATED ORAL at 08:06

## 2025-06-06 RX ADMIN — OXYCODONE HYDROCHLORIDE 5 MG: 5 TABLET ORAL at 08:06

## 2025-06-06 RX ADMIN — OXYCODONE HYDROCHLORIDE 5 MG: 5 TABLET ORAL at 05:06

## 2025-06-06 RX ADMIN — ACETAMINOPHEN 1000 MG: 500 TABLET, FILM COATED ORAL at 09:06

## 2025-06-06 RX ADMIN — HYDROMORPHONE HYDROCHLORIDE 4 MG: 4 TABLET ORAL at 09:06

## 2025-06-06 NOTE — H&P
[facility] Medicine  History and Physical Exam  Room: 235/235   Patient Name: Nikkie Myles  MRN: [id number]  Admit Date: 6/5/2025   Length of Stay: 1  Attending Physician: Girma Levi MD  Nurse Practitioner: Terri Parada NP  Code Status: Full Code    Date of Service: 06/06/2025    Principal Problem:   Crohn's disease of perianal region with complication      HPI obtained from patient, review of previous hospital records, and summarization.   HPI     Nikkie Myles is a 35 y.o. 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  She presented to [facility] ED with with perianal pain, abdominal cramps, panniculitis, and diffuse joint pain.  CT showed probable perirectal fistula and anterior abdominal wall wound, chronic inflammatory changes of the colon and TI, diverting loop ileostomy parastomal hernia containing bowel.  CRS was consulted, and underwent rectal exam under anesthesia with biopsy.  Found to have evidence of deep ulceration wounds checking up her gluteal cleft, in her pannus, and in both labia.  She also had a deep ulcerated fistula trach along the right posterior perianal skin.  Wound seemed to be consistent with hidradenitis/pyoderma.  Biopsies were obtained.  Dermatology, ID, and GI consulted.  She received infliximab 10mg/kg on 6/4.  Pending biopsy reports for pyoderma/hidradenitis.  Ongoing needs for long-term pain management and wound care.    Patient is being admitted to [facility] for pain management and wound care.    Interval History    No issues overnight   Labs reviewed and listed below, no critical values   Hemoglobin is 6.8 today, will transfuse 1 unit PRBC       Past Medical History:      Past Medical History: Patient has a past medical history of Anal  fistula, Chronic diarrhea, Crohn's disease (2022), Enteropathic arthropathy, and Eye injury.    Past Surgical History: Patient has a past surgical history that includes  section, low transverse (2013);  section with tubal ligation (Bilateral, 2020);  section (); Examination under anesthesia (N/A, 8/15/2022); Flexible sigmoidoscopy (N/A, 8/15/2022); Esophagogastroduodenoscopy (N/A, 2022); Colonoscopy (N/A, 2022); Laparoscopic ileostomy (N/A, 2022); Digital rectal examination under anesthesia (N/A, 2025); and Biopsy of anus (N/A, 2025).    Social History: Patient reports that she has quit smoking. Her smoking use included cigarettes. She has a 5 pack-year smoking history. She has never used smokeless tobacco. She reports current alcohol use. She reports that she does not use drugs.    Family History:  family history includes Glaucoma in her maternal grandmother; Hypertension in her father and mother; No Known Problems in her brother, maternal aunt, maternal grandfather, maternal uncle, paternal aunt, paternal grandfather, paternal grandmother, paternal uncle, and sister.    Home Medications: reconciled     Allergies: Patient has no known allergies.      Subjective:     Review of Systems   Constitutional:  Positive for malaise/fatigue. Negative for chills and fever.   Respiratory:  Negative for cough and shortness of breath.    Cardiovascular:  Negative for chest pain and leg swelling.   Gastrointestinal:  Positive for abdominal pain. Negative for nausea and vomiting.   Genitourinary:  Negative for dysuria, flank pain and urgency.   Musculoskeletal:  Negative for back pain and neck pain.        Perineal pain and pain to pannus wound   Neurological:  Positive for weakness. Negative for dizziness and headaches.   Psychiatric/Behavioral:  Negative for depression. The patient is not nervous/anxious.          Objective:     Vital Signs:  Temp:  [97.7 °F (36.5  "°C)-98.4 °F (36.9 °C)]   Pulse:  [63-84]   Resp:  [12-18]   BP: ()/(50-77)   SpO2:  [98 %-100 %]     Body mass index is 36.77 kg/m².           Intake and Output:    Intake/Output Summary (Last 24 hours) at 6/6/2025 1024  Last data filed at 6/6/2025 0859  Gross per 24 hour   Intake 240 ml   Output --   Net 240 ml        Physical Exam  HENT:      Head: Normocephalic and atraumatic.      Mouth/Throat:      Mouth: Mucous membranes are moist.      Pharynx: Oropharynx is clear.   Eyes:      Extraocular Movements: Extraocular movements intact.      Pupils: Pupils are equal, round, and reactive to light.   Cardiovascular:      Rate and Rhythm: Normal rate and regular rhythm.   Pulmonary:      Effort: Pulmonary effort is normal.      Breath sounds: Normal breath sounds.   Abdominal:      General: Bowel sounds are normal. There is no distension.      Palpations: Abdomen is soft.      Comments: Ileostomy in place  Wound to pannus CAMMY   Musculoskeletal:      Right lower leg: No edema.      Left lower leg: No edema.   Skin:     General: Skin is warm and dry.      Capillary Refill: Capillary refill takes less than 2 seconds.      Comments: Perineal wounds +   Neurological:      General: No focal deficit present.      Mental Status: She is alert.   Psychiatric:         Mood and Affect: Mood normal.         Behavior: Behavior normal.         Patient consistently followed by Wound Care NP    Labs:  Recent Labs   Lab 06/04/25  0603 06/05/25  0909 06/06/25  0549   WBC 9.69 11.49  12.16 9.10   HGB 6.8* 7.1*  7.0* 6.8*   HCT 23.9* 24.6*  24.1* 23.3*   * 513*  509* 529*     Recent Labs   Lab 06/06/25  0549      K 4.0      CO2 20*   BUN 13   CREATININE 0.6   GLU 72   CALCIUM 8.5*   MG 1.5*   PHOS 4.2     Recent Labs   Lab 06/06/25  0549   ALKPHOS 64   ALT 10   AST 15   ALBUMIN 2.3*   PROT 7.8   BILITOT 0.1     No results for input(s): "POCTGLUCOSE" in the last 72 hours.    Meds Scheduled:   ibuprofen  400 mg " Oral QID    And    acetaminophen  500 mg Oral QID    cholecalciferol (vitamin D3)  10,000 Units Oral Daily    clindamycin phosphate 1%   Topical (Top) BID    enoxparin  40 mg Subcutaneous Daily    famotidine  20 mg Oral BID    triamcinolone acetonide 0.1%   Topical (Top) BID    And    ketoconazole   Topical (Top) BID    And    magnesium hydroxide 400 mg/5 ml  30 mL Oral BID    oxyCODONE  5 mg Oral QID    pregabalin  150 mg Oral BID         Current Inpatient Problem List:  Active Hospital Problems    Diagnosis  POA    *Crohn's disease of perianal region with complication [K50.119]  Yes    Thrombocytosis [D75.839]  Yes    Crohn's disease of both small and large intestine with fistula [K50.813]  Yes    Iron deficiency anemia due to chronic blood loss [D50.0]  Yes    Symptomatic anemia [D64.9]  Yes      Resolved Hospital Problems   No resolved problems to display.         Assessment / Plan:   Crohn's disease with perianal region with complication   Crohn's disease of both small and large intestine with fistula  Seen by CRS while in the hospital, wounds appear to be more extensive than Crohn's related fistula disease.  Suspicious for pyoderma or hidradenitis superimposed on her IBD  Biopsies obtained, showing ulcerated skin and subcutis with acute and chronic inflammation and several non-necrotizing granulomas  Received dose of Remicade on 06/04   Avoid NSAIDs given risk of IBD exacerbation  Pain management with oxycodone, Lyrica, and scheduled acetaminophen  clindamycin 1% solution BID to L axillae    Mix approximately equal parts triamcinolone 0.1% cream, ketoconazole 2% cream, and milk of magnesia in a sterile urine container. Shake vigorously to mix. Apply to bilateral inguinal cleft BID.   Hemoglobins baths at least twice weekly   Bleach baths at home 1-2 times weekly    Hidradenitis suppurativa   [facility]   Has been seen previously in dermatology clinic when she was well controlled on infliximab per  GI. However, unfortunately lost insurance/ infliximab and now is flaring  Resumed on infliximab 06/04/2025  Receives infliximab every 4 weeks, so next dose would be around 7/2  Will need to f/u with dermatology outpatient  Wound followed and managed by consistent, contracted [facility] NP    Thrombocytosis   Likely reactive due to IBD  Trend on regular labs   DVT prophylaxis with enoxaparin    Iron-deficiency anemia due to chronic blood loss  Symptomatic anemia  Trend hemoglobin on serial CBC   Transfuse for hemoglobin/hematocrit less than 7/21 or if becomes hemodynamically unstable  Will obtain ferritin/iron panel today  Hemoglobin is 6.8 today, will plan to transfuse 1 unit PRBC    Ileostomy in place   Routine care    Anterior pelvis ulceration   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 Yip), and GI  Follow up appointments:   No future appointments.      Terri Parada, NP  [facility] Medicine  [facility]    Total time spent: 132 minutes  Non Face to Face Time: 89 minutes  Description of Time: Time spent in the care of new patient (counseling patient on plan of care, orienting to new facility, coordinating patient care, extensive chart review of previous medical records and summarization, medication reconciliation, initiating consults, reviewing and interpreting labs and any pertinent imaging, reviewing all transferring facility orders, reviewing all transferring facility medications, initiating new  orders, and documentation. Discussed patient's clinical course and plan of care with attending, Dr. Levi.

## 2025-06-06 NOTE — PROGRESS NOTES
06/06/25 1320        Wound 05/27/25 2225 Ulceration lower Abdomen   Date First Assessed/Time First Assessed: 05/27/25 2225   Present on Original Admission: Yes  Primary Wound Type: Ulceration  Orientation: lower  Location: (c) Abdomen  Is this injury device related?: No   Wound Image    Dressing Appearance Moist drainage   Drainage Amount Moderate   Drainage Characteristics/Odor Serosanguineous   Appearance Moist   Tissue loss description Full thickness   Red (%), Wound Tissue Color 100 %   Periwound Area Moist   Wound Length (cm) 4 cm   Wound Width (cm) 22.5 cm   Wound Depth (cm) 2.7 cm   Wound Volume (cm^3) 127.234 cm^3   Wound Surface Area (cm^2) 70.69 cm^2   Care Cleansed with:;Wound cleanser   Dressing   (applied silver, abd pads, mesh underwear)   Dressing Change Due 06/07/25        Wound 05/28/25 2000 Ulceration Gluteal cleft   Date First Assessed/Time First Assessed: 05/28/25 2000   Present on Original Admission: Yes  Primary Wound Type: Ulceration  Location: (c) Gluteal cleft  Is this injury device related?: No   Wound Image     Dressing Appearance Moist drainage   Drainage Amount Moderate   Drainage Characteristics/Odor Serosanguineous   Appearance Moist   Tissue loss description Full thickness   Red (%), Wound Tissue Color 80 %   Yellow (%), Wound Tissue Color 20 %   Wound Edges Open   Wound Length (cm) 25.5 cm   Wound Width (cm) 2.5 cm   Wound Depth (cm) 2.7 cm   Wound Volume (cm^3) 90.124 cm^3   Wound Surface Area (cm^2) 50.07 cm^2   Care Cleansed with:;Wound cleanser  (vashe)   Dressing   (applied silver, abd pads, mesh underwear)   Dressing Change Due 06/07/25   [REMOVED]      Wound 05/28/25 2000 Ulceration Labia   Final Assessment Date/Final Assessment Time: 06/06/25 1520  Date First Assessed/Time First Assessed: 05/28/25 2000   Present on Original Admission: Yes  Primary Wound Type: Ulceration  Location: Labia  Is this injury device related?: No  Wound Outcome...   Dressing   (applied silver, abd  pads, mesh underwear)        Wound 06/06/25 1320 Ulceration Left Axilla   Date First Assessed/Time First Assessed: 06/06/25 1320   Present on Original Admission: Yes  Primary Wound Type: Ulceration  Side: Left  Location: Axilla  Is this injury device related?: No   Wound Image    Dressing Appearance Open to air   Drainage Amount Scant   Drainage Characteristics/Odor No odor   Appearance Moist   Tissue loss description Partial thickness   Red (%), Wound Tissue Color 100 %   Periwound Area Moist   Wound Edges Open   Wound Length (cm) 0.6 cm   Wound Width (cm) 0.7 cm   Wound Depth (cm) 0.2 cm   Wound Volume (cm^3) 0.044 cm^3   Wound Surface Area (cm^2) 0.33 cm^2   Care Cleansed with:;Wound cleanser   Dressing   (silver)        Wound 06/06/25 1320 Ulceration Right Groin   Date First Assessed/Time First Assessed: 06/06/25 1320   Present on Original Admission: Yes  Primary Wound Type: Ulceration  Side: Right  Location: Groin   Wound Image    Dressing Appearance Open to air   Drainage Amount Small   Drainage Characteristics/Odor No odor   Appearance Moist   Red (%), Wound Tissue Color 100 %   Periwound Area Moist   Wound Length (cm) 1.2 cm   Wound Width (cm) 0.3 cm   Wound Depth (cm) 0.2 cm   Wound Volume (cm^3) 0.038 cm^3   Wound Surface Area (cm^2) 0.28 cm^2   Care Wound cleanser   Dressing   (applied silver, abd pad, mesh underwear)        Wound 06/06/25 1320 Ulceration Left Groin   Date First Assessed/Time First Assessed: 06/06/25 1320   Present on Original Admission: Yes  Primary Wound Type: Ulceration  Side: Left  Location: Groin   Wound Image    Dressing Appearance Open to air   Drainage Amount Moderate   Drainage Characteristics/Odor No odor   Appearance Moist   Tissue loss description Full thickness   Red (%), Wound Tissue Color 100 %   Periwound Area Moist   Wound Length (cm) 10 cm   Wound Width (cm) 1.2 cm   Wound Depth (cm) 1 cm   Wound Volume (cm^3) 6.283 cm^3   Wound Surface Area (cm^2) 9.42 cm^2   Care  Cleansed with:;Antimicrobial agent;Wound cleanser   Dressing   (applied silver, abd pad, mesh underwear)   Dressing Change Due 06/07/25   Safety   Safety Precautions emergency equipment at bedside   Infection Prevention single patient room provided   Isolation Precautions precautions maintained;contact   Safety Management   Safety Promotion/Fall Prevention assistive device/personal item within reach;side rails raised x 2;nonskid shoes/socks when out of bed   Patient Rounds bed in low position;bed wheels locked;call light in patient/parent reach;clutter free environment maintained;placement of personal items at bedside;toileting offered;visualized patient;ID band on   Safety Bands on Patient Fall Risk Band   Daily Care   Activity Management Arm raise - L1   Activity Assistance Provided independent   Positioning   Body Position position changed independently   Head of Bed (HOB) Positioning HOB elevated   Positioning/Transfer Devices pillows;in use
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
## 2025-06-06 NOTE — PLAN OF CARE
Problem: Adult Inpatient Plan of Care  Goal: Plan of Care Review  Outcome: Progressing         Plan of care reviewed with pt. No acute changes. Safety maintained, call light in reach, bed in lowest position, will continue to monitor.

## 2025-06-06 NOTE — PROGRESS NOTES
06/06/25 1320   Positioning   Body Position position changed independently   Head of Bed (HOB) Positioning HOB elevated   Positioning/Transfer Devices pillows;in use        Wound 05/27/25 2225 Ulceration lower Abdomen   Date First Assessed/Time First Assessed: 05/27/25 2225   Present on Original Admission: Yes  Primary Wound Type: Ulceration  Orientation: lower  Location: (c) Abdomen  Is this injury device related?: No   Wound Image    Dressing Appearance Moist drainage   Drainage Amount Moderate   Drainage Characteristics/Odor Serosanguineous   Appearance Moist   Tissue loss description Full thickness   Red (%), Wound Tissue Color 100 %   Periwound Area Moist   Wound Length (cm) 4 cm   Wound Width (cm) 22.5 cm   Wound Depth (cm) 2.7 cm   Wound Volume (cm^3) 127.234 cm^3   Wound Surface Area (cm^2) 70.69 cm^2   Care Cleansed with:;Wound cleanser   Dressing   (applied silver, abd pads, mesh underwear)   Dressing Change Due 06/07/25        Wound 05/28/25 2000 Ulceration Gluteal cleft   Date First Assessed/Time First Assessed: 05/28/25 2000   Present on Original Admission: Yes  Primary Wound Type: Ulceration  Location: (c) Gluteal cleft  Is this injury device related?: No   Wound Image     Dressing Appearance Moist drainage   Drainage Amount Moderate   Drainage Characteristics/Odor Serosanguineous   Appearance Moist   Tissue loss description Full thickness   Red (%), Wound Tissue Color 80 %   Yellow (%), Wound Tissue Color 20 %   Wound Edges Open   Wound Length (cm) 25.5 cm   Wound Width (cm) 2.5 cm   Wound Depth (cm) 2.7 cm   Wound Volume (cm^3) 90.124 cm^3   Wound Surface Area (cm^2) 50.07 cm^2   Care Cleansed with:;Wound cleanser  (vashe)   Dressing   (applied silver, abd pads, mesh underwear)   Dressing Change Due 06/07/25   [REMOVED]      Wound 05/28/25 2000 Ulceration Labia   Final Assessment Date/Final Assessment Time: 06/06/25 1520  Date First Assessed/Time First Assessed: 05/28/25 2000   Present on Original  Admission: Yes  Primary Wound Type: Ulceration  Location: Labia  Is this injury device related?: No  Wound Outcome...   Dressing   (applied silver, abd pads, mesh underwear)        Wound 06/06/25 1320 Ulceration Left Axilla   Date First Assessed/Time First Assessed: 06/06/25 1320   Present on Original Admission: Yes  Primary Wound Type: Ulceration  Side: Left  Location: Axilla  Is this injury device related?: No   Wound Image    Dressing Appearance Open to air   Drainage Amount Scant   Drainage Characteristics/Odor No odor   Appearance Moist   Tissue loss description Partial thickness   Red (%), Wound Tissue Color 100 %   Periwound Area Moist   Wound Edges Open   Wound Length (cm) 0.6 cm   Wound Width (cm) 0.7 cm   Wound Depth (cm) 0.2 cm   Wound Volume (cm^3) 0.044 cm^3   Wound Surface Area (cm^2) 0.33 cm^2   Care Cleansed with:;Wound cleanser   Dressing   (silver)        Wound 06/06/25 1320 Ulceration Right Groin   Date First Assessed/Time First Assessed: 06/06/25 1320   Present on Original Admission: Yes  Primary Wound Type: Ulceration  Side: Right  Location: Groin   Wound Image    Dressing Appearance Open to air   Drainage Amount Small   Drainage Characteristics/Odor No odor   Appearance Moist   Red (%), Wound Tissue Color 100 %   Periwound Area Moist   Wound Length (cm) 1.2 cm   Wound Width (cm) 0.3 cm   Wound Depth (cm) 0.2 cm   Wound Volume (cm^3) 0.038 cm^3   Wound Surface Area (cm^2) 0.28 cm^2   Care Wound cleanser   Dressing   (applied silver, abd pad, mesh underwear)        Wound 06/06/25 1320 Ulceration Left Groin   Date First Assessed/Time First Assessed: 06/06/25 1320   Present on Original Admission: Yes  Primary Wound Type: Ulceration  Side: Left  Location: Groin   Wound Image    Dressing Appearance Open to air   Drainage Amount Moderate   Drainage Characteristics/Odor No odor   Appearance Moist   Tissue loss description Full thickness   Red (%), Wound Tissue Color 100 %   Periwound Area Moist   Wound  Length (cm) 10 cm   Wound Width (cm) 1.2 cm   Wound Depth (cm) 1 cm   Wound Volume (cm^3) 6.283 cm^3   Wound Surface Area (cm^2) 9.42 cm^2   Care Cleansed with:;Antimicrobial agent;Wound cleanser   Dressing   (applied silver, abd pad, mesh underwear)   Dressing Change Due 06/07/25
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
## 2025-06-06 NOTE — NURSING
Pt had an uneventful night, AAOx4, VSS, pt received scheduled pain medications, states, pain medication only works for a short period of time and that she tolerates it, pt did refuse scheduled creams for her wounds, states it is too much to do and she would like to do them today with her wound care nurse, no additional issues overnight, pt is pleasant and independent with ADL's, call light in reach, will melissa.

## 2025-06-06 NOTE — PLAN OF CARE
Problem: Physical Therapy  Goal: Physical Therapy Goal  Description: Goals to be met by: DC     Patient will increase functional independence with mobility by performin. Gait  community level  with [place] using No Assistive Device.   2. Ascend/descend 14 stair with right Handrails [place] using No Assistive Device.     Outcome: Progressing

## 2025-06-06 NOTE — PLAN OF CARE
OT enzo performed, report to follow.    Pt to been 2-3 x/week to address the goals below.  No anticipated OT dc needs or DME      Problem: Occupational Therapy  Goal: Occupational Therapy Goal  Description: Goals to be met by: 7/6/25     Patient will increase functional independence with ADLs by performing:    UE Dressing with [place].  LE Dressing with [place].  Grooming while standing at sink with [place].  Toileting from toilet with [place] for hygiene and clothing management.   Toilet transfer to toilet with [place].  Shower with setup  Upper extremity exercise program x10 reps per handout, with independence.    Outcome: Progressing
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
## 2025-06-07 PROCEDURE — 25000003 PHARM REV CODE 250: Performed by: INTERNAL MEDICINE

## 2025-06-07 PROCEDURE — 63600175 PHARM REV CODE 636 W HCPCS: Performed by: INTERNAL MEDICINE

## 2025-06-07 PROCEDURE — 25000003 PHARM REV CODE 250: Performed by: STUDENT IN AN ORGANIZED HEALTH CARE EDUCATION/TRAINING PROGRAM

## 2025-06-07 PROCEDURE — 11000001 HC ACUTE MED/SURG PRIVATE ROOM

## 2025-06-07 PROCEDURE — 25000003 PHARM REV CODE 250: Performed by: FAMILY MEDICINE

## 2025-06-07 RX ORDER — LANOLIN ALCOHOL/MO/W.PET/CERES
400 CREAM (GRAM) TOPICAL 3 TIMES DAILY
Status: DISCONTINUED | OUTPATIENT
Start: 2025-06-07 | End: 2025-06-19 | Stop reason: HOSPADM

## 2025-06-07 RX ADMIN — CLINDAMYCIN PHOSPHATE: 10 GEL TOPICAL at 09:06

## 2025-06-07 RX ADMIN — HYDROMORPHONE HYDROCHLORIDE 4 MG: 4 TABLET ORAL at 04:06

## 2025-06-07 RX ADMIN — HYDROMORPHONE HYDROCHLORIDE 2 MG: 2 INJECTION, SOLUTION INTRAMUSCULAR; INTRAVENOUS; SUBCUTANEOUS at 04:06

## 2025-06-07 RX ADMIN — OXYCODONE HYDROCHLORIDE 5 MG: 5 TABLET ORAL at 05:06

## 2025-06-07 RX ADMIN — FAMOTIDINE 20 MG: 20 TABLET, FILM COATED ORAL at 08:06

## 2025-06-07 RX ADMIN — Medication 400 MG: at 09:06

## 2025-06-07 RX ADMIN — Medication 400 MG: at 02:06

## 2025-06-07 RX ADMIN — OXYCODONE HYDROCHLORIDE 5 MG: 5 TABLET ORAL at 09:06

## 2025-06-07 RX ADMIN — ACETAMINOPHEN 1000 MG: 500 TABLET, FILM COATED ORAL at 02:06

## 2025-06-07 RX ADMIN — OXYCODONE HYDROCHLORIDE 5 MG: 5 TABLET ORAL at 08:06

## 2025-06-07 RX ADMIN — ERGOCALCIFEROL 50000 UNITS: 1.25 CAPSULE ORAL at 08:06

## 2025-06-07 RX ADMIN — PREGABALIN 150 MG: 75 CAPSULE ORAL at 09:06

## 2025-06-07 RX ADMIN — ACETAMINOPHEN 1000 MG: 500 TABLET, FILM COATED ORAL at 09:06

## 2025-06-07 RX ADMIN — OXYCODONE HYDROCHLORIDE 5 MG: 5 TABLET ORAL at 02:06

## 2025-06-07 RX ADMIN — ENOXAPARIN SODIUM 40 MG: 40 INJECTION SUBCUTANEOUS at 04:06

## 2025-06-07 RX ADMIN — FAMOTIDINE 20 MG: 20 TABLET, FILM COATED ORAL at 09:06

## 2025-06-07 RX ADMIN — PREGABALIN 150 MG: 75 CAPSULE ORAL at 08:06

## 2025-06-07 RX ADMIN — ACETAMINOPHEN 1000 MG: 500 TABLET, FILM COATED ORAL at 08:06

## 2025-06-07 NOTE — PLAN OF CARE
Problem: Adult Inpatient Plan of Care  Goal: Plan of Care Review  Outcome: Progressing       Plan of the care reviewed with pt. No acute changes. Safety maintained, call light in reach, bed in lowest position, will continue to monitor.

## 2025-06-07 NOTE — PROGRESS NOTES
[facility] Medicine  Progress Note    Room: 235/235   Patient Name: Nikkie Myles  MRN: [id number]  Admit Date: 6/5/2025   Length of Stay: 2  Attending Physician: Girma Levi MD  Nurse Practitioner: ASIA LARSON  Code Status: Full Code    Date of Service: 06/07/2025    Principal Problem:   Crohn's disease of perianal region with complication      HPI obtained from patient, review of previous hospital records, and summarization.   HPI     Nikkie Myles is a 35 y.o. 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  She presented to [facility] ED with with perianal pain, abdominal cramps, panniculitis, and diffuse joint pain.  CT showed probable perirectal fistula and anterior abdominal wall wound, chronic inflammatory changes of the colon and TI, diverting loop ileostomy parastomal hernia containing bowel.  CRS was consulted, and underwent rectal exam under anesthesia with biopsy.  Found to have evidence of deep ulceration wounds checking up her gluteal cleft, in her pannus, and in both labia.  She also had a deep ulcerated fistula trach along the right posterior perianal skin.  Wound seemed to be consistent with hidradenitis/pyoderma.  Biopsies were obtained.  Dermatology, ID, and GI consulted.  She received infliximab 10mg/kg on 6/4.  Pending biopsy reports for pyoderma/hidradenitis.  Ongoing needs for long-term pain management and wound care.    Patient is being admitted to [facility] for pain management and wound care.    Interval History    No issues overnight   Ambulating in room no complaint  Received 1 unit PRBC yesterday, repeat labs in a.m.  Initiate magnesium replacement      Past Medical History:      Past Medical History: Patient has a past medical history of Anal  fistula, Chronic diarrhea, Crohn's disease (2022), Enteropathic arthropathy, and Eye injury.    Past Surgical History: Patient has a past surgical history that includes  section, low transverse (2013);  section with tubal ligation (Bilateral, 2020);  section (); Examination under anesthesia (N/A, 8/15/2022); Flexible sigmoidoscopy (N/A, 8/15/2022); Esophagogastroduodenoscopy (N/A, 2022); Colonoscopy (N/A, 2022); Laparoscopic ileostomy (N/A, 2022); Digital rectal examination under anesthesia (N/A, 2025); and Biopsy of anus (N/A, 2025).    Social History: Patient reports that she has quit smoking. Her smoking use included cigarettes. She has a 5 pack-year smoking history. She has never used smokeless tobacco. She reports current alcohol use. She reports that she does not use drugs.    Family History:  family history includes Glaucoma in her maternal grandmother; Hypertension in her father and mother; No Known Problems in her brother, maternal aunt, maternal grandfather, maternal uncle, paternal aunt, paternal grandfather, paternal grandmother, paternal uncle, and sister.    Home Medications: reconciled     Allergies: Patient has no known allergies.      Subjective:     Review of Systems   Constitutional:  Positive for malaise/fatigue. Negative for chills and fever.   Respiratory:  Negative for cough and shortness of breath.    Cardiovascular:  Negative for chest pain and leg swelling.   Gastrointestinal:  Positive for abdominal pain. Negative for nausea and vomiting.   Genitourinary:  Negative for dysuria, flank pain and urgency.   Musculoskeletal:  Negative for back pain and neck pain.        Perineal pain and pain to pannus wound   Neurological:  Positive for weakness. Negative for dizziness and headaches.   Psychiatric/Behavioral:  Negative for depression. The patient is not nervous/anxious.          Objective:     Vital Signs:  Temp:  [97.8 °F (36.6  "°C)-99 °F (37.2 °C)]   Pulse:  [71-83]   Resp:  [17-18]   BP: ()/(55-68)   SpO2:  [96 %-99 %]     Body mass index is 36.77 kg/m².           Intake and Output:    Intake/Output Summary (Last 24 hours) at 6/7/2025 1122  Last data filed at 6/7/2025 0000  Gross per 24 hour   Intake 480 ml   Output --   Net 480 ml        Physical Exam  HENT:      Head: Normocephalic and atraumatic.      Mouth/Throat:      Mouth: Mucous membranes are moist.      Pharynx: Oropharynx is clear.   Eyes:      Extraocular Movements: Extraocular movements intact.      Pupils: Pupils are equal, round, and reactive to light.   Cardiovascular:      Rate and Rhythm: Normal rate and regular rhythm.   Pulmonary:      Effort: Pulmonary effort is normal.      Breath sounds: Normal breath sounds.   Abdominal:      General: Bowel sounds are normal. There is no distension.      Palpations: Abdomen is soft.      Comments: Ileostomy in place  Wound to pannus CAMMY   Musculoskeletal:      Right lower leg: No edema.      Left lower leg: No edema.   Skin:     General: Skin is warm and dry.      Capillary Refill: Capillary refill takes less than 2 seconds.      Comments: Perineal wounds +   Neurological:      General: No focal deficit present.      Mental Status: She is alert.   Psychiatric:         Mood and Affect: Mood normal.         Behavior: Behavior normal.         Patient consistently followed by Wound Care NP    Labs:  Recent Labs   Lab 06/04/25  0603 06/05/25  0909 06/06/25  0549   WBC 9.69 11.49  12.16 9.10   HGB 6.8* 7.1*  7.0* 6.8*   HCT 23.9* 24.6*  24.1* 23.3*   * 513*  509* 529*     Recent Labs   Lab 06/06/25  0549      K 4.0      CO2 20*   BUN 13   CREATININE 0.6   GLU 72   CALCIUM 8.5*   MG 1.5*   PHOS 4.2     Recent Labs   Lab 06/06/25  0549   ALKPHOS 64   ALT 10   AST 15   ALBUMIN 2.3*   PROT 7.8   BILITOT 0.1     No results for input(s): "POCTGLUCOSE" in the last 72 hours.    Meds Scheduled:   acetaminophen  1,000 " mg Oral TID    clindamycin phosphate 1%   Topical (Top) BID    enoxparin  40 mg Subcutaneous Daily    ergocalciferol  50,000 Units Oral Q7 Days    famotidine  20 mg Oral BID    triamcinolone acetonide 0.1%   Topical (Top) BID    And    ketoconazole   Topical (Top) BID    And    magnesium hydroxide 400 mg/5 ml  30 mL Oral BID    mupirocin   Nasal BID    oxyCODONE  5 mg Oral QID    pregabalin  150 mg Oral BID         Current Inpatient Problem List:  Active Hospital Problems    Diagnosis  POA    *Crohn's disease of perianal region with complication [K50.119]  Yes    Thrombocytosis [D75.839]  Yes    Crohn's disease of both small and large intestine with fistula [K50.813]  Yes    Iron deficiency anemia due to chronic blood loss [D50.0]  Yes    Symptomatic anemia [D64.9]  Yes      Resolved Hospital Problems   No resolved problems to display.         Assessment / Plan:   Crohn's disease with perianal region with complication   Crohn's disease of both small and large intestine with fistula  Seen by CRS while in the hospital, wounds appear to be more extensive than Crohn's related fistula disease.  Suspicious for pyoderma or hidradenitis superimposed on her IBD  Biopsies obtained, showing ulcerated skin and subcutis with acute and chronic inflammation and several non-necrotizing granulomas  Received dose of Remicade on 06/04   Avoid NSAIDs given risk of IBD exacerbation  Pain management with oxycodone, Lyrica, and scheduled acetaminophen  clindamycin 1% solution BID to L axillae    Mix approximately equal parts triamcinolone 0.1% cream, ketoconazole 2% cream, and milk of magnesia in a sterile urine container. Shake vigorously to mix. Apply to bilateral inguinal cleft BID.   Hemoglobins baths at least twice weekly   Bleach baths at home 1-2 times weekly    Hidradenitis suppurativa   [facility]   Has been seen previously in dermatology clinic when she was well controlled on infliximab per GI. However,  unfortunately lost insurance/ infliximab and now is flaring  Resumed on infliximab 06/04/2025  Receives infliximab every 4 weeks, so next dose would be around 7/2  Will need to f/u with dermatology outpatient  Wound followed and managed by consistent, contracted [facility] NP    Thrombocytosis   Likely reactive due to IBD  Trend on regular labs   DVT prophylaxis with enoxaparin    Iron-deficiency anemia due to chronic blood loss  Symptomatic anemia  Trend hemoglobin on serial CBC   Transfuse for hemoglobin/hematocrit less than 7/21 or if becomes hemodynamically unstable  Will obtain ferritin/iron panel today  Hemoglobin is 6.8 today, will plan to transfuse 1 unit PRBC  Repeat labs 6/8     Ileostomy in place   Routine care    Anterior pelvis ulceration   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    Diet:  Regular   GI Ppx:  Famotidine   DVT Ppx:  Enoxaparin  Lines:  Midline 6/5  Drains:  Ileostomy   Airways:n/a   Wounds:  Pelvis, labia, perineum    Goals of Care:   Restorative  Treat infection  Optimize nutrition  Wound healing  Muscle strengthening  Restoration of ADL's  Improve mobility    Anticipated Disposition:    TBD  Will need follow up with Dermatology, CRS (Dr. Yip), and GI  Follow up appointments:   No future appointments.      MIKE MCMAHON, [facility] Medicine  [facility]    Extended Visit  Total time spent: 52 minutes  Description of Time: counseling patient on clinical condition, therapies provided, plan of care, emotional support, coordinating patient care with other care team members, reviewing and interpreting labs and imaging, collaboration with physician, initiating new orders, chart review, and documentation. See interval hx and  assessment/plan for details.

## 2025-06-07 NOTE — PLAN OF CARE
Problem: Adult Inpatient Plan of Care  Goal: Plan of Care Review  Outcome: Progressing     Problem: Wound  Goal: Skin Health and Integrity  Outcome: Progressing     Problem: Skin Injury Risk Increased  Goal: Skin Health and Integrity  Outcome: Progressing   Patient has not signs and symptoms of acute distress or discomfort at this time. Bed in low locked position. Call light within reach.

## 2025-06-08 LAB
ERYTHROCYTE [DISTWIDTH] IN BLOOD BY AUTOMATED COUNT: 27.2 % (ref 11.5–14.5)
HCT VFR BLD AUTO: 27.1 % (ref 37–48.5)
HGB BLD-MCNC: 7.9 GM/DL (ref 12–16)
MAGNESIUM SERPL-MCNC: 1.4 MG/DL (ref 1.6–2.6)
MCH RBC QN AUTO: 17.2 PG (ref 27–31)
MCHC RBC AUTO-ENTMCNC: 29.2 G/DL (ref 32–36)
MCV RBC AUTO: 59 FL (ref 82–98)
PLATELET # BLD AUTO: 525 K/UL (ref 150–450)
PMV BLD AUTO: 9 FL (ref 9.2–12.9)
RBC # BLD AUTO: 4.58 M/UL (ref 4–5.4)
WBC # BLD AUTO: 8.92 K/UL (ref 3.9–12.7)

## 2025-06-08 PROCEDURE — 11000001 HC ACUTE MED/SURG PRIVATE ROOM

## 2025-06-08 PROCEDURE — 63600175 PHARM REV CODE 636 W HCPCS: Performed by: INTERNAL MEDICINE

## 2025-06-08 PROCEDURE — 25000003 PHARM REV CODE 250: Performed by: FAMILY MEDICINE

## 2025-06-08 PROCEDURE — 83735 ASSAY OF MAGNESIUM: CPT | Performed by: FAMILY MEDICINE

## 2025-06-08 PROCEDURE — 63600175 PHARM REV CODE 636 W HCPCS: Performed by: FAMILY MEDICINE

## 2025-06-08 PROCEDURE — 85027 COMPLETE CBC AUTOMATED: CPT | Performed by: FAMILY MEDICINE

## 2025-06-08 PROCEDURE — 25000003 PHARM REV CODE 250: Performed by: INTERNAL MEDICINE

## 2025-06-08 PROCEDURE — 25000003 PHARM REV CODE 250: Performed by: STUDENT IN AN ORGANIZED HEALTH CARE EDUCATION/TRAINING PROGRAM

## 2025-06-08 RX ORDER — MAGNESIUM SULFATE HEPTAHYDRATE 40 MG/ML
2 INJECTION, SOLUTION INTRAVENOUS ONCE
Status: COMPLETED | OUTPATIENT
Start: 2025-06-08 | End: 2025-06-08

## 2025-06-08 RX ADMIN — KETOCONAZOLE: 20 CREAM TOPICAL at 09:06

## 2025-06-08 RX ADMIN — ENOXAPARIN SODIUM 40 MG: 40 INJECTION SUBCUTANEOUS at 04:06

## 2025-06-08 RX ADMIN — Medication 400 MG: at 09:06

## 2025-06-08 RX ADMIN — PREGABALIN 150 MG: 75 CAPSULE ORAL at 09:06

## 2025-06-08 RX ADMIN — MAGNESIUM SULFATE HEPTAHYDRATE 2 G: 40 INJECTION, SOLUTION INTRAVENOUS at 12:06

## 2025-06-08 RX ADMIN — HYDROMORPHONE HYDROCHLORIDE 2 MG: 2 INJECTION, SOLUTION INTRAMUSCULAR; INTRAVENOUS; SUBCUTANEOUS at 04:06

## 2025-06-08 RX ADMIN — TRIAMCINOLONE ACETONIDE: 1 CREAM TOPICAL at 09:06

## 2025-06-08 RX ADMIN — OXYCODONE HYDROCHLORIDE 5 MG: 5 TABLET ORAL at 12:06

## 2025-06-08 RX ADMIN — CLINDAMYCIN PHOSPHATE: 10 GEL TOPICAL at 09:06

## 2025-06-08 RX ADMIN — MUPIROCIN: 20 OINTMENT TOPICAL at 09:06

## 2025-06-08 RX ADMIN — OXYCODONE HYDROCHLORIDE 5 MG: 5 TABLET ORAL at 09:06

## 2025-06-08 RX ADMIN — ACETAMINOPHEN 1000 MG: 500 TABLET, FILM COATED ORAL at 09:06

## 2025-06-08 RX ADMIN — FAMOTIDINE 20 MG: 20 TABLET, FILM COATED ORAL at 09:06

## 2025-06-08 RX ADMIN — Medication 400 MG: at 04:06

## 2025-06-08 RX ADMIN — ACETAMINOPHEN 1000 MG: 500 TABLET, FILM COATED ORAL at 04:06

## 2025-06-08 RX ADMIN — OXYCODONE HYDROCHLORIDE 5 MG: 5 TABLET ORAL at 06:06

## 2025-06-08 RX ADMIN — OXYCODONE HYDROCHLORIDE 5 MG: 5 TABLET ORAL at 10:06

## 2025-06-08 RX ADMIN — HYDROMORPHONE HYDROCHLORIDE 4 MG: 4 TABLET ORAL at 04:06

## 2025-06-08 NOTE — PROGRESS NOTES
[place] Medicine  Progress Note    Room: 235/235   Patient Name: Nikkie Myles  MRN: [id number]  Admit Date: 6/5/2025   Length of Stay: 3  Attending Physician: Girma Levi MD  Nurse Practitioner: ASIA LARSON  Code Status: Full Code    Date of Service: 06/08/2025    Principal Problem:   Crohn's disease of perianal region with complication      HPI obtained from patient, review of previous hospital records, and summarization.   HPI     Nikkie Myles is a 35 y.o. 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  She presented to [facility] ED with with perianal pain, abdominal cramps, panniculitis, and diffuse joint pain.  CT showed probable perirectal fistula and anterior abdominal wall wound, chronic inflammatory changes of the colon and TI, diverting loop ileostomy parastomal hernia containing bowel.  CRS was consulted, and underwent rectal exam under anesthesia with biopsy.  Found to have evidence of deep ulceration wounds checking up her gluteal cleft, in her pannus, and in both labia.  She also had a deep ulcerated fistula trach along the right posterior perianal skin.  Wound seemed to be consistent with hidradenitis/pyoderma.  Biopsies were obtained.  Dermatology, ID, and GI consulted.  She received infliximab 10mg/kg on 6/4.  Pending biopsy reports for pyoderma/hidradenitis.  Ongoing needs for long-term pain management and wound care.    Patient is being admitted to [facility] for pain management and wound care.    Interval History    No issues overnight   Ambulating in room no complaint  Received 1 unit PRBC Fri  Hgb 7.9 today  Mag 1.4 give 2g IV mag x1 and cont PO Mag ox TID      Past Medical History:      Past Medical History: Patient has a past medical history of Anal  fistula, Chronic diarrhea, Crohn's disease (2022), Enteropathic arthropathy, and Eye injury.    Past Surgical History: Patient has a past surgical history that includes  section, low transverse (2013);  section with tubal ligation (Bilateral, 2020);  section (); Examination under anesthesia (N/A, 8/15/2022); Flexible sigmoidoscopy (N/A, 8/15/2022); Esophagogastroduodenoscopy (N/A, 2022); Colonoscopy (N/A, 2022); Laparoscopic ileostomy (N/A, 2022); Digital rectal examination under anesthesia (N/A, 2025); and Biopsy of anus (N/A, 2025).    Social History: Patient reports that she has quit smoking. Her smoking use included cigarettes. She has a 5 pack-year smoking history. She has never used smokeless tobacco. She reports current alcohol use. She reports that she does not use drugs.    Family History:  family history includes Glaucoma in her maternal grandmother; Hypertension in her father and mother; No Known Problems in her brother, maternal aunt, maternal grandfather, maternal uncle, paternal aunt, paternal grandfather, paternal grandmother, paternal uncle, and sister.    Home Medications: reconciled     Allergies: Patient has no known allergies.      Subjective:     Review of Systems   Constitutional:  Positive for malaise/fatigue. Negative for chills and fever.   Respiratory:  Negative for cough and shortness of breath.    Cardiovascular:  Negative for chest pain and leg swelling.   Gastrointestinal:  Positive for abdominal pain. Negative for nausea and vomiting.   Genitourinary:  Negative for dysuria, flank pain and urgency.   Musculoskeletal:  Negative for back pain and neck pain.        Perineal pain and pain to pannus wound   Neurological:  Positive for weakness. Negative for dizziness and headaches.   Psychiatric/Behavioral:  Negative for depression. The patient is not nervous/anxious.          Objective:     Vital Signs:  Temp:  [97.3 °F (36.3  °C)-98.3 °F (36.8 °C)]   Pulse:  [60-83]   Resp:  [17-18]   BP: ()/(55-81)   SpO2:  [95 %-100 %]     Body mass index is 36.77 kg/m².           Intake and Output:    Intake/Output Summary (Last 24 hours) at 6/8/2025 1052  Last data filed at 6/8/2025 0141  Gross per 24 hour   Intake 905 ml   Output --   Net 905 ml        Physical Exam  HENT:      Head: Normocephalic and atraumatic.      Mouth/Throat:      Mouth: Mucous membranes are moist.      Pharynx: Oropharynx is clear.   Eyes:      Extraocular Movements: Extraocular movements intact.      Pupils: Pupils are equal, round, and reactive to light.   Cardiovascular:      Rate and Rhythm: Normal rate and regular rhythm.   Pulmonary:      Effort: Pulmonary effort is normal.      Breath sounds: Normal breath sounds.   Abdominal:      General: Bowel sounds are normal. There is no distension.      Palpations: Abdomen is soft.      Comments: Ileostomy in place  Wound to pannus CAMMY   Musculoskeletal:      Right lower leg: No edema.      Left lower leg: No edema.   Skin:     General: Skin is warm and dry.      Capillary Refill: Capillary refill takes less than 2 seconds.      Comments: Perineal wounds +   Neurological:      General: No focal deficit present.      Mental Status: She is alert.   Psychiatric:         Mood and Affect: Mood normal.         Behavior: Behavior normal.         Patient consistently followed by Wound Care NP    Labs:  Recent Labs   Lab 06/05/25  0909 06/06/25  0549 06/08/25  0503   WBC 11.49  12.16 9.10 8.92   HGB 7.1*  7.0* 6.8* 7.9*   HCT 24.6*  24.1* 23.3* 27.1*   *  509* 529* 525*     Recent Labs   Lab 06/06/25  0549 06/08/25  0503     --    K 4.0  --      --    CO2 20*  --    BUN 13  --    CREATININE 0.6  --    GLU 72  --    CALCIUM 8.5*  --    MG 1.5* 1.4*   PHOS 4.2  --      Recent Labs   Lab 06/06/25  0549   ALKPHOS 64   ALT 10   AST 15   ALBUMIN 2.3*   PROT 7.8   BILITOT 0.1     No results for input(s):  ""POCTGLUCOSE" in the last 72 hours.    Meds Scheduled:   acetaminophen  1,000 mg Oral TID    clindamycin phosphate 1%   Topical (Top) BID    enoxparin  40 mg Subcutaneous Daily    ergocalciferol  50,000 Units Oral Q7 Days    famotidine  20 mg Oral BID    triamcinolone acetonide 0.1%   Topical (Top) BID    And    ketoconazole   Topical (Top) BID    And    magnesium hydroxide 400 mg/5 ml  30 mL Oral BID    magnesium oxide  400 mg Oral TID    magnesium sulfate 2 g IVPB  2 g Intravenous Once    mupirocin   Nasal BID    oxyCODONE  5 mg Oral QID    pregabalin  150 mg Oral BID         Current Inpatient Problem List:  Active Hospital Problems    Diagnosis  POA    *Crohn's disease of perianal region with complication [K50.119]  Yes    Thrombocytosis [D75.839]  Yes    Crohn's disease of both small and large intestine with fistula [K50.813]  Yes    Iron deficiency anemia due to chronic blood loss [D50.0]  Yes    Symptomatic anemia [D64.9]  Yes      Resolved Hospital Problems   No resolved problems to display.         Assessment / Plan:   Crohn's disease with perianal region with complication   Crohn's disease of both small and large intestine with fistula  Seen by CRS while in the hospital, wounds appear to be more extensive than Crohn's related fistula disease.  Suspicious for pyoderma or hidradenitis superimposed on her IBD  Biopsies obtained, showing ulcerated skin and subcutis with acute and chronic inflammation and several non-necrotizing granulomas  Received dose of Remicade on 06/04   Avoid NSAIDs given risk of IBD exacerbation  Pain management with oxycodone, Lyrica, and scheduled acetaminophen  clindamycin 1% solution BID to L axillae    Mix approximately equal parts triamcinolone 0.1% cream, ketoconazole 2% cream, and milk of magnesia in a sterile urine container. Shake vigorously to mix. Apply to bilateral inguinal cleft BID.   Hemoglobins baths at least twice weekly   Bleach baths at home 1-2 times " weekly    Hidradenitis suppurativa   Sees [facility]   Has been seen previously in dermatology clinic when she was well controlled on infliximab per GI. However, unfortunately lost insurance/ infliximab and now is flaring  Resumed on infliximab 06/04/2025  Receives infliximab every 4 weeks, so next dose would be around 7/2  Will need to f/u with dermatology outpatient  Wound followed and managed by consistent, contracted [facility] NP    Thrombocytosis   Likely reactive due to IBD  Trend on regular labs   DVT prophylaxis with enoxaparin    Iron-deficiency anemia due to chronic blood loss  Symptomatic anemia  Trend hemoglobin on serial CBC   Transfuse for hemoglobin/hematocrit less than 7/21 or if becomes hemodynamically unstable  Will obtain ferritin/iron panel today  Hemoglobin is 6.8 today, will plan to transfuse 1 unit PRBC  Repeat labs 6/8     Ileostomy in place   Routine care    Anterior pelvis ulceration   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    IP [facility] RISK OF UNPLANNED READMISSION Model:  MODERATE     Diet:  Regular   GI Ppx:  Famotidine   DVT Ppx:  Enoxaparin  Lines:  Midline 6/5  Drains:  Ileostomy   Airways:n/a   Wounds:  Pelvis, labia, perineum    Goals of Care:   Restorative  Treat infection  Optimize nutrition  Wound healing  Muscle strengthening  Restoration of ADL's  Improve mobility    Anticipated Disposition:    TBD  Will need follow up with Dermatology, CRS (Dr. Yip), and GI  Follow up appointments:   No future appointments.      MKIE MCMAHON, [facility] Medicine  [facility]    Extended Visit  Total time spent: 54 minutes  Description of Time: counseling patient on clinical condition, therapies provided, plan of care, emotional  support, coordinating patient care with other care team members, reviewing and interpreting labs and imaging, collaboration with physician, initiating new orders, chart review, and documentation. See interval hx and assessment/plan for details.

## 2025-06-08 NOTE — PLAN OF CARE
Problem: Adult Inpatient Plan of Care  Goal: Plan of Care Review  Outcome: Progressing     Problem: Wound  Goal: Skin Health and Integrity  Outcome: Progressing   No acute distress noted. Safety maintain . Dressing to [place] area intact. Call light  within  reach.

## 2025-06-09 LAB
ALBUMIN SERPL BCP-MCNC: 2.6 G/DL (ref 3.5–5.2)
ALP SERPL-CCNC: 82 UNIT/L (ref 40–150)
ALT SERPL W/O P-5'-P-CCNC: 11 UNIT/L (ref 10–44)
ANION GAP (OHS): 9 MMOL/L (ref 8–16)
AST SERPL-CCNC: 17 UNIT/L (ref 11–45)
BILIRUB DIRECT SERPL-MCNC: 0.1 MG/DL (ref 0.1–0.3)
BILIRUB SERPL-MCNC: <0.1 MG/DL (ref 0.1–1)
BUN SERPL-MCNC: 10 MG/DL (ref 6–20)
CALCIUM SERPL-MCNC: 8.6 MG/DL (ref 8.7–10.5)
CHLORIDE SERPL-SCNC: 101 MMOL/L (ref 95–110)
CO2 SERPL-SCNC: 25 MMOL/L (ref 23–29)
CREAT SERPL-MCNC: 0.6 MG/DL (ref 0.5–1.4)
ERYTHROCYTE [DISTWIDTH] IN BLOOD BY AUTOMATED COUNT: 27.9 % (ref 11.5–14.5)
GFR SERPLBLD CREATININE-BSD FMLA CKD-EPI: >60 ML/MIN/1.73/M2
GLUCOSE SERPL-MCNC: 132 MG/DL (ref 70–110)
HCT VFR BLD AUTO: 26.5 % (ref 37–48.5)
HGB BLD-MCNC: 7.9 GM/DL (ref 12–16)
MAGNESIUM SERPL-MCNC: 1.7 MG/DL (ref 1.6–2.6)
MCH RBC QN AUTO: 17.6 PG (ref 27–31)
MCHC RBC AUTO-ENTMCNC: 29.8 G/DL (ref 32–36)
MCV RBC AUTO: 59 FL (ref 82–98)
PHOSPHATE SERPL-MCNC: 3.8 MG/DL (ref 2.7–4.5)
PLATELET # BLD AUTO: 495 K/UL (ref 150–450)
PMV BLD AUTO: 8.9 FL (ref 9.2–12.9)
POTASSIUM SERPL-SCNC: 3.4 MMOL/L (ref 3.5–5.1)
PROT SERPL-MCNC: 8.5 GM/DL (ref 6–8.4)
RBC # BLD AUTO: 4.49 M/UL (ref 4–5.4)
SODIUM SERPL-SCNC: 135 MMOL/L (ref 136–145)
WBC # BLD AUTO: 8.89 K/UL (ref 3.9–12.7)

## 2025-06-09 PROCEDURE — 25000003 PHARM REV CODE 250: Performed by: INTERNAL MEDICINE

## 2025-06-09 PROCEDURE — 25000003 PHARM REV CODE 250: Performed by: FAMILY MEDICINE

## 2025-06-09 PROCEDURE — 25000003 PHARM REV CODE 250: Performed by: STUDENT IN AN ORGANIZED HEALTH CARE EDUCATION/TRAINING PROGRAM

## 2025-06-09 PROCEDURE — 80053 COMPREHEN METABOLIC PANEL: CPT | Performed by: HOSPITALIST

## 2025-06-09 PROCEDURE — 63600175 PHARM REV CODE 636 W HCPCS: Performed by: INTERNAL MEDICINE

## 2025-06-09 PROCEDURE — 97535 SELF CARE MNGMENT TRAINING: CPT | Mod: CO

## 2025-06-09 PROCEDURE — 85027 COMPLETE CBC AUTOMATED: CPT | Performed by: HOSPITALIST

## 2025-06-09 PROCEDURE — 97110 THERAPEUTIC EXERCISES: CPT | Mod: CQ

## 2025-06-09 PROCEDURE — 83735 ASSAY OF MAGNESIUM: CPT | Performed by: HOSPITALIST

## 2025-06-09 PROCEDURE — 82248 BILIRUBIN DIRECT: CPT | Performed by: HOSPITALIST

## 2025-06-09 PROCEDURE — 97110 THERAPEUTIC EXERCISES: CPT | Mod: CO

## 2025-06-09 PROCEDURE — 11000001 HC ACUTE MED/SURG PRIVATE ROOM

## 2025-06-09 PROCEDURE — 84100 ASSAY OF PHOSPHORUS: CPT | Performed by: HOSPITALIST

## 2025-06-09 RX ORDER — POTASSIUM CHLORIDE 20 MEQ/1
40 TABLET, EXTENDED RELEASE ORAL ONCE
Status: COMPLETED | OUTPATIENT
Start: 2025-06-09 | End: 2025-06-09

## 2025-06-09 RX ADMIN — OXYCODONE HYDROCHLORIDE 5 MG: 5 TABLET ORAL at 09:06

## 2025-06-09 RX ADMIN — CLINDAMYCIN PHOSPHATE: 10 GEL TOPICAL at 09:06

## 2025-06-09 RX ADMIN — PREGABALIN 150 MG: 75 CAPSULE ORAL at 09:06

## 2025-06-09 RX ADMIN — MUPIROCIN: 20 OINTMENT TOPICAL at 09:06

## 2025-06-09 RX ADMIN — ACETAMINOPHEN 1000 MG: 500 TABLET, FILM COATED ORAL at 03:06

## 2025-06-09 RX ADMIN — Medication 400 MG: at 03:06

## 2025-06-09 RX ADMIN — HYDROMORPHONE HYDROCHLORIDE 2 MG: 2 INJECTION, SOLUTION INTRAMUSCULAR; INTRAVENOUS; SUBCUTANEOUS at 01:06

## 2025-06-09 RX ADMIN — Medication 400 MG: at 09:06

## 2025-06-09 RX ADMIN — FAMOTIDINE 20 MG: 20 TABLET, FILM COATED ORAL at 09:06

## 2025-06-09 RX ADMIN — HYDROMORPHONE HYDROCHLORIDE 4 MG: 4 TABLET ORAL at 01:06

## 2025-06-09 RX ADMIN — ACETAMINOPHEN 1000 MG: 500 TABLET, FILM COATED ORAL at 09:06

## 2025-06-09 RX ADMIN — OXYCODONE HYDROCHLORIDE 5 MG: 5 TABLET ORAL at 05:06

## 2025-06-09 RX ADMIN — POTASSIUM CHLORIDE 40 MEQ: 1500 TABLET, EXTENDED RELEASE ORAL at 11:06

## 2025-06-09 RX ADMIN — ENOXAPARIN SODIUM 40 MG: 40 INJECTION SUBCUTANEOUS at 05:06

## 2025-06-09 NOTE — PLAN OF CARE
Problem: Occupational Therapy  Goal: Occupational Therapy Goal  Description: Goals to be met by: 7/6/25     Patient will increase functional independence with ADLs by performing:    UE Dressing with [place]. MET 6/9/25  LE Dressing with [place].  Grooming while standing at sink with [place]. MET 6/9/25  Toileting from toilet with [place] for hygiene and clothing management.   Toilet transfer to toilet with [place].  Shower with setup   Upper extremity exercise program x10 reps per handout, with independence.    Outcome: Progressing

## 2025-06-09 NOTE — PT/OT/SLP PROGRESS
"Occupational Therapy   Treatment    Name: Nikkie Myles  MRN: [id number]  Admitting Diagnosis:  Crohn's disease of perianal region with complication       Recommendations:     Discharge Recommendations: No Therapy Indicated  Discharge Equipment Recommendations:  none  Barriers to discharge:  None    Assessment:     Nikkie Myles is a 35 y.o. female with a medical diagnosis of Crohn's disease of perianal region with complication.  She presents with performance deficits affecting function are impaired endurance, impaired skin, impaired balance.     Rehab Prognosis:  Good; patient would benefit from acute skilled OT services to address these deficits and reach maximum level of function.       Plan:     Patient to be seen 2 x/week to address the above listed problems via self-care/home management, therapeutic activities, therapeutic exercises  Plan of Care Expires: 07/06/25  Plan of Care Reviewed with: patient    Subjective     Chief Complaint: no c/o  Patient/Family Comments/goals: "I'm about to go shower,"  Pain/Comfort:  Pain Rating 1: 0/10    Objective:     Communicated with: Nurse prior to session.  Patient found HOB elevated with colostomy, PICC line upon OT entry to room.    General Precautions: Standard, fall    Orthopedic Precautions:   Braces:    Respiratory Status: Room air     Occupational Performance:     Bed Mobility:    Patient completed Rolling/Turning to Left with  independence  Patient completed Rolling/Turning to Right with independence  Patient completed Supine to Sit with independence     Functional Mobility/Transfers:  Patient completed Sit <> Stand Transfer with supervision  with  no assistive device   Patient completed  Shower Transfer Step Transfer technique with supervision with no AD  Functional Mobility: Patient ambulates in room with No AD during ADL performance and functional skills.     Activities of Daily Living:  Patient performed showering, dressing and grooming tasks. Patient " performed stand up shower with supervision.  Patient turned water on and off and applied Hibiclens to washcloths. Patient with set/up assistance by therapist bringing towels to patient.  Patient performed grooming and hygiene task while standing at sink.  Patient brushed teeth and applied toothpaste independently.    Patient donned socks at standing level with supervision.  Educated patient on safety and sitting while donning socks.      [facility] 6 Click ADL: 24    Treatment & Education:  Patient seen by JOSE for therapeutic exercise and ADL performance.  Patient performed BUE strengthening with blue theraband in all available planes 2X10 reps to improve UE strength and endurance for ADL performance and functional mobility.  Patient performed ADLs as stated above.     Patient left up in chair with call button in reach    GOALS:   Multidisciplinary Problems       Occupational Therapy Goals          Problem: Occupational Therapy    Goal Priority Disciplines Outcome Interventions   Occupational Therapy Goal     OT, PT/OT Progressing    Description: Goals to be met by: 7/6/25     Patient will increase functional independence with ADLs by performing:    UE Dressing with Dane.  LE Dressing with Dane.  Grooming while standing at sink with Dane.  Toileting from toilet with Dane for hygiene and clothing management.   Toilet transfer to toilet with Dane.  Shower with setup  Upper extremity exercise program x10 reps per handout, with independence.                         DME Justifications:  TBD    Time Tracking:     OT Date of Treatment: 06/09/25  OT Start Time: 1140  OT Stop Time: 1210  OT Total Time (min): 30 min    Billable Minutes:Self Care/Home Management 22 min  Therapeutic Exercise 8 min    OT/CAMMY: CAMMY     Number of CAMMY visits since last OT visit: 1    6/9/2025
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
## 2025-06-09 NOTE — PLAN OF CARE
06/09/25 0955   Discharge Assessment   Assessment Type Discharge Planning Assessment   Confirmed/corrected address, phone number and insurance Yes   Confirmed Demographics Correct on Facesheet   Source of Information patient   Communicated ENDY with patient/caregiver Date not available/Unable to determine   People in Home child(ayad), dependent   [place] [facility] [place]   Do you expect to return to your current living situation? Yes   Do you have help at home or someone to help you manage your care at home? No   Prior to hospitilization cognitive status: Unable to Assess   Current cognitive status: Alert/Oriented   Walking or Climbing Stairs Difficulty no   Dressing/Bathing Difficulty no   Home Accessibility not wheelchair accessible;stairs to enter home   Number of Stairs, Main Entrance other (see comments)  (14)   Surface of Stairs, Main Entrance concrete   Stair Railings, Main Entrance railings safe and in good condition   Equipment Currently Used at Home none   Readmission within 30 days? No   Patient currently being followed by outpatient case management? No   Do you currently have service(s) that help you manage your care at home? No   Do you take prescription medications? Yes   Do you have prescription coverage? Yes   Do you have any problems affording any of your prescribed medications? TBD   Is the patient taking medications as prescribed? yes   Who is going to help you get home at discharge? Family/Friend   How do you get to doctors appointments? car, drives self   Are you on dialysis? No   Do you take coumadin? No   Discharge Plan A Home   Discharge Plan B Home Health   DME Needed Upon Discharge  none   Discharge Plan discussed with: Patient   Transition of Care Barriers None   Physical Activity   On average, how many days per week do you engage in moderate to strenuous exercise (like a brisk walk)? Pt Declined   On average, how many minutes do you engage in exercise at this level? Pt Declined    Financial Resource Strain   How hard is it for you to pay for the very basics like food, housing, medical care, and heating? Pt Declined   Housing Stability   In the last 12 months, was there a time when you were not able to pay the mortgage or rent on time? Pt Declined   At any time in the past 12 months, were you homeless or living in a shelter (including now)? Pt Declined   Transportation Needs   In the past 12 months, has lack of transportation kept you from medical appointments or from getting medications? Pt Declined   In the past 12 months, has lack of transportation kept you from meetings, work, or from getting things needed for daily living? Pt Declined   Food Insecurity   Within the past 12 months, you worried that your food would run out before you got the money to buy more. Pt Declined   Within the past 12 months, the food you bought just didn't last and you didn't have money to get more. Pt Declined   Stress   Do you feel stress - tense, restless, nervous, or anxious, or unable to sleep at night because your mind is troubled all the time - these days? Pt Declined   Social Isolation   How often do you feel lonely or isolated from those around you?  Patient declined   Alcohol Use   Q1: How often do you have a drink containing alcohol? Pt Declined   Q2: How many drinks containing alcohol do you have on a typical day when you are drinking? Pt Declined   Q3: How often do you have six or more drinks on one occasion? Pt Declined   Utilities   In the past 12 months has the electric, gas, oil, or water company threatened to shut off services in your home? Pt Declined   Health Literacy   How often do you need to have someone help you when you read instructions, pamphlets, or other written material from your doctor or pharmacy? Never     CM met with pt at bedside for initial discharge planning assessment. Pt lives at address on face sheet with her 3 dependent children. Pt at [facility] [place] for Crohn's disease  requiring IV infusions and pain management. Pt lives in an apartment with 14 stairs to enter, states she does not have issues with mobility or taking care of herself. Pt denies the need for home modifications at this time. Pt does have family support from her mother and other relatives. Pt has her own transportation. Pt was not receiving services at the home prior to hospitalization. Pt confirmed contacts on face sheet. Pt signed the Patient Statement of Acknowledgement, original scanned into chart. CM provided pt with intake folder including pt handbook, advance directive booklet and CM contact information.

## 2025-06-09 NOTE — PROGRESS NOTES
06/09/25 1300   Pain Reassessment   Pain Rating Prior to Med Admin 0        Wound 05/27/25 2225 Ulceration lower Abdomen   Date First Assessed/Time First Assessed: 05/27/25 2225   Present on Original Admission: Yes  Primary Wound Type: Ulceration  Orientation: lower  Location: (c) Abdomen  Is this injury device related?: No   Dressing Appearance Moist drainage   Drainage Amount Moderate   Drainage Characteristics/Odor Serosanguineous   Appearance Moist   Tissue loss description Full thickness   Periwound Area Moist   Care Wound cleanser   Dressing Applied  (silver alginate, mesh underwear)        Wound 05/28/25 2000 Ulceration Gluteal cleft   Date First Assessed/Time First Assessed: 05/28/25 2000   Present on Original Admission: Yes  Primary Wound Type: Ulceration  Location: (c) Gluteal cleft  Is this injury device related?: No   Dressing Appearance Moist drainage   Drainage Amount Moderate   Drainage Characteristics/Odor Serosanguineous   Appearance Moist   Tissue loss description Full thickness   Periwound Area Moist   Wound Edges Open   Care Wound cleanser   Dressing   (silver alginate, mesh underwear)        Wound 06/06/25 1320 Ulceration Left Axilla   Date First Assessed/Time First Assessed: 06/06/25 1320   Present on Original Admission: Yes  Primary Wound Type: Ulceration  Side: Left  Location: Axilla  Is this injury device related?: No   Dressing Appearance Open to air   Drainage Amount Scant   Drainage Characteristics/Odor No odor   Appearance Moist   Periwound Area Moist   Care Wound cleanser   Dressing   (sidra)        Wound 06/06/25 1320 Ulceration Right Groin   Date First Assessed/Time First Assessed: 06/06/25 1320   Present on Original Admission: Yes  Primary Wound Type: Ulceration  Side: Right  Location: Groin   Dressing Appearance Moist drainage   Drainage Amount Small   Drainage Characteristics/Odor No odor   Appearance Moist   Periwound Area Moist   Care Cleansed with:;Wound cleanser   Dressing  Applied  (silver alginate, mesh underwear)        Wound 06/06/25 1320 Ulceration Left Groin   Date First Assessed/Time First Assessed: 06/06/25 1320   Present on Original Admission: Yes  Primary Wound Type: Ulceration  Side: Left  Location: Groin   Dressing Appearance Open to air   Drainage Amount Moderate   Drainage Characteristics/Odor No odor   Appearance Moist   Tissue loss description Full thickness   Red (%), Wound Tissue Color 100 %   Periwound Area Moist   Care Cleansed with:;Wound cleanser   Dressing Applied  (silver alginate, mesh underwear)

## 2025-06-09 NOTE — PROGRESS NOTES
[facility] Medicine  Progress Note    Room: 235/235   Patient Name: Nikkie Myles  MRN: [id number]  Admit Date: 6/5/2025   Length of Stay: 4  Attending Physician: Girma Levi MD  Nurse Practitioner: Terri Parada NP  Code Status: Full Code    Date of Service: 06/09/2025    Principal Problem:   Crohn's disease of perianal region with complication      HPI obtained from patient, review of previous hospital records, and summarization.   HPI     Nikkie Myles is a 35 y.o. 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  She presented to [facility] [place] ED with with perianal pain, abdominal cramps, panniculitis, and diffuse joint pain.  CT showed probable perirectal fistula and anterior abdominal wall wound, chronic inflammatory changes of the colon and TI, diverting loop ileostomy parastomal hernia containing bowel.  CRS was consulted, and underwent rectal exam under anesthesia with biopsy.  Found to have evidence of deep ulceration wounds checking up her gluteal cleft, in her pannus, and in both labia.  She also had a deep ulcerated fistula trach along the right posterior perianal skin.  Wound seemed to be consistent with hidradenitis/pyoderma.  Biopsies were obtained.  Dermatology, ID, and GI consulted.  She received infliximab 10mg/kg on 6/4.  Pending biopsy reports for pyoderma/hidradenitis.  Ongoing needs for long-term pain management and wound care.    Patient is being admitted to [facility] for pain management and wound care.    Interval History    No issues overnight   Labs reviewed and listed below, no critical values   Potassium 3.4 today, replacing with KCl 40 mEq x1 dose        Past Medical History:      Past Medical History: Patient has a past medical history of Anal fistula,  Chronic diarrhea, Crohn's disease (2022), Enteropathic arthropathy, and Eye injury.    Past Surgical History: Patient has a past surgical history that includes  section, low transverse (2013);  section with tubal ligation (Bilateral, 2020);  section (); Examination under anesthesia (N/A, 8/15/2022); Flexible sigmoidoscopy (N/A, 8/15/2022); Esophagogastroduodenoscopy (N/A, 2022); Colonoscopy (N/A, 2022); Laparoscopic ileostomy (N/A, 2022); Digital rectal examination under anesthesia (N/A, 2025); and Biopsy of anus (N/A, 2025).    Social History: Patient reports that she has quit smoking. Her smoking use included cigarettes. She has a 5 pack-year smoking history. She has never used smokeless tobacco. She reports current alcohol use. She reports that she does not use drugs.    Family History:  family history includes Glaucoma in her maternal grandmother; Hypertension in her father and mother; No Known Problems in her brother, maternal aunt, maternal grandfather, maternal uncle, paternal aunt, paternal grandfather, paternal grandmother, paternal uncle, and sister.    Home Medications: reconciled     Allergies: Patient has no known allergies.      Subjective:     Review of Systems   Constitutional:  Positive for malaise/fatigue. Negative for chills and fever.   Respiratory:  Negative for cough and shortness of breath.    Cardiovascular:  Negative for chest pain and leg swelling.   Gastrointestinal:  Positive for abdominal pain. Negative for nausea and vomiting.   Genitourinary:  Negative for dysuria, flank pain and urgency.   Musculoskeletal:  Negative for back pain and neck pain.        Perineal pain and pain to pannus wound   Neurological:  Positive for weakness. Negative for dizziness and headaches.   Psychiatric/Behavioral:  Negative for depression. The patient is not nervous/anxious.          Objective:     Vital Signs:  Temp:  [97.7 °F (36.5 °C)-99.5  °F (37.5 °C)]   Pulse:  []   Resp:  [16-20]   BP: ()/(50-91)   SpO2:  [95 %-99 %]     Body mass index is 36.17 kg/m².           Intake and Output:    Intake/Output Summary (Last 24 hours) at 6/9/2025 0944  Last data filed at 6/8/2025 1800  Gross per 24 hour   Intake 1070 ml   Output --   Net 1070 ml        Physical Exam  HENT:      Head: Normocephalic and atraumatic.      Mouth/Throat:      Mouth: Mucous membranes are moist.      Pharynx: Oropharynx is clear.   Eyes:      Extraocular Movements: Extraocular movements intact.      Pupils: Pupils are equal, round, and reactive to light.   Cardiovascular:      Rate and Rhythm: Normal rate and regular rhythm.   Pulmonary:      Effort: Pulmonary effort is normal.      Breath sounds: Normal breath sounds.   Abdominal:      General: Bowel sounds are normal. There is no distension.      Palpations: Abdomen is soft.      Comments: Ileostomy in place  Wound to edytaus CAMMY   Musculoskeletal:      Right lower leg: No edema.      Left lower leg: No edema.   Skin:     General: Skin is warm and dry.      Capillary Refill: Capillary refill takes less than 2 seconds.      Comments: Perineal wounds +   Neurological:      General: No focal deficit present.      Mental Status: She is alert.   Psychiatric:         Mood and Affect: Mood normal.         Behavior: Behavior normal.         Patient consistently followed by Wound Care NP    Labs:  Recent Labs   Lab 06/06/25  0549 06/08/25  0503 06/09/25  0457   WBC 9.10 8.92 8.89   HGB 6.8* 7.9* 7.9*   HCT 23.3* 27.1* 26.5*   * 525* 495*     Recent Labs   Lab 06/06/25  0549 06/08/25  0503 06/09/25  0457     --  135*   K 4.0  --  3.4*     --  101   CO2 20*  --  25   BUN 13  --  10   CREATININE 0.6  --  0.6   GLU 72  --  132*   CALCIUM 8.5*  --  8.6*   MG 1.5* 1.4* 1.7   PHOS 4.2  --  3.8     Recent Labs   Lab 06/06/25  0549 06/09/25  0457   ALKPHOS 64 82   ALT 10 11   AST 15 17   ALBUMIN 2.3* 2.6*   PROT 7.8 8.5*  "  BILITOT 0.1 <0.1*     No results for input(s): "POCTGLUCOSE" in the last 72 hours.    Meds Scheduled:   acetaminophen  1,000 mg Oral TID    clindamycin phosphate 1%   Topical (Top) BID    enoxparin  40 mg Subcutaneous Daily    ergocalciferol  50,000 Units Oral Q7 Days    famotidine  20 mg Oral BID    triamcinolone acetonide 0.1%   Topical (Top) BID    And    ketoconazole   Topical (Top) BID    And    magnesium hydroxide 400 mg/5 ml  30 mL Oral BID    magnesium oxide  400 mg Oral TID    mupirocin   Nasal BID    oxyCODONE  5 mg Oral QID    pregabalin  150 mg Oral BID         Current Inpatient Problem List:  Active Hospital Problems    Diagnosis  POA    *Crohn's disease of perianal region with complication [K50.119]  Yes    Thrombocytosis [D75.839]  Yes    Crohn's disease of both small and large intestine with fistula [K50.813]  Yes    Iron deficiency anemia due to chronic blood loss [D50.0]  Yes    Symptomatic anemia [D64.9]  Yes      Resolved Hospital Problems   No resolved problems to display.         Assessment / Plan:   Crohn's disease with perianal region with complication   Crohn's disease of both small and large intestine with fistula  Seen by CRS while in the hospital, wounds appear to be more extensive than Crohn's related fistula disease.  Suspicious for pyoderma or hidradenitis superimposed on her IBD  Biopsies obtained, showing ulcerated skin and subcutis with acute and chronic inflammation and several non-necrotizing granulomas  Received dose of Remicade on 06/04   Avoid NSAIDs given risk of IBD exacerbation  Pain management with oxycodone, Lyrica, and scheduled acetaminophen  clindamycin 1% solution BID to L axillae    Mix approximately equal parts triamcinolone 0.1% cream, ketoconazole 2% cream, and milk of magnesia in a sterile urine container. Shake vigorously to mix. Apply to bilateral inguinal cleft BID.   Hemoglobins baths at least twice weekly   Bleach baths at home 1-2 times " weekly    Hidradenitis suppurativa   Sees [facility]   Has been seen previously in dermatology clinic when she was well controlled on infliximab per GI. However, unfortunately lost insurance/ infliximab and now is flaring  Resumed on infliximab 06/04/2025  Receives infliximab every 4 weeks, so next dose would be around 7/2  Will need to f/u with dermatology outpatient  Wound followed and managed by consistent, contracted [facility] NP    Thrombocytosis   Likely reactive due to IBD  Trend on regular labs   DVT prophylaxis with enoxaparin    Iron-deficiency anemia due to chronic blood loss  Symptomatic anemia  Trend hemoglobin on serial CBC   Transfuse for hemoglobin/hematocrit less than 7/21 or if becomes hemodynamically unstable  Transfused 1 unit PRBC on 6/6   Iron studies  (6/6): Iron 132, TIBC 222, 59 % sat, Ferritin 197.   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   Trend on regular labs   Magnesium stable at 1.7 today    IP OHS RISK OF UNPLANNED READMISSION Model:  MODERATE     Diet:  Regular   GI Ppx:  Famotidine   DVT Ppx:  Enoxaparin  Lines:  Midline 6/5  Drains:  Ileostomy   Airways:n/a   Wounds:  Pelvis, labia, perineum    Goals of Care:   Restorative  Treat infection  Optimize nutrition  Wound healing  Muscle strengthening  Restoration of ADL's  Improve mobility    Anticipated Disposition:    TBD  Will need follow up with Dermatology, CRS (Dr. Yip), and GI  Follow up appointments:   No future appointments.      Terri Parada NP  [facility]  Medicine  [facility]    Extended Visit  Total time spent: 55 minutes  Description of Time: counseling patient on clinical condition, therapies provided, plan of care, emotional support, coordinating patient care with other care team members, reviewing and interpreting labs and imaging, collaboration with physician, initiating new orders, chart review, and documentation. See interval hx and assessment/plan for details.

## 2025-06-09 NOTE — PT/OT/SLP PROGRESS
Physical Therapy Treatment    Patient Name:  Nikkie Myles   MRN:  [id number]    Recommendations:     Discharge Recommendations: No Therapy Indicated  Discharge Equipment Recommendations: none  Barriers to discharge: Inaccessible home    Assessment:     Nikkie Myles is a 35 y.o. female admitted with a medical diagnosis of Crohn's disease of perianal region with complication.  She presents with the following impairments/functional limitations: impaired endurance, impaired skin, impaired balance .    Rehab Prognosis: Good; patient would benefit from acute skilled PT services to address these deficits and reach maximum level of function.    Recent Surgery: * No surgery found *      Plan:     During this hospitalization, patient to be seen 5 x/week to address the identified rehab impairments via gait training, therapeutic activities, therapeutic exercises, neuromuscular re-education and progress toward the following goals:    Plan of Care Expires:       Subjective     Chief Complaint: none  Patient/Family Comments/goals: n/a  Pain/Comfort:         Objective:     Communicated with PT prior to session.  Patient found HOB elevated with colostomy, PICC line upon PT entry to room.     General Precautions: Standard, fall  Orthopedic Precautions: N/A  Braces: N/A  Respiratory Status: Room air     Functional Mobility:  Bed Mobility:     Supine to Sit: independence  Sit to Supine: independence      AM-PAC 6 CLICK MOBILITY          Treatment & Education:  Pt performed bed mobility as noted to sit EOB . Pt performed sitting and supine (B) LE TE AROM with gentle stretching in all available planes 20 reps ea.     Patient left HOB elevated with all lines intact and call button in reach..    GOALS:   Multidisciplinary Problems       Physical Therapy Goals          Problem: Physical Therapy    Goal Priority Disciplines Outcome Interventions   Physical Therapy Goal     PT, PT/OT Progressing    Description: Goals to be met by: TRENT      Patient will increase functional independence with mobility by performin. Gait  community level  with [place] using No Assistive Device.   2. Ascend/descend 14 stair with right Handrails [place] using No Assistive Device.                          DME Justifications:  No DME recommended requiring DME justifications    Time Tracking:     PT Received On: 25  PT Start Time: 1700     PT Stop Time: 1723  PT Total Time (min): 23 min     Billable Minutes: Total Time 23 min    Treatment Type: Treatment  PT/PTA: PTA     Number of PTA visits since last PT visit: 2025

## 2025-06-10 PROCEDURE — 97535 SELF CARE MNGMENT TRAINING: CPT

## 2025-06-10 PROCEDURE — 11000001 HC ACUTE MED/SURG PRIVATE ROOM

## 2025-06-10 PROCEDURE — 97110 THERAPEUTIC EXERCISES: CPT | Mod: CQ

## 2025-06-10 PROCEDURE — 25000003 PHARM REV CODE 250: Performed by: INTERNAL MEDICINE

## 2025-06-10 PROCEDURE — 63600175 PHARM REV CODE 636 W HCPCS: Performed by: INTERNAL MEDICINE

## 2025-06-10 PROCEDURE — 97116 GAIT TRAINING THERAPY: CPT | Mod: CQ

## 2025-06-10 PROCEDURE — 25000003 PHARM REV CODE 250: Performed by: FAMILY MEDICINE

## 2025-06-10 PROCEDURE — 25000003 PHARM REV CODE 250: Performed by: STUDENT IN AN ORGANIZED HEALTH CARE EDUCATION/TRAINING PROGRAM

## 2025-06-10 RX ADMIN — ACETAMINOPHEN 1000 MG: 500 TABLET, FILM COATED ORAL at 02:06

## 2025-06-10 RX ADMIN — OXYCODONE HYDROCHLORIDE 5 MG: 5 TABLET ORAL at 02:06

## 2025-06-10 RX ADMIN — TRIAMCINOLONE ACETONIDE: 1 CREAM TOPICAL at 10:06

## 2025-06-10 RX ADMIN — ENOXAPARIN SODIUM 40 MG: 40 INJECTION SUBCUTANEOUS at 06:06

## 2025-06-10 RX ADMIN — PREGABALIN 150 MG: 75 CAPSULE ORAL at 10:06

## 2025-06-10 RX ADMIN — MUPIROCIN: 20 OINTMENT TOPICAL at 09:06

## 2025-06-10 RX ADMIN — CLINDAMYCIN PHOSPHATE: 10 GEL TOPICAL at 10:06

## 2025-06-10 RX ADMIN — OXYCODONE HYDROCHLORIDE 5 MG: 5 TABLET ORAL at 09:06

## 2025-06-10 RX ADMIN — CLINDAMYCIN PHOSPHATE: 10 GEL TOPICAL at 09:06

## 2025-06-10 RX ADMIN — ACETAMINOPHEN 1000 MG: 500 TABLET, FILM COATED ORAL at 10:06

## 2025-06-10 RX ADMIN — HYDROMORPHONE HYDROCHLORIDE 4 MG: 4 TABLET ORAL at 11:06

## 2025-06-10 RX ADMIN — Medication 400 MG: at 09:06

## 2025-06-10 RX ADMIN — FAMOTIDINE 20 MG: 20 TABLET, FILM COATED ORAL at 09:06

## 2025-06-10 RX ADMIN — OXYCODONE HYDROCHLORIDE 5 MG: 5 TABLET ORAL at 05:06

## 2025-06-10 RX ADMIN — FAMOTIDINE 20 MG: 20 TABLET, FILM COATED ORAL at 10:06

## 2025-06-10 RX ADMIN — ACETAMINOPHEN 1000 MG: 500 TABLET, FILM COATED ORAL at 09:06

## 2025-06-10 RX ADMIN — HYDROMORPHONE HYDROCHLORIDE 2 MG: 2 INJECTION, SOLUTION INTRAMUSCULAR; INTRAVENOUS; SUBCUTANEOUS at 11:06

## 2025-06-10 RX ADMIN — MAGNESIUM HYDROXIDE 2400 MG: 1200 LIQUID ORAL at 10:06

## 2025-06-10 RX ADMIN — Medication 400 MG: at 10:06

## 2025-06-10 RX ADMIN — Medication 400 MG: at 02:06

## 2025-06-10 RX ADMIN — PREGABALIN 150 MG: 75 CAPSULE ORAL at 09:06

## 2025-06-10 RX ADMIN — OXYCODONE HYDROCHLORIDE 5 MG: 5 TABLET ORAL at 10:06

## 2025-06-10 NOTE — PT/OT/SLP PROGRESS
Physical Therapy Treatment    Patient Name:  Nikkie Myles   MRN:  [id number]    Recommendations:     Discharge Recommendations: No Therapy Indicated  Discharge Equipment Recommendations: none  Barriers to discharge: Inaccessible home    Assessment:     Nikkie Myles is a 35 y.o. female admitted with a medical diagnosis of Crohn's disease of perianal region with complication.  She presents with the following impairments/functional limitations: weakness, impaired endurance, impaired skin, impaired balance .    Rehab Prognosis: Fair; patient would benefit from acute skilled PT services to address these deficits and reach maximum level of function.    Recent Surgery: * No surgery found *      Plan:     During this hospitalization, patient to be seen 5 x/week to address the identified rehab impairments via gait training, therapeutic activities, therapeutic exercises, neuromuscular re-education and progress toward the following goals:    Plan of Care Expires:       Subjective     Chief Complaint: none  Patient/Family Comments/goals: n/a  Pain/Comfort:         Objective:     Communicated with PT prior to session.  Patient found HOB elevated with colostomy, PICC line upon PT entry to room.     General Precautions: Standard, fall  Orthopedic Precautions: N/A  Braces: N/A  Respiratory Status: Room air     Functional Mobility:  Bed Mobility:     Supine to Sit: independence  Transfers:     Sit to Stand:  independence with no AD  Gait: ~300 ' with S and no AD, no LOB      AM-PAC 6 CLICK MOBILITY          Treatment & Education:  Pt performed bed mobility as noted to sit EOB . Pt performed (B) LE TE AROM with gentle stretching in all available planes 15 x 2 sets ea. Gait training as noted.     Patient left up in chair with all lines intact and call button in reach..    GOALS:   Multidisciplinary Problems       Physical Therapy Goals          Problem: Physical Therapy    Goal Priority Disciplines Outcome Interventions    Physical Therapy Goal     PT, PT/OT Progressing    Description: Goals to be met by: DC     Patient will increase functional independence with mobility by performin. Gait  community level  with [place] using No Assistive Device.   2. Ascend/descend 14 stair with right Handrails [place] using No Assistive Device.                          DME Justifications:  No DME recommended requiring DME justifications    Time Tracking:     PT Received On: 06/10/25  PT Start Time: 905     PT Stop Time: 930  PT Total Time (min): 25 min     Billable Minutes: Total Time 25 min    Treatment Type: Treatment  PT/PTA: PTA     Number of PTA visits since last PT visit: 2     06/10/2025

## 2025-06-10 NOTE — PLAN OF CARE
Problem: Adult Inpatient Plan of Care  Goal: Plan of Care Review  Outcome: Progressing     Problem: Wound  Goal: Skin Health and Integrity  Outcome: Progressing   Patient resting at this time. Easily aroused. Abdominal dressing intact. Safety maintained. Call light within  reach.
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
## 2025-06-10 NOTE — PROGRESS NOTES
[facility] Medicine  Progress Note    Room: 235/235   Patient Name: Nikkie Myles  MRN: [id number]  Admit Date: 6/5/2025   Length of Stay: 5  Attending Physician: Girma Levi MD  Nurse Practitioner: Terri Parada NP  Code Status: Full Code    Date of Service: 06/10/2025    Principal Problem:   Crohn's disease of perianal region with complication      HPI obtained from patient, review of previous hospital records, and summarization.   HPI     Nikkie Myles is a 35 y.o. 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  She presented to [facility] ED with with perianal pain, abdominal cramps, panniculitis, and diffuse joint pain.  CT showed probable perirectal fistula and anterior abdominal wall wound, chronic inflammatory changes of the colon and TI, diverting loop ileostomy parastomal hernia containing bowel.  CRS was consulted, and underwent rectal exam under anesthesia with biopsy.  Found to have evidence of deep ulceration wounds checking up her gluteal cleft, in her pannus, and in both labia.  She also had a deep ulcerated fistula trach along the right posterior perianal skin.  Wound seemed to be consistent with hidradenitis/pyoderma.  Biopsies were obtained.  Dermatology, ID, and GI consulted.  She received infliximab 10mg/kg on 6/4.  Pending biopsy reports for pyoderma/hidradenitis.  Ongoing needs for long-term pain management and wound care.    Patient is being admitted to [facility] for pain management and wound care.    Interval History    Having some pain around her midline insertion site today.  Reports pain is primarily with flushing line.  Will remove midline and attempt to place PIV today        Past Medical History:      Past Medical History: Patient has a past medical  history of Anal fistula, Chronic diarrhea, Crohn's disease (2022), Enteropathic arthropathy, and Eye injury.    Past Surgical History: Patient has a past surgical history that includes  section, low transverse (2013);  section with tubal ligation (Bilateral, 2020);  section (); Examination under anesthesia (N/A, 8/15/2022); Flexible sigmoidoscopy (N/A, 8/15/2022); Esophagogastroduodenoscopy (N/A, 2022); Colonoscopy (N/A, 2022); Laparoscopic ileostomy (N/A, 2022); Digital rectal examination under anesthesia (N/A, 2025); and Biopsy of anus (N/A, 2025).    Social History: Patient reports that she has quit smoking. Her smoking use included cigarettes. She has a 5 pack-year smoking history. She has never used smokeless tobacco. She reports current alcohol use. She reports that she does not use drugs.    Family History:  family history includes Glaucoma in her maternal grandmother; Hypertension in her father and mother; No Known Problems in her brother, maternal aunt, maternal grandfather, maternal uncle, paternal aunt, paternal grandfather, paternal grandmother, paternal uncle, and sister.    Home Medications: reconciled     Allergies: Patient has no known allergies.      Subjective:     Review of Systems   Constitutional:  Positive for malaise/fatigue. Negative for chills and fever.   Respiratory:  Negative for cough and shortness of breath.    Cardiovascular:  Negative for chest pain and leg swelling.   Gastrointestinal:  Positive for abdominal pain. Negative for nausea and vomiting.   Genitourinary:  Negative for dysuria, flank pain and urgency.   Musculoskeletal:  Negative for back pain and neck pain.        Perineal pain and pain to pannus wound   Neurological:  Positive for weakness. Negative for dizziness and headaches.   Psychiatric/Behavioral:  Negative for depression. The patient is not nervous/anxious.          Objective:     Vital Signs:  Temp:   [97.2 °F (36.2 °C)-98.7 °F (37.1 °C)]   Pulse:  [51-78]   Resp:  [16-18]   BP: ()/(55-73)   SpO2:  [95 %-99 %]     Body mass index is 36.17 kg/m².           Intake and Output:    Intake/Output Summary (Last 24 hours) at 6/10/2025 1153  Last data filed at 6/9/2025 2000  Gross per 24 hour   Intake 360 ml   Output --   Net 360 ml        Physical Exam  HENT:      Head: Normocephalic and atraumatic.      Mouth/Throat:      Mouth: Mucous membranes are moist.      Pharynx: Oropharynx is clear.   Eyes:      Extraocular Movements: Extraocular movements intact.      Pupils: Pupils are equal, round, and reactive to light.   Cardiovascular:      Rate and Rhythm: Normal rate and regular rhythm.   Pulmonary:      Effort: Pulmonary effort is normal.      Breath sounds: Normal breath sounds.   Abdominal:      General: Bowel sounds are normal. There is no distension.      Palpations: Abdomen is soft.      Comments: Ileostomy in place  Wound to pannus CAMMY   Musculoskeletal:      Right lower leg: No edema.      Left lower leg: No edema.   Skin:     General: Skin is warm and dry.      Capillary Refill: Capillary refill takes less than 2 seconds.      Comments: Perineal wounds +   Neurological:      General: No focal deficit present.      Mental Status: She is alert.   Psychiatric:         Mood and Affect: Mood normal.         Behavior: Behavior normal.         Patient consistently followed by Wound Care NP    Labs:  Recent Labs   Lab 06/06/25  0549 06/08/25  0503 06/09/25  0457   WBC 9.10 8.92 8.89   HGB 6.8* 7.9* 7.9*   HCT 23.3* 27.1* 26.5*   * 525* 495*     Recent Labs   Lab 06/06/25  0549 06/08/25  0503 06/09/25  0457     --  135*   K 4.0  --  3.4*     --  101   CO2 20*  --  25   BUN 13  --  10   CREATININE 0.6  --  0.6   GLU 72  --  132*   CALCIUM 8.5*  --  8.6*   MG 1.5* 1.4* 1.7   PHOS 4.2  --  3.8     Recent Labs   Lab 06/06/25  0549 06/09/25  0457   ALKPHOS 64 82   ALT 10 11   AST 15 17   ALBUMIN 2.3*  "2.6*   PROT 7.8 8.5*   BILITOT 0.1 <0.1*     No results for input(s): "POCTGLUCOSE" in the last 72 hours.    Meds Scheduled:   acetaminophen  1,000 mg Oral TID    clindamycin phosphate 1%   Topical (Top) BID    enoxparin  40 mg Subcutaneous Daily    ergocalciferol  50,000 Units Oral Q7 Days    famotidine  20 mg Oral BID    triamcinolone acetonide 0.1%   Topical (Top) BID    And    ketoconazole   Topical (Top) BID    And    magnesium hydroxide 400 mg/5 ml  30 mL Oral BID    magnesium oxide  400 mg Oral TID    mupirocin   Nasal BID    oxyCODONE  5 mg Oral QID    pregabalin  150 mg Oral BID         Current Inpatient Problem List:  Active Hospital Problems    Diagnosis  POA    *Crohn's disease of perianal region with complication [K50.119]  Yes    Thrombocytosis [D75.839]  Yes    Crohn's disease of both small and large intestine with fistula [K50.813]  Yes    Iron deficiency anemia due to chronic blood loss [D50.0]  Yes    Symptomatic anemia [D64.9]  Yes      Resolved Hospital Problems   No resolved problems to display.         Assessment / Plan:   Crohn's disease with perianal region with complication   Crohn's disease of both small and large intestine with fistula  Seen by CRS while in the hospital, wounds appear to be more extensive than Crohn's related fistula disease.  Suspicious for pyoderma or hidradenitis superimposed on her IBD  Biopsies obtained, showing ulcerated skin and subcutis with acute and chronic inflammation and several non-necrotizing granulomas  Received dose of Remicade on 06/04   Avoid NSAIDs given risk of IBD exacerbation  Pain management with oxycodone, Lyrica, and scheduled acetaminophen  clindamycin 1% solution BID to L axillae    Mix approximately equal parts triamcinolone 0.1% cream, ketoconazole 2% cream, and milk of magnesia in a sterile urine container. Shake vigorously to mix. Apply to bilateral inguinal cleft BID.   Hemoglobins baths at least twice weekly   Bleach baths at home 1-2 " times weekly    Hidradenitis suppurativa   Sees [facility]   Has been seen previously in dermatology clinic when she was well controlled on infliximab per GI. However, unfortunately lost insurance/ infliximab and now is flaring  Resumed on infliximab 06/04/2025  Receives infliximab every 4 weeks, so next dose would be around 7/2  Will need to f/u with dermatology outpatient  Wound followed and managed by consistent, contracted [facility] NP    Thrombocytosis   Likely reactive due to IBD  Trend on regular labs   DVT prophylaxis with enoxaparin    Iron-deficiency anemia due to chronic blood loss  Symptomatic anemia  Trend hemoglobin on serial CBC   Transfuse for hemoglobin/hematocrit less than 7/21 or if becomes hemodynamically unstable  Transfused 1 unit PRBC on 6/6   Iron studies  (6/6): Iron 132, TIBC 222, 59 % sat, Ferritin 197.   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   Trend on regular labs   Magnesium stable at 1.7    IP OHS RISK OF UNPLANNED READMISSION Model:  MODERATE     Diet:  Regular   GI Ppx:  Famotidine   DVT Ppx:  Enoxaparin  Lines:  Midline 6/5  Drains:  Ileostomy   Airways:n/a   Wounds:  Pelvis, labia, perineum    Goals of Care:   Restorative  Treat infection  Optimize nutrition  Wound healing  Muscle strengthening  Restoration of ADL's  Improve mobility    Anticipated Disposition:    TBD  Will need follow up with Dermatology, CRS (Dr. Yip), and GI  Follow up appointments:   No future appointments.      Terri Parada NP  [facility]  Medicine  [facility]    Extended Visit  Total time spent: 51 minutes  Description of Time: counseling patient on clinical condition, therapies provided, plan of care, emotional support, coordinating patient care with other care team members, reviewing and interpreting labs and imaging, collaboration with physician, initiating new orders, chart review, and documentation. See interval hx and assessment/plan for details.

## 2025-06-10 NOTE — PROGRESS NOTES
06/10/25 1315        Wound 05/27/25 2225 Ulceration lower Abdomen   Date First Assessed/Time First Assessed: 05/27/25 2225   Present on Original Admission: Yes  Primary Wound Type: Ulceration  Orientation: lower  Location: (c) Abdomen  Is this injury device related?: No   Dressing Appearance Moist drainage   Drainage Amount Moderate   Drainage Characteristics/Odor Serous   Appearance Moist   Tissue loss description Full thickness   Periwound Area Moist   Wound Edges Open   Care Cleansed with:;Wound cleanser   Dressing   (oosely pack with silver alginate rope and cover with ABD pads place mesh underwear)        Wound 05/28/25 2000 Ulceration Gluteal cleft   Date First Assessed/Time First Assessed: 05/28/25 2000   Present on Original Admission: Yes  Primary Wound Type: Ulceration  Location: (c) Gluteal cleft  Is this injury device related?: No   Dressing Appearance Moist drainage   Drainage Amount Moderate   Drainage Characteristics/Odor Serosanguineous   Appearance Moist   Tissue loss description Full thickness   Periwound Area Moist   Wound Edges Open   Care Wound cleanser   Dressing   (oosely pack with silver alginate rope and cover with ABD pads place mesh underwear)        Wound 06/06/25 1320 Ulceration Left Axilla   Date First Assessed/Time First Assessed: 06/06/25 1320   Present on Original Admission: Yes  Primary Wound Type: Ulceration  Side: Left  Location: Axilla  Is this injury device related?: No   Dressing Appearance Open to air   Drainage Amount None   Drainage Characteristics/Odor No odor   Periwound Area Moist   Care Wound cleanser  (vashe)   Dressing   (open to air)        Wound 06/06/25 1320 Ulceration Right Groin   Date First Assessed/Time First Assessed: 06/06/25 1320   Present on Original Admission: Yes  Primary Wound Type: Ulceration  Side: Right  Location: Groin   Drainage Characteristics/Odor No odor   Periwound Area Moist   Care Wound cleanser  (vashe)   Dressing   (loosely pack with silver  alginate rope and cover with ABD pads place mesh underwear)        Wound 06/06/25 1320 Ulceration Left Groin   Date First Assessed/Time First Assessed: 06/06/25 1320   Present on Original Admission: Yes  Primary Wound Type: Ulceration  Side: Left  Location: Groin   Dressing Appearance Moist drainage   Drainage Amount Moderate   Drainage Characteristics/Odor No odor   Appearance Moist   Tissue loss description Full thickness   Periwound Area Moist   Care Wound cleanser  (vashe)   Dressing   (oosely pack with silver alginate rope and cover with ABD pads place mesh underwear)

## 2025-06-10 NOTE — PT/OT/SLP PROGRESS
Occupational Therapy   Treatment    Name: Nikkie Myles  MRN: [id number]  Admitting Diagnosis:  Crohn's disease of perianal region with complication       Recommendations:     Discharge Recommendations: No Therapy Indicated  Discharge Equipment Recommendations:  none  Barriers to discharge:       Assessment:     Nikkie Myles is a 35 y.o. female with a medical diagnosis of Crohn's disease of perianal region with complication.  She presents with debility. Performance deficits affecting function are impaired endurance, impaired skin, impaired balance.     Rehab Prognosis:  Fair; patient would benefit from acute skilled OT services to address these deficits and reach maximum level of function.       Plan:     Patient to be seen 2 x/week to address the above listed problems via self-care/home management, therapeutic activities, therapeutic exercises  Plan of Care Expires: 07/06/25  Plan of Care Reviewed with: patient    Subjective     Chief Complaint: none stated  Patient/Family Comments/goals: no family in room  Pain/Comfort:       Objective:     Communicated with: Patient prior to session.  Patient found HOB elevated with   upon OT entry to room.    General Precautions: Standard, fall    Orthopedic Precautions:   Braces:    Respiratory Status: Room air     Occupational Performance:     Bed Mobility:    Patient completed Rolling/Turning to Left with  independence  Patient completed Rolling/Turning to Right with independence  Patient completed Supine to Sit with independence     Functional Mobility/Transfers:  Patient completed Sit <> Stand Transfer with independence  with  no assistive device   Functional Mobility: independent    Activities of Daily Living:  Feeding:  independence    Grooming: independence    Upper Body Dressing: independence    Toileting: independence        [facility] 6 Click ADL: 24    Treatment & Education:  Agreeable to therapy this AM.   Patient SPV with functional mobility to restroom 2/2 floor  being went from dripping shower head and therapists 1st time with patient.   Water dried and patient able to perform toileting and toilet hygiene independently .     Originally patient stated she does not always go to restroom she just uses it in her bed.  After consulting with CNA patient always walks to restroom and she has never had to clean patients bed.    Patient independently stands at sink to perform grooming/hygiene tasks.   Patient SPV with functional mobility in halls for safety and patient remained up in bedside chair with all needs in reach    Patient left up in chair with all lines intact, call button in reach, and wound care NP and RN present    GOALS:   Multidisciplinary Problems       Occupational Therapy Goals          Problem: Occupational Therapy    Goal Priority Disciplines Outcome Interventions   Occupational Therapy Goal     OT, PT/OT Progressing    Description: Goals to be met by: 7/6/25     Patient will increase functional independence with ADLs by performing:    UE Dressing with [place]. MET 6/9/25  LE Dressing with [place].  Grooming while standing at sink with [place]. MET 6/9/25  Toileting from toilet with [place] for hygiene and clothing management.   Toilet transfer to toilet with [place].  Shower with setup   Upper extremity exercise program x10 reps per handout, with independence.                         DME Justifications:  No DME recommended requiring DME justifications    Time Tracking:     OT Date of Treatment: 06/10/25  OT Start Time: 0905  OT Stop Time: 0930  OT Total Time (min): 25 min    Billable Minutes:Total Time 25 min    OT/CAMMY: CAMMY     Number of CAMMY visits since last OT visit: 2    6/10/2025

## 2025-06-10 NOTE — PLAN OF CARE
Patient aaox4. PO meds tolerated whole. Denies any pain. Ambulates to the bathroom. No acute changes.     Problem: Adult Inpatient Plan of Care  Goal: Absence of Hospital-Acquired Illness or Injury  Outcome: Progressing     Problem: Adult Inpatient Plan of Care  Goal: Optimal Comfort and Wellbeing  Outcome: Progressing     Problem: Infection  Goal: Absence of Infection Signs and Symptoms  Outcome: Progressing     Problem: Wound  Goal: Optimal Coping  Outcome: Progressing

## 2025-06-11 LAB
ALBUMIN SERPL BCP-MCNC: 2.9 G/DL (ref 3.5–5.2)
ALP SERPL-CCNC: 80 UNIT/L (ref 40–150)
ALT SERPL W/O P-5'-P-CCNC: 11 UNIT/L (ref 10–44)
ANION GAP (OHS): 9 MMOL/L (ref 8–16)
AST SERPL-CCNC: 15 UNIT/L (ref 11–45)
BILIRUB DIRECT SERPL-MCNC: <0.1 MG/DL (ref 0.1–0.3)
BILIRUB SERPL-MCNC: 0.1 MG/DL (ref 0.1–1)
BUN SERPL-MCNC: 19 MG/DL (ref 6–20)
CALCIUM SERPL-MCNC: 9.1 MG/DL (ref 8.7–10.5)
CHLORIDE SERPL-SCNC: 104 MMOL/L (ref 95–110)
CO2 SERPL-SCNC: 25 MMOL/L (ref 23–29)
CREAT SERPL-MCNC: 0.8 MG/DL (ref 0.5–1.4)
ERYTHROCYTE [DISTWIDTH] IN BLOOD BY AUTOMATED COUNT: 29.6 % (ref 11.5–14.5)
GFR SERPLBLD CREATININE-BSD FMLA CKD-EPI: >60 ML/MIN/1.73/M2
GLUCOSE SERPL-MCNC: 111 MG/DL (ref 70–110)
HCT VFR BLD AUTO: 29.3 % (ref 37–48.5)
HGB BLD-MCNC: 8.8 GM/DL (ref 12–16)
MAGNESIUM SERPL-MCNC: 1.7 MG/DL (ref 1.6–2.6)
MCH RBC QN AUTO: 18.3 PG (ref 27–31)
MCHC RBC AUTO-ENTMCNC: 30 G/DL (ref 32–36)
MCV RBC AUTO: 61 FL (ref 82–98)
PHOSPHATE SERPL-MCNC: 5.1 MG/DL (ref 2.7–4.5)
PLATELET # BLD AUTO: 483 K/UL (ref 150–450)
PMV BLD AUTO: 9.4 FL (ref 9.2–12.9)
POTASSIUM SERPL-SCNC: 4.2 MMOL/L (ref 3.5–5.1)
PROT SERPL-MCNC: 8.9 GM/DL (ref 6–8.4)
RBC # BLD AUTO: 4.82 M/UL (ref 4–5.4)
SODIUM SERPL-SCNC: 138 MMOL/L (ref 136–145)
WBC # BLD AUTO: 10.94 K/UL (ref 3.9–12.7)

## 2025-06-11 PROCEDURE — 80053 COMPREHEN METABOLIC PANEL: CPT | Performed by: HOSPITALIST

## 2025-06-11 PROCEDURE — 82248 BILIRUBIN DIRECT: CPT | Performed by: HOSPITALIST

## 2025-06-11 PROCEDURE — 36415 COLL VENOUS BLD VENIPUNCTURE: CPT | Performed by: HOSPITALIST

## 2025-06-11 PROCEDURE — 85027 COMPLETE CBC AUTOMATED: CPT | Performed by: HOSPITALIST

## 2025-06-11 PROCEDURE — 25000003 PHARM REV CODE 250: Performed by: FAMILY MEDICINE

## 2025-06-11 PROCEDURE — 25000003 PHARM REV CODE 250: Performed by: STUDENT IN AN ORGANIZED HEALTH CARE EDUCATION/TRAINING PROGRAM

## 2025-06-11 PROCEDURE — 63600175 PHARM REV CODE 636 W HCPCS: Performed by: INTERNAL MEDICINE

## 2025-06-11 PROCEDURE — 83735 ASSAY OF MAGNESIUM: CPT | Performed by: HOSPITALIST

## 2025-06-11 PROCEDURE — 25000003 PHARM REV CODE 250: Performed by: INTERNAL MEDICINE

## 2025-06-11 PROCEDURE — 11000001 HC ACUTE MED/SURG PRIVATE ROOM

## 2025-06-11 PROCEDURE — 84100 ASSAY OF PHOSPHORUS: CPT | Performed by: HOSPITALIST

## 2025-06-11 RX ADMIN — OXYCODONE HYDROCHLORIDE 5 MG: 5 TABLET ORAL at 02:06

## 2025-06-11 RX ADMIN — FAMOTIDINE 20 MG: 20 TABLET, FILM COATED ORAL at 09:06

## 2025-06-11 RX ADMIN — HYDROMORPHONE HYDROCHLORIDE 2 MG: 2 INJECTION, SOLUTION INTRAMUSCULAR; INTRAVENOUS; SUBCUTANEOUS at 11:06

## 2025-06-11 RX ADMIN — CLINDAMYCIN PHOSPHATE: 10 GEL TOPICAL at 11:06

## 2025-06-11 RX ADMIN — OXYCODONE HYDROCHLORIDE 5 MG: 5 TABLET ORAL at 09:06

## 2025-06-11 RX ADMIN — ACETAMINOPHEN 1000 MG: 500 TABLET, FILM COATED ORAL at 08:06

## 2025-06-11 RX ADMIN — PREGABALIN 150 MG: 75 CAPSULE ORAL at 08:06

## 2025-06-11 RX ADMIN — ACETAMINOPHEN 1000 MG: 500 TABLET, FILM COATED ORAL at 02:06

## 2025-06-11 RX ADMIN — FAMOTIDINE 20 MG: 20 TABLET, FILM COATED ORAL at 08:06

## 2025-06-11 RX ADMIN — Medication 400 MG: at 08:06

## 2025-06-11 RX ADMIN — ENOXAPARIN SODIUM 40 MG: 40 INJECTION SUBCUTANEOUS at 05:06

## 2025-06-11 RX ADMIN — MAGNESIUM HYDROXIDE 2400 MG: 1200 LIQUID ORAL at 09:06

## 2025-06-11 RX ADMIN — OXYCODONE HYDROCHLORIDE 5 MG: 5 TABLET ORAL at 08:06

## 2025-06-11 RX ADMIN — Medication 400 MG: at 09:06

## 2025-06-11 RX ADMIN — HYDROMORPHONE HYDROCHLORIDE 4 MG: 4 TABLET ORAL at 11:06

## 2025-06-11 RX ADMIN — PREGABALIN 150 MG: 75 CAPSULE ORAL at 09:06

## 2025-06-11 RX ADMIN — Medication 400 MG: at 02:06

## 2025-06-11 RX ADMIN — ACETAMINOPHEN 1000 MG: 500 TABLET, FILM COATED ORAL at 09:06

## 2025-06-11 RX ADMIN — OXYCODONE HYDROCHLORIDE 5 MG: 5 TABLET ORAL at 05:06

## 2025-06-11 NOTE — PROGRESS NOTES
06/10/25 1315        Wound 05/27/25 2225 Ulceration lower Abdomen   Date First Assessed/Time First Assessed: 05/27/25 2225   Present on Original Admission: Yes  Primary Wound Type: Ulceration  Orientation: lower  Location: (c) Abdomen  Is this injury device related?: No   Dressing Appearance Moist drainage   Drainage Amount Moderate   Drainage Characteristics/Odor Serous   Appearance Moist   Tissue loss description Full thickness   Periwound Area Moist   Wound Edges Open   Care Cleansed with:;Wound cleanser   Dressing   (oosely pack with silver alginate rope and cover with ABD pads place mesh underwear)        Wound 05/28/25 2000 Ulceration Gluteal cleft   Date First Assessed/Time First Assessed: 05/28/25 2000   Present on Original Admission: Yes  Primary Wound Type: Ulceration  Location: (c) Gluteal cleft  Is this injury device related?: No   Dressing Appearance Moist drainage   Drainage Amount Moderate   Drainage Characteristics/Odor Serosanguineous   Appearance Moist   Tissue loss description Full thickness   Periwound Area Moist   Wound Edges Open   Care Wound cleanser   Dressing   (oosely pack with silver alginate rope and cover with ABD pads place mesh underwear)        Wound 06/06/25 1320 Ulceration Left Axilla   Date First Assessed/Time First Assessed: 06/06/25 1320   Present on Original Admission: Yes  Primary Wound Type: Ulceration  Side: Left  Location: Axilla  Is this injury device related?: No   Dressing Appearance Open to air   Drainage Amount None   Drainage Characteristics/Odor No odor   Periwound Area Moist   Care Wound cleanser  (vashe)   Dressing   (clean with vashe, pat dry with gauze apply clindamycin gel leave open to air, assess daily)        Wound 06/06/25 1320 Ulceration Right Groin   Date First Assessed/Time First Assessed: 06/06/25 1320   Present on Original Admission: Yes  Primary Wound Type: Ulceration  Side: Right  Location: Groin   Drainage Characteristics/Odor No odor   Periwound  Area Moist   Care Wound cleanser  (vashe)   Dressing   (loosely pack with silver alginate rope and cover with ABD pads place mesh underwear)        Wound 06/06/25 1320 Ulceration Left Groin   Date First Assessed/Time First Assessed: 06/06/25 1320   Present on Original Admission: Yes  Primary Wound Type: Ulceration  Side: Left  Location: Groin   Dressing Appearance Moist drainage   Drainage Amount Moderate   Drainage Characteristics/Odor No odor   Appearance Moist   Tissue loss description Full thickness   Periwound Area Moist   Care Wound cleanser  (vashe)   Dressing   (oosely pack with silver alginate rope and cover with ABD pads place mesh underwear)

## 2025-06-11 NOTE — PLAN OF CARE
Awake alert oriented able to make needs known. Medicated several times with pain medication. Ambulates independently in bathroom. Will continue to monitor patient. Son at bedside.   Problem: Infection  Goal: Absence of Infection Signs and Symptoms  Outcome: Progressing     Problem: Wound  Goal: Optimal Coping  Outcome: Progressing     Problem: Fall Injury Risk  Goal: Absence of Fall and Fall-Related Injury  Outcome: Progressing

## 2025-06-11 NOTE — PROGRESS NOTES
06/11/25 1223   Pain Reassessment   Pain Rating Post Med Admin 2        Wound 05/27/25 2225 Ulceration lower Abdomen   Date First Assessed/Time First Assessed: 05/27/25 2225   Present on Original Admission: Yes  Primary Wound Type: Ulceration  Orientation: lower  Location: (c) Abdomen  Is this injury device related?: No   Dressing Appearance Open to air   Drainage Amount Moderate   Appearance Moist   Periwound Area Moist   Care   (cleanse with vashe, pat dry, loosely pack with silver alginate ropeand cover with ABD pads place mesh underwear)        Wound 05/28/25 2000 Ulceration Gluteal cleft   Date First Assessed/Time First Assessed: 05/28/25 2000   Present on Original Admission: Yes  Primary Wound Type: Ulceration  Location: (c) Gluteal cleft  Is this injury device related?: No   Dressing Appearance Moist drainage   Drainage Amount Moderate   Drainage Characteristics/Odor Serosanguineous   Appearance Moist   Periwound Area Moist   Care   (cleanse with vashe, pat dry, loosely pack with silver alginate ropeand cover with ABD pads place mesh underwear)        Wound 06/06/25 1320 Ulceration Left Axilla   Date First Assessed/Time First Assessed: 06/06/25 1320   Present on Original Admission: Yes  Primary Wound Type: Ulceration  Side: Left  Location: Axilla  Is this injury device related?: No   Dressing Appearance Dried drainage   Drainage Amount Scant   Appearance Moist   Periwound Area Moist   Care Wound cleanser   Dressing   (Clean wound to left axilla with vashe, pat dry with gauze apply clindamycin gel leave open to air, assess daily)        Wound 06/06/25 1320 Ulceration Right Groin   Date First Assessed/Time First Assessed: 06/06/25 1320   Present on Original Admission: Yes  Primary Wound Type: Ulceration  Side: Right  Location: Groin   Dressing Appearance Moist drainage   Drainage Amount Small   Appearance Moist   Periwound Area Moist   Care Wound cleanser   Dressing   (cleanse with vashe, pat dry, loosely  pack with silver alginate ropeand cover with ABD pads place mesh underwear)        Wound 06/06/25 1320 Ulceration Left Groin   Date First Assessed/Time First Assessed: 06/06/25 1320   Present on Original Admission: Yes  Primary Wound Type: Ulceration  Side: Left  Location: Groin   Dressing Appearance Open to air   Drainage Amount Moderate   Appearance Moist   Periwound Area Moist   Undermining (depth (cm)/location) cleanse with vashe, pat dry, loosely pack with silver alginate ropeand cover with ABD pads place mesh underwear

## 2025-06-11 NOTE — PROGRESS NOTES
Patient not seen by OT today 2/2 1st attempt in AM patient sleeping with son in room.   PM attempt patient laying in bed with son and sleeping but easily awoken but declined therapy.   Will attempt next treatment day

## 2025-06-11 NOTE — PT/OT/SLP PROGRESS
Physical Therapy      Patient Name:  Nikkie Myles   MRN:  [id number]    Patient not seen today secondary to Patient unwilling to participate. Will follow-up. Pt states she's already getting around safely on her jessica.

## 2025-06-11 NOTE — PROGRESS NOTES
[facility] Medicine  Progress Note    Room: 235/235   Patient Name: Nikkie Myles  MRN: [id number]  Admit Date: 6/5/2025   Length of Stay: 6  Attending Physician: Girma Levi MD  Nurse Practitioner: Terri Parada NP  Code Status: Full Code    Date of Service: 06/11/2025    Principal Problem:   Crohn's disease of perianal region with complication      HPI obtained from patient, review of previous hospital records, and summarization.   HPI     Nikkie Myles is a 35 y.o. 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  [place] [facility] with with perianal pain, abdominal cramps, panniculitis, and diffuse joint pain.  CT showed probable perirectal fistula and anterior abdominal wall wound, chronic inflammatory changes of the colon and TI, diverting loop ileostomy parastomal hernia containing bowel.  CRS was consulted, and underwent rectal exam under anesthesia with biopsy.  Found to have evidence of deep ulceration wounds checking up her gluteal cleft, in her pannus, and in both labia.  She also had a deep ulcerated fistula trach along the right posterior perianal skin.  Wound seemed to be consistent with hidradenitis/pyoderma.  Biopsies were obtained.  Dermatology, ID, and GI consulted.  She received infliximab 10mg/kg on 6/4.  Pending biopsy reports for pyoderma/hidradenitis.  Ongoing needs for long-term pain management and wound care.    Patient is being admitted to [facility] for pain management and wound care.    Interval History    Able to place a PIV overnight   Midline catheter removed   Labs reviewed and listed below, no critical values        Past Medical History:      Past Medical History: Patient has a past medical history of Anal fistula, Chronic diarrhea,  Crohn's disease (2022), Enteropathic arthropathy, and Eye injury.    Past Surgical History: Patient has a past surgical history that includes  section, low transverse (2013);  section with tubal ligation (Bilateral, 2020);  section (); Examination under anesthesia (N/A, 8/15/2022); Flexible sigmoidoscopy (N/A, 8/15/2022); Esophagogastroduodenoscopy (N/A, 2022); Colonoscopy (N/A, 2022); Laparoscopic ileostomy (N/A, 2022); Digital rectal examination under anesthesia (N/A, 2025); and Biopsy of anus (N/A, 2025).    Social History: Patient reports that she has quit smoking. Her smoking use included cigarettes. She has a 5 pack-year smoking history. She has never used smokeless tobacco. She reports current alcohol use. She reports that she does not use drugs.    Family History:  family history includes Glaucoma in her maternal grandmother; Hypertension in her father and mother; No Known Problems in her brother, maternal aunt, maternal grandfather, maternal uncle, paternal aunt, paternal grandfather, paternal grandmother, paternal uncle, and sister.    Home Medications: reconciled     Allergies: Patient has no known allergies.      Subjective:     Review of Systems   Constitutional:  Positive for malaise/fatigue. Negative for chills and fever.   Respiratory:  Negative for cough and shortness of breath.    Cardiovascular:  Negative for chest pain and leg swelling.   Gastrointestinal:  Positive for abdominal pain. Negative for nausea and vomiting.   Genitourinary:  Negative for dysuria, flank pain and urgency.   Musculoskeletal:  Negative for back pain and neck pain.        Perineal pain and pain to pannus wound   Neurological:  Positive for weakness. Negative for dizziness and headaches.   Psychiatric/Behavioral:  Negative for depression. The patient is not nervous/anxious.          Objective:     Vital Signs:  Temp:  [98 °F (36.7 °C)-99.7 °F (37.6 °C)]    Pulse:  [72-92]   Resp:  [16-18]   BP: ()/(51-59)   SpO2:  [96 %-99 %]     Body mass index is 36.17 kg/m².           Intake and Output:    Intake/Output Summary (Last 24 hours) at 6/11/2025 1005  Last data filed at 6/10/2025 1300  Gross per 24 hour   Intake 890 ml   Output --   Net 890 ml        Physical Exam  HENT:      Head: Normocephalic and atraumatic.      Mouth/Throat:      Mouth: Mucous membranes are moist.      Pharynx: Oropharynx is clear.   Eyes:      Extraocular Movements: Extraocular movements intact.      Pupils: Pupils are equal, round, and reactive to light.   Cardiovascular:      Rate and Rhythm: Normal rate and regular rhythm.   Pulmonary:      Effort: Pulmonary effort is normal.      Breath sounds: Normal breath sounds.   Abdominal:      General: Bowel sounds are normal. There is no distension.      Palpations: Abdomen is soft.      Comments: Ileostomy in place  Wound to pannus CAMMY   Musculoskeletal:      Right lower leg: No edema.      Left lower leg: No edema.   Skin:     General: Skin is warm and dry.      Capillary Refill: Capillary refill takes less than 2 seconds.      Comments: Perineal wounds +   Neurological:      General: No focal deficit present.      Mental Status: She is alert.   Psychiatric:         Mood and Affect: Mood normal.         Behavior: Behavior normal.         Patient consistently followed by Wound Care NP    Labs:  Recent Labs   Lab 06/08/25  0503 06/09/25  0457 06/11/25  0324   WBC 8.92 8.89 10.94   HGB 7.9* 7.9* 8.8*   HCT 27.1* 26.5* 29.3*   * 495* 483*     Recent Labs   Lab 06/06/25  0549 06/08/25  0503 06/09/25  0457 06/11/25  0324     --  135* 138   K 4.0  --  3.4* 4.2     --  101 104   CO2 20*  --  25 25   BUN 13  --  10 19   CREATININE 0.6  --  0.6 0.8   GLU 72  --  132* 111*   CALCIUM 8.5*  --  8.6* 9.1   MG 1.5* 1.4* 1.7 1.7   PHOS 4.2  --  3.8 5.1*     Recent Labs   Lab 06/06/25  0549 06/09/25  0457 06/11/25  0324   ALKPHOS 64 82 42  "  ALT 10 11 11   AST 15 17 15   ALBUMIN 2.3* 2.6* 2.9*   PROT 7.8 8.5* 8.9*   BILITOT 0.1 <0.1* 0.1     No results for input(s): "POCTGLUCOSE" in the last 72 hours.    Meds Scheduled:   acetaminophen  1,000 mg Oral TID    clindamycin phosphate 1%   Topical (Top) BID    enoxparin  40 mg Subcutaneous Daily    ergocalciferol  50,000 Units Oral Q7 Days    famotidine  20 mg Oral BID    magnesium hydroxide 400 mg/5 ml  30 mL Oral BID    magnesium oxide  400 mg Oral TID    oxyCODONE  5 mg Oral QID    pregabalin  150 mg Oral BID         Current Inpatient Problem List:  Active Hospital Problems    Diagnosis  POA    *Crohn's disease of perianal region with complication [K50.119]  Yes    Thrombocytosis [D75.839]  Yes    Crohn's disease of both small and large intestine with fistula [K50.813]  Yes    Iron deficiency anemia due to chronic blood loss [D50.0]  Yes    Symptomatic anemia [D64.9]  Yes      Resolved Hospital Problems   No resolved problems to display.         Assessment / Plan:   Crohn's disease with perianal region with complication   Crohn's disease of both small and large intestine with fistula  Seen by CRS while in the hospital, wounds appear to be more extensive than Crohn's related fistula disease.  Suspicious for pyoderma or hidradenitis superimposed on her IBD  Biopsies obtained, showing ulcerated skin and subcutis with acute and chronic inflammation and several non-necrotizing granulomas  Received dose of Remicade on 06/04   Avoid NSAIDs given risk of IBD exacerbation  Pain management with oxycodone, Lyrica, and scheduled acetaminophen  clindamycin 1% solution BID to L axillae    Mix approximately equal parts triamcinolone 0.1% cream, ketoconazole 2% cream, and milk of magnesia in a sterile urine container. Shake vigorously to mix. Apply to bilateral inguinal cleft BID.   Hemoglobins baths at least twice weekly   Bleach baths at home 1-2 times weekly    Hidradenitis suppurativa   Sees [facility]   Has " been seen previously in dermatology clinic when she was well controlled on infliximab per GI. However, unfortunately lost insurance/ infliximab and now is flaring  Resumed on infliximab 06/04/2025  Receives infliximab every 4 weeks, so next dose would be around 7/2  Will need to f/u with dermatology outpatient  Wound followed and managed by consistent, contracted [facility] NP    Thrombocytosis   Likely reactive due to IBD  Trend on regular labs   DVT prophylaxis with enoxaparin    Iron-deficiency anemia due to chronic blood loss  Symptomatic anemia  Trend hemoglobin on serial CBC   Transfuse for hemoglobin/hematocrit less than 7/21 or if becomes hemodynamically unstable  Transfused 1 unit PRBC on 6/6   Iron studies  (6/6): Iron 132, TIBC 222, 59 % sat, Ferritin 197.   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   Trend on regular labs   Magnesium stable at 1.7    IP [facility] RISK OF UNPLANNED READMISSION Model:  MODERATE     Diet:  Regular   GI Ppx:  Famotidine   DVT Ppx:  Enoxaparin  Lines:  Midline 6/5  Drains:  Ileostomy   Airways:n/a   Wounds:  Pelvis, labia, perineum    Goals of Care:   Restorative  Treat infection  Optimize nutrition  Wound healing  Muscle strengthening  Restoration of ADL's  Improve mobility    Anticipated Disposition:    TBD  Will need follow up with Dermatology, CRS (Dr. Yip), and GI  Follow up appointments:   No future appointments.      Terri Parada NP  Hospital Medicine  [facility]    Extended Visit  Total time spent: 51  minutes  Description of Time: counseling patient on clinical condition, therapies provided, plan of care, emotional support, coordinating patient care with other care team members, reviewing and interpreting labs and imaging, collaboration with physician, initiating new orders, chart review, and documentation. See interval hx and assessment/plan for details.

## 2025-06-12 PROCEDURE — 63600175 PHARM REV CODE 636 W HCPCS: Performed by: INTERNAL MEDICINE

## 2025-06-12 PROCEDURE — 25000003 PHARM REV CODE 250: Performed by: STUDENT IN AN ORGANIZED HEALTH CARE EDUCATION/TRAINING PROGRAM

## 2025-06-12 PROCEDURE — 11000001 HC ACUTE MED/SURG PRIVATE ROOM

## 2025-06-12 PROCEDURE — 25000003 PHARM REV CODE 250: Performed by: INTERNAL MEDICINE

## 2025-06-12 PROCEDURE — 25000003 PHARM REV CODE 250: Performed by: FAMILY MEDICINE

## 2025-06-12 RX ORDER — HYDROMORPHONE HYDROCHLORIDE 2 MG/ML
2 INJECTION, SOLUTION INTRAMUSCULAR; INTRAVENOUS; SUBCUTANEOUS 2 TIMES DAILY PRN
Status: DISCONTINUED | OUTPATIENT
Start: 2025-06-12 | End: 2025-06-19 | Stop reason: HOSPADM

## 2025-06-12 RX ORDER — HYDROMORPHONE HYDROCHLORIDE 4 MG/1
4 TABLET ORAL EVERY 6 HOURS PRN
Refills: 0 | Status: DISCONTINUED | OUTPATIENT
Start: 2025-06-12 | End: 2025-06-19 | Stop reason: HOSPADM

## 2025-06-12 RX ORDER — OXYCODONE HYDROCHLORIDE 10 MG/1
10 TABLET ORAL EVERY 4 HOURS PRN
Refills: 0 | Status: DISCONTINUED | OUTPATIENT
Start: 2025-06-12 | End: 2025-06-19 | Stop reason: HOSPADM

## 2025-06-12 RX ADMIN — ACETAMINOPHEN 1000 MG: 500 TABLET, FILM COATED ORAL at 08:06

## 2025-06-12 RX ADMIN — Medication 400 MG: at 09:06

## 2025-06-12 RX ADMIN — OXYCODONE HYDROCHLORIDE 5 MG: 5 TABLET ORAL at 09:06

## 2025-06-12 RX ADMIN — Medication 400 MG: at 08:06

## 2025-06-12 RX ADMIN — ACETAMINOPHEN 1000 MG: 500 TABLET, FILM COATED ORAL at 09:06

## 2025-06-12 RX ADMIN — HYDROMORPHONE HYDROCHLORIDE 2 MG: 2 INJECTION, SOLUTION INTRAMUSCULAR; INTRAVENOUS; SUBCUTANEOUS at 12:06

## 2025-06-12 RX ADMIN — FAMOTIDINE 20 MG: 20 TABLET, FILM COATED ORAL at 08:06

## 2025-06-12 RX ADMIN — PREGABALIN 150 MG: 75 CAPSULE ORAL at 08:06

## 2025-06-12 RX ADMIN — PREGABALIN 150 MG: 75 CAPSULE ORAL at 09:06

## 2025-06-12 RX ADMIN — OXYCODONE HYDROCHLORIDE 5 MG: 5 TABLET ORAL at 05:06

## 2025-06-12 RX ADMIN — FAMOTIDINE 20 MG: 20 TABLET, FILM COATED ORAL at 09:06

## 2025-06-12 RX ADMIN — OXYCODONE HYDROCHLORIDE 5 MG: 5 TABLET ORAL at 08:06

## 2025-06-12 RX ADMIN — CLINDAMYCIN PHOSPHATE: 10 GEL TOPICAL at 08:06

## 2025-06-12 RX ADMIN — OXYCODONE HYDROCHLORIDE 5 MG: 5 TABLET ORAL at 02:06

## 2025-06-12 RX ADMIN — Medication 400 MG: at 02:06

## 2025-06-12 RX ADMIN — HYDROMORPHONE HYDROCHLORIDE 4 MG: 4 TABLET ORAL at 12:06

## 2025-06-12 RX ADMIN — ACETAMINOPHEN 1000 MG: 500 TABLET, FILM COATED ORAL at 02:06

## 2025-06-12 RX ADMIN — ENOXAPARIN SODIUM 40 MG: 40 INJECTION SUBCUTANEOUS at 05:06
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
## 2025-06-12 NOTE — PT/OT/SLP PROGRESS
Physical Therapy      Patient Name:  Nikkie Myles   MRN:  [id number]    Patient not seen today secondary to Patient unwilling to participate. Will follow-up.

## 2025-06-12 NOTE — PLAN OF CARE
[facility]  Interdisciplinary Team Conference        Nikkie Myles    Conference Date: 6/12/2025  Admit Date: 6/5/2025       Active Hospital Problems    Diagnosis    *Crohn's disease of perianal region with complication    Thrombocytosis    Crohn's disease of both small and large intestine with fistula    Iron deficiency anemia due to chronic blood loss    Symptomatic anemia        Code Status: Full Code  Advance Directive  (If Adv Dir status is received, view document under Adv Dir in header or Chart Review Media tab): Patient does not have Advance Directive, declines information.        Power of /Surrogate Decision Maker: N/A    LAB/TEST/TREATMENTS:    POCT Glucose   Date Value Ref Range Status   12/28/2022 83 70 - 110 mg/dL Final      Lab Results   Component Value Date    INR 1.2 04/15/2025    INR 1.1 07/31/2024    INR 1.2 04/20/2022     Lab Results   Component Value Date    BUN 19 06/11/2025    BUN 10 06/09/2025    BUN 13 06/06/2025     Lab Results   Component Value Date    PH 7.421 05/27/2025    PCO2 32.9 (L) 05/27/2025    PO2 56.9 05/27/2025    HCO3 22.1 05/27/2025     Lab Results   Component Value Date    WBC 10.94 06/11/2025    WBC 8.89 06/09/2025    WBC 8.92 06/08/2025     Susceptibility data from last 90 days.  Collected Specimen Info Organism   05/27/25 Blood from Peripheral, Hand, Left COAGULASE NEGATIVE STAPHYLOCOCCI            Dialysis: N/A    Provider Comments/Recommendations: Crohns with HS worsening wounds. LTAC for wound care and pain management. Remicade dose given at OSH on 6/4 needs dose monthly. Needs f/u with derm.     DIAGNOSTIC TEST    Radiology (Last 168 hours)           None             CT Abdomen Pelvis With IV Contrast NO Oral Contrast  Narrative: EXAMINATION:  CT ABDOMEN PELVIS WITH IV CONTRAST    CLINICAL HISTORY:  Abdominal pain, acute, nonlocalized    TECHNIQUE:  Low dose axial images, sagittal and coronal reformations were obtained from the lung  bases to the pubic symphysis following the IV administration of 100 mL of Omnipaque 350 intravenous contrast. Oral contrast was not administered.    COMPARISON:  CT abdomen pelvis 01/23/2025    FINDINGS:  Lungs: Clear.  No consolidation or pleural effusion in the visualized lung bases.    Heart: Normal size. No pericardial effusion.    Liver: Hepatomegaly measuring up to 20.0 cm craniocaudal.  No focal abnormality. .    Gallbladder: Normally distended without calcified gallstones.  No pericholecystic inflammatory changes.    Bile ducts: No intrahepatic or extrahepatic biliary ductal dilatation.    Spleen: Normal size. No focal abnormality.    Pancreas: No mass. No peripancreatic fat stranding.    Adrenals: No significant abnormality    Renal/Ureters: The kidneys are normal in size . No stones.  No focal renal abnormality.  No hydroureteronephrosis. The urinary bladder is unremarkable.    Reproductive: Uterus is without significant abnormality. No adnexal mass.    Stomach/Bowel: Stomach is within normal limits..  Right lower quadrant diverting loop ileostomy with herniation of nondilated bowel into a parastomal hernia.  No evidence of small-bowel obstruction.  There is diffuse fatty infiltration of the terminal ileal and colonic wall suggesting chronic inflammation in this patient with history of Crohn's disease.  There is wall thickening of the rectum with associated perirectal stranding.  There is soft tissue thickening along the gluteal cleft with suspected paramedian fistulous tract (series 2, image 173).  Tract appears to extend superiorly towards the sacrum (series 2: Image 153, series 601: Image 18).  And associated small (11 x 7 mm) posterior perineal abscess is questioned (series 2, image 175).    Peritoneum: No free fluid. No intraperitoneal free air.    Lymph Nodes: No pathologic hernán enlargement in the abdomen or pelvis.    Vasculature: Abdominal aorta tapers normally.  No significant calcific  atherosclerosis of the abdominal aorta and its branches. Portal vasculature, SMV, and splenic vein are patent.    Bones: No acute fractures or osseous destructive lesions.    Soft Tissues: Inflammatory change of the gluteal folds as above with extension superiorly toward the sacrum.  There is enlarged parastomal hernia as above.  Impression: Suspected perirectal fistula with tract extending through the right gluteal fold soft tissues and superiorly towards the sacrum.  Recommend correlation with physical examination.    Anterior abdominal wall wound.  Recommend correlation with physical exam.    Chronic inflammatory changes of the colon and terminal ileum compatible with Crohn's disease.  Diverting loop ileostomy with parastomal hernia containing bowel.    Additional observations as detailed in the body of the report.    This report was flagged in Epic as abnormal.    Electronically signed by resident: Hiram Murry  Date:    05/28/2025  Time:    03:05    Electronically signed by: Chico Ball  Date:    05/28/2025  Time:    05:00       NURSING:    Cognitive/Neuro/Behavioral WDL: WDL  Level of Consciousness (AVPU): alert  Arousal Level: opens eyes spontaneously  Orientation: oriented x 4  Speech: clear/fluent    Fall Risk Score: 3  History Of Fall (W/I 3 Mos): N  Fall this admission: no    Restraints:         Infections:  No active infections  No active isolations      Telemetry: no     Patient currently receiving pharmacological/non pharmacological interventions for pain: Yes     Urinary Incontinence: No  Bowel Incontinence: No         Peripheral IV - Single Lumen 06/10/25 1550 22 G Posterior;Right Hand (Active)   Site Assessment Clean;Dry;Intact 06/12/25 0715   Line Status Capped 06/12/25 0715   Dressing Status Clean;Dry;Intact 06/12/25 0715   Dressing Intervention Integrity maintained 06/12/25 0715   Number of days: 1            Ileostomy 08/23/22 1531 RLQ (Active)   Wound Image   06/02/25 1145   Stoma  Appearance round 06/08/25 0727   Stoma Size (in) 30mm 06/02/25 1145   Appliance 1-piece 06/10/25 1250   Accessories/Skin Care cleansed w/ water;barrier substance over peristomal skin;skin barrier strip 06/02/25 1145   Treatment Bag change 06/08/25 0727   Stoma Function flatus;stool 06/10/25 1250   Peristomal Assessment Intact 06/10/25 1250   Tolerance no signs/symptoms of discomfort 06/10/25 1250   Output (mL) 150 mL 06/04/25 1935   Number of days: 1023       Comments/Recommendations:  Last BM 6/12. PIV. Ileostomy. Ambulating independently to restroom. Pain controlled    Wound Care:             Wound 05/27/25 2225 Ulceration lower Abdomen (Active)   05/27/25 2225 Abdomen   Present on Original Admission: Y   Primary Wound Type: Ulceration   Side:    Orientation: lower   Wound Approximate Age at First Assessment (Weeks):    Wound Number:    Is this injury device related?: No   Incision Type:    Closure Method:    Wound Description (Comments):    Type:    Additional Comments:    Ankle-Brachial Index:    Pulses:    Removal Indication and Assessment:    Wound Outcome:    Wound Image   06/06/25 1320   Dressing Appearance Open to air 06/12/25 0715   Drainage Amount Moderate 06/11/25 1223   Drainage Characteristics/Odor Serous 06/10/25 1315   Appearance Moist 06/11/25 1223   Tissue loss description Full thickness 06/10/25 1315   Red (%), Wound Tissue Color 100 % 06/06/25 1320   Periwound Area Moist 06/11/25 1223   Wound Edges Open 06/10/25 1315   Wound Length (cm) 4 cm 06/06/25 1320   Wound Width (cm) 22.5 cm 06/06/25 1320   Wound Depth (cm) 2.7 cm 06/06/25 1320   Wound Volume (cm^3) 127.234 cm^3 06/06/25 1320   Wound Surface Area (cm^2) 70.69 cm^2 06/06/25 1320   Care Cleansed with:;Wound cleanser 06/10/25 1315   Dressing Applied 06/09/25 1300   Packing packed with;packing removed;hydrofiber/alginate 06/08/25 1656   Dressing Change Due 06/07/25 06/06/25 1320   Number of days: 16            Wound 05/28/25 2000 Ulceration  Gluteal cleft (Active)   05/28/25 2000 Gluteal cleft   Present on Original Admission: Y   Primary Wound Type: Ulceration   Side:    Orientation:    Wound Approximate Age at First Assessment (Weeks):    Wound Number:    Is this injury device related?: No   Incision Type:    Closure Method:    Wound Description (Comments):    Type:    Additional Comments:    Ankle-Brachial Index:    Pulses:    Removal Indication and Assessment:    Wound Outcome:    Wound Image    06/06/25 1320   Dressing Appearance Moist drainage 06/12/25 0715   Drainage Amount Moderate 06/11/25 1223   Drainage Characteristics/Odor Serosanguineous 06/11/25 1223   Appearance Moist 06/11/25 1223   Tissue loss description Full thickness 06/10/25 1315   Red (%), Wound Tissue Color 80 % 06/06/25 1320   Yellow (%), Wound Tissue Color 20 % 06/06/25 1320   Periwound Area Moist 06/11/25 1223   Wound Edges Open 06/10/25 1315   Wound Length (cm) 25.5 cm 06/06/25 1320   Wound Width (cm) 2.5 cm 06/06/25 1320   Wound Depth (cm) 2.7 cm 06/06/25 1320   Wound Volume (cm^3) 90.124 cm^3 06/06/25 1320   Wound Surface Area (cm^2) 50.07 cm^2 06/06/25 1320   Care Wound cleanser 06/10/25 1315   Dressing Changed;Absorptive Pad 06/08/25 1656   Packing hydrofiber/alginate;packed with 06/08/25 1656   Dressing Change Due 06/07/25 06/06/25 1320   Number of days: 15            Wound 06/06/25 1320 Ulceration Left Axilla (Active)   06/06/25 1320 Axilla   Present on Original Admission: Y   Primary Wound Type: Ulceration   Side: Left   Orientation:    Wound Approximate Age at First Assessment (Weeks):    Wound Number:    Is this injury device related?: No   Incision Type:    Closure Method:    Wound Description (Comments):    Type:    Additional Comments:    Ankle-Brachial Index:    Pulses:    Removal Indication and Assessment:    Wound Outcome:    Wound Image   06/06/25 1320   Dressing Appearance Open to air 06/12/25 0851   Drainage Amount Scant 06/11/25 1223   Drainage  Characteristics/Odor No odor 06/10/25 1315   Appearance Moist 06/11/25 1223   Tissue loss description Partial thickness 06/06/25 1320   Red (%), Wound Tissue Color 100 % 06/06/25 1320   Periwound Area Moist 06/11/25 1223   Wound Edges Open 06/06/25 1320   Wound Length (cm) 0.6 cm 06/06/25 1320   Wound Width (cm) 0.7 cm 06/06/25 1320   Wound Depth (cm) 0.2 cm 06/06/25 1320   Wound Volume (cm^3) 0.044 cm^3 06/06/25 1320   Wound Surface Area (cm^2) 0.33 cm^2 06/06/25 1320   Care Cleansed with:;Antimicrobial agent 06/12/25 0851   Dressing Other (comment) 06/12/25 0851   Number of days: 6            Wound 06/06/25 1320 Ulceration Right Groin (Active)   06/06/25 1320 Groin   Present on Original Admission: Y   Primary Wound Type: Ulceration   Side: Right   Orientation:    Wound Approximate Age at First Assessment (Weeks):    Wound Number:    Is this injury device related?:    Incision Type:    Closure Method:    Wound Description (Comments):    Type:    Additional Comments:    Ankle-Brachial Index:    Pulses:    Removal Indication and Assessment:    Wound Outcome:    Wound Image   06/06/25 1320   Dressing Appearance Moist drainage 06/12/25 0715   Drainage Amount Small 06/11/25 1223   Drainage Characteristics/Odor No odor 06/10/25 1315   Appearance Moist 06/11/25 1223   Red (%), Wound Tissue Color 100 % 06/06/25 1320   Periwound Area Moist 06/11/25 1223   Wound Length (cm) 1.2 cm 06/06/25 1320   Wound Width (cm) 0.3 cm 06/06/25 1320   Wound Depth (cm) 0.2 cm 06/06/25 1320   Wound Volume (cm^3) 0.038 cm^3 06/06/25 1320   Wound Surface Area (cm^2) 0.28 cm^2 06/06/25 1320   Care Wound cleanser 06/11/25 1223   Dressing Applied 06/09/25 1300   Packing packed with;hydrofiber/alginate 06/08/25 1656   Number of days: 6            Wound 06/06/25 1320 Ulceration Left Groin (Active)   06/06/25 1320 Groin   Present on Original Admission: Y   Primary Wound Type: Ulceration   Side: Left   Orientation:    Wound Approximate Age at First  Assessment (Weeks):    Wound Number:    Is this injury device related?:    Incision Type:    Closure Method:    Wound Description (Comments):    Type:    Additional Comments:    Ankle-Brachial Index:    Pulses:    Removal Indication and Assessment:    Wound Outcome:    Wound Image   06/06/25 1320   Dressing Appearance Open to air 06/12/25 0715   Drainage Amount Moderate 06/11/25 1223   Drainage Characteristics/Odor No odor 06/10/25 1315   Appearance Moist 06/11/25 1223   Tissue loss description Full thickness 06/10/25 1315   Red (%), Wound Tissue Color 100 % 06/09/25 1300   Periwound Area Moist 06/11/25 1223   Wound Length (cm) 10 cm 06/06/25 1320   Wound Width (cm) 1.2 cm 06/06/25 1320   Wound Depth (cm) 1 cm 06/06/25 1320   Wound Volume (cm^3) 6.283 cm^3 06/06/25 1320   Wound Surface Area (cm^2) 9.42 cm^2 06/06/25 1320   Undermining (depth (cm)/location) cleanse with vashe, pat dry, loosely pack with silver alginate ropeand cover with ABD pads place mesh underwear 06/11/25 1223   Care Wound cleanser 06/10/25 1315   Dressing Applied 06/09/25 1300   Packing packed with;hydrofiber/alginate 06/08/25 1656   Dressing Change Due 06/07/25 06/06/25 1320   Number of days: 6        Siddharth Score: 19     Skin Interventions: Pressure Reduction Devices: specialty bed utilized  Pressure Reduction Techniques: frequent weight shift encouraged     Comments/Recommendations:  ulcerations to left arm pit. HS wounds leesa-rectal. Multiple HS wounds      Respiratory:                  Vent Weaning: N/A    Comments/Recommendations:  N/A    Nutrition:    Dietary Orders (From admission, onward)       Start     Ordered    06/06/25 2000  Dietary nutrition supplements Rodrigo - Any flavor  2 times daily      Question Answer Comment   Route: By Mouth    Select PO Supplement: Rodrigo - Any flavor        06/06/25 0941    06/05/25 1534  Diet Adult Regular  Diet effective now         06/05/25 1533                     Parenteral Nutrition: N/A    Wt  Readings from Last 1 Encounters:   25 1053 84 kg (185 lb 3 oz)   25 0941 85.4 kg (188 lb 4.4 oz)   25 1551 85.4 kg (188 lb 4.4 oz)       Comments/Recommendations: regular diet intake 100% current weight 185 lbs    Pharmacy:        acetaminophen  1,000 mg Oral TID    clindamycin phosphate 1%   Topical (Top) BID    enoxparin  40 mg Subcutaneous Daily    ergocalciferol  50,000 Units Oral Q7 Days    famotidine  20 mg Oral BID    magnesium hydroxide 400 mg/5 ml  30 mL Oral BID    magnesium oxide  400 mg Oral TID    oxyCODONE  5 mg Oral QID    pregabalin  150 mg Oral BID      Antibiotics (From admission, onward)      Start     Stop Route Frequency Ordered    25 1215  mupirocin 2 % ointment         25 0859 Nasl 2 times daily 25 1102    25 2100  clindamycin phosphate 1% gel         -- Top 2 times daily 25 1533           Antivirals (From admission, onward)      None             Current Facility-Administered Medications:     0.9%  NaCl infusion (for blood administration), , Intravenous, Q24H PRN    HYDROmorphone, 2 mg, Intravenous, BID PRN **AND** HYDROmorphone, 4 mg, Oral, BID PRN    naloxone, 0.02 mg, Intravenous, PRN    ondansetron, 4 mg, Intravenous, Q8H PRN    oxyCODONE, 10 mg, Oral, Q4H PRN    sodium chloride 0.9%, 10 mL, Intravenous, PRN     Comments/Recommendations: no abx no issues    Physical Therapy:    Multidisciplinary Problems       Physical Therapy Goals          Problem: Physical Therapy    Goal Priority Disciplines Outcome Interventions   Physical Therapy Goal     PT, PT/OT Progressing    Description: Goals to be met by: DC     Patient will increase functional independence with mobility by performin. Gait  community level  with [place] using No Assistive Device.   2. Ascend/descend 14 stair with right Handrails [place] using No Assistive Device.                            Comments/Recommendations:  independent ambulating 300 ft. Sometimes  limited by pain    Occupational Therapy:    Multidisciplinary Problems       Occupational Therapy Goals          Problem: Occupational Therapy    Goal Priority Disciplines Outcome Interventions   Occupational Therapy Goal     OT, PT/OT Progressing    Description: Goals to be met by: 7/6/25     Patient will increase functional independence with ADLs by performing:    UE Dressing with [place]. MET 6/9/25  LE Dressing with [place].  Grooming while standing at sink with [place]. MET 6/9/25  Toileting from toilet with [place] for hygiene and clothing management.   Toilet transfer to toilet with [place].  Shower with setup   Upper extremity exercise program x10 reps per handout, with independence.                         Comments/Recommendations:  showers independently ADLs     Speech Therapy:    Multidisciplinary Problems       SLP Goals       Not on file                     Comments/Recommendations:  N/A    Discharge Planning:    Prior to hospitilization cognitive status:: Unable to Assess      People in Home: child(ayad), dependent           Discharge Plan A: Home   Discharge Plan B: Home Health   DME Needed Upon Discharge : none        No future appointments.      Expected Discharge Date: 6/18/2025     Comments/Recommendations:  Home with follow up      Family Conference:    Yes, describe: Pt updated on POC via team rounding. All questions addressed.    Attendees Present:    {Kathryn Abadie, RN CM Erika Plaisance, RN DON Kristin Todd, CLEMENCIA Shetty, CCC-SLP  Bunny Bates, LM Gutierrez, [facility]  Che Tavares, MD Terri Mccann, NP  Kemi Recinos, RRT  Shantelle Hill RN wound care  Chase Manrique, Admin      Continued/Extended Stay Review:    Complex wound care for infected/necrotic skin conditions, Stage III or IV pressure injury, surgical wounds, or burns requiring at least one of the following:  Complex dressing changes at least 2 times every 24  hours  Dressing changes requiring IM/IV analgesics    Must meet at least one of the following concomitant treatments/intervention daily unless notes: (excludes PO meds unless notes)  IV medication per therapeutic regimen, Laboratory assessment and medication adjustment(s), Rehab Therapy (PT/OT/ST) 1-3 hours a day greater than or equal to 5 days a week, and Other Pain management

## 2025-06-12 NOTE — PLAN OF CARE
Awake alert oriented able to make needs known. Medicated several times with pain medication. Ambulates in room independently. Will continue to monitor patient. Son at bedside.  Problem: Infection  Goal: Absence of Infection Signs and Symptoms  Outcome: Progressing     Problem: Wound  Goal: Optimal Coping  Outcome: Progressing

## 2025-06-12 NOTE — PROGRESS NOTES
[facility] Medicine  Progress Note    Room: 235/235   Patient Name: Nikkie Myles  MRN: [id number]  Admit Date: 6/5/2025   Length of Stay: 7  Attending Physician: Girma Levi MD  Nurse Practitioner: Terri Parada NP  Code Status: Full Code    Date of Service: 06/12/2025    Principal Problem:   Crohn's disease of perianal region with complication      HPI obtained from patient, review of previous hospital records, and summarization.   HPI     Nikkie Myles is a 35 y.o. 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  She [place] [facility] with with perianal pain, abdominal cramps, panniculitis, and diffuse joint pain.  CT showed probable perirectal fistula and anterior abdominal wall wound, chronic inflammatory changes of the colon and TI, diverting loop ileostomy parastomal hernia containing bowel.  CRS was consulted, and underwent rectal exam under anesthesia with biopsy.  Found to have evidence of deep ulceration wounds checking up her gluteal cleft, in her pannus, and in both labia.  She also had a deep ulcerated fistula trach along the right posterior perianal skin.  Wound seemed to be consistent with hidradenitis/pyoderma.  Biopsies were obtained.  Dermatology, ID, and GI consulted.  She received infliximab 10mg/kg on 6/4.  Pending biopsy reports for pyoderma/hidradenitis.  Ongoing needs for long-term pain management and wound care.    Patient is being admitted to [facility] for pain management and wound care.    Interval History    Patient discussed during interdisciplinary team meeting today with attending physician, , nutrition, therapy, respiratory, nursing, pharmacist, and wound care.  Wants to go transfer to [facility] for all her specialty appointments,  however I'm concerned she may not be able to get these appts in a timely manner (next remicade infusion scheduled for 7/10 at [facility])  Ambulating 300 ft independently  D/c plan:  home. Will need to wean off of IV pain meds. Needs someone to assist her with wound care.          Past Medical History:      Past Medical History: Patient has a past medical history of Anal fistula, Chronic diarrhea, Crohn's disease (2022), Enteropathic arthropathy, and Eye injury.    Past Surgical History: Patient has a past surgical history that includes  section, low transverse (2013);  section with tubal ligation (Bilateral, 2020);  section (); Examination under anesthesia (N/A, 8/15/2022); Flexible sigmoidoscopy (N/A, 8/15/2022); Esophagogastroduodenoscopy (N/A, 2022); Colonoscopy (N/A, 2022); Laparoscopic ileostomy (N/A, 2022); Digital rectal examination under anesthesia (N/A, 2025); and Biopsy of anus (N/A, 2025).    Social History: Patient reports that she has quit smoking. Her smoking use included cigarettes. She has a 5 pack-year smoking history. She has never used smokeless tobacco. She reports current alcohol use. She reports that she does not use drugs.    Family History:  family history includes Glaucoma in her maternal grandmother; Hypertension in her father and mother; No Known Problems in her brother, maternal aunt, maternal grandfather, maternal uncle, paternal aunt, paternal grandfather, paternal grandmother, paternal uncle, and sister.    Home Medications: reconciled     Allergies: Patient has no known allergies.      Subjective:     Review of Systems   Constitutional:  Positive for malaise/fatigue. Negative for chills and fever.   Respiratory:  Negative for cough and shortness of breath.    Cardiovascular:  Negative for chest pain and leg swelling.   Gastrointestinal:  Positive for abdominal pain. Negative for nausea and vomiting.   Genitourinary:   Negative for dysuria, flank pain and urgency.   Musculoskeletal:  Negative for back pain and neck pain.        Perineal pain and pain to pannus wound   Neurological:  Positive for weakness. Negative for dizziness and headaches.   Psychiatric/Behavioral:  Negative for depression. The patient is not nervous/anxious.          Objective:     Vital Signs:  Temp:  [98 °F (36.7 °C)-98.4 °F (36.9 °C)]   Pulse:  [66-86]   Resp:  [17-18]   BP: ()/(53-79)   SpO2:  [97 %-99 %]     Body mass index is 36.17 kg/m².           Intake and Output:  No intake or output data in the 24 hours ending 06/12/25 1101       Physical Exam  HENT:      Head: Normocephalic and atraumatic.      Mouth/Throat:      Mouth: Mucous membranes are moist.      Pharynx: Oropharynx is clear.   Eyes:      Extraocular Movements: Extraocular movements intact.      Pupils: Pupils are equal, round, and reactive to light.   Cardiovascular:      Rate and Rhythm: Normal rate and regular rhythm.   Pulmonary:      Effort: Pulmonary effort is normal.      Breath sounds: Normal breath sounds.   Abdominal:      General: Bowel sounds are normal. There is no distension.      Palpations: Abdomen is soft.      Comments: Ileostomy in place  Wound to pannus CAMMY   Musculoskeletal:      Right lower leg: No edema.      Left lower leg: No edema.   Skin:     General: Skin is warm and dry.      Capillary Refill: Capillary refill takes less than 2 seconds.      Comments: Perineal wounds +   Neurological:      General: No focal deficit present.      Mental Status: She is alert.   Psychiatric:         Mood and Affect: Mood normal.         Behavior: Behavior normal.         Patient consistently followed by Wound Care NP    Labs:  Recent Labs   Lab 06/08/25  0503 06/09/25  0457 06/11/25  0324   WBC 8.92 8.89 10.94   HGB 7.9* 7.9* 8.8*   HCT 27.1* 26.5* 29.3*   * 495* 483*     Recent Labs   Lab 06/06/25  0549 06/08/25  0503 06/09/25  0457 06/11/25  0324     --  135* 138  "  K 4.0  --  3.4* 4.2     --  101 104   CO2 20*  --  25 25   BUN 13  --  10 19   CREATININE 0.6  --  0.6 0.8   GLU 72  --  132* 111*   CALCIUM 8.5*  --  8.6* 9.1   MG 1.5* 1.4* 1.7 1.7   PHOS 4.2  --  3.8 5.1*     Recent Labs   Lab 06/06/25  0549 06/09/25  0457 06/11/25  0324   ALKPHOS 64 82 80   ALT 10 11 11   AST 15 17 15   ALBUMIN 2.3* 2.6* 2.9*   PROT 7.8 8.5* 8.9*   BILITOT 0.1 <0.1* 0.1     No results for input(s): "POCTGLUCOSE" in the last 72 hours.    Meds Scheduled:   acetaminophen  1,000 mg Oral TID    clindamycin phosphate 1%   Topical (Top) BID    enoxparin  40 mg Subcutaneous Daily    ergocalciferol  50,000 Units Oral Q7 Days    famotidine  20 mg Oral BID    magnesium hydroxide 400 mg/5 ml  30 mL Oral BID    magnesium oxide  400 mg Oral TID    oxyCODONE  5 mg Oral QID    pregabalin  150 mg Oral BID         Current Inpatient Problem List:  Active Hospital Problems    Diagnosis  POA    *Crohn's disease of perianal region with complication [K50.119]  Yes    Thrombocytosis [D75.839]  Yes    Crohn's disease of both small and large intestine with fistula [K50.813]  Yes    Iron deficiency anemia due to chronic blood loss [D50.0]  Yes    Symptomatic anemia [D64.9]  Yes      Resolved Hospital Problems   No resolved problems to display.         Assessment / Plan:   Crohn's disease with perianal region with complication   Crohn's disease of both small and large intestine with fistula  Seen by CRS while in the hospital, wounds appear to be more extensive than Crohn's related fistula disease.  Suspicious for pyoderma or hidradenitis superimposed on her IBD  Biopsies obtained, showing ulcerated skin and subcutis with acute and chronic inflammation and several non-necrotizing granulomas  Received dose of Remicade on 06/04   Avoid NSAIDs given risk of IBD exacerbation  Pain management with oxycodone, Lyrica, and scheduled acetaminophen  clindamycin 1% solution BID to L axillae    Mix approximately equal parts " triamcinolone 0.1% cream, ketoconazole 2% cream, and milk of magnesia in a sterile urine container. Shake vigorously to mix. Apply to bilateral inguinal cleft BID.   Hemoglobins baths at least twice weekly   Bleach baths at home 1-2 times weekly    Hidradenitis suppurativa   [facility]   Has been seen previously in dermatology clinic when she was well controlled on infliximab per GI. However, unfortunately lost insurance/ infliximab and now is flaring  Resumed on infliximab 06/04/2025  Receives infliximab every 4 weeks, so next dose would be around 7/2  Will need to f/u with dermatology outpatient  Wound followed and managed by consistent, contracted [facility] NP    Thrombocytosis   Likely reactive due to IBD  Trend on regular labs   DVT prophylaxis with enoxaparin    Iron-deficiency anemia due to chronic blood loss  Symptomatic anemia  Trend hemoglobin on serial CBC   Transfuse for hemoglobin/hematocrit less than 7/21 or if becomes hemodynamically unstable  Transfused 1 unit PRBC on 6/6   Iron studies  (6/6): Iron 132, TIBC 222, 59 % sat, Ferritin 197.   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   Trend on regular labs   Magnesium stable at 1.7    IP [facility] RISK OF UNPLANNED READMISSION Model:  MODERATE     Diet:  Regular   GI Ppx:  Famotidine   DVT Ppx:  Enoxaparin  Lines:  Midline 6/5  Drains:  Ileostomy   Airways:n/a   Wounds:  Pelvis, labia, perineum    Goals of Care:   Restorative  Treat infection  Optimize nutrition  Wound healing  Muscle  strengthening  Restoration of ADL's  Improve mobility    Anticipated Disposition:    Wants to go transfer to [facility] for all her specialty appointments, however I'm concerned she may not be able to get these appts in a timely manner (next remicade infusion scheduled for 7/10 at [facility])  D/c plan: 6/18 home. Will need to wean off of IV pain meds. Needs someone to assist her with wound care.  Will need follow up with Dermatology, CRS (Dr. Yip), and GI  Follow up appointments:   No future appointments.      Terri Parada NP  [facility] Medicine  [facility]    Extended Visit  Total time spent: 52 minutes  Description of Time: counseling patient on clinical condition, therapies provided, plan of care, emotional support, coordinating patient care with other care team members, reviewing and interpreting labs and imaging, collaboration with physician, initiating new orders, chart review, and documentation. See interval hx and assessment/plan for details.

## 2025-06-13 LAB
ALBUMIN SERPL BCP-MCNC: 3 G/DL (ref 3.5–5.2)
ALP SERPL-CCNC: 85 UNIT/L (ref 40–150)
ALT SERPL W/O P-5'-P-CCNC: 15 UNIT/L (ref 10–44)
ANION GAP (OHS): 10 MMOL/L (ref 8–16)
AST SERPL-CCNC: 19 UNIT/L (ref 11–45)
BILIRUB DIRECT SERPL-MCNC: <0.1 MG/DL (ref 0.1–0.3)
BILIRUB SERPL-MCNC: 0.1 MG/DL (ref 0.1–1)
BUN SERPL-MCNC: 14 MG/DL (ref 6–20)
CALCIUM SERPL-MCNC: 8.9 MG/DL (ref 8.7–10.5)
CHLORIDE SERPL-SCNC: 106 MMOL/L (ref 95–110)
CO2 SERPL-SCNC: 20 MMOL/L (ref 23–29)
CREAT SERPL-MCNC: 0.7 MG/DL (ref 0.5–1.4)
ERYTHROCYTE [DISTWIDTH] IN BLOOD BY AUTOMATED COUNT: 30.1 % (ref 11.5–14.5)
GFR SERPLBLD CREATININE-BSD FMLA CKD-EPI: >60 ML/MIN/1.73/M2
GLUCOSE SERPL-MCNC: 134 MG/DL (ref 70–110)
HCT VFR BLD AUTO: 29.5 % (ref 37–48.5)
HGB BLD-MCNC: 8.5 GM/DL (ref 12–16)
MAGNESIUM SERPL-MCNC: 1.6 MG/DL (ref 1.6–2.6)
MCH RBC QN AUTO: 18.1 PG (ref 27–31)
MCHC RBC AUTO-ENTMCNC: 28.8 G/DL (ref 32–36)
MCV RBC AUTO: 63 FL (ref 82–98)
PHOSPHATE SERPL-MCNC: 4.5 MG/DL (ref 2.7–4.5)
PLATELET # BLD AUTO: 418 K/UL (ref 150–450)
PMV BLD AUTO: 9.9 FL (ref 9.2–12.9)
POTASSIUM SERPL-SCNC: 4.2 MMOL/L (ref 3.5–5.1)
PROT SERPL-MCNC: 8.5 GM/DL (ref 6–8.4)
RBC # BLD AUTO: 4.7 M/UL (ref 4–5.4)
SODIUM SERPL-SCNC: 136 MMOL/L (ref 136–145)
WBC # BLD AUTO: 7.66 K/UL (ref 3.9–12.7)

## 2025-06-13 PROCEDURE — 85027 COMPLETE CBC AUTOMATED: CPT | Performed by: HOSPITALIST

## 2025-06-13 PROCEDURE — 80053 COMPREHEN METABOLIC PANEL: CPT | Performed by: HOSPITALIST

## 2025-06-13 PROCEDURE — 82248 BILIRUBIN DIRECT: CPT | Performed by: HOSPITALIST

## 2025-06-13 PROCEDURE — 63600175 PHARM REV CODE 636 W HCPCS: Performed by: INTERNAL MEDICINE

## 2025-06-13 PROCEDURE — 11000001 HC ACUTE MED/SURG PRIVATE ROOM

## 2025-06-13 PROCEDURE — 97110 THERAPEUTIC EXERCISES: CPT | Mod: CO

## 2025-06-13 PROCEDURE — 25000003 PHARM REV CODE 250: Performed by: STUDENT IN AN ORGANIZED HEALTH CARE EDUCATION/TRAINING PROGRAM

## 2025-06-13 PROCEDURE — 63600175 PHARM REV CODE 636 W HCPCS: Performed by: STUDENT IN AN ORGANIZED HEALTH CARE EDUCATION/TRAINING PROGRAM

## 2025-06-13 PROCEDURE — 83735 ASSAY OF MAGNESIUM: CPT | Performed by: HOSPITALIST

## 2025-06-13 PROCEDURE — 97530 THERAPEUTIC ACTIVITIES: CPT | Mod: CO

## 2025-06-13 PROCEDURE — 84100 ASSAY OF PHOSPHORUS: CPT | Performed by: HOSPITALIST

## 2025-06-13 PROCEDURE — 25000003 PHARM REV CODE 250: Performed by: FAMILY MEDICINE

## 2025-06-13 PROCEDURE — 25000003 PHARM REV CODE 250: Performed by: INTERNAL MEDICINE

## 2025-06-13 PROCEDURE — 36415 COLL VENOUS BLD VENIPUNCTURE: CPT | Performed by: HOSPITALIST

## 2025-06-13 RX ADMIN — ENOXAPARIN SODIUM 40 MG: 40 INJECTION SUBCUTANEOUS at 04:06

## 2025-06-13 RX ADMIN — OXYCODONE HYDROCHLORIDE 5 MG: 5 TABLET ORAL at 09:06

## 2025-06-13 RX ADMIN — PREGABALIN 150 MG: 75 CAPSULE ORAL at 09:06

## 2025-06-13 RX ADMIN — FAMOTIDINE 20 MG: 20 TABLET, FILM COATED ORAL at 09:06

## 2025-06-13 RX ADMIN — ACETAMINOPHEN 1000 MG: 500 TABLET, FILM COATED ORAL at 04:06

## 2025-06-13 RX ADMIN — OXYCODONE HYDROCHLORIDE 5 MG: 5 TABLET ORAL at 04:06

## 2025-06-13 RX ADMIN — HYDROMORPHONE HYDROCHLORIDE 2 MG: 2 INJECTION INTRAMUSCULAR; INTRAVENOUS; SUBCUTANEOUS at 11:06

## 2025-06-13 RX ADMIN — Medication 400 MG: at 09:06

## 2025-06-13 RX ADMIN — ACETAMINOPHEN 1000 MG: 500 TABLET, FILM COATED ORAL at 09:06

## 2025-06-13 RX ADMIN — Medication 400 MG: at 04:06

## 2025-06-13 RX ADMIN — CLINDAMYCIN PHOSPHATE: 10 GEL TOPICAL at 09:06

## 2025-06-13 RX ADMIN — HYDROMORPHONE HYDROCHLORIDE 4 MG: 4 TABLET ORAL at 12:06

## 2025-06-13 NOTE — PT/OT/SLP DISCHARGE
Physical Therapy Discharge Summary    Name: Nikkie Myles  MRN: [id number]   Principal Problem: Crohn's disease of perianal region with complication     Patient Discharged from acute Physical Therapy on 25.  Please refer to prior PT noted date on 25 for functional status.    Patient amb up and down 14 step with HR and S. No LOB, normal pace.      Patient requested to be dc from PT . She states that she is walking around fine and is able to amb up and down 14 steps I.   Assessment:     Patient has met all PT goals and  requested to be DC from PT services.     Objective:     GOALS:   Multidisciplinary Problems       Physical Therapy Goals          Problem: Physical Therapy    Goal Priority Disciplines Outcome Interventions   Physical Therapy Goal     PT, PT/OT Progressing    Description: Goals to be met by: DC     Patient will increase functional independence with mobility by performin. Gait  community level  with [place] using No Assistive Device.   2. Ascend/descend 14 stair with right Handrails [place] using No Assistive Device.                          Reasons for Discontinuation of Therapy Services  Patient request. Patient is I with gait and stairs. All PT goals met.      Plan:     Patient Discharged from PT services.      2025
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
## 2025-06-13 NOTE — PT/OT/SLP PROGRESS
Occupational Therapy   Treatment    Name: Nikkie Myles  MRN: [id number]  Admitting Diagnosis:  Crohn's disease of perianal region with complication       Recommendations:     Discharge Recommendations: No Therapy Indicated  Discharge Equipment Recommendations:  none  Barriers to discharge:  None    Assessment:     Nikkie Myles is a 35 y.o. female with a medical diagnosis of Crohn's disease of perianal region with complication.  She presents with performance deficits affecting function are weakness, impaired endurance, impaired skin.     Rehab Prognosis:  Good; patient would benefit from acute skilled OT services to address these deficits and reach maximum level of function.       Plan:     Patient to be seen 2 x/week to address the above listed problems via self-care/home management, therapeutic activities, therapeutic exercises  Plan of Care Expires: 07/06/25  Plan of Care Reviewed with: patient    Subjective     Chief Complaint: no c/o  Patient/Family Comments/goals: to go home  Pain/Comfort:  Pain Rating 1: 0/10    Objective:     Communicated with: Patient prior to session.  Patient found HOB elevated with PICC line, colostomy upon OT entry to room.    General Precautions: Standard, fall    Orthopedic Precautions:N/A  Braces: N/A  Respiratory Status: Room air     Occupational Performance:     Bed Mobility:    Patient completed Rolling/Turning to Left with  independence  Patient completed Rolling/Turning to Right with independence  Patient completed Supine to Sit with independence  Patient completed Sit to Supine with independence     Functional Mobility/Transfers:  Patient completed Sit <> Stand Transfer with independence  with  no assistive device   Functional Mobility: Patient ambulates in room for all self care tasks and needs.     Activities of Daily Living:  Did not perform       AMPAC 6 Click ADL:      Treatment & Education:  Patient seen by REBECA for therapeutic exercise and therapeutic activity.  Patient performed BUE strengthening using blue theraband in all available planes 2X10 reps to improve UE strength and endurance for functional performance and daily activities.  Patient performed standing balance activity, bending/reaching in a progressive manner. Patient educated on role of JOSE and safety during performance.      Patient left HOB elevated with call button in reach and son present    GOALS:   Multidisciplinary Problems       Occupational Therapy Goals          Problem: Occupational Therapy    Goal Priority Disciplines Outcome Interventions   Occupational Therapy Goal     OT, PT/OT Progressing    Description: Goals to be met by: 7/6/25     Patient will increase functional independence with ADLs by performing:    UE Dressing with [place]. MET 6/9/25  LE Dressing with [place].  Grooming while standing at sink with [place]. MET 6/9/25  Toileting from toilet with [place] for hygiene and clothing management.   Toilet transfer to toilet with [place].  Shower with setup   Upper extremity exercise program x10 reps per handout, with independence.                         DME Justifications:  TBD    Time Tracking:     OT Date of Treatment: 06/13/25  OT Start Time: 1015  OT Stop Time: 1038  OT Total Time (min): 23 min     Billable Minutes:Therapeutic Activity 10 min  Therapeutic Exercise 13 min    OT/CAMMY: CAMMY     Number of CAMMY visits since last OT visit: 3    6/13/2025

## 2025-06-13 NOTE — PROGRESS NOTES
Nutrition Follow Up Note    General Info/Comments:  Pt continues on a regular diet with Rodrigo BID. Intake recently reported at 100%. Endorses eating well, having a good appetite, and drinking Rodrigo. Denies N/V. LBM 6/10/2025. Current wt 187# - decrease of 1# since last week. RD to continue to monitor.    Recommendations:  Recommendation/Intervention: 1. Continue regular diet. 2. Continue Rodrigo BID. 3. Monitor PO intake, weight changes, and labs.    Goal:  Goals: Pt to consistently meet >75% assessed needs through PO intake by RD follow up.  Nutrition Goal Status: progressing towards goal    Assessment and Plan:  PES Statement  Nutrition PES Problem: Increased nutrient needs (Protein)  Nutrition PES Etiology: Wound healing  Nutrition PES Signs and Symptoms: Wounds  Nutrition PES Status: Continues    Diet Order:  Current Diet Order: Regular     Oral Nutrition Supplement: Rodrigo BID    Food Allergies:  Food Allergies: NKFA    Spiritual, Cultural Beliefs, Buddhism Preferences that Affect Care:  Spiritual, Cultural Beliefs, Buddhism Practices, Values that Affect Care: no    Principal Problem:  Crohn's disease of perianal region with complication    Weights:  Wt Readings from Last 3 Encounters:   06/12/25 84.8 kg (187 lb 1 oz)   05/28/25 87 kg (191 lb 12.8 oz)   01/24/25 87.3 kg (192 lb 7.4 oz)       Estimated/Assessed Needs:  Energy Calorie Requirements (kcal): 1588 (35 kcak/kg IBW)  Energy Need Method: Kcal/kg (IBW)  Protein Requirements: 72 g (1.6 g/kg IBW)       Labs:  Lab Results   Component Value Date/Time     06/13/2025 05:08 AM     04/17/2025 05:36 AM     01/26/2025 04:43 AM    K 4.2 06/13/2025 05:08 AM    K 3.8 04/17/2025 05:36 AM    K 4.9 01/26/2025 04:43 AM    CHLORIDE 102 04/17/2025 05:36 AM    EGFRNORACEVR >60 06/13/2025 05:08 AM    EGFRNORACEVR >60 01/26/2025 04:43 AM    GLUCOSE 92 04/17/2025 05:36 AM    CALCIUM 8.9 06/13/2025 05:08 AM    CALCIUM 8.6 04/17/2025 05:36 AM    CALCIUM 8.9  01/26/2025 04:43 AM    PHOS 4.5 06/13/2025 05:08 AM    PHOS 2.7 01/26/2025 04:43 AM    MG 1.6 06/13/2025 05:08 AM    ALBUMIN 3.0 (L) 06/13/2025 05:08 AM    ALBUMIN 2.9 (L) 04/17/2025 05:36 AM    ALBUMIN 2.8 (L) 01/26/2025 04:43 AM    CRP 95.2 (H) 06/05/2025 05:34 AM    TRIG 85 04/20/2022 08:33 PM    HGBA1C 5.7 (H) 04/21/2022 06:30 AM    HGBA1C 4.9 07/29/2020 11:02 AM       Medications:  Scheduled Meds:   acetaminophen  1,000 mg Oral TID    clindamycin phosphate 1%   Topical (Top) BID    enoxparin  40 mg Subcutaneous Daily    ergocalciferol  50,000 Units Oral Q7 Days    famotidine  20 mg Oral BID    magnesium hydroxide 400 mg/5 ml  30 mL Oral BID    magnesium oxide  400 mg Oral TID    oxyCODONE  5 mg Oral QID    pregabalin  150 mg Oral BID     Continuous Infusions:  PRN Meds:.  Current Facility-Administered Medications:     0.9%  NaCl infusion (for blood administration), , Intravenous, Q24H PRN    HYDROmorphone, 2 mg, Intravenous, BID PRN    HYDROmorphone, 4 mg, Oral, Q6H PRN    naloxone, 0.02 mg, Intravenous, PRN    ondansetron, 4 mg, Intravenous, Q8H PRN    oxyCODONE, 10 mg, Oral, Q4H PRN    sodium chloride 0.9%, 10 mL, Intravenous, PRN    Nutrition Risk:  Nutrition Risk  Level of Risk/Frequency of Follow-up: moderate     Follow Up: Weekly    Monitor and Evaluation:  Monitor and Evaluation  Monitor and Evaluation: Energy intake, Protein intake, Food and beverage intake, Diet order, Food and nutrition knowledge, Weight, Nutrition focused physical findings, Beliefs and attitudes, Electrolyte and renal panel, Gastrointestinal profile, Glucose/endocrine profile, Inflammatory profile, Lipid profile, Skin

## 2025-06-13 NOTE — PROGRESS NOTES
[facility] Medicine  Progress Note    Room: 235/235   Patient Name: Nikkie Myles  MRN: [id number]  Admit Date: 6/5/2025   Length of Stay: 8  Attending Physician: Girma Levi MD  Nurse Practitioner: Terri Parada NP  Code Status: Full Code    Date of Service: 06/13/2025    Principal Problem:   Crohn's disease of perianal region with complication      HPI obtained from patient, review of previous hospital records, and summarization.   HPI     Nikkie Myles is a 35 y.o. 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  She presented to [facility] ED with with perianal pain, abdominal cramps, panniculitis, and diffuse joint pain.  CT showed probable perirectal fistula and anterior abdominal wall wound, chronic inflammatory changes of the colon and TI, diverting loop ileostomy parastomal hernia containing bowel.  CRS was consulted, and underwent rectal exam under anesthesia with biopsy.  Found to have evidence of deep ulceration wounds checking up her gluteal cleft, in her pannus, and in both labia.  She also had a deep ulcerated fistula trach along the right posterior perianal skin.  Wound seemed to be consistent with hidradenitis/pyoderma.  Biopsies were obtained.  Dermatology, ID, and GI consulted.  She received infliximab 10mg/kg on 6/4.  Pending biopsy reports for pyoderma/hidradenitis.  Ongoing needs for long-term pain management and wound care.    Patient is being admitted to [facility] for pain management and wound care.    Interval History    No issues overnight   Labs reviewed and listed below, no critical values          Past Medical History:      Past Medical History: Patient has a past medical history of Anal fistula, Chronic diarrhea, Crohn's disease (03/2022), Enteropathic  arthropathy, and Eye injury.    Past Surgical History: Patient has a past surgical history that includes  section, low transverse (2013);  section with tubal ligation (Bilateral, 2020);  section (); Examination under anesthesia (N/A, 8/15/2022); Flexible sigmoidoscopy (N/A, 8/15/2022); Esophagogastroduodenoscopy (N/A, 2022); Colonoscopy (N/A, 2022); Laparoscopic ileostomy (N/A, 2022); Digital rectal examination under anesthesia (N/A, 2025); and Biopsy of anus (N/A, 2025).    Social History: Patient reports that she has quit smoking. Her smoking use included cigarettes. She has a 5 pack-year smoking history. She has never used smokeless tobacco. She reports current alcohol use. She reports that she does not use drugs.    Family History:  family history includes Glaucoma in her maternal grandmother; Hypertension in her father and mother; No Known Problems in her brother, maternal aunt, maternal grandfather, maternal uncle, paternal aunt, paternal grandfather, paternal grandmother, paternal uncle, and sister.    Home Medications: reconciled     Allergies: Patient has no known allergies.      Subjective:     Review of Systems   Constitutional:  Positive for malaise/fatigue. Negative for chills and fever.   Respiratory:  Negative for cough and shortness of breath.    Cardiovascular:  Negative for chest pain and leg swelling.   Gastrointestinal:  Positive for abdominal pain. Negative for nausea and vomiting.   Genitourinary:  Negative for dysuria, flank pain and urgency.   Musculoskeletal:  Negative for back pain and neck pain.        Perineal pain and pain to pannus wound   Neurological:  Positive for weakness. Negative for dizziness and headaches.   Psychiatric/Behavioral:  Negative for depression. The patient is not nervous/anxious.          Objective:     Vital Signs:  Temp:  [98.1 °F (36.7 °C)-98.9 °F (37.2 °C)]   Pulse:  [75-86]   Resp:  [16-20]   BP:  ()/(53-66)   SpO2:  [96 %-100 %]     Body mass index is 36.53 kg/m².           Intake and Output:    Intake/Output Summary (Last 24 hours) at 6/13/2025 1018  Last data filed at 6/12/2025 1814  Gross per 24 hour   Intake 1225 ml   Output --   Net 1225 ml          Physical Exam  HENT:      Head: Normocephalic and atraumatic.      Mouth/Throat:      Mouth: Mucous membranes are moist.      Pharynx: Oropharynx is clear.   Eyes:      Extraocular Movements: Extraocular movements intact.      Pupils: Pupils are equal, round, and reactive to light.   Cardiovascular:      Rate and Rhythm: Normal rate and regular rhythm.   Pulmonary:      Effort: Pulmonary effort is normal.      Breath sounds: Normal breath sounds.   Abdominal:      General: Bowel sounds are normal. There is no distension.      Palpations: Abdomen is soft.      Comments: Ileostomy in place  Wound to pannus CAMMY   Musculoskeletal:      Right lower leg: No edema.      Left lower leg: No edema.   Skin:     General: Skin is warm and dry.      Capillary Refill: Capillary refill takes less than 2 seconds.      Comments: Perineal wounds +   Neurological:      General: No focal deficit present.      Mental Status: She is alert.   Psychiatric:         Mood and Affect: Mood normal.         Behavior: Behavior normal.         Patient consistently followed by Wound Care NP    Labs:  Recent Labs   Lab 06/09/25 0457 06/11/25 0324 06/13/25  0459   WBC 8.89 10.94 7.66   HGB 7.9* 8.8* 8.5*   HCT 26.5* 29.3* 29.5*   * 483* 418     Recent Labs   Lab 06/09/25 0457 06/11/25 0324 06/13/25  0508   * 138 136   K 3.4* 4.2 4.2    104 106   CO2 25 25 20*   BUN 10 19 14   CREATININE 0.6 0.8 0.7   * 111* 134*   CALCIUM 8.6* 9.1 8.9   MG 1.7 1.7 1.6   PHOS 3.8 5.1* 4.5     Recent Labs   Lab 06/09/25 0457 06/11/25  0324 06/13/25  0508   ALKPHOS 82 80 85   ALT 11 11 15   AST 17 15 19   ALBUMIN 2.6* 2.9* 3.0*   PROT 8.5* 8.9* 8.5*   BILITOT <0.1* 0.1 0.1  "    No results for input(s): "POCTGLUCOSE" in the last 72 hours.    Meds Scheduled:   acetaminophen  1,000 mg Oral TID    clindamycin phosphate 1%   Topical (Top) BID    enoxparin  40 mg Subcutaneous Daily    ergocalciferol  50,000 Units Oral Q7 Days    famotidine  20 mg Oral BID    magnesium hydroxide 400 mg/5 ml  30 mL Oral BID    magnesium oxide  400 mg Oral TID    oxyCODONE  5 mg Oral QID    pregabalin  150 mg Oral BID         Current Inpatient Problem List:  Active Hospital Problems    Diagnosis  POA    *Crohn's disease of perianal region with complication [K50.119]  Yes    Thrombocytosis [D75.839]  Yes    Crohn's disease of both small and large intestine with fistula [K50.813]  Yes    Iron deficiency anemia due to chronic blood loss [D50.0]  Yes    Symptomatic anemia [D64.9]  Yes      Resolved Hospital Problems   No resolved problems to display.         Assessment / Plan:   Crohn's disease with perianal region with complication   Crohn's disease of both small and large intestine with fistula  Seen by CRS while in the hospital, wounds appear to be more extensive than Crohn's related fistula disease.  Suspicious for pyoderma or hidradenitis superimposed on her IBD  Biopsies obtained, showing ulcerated skin and subcutis with acute and chronic inflammation and several non-necrotizing granulomas  Received dose of Remicade on 06/04   Avoid NSAIDs given risk of IBD exacerbation  Pain management with oxycodone, Lyrica, and scheduled acetaminophen  clindamycin 1% solution BID to L axillae    Mix approximately equal parts triamcinolone 0.1% cream, ketoconazole 2% cream, and milk of magnesia in a sterile urine container. Shake vigorously to mix. Apply to bilateral inguinal cleft BID.   Hemoglobins baths at least twice weekly   Bleach baths at home 1-2 times weekly    Hidradenitis suppurativa   [facility]   Has been seen previously in dermatology clinic when she was well controlled on infliximab per GI. " However, unfortunately lost insurance/ infliximab and now is flaring  Resumed on infliximab 06/04/2025  Receives infliximab every 4 weeks, so next dose would be around 7/2  Will need to f/u with dermatology outpatient  Wound followed and managed by consistent, contracted [facility] NP    Thrombocytosis   Likely reactive due to IBD  Trend on regular labs   DVT prophylaxis with enoxaparin    Iron-deficiency anemia due to chronic blood loss  Symptomatic anemia  Trend hemoglobin on serial CBC   Transfuse for hemoglobin/hematocrit less than 7/21 or if becomes hemodynamically unstable  Transfused 1 unit PRBC on 6/6   Iron studies  (6/6): Iron 132, TIBC 222, 59 % sat, Ferritin 197.   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   Trend on regular labs   Magnesium stable at 1.6    IP [facility] RISK OF UNPLANNED READMISSION Model:  MODERATE     Diet:  Regular   GI Ppx:  Famotidine   DVT Ppx:  Enoxaparin  Lines:  Midline 6/5  Drains:  Ileostomy   Airways:n/a   Wounds:  Pelvis, labia, perineum    Goals of Care:   Restorative  Treat infection  Optimize nutrition  Wound healing  Muscle strengthening  Restoration of ADL's  Improve mobility    Anticipated Disposition:    Wants to go transfer to [facility] for all her specialty appointments, however I'm concerned she may not be able to get these appts in a timely manner (next remicade infusion scheduled for 7/10 at [facility])  D/c plan: 6/18 home. Will need to wean off of IV pain meds. Needs someone to assist her with wound care.  Will need follow up with  Dermatology, CRS (Dr. Yip), and GI  Follow up appointments:   No future appointments.      Terri Parada NP  Hospital Medicine [facility]    Extended Visit  Total time spent: 51 minutes  Description of Time: counseling patient on clinical condition, therapies provided, plan of care, emotional support, coordinating patient care with other care team members, reviewing and interpreting labs and imaging, collaboration with physician, initiating new orders, chart review, and documentation. See interval hx and assessment/plan for details.

## 2025-06-14 PROCEDURE — 25000003 PHARM REV CODE 250: Performed by: INTERNAL MEDICINE

## 2025-06-14 PROCEDURE — 63600175 PHARM REV CODE 636 W HCPCS: Performed by: STUDENT IN AN ORGANIZED HEALTH CARE EDUCATION/TRAINING PROGRAM

## 2025-06-14 PROCEDURE — 63600175 PHARM REV CODE 636 W HCPCS: Performed by: INTERNAL MEDICINE

## 2025-06-14 PROCEDURE — 25000003 PHARM REV CODE 250: Performed by: STUDENT IN AN ORGANIZED HEALTH CARE EDUCATION/TRAINING PROGRAM

## 2025-06-14 PROCEDURE — 25000003 PHARM REV CODE 250: Performed by: FAMILY MEDICINE

## 2025-06-14 PROCEDURE — 11000001 HC ACUTE MED/SURG PRIVATE ROOM

## 2025-06-14 RX ADMIN — ACETAMINOPHEN 1000 MG: 500 TABLET, FILM COATED ORAL at 09:06

## 2025-06-14 RX ADMIN — OXYCODONE HYDROCHLORIDE 5 MG: 5 TABLET ORAL at 09:06

## 2025-06-14 RX ADMIN — HYDROMORPHONE HYDROCHLORIDE 4 MG: 4 TABLET ORAL at 01:06

## 2025-06-14 RX ADMIN — PREGABALIN 150 MG: 75 CAPSULE ORAL at 09:06

## 2025-06-14 RX ADMIN — Medication 400 MG: at 09:06

## 2025-06-14 RX ADMIN — OXYCODONE HYDROCHLORIDE 5 MG: 5 TABLET ORAL at 01:06

## 2025-06-14 RX ADMIN — FAMOTIDINE 20 MG: 20 TABLET, FILM COATED ORAL at 09:06

## 2025-06-14 RX ADMIN — ACETAMINOPHEN 1000 MG: 500 TABLET, FILM COATED ORAL at 04:06

## 2025-06-14 RX ADMIN — ERGOCALCIFEROL 50000 UNITS: 1.25 CAPSULE ORAL at 09:06

## 2025-06-14 RX ADMIN — ENOXAPARIN SODIUM 40 MG: 40 INJECTION SUBCUTANEOUS at 04:06

## 2025-06-14 RX ADMIN — OXYCODONE HYDROCHLORIDE 5 MG: 5 TABLET ORAL at 04:06

## 2025-06-14 RX ADMIN — HYDROMORPHONE HYDROCHLORIDE 2 MG: 2 INJECTION INTRAMUSCULAR; INTRAVENOUS; SUBCUTANEOUS at 01:06

## 2025-06-14 RX ADMIN — Medication 400 MG: at 04:06

## 2025-06-14 RX ADMIN — CLINDAMYCIN PHOSPHATE: 10 GEL TOPICAL at 09:06

## 2025-06-14 NOTE — PLAN OF CARE
IV removed, leaking, painful.Pt has poor venous access. Pt requesting US guided IV. Charge nurse aware, NP aware, consult to midline team pending.        Problem: Adult Inpatient Plan of Care  Goal: Plan of Care Review  Outcome: Progressing  Goal: Absence of Hospital-Acquired Illness or Injury  Outcome: Progressing  Goal: Optimal Comfort and Wellbeing  Outcome: Progressing

## 2025-06-14 NOTE — PROGRESS NOTES
[facility] Medicine  Progress Note    Room: 233/233   Patient Name: Nikkie Myles  MRN: [id number]  Admit Date: 6/5/2025   Length of Stay: 9  Attending Physician: Girma Levi MD  Nurse Practitioner: Britney Monet NP  Code Status: Full Code    Date of Service: 06/14/2025    Principal Problem:   Crohn's disease of perianal region with complication      HPI obtained from patient, review of previous hospital records, and summarization.   HPI     Nikkie Myles is a 35 y.o. 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  [place] [facility] with with perianal pain, abdominal cramps, panniculitis, and diffuse joint pain.  CT showed probable perirectal fistula and anterior abdominal wall wound, chronic inflammatory changes of the colon and TI, diverting loop ileostomy parastomal hernia containing bowel.  CRS was consulted, and underwent rectal exam under anesthesia with biopsy.  Found to have evidence of deep ulceration wounds checking up her gluteal cleft, in her pannus, and in both labia.  She also had a deep ulcerated fistula trach along the right posterior perianal skin.  Wound seemed to be consistent with hidradenitis/pyoderma.  Biopsies were obtained.  Dermatology, ID, and GI consulted.  She received infliximab 10mg/kg on 6/4.  Pending biopsy reports for pyoderma/hidradenitis.  Ongoing needs for long-term pain management and wound care.    Patient is being admitted to [facility] for pain management and wound care.    Interval History    No issues overnight   Vital signs reviewed and stable, afebrile  LBM 6/13          Past Medical History:      Past Medical History: Patient has a past medical history of Anal fistula, Chronic diarrhea, Crohn's disease (03/2022), Enteropathic  arthropathy, and Eye injury.    Past Surgical History: Patient has a past surgical history that includes  section, low transverse (2013);  section with tubal ligation (Bilateral, 2020);  section (); Examination under anesthesia (N/A, 8/15/2022); Flexible sigmoidoscopy (N/A, 8/15/2022); Esophagogastroduodenoscopy (N/A, 2022); Colonoscopy (N/A, 2022); Laparoscopic ileostomy (N/A, 2022); Digital rectal examination under anesthesia (N/A, 2025); and Biopsy of anus (N/A, 2025).    Social History: Patient reports that she has quit smoking. Her smoking use included cigarettes. She has a 5 pack-year smoking history. She has never used smokeless tobacco. She reports current alcohol use. She reports that she does not use drugs.    Family History:  family history includes Glaucoma in her maternal grandmother; Hypertension in her father and mother; No Known Problems in her brother, maternal aunt, maternal grandfather, maternal uncle, paternal aunt, paternal grandfather, paternal grandmother, paternal uncle, and sister.    Home Medications: reconciled     Allergies: Patient has no known allergies.      Subjective:     Review of Systems   Constitutional:  Positive for malaise/fatigue. Negative for chills and fever.   Respiratory:  Negative for cough and shortness of breath.    Cardiovascular:  Negative for chest pain and leg swelling.   Gastrointestinal:  Positive for abdominal pain. Negative for nausea and vomiting.   Genitourinary:  Negative for dysuria, flank pain and urgency.   Musculoskeletal:  Negative for back pain and neck pain.        Perineal pain and pain to pannus wound   Neurological:  Positive for weakness. Negative for dizziness and headaches.   Psychiatric/Behavioral:  Negative for depression. The patient is not nervous/anxious.          Objective:     Vital Signs:  Temp:  [98 °F (36.7 °C)-98.4 °F (36.9 °C)]   Pulse:  [73-91]   Resp:  [16-20]   BP:  ()/(51-61)   SpO2:  [98 %-100 %]     Body mass index is 36.53 kg/m².           Intake and Output:    Intake/Output Summary (Last 24 hours) at 6/14/2025 0718  Last data filed at 6/13/2025 1715  Gross per 24 hour   Intake 1840 ml   Output --   Net 1840 ml          Physical Exam  HENT:      Head: Normocephalic and atraumatic.      Mouth/Throat:      Mouth: Mucous membranes are moist.      Pharynx: Oropharynx is clear.   Eyes:      Extraocular Movements: Extraocular movements intact.      Pupils: Pupils are equal, round, and reactive to light.   Cardiovascular:      Rate and Rhythm: Normal rate and regular rhythm.   Pulmonary:      Effort: Pulmonary effort is normal.      Breath sounds: Normal breath sounds.   Abdominal:      General: Bowel sounds are normal. There is no distension.      Palpations: Abdomen is soft.      Comments: Ileostomy in place  Wound to pannus CAMMY   Musculoskeletal:      Right lower leg: No edema.      Left lower leg: No edema.   Skin:     General: Skin is warm and dry.      Capillary Refill: Capillary refill takes less than 2 seconds.      Comments: Perineal wounds +   Neurological:      General: No focal deficit present.      Mental Status: She is alert.   Psychiatric:         Mood and Affect: Mood normal.         Behavior: Behavior normal.         Patient consistently followed by Wound Care NP    Labs:  Recent Labs   Lab 06/09/25 0457 06/11/25 0324 06/13/25  0459   WBC 8.89 10.94 7.66   HGB 7.9* 8.8* 8.5*   HCT 26.5* 29.3* 29.5*   * 483* 418     Recent Labs   Lab 06/09/25 0457 06/11/25 0324 06/13/25  0508   * 138 136   K 3.4* 4.2 4.2    104 106   CO2 25 25 20*   BUN 10 19 14   CREATININE 0.6 0.8 0.7   * 111* 134*   CALCIUM 8.6* 9.1 8.9   MG 1.7 1.7 1.6   PHOS 3.8 5.1* 4.5     Recent Labs   Lab 06/09/25 0457 06/11/25  0324 06/13/25  0508   ALKPHOS 82 80 85   ALT 11 11 15   AST 17 15 19   ALBUMIN 2.6* 2.9* 3.0*   PROT 8.5* 8.9* 8.5*   BILITOT <0.1* 0.1 0.1  "    No results for input(s): "POCTGLUCOSE" in the last 72 hours.    Meds Scheduled:   acetaminophen  1,000 mg Oral TID    clindamycin phosphate 1%   Topical (Top) BID    enoxparin  40 mg Subcutaneous Daily    ergocalciferol  50,000 Units Oral Q7 Days    famotidine  20 mg Oral BID    magnesium hydroxide 400 mg/5 ml  30 mL Oral BID    magnesium oxide  400 mg Oral TID    oxyCODONE  5 mg Oral QID    pregabalin  150 mg Oral BID         Current Inpatient Problem List:  Active Hospital Problems    Diagnosis  POA    *Crohn's disease of perianal region with complication [K50.119]  Yes    Thrombocytosis [D75.839]  Yes    Crohn's disease of both small and large intestine with fistula [K50.813]  Yes    Iron deficiency anemia due to chronic blood loss [D50.0]  Yes    Symptomatic anemia [D64.9]  Yes      Resolved Hospital Problems   No resolved problems to display.         Assessment / Plan:   Crohn's disease with perianal region with complication   Crohn's disease of both small and large intestine with fistula  Seen by CRS while in the hospital, wounds appear to be more extensive than Crohn's related fistula disease.  Suspicious for pyoderma or hidradenitis superimposed on her IBD  Biopsies obtained, showing ulcerated skin and subcutis with acute and chronic inflammation and several non-necrotizing granulomas  Received dose of Remicade on 06/04   Avoid NSAIDs given risk of IBD exacerbation  Pain management with oxycodone, Lyrica, and scheduled acetaminophen  clindamycin 1% solution BID to L axillae    Mix approximately equal parts triamcinolone 0.1% cream, ketoconazole 2% cream, and milk of magnesia in a sterile urine container. Shake vigorously to mix. Apply to bilateral inguinal cleft BID.   Hemoglobins baths at least twice weekly   Bleach baths at home 1-2 times weekly    Hidradenitis suppurativa   [facility]   Has been seen previously in dermatology clinic when she was well controlled on infliximab per GI. " However, unfortunately lost insurance/ infliximab and now is flaring  Resumed on infliximab 06/04/2025  Receives infliximab every 4 weeks, so next dose would be around 7/2  Will need to f/u with dermatology outpatient  Wound followed and managed by consistent, contracted [facility] NP    Thrombocytosis   Likely reactive due to IBD  Trend on regular labs   DVT prophylaxis with enoxaparin    Iron-deficiency anemia due to chronic blood loss  Symptomatic anemia  Trend hemoglobin on serial CBC   Transfuse for hemoglobin/hematocrit less than 7/21 or if becomes hemodynamically unstable  Transfused 1 unit PRBC on 6/6   Iron studies  (6/6): Iron 132, TIBC 222, 59 % sat, Ferritin 197.   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   Trend on regular labs     IP OHS RISK OF UNPLANNED READMISSION Model:  MODERATE     Diet:  Regular   GI Ppx:  Famotidine   DVT Ppx:  Enoxaparin  Lines:  Midline 6/5  Drains:  Ileostomy   Airways:n/a   Wounds:  Pelvis, labia, perineum    Goals of Care:   Restorative  Treat infection  Optimize nutrition  Wound healing  Muscle strengthening  Restoration of ADL's  Improve mobility    Anticipated Disposition:    Wants to go transfer to [facility] for all her specialty appointments, however I'm concerned she may not be able to get these appts in a timely manner (next remicade infusion scheduled for 7/10 at [facility])  D/c plan: 6/18 home. Will need to wean off of IV pain meds. Needs someone to assist her with wound care.  Will need follow up with Dermatology, CRS (Dr. Yip), and GI  Follow up  appointments:   No future appointments.      Britney Monet NP  Hospital Medicine [facility]    Total time spent: 51 minutes  Description of Time: counseling patient on clinical condition, therapies provided, plan of care, emotional support, coordinating patient care with other care team members, reviewing and interpreting labs and imaging, collaboration with physician, initiating new orders, chart review, and documentation. See interval hx and assessment/plan for details.

## 2025-06-14 NOTE — PROGRESS NOTES
06/12/25 1300        Wound 05/27/25 2225 Ulceration lower Abdomen   Date First Assessed/Time First Assessed: 05/27/25 2225   Present on Original Admission: Yes  Primary Wound Type: Ulceration  Orientation: lower  Location: (c) Abdomen  Is this injury device related?: No   Wound Image   (photo did not develop)   Dressing Appearance Moist drainage   Drainage Amount Moderate   Drainage Characteristics/Odor Serous   Appearance Moist   Tissue loss description Full thickness   Red (%), Wound Tissue Color 100 %   Periwound Area Moist   Wound Edges Open   Wound Length (cm) 3.8 cm   Wound Width (cm) 21.5 cm   Wound Depth (cm) 2.5 cm   Wound Volume (cm^3) 106.945 cm^3   Wound Surface Area (cm^2) 64.17 cm^2   Care Cleansed with:  (wound cleanser)   Dressing   (silver alginate, abd, mesh underwear)        Wound 05/28/25 2000 Ulceration Gluteal cleft   Date First Assessed/Time First Assessed: 05/28/25 2000   Present on Original Admission: Yes  Primary Wound Type: Ulceration  Location: (c) Gluteal cleft  Is this injury device related?: No   Wound Image     Dressing Appearance Moist drainage   Drainage Amount Moderate   Drainage Characteristics/Odor Serosanguineous   Appearance Moist   Tissue loss description Full thickness   Red (%), Wound Tissue Color 100 %   Periwound Area Moist   Wound Edges Open   Wound Length (cm) 25 cm   Wound Width (cm) 2 cm   Wound Depth (cm) 2.5 cm   Wound Volume (cm^3) 65.45 cm^3   Wound Surface Area (cm^2) 39.27 cm^2   Care Wound cleanser   Dressing Applied  (silver alginate, abd, mesh underwear)        Wound 05/28/25 2000 Ulceration Labia   Date First Assessed/Time First Assessed: 05/28/25 2000   Present on Original Admission: Yes  Primary Wound Type: Ulceration  Location: Labia  Is this injury device related?: No  Wound Outcome: Converged   Wound Image     Dressing Appearance Moist drainage   Drainage Amount Scant   Drainage Characteristics/Odor Serosanguineous   Appearance Moist   Red (%), Wound  Tissue Color 100 %   Periwound Area Moist   Wound Edges Open   Wound Length (cm) 1.4 cm   Wound Width (cm) 0.8 cm   Wound Depth (cm) 0.1 cm   Wound Volume (cm^3) 0.059 cm^3   Wound Surface Area (cm^2) 0.88 cm^2   Care Wound cleanser   Dressing Applied  (silver alginate, abd, mesh underwear)        Wound 06/06/25 1320 Ulceration Left Axilla   Date First Assessed/Time First Assessed: 06/06/25 1320   Present on Original Admission: Yes  Primary Wound Type: Ulceration  Side: Left  Location: Axilla  Is this injury device related?: No   Dressing Appearance Open to air   Drainage Amount Scant   Drainage Characteristics/Odor No odor   Appearance Moist   Red (%), Wound Tissue Color 100 %   Periwound Area Moist   Wound Edges Open   Care Wound cleanser        Wound 06/06/25 1320 Ulceration Right Groin   Date First Assessed/Time First Assessed: 06/06/25 1320   Present on Original Admission: Yes  Primary Wound Type: Ulceration  Side: Right  Location: Groin   Dressing Appearance Intact   Drainage Amount None   Drainage Characteristics/Odor No odor   Red (%), Wound Tissue Color 100 %   Periwound Area Moist   Wound Length (cm) 0 cm   Wound Width (cm) 0 cm   Wound Depth (cm) 0 cm   Wound Volume (cm^3) 0 cm^3   Wound Surface Area (cm^2) 0 cm^2   Care Cleansed with:;Wound cleanser   Dressing   (open to air)        Wound 06/06/25 1320 Ulceration Left Groin   Date First Assessed/Time First Assessed: 06/06/25 1320   Present on Original Admission: Yes  Primary Wound Type: Ulceration  Side: Left  Location: Groin   Wound Image    Dressing Appearance Moist drainage   Drainage Amount Small   Drainage Characteristics/Odor No odor   Appearance Moist   Tissue loss description Full thickness   Red (%), Wound Tissue Color 100 %   Periwound Area Moist   Wound Length (cm) 6.8 cm   Wound Width (cm) 0.8 cm   Wound Depth (cm) 0.8 cm   Wound Volume (cm^3) 2.279 cm^3   Wound Surface Area (cm^2) 4.27 cm^2   Care Cleansed with:;Wound cleanser   Dressing  Applied  (silver alginate, abd, mesh underwear)   Nutrition   Intake (%) 100%

## 2025-06-14 NOTE — PROGRESS NOTES
06/13/25 1135   Pain/Comfort/Sleep   POSS (Pasero Opioid-Induced Sed Scale) 1 - Awake and alert   Pain Reassessment   Pain Rating Prior to Med Admin 6   RASS ([place]ton Agitation-Sedation Scale)   RASS ([place] Agitation-Sedation Scale) 0   Brief Confusion Assessment Method (bCAM)   Feature 3: Altered Level of Consciousness Negative        Wound 05/27/25 2225 Ulceration lower Abdomen   Date First Assessed/Time First Assessed: 05/27/25 2225   Present on Original Admission: Yes  Primary Wound Type: Ulceration  Orientation: lower  Location: (c) Abdomen  Is this injury device related?: No   Dressing Appearance Moist drainage   Drainage Amount Moderate   Drainage Characteristics/Odor Serosanguineous   Appearance Moist   Tissue loss description Full thickness   Periwound Area Moist   Wound Edges Open   Care Wound cleanser   Dressing Applied  (silver alginate, abd, mesh underwear)        Wound 05/28/25 2000 Ulceration Gluteal cleft   Date First Assessed/Time First Assessed: 05/28/25 2000   Present on Original Admission: Yes  Primary Wound Type: Ulceration  Location: (c) Gluteal cleft  Is this injury device related?: No   Dressing Appearance Moist drainage   Drainage Amount Moderate   Drainage Characteristics/Odor Serous   Appearance Moist   Tissue loss description Full thickness   Periwound Area Moist   Wound Edges Open   Care Wound cleanser   Dressing Applied   Packing   (silver alginate, abd, mesh underwear)        Wound 05/28/25 2000 Ulceration Labia   Date First Assessed/Time First Assessed: 05/28/25 2000   Present on Original Admission: Yes  Primary Wound Type: Ulceration  Location: Labia  Is this injury device related?: No  Wound Outcome: Converged   Dressing Appearance Moist drainage   Drainage Amount Scant   Drainage Characteristics/Odor Serous   Appearance Moist   Periwound Area Moist   Wound Edges Open   Care Wound cleanser   Dressing Applied  (silver alginate, abd, mesh underwear)        Wound 06/06/25 1320  Ulceration Left Axilla   Date First Assessed/Time First Assessed: 06/06/25 1320   Present on Original Admission: Yes  Primary Wound Type: Ulceration  Side: Left  Location: Axilla  Is this injury device related?: No   Dressing Appearance Open to air   Drainage Amount Scant   Drainage Characteristics/Odor No odor   Appearance Moist   Periwound Area Moist   Care Wound cleanser   Dressing Applied  (clindamycin gel, open to air)        Wound 06/06/25 1320 Ulceration Right Groin   Date First Assessed/Time First Assessed: 06/06/25 1320   Present on Original Admission: Yes  Primary Wound Type: Ulceration  Side: Right  Location: Groin   Dressing Appearance Intact   Drainage Amount None   Drainage Characteristics/Odor No odor   Periwound Area Moist   Care Wound cleanser   Dressing   (open to air)        Wound 06/06/25 1320 Ulceration Left Groin   Date First Assessed/Time First Assessed: 06/06/25 1320   Present on Original Admission: Yes  Primary Wound Type: Ulceration  Side: Left  Location: Groin   Dressing Appearance Moist drainage   Drainage Amount Small   Drainage Characteristics/Odor No odor   Appearance Moist   Tissue loss description Full thickness   Periwound Area Moist   Care Cleansed with:;Wound cleanser   Dressing Applied  (silver alginate, abd, mesh underwear)   Safety   Safety Precautions emergency equipment at bedside   Safety Management   Safety Promotion/Fall Prevention assistive device/personal item within reach;bed alarm set;side rails raised x 2   Patient Rounds bed in low position;bed wheels locked;ID band on;visualized patient;call light in patient/parent reach;placement of personal items at bedside   Positioning   Body Position position changed independently   Head of Bed (HOB) Positioning HOB at 20-30 degrees

## 2025-06-15 PROCEDURE — 11000001 HC ACUTE MED/SURG PRIVATE ROOM

## 2025-06-15 PROCEDURE — 25000003 PHARM REV CODE 250: Performed by: FAMILY MEDICINE

## 2025-06-15 PROCEDURE — 25000003 PHARM REV CODE 250: Performed by: INTERNAL MEDICINE

## 2025-06-15 PROCEDURE — 63600175 PHARM REV CODE 636 W HCPCS: Performed by: STUDENT IN AN ORGANIZED HEALTH CARE EDUCATION/TRAINING PROGRAM

## 2025-06-15 PROCEDURE — 63600175 PHARM REV CODE 636 W HCPCS: Performed by: INTERNAL MEDICINE

## 2025-06-15 PROCEDURE — 25000003 PHARM REV CODE 250: Performed by: STUDENT IN AN ORGANIZED HEALTH CARE EDUCATION/TRAINING PROGRAM

## 2025-06-15 RX ADMIN — OXYCODONE HYDROCHLORIDE 5 MG: 5 TABLET ORAL at 09:06

## 2025-06-15 RX ADMIN — Medication 400 MG: at 09:06

## 2025-06-15 RX ADMIN — Medication 400 MG: at 08:06

## 2025-06-15 RX ADMIN — HYDROMORPHONE HYDROCHLORIDE 2 MG: 2 INJECTION INTRAMUSCULAR; INTRAVENOUS; SUBCUTANEOUS at 12:06

## 2025-06-15 RX ADMIN — OXYCODONE HYDROCHLORIDE 5 MG: 5 TABLET ORAL at 01:06

## 2025-06-15 RX ADMIN — CLINDAMYCIN PHOSPHATE: 10 GEL TOPICAL at 09:06

## 2025-06-15 RX ADMIN — OXYCODONE HYDROCHLORIDE 5 MG: 5 TABLET ORAL at 08:06

## 2025-06-15 RX ADMIN — FAMOTIDINE 20 MG: 20 TABLET, FILM COATED ORAL at 09:06

## 2025-06-15 RX ADMIN — PREGABALIN 150 MG: 75 CAPSULE ORAL at 08:06

## 2025-06-15 RX ADMIN — ENOXAPARIN SODIUM 40 MG: 40 INJECTION SUBCUTANEOUS at 06:06

## 2025-06-15 RX ADMIN — CLINDAMYCIN PHOSPHATE: 10 GEL TOPICAL at 08:06

## 2025-06-15 RX ADMIN — PREGABALIN 150 MG: 75 CAPSULE ORAL at 09:06

## 2025-06-15 RX ADMIN — ACETAMINOPHEN 1000 MG: 500 TABLET, FILM COATED ORAL at 08:06

## 2025-06-15 RX ADMIN — ACETAMINOPHEN 1000 MG: 500 TABLET, FILM COATED ORAL at 09:06

## 2025-06-15 RX ADMIN — FAMOTIDINE 20 MG: 20 TABLET, FILM COATED ORAL at 08:06

## 2025-06-15 RX ADMIN — Medication 400 MG: at 04:06

## 2025-06-15 RX ADMIN — ACETAMINOPHEN 1000 MG: 500 TABLET, FILM COATED ORAL at 04:06

## 2025-06-15 RX ADMIN — OXYCODONE HYDROCHLORIDE 5 MG: 5 TABLET ORAL at 04:06

## 2025-06-15 NOTE — PLAN OF CARE
Uneventful shift.  Vitals stable, NAD noted.    Problem: Adult Inpatient Plan of Care  Goal: Optimal Comfort and Wellbeing  Outcome: Progressing  Intervention: Monitor Pain and Promote Comfort  Flowsheets (Taken 6/15/2025 0620)  Pain Management Interventions:   care clustered   quiet environment facilitated

## 2025-06-15 NOTE — PLAN OF CARE
Pt continues to make progress towards GOC, pt looking forward to discharge Wound care done as per order, treatment well tolerated, no s/s of discomfort. NADN at this time .          Problem: Adult Inpatient Plan of Care  Goal: Plan of Care Review  Outcome: Progressing  Goal: Optimal Comfort and Wellbeing  Outcome: Progressing  Intervention: Provide Person-Centered Care  Flowsheets (Taken 6/15/2025 2704)  Trust Relationship/Rapport:   care explained   emotional support provided   reassurance provided     Problem: Wound  Goal: Optimal Coping  Outcome: Progressing

## 2025-06-15 NOTE — PROGRESS NOTES
[facility]  Progress Note    Room: 233/233   Patient Name: Nikkie Myles  MRN: [id number]  Admit Date: 6/5/2025   Length of Stay: 10  Attending Physician: Girma Levi MD  Nurse Practitioner: Britney Monet NP  Code Status: Full Code    Date of Service: 06/15/2025    Principal Problem:   Crohn's disease of perianal region with complication      HPI obtained from patient, review of previous hospital records, and summarization.   HPI     Nikkie Myles is a 35 y.o. 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  [place] [facility] [place] with [place]ianal pain, abdominal cramps, panniculitis, and diffuse joint pain.  CT showed probable perirectal fistula and anterior abdominal wall wound, chronic inflammatory changes of the colon and TI, diverting loop ileostomy parastomal hernia containing bowel.  CRS was consulted, and underwent rectal exam under anesthesia with biopsy.  Found to have evidence of deep ulceration wounds checking up her gluteal cleft, in her pannus, and in both labia.  She also had a deep ulcerated fistula trach along the right posterior perianal skin.  Wound seemed to be consistent with hidradenitis/pyoderma.  Biopsies were obtained.  Dermatology, ID, and GI consulted.  She received infliximab 10mg/kg on 6/4.  Pending biopsy reports for pyoderma/hidradenitis.  Ongoing needs for long-term pain management and wound care.    Patient is being admitted to [facility] for pain management and wound care.    Interval History    NAEON  Vital signs reviewed and stable, afebrile  Appears in no acute distress at this time, continue current medical management         Past Medical History:      Past Medical History: Patient has a past medical history of Anal fistula,  Chronic diarrhea, Crohn's disease (2022), Enteropathic arthropathy, and Eye injury.    Past Surgical History: Patient has a past surgical history that includes  section, low transverse (2013);  section with tubal ligation (Bilateral, 2020);  section (); Examination under anesthesia (N/A, 8/15/2022); Flexible sigmoidoscopy (N/A, 8/15/2022); Esophagogastroduodenoscopy (N/A, 2022); Colonoscopy (N/A, 2022); Laparoscopic ileostomy (N/A, 2022); Digital rectal examination under anesthesia (N/A, 2025); and Biopsy of anus (N/A, 2025).    Social History: Patient reports that she has quit smoking. Her smoking use included cigarettes. She has a 5 pack-year smoking history. She has never used smokeless tobacco. She reports current alcohol use. She reports that she does not use drugs.    Family History:  family history includes Glaucoma in her maternal grandmother; Hypertension in her father and mother; No Known Problems in her brother, maternal aunt, maternal grandfather, maternal uncle, paternal aunt, paternal grandfather, paternal grandmother, paternal uncle, and sister.    Home Medications: reconciled     Allergies: Patient has no known allergies.      Subjective:     Review of Systems   Constitutional:  Positive for malaise/fatigue. Negative for chills and fever.   Respiratory:  Negative for cough and shortness of breath.    Cardiovascular:  Negative for chest pain and leg swelling.   Gastrointestinal:  Positive for abdominal pain. Negative for nausea and vomiting.   Genitourinary:  Negative for dysuria, flank pain and urgency.   Musculoskeletal:  Negative for back pain and neck pain.        Perineal pain and pain to pannus wound   Neurological:  Positive for weakness. Negative for dizziness and headaches.   Psychiatric/Behavioral:  Negative for depression. The patient is not nervous/anxious.          Objective:     Vital Signs:  Temp:  [97.6 °F (36.4 °C)-98.7  °F (37.1 °C)]   Pulse:  [77-94]   Resp:  [16-20]   BP: (103-108)/(60-68)   SpO2:  [96 %-100 %]     Body mass index is 37.54 kg/m².           Intake and Output:    Intake/Output Summary (Last 24 hours) at 6/15/2025 0724  Last data filed at 6/14/2025 1800  Gross per 24 hour   Intake 1455 ml   Output --   Net 1455 ml          Physical Exam  HENT:      Head: Normocephalic and atraumatic.      Mouth/Throat:      Mouth: Mucous membranes are moist.      Pharynx: Oropharynx is clear.   Eyes:      Extraocular Movements: Extraocular movements intact.      Pupils: Pupils are equal, round, and reactive to light.   Cardiovascular:      Rate and Rhythm: Normal rate and regular rhythm.   Pulmonary:      Effort: Pulmonary effort is normal.      Breath sounds: Normal breath sounds.   Abdominal:      General: Bowel sounds are normal. There is no distension.      Palpations: Abdomen is soft.      Comments: Ileostomy in place  Wound to panritous CAMMY   Musculoskeletal:      Right lower leg: No edema.      Left lower leg: No edema.   Skin:     General: Skin is warm and dry.      Capillary Refill: Capillary refill takes less than 2 seconds.      Comments: Perineal wounds +   Neurological:      General: No focal deficit present.      Mental Status: She is alert.   Psychiatric:         Mood and Affect: Mood normal.         Behavior: Behavior normal.         Patient consistently followed by Wound Care NP    Labs:  Recent Labs   Lab 06/09/25 0457 06/11/25 0324 06/13/25  0459   WBC 8.89 10.94 7.66   HGB 7.9* 8.8* 8.5*   HCT 26.5* 29.3* 29.5*   * 483* 418     Recent Labs   Lab 06/09/25 0457 06/11/25 0324 06/13/25  0508   * 138 136   K 3.4* 4.2 4.2    104 106   CO2 25 25 20*   BUN 10 19 14   CREATININE 0.6 0.8 0.7   * 111* 134*   CALCIUM 8.6* 9.1 8.9   MG 1.7 1.7 1.6   PHOS 3.8 5.1* 4.5     Recent Labs   Lab 06/09/25 0457 06/11/25 0324 06/13/25  0508   ALKPHOS 82 80 85   ALT 11 11 15   AST 17 15 19   ALBUMIN 2.6*  "2.9* 3.0*   PROT 8.5* 8.9* 8.5*   BILITOT <0.1* 0.1 0.1     No results for input(s): "POCTGLUCOSE" in the last 72 hours.    Meds Scheduled:   acetaminophen  1,000 mg Oral TID    clindamycin phosphate 1%   Topical (Top) BID    enoxparin  40 mg Subcutaneous Daily    ergocalciferol  50,000 Units Oral Q7 Days    famotidine  20 mg Oral BID    magnesium hydroxide 400 mg/5 ml  30 mL Oral BID    magnesium oxide  400 mg Oral TID    oxyCODONE  5 mg Oral QID    pregabalin  150 mg Oral BID         Current Inpatient Problem List:  Active Hospital Problems    Diagnosis  POA    *Crohn's disease of perianal region with complication [K50.119]  Yes    Thrombocytosis [D75.839]  Yes    Crohn's disease of both small and large intestine with fistula [K50.813]  Yes    Iron deficiency anemia due to chronic blood loss [D50.0]  Yes    Symptomatic anemia [D64.9]  Yes      Resolved Hospital Problems   No resolved problems to display.         Assessment / Plan:   Crohn's disease with perianal region with complication   Crohn's disease of both small and large intestine with fistula  Seen by CRS while in the hospital, wounds appear to be more extensive than Crohn's related fistula disease.  Suspicious for pyoderma or hidradenitis superimposed on her IBD  Biopsies obtained, showing ulcerated skin and subcutis with acute and chronic inflammation and several non-necrotizing granulomas  Received dose of Remicade on 06/04   Avoid NSAIDs given risk of IBD exacerbation  Pain management with oxycodone, Lyrica, and scheduled acetaminophen  clindamycin 1% solution BID to L axillae    Mix approximately equal parts triamcinolone 0.1% cream, ketoconazole 2% cream, and milk of magnesia in a sterile urine container. Shake vigorously to mix. Apply to bilateral inguinal cleft BID.   Hemoglobins baths at least twice weekly   Bleach baths at home 1-2 times weekly    Hidradenitis suppurativa   [facility]   Has been seen previously in dermatology clinic " when she was well controlled on infliximab per GI. However, unfortunately lost insurance/ infliximab and now is flaring  Resumed on infliximab 06/04/2025  Receives infliximab every 4 weeks, so next dose would be around 7/2  Will need to f/u with dermatology outpatient  Wound followed and managed by consistent, contracted [facility] NP    Thrombocytosis   Likely reactive due to IBD  Trend on regular labs   DVT prophylaxis with enoxaparin    Iron-deficiency anemia due to chronic blood loss  Symptomatic anemia  Trend hemoglobin on serial CBC   Transfuse for hemoglobin/hematocrit less than 7/21 or if becomes hemodynamically unstable  Transfused 1 unit PRBC on 6/6   Iron studies  (6/6): Iron 132, TIBC 222, 59 % sat, Ferritin 197.   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   Trend on regular labs     IP [facility] RISK OF UNPLANNED READMISSION Model:  MODERATE     Diet:  Regular   GI Ppx:  Famotidine   DVT Ppx:  Enoxaparin  Lines:  Midline 6/5  Drains:  Ileostomy   Airways:n/a   Wounds:  Pelvis, labia, perineum    Goals of Care:   Restorative  Treat infection  Optimize nutrition  Wound healing  Muscle strengthening  Restoration of ADL's  Improve mobility    Anticipated Disposition:    Wants to go transfer to [facility] for all her specialty appointments, however I'm concerned she may not be able to get these appts in a timely manner (next remicade infusion scheduled for 7/10 at [facility])  D/c plan: 6/18 home. Will need to wean off of IV pain meds. Needs someone to assist her with wound care.  Will need follow up with  Dermatology, CRS (Dr. Yip), and GI  Follow up appointments:   No future appointments.      Britney Monet NP  Hospital Medicine [facility]    Total time spent: 51 minutes  Description of Time: counseling patient on clinical condition, therapies provided, plan of care, emotional support, coordinating patient care with other care team members, reviewing and interpreting labs and imaging, collaboration with physician, initiating new orders, chart review, and documentation. See interval hx and assessment/plan for details.

## 2025-06-16 LAB
ALBUMIN SERPL BCP-MCNC: 3 G/DL (ref 3.5–5.2)
ALP SERPL-CCNC: 86 UNIT/L (ref 40–150)
ALT SERPL W/O P-5'-P-CCNC: 16 UNIT/L (ref 10–44)
ANION GAP (OHS): 8 MMOL/L (ref 8–16)
AST SERPL-CCNC: 22 UNIT/L (ref 11–45)
BILIRUB DIRECT SERPL-MCNC: 0.1 MG/DL (ref 0.1–0.3)
BILIRUB SERPL-MCNC: 0.1 MG/DL (ref 0.1–1)
BUN SERPL-MCNC: 10 MG/DL (ref 6–20)
CALCIUM SERPL-MCNC: 8.7 MG/DL (ref 8.7–10.5)
CHLORIDE SERPL-SCNC: 106 MMOL/L (ref 95–110)
CO2 SERPL-SCNC: 23 MMOL/L (ref 23–29)
CREAT SERPL-MCNC: 0.6 MG/DL (ref 0.5–1.4)
ERYTHROCYTE [DISTWIDTH] IN BLOOD BY AUTOMATED COUNT: 29.9 % (ref 11.5–14.5)
GFR SERPLBLD CREATININE-BSD FMLA CKD-EPI: >60 ML/MIN/1.73/M2
GLUCOSE SERPL-MCNC: 91 MG/DL (ref 70–110)
HCT VFR BLD AUTO: 29.5 % (ref 37–48.5)
HGB BLD-MCNC: 8.6 GM/DL (ref 12–16)
MAGNESIUM SERPL-MCNC: 1.9 MG/DL (ref 1.6–2.6)
MCH RBC QN AUTO: 18 PG (ref 27–31)
MCHC RBC AUTO-ENTMCNC: 29.2 G/DL (ref 32–36)
MCV RBC AUTO: 62 FL (ref 82–98)
PHOSPHATE SERPL-MCNC: 3.9 MG/DL (ref 2.7–4.5)
PLATELET # BLD AUTO: 379 K/UL (ref 150–450)
PMV BLD AUTO: ABNORMAL FL
POTASSIUM SERPL-SCNC: 4.2 MMOL/L (ref 3.5–5.1)
PROT SERPL-MCNC: 8.6 GM/DL (ref 6–8.4)
RBC # BLD AUTO: 4.78 M/UL (ref 4–5.4)
SODIUM SERPL-SCNC: 137 MMOL/L (ref 136–145)
WBC # BLD AUTO: 6.91 K/UL (ref 3.9–12.7)

## 2025-06-16 PROCEDURE — 11000001 HC ACUTE MED/SURG PRIVATE ROOM

## 2025-06-16 PROCEDURE — 36415 COLL VENOUS BLD VENIPUNCTURE: CPT | Performed by: HOSPITALIST

## 2025-06-16 PROCEDURE — 82248 BILIRUBIN DIRECT: CPT | Performed by: HOSPITALIST

## 2025-06-16 PROCEDURE — 25000003 PHARM REV CODE 250: Performed by: FAMILY MEDICINE

## 2025-06-16 PROCEDURE — 63600175 PHARM REV CODE 636 W HCPCS: Performed by: STUDENT IN AN ORGANIZED HEALTH CARE EDUCATION/TRAINING PROGRAM

## 2025-06-16 PROCEDURE — 25000003 PHARM REV CODE 250: Performed by: STUDENT IN AN ORGANIZED HEALTH CARE EDUCATION/TRAINING PROGRAM

## 2025-06-16 PROCEDURE — 84100 ASSAY OF PHOSPHORUS: CPT | Performed by: HOSPITALIST

## 2025-06-16 PROCEDURE — 97110 THERAPEUTIC EXERCISES: CPT

## 2025-06-16 PROCEDURE — 63600175 PHARM REV CODE 636 W HCPCS: Performed by: INTERNAL MEDICINE

## 2025-06-16 PROCEDURE — 25000003 PHARM REV CODE 250: Performed by: INTERNAL MEDICINE

## 2025-06-16 PROCEDURE — 80053 COMPREHEN METABOLIC PANEL: CPT | Performed by: HOSPITALIST

## 2025-06-16 PROCEDURE — 85027 COMPLETE CBC AUTOMATED: CPT | Performed by: HOSPITALIST

## 2025-06-16 PROCEDURE — 83735 ASSAY OF MAGNESIUM: CPT | Performed by: HOSPITALIST

## 2025-06-16 RX ADMIN — PREGABALIN 150 MG: 75 CAPSULE ORAL at 08:06

## 2025-06-16 RX ADMIN — Medication 400 MG: at 08:06

## 2025-06-16 RX ADMIN — FAMOTIDINE 20 MG: 20 TABLET, FILM COATED ORAL at 08:06

## 2025-06-16 RX ADMIN — CLINDAMYCIN PHOSPHATE: 10 GEL TOPICAL at 08:06

## 2025-06-16 RX ADMIN — Medication 400 MG: at 02:06

## 2025-06-16 RX ADMIN — ACETAMINOPHEN 1000 MG: 500 TABLET, FILM COATED ORAL at 08:06

## 2025-06-16 RX ADMIN — OXYCODONE HYDROCHLORIDE 5 MG: 5 TABLET ORAL at 08:06

## 2025-06-16 RX ADMIN — HYDROMORPHONE HYDROCHLORIDE 2 MG: 2 INJECTION INTRAMUSCULAR; INTRAVENOUS; SUBCUTANEOUS at 11:06

## 2025-06-16 RX ADMIN — ENOXAPARIN SODIUM 40 MG: 40 INJECTION SUBCUTANEOUS at 05:06

## 2025-06-16 RX ADMIN — ACETAMINOPHEN 1000 MG: 500 TABLET, FILM COATED ORAL at 02:06

## 2025-06-16 RX ADMIN — OXYCODONE HYDROCHLORIDE 5 MG: 5 TABLET ORAL at 05:06

## 2025-06-16 RX ADMIN — OXYCODONE HYDROCHLORIDE 5 MG: 5 TABLET ORAL at 02:06

## 2025-06-16 NOTE — PLAN OF CARE
Awake alert oriented able to make needs known. Medicated x 1 for pain. Ambulates independently in room and bathroom. Will continue to monitor patient.  Problem: Infection  Goal: Absence of Infection Signs and Symptoms  Outcome: Progressing     Problem: Wound  Goal: Optimal Coping  Outcome: Progressing

## 2025-06-16 NOTE — PLAN OF CARE
All goals met and pt given HEP. Discontinue OT services    Problem: Occupational Therapy  Goal: Occupational Therapy Goal  Description: Goals to be met by: 7/6/25     Patient will increase functional independence with ADLs by performing:    UE Dressing with [place]. MET 6/9/25  LE Dressing with [place].  Grooming while standing at sink with [place]. MET 6/9/25  Toileting from toilet with [place] for hygiene and clothing management.   Toilet transfer to toilet with [place].  Shower with setup   Upper extremity exercise program x10 reps per handout, with independence.    Outcome: Met

## 2025-06-16 NOTE — PROGRESS NOTES
06/16/25 1330        Wound 05/27/25 2225 Ulceration lower Abdomen   Date First Assessed/Time First Assessed: 05/27/25 2225   Present on Original Admission: Yes  Primary Wound Type: Ulceration  Orientation: lower  Location: (c) Abdomen  Is this injury device related?: No   Wound Image    Dressing Appearance Moist drainage   Drainage Amount Moderate   Drainage Characteristics/Odor Serosanguineous   Appearance Moist   Tissue loss description Full thickness   Red (%), Wound Tissue Color 100 %   Periwound Area Moist   Wound Edges Open   Wound Length (cm) 3.5 cm   Wound Width (cm) 20.5 cm   Wound Depth (cm) 2.2 cm   Wound Volume (cm^3) 82.65 cm^3   Wound Surface Area (cm^2) 56.35 cm^2   Care Wound cleanser   Dressing   (cleanse with vashe, pat dry, loosely pack with silver alginate ropeand cover with ABD pads place mesh underwear. Change daily and PRN soiling)        Wound 05/28/25 2000 Ulceration Gluteal cleft   Date First Assessed/Time First Assessed: 05/28/25 2000   Present on Original Admission: Yes  Primary Wound Type: Ulceration  Location: (c) Gluteal cleft  Is this injury device related?: No   Wound Image     Dressing Appearance Moist drainage   Drainage Amount Moderate   Drainage Characteristics/Odor Serosanguineous   Appearance Moist   Tissue loss description Full thickness   Red (%), Wound Tissue Color 100 %   Periwound Area Moist   Wound Edges Open   Wound Length (cm) 23.5 cm   Wound Width (cm) 1.5 cm   Wound Depth (cm) 2.2 cm   Wound Volume (cm^3) 40.605 cm^3   Wound Surface Area (cm^2) 27.69 cm^2   Care Wound cleanser   Dressing   (cleanse with vashe, pat dry, loosely pack with silver alginate ropeand cover with ABD pads place mesh underwear. Change daily and PRN soiling)        Wound 05/28/25 2000 Ulceration Labia   Date First Assessed/Time First Assessed: 05/28/25 2000   Present on Original Admission: Yes  Primary Wound Type: Ulceration  Location: Labia  Is this injury device related?: No  Wound Outcome:  Converged   Wound Image     Dressing Appearance Moist drainage   Drainage Amount Scant   Drainage Characteristics/Odor Serous   Appearance Moist   Red (%), Wound Tissue Color 100 %   Periwound Area Moist   Wound Edges Open   Wound Length (cm) 1.4 cm   Wound Width (cm) 0.6 cm   Wound Depth (cm) 0.1 cm   Wound Volume (cm^3) 0.044 cm^3   Wound Surface Area (cm^2) 0.66 cm^2   Care Wound cleanser   Dressing   (cleanse with vashe, pat dry, loosely pack with silver alginate ropeand cover with ABD pads place mesh underwea)        Wound 06/06/25 1320 Ulceration Left Axilla   Date First Assessed/Time First Assessed: 06/06/25 1320   Present on Original Admission: Yes  Primary Wound Type: Ulceration  Side: Left  Location: Axilla  Is this injury device related?: No   Wound Image    Dressing Appearance Open to air   Drainage Amount Scant   Drainage Characteristics/Odor No odor   Appearance Moist   Periwound Area Moist   Wound Edges Open   Wound Length (cm) 0.3 cm   Wound Width (cm) 0.2 cm   Wound Depth (cm) 0.1 cm   Wound Volume (cm^3) 0.003 cm^3   Wound Surface Area (cm^2) 0.05 cm^2   Care Wound cleanser   Dressing Applied  (Clean wound to left axilla with vashe, pat dry with gauze apply clindamycin gel leave open to air, assess daily)        Wound 06/06/25 1320 Ulceration Right Groin   Date First Assessed/Time First Assessed: 06/06/25 1320   Present on Original Admission: Yes  Primary Wound Type: Ulceration  Side: Right  Location: Groin   Wound Image    Dressing Appearance Open to air   Drainage Amount None   Drainage Characteristics/Odor No odor   Red (%), Wound Tissue Color 100 %   Periwound Area Moist   Wound Length (cm) 0 cm   Wound Width (cm) 0 cm   Wound Depth (cm) 0 cm   Wound Volume (cm^3) 0 cm^3   Wound Surface Area (cm^2) 0 cm^2   Care Wound cleanser   Dressing   (cleanse with vashe, pat dry, loosely pack with silver alginate ropeand cover with ABD pads place mesh underwear. Change daily and PRN soiling.)        Wound  06/06/25 1320 Ulceration Left Groin   Date First Assessed/Time First Assessed: 06/06/25 1320   Present on Original Admission: Yes  Primary Wound Type: Ulceration  Side: Left  Location: Groin   Wound Image    Dressing Appearance Moist drainage   Drainage Amount Small   Drainage Characteristics/Odor No odor   Appearance Moist   Tissue loss description Full thickness   Red (%), Wound Tissue Color 100 %   Periwound Area Moist   Wound Length (cm) 6 cm   Wound Width (cm) 0.6 cm   Wound Depth (cm) 0.6 cm   Wound Volume (cm^3) 1.131 cm^3   Wound Surface Area (cm^2) 2.83 cm^2   Care Cleansed with:;Wound cleanser   Dressing   (cleanse with vashe, pat dry, loosely pack with silver alginate ropeand cover with ABD pads place mesh underwear. Change daily and PRN soiling)

## 2025-06-16 NOTE — NURSING
Pt slept well overnight, NAD noted. No complaints offered at this time. Bed locked in lowest position, safety maintained. Call bell within reach. All needs met.

## 2025-06-16 NOTE — PROGRESS NOTES
[place] [facility]  Progress Note    Room: 233/233   Patient Name: Nikkie Myles  MRN: [id number]  Admit Date: 6/5/2025   Length of Stay: 11  Attending Physician: Girma Levi MD  Nurse Practitioner: Terri Parada NP  Code Status: Full Code    Date of Service: 06/16/2025    Principal Problem:   Crohn's disease of perianal region with complication      HPI obtained from patient, review of previous hospital records, and summarization.   HPI     Nikkie Myles is a 35 y.o. 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  She presented to [facility] ED with with perianal pain, abdominal cramps, panniculitis, and diffuse joint pain.  CT showed probable perirectal fistula and anterior abdominal wall wound, chronic inflammatory changes of the colon and TI, diverting loop ileostomy parastomal hernia containing bowel.  CRS was consulted, and underwent rectal exam under anesthesia with biopsy.  Found to have evidence of deep ulceration wounds checking up her gluteal cleft, in her pannus, and in both labia.  She also had a deep ulcerated fistula trach along the right posterior perianal skin.  Wound seemed to be consistent with hidradenitis/pyoderma.  Biopsies were obtained.  Dermatology, ID, and GI consulted.  She received infliximab 10mg/kg on 6/4.  Pending biopsy reports for pyoderma/hidradenitis.  Ongoing needs for long-term pain management and wound care.    Patient is being admitted to [facility] for pain management and wound care.    Interval History    No issues overnight   Labs reviewed and listed below, no critical values  Sitting up in chair eating lunch, no complaints or concerns currently      Past Medical History:      Past Medical History: Patient has a past medical history of  Anal fistula, Chronic diarrhea, Crohn's disease (2022), Enteropathic arthropathy, and Eye injury.    Past Surgical History: Patient has a past surgical history that includes  section, low transverse (2013);  section with tubal ligation (Bilateral, 2020);  section (); Examination under anesthesia (N/A, 8/15/2022); Flexible sigmoidoscopy (N/A, 8/15/2022); Esophagogastroduodenoscopy (N/A, 2022); Colonoscopy (N/A, 2022); Laparoscopic ileostomy (N/A, 2022); Digital rectal examination under anesthesia (N/A, 2025); and Biopsy of anus (N/A, 2025).    Social History: Patient reports that she has quit smoking. Her smoking use included cigarettes. She has a 5 pack-year smoking history. She has never used smokeless tobacco. She reports current alcohol use. She reports that she does not use drugs.    Family History:  family history includes Glaucoma in her maternal grandmother; Hypertension in her father and mother; No Known Problems in her brother, maternal aunt, maternal grandfather, maternal uncle, paternal aunt, paternal grandfather, paternal grandmother, paternal uncle, and sister.    Home Medications: reconciled     Allergies: Patient has no known allergies.      Subjective:     Review of Systems   Constitutional:  Positive for malaise/fatigue. Negative for chills and fever.   Respiratory:  Negative for cough and shortness of breath.    Cardiovascular:  Negative for chest pain and leg swelling.   Gastrointestinal:  Positive for abdominal pain. Negative for nausea and vomiting.   Genitourinary:  Negative for dysuria, flank pain and urgency.   Musculoskeletal:  Negative for back pain and neck pain.        Perineal pain and pain to pannus wound   Neurological:  Positive for weakness. Negative for dizziness and headaches.   Psychiatric/Behavioral:  Negative for depression. The patient is not nervous/anxious.          Objective:     Vital Signs:  Temp:  [97.9 °F  (36.6 °C)-98.9 °F (37.2 °C)]   Pulse:  []   Resp:  [16-20]   BP: ()/(45-63)   SpO2:  [97 %-100 %]     Body mass index is 37.54 kg/m².           Intake and Output:    Intake/Output Summary (Last 24 hours) at 6/16/2025 1137  Last data filed at 6/16/2025 0900  Gross per 24 hour   Intake 1055 ml   Output --   Net 1055 ml          Physical Exam  HENT:      Head: Normocephalic and atraumatic.      Mouth/Throat:      Mouth: Mucous membranes are moist.      Pharynx: Oropharynx is clear.   Eyes:      Extraocular Movements: Extraocular movements intact.      Pupils: Pupils are equal, round, and reactive to light.   Cardiovascular:      Rate and Rhythm: Normal rate and regular rhythm.   Pulmonary:      Effort: Pulmonary effort is normal.      Breath sounds: Normal breath sounds.   Abdominal:      General: Bowel sounds are normal. There is no distension.      Palpations: Abdomen is soft.      Comments: Ileostomy in place  Wound to pannus CAMMY   Musculoskeletal:      Right lower leg: No edema.      Left lower leg: No edema.   Skin:     General: Skin is warm and dry.      Capillary Refill: Capillary refill takes less than 2 seconds.      Comments: Perineal wounds +   Neurological:      General: No focal deficit present.      Mental Status: She is alert.   Psychiatric:         Mood and Affect: Mood normal.         Behavior: Behavior normal.         Patient consistently followed by Wound Care NP    Labs:  Recent Labs   Lab 06/11/25 0324 06/13/25  0459 06/16/25  0412   WBC 10.94 7.66 6.91   HGB 8.8* 8.5* 8.6*   HCT 29.3* 29.5* 29.5*   * 418 379     Recent Labs   Lab 06/11/25 0324 06/13/25  0508 06/16/25  0412    136 137   K 4.2 4.2 4.2    106 106   CO2 25 20* 23   BUN 19 14 10   CREATININE 0.8 0.7 0.6   * 134* 91   CALCIUM 9.1 8.9 8.7   MG 1.7 1.6 1.9   PHOS 5.1* 4.5 3.9     Recent Labs   Lab 06/11/25 0324 06/13/25  0508 06/16/25  0412   ALKPHOS 80 85 86   ALT 11 15 16   AST 15 19 22   ALBUMIN  "2.9* 3.0* 3.0*   PROT 8.9* 8.5* 8.6*   BILITOT 0.1 0.1 0.1     No results for input(s): "POCTGLUCOSE" in the last 72 hours.    Meds Scheduled:   acetaminophen  1,000 mg Oral TID    clindamycin phosphate 1%   Topical (Top) BID    enoxparin  40 mg Subcutaneous Daily    ergocalciferol  50,000 Units Oral Q7 Days    famotidine  20 mg Oral BID    magnesium hydroxide 400 mg/5 ml  30 mL Oral BID    magnesium oxide  400 mg Oral TID    oxyCODONE  5 mg Oral QID    pregabalin  150 mg Oral BID         Current Inpatient Problem List:  Active Hospital Problems    Diagnosis  POA    *Crohn's disease of perianal region with complication [K50.119]  Yes    Thrombocytosis [D75.839]  Yes    Crohn's disease of both small and large intestine with fistula [K50.813]  Yes    Iron deficiency anemia due to chronic blood loss [D50.0]  Yes    Symptomatic anemia [D64.9]  Yes      Resolved Hospital Problems   No resolved problems to display.         Assessment / Plan:   Crohn's disease with perianal region with complication   Crohn's disease of both small and large intestine with fistula  Seen by CRS while in the hospital, wounds appear to be more extensive than Crohn's related fistula disease.  Suspicious for pyoderma or hidradenitis superimposed on her IBD  Biopsies obtained, showing ulcerated skin and subcutis with acute and chronic inflammation and several non-necrotizing granulomas  Received dose of Remicade on 06/04   Avoid NSAIDs given risk of IBD exacerbation  Pain management with oxycodone, Lyrica, and scheduled acetaminophen  clindamycin 1% solution BID to L axillae    Mix approximately equal parts triamcinolone 0.1% cream, ketoconazole 2% cream, and milk of magnesia in a sterile urine container. Shake vigorously to mix. Apply to bilateral inguinal cleft BID.   Hemoglobins baths at least twice weekly   Bleach baths at home 1-2 times weekly    Hidradenitis suppurativa   [facility]   Has been seen previously in dermatology " clinic when she was well controlled on infliximab per GI. However, unfortunately lost insurance/ infliximab and now is flaring  Resumed on infliximab 06/04/2025  Receives infliximab every 4 weeks, so next dose would be around 7/2  Will need to f/u with dermatology outpatient  Wound followed and managed by consistent, contracted [facility] NP    Thrombocytosis   Likely reactive due to IBD  Trend on regular labs   DVT prophylaxis with enoxaparin    Iron-deficiency anemia due to chronic blood loss  Symptomatic anemia  Trend hemoglobin on serial CBC   Transfuse for hemoglobin/hematocrit less than 7/21 or if becomes hemodynamically unstable  Transfused 1 unit PRBC on 6/6   Iron studies  (6/6): Iron 132, TIBC 222, 59 % sat, Ferritin 197.   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   Trend on regular labs   Mag stable at 1.9 today    IP [facility] RISK OF UNPLANNED READMISSION Model:  MODERATE     Diet:  Regular   GI Ppx:  Famotidine   DVT Ppx:  Enoxaparin  Lines:  Midline 6/5  Drains:  Ileostomy   Airways:n/a   Wounds:  Pelvis, labia, perineum    Goals of Care:   Restorative  Treat infection  Optimize nutrition  Wound healing  Muscle strengthening  Restoration of ADL's  Improve mobility    Anticipated Disposition:    Wants to go transfer to [facility] for all her specialty appointments, however I'm concerned she may not be able to get these appts in a timely manner (next remicade infusion scheduled for 7/10 at [facility])  D/c plan: 6/18 home. Will need to wean off of IV pain meds. Needs  someone to assist her with wound care.  Will need follow up with Dermatology, CRS (Dr. Yip), and GI  Follow up appointments:   No future appointments.      Terri Parada NP  Hospital Medicine [facility]    Total time spent: 51 minutes  Description of Time: counseling patient on clinical condition, therapies provided, plan of care, emotional support, coordinating patient care with other care team members, reviewing and interpreting labs and imaging, collaboration with physician, initiating new orders, chart review, and documentation. See interval hx and assessment/plan for details.

## 2025-06-16 NOTE — PROGRESS NOTES
06/16/25 1330        Wound 05/27/25 2225 Ulceration lower Abdomen   Date First Assessed/Time First Assessed: 05/27/25 2225   Present on Original Admission: Yes  Primary Wound Type: Ulceration  Orientation: lower  Location: (c) Abdomen  Is this injury device related?: No   Wound Image    Dressing Appearance Moist drainage   Drainage Amount Moderate   Drainage Characteristics/Odor Serosanguineous   Appearance Moist   Tissue loss description Full thickness   Red (%), Wound Tissue Color 100 %   Periwound Area Moist   Wound Edges Open   Wound Length (cm) 3.5 cm   Wound Width (cm) 20.5 cm   Wound Depth (cm) 2.2 cm   Wound Volume (cm^3) 82.65 cm^3   Wound Surface Area (cm^2) 56.35 cm^2   Care Wound cleanser   Dressing   (cleanse with vashe, pat dry, loosely pack with silver alginate ropeand cover with ABD pads place mesh underwear. Change daily and PRN soiling)        Wound 05/28/25 2000 Ulceration Gluteal cleft   Date First Assessed/Time First Assessed: 05/28/25 2000   Present on Original Admission: Yes  Primary Wound Type: Ulceration  Location: (c) Gluteal cleft  Is this injury device related?: No   Wound Image     Dressing Appearance Moist drainage   Drainage Amount Moderate   Drainage Characteristics/Odor Serosanguineous   Appearance Moist   Tissue loss description Full thickness   Red (%), Wound Tissue Color 100 %   Periwound Area Moist   Wound Edges Open   Wound Length (cm) 23.5 cm   Wound Width (cm) 1.5 cm   Wound Depth (cm) 2.2 cm   Wound Volume (cm^3) 40.605 cm^3   Wound Surface Area (cm^2) 27.69 cm^2   Care Wound cleanser   Dressing   (cleanse with vashe, pat dry, loosely pack with silver alginate ropeand cover with ABD pads place mesh underwear. Change daily and PRN soiling)        Wound 05/28/25 2000 Ulceration Labia   Date First Assessed/Time First Assessed: 05/28/25 2000   Present on Original Admission: Yes  Primary Wound Type: Ulceration  Location: Labia  Is this injury device related?: No  Wound Outcome:  Converged   Wound Image     Dressing Appearance Moist drainage   Drainage Amount Scant   Drainage Characteristics/Odor Serous   Appearance Moist   Red (%), Wound Tissue Color 100 %   Periwound Area Moist   Wound Edges Open   Wound Length (cm) 1.4 cm   Wound Width (cm) 0.6 cm   Wound Depth (cm) 0.1 cm   Wound Volume (cm^3) 0.044 cm^3   Wound Surface Area (cm^2) 0.66 cm^2   Care Wound cleanser   Dressing   (cleanse with vashe, pat dry, loosely pack with silver alginate ropeand cover with ABD pads place mesh underwea)        Wound 06/06/25 1320 Ulceration Left Axilla   Date First Assessed/Time First Assessed: 06/06/25 1320   Present on Original Admission: Yes  Primary Wound Type: Ulceration  Side: Left  Location: Axilla  Is this injury device related?: No   Dressing Appearance Open to air   Drainage Amount Scant   Drainage Characteristics/Odor No odor   Appearance Moist   Periwound Area Moist   Wound Edges Open   Care Wound cleanser   Dressing Applied  (Clean wound to left axilla with vashe, pat dry with gauze apply clindamycin gel leave open to air, assess daily)        Wound 06/06/25 1320 Ulceration Right Groin   Date First Assessed/Time First Assessed: 06/06/25 1320   Present on Original Admission: Yes  Primary Wound Type: Ulceration  Side: Right  Location: Groin   Wound Image    Dressing Appearance Open to air   Drainage Amount None   Drainage Characteristics/Odor No odor   Red (%), Wound Tissue Color 100 %   Periwound Area Moist   Wound Length (cm) 0 cm   Wound Width (cm) 0 cm   Wound Depth (cm) 0 cm   Wound Volume (cm^3) 0 cm^3   Wound Surface Area (cm^2) 0 cm^2   Care Wound cleanser   Dressing   (cleanse with vashe, pat dry, loosely pack with silver alginate ropeand cover with ABD pads place mesh underwear. Change daily and PRN soiling.)        Wound 06/06/25 1320 Ulceration Left Groin   Date First Assessed/Time First Assessed: 06/06/25 1320   Present on Original Admission: Yes  Primary Wound Type: Ulceration   Side: Left  Location: Groin   Wound Image    Dressing Appearance Moist drainage   Drainage Amount Small   Drainage Characteristics/Odor No odor   Appearance Moist   Tissue loss description Full thickness   Red (%), Wound Tissue Color 100 %   Periwound Area Moist   Wound Length (cm) 6 cm   Wound Width (cm) 0.6 cm   Wound Depth (cm) 0.6 cm   Wound Volume (cm^3) 1.131 cm^3   Wound Surface Area (cm^2) 2.83 cm^2   Care Cleansed with:;Wound cleanser   Dressing   (cleanse with vashe, pat dry, loosely pack with silver alginate ropeand cover with ABD pads place mesh underwear. Change daily and PRN soiling)

## 2025-06-16 NOTE — PROGRESS NOTES
06/12/25 1300        Wound 05/27/25 2225 Ulceration lower Abdomen   Date First Assessed/Time First Assessed: 05/27/25 2225   Present on Original Admission: Yes  Primary Wound Type: Ulceration  Orientation: lower  Location: (c) Abdomen  Is this injury device related?: No   Wound Image   (photo did not save)   Dressing Appearance Moist drainage   Drainage Amount Moderate   Drainage Characteristics/Odor Serous   Appearance Moist   Tissue loss description Full thickness   Red (%), Wound Tissue Color 100 %   Periwound Area Moist   Wound Edges Open   Wound Length (cm) 3.8 cm   Wound Width (cm) 21.5 cm   Wound Depth (cm) 2.5 cm   Wound Volume (cm^3) 106.945 cm^3   Wound Surface Area (cm^2) 64.17 cm^2   Care Cleansed with:  (wound cleanser)   Dressing   (silver alginate, abd, mesh underwear)        Wound 05/28/25 2000 Ulceration Gluteal cleft   Date First Assessed/Time First Assessed: 05/28/25 2000   Present on Original Admission: Yes  Primary Wound Type: Ulceration  Location: (c) Gluteal cleft  Is this injury device related?: No   Wound Image     Dressing Appearance Moist drainage   Drainage Amount Moderate   Drainage Characteristics/Odor Serosanguineous   Appearance Moist   Tissue loss description Full thickness   Red (%), Wound Tissue Color 100 %   Periwound Area Moist   Wound Edges Open   Wound Length (cm) 25 cm   Wound Width (cm) 2 cm   Wound Depth (cm) 2.5 cm   Wound Volume (cm^3) 65.45 cm^3   Wound Surface Area (cm^2) 39.27 cm^2   Care Wound cleanser   Dressing Applied  (silver alginate, abd, mesh underwear)        Wound 05/28/25 2000 Ulceration Labia   Date First Assessed/Time First Assessed: 05/28/25 2000   Present on Original Admission: Yes  Primary Wound Type: Ulceration  Location: Labia  Is this injury device related?: No  Wound Outcome: Converged   Wound Image     Dressing Appearance Moist drainage   Drainage Amount Scant   Drainage Characteristics/Odor Serosanguineous   Appearance Moist   Red (%), Wound  Tissue Color 100 %   Periwound Area Moist   Wound Edges Open   Wound Length (cm) 1.4 cm   Wound Width (cm) 0.8 cm   Wound Depth (cm) 0.1 cm   Wound Volume (cm^3) 0.059 cm^3   Wound Surface Area (cm^2) 0.88 cm^2   Care Wound cleanser   Dressing Applied  (silver alginate, abd, mesh underwear)        Wound 06/06/25 1320 Ulceration Left Axilla   Date First Assessed/Time First Assessed: 06/06/25 1320   Present on Original Admission: Yes  Primary Wound Type: Ulceration  Side: Left  Location: Axilla  Is this injury device related?: No   Wound Image   (photo did not save)   Dressing Appearance Open to air   Drainage Amount Scant   Drainage Characteristics/Odor No odor   Appearance Moist   Red (%), Wound Tissue Color 100 %   Periwound Area Moist   Wound Edges Open   Care Wound cleanser        Wound 06/06/25 1320 Ulceration Right Groin   Date First Assessed/Time First Assessed: 06/06/25 1320   Present on Original Admission: Yes  Primary Wound Type: Ulceration  Side: Right  Location: Groin   Wound Image   (photo did not save)   Dressing Appearance Intact   Drainage Amount None   Drainage Characteristics/Odor No odor   Red (%), Wound Tissue Color 100 %   Periwound Area Moist   Wound Length (cm) 0 cm   Wound Width (cm) 0 cm   Wound Depth (cm) 0 cm   Wound Volume (cm^3) 0 cm^3   Wound Surface Area (cm^2) 0 cm^2   Care Cleansed with:;Wound cleanser   Dressing   (open to air)        Wound 06/06/25 1320 Ulceration Left Groin   Date First Assessed/Time First Assessed: 06/06/25 1320   Present on Original Admission: Yes  Primary Wound Type: Ulceration  Side: Left  Location: Groin   Wound Image    Dressing Appearance Moist drainage   Drainage Amount Small   Drainage Characteristics/Odor No odor   Appearance Moist   Tissue loss description Full thickness   Red (%), Wound Tissue Color 100 %   Periwound Area Moist   Wound Length (cm) 6.8 cm   Wound Width (cm) 0.8 cm   Wound Depth (cm) 0.8 cm   Wound Volume (cm^3) 2.279 cm^3   Wound Surface  Area (cm^2) 4.27 cm^2   Care Cleansed with:;Wound cleanser   Dressing Applied  (silver alginate, abd, mesh underwear)   Nutrition   Intake (%) 100%

## 2025-06-16 NOTE — PT/OT/SLP DISCHARGE
Occupational Therapy Discharge Summary    Nikkie Myles  MRN: [id number]   Principal Problem: Crohn's disease of perianal region with complication      Patient Discharged from acute Occupational Therapy on 6/16/25.  Please refer to prior OT note dated 6/13 for functional status.    Assessment:      Patient has met all goals and is not appropriate for therapy.    Objective:     GOALS:   Multidisciplinary Problems       Occupational Therapy Goals       Not on file              Multidisciplinary Problems (Resolved)          Problem: Occupational Therapy    Goal Priority Disciplines Outcome Interventions   Occupational Therapy Goal   (Resolved)     OT, PT/OT Met    Description: Goals to be met by: 7/6/25     Patient will increase functional independence with ADLs by performing:    UE Dressing with [place]. MET 6/9/25  LE Dressing with [place].  Grooming while standing at sink with [place]. MET 6/9/25  Toileting from toilet with [place] for hygiene and clothing management.   Toilet transfer to toilet with [place].  Shower with setup   Upper extremity exercise program x10 reps per handout, with independence.                         Reasons for Discontinuation of Therapy Services  Satisfactory goal achievement.      Plan:     Patient Expected Discharge to: Home no OT services needed    6/16/2025

## 2025-06-16 NOTE — PT/OT/SLP PROGRESS
Occupational Therapy   Treatment    Name: Nikkie Myles  MRN: [id number]  Admitting Diagnosis:  Crohn's disease of perianal region with complication       Recommendations:     Discharge Recommendations: No Therapy Indicated  Discharge Equipment Recommendations:  none  Barriers to discharge:  None    Assessment:     Nikkie Myles is a 35 y.o. female with a medical diagnosis of Crohn's disease of perianal region with complication.  She has met goals. HEP given.     Rehab Prognosis:  Good; patient has met goals and no longer requires OT intervention.    Plan:     Patient to be seen 2 x/week to address the above listed problems via self-care/home management, therapeutic activities, therapeutic exercises  Plan of Care Expires: 07/06/25  Plan of Care Reviewed with: patient    Subjective     Chief Complaint: none  Patient/Family Comments/goals: pt would like to be discharged from OT services  Pain/Comfort:  Pain Rating 1: 0/10  Pain Rating Post-Intervention 1: 0/10    Objective:     Communicated with: nurse prior to session.  Patient found HOB elevated with colostomy, PICC line upon OT entry to room.    General Precautions: Standard, fall    Orthopedic Precautions:N/A  Braces: N/A  Respiratory Status: Room air     Occupational Performance:       [facility] 6 Click ADL: 24    Treatment & Education:  Pt given written UE Tband HEP and reviewed w/pt.    Patient left HOB elevated with all lines intact and call button in reach    GOALS:   Multidisciplinary Problems       Occupational Therapy Goals       Not on file              Multidisciplinary Problems (Resolved)          Problem: Occupational Therapy    Goal Priority Disciplines Outcome Interventions   Occupational Therapy Goal   (Resolved)     OT, PT/OT Met    Description: Goals to be met by: 7/6/25     Patient will increase functional independence with ADLs by performing:    UE Dressing with [place]. MET 6/9/25  LE Dressing with [place].  Grooming while standing  at sink with [place]. MET 6/9/25  Toileting from toilet with [place] for hygiene and clothing management.   Toilet transfer to toilet with [place].  Shower with setup   Upper extremity exercise program x10 reps per handout, with independence.                         DME Justifications:  No DME recommended requiring DME justifications    Time Tracking:     OT Date of Treatment: 06/16/25  OT Start Time: 0858  OT Stop Time: 0913  OT Total Time (min): 15 min    Billable Minutes:Therapeutic Exercise 15        6/16/2025

## 2025-06-17 PROCEDURE — 25000003 PHARM REV CODE 250: Performed by: INTERNAL MEDICINE

## 2025-06-17 PROCEDURE — 11000001 HC ACUTE MED/SURG PRIVATE ROOM

## 2025-06-17 PROCEDURE — 25000003 PHARM REV CODE 250: Performed by: STUDENT IN AN ORGANIZED HEALTH CARE EDUCATION/TRAINING PROGRAM

## 2025-06-17 PROCEDURE — 25000003 PHARM REV CODE 250: Performed by: FAMILY MEDICINE

## 2025-06-17 RX ORDER — CLINDAMYCIN PHOSPHATE 10 MG/G
GEL TOPICAL 2 TIMES DAILY
Qty: 60 G | Refills: 2 | Status: SHIPPED | OUTPATIENT
Start: 2025-06-17

## 2025-06-17 RX ORDER — PREGABALIN 150 MG/1
150 CAPSULE ORAL 2 TIMES DAILY
Qty: 60 CAPSULE | Refills: 2 | Status: SHIPPED | OUTPATIENT
Start: 2025-06-17 | End: 2025-12-16

## 2025-06-17 RX ORDER — ERGOCALCIFEROL 1.25 MG/1
50000 CAPSULE ORAL
Qty: 12 CAPSULE | Refills: 2 | Status: SHIPPED | OUTPATIENT
Start: 2025-06-21

## 2025-06-17 RX ORDER — HYDROMORPHONE HYDROCHLORIDE 4 MG/1
4 TABLET ORAL EVERY 6 HOURS PRN
Qty: 20 TABLET | Refills: 0 | Status: SHIPPED | OUTPATIENT
Start: 2025-06-17

## 2025-06-17 RX ORDER — LANOLIN ALCOHOL/MO/W.PET/CERES
400 CREAM (GRAM) TOPICAL 3 TIMES DAILY
Qty: 90 TABLET | Refills: 0 | Status: SHIPPED | OUTPATIENT
Start: 2025-06-17

## 2025-06-17 RX ORDER — FAMOTIDINE 20 MG/1
20 TABLET, FILM COATED ORAL 2 TIMES DAILY
Qty: 60 TABLET | Refills: 11 | Status: SHIPPED | OUTPATIENT
Start: 2025-06-17 | End: 2026-06-17

## 2025-06-17 RX ADMIN — Medication 400 MG: at 09:06

## 2025-06-17 RX ADMIN — OXYCODONE HYDROCHLORIDE 5 MG: 5 TABLET ORAL at 09:06

## 2025-06-17 RX ADMIN — ACETAMINOPHEN 1000 MG: 500 TABLET, FILM COATED ORAL at 02:06

## 2025-06-17 RX ADMIN — OXYCODONE HYDROCHLORIDE 5 MG: 5 TABLET ORAL at 02:06

## 2025-06-17 RX ADMIN — FAMOTIDINE 20 MG: 20 TABLET, FILM COATED ORAL at 08:06

## 2025-06-17 RX ADMIN — OXYCODONE HYDROCHLORIDE 5 MG: 5 TABLET ORAL at 08:06

## 2025-06-17 RX ADMIN — PREGABALIN 150 MG: 75 CAPSULE ORAL at 08:06

## 2025-06-17 RX ADMIN — Medication 400 MG: at 08:06

## 2025-06-17 RX ADMIN — PREGABALIN 150 MG: 75 CAPSULE ORAL at 09:06

## 2025-06-17 RX ADMIN — ACETAMINOPHEN 1000 MG: 500 TABLET, FILM COATED ORAL at 09:06

## 2025-06-17 RX ADMIN — OXYCODONE HYDROCHLORIDE 5 MG: 5 TABLET ORAL at 05:06

## 2025-06-17 RX ADMIN — Medication 400 MG: at 02:06

## 2025-06-17 RX ADMIN — HYDROMORPHONE HYDROCHLORIDE 4 MG: 4 TABLET ORAL at 12:06

## 2025-06-17 RX ADMIN — ACETAMINOPHEN 1000 MG: 500 TABLET, FILM COATED ORAL at 08:06

## 2025-06-17 RX ADMIN — FAMOTIDINE 20 MG: 20 TABLET, FILM COATED ORAL at 09:06

## 2025-06-17 RX ADMIN — CLINDAMYCIN PHOSPHATE: 10 GEL TOPICAL at 09:06

## 2025-06-17 RX ADMIN — CLINDAMYCIN PHOSPHATE: 10 GEL TOPICAL at 12:06
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
## 2025-06-17 NOTE — PLAN OF CARE
[facility]  Interdisciplinary Team Conference        Nikkie Myles    Conference Date: 6/17/2025  Admit Date: 6/5/2025       Active Hospital Problems    Diagnosis    *Crohn's disease of perianal region with complication    Thrombocytosis    Crohn's disease of both small and large intestine with fistula    Iron deficiency anemia due to chronic blood loss    Symptomatic anemia        Code Status: Full Code  Advance Directive  (If Adv Dir status is received, view document under Adv Dir in header or Chart Review Media tab): Patient does not have Advance Directive, declines information.        Power of /Surrogate Decision Maker: N/A    LAB/TEST/TREATMENTS:    POCT Glucose   Date Value Ref Range Status   12/28/2022 83 70 - 110 mg/dL Final      Lab Results   Component Value Date    INR 1.2 04/15/2025    INR 1.1 07/31/2024    INR 1.2 04/20/2022     Lab Results   Component Value Date    BUN 10 06/16/2025    BUN 14 06/13/2025    BUN 19 06/11/2025     Lab Results   Component Value Date    PH 7.421 05/27/2025    PCO2 32.9 (L) 05/27/2025    PO2 56.9 05/27/2025    HCO3 22.1 05/27/2025     Lab Results   Component Value Date    WBC 6.91 06/16/2025    WBC 7.66 06/13/2025    WBC 10.94 06/11/2025     Susceptibility data from last 90 days.  Collected Specimen Info Organism   05/27/25 Blood from Peripheral, Hand, Left COAGULASE NEGATIVE STAPHYLOCOCCI            Dialysis: N/A    Provider Comments/Recommendations: Working on weaning IV pain medications. Still with wound care needs. Will need all follow ups setup with [facility] per pt request.     DIAGNOSTIC TEST    Radiology (Last 168 hours)           None             CT Abdomen Pelvis With IV Contrast NO Oral Contrast  Narrative: EXAMINATION:  CT ABDOMEN PELVIS WITH IV CONTRAST    CLINICAL HISTORY:  Abdominal pain, acute, nonlocalized    TECHNIQUE:  Low dose axial images, sagittal and coronal reformations were obtained from the lung bases to the pubic  symphysis following the IV administration of 100 mL of Omnipaque 350 intravenous contrast. Oral contrast was not administered.    COMPARISON:  CT abdomen pelvis 01/23/2025    FINDINGS:  Lungs: Clear.  No consolidation or pleural effusion in the visualized lung bases.    Heart: Normal size. No pericardial effusion.    Liver: Hepatomegaly measuring up to 20.0 cm craniocaudal.  No focal abnormality. .    Gallbladder: Normally distended without calcified gallstones.  No pericholecystic inflammatory changes.    Bile ducts: No intrahepatic or extrahepatic biliary ductal dilatation.    Spleen: Normal size. No focal abnormality.    Pancreas: No mass. No peripancreatic fat stranding.    Adrenals: No significant abnormality    Renal/Ureters: The kidneys are normal in size . No stones.  No focal renal abnormality.  No hydroureteronephrosis. The urinary bladder is unremarkable.    Reproductive: Uterus is without significant abnormality. No adnexal mass.    Stomach/Bowel: Stomach is within normal limits..  Right lower quadrant diverting loop ileostomy with herniation of nondilated bowel into a parastomal hernia.  No evidence of small-bowel obstruction.  There is diffuse fatty infiltration of the terminal ileal and colonic wall suggesting chronic inflammation in this patient with history of Crohn's disease.  There is wall thickening of the rectum with associated perirectal stranding.  There is soft tissue thickening along the gluteal cleft with suspected paramedian fistulous tract (series 2, image 173).  Tract appears to extend superiorly towards the sacrum (series 2: Image 153, series 601: Image 18).  And associated small (11 x 7 mm) posterior perineal abscess is questioned (series 2, image 175).    Peritoneum: No free fluid. No intraperitoneal free air.    Lymph Nodes: No pathologic hernán enlargement in the abdomen or pelvis.    Vasculature: Abdominal aorta tapers normally.  No significant calcific atherosclerosis of the  abdominal aorta and its branches. Portal vasculature, SMV, and splenic vein are patent.    Bones: No acute fractures or osseous destructive lesions.    Soft Tissues: Inflammatory change of the gluteal folds as above with extension superiorly toward the sacrum.  There is enlarged parastomal hernia as above.  Impression: Suspected perirectal fistula with tract extending through the right gluteal fold soft tissues and superiorly towards the sacrum.  Recommend correlation with physical examination.    Anterior abdominal wall wound.  Recommend correlation with physical exam.    Chronic inflammatory changes of the colon and terminal ileum compatible with Crohn's disease.  Diverting loop ileostomy with parastomal hernia containing bowel.    Additional observations as detailed in the body of the report.    This report was flagged in Epic as abnormal.    Electronically signed by resident: Hiram Murry  Date:    05/28/2025  Time:    03:05    Electronically signed by: Chico Ball  Date:    05/28/2025  Time:    05:00       NURSING:    Cognitive/Neuro/Behavioral WDL: WDL  Level of Consciousness (AVPU): alert  Arousal Level: opens eyes spontaneously  Orientation: oriented x 4  Speech: clear/fluent    Fall Risk Score: 5  History Of Fall (W/I 3 Mos): N  Fall this admission: no    Restraints:         Infections:  No active infections  No active isolations      Telemetry: no     Patient currently receiving pharmacological/non pharmacological interventions for pain: Yes     Urinary Incontinence: No  Bowel Incontinence: No         Peripheral IV - Single Lumen 06/13/25 2050 20 G 1 3/4 in Long PIVC Left Forearm (Active)   Site Assessment Clean;Dry;Intact 06/17/25 0710   Line Securement Device Secured with sutureless device 06/17/25 0710   Extremity Assessment Distal to IV No abnormal discoloration;No redness;No swelling;No warmth 06/16/25 2031   Line Status Saline locked 06/17/25 0710   Dressing Status Clean;Dry;Intact 06/17/25  0710   Dressing Intervention Integrity maintained 06/17/25 0710   Dressing Change Due 06/20/25 06/16/25 2031   Site Change Due 06/20/25 06/16/25 2031   Number of days: 3            Ileostomy 08/23/22 1531 RLQ (Active)   Wound Image   06/02/25 1145   Stoma Appearance round 06/16/25 2031   Stoma Size (in) 30mm 06/16/25 2031   Appliance 1-piece 06/16/25 2031   Accessories/Skin Care cleansed w/ water;barrier substance over peristomal skin;skin barrier strip 06/02/25 1145   Treatment Bag change 06/08/25 0727   Stoma Function flatus;stool 06/16/25 2031   Peristomal Assessment Intact 06/16/25 2031   Tolerance no signs/symptoms of discomfort 06/16/25 2031   Output (mL) 150 mL 06/04/25 1935   Number of days: 1028       Comments/Recommendations:  Last BM 6/15. PIV. Ostomy. Doing well but has had a few PIV access while admitted    Wound Care:             Wound 05/27/25 2225 Ulceration lower Abdomen (Active)   05/27/25 2225 Abdomen   Present on Original Admission: Y   Primary Wound Type: Ulceration   Side:    Orientation: lower   Wound Approximate Age at First Assessment (Weeks):    Wound Number:    Is this injury device related?: No   Incision Type:    Closure Method:    Wound Description (Comments):    Type:    Additional Comments:    Ankle-Brachial Index:    Pulses:    Removal Indication and Assessment:    Wound Outcome:    Wound Image   06/16/25 1330   Dressing Appearance Moist drainage 06/17/25 0710   Drainage Amount Moderate 06/16/25 1330   Drainage Characteristics/Odor Serosanguineous 06/16/25 1330   Appearance Moist 06/16/25 1330   Tissue loss description Full thickness 06/16/25 1330   Red (%), Wound Tissue Color 100 % 06/16/25 1330   Periwound Area Moist 06/16/25 1330   Wound Edges Open 06/16/25 1330   Wound Length (cm) 3.5 cm 06/16/25 1330   Wound Width (cm) 20.5 cm 06/16/25 1330   Wound Depth (cm) 2.2 cm 06/16/25 1330   Wound Volume (cm^3) 82.65 cm^3 06/16/25 1330   Wound Surface Area (cm^2) 56.35 cm^2 06/16/25 7725    Care Wound cleanser 06/16/25 1330   Dressing Changed;Absorptive Pad 06/15/25 1342   Packing hydrofiber/alginate 06/15/25 1342   Dressing Change Due 06/07/25 06/06/25 1320   Number of days: 21            Wound 05/28/25 2000 Ulceration Labia (Active)   05/28/25 2000 Labia   Present on Original Admission: Y   Primary Wound Type: Ulceration   Side:    Orientation:    Wound Approximate Age at First Assessment (Weeks):    Wound Number:    Is this injury device related?: No   Incision Type:    Closure Method:    Wound Description (Comments):    Type:    Additional Comments:    Ankle-Brachial Index:    Pulses:    Removal Indication and Assessment:    Wound Outcome: Converged   Wound Image    06/16/25 1330   Dressing Appearance Moist drainage 06/17/25 0710   Drainage Amount Scant 06/16/25 1330   Drainage Characteristics/Odor Serous 06/16/25 1330   Appearance Moist 06/16/25 1330   Tissue loss description Partial thickness 06/03/25 1530   Red (%), Wound Tissue Color 100 % 06/16/25 1330   Periwound Area Moist 06/16/25 1330   Wound Edges Open 06/16/25 1330   Wound Length (cm) 1.4 cm 06/16/25 1330   Wound Width (cm) 0.6 cm 06/16/25 1330   Wound Depth (cm) 0.1 cm 06/16/25 1330   Wound Volume (cm^3) 0.044 cm^3 06/16/25 1330   Wound Surface Area (cm^2) 0.66 cm^2 06/16/25 1330   Care Wound cleanser 06/16/25 1330   Dressing Applied 06/13/25 1135   Dressing Change Due 06/05/25 06/04/25 1935   Number of days: 20            Wound 05/28/25 2000 Ulceration Gluteal cleft (Active)   05/28/25 2000 Gluteal cleft   Present on Original Admission: Y   Primary Wound Type: Ulceration   Side:    Orientation:    Wound Approximate Age at First Assessment (Weeks):    Wound Number:    Is this injury device related?: No   Incision Type:    Closure Method:    Wound Description (Comments):    Type:    Additional Comments:    Ankle-Brachial Index:    Pulses:    Removal Indication and Assessment:    Wound Outcome:    Wound Image    06/16/25 1330   Dressing  Appearance Moist drainage 06/17/25 0710   Drainage Amount Moderate 06/16/25 1330   Drainage Characteristics/Odor Serosanguineous 06/16/25 1330   Appearance Moist 06/16/25 1330   Tissue loss description Full thickness 06/16/25 1330   Red (%), Wound Tissue Color 100 % 06/16/25 1330   Yellow (%), Wound Tissue Color 20 % 06/06/25 1320   Periwound Area Moist 06/16/25 1330   Wound Edges Open 06/16/25 1330   Wound Length (cm) 23.5 cm 06/16/25 1330   Wound Width (cm) 1.5 cm 06/16/25 1330   Wound Depth (cm) 2.2 cm 06/16/25 1330   Wound Volume (cm^3) 40.605 cm^3 06/16/25 1330   Wound Surface Area (cm^2) 27.69 cm^2 06/16/25 1330   Care Wound cleanser 06/16/25 1330   Dressing Applied;Absorptive Pad 06/15/25 1342   Packing hydrofiber/alginate 06/15/25 1342   Dressing Change Due 06/07/25 06/06/25 1320   Number of days: 20            Wound 06/06/25 1320 Ulceration Left Axilla (Active)   06/06/25 1320 Axilla   Present on Original Admission: Y   Primary Wound Type: Ulceration   Side: Left   Orientation:    Wound Approximate Age at First Assessment (Weeks):    Wound Number:    Is this injury device related?: No   Incision Type:    Closure Method:    Wound Description (Comments):    Type:    Additional Comments:    Ankle-Brachial Index:    Pulses:    Removal Indication and Assessment:    Wound Outcome:    Wound Image   06/16/25 1330   Dressing Appearance Open to air 06/17/25 0710   Drainage Amount Scant 06/16/25 1330   Drainage Characteristics/Odor No odor 06/16/25 1330   Appearance Moist 06/16/25 1330   Tissue loss description Partial thickness 06/06/25 1320   Red (%), Wound Tissue Color 100 % 06/12/25 1300   Periwound Area Moist 06/16/25 1330   Wound Edges Open 06/16/25 1330   Wound Length (cm) 0.3 cm 06/16/25 1330   Wound Width (cm) 0.2 cm 06/16/25 1330   Wound Depth (cm) 0.1 cm 06/16/25 1330   Wound Volume (cm^3) 0.003 cm^3 06/16/25 1330   Wound Surface Area (cm^2) 0.05 cm^2 06/16/25 1330   Care Wound cleanser 06/16/25 1330    Dressing Applied 06/16/25 1330   Number of days: 11            Wound 06/06/25 1320 Ulceration Left Groin (Active)   06/06/25 1320 Groin   Present on Original Admission: Y   Primary Wound Type: Ulceration   Side: Left   Orientation:    Wound Approximate Age at First Assessment (Weeks):    Wound Number:    Is this injury device related?:    Incision Type:    Closure Method:    Wound Description (Comments):    Type:    Additional Comments:    Ankle-Brachial Index:    Pulses:    Removal Indication and Assessment:    Wound Outcome:    Wound Image   06/16/25 1330   Dressing Appearance Moist drainage 06/17/25 0710   Drainage Amount Small 06/16/25 1330   Drainage Characteristics/Odor No odor 06/16/25 1330   Appearance Moist 06/16/25 1330   Tissue loss description Full thickness 06/16/25 1330   Red (%), Wound Tissue Color 100 % 06/16/25 1330   Periwound Area Moist 06/16/25 1330   Wound Length (cm) 6 cm 06/16/25 1330   Wound Width (cm) 0.6 cm 06/16/25 1330   Wound Depth (cm) 0.6 cm 06/16/25 1330   Wound Volume (cm^3) 1.131 cm^3 06/16/25 1330   Wound Surface Area (cm^2) 2.83 cm^2 06/16/25 1330   Undermining (depth (cm)/location) cleanse with vashe, pat dry, loosely pack with silver alginate ropeand cover with ABD pads place mesh underwear 06/11/25 1223   Care Cleansed with:;Wound cleanser 06/16/25 1330   Dressing Applied 06/13/25 1135   Packing packed with;hydrofiber/alginate 06/08/25 1656   Dressing Change Due 06/07/25 06/06/25 1320   Number of days: 11        Siddharth Score: 21     Skin Interventions: Pressure Reduction Devices: specialty bed utilized  Pressure Reduction Techniques: frequent weight shift encouraged     Comments/Recommendations:  ulcerations to left armpit. Groin, Multiple HS wounds in perineal area, inner labia. Pt cousin is willing to be trained in wound care. Complex wound care with daily dressing changes.     Respiratory:                  Vent Weaning: N/A    Comments/Recommendations:   N/A    Nutrition:    Dietary Orders (From admission, onward)       Start     Ordered    06/06/25 2000  Dietary nutrition supplements Rodrigo - Any flavor  2 times daily      Question Answer Comment   Route: By Mouth    Select PO Supplement: Rodrigo - Any flavor        06/06/25 0941    06/05/25 1534  Diet Adult Regular  Diet effective now         06/05/25 1533                     Parenteral Nutrition: N/A    Wt Readings from Last 1 Encounters:   06/15/25 0800 87.2 kg (192 lb 3.9 oz)   06/15/25 0454 87.2 kg (192 lb 3.9 oz)   06/12/25 1712 84.8 kg (187 lb 1 oz)   06/08/25 1053 84 kg (185 lb 3 oz)   06/06/25 0941 85.4 kg (188 lb 4.4 oz)   06/05/25 1551 85.4 kg (188 lb 4.4 oz)       Comments/Recommendations: regular diet intake 100% current weight 192 lbs up 5 lbs from last week    Pharmacy:        acetaminophen  1,000 mg Oral TID    clindamycin phosphate 1%   Topical (Top) BID    enoxparin  40 mg Subcutaneous Daily    ergocalciferol  50,000 Units Oral Q7 Days    famotidine  20 mg Oral BID    magnesium hydroxide 400 mg/5 ml  30 mL Oral BID    magnesium oxide  400 mg Oral TID    oxyCODONE  5 mg Oral QID    pregabalin  150 mg Oral BID      Antibiotics (From admission, onward)      Start     Stop Route Frequency Ordered    06/06/25 1215  mupirocin 2 % ointment         06/11/25 0859 Nasl 2 times daily 06/06/25 1102    06/05/25 2100  clindamycin phosphate 1% gel         -- Top 2 times daily 06/05/25 1533           Antivirals (From admission, onward)      None             Current Facility-Administered Medications:     0.9%  NaCl infusion (for blood administration), , Intravenous, Q24H PRN    HYDROmorphone, 2 mg, Intravenous, BID PRN    HYDROmorphone, 4 mg, Oral, Q6H PRN    naloxone, 0.02 mg, Intravenous, PRN    ondansetron, 4 mg, Intravenous, Q8H PRN    oxyCODONE, 10 mg, Oral, Q4H PRN    sodium chloride 0.9%, 10 mL, Intravenous, PRN     Comments/Recommendations: no abx. Pain med weaning    Physical Therapy:    Multidisciplinary  Problems       Physical Therapy Goals          Problem: Physical Therapy    Goal Priority Disciplines Outcome Interventions   Physical Therapy Goal     PT, PT/OT Progressing    Description: Goals to be met by: DC     Patient will increase functional independence with mobility by performin. Gait  community level  with [place] using No Assistive Device.   2. Ascend/descend 14 stair with right Handrails [place] using No Assistive Device.                            Comments/Recommendations:  independent. D/c from therapy services    Occupational Therapy:    Multidisciplinary Problems       Occupational Therapy Goals       Not on file              Multidisciplinary Problems (Resolved)          Problem: Occupational Therapy    Goal Priority Disciplines Outcome Interventions   Occupational Therapy Goal   (Resolved)     OT, PT/OT Met    Description: Goals to be met by: 25     Patient will increase functional independence with ADLs by performing:    UE Dressing with Tama. MET 25  LE Dressing with Tama.  Grooming while standing at sink with Tama. MET 25  Toileting from toilet with Tama for hygiene and clothing management.   Toilet transfer to toilet with Tama.  Shower with setup   Upper extremity exercise program x10 reps per handout, with independence.                         Comments/Recommendations:  d/c from services. Pt given home exercise program    Speech Therapy:    Multidisciplinary Problems       SLP Goals       Not on file                     Comments/Recommendations:  N/A    Discharge Planning:    Prior to hospitilization cognitive status:: Unable to Assess      People in Home: child(ayad), dependent           Discharge Plan A: Home   Discharge Plan B: Home Health   DME Needed Upon Discharge : none        No future appointments.      Expected Discharge Date: 2025     Comments/Recommendations:  Home. F/U with Derm, CRS, Infusion and Wound care  clinic scheduled at [facility] due to insurance coverage, patient verbalized understanding. CM working on arranging home wound care follow ups.       Family Conference:    No    Attendees Present:    {Gabby Wilson, RN CM Kathryn Abadie, RN CM Erika Plaisance, RN DON George LeBlanc, [facility]   Mira Gutierrez, [facility]  Che Tavares, MAU Levi, MD Terri Parada, NP      Continued/Extended Stay Review:    Complex wound care for infected/necrotic skin conditions, Stage III or IV pressure injury, surgical wounds, or burns requiring at least one of the following:  Dressing changes requiring IM/IV analgesics    Must meet at least one of the following concomitant treatments/intervention daily unless notes: (excludes PO meds unless notes)  IV medication per therapeutic regimen, Laboratory assessment and medication adjustment(s), and Other Pain management

## 2025-06-17 NOTE — PROGRESS NOTES
06/17/25 1409        Wound 05/27/25 2225 Ulceration lower Abdomen   Date First Assessed/Time First Assessed: 05/27/25 2225   Present on Original Admission: Yes  Primary Wound Type: Ulceration  Orientation: lower  Location: (c) Abdomen  Is this injury device related?: No   Dressing Appearance Moist drainage   Drainage Amount Moderate   Drainage Characteristics/Odor Serosanguineous   Appearance Moist   Tissue loss description Full thickness   Periwound Area Moist   Care Wound cleanser   Dressing   (cleanse with vashe, pat dry, loosely pack with silver alginate ropeand cover with ABD pads place mesh underwear. Change daily and PRN soiling.)        Wound 05/28/25 2000 Ulceration Gluteal cleft   Date First Assessed/Time First Assessed: 05/28/25 2000   Present on Original Admission: Yes  Primary Wound Type: Ulceration  Location: (c) Gluteal cleft  Is this injury device related?: No   Dressing Appearance Moist drainage   Drainage Amount Moderate   Drainage Characteristics/Odor Serosanguineous   Appearance Moist   Tissue loss description Full thickness   Periwound Area Moist   Wound Edges Open   Care Wound cleanser   Dressing Applied  (cleanse with vashe, pat dry, loosely pack with silver alginate ropeand cover with ABD pads place mesh underwear. Change daily and PRN soiling.)        Wound 05/28/25 2000 Ulceration Labia   Date First Assessed/Time First Assessed: 05/28/25 2000   Present on Original Admission: Yes  Primary Wound Type: Ulceration  Location: Labia  Is this injury device related?: No  Wound Outcome: Converged   Dressing Appearance Moist drainage   Drainage Amount Scant   Appearance Moist   Periwound Area Moist   Wound Edges Open   Care Wound cleanser   Dressing   (cleanse with vashe, pat dry, loosely pack with silver alginate ropeand cover with ABD pads place mesh underwear. Change daily and PRN soiling.)        Wound 06/06/25 1320 Ulceration Left Axilla   Date First Assessed/Time First Assessed: 06/06/25  1320   Present on Original Admission: Yes  Primary Wound Type: Ulceration  Side: Left  Location: Axilla  Is this injury device related?: No   Dressing Appearance Open to air   Drainage Amount Scant   Drainage Characteristics/Odor No odor   Appearance Moist   Periwound Area Moist   Care Wound cleanser   Dressing   (Clean wound to left axilla with vashe, pat dry with gauze apply clindamycin gel leave open to air, assess daily)        Wound 06/06/25 1320 Ulceration Left Groin   Date First Assessed/Time First Assessed: 06/06/25 1320   Present on Original Admission: Yes  Primary Wound Type: Ulceration  Side: Left  Location: Groin   Dressing Appearance Moist drainage   Drainage Amount Small   Drainage Characteristics/Odor Serosanguineous   Appearance Moist   Periwound Area Moist   Care Cleansed with:;Wound cleanser   Dressing   (Buttocks and abdomen/pannus, left/right groin: cleanse with vashe, pat dry, loosely pack with silver alginate ropeand cover with ABD pads place mesh underwear. Change daily and PRN soiling.)

## 2025-06-17 NOTE — PLAN OF CARE
Awake alert oriented able to make needs known. Medicated x 1 for pain prior to wound care. Ambulates in room independently. Family at bedside. Will continue to monitor patient.  Problem: Infection  Goal: Absence of Infection Signs and Symptoms  Outcome: Progressing     Problem: Wound  Goal: Optimal Coping  Outcome: Progressing     Problem: Skin Injury Risk Increased  Goal: Skin Health and Integrity  Outcome: Progressing

## 2025-06-17 NOTE — PLAN OF CARE
Problem: Adult Inpatient Plan of Care  Goal: Plan of Care Review  Outcome: Progressing  Goal: Patient-Specific Goal (Individualized)  Outcome: Progressing  Goal: Absence of Hospital-Acquired Illness or Injury  Outcome: Progressing  Goal: Optimal Comfort and Wellbeing  Outcome: Progressing  Goal: Readiness for Transition of Care  Outcome: Progressing     Problem: Infection  Goal: Absence of Infection Signs and Symptoms  Outcome: Progressing     Problem: Wound  Goal: Optimal Coping  Outcome: Progressing

## 2025-06-17 NOTE — PROGRESS NOTES
[facility] Medicine  Progress Note    Room: 233/233   Patient Name: Nikkie Myles  MRN: [id number]  Admit Date: 6/5/2025   Length of Stay: 12  Attending Physician: Girma Levi MD  Nurse Practitioner: Terri Parada NP  Code Status: Full Code    Date of Service: 06/17/2025    Principal Problem:   Crohn's disease of perianal region with complication      HPI obtained from patient, review of previous hospital records, and summarization.   HPI     Nikkie Myles is a 35 y.o. 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  She [place] [facility] with with perianal pain, abdominal cramps, panniculitis, and diffuse joint pain.  CT showed probable perirectal fistula and anterior abdominal wall wound, chronic inflammatory changes of the colon and TI, diverting loop ileostomy parastomal hernia containing bowel.  CRS was consulted, and underwent rectal exam under anesthesia with biopsy.  Found to have evidence of deep ulceration wounds checking up her gluteal cleft, in her pannus, and in both labia.  She also had a deep ulcerated fistula trach along the right posterior perianal skin.  Wound seemed to be consistent with hidradenitis/pyoderma.  Biopsies were obtained.  Dermatology, ID, and GI consulted.  She received infliximab 10mg/kg on 6/4.  Pending biopsy reports for pyoderma/hidradenitis.  Ongoing needs for long-term pain management and wound care.    Patient is being admitted to [facility] for pain management and wound care.    Interval History    Patient discussed during interdisciplinary team meeting today with attending physician, , nutrition, therapy, respiratory, nursing, pharmacist, and wound care.  Still requiring IV dilaudid prior to wound care.  Wounds  improving  Will need family member training for wound care. Patient unable to do wound care independently due to location of wounds  Daily dressing changes  D/c plan: home, will not be able to get  d/t insurance. She will need to follow up with her [facility] providers.   Will see if we can schedule wound clinic visits. Patient will need wound care supplies on d/c  Plan for d/c home     Past Medical History:      Past Medical History: Patient has a past medical history of Anal fistula, Chronic diarrhea, Crohn's disease (2022), Enteropathic arthropathy, and Eye injury.    Past Surgical History: Patient has a past surgical history that includes  section, low transverse (2013);  section with tubal ligation (Bilateral, 2020);  section (); Examination under anesthesia (N/A, 8/15/2022); Flexible sigmoidoscopy (N/A, 8/15/2022); Esophagogastroduodenoscopy (N/A, 2022); Colonoscopy (N/A, 2022); Laparoscopic ileostomy (N/A, 2022); Digital rectal examination under anesthesia (N/A, 2025); and Biopsy of anus (N/A, 2025).    Social History: Patient reports that she has quit smoking. Her smoking use included cigarettes. She has a 5 pack-year smoking history. She has never used smokeless tobacco. She reports current alcohol use. She reports that she does not use drugs.    Family History:  family history includes Glaucoma in her maternal grandmother; Hypertension in her father and mother; No Known Problems in her brother, maternal aunt, maternal grandfather, maternal uncle, paternal aunt, paternal grandfather, paternal grandmother, paternal uncle, and sister.    Home Medications: reconciled     Allergies: Patient has no known allergies.      Subjective:     Review of Systems   Constitutional:  Positive for malaise/fatigue. Negative for chills and fever.   Respiratory:  Negative for cough and shortness of breath.    Cardiovascular:  Negative for chest pain and leg  swelling.   Gastrointestinal:  Positive for abdominal pain. Negative for nausea and vomiting.   Genitourinary:  Negative for dysuria, flank pain and urgency.   Musculoskeletal:  Negative for back pain and neck pain.        Perineal pain and pain to pannus wound   Neurological:  Positive for weakness. Negative for dizziness and headaches.   Psychiatric/Behavioral:  Negative for depression. The patient is not nervous/anxious.          Objective:     Vital Signs:  Temp:  [98.1 °F (36.7 °C)-98.5 °F (36.9 °C)]   Pulse:  [74-88]   Resp:  [17-20]   BP: ()/(49-69)   SpO2:  [98 %-100 %]     Body mass index is 37.54 kg/m².           Intake and Output:    Intake/Output Summary (Last 24 hours) at 6/17/2025 1017  Last data filed at 6/16/2025 1300  Gross per 24 hour   Intake 790 ml   Output --   Net 790 ml          Physical Exam  HENT:      Head: Normocephalic and atraumatic.      Mouth/Throat:      Mouth: Mucous membranes are moist.      Pharynx: Oropharynx is clear.   Eyes:      Extraocular Movements: Extraocular movements intact.      Pupils: Pupils are equal, round, and reactive to light.   Cardiovascular:      Rate and Rhythm: Normal rate and regular rhythm.   Pulmonary:      Effort: Pulmonary effort is normal.      Breath sounds: Normal breath sounds.   Abdominal:      General: Bowel sounds are normal. There is no distension.      Palpations: Abdomen is soft.      Comments: Ileostomy in place  Wound to pannus CAMMY   Musculoskeletal:      Right lower leg: No edema.      Left lower leg: No edema.   Skin:     General: Skin is warm and dry.      Capillary Refill: Capillary refill takes less than 2 seconds.      Comments: Perineal wounds +   Neurological:      General: No focal deficit present.      Mental Status: She is alert.   Psychiatric:         Mood and Affect: Mood normal.         Behavior: Behavior normal.         Patient consistently followed by Wound Care NP    Labs:  Recent Labs   Lab 06/11/25  3054  "06/13/25 0459 06/16/25  0412   WBC 10.94 7.66 6.91   HGB 8.8* 8.5* 8.6*   HCT 29.3* 29.5* 29.5*   * 418 379     Recent Labs   Lab 06/11/25 0324 06/13/25  0508 06/16/25  0412    136 137   K 4.2 4.2 4.2    106 106   CO2 25 20* 23   BUN 19 14 10   CREATININE 0.8 0.7 0.6   * 134* 91   CALCIUM 9.1 8.9 8.7   MG 1.7 1.6 1.9   PHOS 5.1* 4.5 3.9     Recent Labs   Lab 06/11/25 0324 06/13/25  0508 06/16/25 0412   ALKPHOS 80 85 86   ALT 11 15 16   AST 15 19 22   ALBUMIN 2.9* 3.0* 3.0*   PROT 8.9* 8.5* 8.6*   BILITOT 0.1 0.1 0.1     No results for input(s): "POCTGLUCOSE" in the last 72 hours.    Meds Scheduled:   acetaminophen  1,000 mg Oral TID    clindamycin phosphate 1%   Topical (Top) BID    enoxparin  40 mg Subcutaneous Daily    ergocalciferol  50,000 Units Oral Q7 Days    famotidine  20 mg Oral BID    magnesium hydroxide 400 mg/5 ml  30 mL Oral BID    magnesium oxide  400 mg Oral TID    oxyCODONE  5 mg Oral QID    pregabalin  150 mg Oral BID         Current Inpatient Problem List:  Active Hospital Problems    Diagnosis  POA    *Crohn's disease of perianal region with complication [K50.119]  Yes    Thrombocytosis [D75.839]  Yes    Crohn's disease of both small and large intestine with fistula [K50.813]  Yes    Iron deficiency anemia due to chronic blood loss [D50.0]  Yes    Symptomatic anemia [D64.9]  Yes      Resolved Hospital Problems   No resolved problems to display.         Assessment / Plan:   Crohn's disease with perianal region with complication   Crohn's disease of both small and large intestine with fistula  Seen by CRS while in the hospital, wounds appear to be more extensive than Crohn's related fistula disease.  Suspicious for pyoderma or hidradenitis superimposed on her IBD  Biopsies obtained, showing ulcerated skin and subcutis with acute and chronic inflammation and several non-necrotizing granulomas  Received dose of Remicade on 06/04   Avoid NSAIDs given risk of IBD " exacerbation  Pain management with oxycodone, Lyrica, and scheduled acetaminophen  clindamycin 1% solution BID to L axillae    Mix approximately equal parts triamcinolone 0.1% cream, ketoconazole 2% cream, and milk of magnesia in a sterile urine container. Shake vigorously to mix. Apply to bilateral inguinal cleft BID.   Hemoglobins baths at least twice weekly   Bleach baths at home 1-2 times weekly    Hidradenitis suppurativa   [facility]   Has been seen previously in dermatology clinic when she was well controlled on infliximab per GI. However, unfortunately lost insurance/ infliximab and now is flaring  Resumed on infliximab 06/04/2025  Receives infliximab every 4 weeks, so next dose would be around 7/2  Will need to f/u with dermatology outpatient  Wound followed and managed by consistent, contracted [facility] NP    Thrombocytosis   Likely reactive due to IBD  Trend on regular labs   DVT prophylaxis with enoxaparin    Iron-deficiency anemia due to chronic blood loss  Symptomatic anemia  Trend hemoglobin on serial CBC   Transfuse for hemoglobin/hematocrit less than 7/21 or if becomes hemodynamically unstable  Transfused 1 unit PRBC on 6/6   Iron studies  (6/6): Iron 132, TIBC 222, 59 % sat, Ferritin 197.   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   Trend on regular labs   Mag stable at 1.9    IP [facility] RISK OF UNPLANNED READMISSION Model:  MODERATE     Diet:  Regular   GI Ppx:  Famotidine   DVT Ppx:  Enoxaparin  Lines:  Midline 6/5  Drains:  Ileostomy    Airways:n/a   Wounds:  Pelvis, labia, perineum    Goals of Care:   Restorative  Treat infection  Optimize nutrition  Wound healing  Muscle strengthening  Restoration of ADL's  Improve mobility    Anticipated Disposition:    Wants to go transfer to [facility] for all her specialty appointments, however I'm concerned she may not be able to get these appts in a timely manner (next remicade infusion scheduled for 7/10 at [facility])  Will need family member training for wound care. Patient unable to do wound care independently due to location of wounds  Daily dressing changes  D/c plan: home, will not be able to get  d/t insurance. She will need to follow up with her [facility] providers.   Will see if we can schedule wound clinic visits. Patient will need wound care supplies on d/c  Plan for d/c home 6/25  Will need follow up with Dermatology, CRS (Dr. Yip), and GI  Follow up appointments:   No future appointments.      Terri Parada NP  [facility] Medicine  [facility]    Total time spent: 52 minutes  Description of Time: counseling patient on clinical condition, therapies provided, plan of care, emotional support, coordinating patient care with other care team members, reviewing and interpreting labs and imaging, collaboration with physician, initiating new orders, chart review, and documentation. See interval hx and assessment/plan for details.

## 2025-06-17 NOTE — NURSING
RN with Wound Care provided wound care demonstration to patient's cousin Nicky & friend who is a CNA/lives down street from patient, both willing in able to assist patient with daily dressing changes. RN scheduled another meeting tomm after 11 for 2nd training & return demonstration. No further questions at this time.

## 2025-06-18 LAB
ALBUMIN SERPL BCP-MCNC: 3.1 G/DL (ref 3.5–5.2)
ALP SERPL-CCNC: 70 UNIT/L (ref 40–150)
ALT SERPL W/O P-5'-P-CCNC: 10 UNIT/L (ref 10–44)
ANION GAP (OHS): 9 MMOL/L (ref 8–16)
AST SERPL-CCNC: 16 UNIT/L (ref 11–45)
BILIRUB DIRECT SERPL-MCNC: 0.1 MG/DL (ref 0.1–0.3)
BILIRUB SERPL-MCNC: 0.2 MG/DL (ref 0.1–1)
BUN SERPL-MCNC: 8 MG/DL (ref 6–20)
CALCIUM SERPL-MCNC: 8.7 MG/DL (ref 8.7–10.5)
CHLORIDE SERPL-SCNC: 106 MMOL/L (ref 95–110)
CO2 SERPL-SCNC: 21 MMOL/L (ref 23–29)
CREAT SERPL-MCNC: 0.6 MG/DL (ref 0.5–1.4)
ERYTHROCYTE [DISTWIDTH] IN BLOOD BY AUTOMATED COUNT: 30.1 % (ref 11.5–14.5)
GFR SERPLBLD CREATININE-BSD FMLA CKD-EPI: >60 ML/MIN/1.73/M2
GLUCOSE SERPL-MCNC: 88 MG/DL (ref 70–110)
HCT VFR BLD AUTO: 29.8 % (ref 37–48.5)
HGB BLD-MCNC: 8.7 GM/DL (ref 12–16)
MAGNESIUM SERPL-MCNC: 1.8 MG/DL (ref 1.6–2.6)
MCH RBC QN AUTO: 18 PG (ref 27–31)
MCHC RBC AUTO-ENTMCNC: 29.2 G/DL (ref 32–36)
MCV RBC AUTO: 62 FL (ref 82–98)
PHOSPHATE SERPL-MCNC: 4.2 MG/DL (ref 2.7–4.5)
PLATELET # BLD AUTO: 376 K/UL (ref 150–450)
PMV BLD AUTO: ABNORMAL FL
POTASSIUM SERPL-SCNC: 4.3 MMOL/L (ref 3.5–5.1)
PROT SERPL-MCNC: 8.5 GM/DL (ref 6–8.4)
RBC # BLD AUTO: 4.84 M/UL (ref 4–5.4)
SODIUM SERPL-SCNC: 136 MMOL/L (ref 136–145)
WBC # BLD AUTO: 6.5 K/UL (ref 3.9–12.7)

## 2025-06-18 PROCEDURE — 85027 COMPLETE CBC AUTOMATED: CPT | Performed by: HOSPITALIST

## 2025-06-18 PROCEDURE — 82947 ASSAY GLUCOSE BLOOD QUANT: CPT | Performed by: HOSPITALIST

## 2025-06-18 PROCEDURE — 84100 ASSAY OF PHOSPHORUS: CPT | Performed by: HOSPITALIST

## 2025-06-18 PROCEDURE — 11000001 HC ACUTE MED/SURG PRIVATE ROOM

## 2025-06-18 PROCEDURE — 83735 ASSAY OF MAGNESIUM: CPT | Performed by: HOSPITALIST

## 2025-06-18 PROCEDURE — 36415 COLL VENOUS BLD VENIPUNCTURE: CPT | Performed by: HOSPITALIST

## 2025-06-18 PROCEDURE — 25000003 PHARM REV CODE 250: Performed by: STUDENT IN AN ORGANIZED HEALTH CARE EDUCATION/TRAINING PROGRAM

## 2025-06-18 PROCEDURE — 82248 BILIRUBIN DIRECT: CPT | Performed by: HOSPITALIST

## 2025-06-18 PROCEDURE — 25000003 PHARM REV CODE 250: Performed by: INTERNAL MEDICINE

## 2025-06-18 PROCEDURE — 25000003 PHARM REV CODE 250: Performed by: FAMILY MEDICINE

## 2025-06-18 RX ADMIN — OXYCODONE HYDROCHLORIDE 5 MG: 5 TABLET ORAL at 08:06

## 2025-06-18 RX ADMIN — CLINDAMYCIN PHOSPHATE: 10 GEL TOPICAL at 08:06

## 2025-06-18 RX ADMIN — Medication 400 MG: at 08:06

## 2025-06-18 RX ADMIN — Medication 400 MG: at 02:06

## 2025-06-18 RX ADMIN — PREGABALIN 150 MG: 75 CAPSULE ORAL at 08:06

## 2025-06-18 RX ADMIN — ACETAMINOPHEN 1000 MG: 500 TABLET, FILM COATED ORAL at 02:06

## 2025-06-18 RX ADMIN — OXYCODONE HYDROCHLORIDE 5 MG: 5 TABLET ORAL at 05:06

## 2025-06-18 RX ADMIN — ACETAMINOPHEN 1000 MG: 500 TABLET, FILM COATED ORAL at 08:06

## 2025-06-18 RX ADMIN — FAMOTIDINE 20 MG: 20 TABLET, FILM COATED ORAL at 08:06

## 2025-06-18 RX ADMIN — HYDROMORPHONE HYDROCHLORIDE 4 MG: 4 TABLET ORAL at 12:06

## 2025-06-18 NOTE — PROGRESS NOTES
PCP appointment scheduled at [facility] for 7/15/25 9:00 am arrive 30 min prior. Address: [address] [facility]
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
## 2025-06-19 VITALS
HEART RATE: 81 BPM | DIASTOLIC BLOOD PRESSURE: 71 MMHG | OXYGEN SATURATION: 100 % | HEIGHT: 60 IN | WEIGHT: 192.25 LBS | TEMPERATURE: 98 F | BODY MASS INDEX: 37.74 KG/M2 | SYSTOLIC BLOOD PRESSURE: 109 MMHG | RESPIRATION RATE: 18 BRPM

## 2025-06-19 PROCEDURE — 25000003 PHARM REV CODE 250: Performed by: FAMILY MEDICINE

## 2025-06-19 PROCEDURE — 25000003 PHARM REV CODE 250: Performed by: STUDENT IN AN ORGANIZED HEALTH CARE EDUCATION/TRAINING PROGRAM

## 2025-06-19 PROCEDURE — 25000003 PHARM REV CODE 250: Performed by: INTERNAL MEDICINE

## 2025-06-19 RX ADMIN — FAMOTIDINE 20 MG: 20 TABLET, FILM COATED ORAL at 09:06

## 2025-06-19 RX ADMIN — PREGABALIN 150 MG: 75 CAPSULE ORAL at 09:06

## 2025-06-19 RX ADMIN — OXYCODONE HYDROCHLORIDE 5 MG: 5 TABLET ORAL at 09:06

## 2025-06-19 RX ADMIN — Medication 400 MG: at 09:06

## 2025-06-19 RX ADMIN — ACETAMINOPHEN 1000 MG: 500 TABLET, FILM COATED ORAL at 09:06

## 2025-06-19 NOTE — PLAN OF CARE
Problem: Adult Inpatient Plan of Care  Goal: Plan of Care Review  Outcome: Progressing     Problem: Wound  Goal: Optimal Wound Healing  Outcome: Progressing   No acute changes. TAWNYA FAJARDO.

## 2025-06-19 NOTE — NURSING
RN with Wound Care provided wound care instructions & demonstration to patient's cousin Nicky & friend who lives down the street and is a CNA, both willing and able. Both took turns physically demonstrating how they would perform wound care at home. Reviewed s/s of infection with both including to immediately report odor, fever, change in drainage, increase in pain, or an increase in size of wounds now or new areas that need attention. No questions from either at this time.

## 2025-06-19 NOTE — PLAN OF CARE
06/19/25 1032   Final Note   Assessment Type Final Discharge Note   Anticipated Discharge Disposition Home-Health   What phone number can be called within the next 1-3 days to see how you are doing after discharge? [phone]   Hospital Resources/Appts/Education Provided Appointments scheduled and added to AVS;Post-Acute resouces added to AVS   Post-Acute Status   Post-Acute Authorization Home Health   Home Health Status Set-up Complete/Auth obtained   Discharge Delays None known at this time     Pt being discharged to home at address on face sheet. Pt denies the need for home modifications or DME at this time. Pt will have [facility] for home wound care admit next week. Pt verbalized understanding. Will provide pt with wound care supplies until she is seen at home. Follow up appointments scheduled, print out calendar provided to pt. Pt will have transportation by her friend to appointments. Pt stated her discharge medications are ready for  at [facility] pharmacy, plans to  when she leaves [facility]. Pt signed the Patient Choice Statement, original in chart. Instructions/education provided by CM and all questions addressed and answered. Team notified via secure chat and print out.

## 2025-06-19 NOTE — PROGRESS NOTES
[facility] Medicine  Progress Note    Room: 233/233   Patient Name: Nikkie Myles  MRN: [id number]  Admit Date: 6/5/2025   Length of Stay: 14  Attending Physician: Girma Levi MD  Nurse Practitioner: Fatuma Hendricks NP  Code Status: Full Code    Date of Service: 06/19/2025    Principal Problem:   Crohn's disease of perianal region with complication      HPI obtained from patient, review of previous hospital records, and summarization.   HPI     Nikkie Myles is a 35 y.o. 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  She presented to [facility] ED with with perianal pain, abdominal cramps, panniculitis, and diffuse joint pain.  CT showed probable perirectal fistula and anterior abdominal wall wound, chronic inflammatory changes of the colon and TI, diverting loop ileostomy parastomal hernia containing bowel.  CRS was consulted, and underwent rectal exam under anesthesia with biopsy.  Found to have evidence of deep ulceration wounds checking up her gluteal cleft, in her pannus, and in both labia.  She also had a deep ulcerated fistula trach along the right posterior perianal skin.  Wound seemed to be consistent with hidradenitis/pyoderma.  Biopsies were obtained.  Dermatology, ID, and GI consulted.  She received infliximab 10mg/kg on 6/4.  Pending biopsy reports for pyoderma/hidradenitis.  Ongoing needs for long-term pain management and wound care.    Patient is being admitted to [facility] for pain management and wound care.    Interval History    No new complaints.  outpt wound care.   Family member trained for wound care. Patient unable to do wound care independently due to location of wounds  D/c plan: home, will not be able to get  d/t insurance. She  will need to follow up with her [facility] providers.   Patient will need wound care supplies on d/c  Plan for d/c home     Past Medical History:      Past Medical History: Patient has a past medical history of Anal fistula, Chronic diarrhea, Crohn's disease (2022), Enteropathic arthropathy, and Eye injury.    Past Surgical History: Patient has a past surgical history that includes  section, low transverse (2013);  section with tubal ligation (Bilateral, 2020);  section (); Examination under anesthesia (N/A, 8/15/2022); Flexible sigmoidoscopy (N/A, 8/15/2022); Esophagogastroduodenoscopy (N/A, 2022); Colonoscopy (N/A, 2022); Laparoscopic ileostomy (N/A, 2022); Digital rectal examination under anesthesia (N/A, 2025); and Biopsy of anus (N/A, 2025).    Social History: Patient reports that she has quit smoking. Her smoking use included cigarettes. She has a 5 pack-year smoking history. She has never used smokeless tobacco. She reports current alcohol use. She reports that she does not use drugs.    Family History:  family history includes Glaucoma in her maternal grandmother; Hypertension in her father and mother; No Known Problems in her brother, maternal aunt, maternal grandfather, maternal uncle, paternal aunt, paternal grandfather, paternal grandmother, paternal uncle, and sister.    Home Medications: reconciled     Allergies: Patient has no known allergies.      Subjective:     Review of Systems   Constitutional:  Positive for malaise/fatigue. Negative for chills and fever.   Respiratory:  Negative for cough and shortness of breath.    Cardiovascular:  Negative for chest pain and leg swelling.   Gastrointestinal:  Positive for abdominal pain. Negative for nausea and vomiting.   Genitourinary:  Negative for dysuria, flank pain and urgency.   Musculoskeletal:  Negative for back pain and neck pain.        Perineal pain and pain to pannus wound    Neurological:  Positive for weakness. Negative for dizziness and headaches.   Psychiatric/Behavioral:  Negative for depression. The patient is not nervous/anxious.          Objective:     Vital Signs:  Temp:  [97.6 °F (36.4 °C)-98.4 °F (36.9 °C)]   Pulse:  [69-96]   Resp:  [18]   BP: (105-110)/(60-70)   SpO2:  [98 %-100 %]     Body mass index is 37.54 kg/m².           Intake and Output:    Intake/Output Summary (Last 24 hours) at 6/19/2025 0045  Last data filed at 6/18/2025 1800  Gross per 24 hour   Intake 1195 ml   Output --   Net 1195 ml          Physical Exam  HENT:      Head: Normocephalic and atraumatic.      Mouth/Throat:      Mouth: Mucous membranes are moist.      Pharynx: Oropharynx is clear.   Eyes:      Extraocular Movements: Extraocular movements intact.      Pupils: Pupils are equal, round, and reactive to light.   Cardiovascular:      Rate and Rhythm: Normal rate and regular rhythm.   Pulmonary:      Effort: Pulmonary effort is normal.      Breath sounds: Normal breath sounds.   Abdominal:      General: Bowel sounds are normal. There is no distension.      Palpations: Abdomen is soft.      Comments: Ileostomy in place  Wound to pannus CAMMY   Musculoskeletal:      Right lower leg: No edema.      Left lower leg: No edema.   Skin:     General: Skin is warm and dry.      Capillary Refill: Capillary refill takes less than 2 seconds.      Comments: Perineal wounds +   Neurological:      General: No focal deficit present.      Mental Status: She is alert.   Psychiatric:         Mood and Affect: Mood normal.         Behavior: Behavior normal.         Patient consistently followed by Wound Care NP    Labs:  Recent Labs   Lab 06/13/25  0459 06/16/25  0412 06/18/25  0446   WBC 7.66 6.91 6.50   HGB 8.5* 8.6* 8.7*   HCT 29.5* 29.5* 29.8*    379 376     Recent Labs   Lab 06/13/25  0508 06/16/25  0412 06/18/25  0446    137 136   K 4.2 4.2 4.3    106 106   CO2 20* 23 21*   BUN 14 10 8   CREATININE  "0.7 0.6 0.6   * 91 88   CALCIUM 8.9 8.7 8.7   MG 1.6 1.9 1.8   PHOS 4.5 3.9 4.2     Recent Labs   Lab 06/13/25  0508 06/16/25  0412 06/18/25  0446   ALKPHOS 85 86 70   ALT 15 16 10   AST 19 22 16   ALBUMIN 3.0* 3.0* 3.1*   PROT 8.5* 8.6* 8.5*   BILITOT 0.1 0.1 0.2     No results for input(s): "POCTGLUCOSE" in the last 72 hours.    Meds Scheduled:   acetaminophen  1,000 mg Oral TID    clindamycin phosphate 1%   Topical (Top) BID    enoxparin  40 mg Subcutaneous Daily    ergocalciferol  50,000 Units Oral Q7 Days    famotidine  20 mg Oral BID    magnesium oxide  400 mg Oral TID    oxyCODONE  5 mg Oral QID    pregabalin  150 mg Oral BID         Current Inpatient Problem List:  Active Hospital Problems    Diagnosis  POA    *Crohn's disease of perianal region with complication [K50.119]  Yes    Thrombocytosis [D75.839]  Yes    Crohn's disease of both small and large intestine with fistula [K50.813]  Yes    Iron deficiency anemia due to chronic blood loss [D50.0]  Yes    Symptomatic anemia [D64.9]  Yes      Resolved Hospital Problems   No resolved problems to display.         Assessment / Plan:   Crohn's disease with perianal region with complication   Crohn's disease of both small and large intestine with fistula  Seen by CRS while in the hospital, wounds appear to be more extensive than Crohn's related fistula disease.  Suspicious for pyoderma or hidradenitis superimposed on her IBD  Biopsies obtained, showing ulcerated skin and subcutis with acute and chronic inflammation and several non-necrotizing granulomas  Received dose of Remicade on 06/04   Avoid NSAIDs given risk of IBD exacerbation  Pain management with oxycodone, Lyrica, and scheduled acetaminophen  clindamycin 1% solution BID to L axillae    Mix approximately equal parts triamcinolone 0.1% cream, ketoconazole 2% cream, and milk of magnesia in a sterile urine container. Shake vigorously to mix. Apply to bilateral inguinal cleft BID.   Hemoglobins baths " at least twice weekly   Bleach baths at home 1-2 times weekly    Hidradenitis suppurativa   Sees [facility]   Has been seen previously in dermatology clinic when she was well controlled on infliximab per GI. However, unfortunately lost insurance/ infliximab and now is flaring  Resumed on infliximab 06/04/2025  Receives infliximab every 4 weeks, so next dose would be around 7/2  Will need to f/u with dermatology outpatient  Wound followed and managed by consistent, contracted [facility] NP    Thrombocytosis   Likely reactive due to IBD  Trend on regular labs   DVT prophylaxis with enoxaparin    Iron-deficiency anemia due to chronic blood loss  Symptomatic anemia  Trend hemoglobin on serial CBC   Transfuse for hemoglobin/hematocrit less than 7/21 or if becomes hemodynamically unstable  Transfused 1 unit PRBC on 6/6   Iron studies  (6/6): Iron 132, TIBC 222, 59 % sat, Ferritin 197.   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   Trend on regular labs   Mag stable at 1.9    IP [facility] RISK OF UNPLANNED READMISSION Model:  MODERATE     Diet:  Regular   GI Ppx:  Famotidine   DVT Ppx:  Enoxaparin  Lines:  Midline 6/5  Drains:  Ileostomy   Airways:n/a   Wounds:  Pelvis, labia, perineum    Goals of Care:   Restorative  Treat infection  Optimize nutrition  Wound healing  Muscle strengthening  Restoration of ADL's  Improve mobility    Anticipated Disposition:    Wants to go transfer to [facility] for all her specialty appointments, however I'm concerned she may not be able to get these  appts in a timely manner (next remicade infusion scheduled for 7/10 at [facility])  Will need family member training for wound care. Patient unable to do wound care independently due to location of wounds  Daily dressing changes  D/c plan: home, will not be able to get  d/t insurance. She will need to follow up with her [facility] providers.   Will see if we can schedule wound clinic visits. Patient will need wound care supplies on d/c  Plan for d/c home 6/25  Will need follow up with Dermatology, CRS (Dr. Yip), and GI  Follow up appointments:   No future appointments.      Fatuma Hendricks, ANDRAE  Hospital Medicine [facility]

## 2025-06-19 NOTE — PLAN OF CARE
Problem: Adult Inpatient Plan of Care  Goal: Plan of Care Review  Outcome: Progressing     Problem: Wound  Goal: Skin Health and Integrity  Outcome: Progressing   Patient has no signs and symptoms of acute distress or discomfort at this time. Ambulated in room. Bed in low locked position. Call light within reach.

## 2025-06-19 NOTE — DISCHARGE SUMMARY
[facility] Medicine  Discharge Summary  Room: 233/233   Patient Name: Nikkie Myles  MRN: [id number]  Admit Date: 6/5/2025   Date of Discharge:    Length of Stay:  LOS: 14 days   Attending Physician: Girma Levi MD  Nurse Practitioner: Terri Parada NP  Code Status:  Full Code    Date of Service: 06/19/2025    Principal Problem: Crohn's disease of perianal region with complication    HPI  Nikkie Myles is a 35 y.o. 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  She presented to [facility] ED with with perianal pain, abdominal cramps, panniculitis, and diffuse joint pain.  CT showed probable perirectal fistula and anterior abdominal wall wound, chronic inflammatory changes of the colon and TI, diverting loop ileostomy parastomal hernia containing bowel.  CRS was consulted, and underwent rectal exam under anesthesia with biopsy.  Found to have evidence of deep ulceration wounds checking up her gluteal cleft, in her pannus, and in both labia.  She also had a deep ulcerated fistula trach along the right posterior perianal skin.  Wound seemed to be consistent with hidradenitis/pyoderma.  Biopsies were obtained.  Dermatology, ID, and GI consulted.  She received infliximab 10mg/kg on 6/4.  Pending biopsy reports for pyoderma/hidradenitis.  Ongoing needs for long-term pain management and wound care.     Patient is being admitted to [facility] for pain management and wound care.    Hospital Course  Admitted to [facility] on 06/05/2025 for pain management and wound care.  Course was uncomplicated.  She was able to be weaned off of IV pain medications.  She was discharged home on 06/19. Pt will have [facility] for home wound care. Follow up scheduled for Dermatology, CRS,  and GI    Crohn's disease with perianal region with complication   Crohn's disease of both small and large intestine with fistula  Seen by CRS while in the hospital, wounds appear to be more extensive than Crohn's related fistula disease.  Suspicious for pyoderma or hidradenitis superimposed on her IBD  Biopsies obtained, showing ulcerated skin and subcutis with acute and chronic inflammation and several non-necrotizing granulomas  Received dose of Remicade on 06/04   Avoid NSAIDs given risk of IBD exacerbation  Pain management with oxycodone, Lyrica, and scheduled acetaminophen  clindamycin 1% solution BID to L axillae    Mix approximately equal parts triamcinolone 0.1% cream, ketoconazole 2% cream, and milk of magnesia in a sterile urine container. Shake vigorously to mix. Apply to bilateral inguinal cleft BID.   Hemoglobins baths at least twice weekly   Bleach baths at home 1-2 times weekly     Hidradenitis suppurativa   [facility]   Has been seen previously in dermatology clinic when she was well controlled on infliximab per GI. However, unfortunately lost insurance/ infliximab and now is flaring  Resumed on infliximab 06/04/2025  Receives infliximab every 4 weeks, so next dose would be around 7/2  Will need to f/u with dermatology outpatient  Wound followed and managed by consistent, contracted [facility] NP     Thrombocytosis   Likely reactive due to IBD  Trend on regular labs   DVT prophylaxis with enoxaparin     Iron-deficiency anemia due to chronic blood loss  Symptomatic anemia  Trend hemoglobin on serial CBC   Transfuse for hemoglobin/hematocrit less than 7/21 or if becomes hemodynamically unstable  Transfused 1 unit PRBC on 6/6   Iron studies  (6/6): Iron 132, TIBC 222, 59 % sat, Ferritin 197.   She is iron replete, no need for iron supplementation currently   Hemoglobin stable at 8.6      Ileostomy in place   Routine care     Anterior pelvis ulceration   Non pressure related chronic  ulcer of labia  Non pressure related chronic ulcer perineum  Wound followed and managed by consistent, contracted [facility] NP     Hypotension   Asymptomatic  On scheduled oxycodone and Lyrica     Hypomagnesemia   Continue Mag-Ox 400 mg t.i.d.   Trend on regular labs   Mag stable at 1.9    At the time of discharge patient was told to take all medications as prescribed, to keep all followup appointments, and to call their primary care physician or return to the emergency room if they have any worsening or concerning symptoms.    Physical Exam  HENT:      Head: Normocephalic and atraumatic.      Mouth/Throat:      Mouth: Mucous membranes are moist.      Pharynx: Oropharynx is clear.   Eyes:      Extraocular Movements: Extraocular movements intact.      Pupils: Pupils are equal, round, and reactive to light.   Cardiovascular:      Rate and Rhythm: Normal rate and regular rhythm.   Pulmonary:      Effort: Pulmonary effort is normal.      Breath sounds: Normal breath sounds.   Abdominal:      General: Bowel sounds are normal. There is no distension.      Palpations: Abdomen is soft.      Comments: Ileostomy in place  Wound to joshua CAMMY   Musculoskeletal:      Right lower leg: No edema.      Left lower leg: No edema.   Skin:     General: Skin is warm and dry.      Capillary Refill: Capillary refill takes less than 2 seconds.      Comments: Perineal wounds +   Neurological:      General: No focal deficit present.      Mental Status: She is alert.   Psychiatric:         Mood and Affect: Mood normal.         Behavior: Behavior normal.     Recent Labs   Lab 06/13/25  0459 06/16/25  0412 06/18/25  0446   WBC 7.66 6.91 6.50   HGB 8.5* 8.6* 8.7*   HCT 29.5* 29.5* 29.8*    379 376     Recent Labs   Lab 06/13/25  0508 06/16/25  0412 06/18/25  0446    137 136   K 4.2 4.2 4.3    106 106   CO2 20* 23 21*   BUN 14 10 8   CREATININE 0.7 0.6 0.6   * 91 88   CALCIUM 8.9 8.7 8.7   MG 1.6 1.9 1.8   PHOS 4.5  "3.9 4.2     Recent Labs   Lab 06/13/25  0508 06/16/25  0412 06/18/25  0446   ALKPHOS 85 86 70   ALT 15 16 10   AST 19 22 16   ALBUMIN 3.0* 3.0* 3.1*   PROT 8.5* 8.6* 8.5*   BILITOT 0.1 0.1 0.2      No results for input(s): "CPK", "CPKMB", "MB", "TROPONINI" in the last 72 hours.  No results for input(s): "LACTATE" in the last 72 hours.    No results for input(s): "POCTGLUCOSE" in the last 168 hours.   Hemoglobin A1C   Date Value Ref Range Status   04/21/2022 5.7 (H) 4.7 - 5.6 % Final   07/29/2020 4.9 4.0 - 5.6 % Final     Comment:     ADA Screening Guidelines:  5.7-6.4%  Consistent with prediabetes  >or=6.5%  Consistent with diabetes  High levels of fetal hemoglobin interfere with the HbA1C  assay. Heterozygous hemoglobin variants (HbS, HgC, etc)do  not significantly interfere with this assay.   However, presence of multiple variants may affect accuracy.     07/23/2020 5.4 4.0 - 5.6 % Final     Comment:     ADA Screening Guidelines:  5.7-6.4%  Consistent with prediabetes  >or=6.5%  Consistent with diabetes  High levels of fetal hemoglobin interfere with the HbA1C  assay. Heterozygous hemoglobin variants (HbS, HgC, etc)do  not significantly interfere with this assay.   However, presence of multiple variants may affect accuracy.     04/14/2020 5.4 4.0 - 5.6 % Final     Comment:     ADA Screening Guidelines:  5.7-6.4%  Consistent with prediabetes  >or=6.5%  Consistent with diabetes  High levels of fetal hemoglobin interfere with the HbA1C  assay. Heterozygous hemoglobin variants (HbS, HgC, etc)do  not significantly interfere with this assay.   However, presence of multiple variants may affect accuracy.         Procedures: none    Consultants: wound care    Current Discharge Medication List        START taking these medications    Details   clindamycin phosphate 1% 1 % gel Apply topically 2 (two) times daily.  Qty: 60 g, Refills: 2      ergocalciferol (ERGOCALCIFEROL) 50,000 unit Cap Take 1 capsule (50,000 Units total) " by mouth every 7 days.  Qty: 12 capsule, Refills: 2      famotidine (PEPCID) 20 MG tablet Take 1 tablet (20 mg total) by mouth 2 (two) times daily.  Qty: 60 tablet, Refills: 11      HYDROmorphone (DILAUDID) 4 MG tablet Take 1 tablet (4 mg total) by mouth every 6 (six) hours as needed for Pain.  Qty: 20 tablet, Refills: 0    Comments: Quantity prescribed more than 7 day supply? No  Associated Diagnoses: Crohn's disease of perianal region with complication      magnesium oxide (MAG-OX) 400 mg (241.3 mg magnesium) tablet Take 1 tablet (400 mg total) by mouth 3 (three) times daily.  Qty: 90 tablet, Refills: 0      pregabalin (LYRICA) 150 MG capsule Take 1 capsule (150 mg total) by mouth 2 (two) times daily.  Qty: 60 capsule, Refills: 2    Associated Diagnoses: Crohn's disease of perianal region with complication             Discharge Diet:regular diet with Normal Fluid intake of 1500 - 2000 mL per day    Activity: activity as tolerated    Discharge Condition: Good    Tests pending at the time of discharge: none      Disposition: Home or Self Care    No future appointments.    34 minutes total time spent on the discharge of the patient including review of hospital course with the patient, reviewing discharge medications, and arranging follow-up care.      Terri Parada NP  Hospital Medicine [facility]

## 2025-06-19 NOTE — PROGRESS NOTES
06/19/25 1230        Wound 05/27/25 2225 Ulceration lower Abdomen   Date First Assessed/Time First Assessed: 05/27/25 2225   Present on Original Admission: Yes  Primary Wound Type: Ulceration  Orientation: lower  Location: (c) Abdomen  Is this injury device related?: No   Wound Image    Dressing Appearance Moist drainage   Drainage Amount Moderate   Drainage Characteristics/Odor Serosanguineous   Appearance Moist;Red   Tissue loss description Full thickness   Red (%), Wound Tissue Color 100 %   Periwound Area Moist   Wound Edges Open   Wound Length (cm) 3.2 cm   Wound Width (cm) 20 cm   Wound Depth (cm) 2 cm   Wound Volume (cm^3) 67.021 cm^3   Wound Surface Area (cm^2) 50.27 cm^2   Care Cleansed with:;Wound cleanser   Dressing   (pack with silver alginate, abd pad, mesh underwear)        Wound 05/28/25 2000 Ulceration Gluteal cleft   Date First Assessed/Time First Assessed: 05/28/25 2000   Present on Original Admission: Yes  Primary Wound Type: Ulceration  Location: (c) Gluteal cleft  Is this injury device related?: No   Wound Image     Dressing Appearance Moist drainage   Drainage Amount Moderate   Drainage Characteristics/Odor Serosanguineous   Appearance Red;Moist   Tissue loss description Full thickness   Red (%), Wound Tissue Color 100 %   Periwound Area Moist   Wound Edges Open   Wound Length (cm) 23.2 cm   Wound Width (cm) 1 cm   Wound Depth (cm) 2 cm   Wound Volume (cm^3) 24.295 cm^3   Wound Surface Area (cm^2) 18.22 cm^2   Care Wound cleanser   Dressing   (pack with silver alginate, abd pad, mesh underwear)        Wound 05/28/25 2000 Ulceration Labia   Date First Assessed/Time First Assessed: 05/28/25 2000   Present on Original Admission: Yes  Primary Wound Type: Ulceration  Location: Labia  Is this injury device related?: No  Wound Outcome: Converged   Wound Image     Dressing Appearance Moist drainage   Drainage Amount Moderate   Drainage Characteristics/Odor Serous   Appearance Red;Moist   Red (%),  Wound Tissue Color 100 %   Periwound Area Moist   Wound Edges Open   Wound Length (cm) 1.4 cm   Wound Width (cm) 0.3 cm   Wound Depth (cm) 0.1 cm   Wound Volume (cm^3) 0.022 cm^3   Wound Surface Area (cm^2) 0.33 cm^2   Care Cleansed with:;Wound cleanser   Dressing   (pack with silver alginate, abd pad, mesh underwear)        Wound 06/06/25 1320 Ulceration Left Axilla   Date First Assessed/Time First Assessed: 06/06/25 1320   Present on Original Admission: Yes  Primary Wound Type: Ulceration  Side: Left  Location: Axilla  Is this injury device related?: No   Wound Image    Dressing Appearance Open to air;Moist drainage   Drainage Amount Scant   Drainage Characteristics/Odor Serous   Appearance Red;Moist   Red (%), Wound Tissue Color 100 %   Periwound Area Moist   Wound Edges Open   Wound Length (cm) 0.03 cm   Wound Width (cm) 0.2 cm   Wound Depth (cm) 0.1 cm   Wound Volume (cm^3) 0 cm^3   Wound Surface Area (cm^2) 0 cm^2   Care Wound cleanser   Dressing   (clindamycin gel open to air)        Wound 06/06/25 1320 Ulceration Left Groin   Date First Assessed/Time First Assessed: 06/06/25 1320   Present on Original Admission: Yes  Primary Wound Type: Ulceration  Side: Left  Location: Groin   Wound Image    Dressing Appearance Moist drainage   Drainage Amount Small   Drainage Characteristics/Odor Serosanguineous   Appearance Moist   Tissue loss description Full thickness   Red (%), Wound Tissue Color 100 %   Periwound Area Moist   Wound Length (cm) 2.8 cm   Wound Width (cm) 0.5 cm   Wound Depth (cm) 0.5 cm   Wound Volume (cm^3) 0.367 cm^3   Wound Surface Area (cm^2) 1.1 cm^2   Care Cleansed with:;Wound cleanser   Dressing   (pack with silver alginate, abd pad, mesh underwear)

## 2025-06-19 NOTE — NURSING
Patient was discharged to home. Medication listed was discussed with patient . All items was removed from room.

## 2025-06-19 NOTE — NURSING
RN with wound care photographed wounds on day of d/c, provided at least 7 to 10 days worth of wound care supplies, Wound Care company not able to come out until next Thursday. Included ostomy supplies, gauze, vashe, mesh underwear, silver alginate, and abd pads with sterile scissor pack. Recommendation made for patient to use kotex if abd pads should run low. Patient does not have any further questions after reviewing frequency needed, s/s of infection to report.
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
## 2025-06-24 ENCOUNTER — TELEPHONE (OUTPATIENT)
Dept: DERMATOLOGY | Facility: CLINIC | Age: 35
End: 2025-06-24
Payer: COMMERCIAL

## 2025-06-24 NOTE — TELEPHONE ENCOUNTER
----- Message from Willi Richards sent at 6/22/2025  4:56 PM CDT -----  Regarding: Acute Dermatology Follow Up  Hi all, please schedule this patient for outpatient acute derm follow up when possible within the next few weeks for PG of the groin. Thanks so much in advance.

## 2025-06-24 NOTE — TELEPHONE ENCOUNTER
Called patient and left message for her to call back to schedule an appointment in acute derm in the next couple weeks for PG.

## (undated) DEVICE — COVER MAYO STND XL 30X57IN

## (undated) DEVICE — TRAY MINOR GEN SURG OMC

## (undated) DEVICE — IRRIGATOR ENDOSCOPY DISP.

## (undated) DEVICE — DRAPE ABDOMINAL TIBURON 14X11

## (undated) DEVICE — PANTIES FEMININE NAPKIN LG/XLG

## (undated) DEVICE — PENCIL ROCKER SWITCH 10FT CORD

## (undated) DEVICE — TROCAR ENDOPATH XCEL 5MM 7.5CM

## (undated) DEVICE — SUT MONOCRYL 4-0 PS-2

## (undated) DEVICE — SEE MEDLINE ITEM 156902

## (undated) DEVICE — SUT PDS II 3-0 SH 36IN

## (undated) DEVICE — LUBRICANT SURGILUBE 2 OZ

## (undated) DEVICE — APPLICATOR CHLORAPREP ORN 26ML

## (undated) DEVICE — ELECTRODE MEGADYNE RETURN DUAL

## (undated) DEVICE — TRAY SKIN SCRUB WET PREMIUM

## (undated) DEVICE — SUT 1 27IN PDS II VIO MONO

## (undated) DEVICE — NDL BOX COUNTER

## (undated) DEVICE — TIP YANKAUERS BULB NO VENT

## (undated) DEVICE — TUBING HF INSUFFLATION W/ FLTR

## (undated) DEVICE — RELOAD PROXIMATE GREEN 75MM

## (undated) DEVICE — TROCAR KII BLLN 12MM 10CM

## (undated) DEVICE — SYR 10CC LUER LOCK

## (undated) DEVICE — PUNCH BIOPSY 4MM STERILE DISPO

## (undated) DEVICE — ADHESIVE DERMABOND ADVANCED

## (undated) DEVICE — KIT ANTIFOG W/SPONG & FLUID

## (undated) DEVICE — SEALER LIGASURE MARYLAND 37CM

## (undated) DEVICE — PAD PINK TRENDELENBURG POS XL

## (undated) DEVICE — DRAPE LAP T SHT W/ INSTR PAD

## (undated) DEVICE — TRAY CATH FOL SIL URIMTR 16FR

## (undated) DEVICE — TAPE SILK 3IN

## (undated) DEVICE — NDL 22GA X1 1/2 REG BEVEL

## (undated) DEVICE — DRESSING AQUACEL AG 3.5X10IN

## (undated) DEVICE — COVER LIGHT HANDLE 80/CA

## (undated) DEVICE — TRAY MINOR GEN SURG

## (undated) DEVICE — ELECTRODE REM PLYHSV RETURN 9

## (undated) DEVICE — DRAIN PENROSE XRAY 12 X 1/2 ST

## (undated) DEVICE — Device

## (undated) DEVICE — NDL INSUF ULTRA VERESS 120MM

## (undated) DEVICE — TROCAR ENDOPATH XCEL 5X75MM

## (undated) DEVICE — NDL HYPO STD REG BVL 22GX1.5IN

## (undated) DEVICE — STAPLER INT PROX TX 60X3.5MM

## (undated) DEVICE — DRESSING HYDROFIBER 4X5IN

## (undated) DEVICE — BOWL STERILE LARGE 32OZ

## (undated) DEVICE — CUTTER PROXIMATE BLUE 75MM

## (undated) DEVICE — DRAPE CORETEMP FLD WRM 56X62IN

## (undated) DEVICE — MARKER SKIN STND TIP BLUE BARR